# Patient Record
Sex: MALE | Race: ASIAN | NOT HISPANIC OR LATINO
[De-identification: names, ages, dates, MRNs, and addresses within clinical notes are randomized per-mention and may not be internally consistent; named-entity substitution may affect disease eponyms.]

---

## 2017-01-23 ENCOUNTER — FORM ENCOUNTER (OUTPATIENT)
Age: 62
End: 2017-01-23

## 2017-01-24 ENCOUNTER — APPOINTMENT (OUTPATIENT)
Age: 62
End: 2017-01-24

## 2017-02-01 PROBLEM — Z87.891 FORMER SMOKER: Status: ACTIVE | Noted: 2017-02-01

## 2017-03-31 ENCOUNTER — APPOINTMENT (OUTPATIENT)
Dept: VASCULAR SURGERY | Facility: CLINIC | Age: 62
End: 2017-03-31

## 2017-06-19 ENCOUNTER — INPATIENT (INPATIENT)
Facility: HOSPITAL | Age: 62
LOS: 1 days | Discharge: HOME | End: 2017-06-21
Attending: INTERNAL MEDICINE | Admitting: INTERNAL MEDICINE
Payer: MEDICAID

## 2017-06-19 VITALS
OXYGEN SATURATION: 100 % | RESPIRATION RATE: 20 BRPM | SYSTOLIC BLOOD PRESSURE: 181 MMHG | DIASTOLIC BLOOD PRESSURE: 109 MMHG | HEART RATE: 77 BPM | TEMPERATURE: 98 F

## 2017-06-19 DIAGNOSIS — D69.6 THROMBOCYTOPENIA, UNSPECIFIED: ICD-10-CM

## 2017-06-19 DIAGNOSIS — R30.0 DYSURIA: ICD-10-CM

## 2017-06-19 DIAGNOSIS — E87.70 FLUID OVERLOAD, UNSPECIFIED: ICD-10-CM

## 2017-06-19 DIAGNOSIS — N18.9 CHRONIC KIDNEY DISEASE, UNSPECIFIED: ICD-10-CM

## 2017-06-19 DIAGNOSIS — N18.6 END STAGE RENAL DISEASE: ICD-10-CM

## 2017-06-19 DIAGNOSIS — I77.0 ARTERIOVENOUS FISTULA, ACQUIRED: Chronic | ICD-10-CM

## 2017-06-19 DIAGNOSIS — Z29.9 ENCOUNTER FOR PROPHYLACTIC MEASURES, UNSPECIFIED: ICD-10-CM

## 2017-06-19 DIAGNOSIS — I10 ESSENTIAL (PRIMARY) HYPERTENSION: ICD-10-CM

## 2017-06-19 LAB
ALBUMIN SERPL ELPH-MCNC: 4 G/DL — SIGNIFICANT CHANGE UP (ref 3.3–5)
ALP SERPL-CCNC: 94 U/L — SIGNIFICANT CHANGE UP (ref 40–120)
ALT FLD-CCNC: 8 U/L — SIGNIFICANT CHANGE UP (ref 4–41)
AST SERPL-CCNC: 8 U/L — SIGNIFICANT CHANGE UP (ref 4–40)
BASE EXCESS BLDV CALC-SCNC: -4.6 MMOL/L — SIGNIFICANT CHANGE UP
BASOPHILS # BLD AUTO: 0.02 K/UL — SIGNIFICANT CHANGE UP (ref 0–0.2)
BASOPHILS NFR BLD AUTO: 0.3 % — SIGNIFICANT CHANGE UP (ref 0–2)
BILIRUB SERPL-MCNC: 0.3 MG/DL — SIGNIFICANT CHANGE UP (ref 0.2–1.2)
BLOOD GAS VENOUS - CREATININE: > 17.4 MG/DL — HIGH (ref 0.5–1.3)
BUN SERPL-MCNC: 110 MG/DL — HIGH (ref 7–23)
CALCIUM SERPL-MCNC: 8.9 MG/DL — SIGNIFICANT CHANGE UP (ref 8.4–10.5)
CHLORIDE BLDV-SCNC: 103 MMOL/L — SIGNIFICANT CHANGE UP (ref 96–108)
CHLORIDE SERPL-SCNC: 95 MMOL/L — LOW (ref 98–107)
CK MB BLD-MCNC: 3.39 NG/ML — SIGNIFICANT CHANGE UP (ref 1–6.6)
CK SERPL-CCNC: 154 U/L — SIGNIFICANT CHANGE UP (ref 30–200)
CO2 SERPL-SCNC: 19 MMOL/L — LOW (ref 22–31)
CREAT SERPL-MCNC: 17.06 MG/DL — HIGH (ref 0.5–1.3)
EOSINOPHIL # BLD AUTO: 0.11 K/UL — SIGNIFICANT CHANGE UP (ref 0–0.5)
EOSINOPHIL NFR BLD AUTO: 1.8 % — SIGNIFICANT CHANGE UP (ref 0–6)
GAS PNL BLDV: 135 MMOL/L — LOW (ref 136–146)
GLUCOSE BLDV-MCNC: 91 — SIGNIFICANT CHANGE UP (ref 70–99)
GLUCOSE SERPL-MCNC: 95 MG/DL — SIGNIFICANT CHANGE UP (ref 70–99)
HBV SURFACE AG SER-ACNC: NEGATIVE — SIGNIFICANT CHANGE UP
HCO3 BLDV-SCNC: 20 MMOL/L — SIGNIFICANT CHANGE UP (ref 20–27)
HCT VFR BLD CALC: 23 % — LOW (ref 39–50)
HCT VFR BLDV CALC: 23.6 % — LOW (ref 39–51)
HGB BLD-MCNC: 7.4 G/DL — LOW (ref 13–17)
HGB BLDV-MCNC: 7.6 G/DL — LOW (ref 13–17)
IMM GRANULOCYTES NFR BLD AUTO: 0.2 % — SIGNIFICANT CHANGE UP (ref 0–1.5)
LACTATE BLDV-MCNC: 0.6 MMOL/L — SIGNIFICANT CHANGE UP (ref 0.5–2)
LIDOCAIN IGE QN: 77.3 U/L — HIGH (ref 7–60)
LYMPHOCYTES # BLD AUTO: 1.59 K/UL — SIGNIFICANT CHANGE UP (ref 1–3.3)
LYMPHOCYTES # BLD AUTO: 26.5 % — SIGNIFICANT CHANGE UP (ref 13–44)
MCHC RBC-ENTMCNC: 30 PG — SIGNIFICANT CHANGE UP (ref 27–34)
MCHC RBC-ENTMCNC: 32.2 % — SIGNIFICANT CHANGE UP (ref 32–36)
MCV RBC AUTO: 93.1 FL — SIGNIFICANT CHANGE UP (ref 80–100)
MONOCYTES # BLD AUTO: 0.46 K/UL — SIGNIFICANT CHANGE UP (ref 0–0.9)
MONOCYTES NFR BLD AUTO: 7.7 % — SIGNIFICANT CHANGE UP (ref 2–14)
NEUTROPHILS # BLD AUTO: 3.82 K/UL — SIGNIFICANT CHANGE UP (ref 1.8–7.4)
NEUTROPHILS NFR BLD AUTO: 63.5 % — SIGNIFICANT CHANGE UP (ref 43–77)
NT-PROBNP SERPL-SCNC: 9621 PG/ML — SIGNIFICANT CHANGE UP
PCO2 BLDV: 45 MMHG — SIGNIFICANT CHANGE UP (ref 41–51)
PH BLDV: 7.29 PH — LOW (ref 7.32–7.43)
PLATELET # BLD AUTO: 109 K/UL — LOW (ref 150–400)
PMV BLD: 9.3 FL — SIGNIFICANT CHANGE UP (ref 7–13)
PO2 BLDV: 35 MMHG — SIGNIFICANT CHANGE UP (ref 35–40)
POTASSIUM BLDV-SCNC: 4.8 MMOL/L — HIGH (ref 3.4–4.5)
POTASSIUM SERPL-MCNC: 5.1 MMOL/L — SIGNIFICANT CHANGE UP (ref 3.5–5.3)
POTASSIUM SERPL-SCNC: 5.1 MMOL/L — SIGNIFICANT CHANGE UP (ref 3.5–5.3)
PROT SERPL-MCNC: 7 G/DL — SIGNIFICANT CHANGE UP (ref 6–8.3)
RBC # BLD: 2.47 M/UL — LOW (ref 4.2–5.8)
RBC # FLD: 16.2 % — HIGH (ref 10.3–14.5)
SAO2 % BLDV: 53.1 % — LOW (ref 60–85)
SODIUM SERPL-SCNC: 137 MMOL/L — SIGNIFICANT CHANGE UP (ref 135–145)
TROPONIN T SERPL-MCNC: < 0.06 NG/ML — SIGNIFICANT CHANGE UP (ref 0–0.06)
WBC # BLD: 6.01 K/UL — SIGNIFICANT CHANGE UP (ref 3.8–10.5)
WBC # FLD AUTO: 6.01 K/UL — SIGNIFICANT CHANGE UP (ref 3.8–10.5)

## 2017-06-19 PROCEDURE — 71020: CPT | Mod: 26

## 2017-06-19 PROCEDURE — 99223 1ST HOSP IP/OBS HIGH 75: CPT

## 2017-06-19 RX ORDER — AMLODIPINE BESYLATE 2.5 MG/1
10 TABLET ORAL DAILY
Qty: 0 | Refills: 0 | Status: DISCONTINUED | OUTPATIENT
Start: 2017-06-20 | End: 2017-06-21

## 2017-06-19 RX ORDER — CINACALCET 30 MG/1
30 TABLET, FILM COATED ORAL DAILY
Qty: 0 | Refills: 0 | Status: DISCONTINUED | OUTPATIENT
Start: 2017-06-19 | End: 2017-06-21

## 2017-06-19 RX ORDER — ERYTHROPOIETIN 10000 [IU]/ML
20000 INJECTION, SOLUTION INTRAVENOUS; SUBCUTANEOUS
Qty: 0 | Refills: 0 | Status: DISCONTINUED | OUTPATIENT
Start: 2017-06-19 | End: 2017-06-21

## 2017-06-19 RX ORDER — CALCIUM ACETATE 667 MG
2001 TABLET ORAL
Qty: 0 | Refills: 0 | Status: DISCONTINUED | OUTPATIENT
Start: 2017-06-19 | End: 2017-06-21

## 2017-06-19 RX ADMIN — ERYTHROPOIETIN 20000 UNIT(S): 10000 INJECTION, SOLUTION INTRAVENOUS; SUBCUTANEOUS at 23:04

## 2017-06-19 NOTE — ED ADULT NURSE NOTE - CHIEF COMPLAINT QUOTE
patient missed HD (normally MWF) last friday and today because he was out of the country. Arrived back from [place] on thursday and was unable to make it to HD on Friday because he was feeling tired. pt unable to go to HD today. pt states he feels SOB and is having difficulty speaking between breaths, fine crackles in the bases.    EKG complete

## 2017-06-19 NOTE — H&P ADULT - PROBLEM SELECTOR PLAN 1
ESRD on HD, missed HD x 2, HD tonight and in AM  F/U BMP, CBC, Renal F/U  Fall/aspiration precaution

## 2017-06-19 NOTE — ED PROVIDER NOTE - ATTENDING CONTRIBUTION TO CARE
Rae CASTRO- 63 Y/O M with h/o htn, esrd s/p kidney stone obstruction p/w sob after he missed dialysis last two times, last wed as pt was out of country and hence missed it, pt has chills now but no fever, cough. Pt makes small urine.    Pt is alert, well appearing male, s1s2 normal reg, b/l rales with jvd , abd soft, nt, nd, neuro exam aox3, cn 2-12 intact , skin warm, dry, good turgor, left arm av fistula thrill+    Dx:      fluid overload 2/2 missing HD, admit

## 2017-06-19 NOTE — CONSULT NOTE ADULT - PROBLEM SELECTOR RECOMMENDATION 9
urgent HD tonight, 2 hrs, 2K bath, net uf 2kg as tolearted,   next HD tomorrow w/2K bath,  net uf 2.5-3kg as tolearted,   send hepatitis profile w/hd  renal diet, fluid restriction  DENIZ holt in AM to send readmission paperwork for outpt HD at [facility] urgent HD tonight, 2 hrs, 2K bath, net uf 2kg as tolerated,   next HD tomorrow w/2K bath,  net uf 2.5-3kg as tolerated,   send hepatitis profile w/hd  renal diet, fluid restriction  DENIZ holt in AM to send readmission paperwork for [place] [facility]

## 2017-06-19 NOTE — H&P ADULT - MUSCULOSKELETAL
details… detailed exam no calf tenderness/no joint erythema/no joint warmth/normal strength/ROM intact/no joint swelling

## 2017-06-19 NOTE — ED PROVIDER NOTE - PSH
Acquired arteriovenous fistula  left wrist  1/2015 - [facility], Left upper arm 4/2015 (approximate date)  S/P Nephrectomy  (L) 2007

## 2017-06-19 NOTE — ED ADULT TRIAGE NOTE - CHIEF COMPLAINT QUOTE
patient missed HD (normally MWF) last friday and today because he was out of the country. Arrived back from [place] on thursday and was unable to make it to HD on Friday because he was feeling tired. pt unable to go to HD today. pt states he feels SOB and is having difficulty speaking between breaths, fine crackles in the bases.    EKG complete

## 2017-06-19 NOTE — H&P ADULT - PMH
Chronic kidney disease    Hemodialysis access, AV graft  Left upper extremity  HTN (Hypertension)    Hyperparathyroidism    Kidney stones Anemia    Chronic kidney disease    Hemodialysis access, AV graft  Left upper extremity  HTN (Hypertension)    Hyperparathyroidism    Kidney stones    Thrombocytopenia

## 2017-06-19 NOTE — H&P ADULT - ASSESSMENT
63 y/o male with PMHx of ESRD on HD ( M-W-F )  HTN, Chronic Anemia, Thrombocytopenia, S/P Left Nephrectomy due to Renal stone,  presenting to the ED c/o SOB that began yesterday, worse when he lays down flat, better when sitting forward. Pt reports he missed two days of dialysis because he was out of the country in [place] for the past 3 months.  Admits to epigastric pain that he said felt like gas and has resolved, NO CP, + SOB, mild dizziness, no HA, no N/V, no cough, + Dysuria, no fever, still making urine, . Denies other abdominal pain, diarrhea, fever, chills, recent illness. Pt is A+O x 3, seen By Nephrology tonight for Volume overload, HD tonight and tomorrow,

## 2017-06-19 NOTE — CONSULT NOTE ADULT - ASSESSMENT
62y Male w/ESRD on HD, HTN, anemia last HD was 6/14/17 w/fluid overload, uncontrolled BP, uremia, M.acidosis. needs urgent HD tonight 62y Male w/ESRD on HD, HTN, anemia, out of Country for >3 months, s/p missed HD, [place] spot p/w SOB. last HD was 6/14/17 w/fluid overload, uncontrolled BP, uremia, M.acidosis. needs urgent HD tonight

## 2017-06-19 NOTE — ED ADULT NURSE NOTE - CHPI ED SYMPTOMS NEG
no chills/no vomiting/no fever/no dizziness/no decreased eating/drinking/no numbness/no pain/no nausea/no tingling

## 2017-06-19 NOTE — ED PROVIDER NOTE - PMH
Chronic kidney disease    Hemodialysis access, AV graft  Left upper extremity  HTN (Hypertension)    Hyperparathyroidism    Kidney stones

## 2017-06-19 NOTE — ED PROVIDER NOTE - OBJECTIVE STATEMENT
63 yo male with PMHx of CKD on HD, htn presenting to the ED c/o SOB that began yesterday, worse when he lays down flat, better when sitting forward. Pt reports he missed two days of dialysis because he was out of the country in [place]. Admits to epigastric pain that he said felt like gas and has resolved, mild chest discomfort. Denies other abdominal pain, diarrhea, fever, chills, recent illness, urinary sx. 61 yo male with PMHx of CKD on HD, htn presenting to the ED c/o SOB that began yesterday, worse when he lays down flat, better when sitting forward. Pt reports he missed two days of dialysis because he was out of the country in [place]. Admits to epigastric pain that he said felt like gas and has resolved, mild chest discomfort. Denies other abdominal pain, diarrhea, fever, chills, recent illness, urinary sx.    [facility] [phone]. Dr. Jovan Garrison

## 2017-06-19 NOTE — CONSULT NOTE ADULT - SUBJECTIVE AND OBJECTIVE BOX
[facility] - Alicia / Ej S /Federico/ S Bojorquez/ S Faust/ Jovanbecky Juniorez / GAYATRI Njeru  ---------------------------------------------------------------------------------------------------------------    Patient is a 62y Male w/ESRD on HD, HTN, anemia out of country for >3mths, [place] new hepatitis profile, was advised by outpt HD unit to go to ER for HD and to arrange for outpt HD. c/o SOB since last night.     Patient seen and examined bedside in ER    PAST MEDICAL & SURGICAL HISTORY:  Hyperparathyroidism  Hemodialysis access, AV graft: Left upper extremity  Kidney stones  Chronic kidney disease  HTN (Hypertension)  Acquired arteriovenous fistula: left wrist  1/2015 - [facility], Left upper arm 4/2015 (approximate date)  S/P Nephrectomy: (L) 2007      Allergies: No Known Allergies    Home Medications Reviewed  Hospital Medications:   MEDICATIONS  (STANDING):  epoetin tammi Injectable 35000Wylg(s) IV Push <User Schedule>    SOCIAL HISTORY:  Denies ETOh,Smoking, illicit drug use  FAMILY HISTORY:  No pertinent family history  No pertinent family history in first degree relatives      REVIEW OF SYSTEMS:  CONSTITUTIONAL: No weakness, fevers or chills  EYES/ENT: No visual changes;  No vertigo or throat pain   NECK: No pain or stiffness  RESPIRATORY: No cough, wheezing, hemoptysis; No shortness of breath  CARDIOVASCULAR: No chest pain or palpitations.  GASTROINTESTINAL: No abdominal or epigastric pain. No nausea, vomiting, or hematemesis; No diarrhea or constipation. No melena or hematochezia.  GENITOURINARY: No dysuria, frequency, foamy urine, urinary urgency, incontinence or hematuria  NEUROLOGICAL: No numbness or weakness  SKIN: No itching, burning, rashes, or lesions   VASCULAR: No bilateral lower extremity edema.   All other review of systems is negative unless indicated above.    VITALS:  T(F): 98.4, Max: 98.4 (06-19 @ 22:02)  HR: 60  BP: 174/107  RR: 16  SpO2: 100%  Wt(kg): --        PHYSICAL EXAM:  Constitutional: NAD  HEENT: anicteric sclera, oropharynx clear, MMM  Neck: No JVD  Respiratory: CTAB, no wheezes, rales or rhonchi  Cardiovascular: S1, S2, RRR  Gastrointestinal: BS+, soft, NT/ND  Extremities: No cyanosis or clubbing. No peripheral edema  Neurological: A/O x 3, no focal deficits  Psychiatric: Normal mood, normal affect  : No CVA tenderness. No siegel.   Skin: No rashes  Vascular Access: TATIANA AVF+thrill    LABS:  06-19    137  |  95<L>  |  110<H>  ----------------------------<  95  5.1   |  19<L>  |  17.06<H>    Ca    8.9      19 Jun 2017 19:01    TPro  7.0  /  Alb  4.0  /  TBili  0.3  /  DBili      /  AST  8   /  ALT  8   /  AlkPhos  94  06-19                          7.4    6.01  )-----------( 109      ( 19 Jun 2017 19:01 )             23.0     Urine Studies:        RADIOLOGY & ADDITIONAL STUDIES: [facility] - Alicia / Ej S /Federico/ S Maryellen/ S Govind/ Jovan Garrison / GAYATRI Njeru  ---------------------------------------------------------------------------------------------------------------    Patient is a 62y Male w/ESRD on HD, HTN, anemia, pt been out of country to [place] for >3mths, [place] 6[place] w/o new hepatitis profile, called was outpt HD unit today AM who advised by to go to ER for HD and to get hepatitis profile checked for readmission to outpt HD unit. Pt well known to me from [facility]. Pt had last HD 6/14/17 in [place]. States slept for 2 days after [place] to [place], hence came late to ER. c/o SOB since last night. Denies cp.     Patient seen and examined bedside in ER earlier    PAST MEDICAL & SURGICAL HISTORY:  Hyperparathyroidism  Hemodialysis access, AV graft: Left upper extremity  Kidney stones  Chronic kidney disease  HTN (Hypertension)  Acquired arteriovenous fistula: left wrist  1/2015 - [facility], Left upper arm 4/2015 (approximate date)  S/P Nephrectomy: (L) 2007      Allergies: No Known Allergies    Home Medications Reviewed  Hospital Medications:   MEDICATIONS  (STANDING):  epoetin tammi Injectable 77830Qwys(s) IV Push <User Schedule>    SOCIAL HISTORY:  Denies ETOh,Smoking, illicit drug use  FAMILY HISTORY:  No pertinent family history  No pertinent family history in first degree relatives      REVIEW OF SYSTEMS:  CONSTITUTIONAL: No weakness, fevers or chills  EYES/ENT: No visual changes;  No vertigo or throat pain   NECK: No pain or stiffness  RESPIRATORY: No cough, wheezing, hemoptysis; + shortness of breath, now better  CARDIOVASCULAR: No chest pain or palpitations.  GASTROINTESTINAL: No abdominal or epigastric pain. No nausea, vomiting, or hematemesis; No diarrhea or constipation. No melena or hematochezia.  GENITOURINARY: No dysuria, frequency, foamy urine, urinary urgency, incontinence or hematuria  NEUROLOGICAL: No numbness or weakness  SKIN: No itching, burning, rashes, or lesions   VASCULAR: No bilateral lower extremity edema.   All other review of systems is negative unless indicated above.    VITALS:  T(F): 98.4, Max: 98.4 (06-19 @ 22:02)  HR: 60  BP: 174/107  RR: 16  SpO2: 100%  Wt(kg): --        PHYSICAL EXAM:  Constitutional: NAD, comfortable lying in bed  HEENT: anicteric sclera, oropharynx clear, MMM  Neck: No JVD  Respiratory: CTAB, no wheezes, rales or rhonchi  Cardiovascular: S1, S2, RRR  Gastrointestinal: BS+, soft, NT/ND  Extremities: No cyanosis or clubbing. No peripheral edema  Neurological: A/O x 3, no focal deficits  Psychiatric: Normal mood, normal affect  : No CVA tenderness. No siegel.   Skin: No rashes  Vascular Access: TATIANA AVF+thrill    LABS:  06-19    137  |  95<L>  |  110<H>  ----------------------------<  95  5.1   |  19<L>  |  17.06<H>    Ca    8.9      19 Jun 2017 19:01    TPro  7.0  /  Alb  4.0  /  TBili  0.3  /  DBili      /  AST  8   /  ALT  8   /  AlkPhos  94  06-19                          7.4    6.01  )-----------( 109      ( 19 Jun 2017 19:01 )             23.0     Urine Studies:        RADIOLOGY & ADDITIONAL STUDIES:

## 2017-06-19 NOTE — ED PROVIDER NOTE - PROGRESS NOTE DETAILS
Rae CASTRO- paged renal and plan to admit for urgent dialysis, pt is stable now, no resp distress, mild fluid overload

## 2017-06-19 NOTE — ED PROVIDER NOTE - MEDICAL DECISION MAKING DETAILS
61 yo male with CKD on HD and HTN  co sob x 1 day  Plan: labs, cxr, ekg 63 yo male with CKD on HD and HTN  co sob x 1 day  Plan: labs, cxr, ekg. likely admit for HD

## 2017-06-19 NOTE — ED PROVIDER NOTE - SKIN, MLM
Skin normal color for race, warm, dry and intact. No evidence of rash. mass on thoracic spine, non fluctuant

## 2017-06-19 NOTE — ED ADULT NURSE NOTE - OBJECTIVE STATEMENT
Pt 62y male p/w missed dialysis recently because he was out of country in [place], returned on Thursday and was unable to make it to dialysis on Friday b/c pt felt fatigued and tired. Presents to ED today feeling SOB. Pt appears comfortable breathing unlabored and equal, pt 100% on RA, appears in NAD, no respiratory distress at this time. Pt usually goes to dialysis M/W/F.  PMH of CKD. Pt denies chest pain, abd pain, fever, chills, urinary symptoms. Will continue to monitor. Pt 62y male p/w missed dialysis recently because he was out of country in [place], returned on Thursday and was unable to make it to dialysis on Friday b/c pt felt fatigued and tired. Presents to ED today feeling SOB. Pt appears comfortable breathing unlabored and equal, pt 100% on RA, appears in NAD, no respiratory distress at this time. Pt usually goes to dialysis M/W/F with Left AVF +thrill.  PMH of CKD. Pt denies chest pain, abd pain, fever, chills, urinary symptoms. Will continue to monitor.

## 2017-06-19 NOTE — H&P ADULT - ATTENDING COMMENTS
Pt was seen & Examined by me, Dr. BEBE Brown on 6/19/17. Dr. Zepeda was notified by me as well.     Dr. Zepeda will resume the care of pt in AM.

## 2017-06-20 DIAGNOSIS — N18.9 CHRONIC KIDNEY DISEASE, UNSPECIFIED: ICD-10-CM

## 2017-06-20 LAB
ALBUMIN SERPL ELPH-MCNC: 3.7 G/DL — SIGNIFICANT CHANGE UP (ref 3.3–5)
ALP SERPL-CCNC: 83 U/L — SIGNIFICANT CHANGE UP (ref 40–120)
ALT FLD-CCNC: 9 U/L — SIGNIFICANT CHANGE UP (ref 4–41)
APPEARANCE UR: CLEAR — SIGNIFICANT CHANGE UP
APTT BLD: 29.1 SEC — SIGNIFICANT CHANGE UP (ref 27.5–37.4)
AST SERPL-CCNC: 8 U/L — SIGNIFICANT CHANGE UP (ref 4–40)
BASOPHILS # BLD AUTO: 0.01 K/UL — SIGNIFICANT CHANGE UP (ref 0–0.2)
BASOPHILS NFR BLD AUTO: 0.2 % — SIGNIFICANT CHANGE UP (ref 0–2)
BILIRUB SERPL-MCNC: 0.3 MG/DL — SIGNIFICANT CHANGE UP (ref 0.2–1.2)
BILIRUB UR-MCNC: NEGATIVE — SIGNIFICANT CHANGE UP
BLD GP AB SCN SERPL QL: NEGATIVE — SIGNIFICANT CHANGE UP
BLOOD UR QL VISUAL: HIGH
BUN SERPL-MCNC: 57 MG/DL — HIGH (ref 7–23)
CALCIUM SERPL-MCNC: 8.6 MG/DL — SIGNIFICANT CHANGE UP (ref 8.4–10.5)
CHLORIDE SERPL-SCNC: 101 MMOL/L — SIGNIFICANT CHANGE UP (ref 98–107)
CO2 SERPL-SCNC: 22 MMOL/L — SIGNIFICANT CHANGE UP (ref 22–31)
COLOR SPEC: SIGNIFICANT CHANGE UP
CREAT SERPL-MCNC: 10.76 MG/DL — HIGH (ref 0.5–1.3)
EOSINOPHIL # BLD AUTO: 0.09 K/UL — SIGNIFICANT CHANGE UP (ref 0–0.5)
EOSINOPHIL NFR BLD AUTO: 1.6 % — SIGNIFICANT CHANGE UP (ref 0–6)
FERRITIN SERPL-MCNC: 864.7 NG/ML — HIGH (ref 30–400)
FOLATE SERPL-MCNC: 11.4 NG/ML — SIGNIFICANT CHANGE UP (ref 4.7–20)
GLUCOSE SERPL-MCNC: 83 MG/DL — SIGNIFICANT CHANGE UP (ref 70–99)
GLUCOSE UR-MCNC: 50 — SIGNIFICANT CHANGE UP
HAV IGM SER-ACNC: NONREACTIVE — SIGNIFICANT CHANGE UP
HAV IGM SER-ACNC: NONREACTIVE — SIGNIFICANT CHANGE UP
HBA1C BLD-MCNC: 5.3 % — SIGNIFICANT CHANGE UP (ref 4–5.6)
HBV CORE AB SER-ACNC: NONREACTIVE — SIGNIFICANT CHANGE UP
HBV CORE IGM SER-ACNC: NONREACTIVE — SIGNIFICANT CHANGE UP
HBV CORE IGM SER-ACNC: NONREACTIVE — SIGNIFICANT CHANGE UP
HBV SURFACE AB SER-ACNC: 919.8 MLU/ML — SIGNIFICANT CHANGE UP
HBV SURFACE AG SER-ACNC: NONREACTIVE — SIGNIFICANT CHANGE UP
HBV SURFACE AG SER-ACNC: NONREACTIVE — SIGNIFICANT CHANGE UP
HCT VFR BLD CALC: 21.8 % — LOW (ref 39–50)
HCV AB S/CO SERPL IA: 0.83 S/CO — SIGNIFICANT CHANGE UP
HCV AB S/CO SERPL IA: 0.88 S/CO — SIGNIFICANT CHANGE UP
HCV AB SERPL-IMP: SIGNIFICANT CHANGE UP
HCV AB SERPL-IMP: SIGNIFICANT CHANGE UP
HGB BLD-MCNC: 7.1 G/DL — LOW (ref 13–17)
HIV1 AG SER QL: SIGNIFICANT CHANGE UP
HIV1+2 AB SPEC QL: SIGNIFICANT CHANGE UP
IMM GRANULOCYTES NFR BLD AUTO: 0.2 % — SIGNIFICANT CHANGE UP (ref 0–1.5)
INR BLD: 0.97 — SIGNIFICANT CHANGE UP (ref 0.88–1.17)
IRON SATN MFR SERPL: 175 UG/DL — SIGNIFICANT CHANGE UP (ref 155–535)
IRON SATN MFR SERPL: 59 UG/DL — SIGNIFICANT CHANGE UP (ref 45–165)
KETONES UR-MCNC: NEGATIVE — SIGNIFICANT CHANGE UP
LEUKOCYTE ESTERASE UR-ACNC: NEGATIVE — SIGNIFICANT CHANGE UP
LIDOCAIN IGE QN: 63.6 U/L — HIGH (ref 7–60)
LYMPHOCYTES # BLD AUTO: 1.3 K/UL — SIGNIFICANT CHANGE UP (ref 1–3.3)
LYMPHOCYTES # BLD AUTO: 23.6 % — SIGNIFICANT CHANGE UP (ref 13–44)
MAGNESIUM SERPL-MCNC: 1.8 MG/DL — SIGNIFICANT CHANGE UP (ref 1.6–2.6)
MCHC RBC-ENTMCNC: 30.1 PG — SIGNIFICANT CHANGE UP (ref 27–34)
MCHC RBC-ENTMCNC: 32.6 % — SIGNIFICANT CHANGE UP (ref 32–36)
MCV RBC AUTO: 92.4 FL — SIGNIFICANT CHANGE UP (ref 80–100)
MONOCYTES # BLD AUTO: 0.58 K/UL — SIGNIFICANT CHANGE UP (ref 0–0.9)
MONOCYTES NFR BLD AUTO: 10.5 % — SIGNIFICANT CHANGE UP (ref 2–14)
NEUTROPHILS # BLD AUTO: 3.53 K/UL — SIGNIFICANT CHANGE UP (ref 1.8–7.4)
NEUTROPHILS NFR BLD AUTO: 63.9 % — SIGNIFICANT CHANGE UP (ref 43–77)
NITRITE UR-MCNC: NEGATIVE — SIGNIFICANT CHANGE UP
PH UR: 8 — SIGNIFICANT CHANGE UP (ref 4.6–8)
PHOSPHATE SERPL-MCNC: 5.1 MG/DL — HIGH (ref 2.5–4.5)
PLATELET # BLD AUTO: 116 K/UL — LOW (ref 150–400)
PMV BLD: 10.3 FL — SIGNIFICANT CHANGE UP (ref 7–13)
POTASSIUM SERPL-MCNC: 3.7 MMOL/L — SIGNIFICANT CHANGE UP (ref 3.5–5.3)
POTASSIUM SERPL-SCNC: 3.7 MMOL/L — SIGNIFICANT CHANGE UP (ref 3.5–5.3)
PROT SERPL-MCNC: 6.4 G/DL — SIGNIFICANT CHANGE UP (ref 6–8.3)
PROT UR-MCNC: 100 — SIGNIFICANT CHANGE UP
PROTHROM AB SERPL-ACNC: 10.9 SEC — SIGNIFICANT CHANGE UP (ref 9.8–13.1)
RBC # BLD: 2.36 M/UL — LOW (ref 4.2–5.8)
RBC # FLD: 16 % — HIGH (ref 10.3–14.5)
RBC CASTS # UR COMP ASSIST: SIGNIFICANT CHANGE UP (ref 0–?)
RH IG SCN BLD-IMP: POSITIVE — SIGNIFICANT CHANGE UP
SODIUM SERPL-SCNC: 143 MMOL/L — SIGNIFICANT CHANGE UP (ref 135–145)
SP GR SPEC: 1.01 — SIGNIFICANT CHANGE UP (ref 1–1.03)
SQUAMOUS # UR AUTO: SIGNIFICANT CHANGE UP
T4 FREE SERPL-MCNC: 1.44 NG/DL — SIGNIFICANT CHANGE UP (ref 0.9–1.8)
TSH SERPL-MCNC: 0.7 UIU/ML — SIGNIFICANT CHANGE UP (ref 0.27–4.2)
UIBC SERPL-MCNC: 116 UG/DL — SIGNIFICANT CHANGE UP (ref 110–370)
UROBILINOGEN FLD QL: NORMAL E.U. — SIGNIFICANT CHANGE UP (ref 0.1–0.2)
VIT B12 SERPL-MCNC: 355 PG/ML — SIGNIFICANT CHANGE UP (ref 200–900)
WBC # BLD: 5.52 K/UL — SIGNIFICANT CHANGE UP (ref 3.8–10.5)
WBC # FLD AUTO: 5.52 K/UL — SIGNIFICANT CHANGE UP (ref 3.8–10.5)
WBC UR QL: SIGNIFICANT CHANGE UP (ref 0–?)

## 2017-06-20 RX ADMIN — Medication 2001 MILLIGRAM(S): at 13:09

## 2017-06-20 RX ADMIN — Medication 2001 MILLIGRAM(S): at 20:43

## 2017-06-20 RX ADMIN — Medication 2001 MILLIGRAM(S): at 09:13

## 2017-06-20 RX ADMIN — AMLODIPINE BESYLATE 10 MILLIGRAM(S): 2.5 TABLET ORAL at 06:20

## 2017-06-20 RX ADMIN — CINACALCET 30 MILLIGRAM(S): 30 TABLET, FILM COATED ORAL at 13:09

## 2017-06-20 NOTE — PROGRESS NOTE ADULT - SUBJECTIVE AND OBJECTIVE BOX
Patient is a 62y old  Male who presents with a chief complaint of SOB, NEEDS HD tonight, Volume overload (2017 22:17)      INTERVAL HPI/OVERNIGHT EVENTS:  T(C): 36.7, Max: 37 (06-20 @ 15:08)  HR: 101 (68 - 101)  BP: 118/81 (111/60 - 156/86)  RR: 18 (18 - 18)  SpO2: 98% (98% - 100%)  Wt(kg): --  I&O's Summary  I & Os for 24h ending 2017 07:00  =============================================  IN: 400 ml / OUT: 2400 ml / NET: -2000 ml    I & Os for current day (as of 2017 23:36)  =============================================  IN: 500 ml / OUT: 3300 ml / NET: -2800 ml      PAST MEDICAL & SURGICAL HISTORY:  Thrombocytopenia  Anemia  Hyperparathyroidism  Hemodialysis access, AV graft: Left upper extremity  Kidney stones  Chronic kidney disease  HTN (Hypertension)  Acquired arteriovenous fistula: left wrist  2015 - [facility], Left upper arm 2015 (approximate date)  S/P Nephrectomy: (L)       SOCIAL HISTORY  Alcohol:  Tobacco:  Illicit substance use:    FAMILY HISTORY:    REVIEW OF SYSTEMS:  CONSTITUTIONAL: No fever, weight loss, or fatigue  EYES: No eye pain, visual disturbances, or discharge  ENMT:  No difficulty hearing, tinnitus, vertigo; No sinus or throat pain  NECK: No pain or stiffness  RESPIRATORY: No cough, wheezing, chills or hemoptysis; No shortness of breath  CARDIOVASCULAR: No chest pain, palpitations, dizziness, or leg swelling  GASTROINTESTINAL: No abdominal or epigastric pain. No nausea, vomiting, or hematemesis; No diarrhea or constipation. No melena or hematochezia.  GENITOURINARY: No dysuria, frequency, hematuria, or incontinence  NEUROLOGICAL: No headaches, memory loss, loss of strength, numbness, or tremors  SKIN: No itching, burning, rashes, or lesions   LYMPH NODES: No enlarged glands  ENDOCRINE: No heat or cold intolerance; No hair loss  MUSCULOSKELETAL: No joint pain or swelling; No muscle, back, or extremity pain  PSYCHIATRIC: No depression, anxiety, mood swings, or difficulty sleeping  HEME/LYMPH: No easy bruising, or bleeding gums  ALLERY AND IMMUNOLOGIC: No hives or eczema    RADIOLOGY & ADDITIONAL TESTS:    Imaging Personally Reviewed:  [ ] YES  [ ] NO    Consultant(s) Notes Reviewed:  [ ] YES  [ ] NO    PHYSICAL EXAM:  GENERAL: NAD, well-groomed, well-developed  HEAD:  Atraumatic, Normocephalic  EYES: EOMI, PERRLA, conjunctiva and sclera clear  ENMT: No tonsillar erythema, exudates, or enlargement; Moist mucous membranes, Good dentition, No lesions  NECK: Supple, No JVD, Normal thyroid  NERVOUS SYSTEM:  Alert & Oriented X3, Good concentration; Motor Strength 5/5 B/L upper and lower extremities; DTRs 2+ intact and symmetric  CHEST/LUNG: Clear to percussion bilaterally; No rales, rhonchi, wheezing, or rubs  HEART: Regular rate and rhythm; No murmurs, rubs, or gallops  ABDOMEN: Soft, Nontender, Nondistended; Bowel sounds present  EXTREMITIES:  2+ Peripheral Pulses, No clubbing, cyanosis, or edema  LYMPH: No lymphadenopathy noted  SKIN: No rashes or lesions    LABS:                        7.1    5.52  )-----------( 116      ( 2017 06:00 )             21.8     06-20    143  |  101  |  57<H>  ----------------------------<  83  3.7   |  22  |  10.76<H>    Ca    8.6      2017 06:00  Phos  5.1     06-20  Mg     1.8     06-20    TPro  6.4  /  Alb  3.7  /  TBili  0.3  /  DBili  x   /  AST  8   /  ALT  9   /  AlkPhos  83  06-20    PT/INR - ( 2017 06:00 )   PT: 10.9 SEC;   INR: 0.97          PTT - ( 2017 06:00 )  PTT:29.1 SEC  Urinalysis Basic - ( 2017 07:00 )    Color: PLYEL / Appearance: CLEAR / S.006 / pH: 8.0  Gluc: 50 / Ketone: NEGATIVE  / Bili: NEGATIVE / Urobili: NORMAL E.U.   Blood: TRACE / Protein: 100 / Nitrite: NEGATIVE   Leuk Esterase: NEGATIVE / RBC: 2-5 / WBC 0-2   Sq Epi: OCC / Non Sq Epi: x / Bacteria: x      CAPILLARY BLOOD GLUCOSE        Urinalysis Basic - ( 2017 07:00 )    Color: PLYEL / Appearance: CLEAR / S.006 / pH: 8.0  Gluc: 50 / Ketone: NEGATIVE  / Bili: NEGATIVE / Urobili: NORMAL E.U.   Blood: TRACE / Protein: 100 / Nitrite: NEGATIVE   Leuk Esterase: NEGATIVE / RBC: 2-5 / WBC 0-2   Sq Epi: OCC / Non Sq Epi: x / Bacteria: x        MEDICATIONS  (STANDING):  epoetin tammi Injectable 29763Acjf(s) IV Push <User Schedule>  calcium acetate 2001milliGRAM(s) Oral three times a day with meals  cinacalcet 30milliGRAM(s) Oral daily  amLODIPine   Tablet 10milliGRAM(s) Oral daily    MEDICATIONS  (PRN):      Care Discussed with Consultants/Other Providers [ ] YES  [ ] NO

## 2017-06-20 NOTE — PROGRESS NOTE ADULT - ASSESSMENT
62y Male w/ESRD on HD, HTN, anemia, out of Country for >3 months, s/p missed HD, [place] spot p/w SOB. last HD was 6/14/17 w/fluid overload, uncontrolled BP, uremia, M.acidosis. requiring urgent HD last night. Renal following for HD. Rx sheet reviewed, net uf 2L, uneventful

## 2017-06-20 NOTE — PROGRESS NOTE ADULT - SUBJECTIVE AND OBJECTIVE BOX
[facility] - Alicia / Ej S /Federico/ SERA Bojorquez/ SERA Faust/ Jovan Garrison / GAYATRI Njeru  ---------------------------------------------------------------------------------------------------------------    Patient seen and examined bedside    Subjective and Objective: No overnight events, sob resolved. No complaints today. feeling better    Allergies: No Known Allergies      Hospital Medications:   MEDICATIONS  (STANDING):  epoetin tammi Injectable 39266Jqji(s) IV Push <User Schedule>  calcium acetate 2001milliGRAM(s) Oral three times a day with meals  cinacalcet 30milliGRAM(s) Oral daily  amLODIPine   Tablet 10milliGRAM(s) Oral daily    VITALS:  T(F): 98.1, Max: 98.6 ( @ 15:08)  HR: 79  BP: 147/97  RR: 18  SpO2: 100%  Wt(kg): --    I & Os for current day (as of  @ 18:39)  =============================================  IN: 400 ml / OUT: 2400 ml / NET: -2000 ml    Height (cm): 175.3 ( @ 22:37)  Weight (kg): 71.7 ( @ 22:45)  BMI (kg/m2): 23.3 ( @ 22:45)  BSA (m2): 1.87 ( @ 22:45)    PHYSICAL EXAM:  Constitutional: NAD  HEENT: anicteric sclera, oropharynx clear  Neck: No JVD  Respiratory: CTAB, no wheezes, rales or rhonchi  Cardiovascular: S1, S2, RRR  Gastrointestinal: BS+, soft, NT/ND  Extremities: No cyanosis or clubbing. No peripheral edema  Neurological: A/O x 3, no focal deficits  Psychiatric: Normal mood, normal affect  : No CVA tenderness. No siegel.   Skin: No rashes  Vascular Access: TATIANA AVF +thrill    LABS:      143  |  101  |  57<H>  ----------------------------<  83  3.7   |  22  |  10.76<H>    Ca    8.6      2017 06:00  Phos  5.1       Mg     1.8         TPro  6.4  /  Alb  3.7  /  TBili  0.3  /  DBili      /  AST  8   /  ALT  9   /  AlkPhos  83      Creatinine Trend: 10.76 <--, 17.06 <--                        7.1    5.52  )-----------( 116      ( 2017 06:00 )             21.8     Urine Studies:  Urinalysis Basic - ( 2017 07:00 )    Color: PLYEL / Appearance: CLEAR / S.006 / pH: 8.0  Gluc: 50 / Ketone: NEGATIVE  / Bili: NEGATIVE / Urobili: NORMAL E.U.   Blood: TRACE / Protein: 100 / Nitrite: NEGATIVE   Leuk Esterase: NEGATIVE / RBC: 2-5 / WBC 0-2   Sq Epi: OCC / Non Sq Epi:  / Bacteria:     RADIOLOGY & ADDITIONAL STUDIES:  RAD CHEST PA LAT  Clear lungs. [facility] - Alicia / Ej S /Federico/ SERA Bojorquez/ SERA Faust/ Jovan Garrison / GAYATRI Njeru  ---------------------------------------------------------------------------------------------------------------    Patient seen and examined bedside on HD    Subjective and Objective: No overnight events, sob resolved. No complaints today. uneventful HD last night    Allergies: No Known Allergies    Hospital Medications:   MEDICATIONS  (STANDING):  epoetin tammi Injectable 35349Edba(s) IV Push <User Schedule>  calcium acetate 2001milliGRAM(s) Oral three times a day with meals  cinacalcet 30milliGRAM(s) Oral daily  amLODIPine   Tablet 10milliGRAM(s) Oral daily    VITALS:  T(F): 98.1, Max: 98.6 ( @ 15:08)  HR: 79  BP: 147/97  RR: 18  SpO2: 100%  Wt(kg): --    I & Os for current day (as of  @ 18:39)  =============================================  IN: 400 ml / OUT: 2400 ml / NET: -2000 ml    Height (cm): 175.3 ( @ 22:37)  Weight (kg): 71.7 ( @ 22:45)  BMI (kg/m2): 23.3 ( @ 22:45)  BSA (m2): 1.87 ( @ 22:45)    PHYSICAL EXAM:  Constitutional: NAD  HEENT: anicteric sclera, oropharynx clear  Neck: No JVD  Respiratory: CTAB, no wheezes, rales or rhonchi  Cardiovascular: S1, S2, RRR  Gastrointestinal: BS+, soft, NT/ND  Extremities: No cyanosis or clubbing. No peripheral edema  Neurological: A/O x 3, no focal deficits  Psychiatric: Normal mood, normal affect  : No CVA tenderness. No siegel.   Skin: No rashes  Vascular Access: TATIANA AVF +thrill    LABS:      143  |  101  |  57<H>  ----------------------------<  83  3.7   |  22  |  10.76<H>    Ca    8.6      2017 06:00  Phos  5.1       Mg     1.8         TPro  6.4  /  Alb  3.7  /  TBili  0.3  /  DBili      /  AST  8   /  ALT  9   /  AlkPhos  83      Creatinine Trend: 10.76 <--, 17.06 <--                        7.1    5.52  )-----------( 116      ( 2017 06:00 )             21.8     Urine Studies:  Urinalysis Basic - ( 2017 07:00 )    Color: PLYEL / Appearance: CLEAR / S.006 / pH: 8.0  Gluc: 50 / Ketone: NEGATIVE  / Bili: NEGATIVE / Urobili: NORMAL E.U.   Blood: TRACE / Protein: 100 / Nitrite: NEGATIVE   Leuk Esterase: NEGATIVE / RBC: 2-5 / WBC 0-2   Sq Epi: OCC / Non Sq Epi:  / Bacteria:     RADIOLOGY & ADDITIONAL STUDIES:  RAD CHEST PA LAT  Clear lungs.

## 2017-06-20 NOTE — PROGRESS NOTE ADULT - PROBLEM SELECTOR PLAN 1
HD today w/2K bath,  net uf 2.5-3kg as tolerated,   f/u w/SW to send readmission paperwork for [facility]pt [facility] c/w HD now, change to 3K bath, net uf 3kg as tolerated, bp better on hd, uneventful so far  f/u w/SW to send readmission paperwork for outpt HD at [facility]  c/w reanl diet, phos binders

## 2017-06-21 VITALS
SYSTOLIC BLOOD PRESSURE: 110 MMHG | HEART RATE: 63 BPM | OXYGEN SATURATION: 98 % | DIASTOLIC BLOOD PRESSURE: 67 MMHG | RESPIRATION RATE: 18 BRPM | TEMPERATURE: 99 F

## 2017-06-21 LAB
ALBUMIN SERPL ELPH-MCNC: 3.9 G/DL — SIGNIFICANT CHANGE UP (ref 3.3–5)
ALP SERPL-CCNC: 87 U/L — SIGNIFICANT CHANGE UP (ref 40–120)
ALT FLD-CCNC: 9 U/L — SIGNIFICANT CHANGE UP (ref 4–41)
AST SERPL-CCNC: 10 U/L — SIGNIFICANT CHANGE UP (ref 4–40)
BACTERIA UR CULT: SIGNIFICANT CHANGE UP
BASOPHILS # BLD AUTO: 0.01 K/UL — SIGNIFICANT CHANGE UP (ref 0–0.2)
BASOPHILS NFR BLD AUTO: 0.1 % — SIGNIFICANT CHANGE UP (ref 0–2)
BILIRUB SERPL-MCNC: 0.3 MG/DL — SIGNIFICANT CHANGE UP (ref 0.2–1.2)
BUN SERPL-MCNC: 42 MG/DL — HIGH (ref 7–23)
CALCIUM SERPL-MCNC: 9.1 MG/DL — SIGNIFICANT CHANGE UP (ref 8.4–10.5)
CHLORIDE SERPL-SCNC: 87 MMOL/L — LOW (ref 98–107)
CO2 SERPL-SCNC: 29 MMOL/L — SIGNIFICANT CHANGE UP (ref 22–31)
CREAT SERPL-MCNC: 8.5 MG/DL — HIGH (ref 0.5–1.3)
EOSINOPHIL # BLD AUTO: 0.05 K/UL — SIGNIFICANT CHANGE UP (ref 0–0.5)
EOSINOPHIL NFR BLD AUTO: 0.5 % — SIGNIFICANT CHANGE UP (ref 0–6)
GLUCOSE SERPL-MCNC: 93 MG/DL — SIGNIFICANT CHANGE UP (ref 70–99)
HCT VFR BLD CALC: 26.4 % — LOW (ref 39–50)
HGB BLD-MCNC: 8.5 G/DL — LOW (ref 13–17)
IMM GRANULOCYTES NFR BLD AUTO: 0.1 % — SIGNIFICANT CHANGE UP (ref 0–1.5)
LYMPHOCYTES # BLD AUTO: 1.77 K/UL — SIGNIFICANT CHANGE UP (ref 1–3.3)
LYMPHOCYTES # BLD AUTO: 16.5 % — SIGNIFICANT CHANGE UP (ref 13–44)
MAGNESIUM SERPL-MCNC: 1.8 MG/DL — SIGNIFICANT CHANGE UP (ref 1.6–2.6)
MCHC RBC-ENTMCNC: 29.9 PG — SIGNIFICANT CHANGE UP (ref 27–34)
MCHC RBC-ENTMCNC: 32.2 % — SIGNIFICANT CHANGE UP (ref 32–36)
MCV RBC AUTO: 93 FL — SIGNIFICANT CHANGE UP (ref 80–100)
MONOCYTES # BLD AUTO: 1.07 K/UL — HIGH (ref 0–0.9)
MONOCYTES NFR BLD AUTO: 10 % — SIGNIFICANT CHANGE UP (ref 2–14)
NEUTROPHILS # BLD AUTO: 7.79 K/UL — HIGH (ref 1.8–7.4)
NEUTROPHILS NFR BLD AUTO: 72.8 % — SIGNIFICANT CHANGE UP (ref 43–77)
PHOSPHATE SERPL-MCNC: 4.8 MG/DL — HIGH (ref 2.5–4.5)
PLATELET # BLD AUTO: 134 K/UL — LOW (ref 150–400)
PMV BLD: 10.4 FL — SIGNIFICANT CHANGE UP (ref 7–13)
POTASSIUM SERPL-MCNC: 4.1 MMOL/L — SIGNIFICANT CHANGE UP (ref 3.5–5.3)
POTASSIUM SERPL-SCNC: 4.1 MMOL/L — SIGNIFICANT CHANGE UP (ref 3.5–5.3)
PROT SERPL-MCNC: 7.1 G/DL — SIGNIFICANT CHANGE UP (ref 6–8.3)
RBC # BLD: 2.84 M/UL — LOW (ref 4.2–5.8)
RBC # FLD: 15.7 % — HIGH (ref 10.3–14.5)
SODIUM SERPL-SCNC: 134 MMOL/L — LOW (ref 135–145)
SPECIMEN SOURCE: SIGNIFICANT CHANGE UP
WBC # BLD: 10.7 K/UL — HIGH (ref 3.8–10.5)
WBC # FLD AUTO: 10.7 K/UL — HIGH (ref 3.8–10.5)

## 2017-06-21 RX ORDER — AMLODIPINE BESYLATE 2.5 MG/1
1 TABLET ORAL
Qty: 0 | Refills: 0 | COMMUNITY

## 2017-06-21 RX ORDER — AMLODIPINE BESYLATE 2.5 MG/1
1 TABLET ORAL
Qty: 0 | Refills: 0 | DISCHARGE
Start: 2017-06-21

## 2017-06-21 RX ORDER — LOSARTAN POTASSIUM 100 MG/1
1 TABLET, FILM COATED ORAL
Qty: 0 | Refills: 0 | COMMUNITY

## 2017-06-21 RX ADMIN — CINACALCET 30 MILLIGRAM(S): 30 TABLET, FILM COATED ORAL at 12:47

## 2017-06-21 RX ADMIN — Medication 2001 MILLIGRAM(S): at 12:47

## 2017-06-21 RX ADMIN — AMLODIPINE BESYLATE 10 MILLIGRAM(S): 2.5 TABLET ORAL at 06:00

## 2017-06-21 RX ADMIN — Medication 2001 MILLIGRAM(S): at 08:52

## 2017-06-21 RX ADMIN — Medication 2001 MILLIGRAM(S): at 17:58

## 2017-06-21 NOTE — DISCHARGE NOTE ADULT - CARE PROVIDER_API CALL
Monalisa Vargas), Internal Medicine; Nephrology  [facility] [address] [place] [address]  Phone: [phone]  Fax: [phone]    PCP,   your PCP  Phone: (   )    -  Fax: (   )    -

## 2017-06-21 NOTE — PROGRESS NOTE ADULT - ASSESSMENT
62y Male w/ESRD on HD, HTN, anemia, out of Country [place], s/p missed 2 HD, [place] p/w SOB. was admitted for w/fluid overload, uncontrolled BP, uremia, M.acidosis. requiring urgent HD 6/19. Renal following for HD. s/p HD yesterday, Rx sheet reviewed, net uf 2.8L removed, uneventful. now euvolemic, K acceptable

## 2017-06-21 NOTE — DISCHARGE NOTE ADULT - MEDICATION SUMMARY - MEDICATIONS TO TAKE
I will START or STAY ON the medications listed below when I get home from the hospital:    amLODIPine 10 mg oral tablet  -- 1 tab(s) by mouth once a day  -- Indication: For HTN (hypertension)    Sensipar 30 mg oral tablet  -- 1 tab(s) by mouth once a day  -- Indication: For ESRD (end stage renal disease)    calcium acetate 667 mg oral tablet  -- 3 tab(s) by mouth 3 times a day  -- Indication: For ESRD (end stage renal disease)

## 2017-06-21 NOTE — DISCHARGE NOTE ADULT - HOSPITAL COURSE
62y Male w/ESRD on HD, HTN, anemia, out of [place], s/p missed HD, [place] spot p/w SOB. last HD was 6/14/17 w/fluid overload, uncontrolled BP, uremia, M.acidosis. requiring urgent HD last night 6/19 and 6/20. 62y Male w/ESRD on HD, HTN, anemia, out of Country for >3 months, s/p missed HD, [place] spot p/w SOB. last HD was 6/14/17 w/fluid overload, uncontrolled BP, uremia, M.acidosis. requiring urgent HD last night 6/19 and 6/20.     Tolerated HD well. Next session can be 6/23 at clinic.     Stable for discharge home

## 2017-06-21 NOTE — DISCHARGE NOTE ADULT - CARE PLAN
Principal Discharge DX:	ESRD (end stage renal disease)  Goal:	continue HD schedule  Instructions for follow-up, activity and diet:	resume HD schedule. make sure your attend session, if you are traveling make proper arrangements for your self  Secondary Diagnosis:	HTN (hypertension)  Instructions for follow-up, activity and diet:	Losartan was held. BP was normal with holding medications. Follow up with your PCP for further evaluation and management. Please call to make an appointment within 1-2 days for BP check Principal Discharge DX:	ESRD (end stage renal disease)  Goal:	NEXT HD SESSION FRIDAY 6/23***  Instructions for follow-up, activity and diet:	resume HD schedule. NEXT SESSION 6/23  make sure your attend session, if you are traveling make proper arrangements for your self  Secondary Diagnosis:	HTN (hypertension)  Instructions for follow-up, activity and diet:	Losartan was held. BP was normal with holding medications. Follow up with your PCP for further evaluation and management. Please call to make an appointment within 1-2 days for BP check

## 2017-06-21 NOTE — CHART NOTE - NSCHARTNOTEFT_GEN_A_CORE
Spoke with Dr. Zepeda at 11am, patient can be discharge home once outpatient HD is confirmed per renal. Team made aware for IDR this morning

## 2017-06-21 NOTE — PROGRESS NOTE ADULT - SUBJECTIVE AND OBJECTIVE BOX
[facility] - Alicia / Ej S /Federico/ SERA Bojorquez/ SERA Faust/ Jovan Garrison / GAYATRI Njeru  ---------------------------------------------------------------------------------------------------------------    Patient seen and examined bedside     Subjective and Objective: No overnight events, sob resolved. No complaints today. uneventful HD yesterday    Allergies: No Known Allergies    Hospital Medications:   MEDICATIONS  (STANDING):  epoetin tammi Injectable 34133Vieo(s) IV Push <User Schedule>  calcium acetate 2001milliGRAM(s) Oral three times a day with meals  cinacalcet 30milliGRAM(s) Oral daily  amLODIPine   Tablet 10milliGRAM(s) Oral daily    VITALS:  Vital Signs Last 24 Hrs  T(C): 37.2, Max: 37.2 (06-21 @ 13:48)  T(F): 98.9, Max: 98.9 (06-21 @ 13:48)  HR: 63 (63 - 101)  BP: 110/67 (110/67 - 147/97)  BP(mean): --  RR: 18 (18 - 18)  SpO2: 98% (98% - 100%)    I&O's Summary    I & Os for current day (as of 21 Jun 2017 14:38)  =============================================  IN: 500 ml / OUT: 3300 ml / NET: -2800 ml    PHYSICAL EXAM:  Constitutional: NAD  HEENT: anicteric sclera, oropharynx clear  Neck: No JVD  Respiratory: CTAB, no wheezes, rales or rhonchi  Cardiovascular: S1, S2, RRR  Gastrointestinal: BS+, soft, NT/ND  Extremities: No cyanosis or clubbing. No peripheral edema  Neurological: A/O x 3, no focal deficits  Psychiatric: Normal mood, normal affect  : No CVA tenderness. No siegel.   Skin: No rashes  Vascular Access: TATIANA AVF +thrill    LABS:  06-21    134<L>  |  87<L>  |  42<H>  ----------------------------<  93  4.1   |  29  |  8.50<H>    Ca    9.1      21 Jun 2017 06:00  Phos  4.8     06-21  Mg     1.8     06-21    TPro  7.1  /  Alb  3.9  /  TBili  0.3  /  DBili  x   /  AST  10  /  ALT  9   /  AlkPhos  87  06-21                        8.5    10.70 )-----------( 134      ( 21 Jun 2017 06:00 )             26.4

## 2017-06-21 NOTE — DISCHARGE NOTE ADULT - PATIENT PORTAL LINK FT
“You can access the [facility] Patient Portal, offered by [facility], by registering with the following website: [email or web address]”

## 2017-06-21 NOTE — DISCHARGE NOTE ADULT - PLAN OF CARE
continue HD schedule resume HD schedule. make sure your attend session, if you are traveling make proper arrangements for your self Losartan was held. BP was normal with holding medications. Follow up with your PCP for further evaluation and management. Please call to make an appointment within 1-2 days for BP check NEXT HD SESSION FRIDAY 6/23*** resume HD schedule. NEXT SESSION 6/23  make sure your attend session, if you are traveling make proper arrangements for your self

## 2017-07-18 ENCOUNTER — APPOINTMENT (OUTPATIENT)
Dept: VASCULAR SURGERY | Facility: CLINIC | Age: 62
End: 2017-07-18

## 2017-08-17 ENCOUNTER — FORM ENCOUNTER (OUTPATIENT)
Age: 62
End: 2017-08-17

## 2017-08-18 ENCOUNTER — APPOINTMENT (OUTPATIENT)
Age: 62
End: 2017-08-18
Payer: COMMERCIAL

## 2017-08-18 PROCEDURE — 36902Z: CUSTOM

## 2017-12-07 ENCOUNTER — APPOINTMENT (OUTPATIENT)
Dept: VASCULAR SURGERY | Facility: CLINIC | Age: 62
End: 2017-12-07
Payer: MEDICAID

## 2017-12-07 PROCEDURE — 99213 OFFICE O/P EST LOW 20 MIN: CPT

## 2017-12-07 PROCEDURE — 93990 DOPPLER FLOW TESTING: CPT

## 2018-05-10 ENCOUNTER — APPOINTMENT (OUTPATIENT)
Dept: VASCULAR SURGERY | Facility: CLINIC | Age: 63
End: 2018-05-10
Payer: MEDICAID

## 2018-05-10 PROCEDURE — 93990 DOPPLER FLOW TESTING: CPT

## 2018-05-10 PROCEDURE — 99213 OFFICE O/P EST LOW 20 MIN: CPT

## 2018-06-23 ENCOUNTER — INPATIENT (INPATIENT)
Facility: HOSPITAL | Age: 63
LOS: 1 days | Discharge: HOME | End: 2018-06-25
Attending: INTERNAL MEDICINE | Admitting: INTERNAL MEDICINE
Payer: MEDICAID

## 2018-06-23 VITALS
HEART RATE: 81 BPM | TEMPERATURE: 98 F | SYSTOLIC BLOOD PRESSURE: 169 MMHG | OXYGEN SATURATION: 99 % | DIASTOLIC BLOOD PRESSURE: 99 MMHG | RESPIRATION RATE: 18 BRPM

## 2018-06-23 DIAGNOSIS — N18.9 CHRONIC KIDNEY DISEASE, UNSPECIFIED: ICD-10-CM

## 2018-06-23 DIAGNOSIS — N18.6 END STAGE RENAL DISEASE: ICD-10-CM

## 2018-06-23 DIAGNOSIS — R07.89 OTHER CHEST PAIN: ICD-10-CM

## 2018-06-23 DIAGNOSIS — I77.0 ARTERIOVENOUS FISTULA, ACQUIRED: Chronic | ICD-10-CM

## 2018-06-23 DIAGNOSIS — I10 ESSENTIAL (PRIMARY) HYPERTENSION: ICD-10-CM

## 2018-06-23 DIAGNOSIS — R06.00 DYSPNEA, UNSPECIFIED: ICD-10-CM

## 2018-06-23 LAB
BASE EXCESS BLDV CALC-SCNC: -0.4 MMOL/L — SIGNIFICANT CHANGE UP
BASOPHILS # BLD AUTO: 0.04 K/UL — SIGNIFICANT CHANGE UP (ref 0–0.2)
BASOPHILS NFR BLD AUTO: 0.5 % — SIGNIFICANT CHANGE UP (ref 0–2)
BLOOD GAS VENOUS - CREATININE: 15.6 MG/DL — HIGH (ref 0.5–1.3)
CHLORIDE BLDV-SCNC: 103 MMOL/L — SIGNIFICANT CHANGE UP (ref 96–108)
EOSINOPHIL # BLD AUTO: 0.28 K/UL — SIGNIFICANT CHANGE UP (ref 0–0.5)
EOSINOPHIL NFR BLD AUTO: 3.8 % — SIGNIFICANT CHANGE UP (ref 0–6)
GAS PNL BLDV: 143 MMOL/L — SIGNIFICANT CHANGE UP (ref 136–146)
GLUCOSE BLDV-MCNC: 123 — HIGH (ref 70–99)
HBV SURFACE AG SER-ACNC: NEGATIVE — SIGNIFICANT CHANGE UP
HCO3 BLDV-SCNC: 22 MMOL/L — SIGNIFICANT CHANGE UP (ref 20–27)
HCT VFR BLD CALC: 29.2 % — LOW (ref 39–50)
HCT VFR BLDV CALC: 31.1 % — LOW (ref 39–51)
HGB BLD-MCNC: 9.3 G/DL — LOW (ref 13–17)
HGB BLDV-MCNC: 10.1 G/DL — LOW (ref 13–17)
HIV COMBO RESULT: SIGNIFICANT CHANGE UP
HIV1+2 AB SPEC QL: SIGNIFICANT CHANGE UP
IMM GRANULOCYTES # BLD AUTO: 0.03 # — SIGNIFICANT CHANGE UP
IMM GRANULOCYTES NFR BLD AUTO: 0.4 % — SIGNIFICANT CHANGE UP (ref 0–1.5)
LACTATE BLDV-MCNC: 1.9 MMOL/L — SIGNIFICANT CHANGE UP (ref 0.5–2)
LYMPHOCYTES # BLD AUTO: 1.57 K/UL — SIGNIFICANT CHANGE UP (ref 1–3.3)
LYMPHOCYTES # BLD AUTO: 21.5 % — SIGNIFICANT CHANGE UP (ref 13–44)
MCHC RBC-ENTMCNC: 28 PG — SIGNIFICANT CHANGE UP (ref 27–34)
MCHC RBC-ENTMCNC: 31.8 % — LOW (ref 32–36)
MCV RBC AUTO: 88 FL — SIGNIFICANT CHANGE UP (ref 80–100)
MONOCYTES # BLD AUTO: 0.55 K/UL — SIGNIFICANT CHANGE UP (ref 0–0.9)
MONOCYTES NFR BLD AUTO: 7.5 % — SIGNIFICANT CHANGE UP (ref 2–14)
NEUTROPHILS # BLD AUTO: 4.84 K/UL — SIGNIFICANT CHANGE UP (ref 1.8–7.4)
NEUTROPHILS NFR BLD AUTO: 66.3 % — SIGNIFICANT CHANGE UP (ref 43–77)
NRBC # FLD: 0 — SIGNIFICANT CHANGE UP
PCO2 BLDV: 64 MMHG — HIGH (ref 41–51)
PH BLDV: 7.24 PH — LOW (ref 7.32–7.43)
PLATELET # BLD AUTO: 141 K/UL — LOW (ref 150–400)
PMV BLD: 10.2 FL — SIGNIFICANT CHANGE UP (ref 7–13)
PO2 BLDV: < 24 MMHG — LOW (ref 35–40)
POTASSIUM BLDV-SCNC: 4.8 MMOL/L — HIGH (ref 3.4–4.5)
RBC # BLD: 3.32 M/UL — LOW (ref 4.2–5.8)
RBC # FLD: 16.6 % — HIGH (ref 10.3–14.5)
SAO2 % BLDV: 16.2 % — LOW (ref 60–85)
TROPONIN T, HIGH SENSITIVITY: 56 NG/L — CRITICAL HIGH (ref ?–14)
TROPONIN T, HIGH SENSITIVITY: 57 NG/L — CRITICAL HIGH (ref ?–14)
WBC # BLD: 7.31 K/UL — SIGNIFICANT CHANGE UP (ref 3.8–10.5)
WBC # FLD AUTO: 7.31 K/UL — SIGNIFICANT CHANGE UP (ref 3.8–10.5)

## 2018-06-23 PROCEDURE — 71046 X-RAY EXAM CHEST 2 VIEWS: CPT | Mod: 26

## 2018-06-23 RX ORDER — LOSARTAN POTASSIUM 100 MG/1
50 TABLET, FILM COATED ORAL DAILY
Qty: 0 | Refills: 0 | Status: DISCONTINUED | OUTPATIENT
Start: 2018-06-23 | End: 2018-06-25

## 2018-06-23 RX ORDER — CINACALCET 30 MG/1
30 TABLET, FILM COATED ORAL DAILY
Qty: 0 | Refills: 0 | Status: DISCONTINUED | OUTPATIENT
Start: 2018-06-23 | End: 2018-06-25

## 2018-06-23 RX ORDER — CHOLECALCIFEROL (VITAMIN D3) 125 MCG
0 CAPSULE ORAL
Qty: 0 | Refills: 0 | COMMUNITY

## 2018-06-23 RX ORDER — HEPARIN SODIUM 5000 [USP'U]/ML
5000 INJECTION INTRAVENOUS; SUBCUTANEOUS EVERY 8 HOURS
Qty: 0 | Refills: 0 | Status: DISCONTINUED | OUTPATIENT
Start: 2018-06-23 | End: 2018-06-25

## 2018-06-23 RX ORDER — AMLODIPINE BESYLATE 2.5 MG/1
10 TABLET ORAL DAILY
Qty: 0 | Refills: 0 | Status: DISCONTINUED | OUTPATIENT
Start: 2018-06-23 | End: 2018-06-25

## 2018-06-23 NOTE — CONSULT NOTE ADULT - PROBLEM SELECTOR RECOMMENDATION 9
Plan for HD today   2k bath and uf 1.5 to 2kg as tolerated  consent obtained- in chart  will have output set initiated at [facility] on monday  trend bmp  check hepatitis panel

## 2018-06-23 NOTE — CONSULT NOTE ADULT - SUBJECTIVE AND OBJECTIVE BOX
[facility] Consult Note Nephrology - CONSULTATION NOTE    63 year old male hx of esrd on hd via left upper ext avf who was [place] country [place] one [place], [place]. Last HD was wednesday; Reports some SOB- denies any f/c/n/v/d/c    PAST MEDICAL & SURGICAL HISTORY:  Thrombocytopenia  Anemia  Hyperparathyroidism  Hemodialysis access, AV graft: Left upper extremity  Kidney stones  Chronic kidney disease  HTN (Hypertension)  Acquired arteriovenous fistula: left wrist  1/2015 - [facility], Left upper arm 4/2015 (approximate date)  S/P Nephrectomy: (L) 2007    No Known Allergies    Home Medications Reviewed  Hospital Medications:   MEDICATIONS  (STANDING):    SOCIAL HISTORY:  Denies ETOh,Smoking,   FAMILY HISTORY:  No pertinent family history    REVIEW OF SYSTEMS:  CONSTITUTIONAL: No weakness, fevers or chills  EYES/ENT: No visual changes;  No vertigo or throat pain   NECK: No pain or stiffness  RESPIRATORY: +shortness of breath  CARDIOVASCULAR: No chest pain or palpitations.  GASTROINTESTINAL: No abdominal or epigastric pain. No nausea, vomiting, or hematemesis; No diarrhea or constipation. No melena or hematochezia.  GENITOURINARY: No dysuria, frequency, foamy urine, urinary urgency, incontinence or hematuria  NEUROLOGICAL: No numbness or weakness  SKIN: No itching, burning, rashes, or lesions   VASCULAR: No bilateral lower extremity edema.   All other review of systems is negative unless indicated above.    VITALS:  T(F): 98 (06-23-18 @ 10:40), Max: 98 (06-23-18 @ 10:40)  HR: 81 (06-23-18 @ 10:40)  BP: 169/99 (06-23-18 @ 10:40)  RR: 18 (06-23-18 @ 10:40)  SpO2: 99% (06-23-18 @ 10:40)  Wt(kg): --      PHYSICAL EXAM:  Constitutional: NAD  HEENT: anicteric sclera, oropharynx clear, MMM  Neck: No JVD  Respiratory: CTAB, no wheezes, rales or rhonchi  Cardiovascular: S1, S2, RRR  Gastrointestinal: BS+, soft, NT/ND  Extremities: trace peripheral edema  Neurological: A/O x 3, no focal deficits  Psychiatric: Normal mood, normal affect  : No CVA tenderness. No siegel.   Skin: No rashes  Vascular Access: left upper ext avf + thrill    LABS:        Creatinine Trend:                         9.3    7.31  )-----------( 141      ( 23 Jun 2018 12:00 )             29.2     Urine Studies:      RADIOLOGY & ADDITIONAL STUDIES:

## 2018-06-23 NOTE — ED PROVIDER NOTE - OBJECTIVE STATEMENT
63 y.o M with PMH of endstage renal disease and currently on dialysis Mon, Wed, and Thurs, presents with complaint of SOB. Pt states that he was in [place] for the past month for a , and has last Dialysis was 3 days prior. He returned for regular dialysis but states that since he has been gone he can't get dialyzed without screening for HIV or Hepatitis. Since yesterday he developed SOB that is worse with lying down or exertion and has one episode of L side CP. Pt states the pain is sharp radiating and lasting seconds. Pt states that the pain has now resolved. Ther is no associate leg swelling or fever. 63 y.o M with PMH of endstage renal disease and currently on dialysis Mon, Wed, and Thurs, presents with complaint of SOB. Pt states that he was in [place] for the past month for a , and has last Dialysis was 3 days prior. He returned for regular dialysis but states that since he has been gone he can't get dialyzed without screening for HIV or Hepatitis. Since yesterday he developed SOB that is worse with lying down or exertion and has one episode of L side CP. Pt states the pain is sharp radiating and lasting seconds. Pt states that the pain has now resolved. There is no associate leg swelling or fever.

## 2018-06-23 NOTE — ED ADULT NURSE NOTE - CHIEF COMPLAINT QUOTE
dialysis pt needs hepitis A and B and Hiv test because his test is / told to come for blood by  [facility] dialysis [place]/  states normally dialyzed M-W-. pt was told to get labs done yesterday

## 2018-06-23 NOTE — ED PROVIDER NOTE - PMH
Anemia    Chronic kidney disease    Hemodialysis access, AV graft  Left upper extremity  HTN (Hypertension)    Hyperparathyroidism    Kidney stones    Thrombocytopenia

## 2018-06-23 NOTE — ED PROVIDER NOTE - NS_ ATTENDINGSCRIBEDETAILS _ED_A_ED_FT
The scribe's documentation has been prepared under my direction and personally reviewed by me in its entirety. I confirm that the note above accurately reflects all work, treatment, procedures, and medical decision making performed by me. AJ Anne MD

## 2018-06-23 NOTE — H&P ADULT - HISTORY OF PRESENT ILLNESS
This is a 62 yo M who states he had an epsisode of chest pain and SOB yesterday 6/22 . The chest pain woke him up from his sleep in the middle night and then he was also having SOB . He states the pain lasted seconds and went away on its own. He did not take any medications and denies any alleviating factors. The SOB has improved and he currently has no CP or SOB. His last dialysis was on 6/20 in [place]. He went to his HD on Friday 6/22 but they did not do dialysis because he needed to be checked for HIV and hepatitis. He will be dialyzed today .  Patient has no fever or chills. No palpitations. No N/V  no abdominal pain.

## 2018-06-23 NOTE — ED ADULT NURSE NOTE - OBJECTIVE STATEMENT
received pt A&Ox3 in no apparent distress at this time. c/o slight dizziness at this time. #20g IVL to R AC, bloods drawn and sent to the lab. no s/s of infiltration noted at this time. HD port noted to left armm intact. pt sent for xray. dispo pending.

## 2018-06-23 NOTE — ED PROVIDER NOTE - PROGRESS NOTE DETAILS
EKG was conducted.  with no signs of acute ischemia. Trop elevated in setting of ESRD, plan for serial trop. SEen by nephrology, plan for HD today, will admit to medicine. AJ Anne MD

## 2018-06-23 NOTE — ED ADULT TRIAGE NOTE - CHIEF COMPLAINT QUOTE
dialysis pt needs hepitis A and B and Hiv test because his test is / told to come for blood by  [facility] dialysis [place]/  states normally dialyzed M-W-. pt was told to get labs done yesterday

## 2018-06-23 NOTE — ED PROVIDER NOTE - MEDICAL DECISION MAKING DETAILS
62 y/o M on dialysis now with suspicion for fluid overload s/p not receiving dialysis. Plan for HIV, Hepatitis screen, basic labs, EKG, Troponin, CXR and admission.

## 2018-06-24 LAB
BUN SERPL-MCNC: 37 MG/DL — HIGH (ref 7–23)
CALCIUM SERPL-MCNC: 8.3 MG/DL — LOW (ref 8.4–10.5)
CHLORIDE SERPL-SCNC: 99 MMOL/L — SIGNIFICANT CHANGE UP (ref 98–107)
CO2 SERPL-SCNC: 26 MMOL/L — SIGNIFICANT CHANGE UP (ref 22–31)
CREAT SERPL-MCNC: 7.79 MG/DL — HIGH (ref 0.5–1.3)
GLUCOSE SERPL-MCNC: 84 MG/DL — SIGNIFICANT CHANGE UP (ref 70–99)
HAV IGM SER-ACNC: NONREACTIVE — SIGNIFICANT CHANGE UP
HBV CORE IGM SER-ACNC: NONREACTIVE — SIGNIFICANT CHANGE UP
HBV SURFACE AB SER-ACNC: 737.9 MLU/ML — SIGNIFICANT CHANGE UP
HBV SURFACE AG SER-ACNC: NONREACTIVE — SIGNIFICANT CHANGE UP
HCT VFR BLD CALC: 27.3 % — LOW (ref 39–50)
HCV AB S/CO SERPL IA: 0.85 S/CO — SIGNIFICANT CHANGE UP
HCV AB SERPL-IMP: SIGNIFICANT CHANGE UP
HGB BLD-MCNC: 8.8 G/DL — LOW (ref 13–17)
MAGNESIUM SERPL-MCNC: 2.1 MG/DL — SIGNIFICANT CHANGE UP (ref 1.6–2.6)
MCHC RBC-ENTMCNC: 27.6 PG — SIGNIFICANT CHANGE UP (ref 27–34)
MCHC RBC-ENTMCNC: 32.2 % — SIGNIFICANT CHANGE UP (ref 32–36)
MCV RBC AUTO: 85.6 FL — SIGNIFICANT CHANGE UP (ref 80–100)
NRBC # FLD: 0 — SIGNIFICANT CHANGE UP
PLATELET # BLD AUTO: 121 K/UL — LOW (ref 150–400)
PMV BLD: 10.2 FL — SIGNIFICANT CHANGE UP (ref 7–13)
POTASSIUM SERPL-MCNC: 3.7 MMOL/L — SIGNIFICANT CHANGE UP (ref 3.5–5.3)
POTASSIUM SERPL-SCNC: 3.7 MMOL/L — SIGNIFICANT CHANGE UP (ref 3.5–5.3)
RBC # BLD: 3.19 M/UL — LOW (ref 4.2–5.8)
RBC # FLD: 16.6 % — HIGH (ref 10.3–14.5)
SODIUM SERPL-SCNC: 139 MMOL/L — SIGNIFICANT CHANGE UP (ref 135–145)
WBC # BLD: 6.76 K/UL — SIGNIFICANT CHANGE UP (ref 3.8–10.5)
WBC # FLD AUTO: 6.76 K/UL — SIGNIFICANT CHANGE UP (ref 3.8–10.5)

## 2018-06-24 RX ORDER — ERYTHROPOIETIN 10000 [IU]/ML
10000 INJECTION, SOLUTION INTRAVENOUS; SUBCUTANEOUS
Qty: 0 | Refills: 0 | Status: DISCONTINUED | OUTPATIENT
Start: 2018-06-24 | End: 2018-06-25

## 2018-06-24 RX ADMIN — Medication 1 TABLET(S): at 13:44

## 2018-06-24 RX ADMIN — CINACALCET 30 MILLIGRAM(S): 30 TABLET, FILM COATED ORAL at 13:44

## 2018-06-24 RX ADMIN — LOSARTAN POTASSIUM 50 MILLIGRAM(S): 100 TABLET, FILM COATED ORAL at 05:52

## 2018-06-24 RX ADMIN — AMLODIPINE BESYLATE 10 MILLIGRAM(S): 2.5 TABLET ORAL at 13:44

## 2018-06-24 NOTE — PROGRESS NOTE ADULT - ASSESSMENT
· Assessment	  64 yo M with chest pain and SOB, missed HD on 6/22      Problem/Plan - 1:  ·  Problem: Other chest pain.  Plan: TELE  continue current meds   ECHO.      Problem/Plan - 2:  ·  Problem: ESRD (end stage renal disease).  Plan: continue HD  renal following.      Problem/Plan - 3:  ·  Problem: Hypertension, unspecified type.  Plan: continue losartan  & amlodipine.

## 2018-06-24 NOTE — PROGRESS NOTE ADULT - SUBJECTIVE AND OBJECTIVE BOX
Patient is a 63y old  Male who presents with a chief complaint of chest pain and SOB (23 Jun 2018 17:18)      SUBJECTIVE / OVERNIGHT EVENTS:   Feels better.  Denies CP/SOB/Palpitation/HA.    MEDICATIONS  (STANDING):  amLODIPine   Tablet 10 milliGRAM(s) Oral daily  cinacalcet 30 milliGRAM(s) Oral daily  epoetin tammi Injectable 90667 Unit(s) IV Push <User Schedule>  heparin  Injectable 5000 Unit(s) SubCutaneous every 8 hours  losartan 50 milliGRAM(s) Oral daily  Nephro-kiara 1 Tablet(s) Oral daily    MEDICATIONS  (PRN):        CAPILLARY BLOOD GLUCOSE        I&O's Summary    23 Jun 2018 07:01  -  24 Jun 2018 07:00  --------------------------------------------------------  IN: 520 mL / OUT: 3000 mL / NET: -2480 mL    24 Jun 2018 07:01  -  24 Jun 2018 23:09  --------------------------------------------------------  IN: 720 mL / OUT: 0 mL / NET: 720 mL        PHYSICAL EXAM:  GENERAL: NAD, well-developed  HEAD:  Atraumatic, Normocephalic  NECK: Supple, No JVD  CHEST/LUNG: Clear to auscultation bilaterally; No wheezing.  HEART: Regular rate and rhythm; No murmurs, rubs, or gallops  ABDOMEN: Soft, Nontender, Nondistended; Bowel sounds present  EXTREMITIES:   No clubbing, cyanosis, or edema  NEUROLOGY: AAO X 3  SKIN: No rashes    LABS:                        8.8    6.76  )-----------( 121      ( 24 Jun 2018 03:00 )             27.3     06-24    139  |  99  |  37<H>  ----------------------------<  84  3.7   |  26  |  7.79<H>    Ca    8.3<L>      24 Jun 2018 03:00  Mg     2.1     06-24              CAPILLARY BLOOD GLUCOSE                    RADIOLOGY & ADDITIONAL TESTS:    Imaging Personally Reviewed:    Consultant(s) Notes Reviewed:      Care Discussed with Consultants/Other Providers:

## 2018-06-24 NOTE — PROGRESS NOTE ADULT - SUBJECTIVE AND OBJECTIVE BOX
Patient seen and examined  no complaints    No Known Allergies    Hospital Medications:   MEDICATIONS  (STANDING):  amLODIPine   Tablet 10 milliGRAM(s) Oral daily  cinacalcet 30 milliGRAM(s) Oral daily  heparin  Injectable 5000 Unit(s) SubCutaneous every 8 hours  losartan 50 milliGRAM(s) Oral daily  Nephro-kiara 1 Tablet(s) Oral daily      VITALS:  T(F): 98.6 (06-24-18 @ 05:50), Max: 98.6 (06-24-18 @ 05:50)  HR: 74 (06-24-18 @ 05:50)  BP: 122/89 (06-24-18 @ 05:50)  RR: 18 (06-24-18 @ 05:50)  SpO2: 99% (06-24-18 @ 05:50)  Wt(kg): --    06-23 @ 07:01  -  06-24 @ 07:00  --------------------------------------------------------  IN: 520 mL / OUT: 3000 mL / NET: -2480 mL      Height (cm): 175.26 (06-23 @ 16:54)  Weight (kg): 70.9 (06-23 @ 16:54)  BMI (kg/m2): 23.1 (06-23 @ 16:54)  BSA (m2): 1.86 (06-23 @ 16:54)  PHYSICAL EXAM:  Constitutional: NAD  HEENT: anicteric sclera, oropharynx clear, MMM  Neck: No JVD  Respiratory: CTAB, no wheezes, rales or rhonchi  Cardiovascular: S1, S2, RRR  Gastrointestinal: BS+, soft, NT/ND  Extremities: trace peripheral edema  Neurological: A/O x 3, no focal deficits  Psychiatric: Normal mood, normal affect  : No CVA tenderness. No siegel.   Skin: No rashes  Vascular Access: left upper ext avf + thrill  LABS:  06-24    139  |  99  |  37<H>  ----------------------------<  84  3.7   |  26  |  7.79<H>    Ca    8.3<L>      24 Jun 2018 03:00  Mg     2.1     06-24      Creatinine Trend: 7.79 <--                        8.8    6.76  )-----------( 121      ( 24 Jun 2018 03:00 )             27.3     Urine Studies:      RADIOLOGY & ADDITIONAL STUDIES:

## 2018-06-24 NOTE — PROGRESS NOTE ADULT - PROBLEM SELECTOR PLAN 1
s/p HD yesterday- flowsheet reviewed  plan for next hd tuesday likely  trend bmp  needs output set up at [facility] dialysis in [place] [address]

## 2018-06-25 ENCOUNTER — TRANSCRIPTION ENCOUNTER (OUTPATIENT)
Age: 63
End: 2018-06-25

## 2018-06-25 VITALS — OXYGEN SATURATION: 100 % | HEART RATE: 74 BPM | RESPIRATION RATE: 18 BRPM | TEMPERATURE: 98 F

## 2018-06-25 LAB
BUN SERPL-MCNC: 55 MG/DL — HIGH (ref 7–23)
CALCIUM SERPL-MCNC: 7.9 MG/DL — LOW (ref 8.4–10.5)
CHLORIDE SERPL-SCNC: 98 MMOL/L — SIGNIFICANT CHANGE UP (ref 98–107)
CO2 SERPL-SCNC: 24 MMOL/L — SIGNIFICANT CHANGE UP (ref 22–31)
CREAT SERPL-MCNC: 10.63 MG/DL — HIGH (ref 0.5–1.3)
FERRITIN SERPL-MCNC: 991.5 NG/ML — HIGH (ref 30–400)
GLUCOSE SERPL-MCNC: 84 MG/DL — SIGNIFICANT CHANGE UP (ref 70–99)
HAPTOGLOB SERPL-MCNC: 122 MG/DL — SIGNIFICANT CHANGE UP (ref 34–200)
HCT VFR BLD CALC: 26.6 % — LOW (ref 39–50)
HGB BLD-MCNC: 8.5 G/DL — LOW (ref 13–17)
IRON SATN MFR SERPL: 125 UG/DL — SIGNIFICANT CHANGE UP (ref 45–165)
IRON SATN MFR SERPL: 158 UG/DL — SIGNIFICANT CHANGE UP (ref 155–535)
MAGNESIUM SERPL-MCNC: 2.2 MG/DL — SIGNIFICANT CHANGE UP (ref 1.6–2.6)
MCHC RBC-ENTMCNC: 27.6 PG — SIGNIFICANT CHANGE UP (ref 27–34)
MCHC RBC-ENTMCNC: 32 % — SIGNIFICANT CHANGE UP (ref 32–36)
MCV RBC AUTO: 86.4 FL — SIGNIFICANT CHANGE UP (ref 80–100)
NRBC # FLD: 0 — SIGNIFICANT CHANGE UP
PLATELET # BLD AUTO: 125 K/UL — LOW (ref 150–400)
PMV BLD: 10.4 FL — SIGNIFICANT CHANGE UP (ref 7–13)
POTASSIUM SERPL-MCNC: 4.1 MMOL/L — SIGNIFICANT CHANGE UP (ref 3.5–5.3)
POTASSIUM SERPL-SCNC: 4.1 MMOL/L — SIGNIFICANT CHANGE UP (ref 3.5–5.3)
RBC # BLD: 3.08 M/UL — LOW (ref 4.2–5.8)
RBC # FLD: 16.3 % — HIGH (ref 10.3–14.5)
SODIUM SERPL-SCNC: 139 MMOL/L — SIGNIFICANT CHANGE UP (ref 135–145)
UIBC SERPL-MCNC: 33.2 UG/DL — LOW (ref 110–370)
WBC # BLD: 6.1 K/UL — SIGNIFICANT CHANGE UP (ref 3.8–10.5)
WBC # FLD AUTO: 6.1 K/UL — SIGNIFICANT CHANGE UP (ref 3.8–10.5)

## 2018-06-25 RX ORDER — CALCIUM ACETATE 667 MG
3 TABLET ORAL
Qty: 0 | Refills: 0 | COMMUNITY

## 2018-06-25 RX ORDER — CINACALCET 30 MG/1
1 TABLET, FILM COATED ORAL
Qty: 0 | Refills: 0 | COMMUNITY

## 2018-06-25 RX ORDER — ERYTHROPOIETIN 10000 [IU]/ML
10000 INJECTION, SOLUTION INTRAVENOUS; SUBCUTANEOUS
Qty: 0 | Refills: 0 | Status: DISCONTINUED | OUTPATIENT
Start: 2018-06-25 | End: 2018-06-25

## 2018-06-25 RX ORDER — CINACALCET 30 MG/1
1 TABLET, FILM COATED ORAL
Qty: 30 | Refills: 0
Start: 2018-06-25 | End: 2018-07-24

## 2018-06-25 RX ADMIN — Medication 1 TABLET(S): at 09:28

## 2018-06-25 RX ADMIN — LOSARTAN POTASSIUM 50 MILLIGRAM(S): 100 TABLET, FILM COATED ORAL at 05:22

## 2018-06-25 RX ADMIN — HEPARIN SODIUM 5000 UNIT(S): 5000 INJECTION INTRAVENOUS; SUBCUTANEOUS at 13:19

## 2018-06-25 RX ADMIN — CINACALCET 30 MILLIGRAM(S): 30 TABLET, FILM COATED ORAL at 09:28

## 2018-06-25 RX ADMIN — AMLODIPINE BESYLATE 10 MILLIGRAM(S): 2.5 TABLET ORAL at 09:28

## 2018-06-25 NOTE — DISCHARGE NOTE ADULT - PATIENT PORTAL LINK FT
You can access the [facility] Patient Portal, offered by [facility], by registering with the following website: [email or web address]

## 2018-06-25 NOTE — PROGRESS NOTE ADULT - PROBLEM SELECTOR PLAN 1
s/p HD [place].   has HD spot [facility]WF4 at hospitals dialysis, can start tonight. confirmed w/[facility]  stable for d/c renal stand point to home w/o HD today

## 2018-06-25 NOTE — DISCHARGE NOTE ADULT - MEDICATION SUMMARY - MEDICATIONS TO STOP TAKING
I will STOP taking the medications listed below when I get home from the hospital:    calcium acetate 667 mg oral tablet  -- 3 tab(s) by mouth 3 times a day

## 2018-06-25 NOTE — PROGRESS NOTE ADULT - SUBJECTIVE AND OBJECTIVE BOX
[facility] - Alicia / Ej ZAMORA /Federico/ SERA Bojorquez/ SERA Faust/ Jovan Garrison / GAYATRI Matsonu  ---------------------------------------------------------------------------------------------------------------    Patient seen and examined bedside    Subjective and Objective: No overnight events. no sob. No complaints today    Allergies: No Known Allergies      Hospital Medications:   MEDICATIONS  (STANDING):  amLODIPine   Tablet 10 milliGRAM(s) Oral daily  cinacalcet 30 milliGRAM(s) Oral daily  epoetin tammi Injectable 37452 Unit(s) IV Push <User Schedule>  heparin  Injectable 5000 Unit(s) SubCutaneous every 8 hours  losartan 50 milliGRAM(s) Oral daily  Nephro-kiara 1 Tablet(s) Oral daily      VITALS:  T(F): 98.2 (06-25-18 @ 13:33), Max: 98.6 (06-25-18 @ 05:21)  HR: 74 (06-25-18 @ 13:33)  BP: 119/73 (06-25-18 @ 08:40)  RR: 18 (06-25-18 @ 13:33)  SpO2: 100% (06-25-18 @ 13:33)  Wt(kg): --    06-24 @ 07:01  -  06-25 @ 07:00  --------------------------------------------------------  IN: 1470 mL / OUT: 1 mL / NET: 1469 mL    06-25 @ 07:01  -  06-25 @ 13:51  --------------------------------------------------------  IN: 640 mL / OUT: 0 mL / NET: 640 mL      PHYSICAL EXAM:  Constitutional: NAD  HEENT: anicteric sclera, oropharynx clear  Neck: No JVD  Respiratory: CTAB, no wheezes, rales or rhonchi  Cardiovascular: S1, S2, RRR  Gastrointestinal: BS+, soft, NT/ND  Extremities: No cyanosis or clubbing. No peripheral edema  Neurological: A/O x 3, no focal deficits  Psychiatric: Normal mood, normal affect  : No CVA tenderness. No siegel.   Skin: No rashes  Vascular Access: AVF+thrill     LABS:  06-25    139  |  98  |  55<H>  ----------------------------<  84  4.1   |  24  |  10.63<H>    Ca    7.9<L>      25 Jun 2018 04:43  Mg     2.2     06-25      Creatinine Trend: 10.63 <--, 7.79 <--                        8.5    6.10  )-----------( 125      ( 25 Jun 2018 04:43 )             26.6     Urine Studies:        RADIOLOGY & ADDITIONAL STUDIES:

## 2018-06-25 NOTE — DISCHARGE NOTE ADULT - HOSPITAL COURSE
This is a 64 yo M who states he had an epsisode of chest pain and SOB yesterday 6/22 . The chest pain woke him up from his sleep in the middle night and then he was also having SOB . He states the pain lasted seconds and went away on its own. He did not take any medications and denies any alleviating factors. The SOB has improved and he currently has no CP or SOB. His last dialysis was on 6/20 in [place]. He went to his HD on Friday 6/22 but they did not do dialysis because he needed to be checked for HIV and hepatitis. He will be dialyzed today .  Patient has no fever or chills. No palpitations. No N/V  no abdominal pain. (23 Jun 2018 17:18)    On admission serial hsTc 57 to 56. CXR was clear. Patient had dialysis and awaited set up for outpatient dialysis. FRANNY given for anemia of chronic disease. Patient cleared for discharge.

## 2018-06-25 NOTE — DISCHARGE NOTE ADULT - PLAN OF CARE
To improve quality of life and reduce mortality by preventing fluid overload and electrolytes abnormalities, and blood pressure control. Please follow up with your nephrologist for management, and continue you schedule hemodialysis. Continue epogen per your nephrologist during dialysis. Low sodium and fat diet, continue anti-hypertensive medications, and follow up with primary care physician.

## 2018-06-25 NOTE — DISCHARGE NOTE ADULT - CARE PLAN
Principal Discharge DX:	ESRD (end stage renal disease)  Goal:	To improve quality of life and reduce mortality by preventing fluid overload and electrolytes abnormalities, and blood pressure control.  Assessment and plan of treatment:	Please follow up with your nephrologist for management, and continue you schedule hemodialysis.  Secondary Diagnosis:	Anemia due to chronic kidney disease  Assessment and plan of treatment:	Continue epogen per your nephrologist during dialysis.  Secondary Diagnosis:	Essential hypertension  Assessment and plan of treatment:	Low sodium and fat diet, continue anti-hypertensive medications, and follow up with primary care physician.

## 2018-06-25 NOTE — DISCHARGE NOTE ADULT - MEDICATION SUMMARY - MEDICATIONS TO TAKE
I will START or STAY ON the medications listed below when I get home from the hospital:    losartan 50 mg oral tablet  -- 1 tab(s) by mouth once a day  -- Indication: For Hypertension, unspecified type    Sensipar 30 mg oral tablet  -- 1 tab(s) by mouth once a day  -- Indication: For ESRD (end stage renal disease)    amLODIPine 10 mg oral tablet  -- 1 tab(s) by mouth once a day  -- Indication: For Hypertension, unspecified type    Nephro-Crystal oral tablet  -- 1 tab(s) by mouth once a day  -- Indication: For ESRD (end stage renal disease)

## 2018-06-25 NOTE — PROGRESS NOTE ADULT - SUBJECTIVE AND OBJECTIVE BOX
Patient is a 63y old  Male who presents with a chief complaint of chest pain and SOB (25 Jun 2018 08:16)      SUBJECTIVE / OVERNIGHT EVENTS:   Feels better.  Denies CP/SOB/Palpitation/HA.    MEDICATIONS  (STANDING):  amLODIPine   Tablet 10 milliGRAM(s) Oral daily  cinacalcet 30 milliGRAM(s) Oral daily  epoetin tammi Injectable 20915 Unit(s) IV Push <User Schedule>  heparin  Injectable 5000 Unit(s) SubCutaneous every 8 hours  losartan 50 milliGRAM(s) Oral daily  Nephro-kiara 1 Tablet(s) Oral daily    MEDICATIONS  (PRN):        CAPILLARY BLOOD GLUCOSE        I&O's Summary    24 Jun 2018 07:01  -  25 Jun 2018 07:00  --------------------------------------------------------  IN: 1470 mL / OUT: 1 mL / NET: 1469 mL    25 Jun 2018 07:01  -  25 Jun 2018 23:25  --------------------------------------------------------  IN: 640 mL / OUT: 0 mL / NET: 640 mL        PHYSICAL EXAM:  GENERAL: NAD, well-developed  HEAD:  Atraumatic, Normocephalic  NECK: Supple, No JVD  CHEST/LUNG: Clear to auscultation bilaterally; No wheezing.  HEART: Regular rate and rhythm; No murmurs, rubs, or gallops  ABDOMEN: Soft, Nontender, Nondistended; Bowel sounds present  EXTREMITIES:   No clubbing, cyanosis, or edema  NEUROLOGY: AAO X 3  SKIN: No rashes    LABS:                        8.5    6.10  )-----------( 125      ( 25 Jun 2018 04:43 )             26.6     06-25    139  |  98  |  55<H>  ----------------------------<  84  4.1   |  24  |  10.63<H>    Ca    7.9<L>      25 Jun 2018 04:43  Mg     2.2     06-25              CAPILLARY BLOOD GLUCOSE                    RADIOLOGY & ADDITIONAL TESTS:    Imaging Personally Reviewed:    Consultant(s) Notes Reviewed:      Care Discussed with Consultants/Other Providers:

## 2018-06-25 NOTE — DISCHARGE NOTE ADULT - CARE PROVIDER_API CALL
Dai Bojorquez), Internal Medicine  [address] [place] [address]  Phone: [phone]  Fax: [phone]

## 2018-09-13 ENCOUNTER — APPOINTMENT (OUTPATIENT)
Dept: VASCULAR SURGERY | Facility: CLINIC | Age: 63
End: 2018-09-13
Payer: MEDICAID

## 2018-09-13 DIAGNOSIS — N18.6 END STAGE RENAL DISEASE: ICD-10-CM

## 2018-09-13 PROCEDURE — 93990 DOPPLER FLOW TESTING: CPT

## 2018-09-13 PROCEDURE — 99213 OFFICE O/P EST LOW 20 MIN: CPT

## 2018-09-28 PROBLEM — D64.9 ANEMIA, UNSPECIFIED: Chronic | Status: ACTIVE | Noted: 2017-06-19

## 2018-09-28 PROBLEM — D69.6 THROMBOCYTOPENIA, UNSPECIFIED: Chronic | Status: ACTIVE | Noted: 2017-06-19

## 2018-10-29 ENCOUNTER — FORM ENCOUNTER (OUTPATIENT)
Age: 63
End: 2018-10-29

## 2018-10-30 ENCOUNTER — APPOINTMENT (OUTPATIENT)
Dept: ENDOVASCULAR SURGERY | Facility: CLINIC | Age: 63
End: 2018-10-30
Payer: MEDICAID

## 2018-10-30 PROCEDURE — 36903Z: CUSTOM

## 2019-01-04 ENCOUNTER — APPOINTMENT (OUTPATIENT)
Dept: VASCULAR SURGERY | Facility: CLINIC | Age: 64
End: 2019-01-04

## 2019-04-23 NOTE — H&P ADULT - OPHTHALMOLOGIC
details… Partial Purse String (Simple) Text: Given the location of the defect and the characteristics of the surrounding skin a simple purse string closure was deemed most appropriate.  Undermining was performed circumfirentially around the surgical defect.  A purse string suture was then placed and tightened. Wound tension only allowed a partial closure of the circular defect.

## 2019-06-03 ENCOUNTER — APPOINTMENT (OUTPATIENT)
Dept: VASCULAR SURGERY | Facility: CLINIC | Age: 64
End: 2019-06-03
Payer: MEDICAID

## 2019-06-03 VITALS
DIASTOLIC BLOOD PRESSURE: 87 MMHG | HEART RATE: 70 BPM | SYSTOLIC BLOOD PRESSURE: 138 MMHG | BODY MASS INDEX: 22.51 KG/M2 | WEIGHT: 152 LBS | TEMPERATURE: 98.1 F | HEIGHT: 69 IN

## 2019-06-03 PROCEDURE — 93990 DOPPLER FLOW TESTING: CPT

## 2019-06-03 PROCEDURE — 99214 OFFICE O/P EST MOD 30 MIN: CPT

## 2019-06-07 NOTE — DISCUSSION/SUMMARY
[FreeTextEntry1] : 65 yo male with history of esrd on hd presents for evaluation of left upper extremity avf  with now prolonged bleeding after hd\par duplex shows left brachial cephalic avf with multiple areas of stenosis one of which is >75% within the stent at the shoulder with flow rate of 1092\par given the severe stenosis and prolonged bleeding will arrange for left upper extremity fistulagram

## 2019-06-07 NOTE — HISTORY OF PRESENT ILLNESS
[] : left brachiocephalic fistula [FreeTextEntry1] : 63 yo male with history of esrd on hd presents for evaluation of left upper extremity avf.  pt without any complaints of the left upper extremity avf no difficulty with access\par pt reports that the rn at hd noted increased bleeding after hd

## 2019-06-17 ENCOUNTER — FORM ENCOUNTER (OUTPATIENT)
Age: 64
End: 2019-06-17

## 2019-06-18 ENCOUNTER — APPOINTMENT (OUTPATIENT)
Dept: ENDOVASCULAR SURGERY | Facility: CLINIC | Age: 64
End: 2019-06-18
Payer: MEDICAID

## 2019-06-18 VITALS
OXYGEN SATURATION: 97 % | TEMPERATURE: 98 F | SYSTOLIC BLOOD PRESSURE: 124 MMHG | RESPIRATION RATE: 16 BRPM | HEART RATE: 75 BPM | BODY MASS INDEX: 22.51 KG/M2 | WEIGHT: 152 LBS | DIASTOLIC BLOOD PRESSURE: 84 MMHG | HEIGHT: 69 IN

## 2019-06-18 DIAGNOSIS — T82.898A OTHER SPECIFIED COMPLICATION OF VASCULAR PROSTHETIC DEVICES, IMPLANTS AND GRAFTS, INITIAL ENCOUNTER: ICD-10-CM

## 2019-06-18 PROCEDURE — 36906Z: CUSTOM

## 2019-06-18 RX ORDER — CLOPIDOGREL BISULFATE 75 MG/1
75 TABLET, FILM COATED ORAL DAILY
Qty: 90 | Refills: 3 | Status: DISCONTINUED | COMMUNITY
Start: 2018-10-30 | End: 2019-06-18

## 2019-06-25 NOTE — PAST MEDICAL HISTORY
[Increasing age ( >40 years old)] : Increasing age ( >40 years old) [No therapy indicated for cases scheduled for less than one hour] : No therapy indicated for cases scheduled for less than one hour. [FreeTextEntry1] : Malignant Hyperthermia Screening Tool and Risk of Bleeding Assessment \par \par Mr. CHAVEZ MARQUEZ denies family of unexpected death following Anesthesia or Exercise.\par Denies Family history of Malignant Hyperthermia, Muscle or Neuromuscular disorder and High Temperature  following exercise.\par \par Mr. CHAVEZ MARQUEZ denies history of Muscle Spasm, Dark or Chocolate- Colored and Unanticipated fever immediately following anaesthesia or serious exercise.\par Mr. CHAVEZ MARQUEZ also denies bleeding tendencies/Risks of Bleeding.\par

## 2019-06-25 NOTE — PROCEDURE
[Site check for bleeding/hematoma/thrill/bruit] : Site check for bleeding/hematoma/thrill/bruit [Vital signs on admission the q 15 mins x2] : Vital signs on admission the q 15 mins x2 [Resume diet] : resume diet [Other: _____] : [unfilled] [FreeTextEntry1] : thrombectomy left arm fistula/TPA Iin OR/fistulogram/angioplasty/stent

## 2019-06-25 NOTE — HISTORY OF PRESENT ILLNESS
[] : in left upper arm [FreeTextEntry1] : alert and oriented x 3 \par accompanied by \par no reported falls [FreeTextEntry4] : yesterday [FreeTextEntry5] : yesterday at 11pm [FreeTextEntry6] : Dr. Padilla

## 2019-08-20 ENCOUNTER — APPOINTMENT (OUTPATIENT)
Dept: VASCULAR SURGERY | Facility: CLINIC | Age: 64
End: 2019-08-20
Payer: MEDICAID

## 2019-08-20 PROCEDURE — 93990 DOPPLER FLOW TESTING: CPT

## 2019-08-20 PROCEDURE — 99213 OFFICE O/P EST LOW 20 MIN: CPT

## 2019-08-20 NOTE — DISCUSSION/SUMMARY
[FreeTextEntry1] : 65 yo male with history of esrd on hd presents for follow up of left upper extremity avf \par duplex shows 50-75% stenosis of the distal upper arm and in stent stenosis at the shoulder with flow rate of 2064\par given no difficulty with access will continue to monitor\par pt to follow up in 3-4 months with repeat duplex

## 2019-08-20 NOTE — PHYSICAL EXAM
c/c of light pink spotting when wipping  denies intercourse with in 24-48 hrs or the spotting  light cramping on left side pt is not concerned with the cramping  advised patient to watch for persisting spotting or worsening  [Thrill] : thrill [Aneurysm] : aneurysm [Hand well perfused] : hand well perfused [2+] : left 2+ [Normal] : coordination grossly intact, no focal deficits [Pulsatile Thrill] : no pulsatile thrill [Bleeding] : no bleeding [Ulcer] : no ulcer [de-identified] : intact

## 2019-08-20 NOTE — HISTORY OF PRESENT ILLNESS
[] : left brachiocephalic fistula [FreeTextEntry1] : 65 yo male with history of esrd on hd presents for follow up of left upper extremity avf \par pt without any complaints denies any history of difficulty with left upper extremity avf for hd\par pt denies any bleeding after hd

## 2019-11-19 ENCOUNTER — APPOINTMENT (OUTPATIENT)
Dept: VASCULAR SURGERY | Facility: CLINIC | Age: 64
End: 2019-11-19
Payer: MEDICAID

## 2019-11-19 PROCEDURE — 93990 DOPPLER FLOW TESTING: CPT

## 2019-11-19 PROCEDURE — 99213 OFFICE O/P EST LOW 20 MIN: CPT

## 2019-11-19 NOTE — HISTORY OF PRESENT ILLNESS
[FreeTextEntry1] : 63 yo male with history of esrd on hd presents for follow up of left upper extremity avf \par pt without any complaints denies any history of difficulty with left upper extremity avf for hd\par pt denies any bleeding after hd  [] : left brachiocephalic fistula

## 2019-11-19 NOTE — PHYSICAL EXAM
[Thrill] : thrill [Pulsatile Thrill] : no pulsatile thrill [Bleeding] : no bleeding [Aneurysm] : aneurysm [Hand well perfused] : hand well perfused [Ulcer] : no ulcer [2+] : left 2+ [Normal] : coordination grossly intact, no focal deficits [de-identified] : intact

## 2020-03-24 ENCOUNTER — APPOINTMENT (OUTPATIENT)
Dept: VASCULAR SURGERY | Facility: CLINIC | Age: 65
End: 2020-03-24

## 2020-06-25 NOTE — PATIENT PROFILE ADULT. - FUNCTIONAL SCREEN CURRENT LEVEL: TOILETING, MLM
Left detailed message for patient notifying him of below. Asked him to call the office if he had questions. (0) independent

## 2020-10-06 NOTE — ED PROVIDER NOTE - NS ED ATTENDING STATEMENT MOD
What Is The Reason For Today's Visit?: History of Non-Melanoma Skin Cancer How Many Skin Cancers Have You Had?: one When Was Your Last Cancer Diagnosed?: 2015 I have personally performed a face to face diagnostic evaluation on this patient. I have reviewed the ACP note and agree with the history, exam and plan of care, except as noted.

## 2021-04-30 ENCOUNTER — APPOINTMENT (OUTPATIENT)
Dept: VASCULAR SURGERY | Facility: CLINIC | Age: 66
End: 2021-04-30

## 2021-05-25 ENCOUNTER — INPATIENT (INPATIENT)
Facility: HOSPITAL | Age: 66
LOS: 24 days | Discharge: HOME | End: 2021-06-19
Attending: INTERNAL MEDICINE | Admitting: INTERNAL MEDICINE
Payer: MEDICAID

## 2021-05-25 VITALS
RESPIRATION RATE: 22 BRPM | HEIGHT: 69 IN | HEART RATE: 95 BPM | OXYGEN SATURATION: 100 % | TEMPERATURE: 98 F | SYSTOLIC BLOOD PRESSURE: 184 MMHG | DIASTOLIC BLOOD PRESSURE: 120 MMHG

## 2021-05-25 DIAGNOSIS — E03.9 HYPOTHYROIDISM, UNSPECIFIED: ICD-10-CM

## 2021-05-25 DIAGNOSIS — D69.6 THROMBOCYTOPENIA, UNSPECIFIED: ICD-10-CM

## 2021-05-25 DIAGNOSIS — N18.6 END STAGE RENAL DISEASE: ICD-10-CM

## 2021-05-25 DIAGNOSIS — I10 ESSENTIAL (PRIMARY) HYPERTENSION: ICD-10-CM

## 2021-05-25 DIAGNOSIS — R77.8 OTHER SPECIFIED ABNORMALITIES OF PLASMA PROTEINS: ICD-10-CM

## 2021-05-25 DIAGNOSIS — I77.0 ARTERIOVENOUS FISTULA, ACQUIRED: Chronic | ICD-10-CM

## 2021-05-25 DIAGNOSIS — Z29.9 ENCOUNTER FOR PROPHYLACTIC MEASURES, UNSPECIFIED: ICD-10-CM

## 2021-05-25 DIAGNOSIS — R94.31 ABNORMAL ELECTROCARDIOGRAM [ECG] [EKG]: ICD-10-CM

## 2021-05-25 DIAGNOSIS — D64.9 ANEMIA, UNSPECIFIED: ICD-10-CM

## 2021-05-25 DIAGNOSIS — E87.5 HYPERKALEMIA: ICD-10-CM

## 2021-05-25 LAB
ALBUMIN SERPL ELPH-MCNC: 4 G/DL — SIGNIFICANT CHANGE UP (ref 3.3–5)
ALP SERPL-CCNC: 107 U/L — SIGNIFICANT CHANGE UP (ref 40–120)
ALT FLD-CCNC: 11 U/L — SIGNIFICANT CHANGE UP (ref 4–41)
ANION GAP SERPL CALC-SCNC: 21 MMOL/L — HIGH (ref 7–14)
ANION GAP SERPL CALC-SCNC: 22 MMOL/L — HIGH (ref 7–14)
AST SERPL-CCNC: 11 U/L — SIGNIFICANT CHANGE UP (ref 4–40)
BASE EXCESS BLDV CALC-SCNC: -6.5 MMOL/L — LOW (ref -3–2)
BASOPHILS # BLD AUTO: 0.04 K/UL — SIGNIFICANT CHANGE UP (ref 0–0.2)
BASOPHILS NFR BLD AUTO: 0.6 % — SIGNIFICANT CHANGE UP (ref 0–2)
BILIRUB SERPL-MCNC: 0.5 MG/DL — SIGNIFICANT CHANGE UP (ref 0.2–1.2)
BLOOD GAS VENOUS - CREATININE: 14.8 MG/DL — HIGH (ref 0.5–1.3)
BLOOD GAS VENOUS COMPREHENSIVE RESULT: SIGNIFICANT CHANGE UP
BUN SERPL-MCNC: 87 MG/DL — HIGH (ref 7–23)
BUN SERPL-MCNC: 89 MG/DL — HIGH (ref 7–23)
CALCIUM SERPL-MCNC: 9 MG/DL — SIGNIFICANT CHANGE UP (ref 8.4–10.5)
CALCIUM SERPL-MCNC: 9.2 MG/DL — SIGNIFICANT CHANGE UP (ref 8.4–10.5)
CHLORIDE BLDV-SCNC: 107 MMOL/L — SIGNIFICANT CHANGE UP (ref 96–108)
CHLORIDE SERPL-SCNC: 102 MMOL/L — SIGNIFICANT CHANGE UP (ref 98–107)
CHLORIDE SERPL-SCNC: 102 MMOL/L — SIGNIFICANT CHANGE UP (ref 98–107)
CO2 SERPL-SCNC: 15 MMOL/L — LOW (ref 22–31)
CO2 SERPL-SCNC: 16 MMOL/L — LOW (ref 22–31)
CREAT SERPL-MCNC: 13.26 MG/DL — HIGH (ref 0.5–1.3)
CREAT SERPL-MCNC: 13.27 MG/DL — HIGH (ref 0.5–1.3)
DIALYSIS INSTRUMENT RESULT - HEPATITIS B SURFACE ANTIGEN: NEGATIVE — SIGNIFICANT CHANGE UP
EOSINOPHIL # BLD AUTO: 0.1 K/UL — SIGNIFICANT CHANGE UP (ref 0–0.5)
EOSINOPHIL NFR BLD AUTO: 1.5 % — SIGNIFICANT CHANGE UP (ref 0–6)
GAS PNL BLDV: 139 MMOL/L — SIGNIFICANT CHANGE UP (ref 136–146)
GLUCOSE BLDC GLUCOMTR-MCNC: 119 MG/DL — HIGH (ref 70–99)
GLUCOSE BLDC GLUCOMTR-MCNC: 142 MG/DL — HIGH (ref 70–99)
GLUCOSE BLDV-MCNC: 112 MG/DL — HIGH (ref 70–99)
GLUCOSE SERPL-MCNC: 115 MG/DL — HIGH (ref 70–99)
GLUCOSE SERPL-MCNC: 86 MG/DL — SIGNIFICANT CHANGE UP (ref 70–99)
HAV IGM SER-ACNC: SIGNIFICANT CHANGE UP
HBV CORE IGM SER-ACNC: SIGNIFICANT CHANGE UP
HBV SURFACE AG SER-ACNC: SIGNIFICANT CHANGE UP
HCO3 BLDV-SCNC: 18 MMOL/L — LOW (ref 20–27)
HCT VFR BLD CALC: 26.3 % — LOW (ref 39–50)
HCT VFR BLDA CALC: 27.4 % — LOW (ref 39–51)
HGB BLD CALC-MCNC: 8.8 G/DL — LOW (ref 13–17)
HGB BLD-MCNC: 8.4 G/DL — LOW (ref 13–17)
HIV 1+2 AB+HIV1 P24 AG SERPL QL IA: SIGNIFICANT CHANGE UP
IANC: 4.85 K/UL — SIGNIFICANT CHANGE UP (ref 1.5–8.5)
IMM GRANULOCYTES NFR BLD AUTO: 0.4 % — SIGNIFICANT CHANGE UP (ref 0–1.5)
LACTATE BLDV-MCNC: 0.8 MMOL/L — SIGNIFICANT CHANGE UP (ref 0.5–2)
LYMPHOCYTES # BLD AUTO: 1.22 K/UL — SIGNIFICANT CHANGE UP (ref 1–3.3)
LYMPHOCYTES # BLD AUTO: 18 % — SIGNIFICANT CHANGE UP (ref 13–44)
MAGNESIUM SERPL-MCNC: 2.5 MG/DL — SIGNIFICANT CHANGE UP (ref 1.6–2.6)
MCHC RBC-ENTMCNC: 27.5 PG — SIGNIFICANT CHANGE UP (ref 27–34)
MCHC RBC-ENTMCNC: 31.9 GM/DL — LOW (ref 32–36)
MCV RBC AUTO: 86.2 FL — SIGNIFICANT CHANGE UP (ref 80–100)
MONOCYTES # BLD AUTO: 0.53 K/UL — SIGNIFICANT CHANGE UP (ref 0–0.9)
MONOCYTES NFR BLD AUTO: 7.8 % — SIGNIFICANT CHANGE UP (ref 2–14)
NEUTROPHILS # BLD AUTO: 4.85 K/UL — SIGNIFICANT CHANGE UP (ref 1.8–7.4)
NEUTROPHILS NFR BLD AUTO: 71.7 % — SIGNIFICANT CHANGE UP (ref 43–77)
NRBC # BLD: 0 /100 WBCS — SIGNIFICANT CHANGE UP
NRBC # FLD: 0 K/UL — SIGNIFICANT CHANGE UP
PCO2 BLDV: 38 MMHG — LOW (ref 41–51)
PH BLDV: 7.31 — LOW (ref 7.32–7.43)
PHOSPHATE SERPL-MCNC: 7.6 MG/DL — HIGH (ref 2.5–4.5)
PLATELET # BLD AUTO: 129 K/UL — LOW (ref 150–400)
PO2 BLDV: <24 MMHG — LOW (ref 35–40)
POTASSIUM BLDV-SCNC: 5.5 MMOL/L — HIGH (ref 3.4–4.5)
POTASSIUM SERPL-MCNC: 4.9 MMOL/L — SIGNIFICANT CHANGE UP (ref 3.5–5.3)
POTASSIUM SERPL-MCNC: 5.8 MMOL/L — HIGH (ref 3.5–5.3)
POTASSIUM SERPL-SCNC: 4.9 MMOL/L — SIGNIFICANT CHANGE UP (ref 3.5–5.3)
POTASSIUM SERPL-SCNC: 5.8 MMOL/L — HIGH (ref 3.5–5.3)
PROT SERPL-MCNC: 7.9 G/DL — SIGNIFICANT CHANGE UP (ref 6–8.3)
RBC # BLD: 3.05 M/UL — LOW (ref 4.2–5.8)
RBC # FLD: 17.4 % — HIGH (ref 10.3–14.5)
SAO2 % BLDV: 11.6 % — LOW (ref 60–85)
SARS-COV-2 RNA SPEC QL NAA+PROBE: SIGNIFICANT CHANGE UP
SODIUM SERPL-SCNC: 139 MMOL/L — SIGNIFICANT CHANGE UP (ref 135–145)
SODIUM SERPL-SCNC: 139 MMOL/L — SIGNIFICANT CHANGE UP (ref 135–145)
TROPONIN T, HIGH SENSITIVITY RESULT: 100 NG/L — CRITICAL HIGH
TROPONIN T, HIGH SENSITIVITY RESULT: 105 NG/L — CRITICAL HIGH
WBC # BLD: 6.77 K/UL — SIGNIFICANT CHANGE UP (ref 3.8–10.5)
WBC # FLD AUTO: 6.77 K/UL — SIGNIFICANT CHANGE UP (ref 3.8–10.5)

## 2021-05-25 PROCEDURE — 93010 ELECTROCARDIOGRAM REPORT: CPT

## 2021-05-25 PROCEDURE — 71045 X-RAY EXAM CHEST 1 VIEW: CPT | Mod: 26

## 2021-05-25 PROCEDURE — 99223 1ST HOSP IP/OBS HIGH 75: CPT

## 2021-05-25 PROCEDURE — 99285 EMERGENCY DEPT VISIT HI MDM: CPT | Mod: 25

## 2021-05-25 RX ORDER — FERROUS SULFATE 325(65) MG
325 TABLET ORAL DAILY
Refills: 0 | Status: DISCONTINUED | OUTPATIENT
Start: 2021-05-25 | End: 2021-06-19

## 2021-05-25 RX ORDER — FOLIC ACID 0.8 MG
1 TABLET ORAL DAILY
Refills: 0 | Status: DISCONTINUED | OUTPATIENT
Start: 2021-05-25 | End: 2021-06-19

## 2021-05-25 RX ORDER — CHLORHEXIDINE GLUCONATE 213 G/1000ML
1 SOLUTION TOPICAL
Refills: 0 | Status: DISCONTINUED | OUTPATIENT
Start: 2021-05-25 | End: 2021-06-19

## 2021-05-25 RX ORDER — CARVEDILOL PHOSPHATE 80 MG/1
12.5 CAPSULE, EXTENDED RELEASE ORAL EVERY 12 HOURS
Refills: 0 | Status: DISCONTINUED | OUTPATIENT
Start: 2021-05-25 | End: 2021-06-19

## 2021-05-25 RX ORDER — DEXTROSE 50 % IN WATER 50 %
50 SYRINGE (ML) INTRAVENOUS ONCE
Refills: 0 | Status: COMPLETED | OUTPATIENT
Start: 2021-05-25 | End: 2021-05-25

## 2021-05-25 RX ORDER — SEVELAMER CARBONATE 2400 MG/1
800 POWDER, FOR SUSPENSION ORAL
Refills: 0 | Status: DISCONTINUED | OUTPATIENT
Start: 2021-05-25 | End: 2021-05-29

## 2021-05-25 RX ORDER — INSULIN HUMAN 100 [IU]/ML
5 INJECTION, SOLUTION SUBCUTANEOUS ONCE
Refills: 0 | Status: COMPLETED | OUTPATIENT
Start: 2021-05-25 | End: 2021-05-25

## 2021-05-25 RX ORDER — LABETALOL HCL 100 MG
300 TABLET ORAL ONCE
Refills: 0 | Status: COMPLETED | OUTPATIENT
Start: 2021-05-25 | End: 2021-05-25

## 2021-05-25 RX ORDER — LABETALOL HCL 100 MG
300 TABLET ORAL ONCE
Refills: 0 | Status: DISCONTINUED | OUTPATIENT
Start: 2021-05-25 | End: 2021-05-25

## 2021-05-25 RX ORDER — HEPARIN SODIUM 5000 [USP'U]/ML
5000 INJECTION INTRAVENOUS; SUBCUTANEOUS EVERY 8 HOURS
Refills: 0 | Status: DISCONTINUED | OUTPATIENT
Start: 2021-05-25 | End: 2021-06-02

## 2021-05-25 RX ORDER — LEVOTHYROXINE SODIUM 125 MCG
100 TABLET ORAL DAILY
Refills: 0 | Status: DISCONTINUED | OUTPATIENT
Start: 2021-05-25 | End: 2021-06-19

## 2021-05-25 RX ORDER — ERYTHROPOIETIN 10000 [IU]/ML
10000 INJECTION, SOLUTION INTRAVENOUS; SUBCUTANEOUS
Refills: 0 | Status: DISCONTINUED | OUTPATIENT
Start: 2021-05-25 | End: 2021-05-31

## 2021-05-25 RX ORDER — FUROSEMIDE 40 MG
80 TABLET ORAL ONCE
Refills: 0 | Status: COMPLETED | OUTPATIENT
Start: 2021-05-25 | End: 2021-05-25

## 2021-05-25 RX ORDER — PANTOPRAZOLE SODIUM 20 MG/1
40 TABLET, DELAYED RELEASE ORAL
Refills: 0 | Status: DISCONTINUED | OUTPATIENT
Start: 2021-05-25 | End: 2021-06-19

## 2021-05-25 RX ORDER — LOSARTAN POTASSIUM 100 MG/1
1 TABLET, FILM COATED ORAL
Qty: 0 | Refills: 0 | DISCHARGE

## 2021-05-25 RX ORDER — LABETALOL HCL 100 MG
10 TABLET ORAL ONCE
Refills: 0 | Status: DISCONTINUED | OUTPATIENT
Start: 2021-05-25 | End: 2021-05-25

## 2021-05-25 RX ADMIN — Medication 300 MILLIGRAM(S): at 13:52

## 2021-05-25 RX ADMIN — ERYTHROPOIETIN 10000 UNIT(S): 10000 INJECTION, SOLUTION INTRAVENOUS; SUBCUTANEOUS at 22:46

## 2021-05-25 RX ADMIN — Medication 50 MILLILITER(S): at 14:34

## 2021-05-25 RX ADMIN — INSULIN HUMAN 5 UNIT(S): 100 INJECTION, SOLUTION SUBCUTANEOUS at 14:33

## 2021-05-25 RX ADMIN — Medication 80 MILLIGRAM(S): at 14:33

## 2021-05-25 NOTE — ED ADULT TRIAGE NOTE - CHIEF COMPLAINT QUOTE
c/o worsening SOB for the past 2 days worse today pt is on Dialysis, M,W,Fr reports just came back from [place] and was unable to schedule dialysis due to required screening blood work, pt left upper arm AVF and right neck catheter. denies any other symptoms,  pt with shallow rapid respirations in triage, communicates with 3-4 word sentences.

## 2021-05-25 NOTE — ED ADULT NURSE REASSESSMENT NOTE - NS ED NURSE REASSESS COMMENT FT1
Patient alert and awake oriented x4, breathing "better", scheduled for dialysis, pending lab results, verbal report given to dialysis nurse RN August.

## 2021-05-25 NOTE — H&P ADULT - NSICDXPASTMEDICALHX_GEN_ALL_CORE_FT
PAST MEDICAL HISTORY:  Anemia     Chronic kidney disease     Hemodialysis access, AV graft Left upper extremity    HTN (Hypertension)     Hyperparathyroidism     Kidney stones     Thrombocytopenia

## 2021-05-25 NOTE — H&P ADULT - HISTORY OF PRESENT ILLNESS
66M hx of ESRD on HD (M/W/F, previously via LUE AVF, now via chest wall catheter), anemia, hyperparathyroidism, thrombocytopenia, hypothyroidism, HTN who presents with shortness of breath and LE swelling. Patient went to [place] last year and unable to return sooner due to the pandemic. There he was getting HD via LUE fistula, which blew, then had catheter placed via which he has been getting HD since. He last had HD on Friday, [place] to the [place] on Saturday. He went to his previous HD center yesterday but was told he needs HIV/hepatitis screening because he has been out of the country for one year. He denies fevers, chills, cp, abdominal pain, n/v/d.     ED: Tm 98.7, HR 93, /105, RR 25, 95% on RA  s/p insulin 5U, d50 x1, Lasix 80mg IVx1, labetalol 300mg PO x1

## 2021-05-25 NOTE — H&P ADULT - PROBLEM SELECTOR PLAN 1
Presented with SOB/LE swelling likely from volume overload from missed HD session  - on HD (M/W/F, previously via LUE AVF, now via chest wall catheter)  - HD per nephrology - Dr. Vargas to see patient   - d/w lab to expedite hep b screen, ACP to expedite with dialysis unit once lab results   - at home on lasix 40mg daily, hold for now given patient already on ESRD/dose likely to have minimal affect given creatinine, can resume per nephro if indicated  - c/w Renvela TID with meals  - monitor BMP, electrolytes

## 2021-05-25 NOTE — H&P ADULT - NSHPREVIEWOFSYSTEMS_GEN_ALL_CORE
Review of Systems:   CONSTITUTIONAL: No fever, no fatigue  EYES: No eye pain or discharge  ENMT:  No sinus or throat pain  NECK: No pain or stiffness  RESPIRATORY: No cough, wheezing, chills or hemoptysis; +SOB  CARDIOVASCULAR: No chest pain, palpitations, dizziness, or leg swelling  GASTROINTESTINAL: No abdominal or epigastric pain. No nausea, vomiting, or hematemesis; No diarrhea or constipation. No melena or hematochezia.  GENITOURINARY: No dysuria or incontinence  MUSCULOSKELETAL: No joint pain or swelling; No muscle, back, or extremity pain  NEUROLOGICAL: No headaches, memory loss, loss of strength, numbness, or tremors  PSYCHIATRIC: No depression, anxiety, mood swings, or difficulty sleeping  SKIN: No rashes, no skin changes

## 2021-05-25 NOTE — ED PROVIDER NOTE - OBJECTIVE STATEMENT
66M w/ PMHx of HTN, ESRD on HD (M, W, F), anemia, thrombocytopenia, hyperparathyroidism p/w SOB.  Pt. went to visit [place] last year but was stuck there for a year due to the pandemic.  Had a left AVF through which he got dialysis, but the fistula blew in [place].  Pt. had a catheter placed on his neck and has been getting HD through there since.  Last HD on Friday, [place] on Saturday.  Went to his former HD center yesterday but was told he needed hepatitis and HIV testing since he's been out of the country for over a year.  Endorses b/l LE edema.  Denies any CP, f/c, sick contacts, abd pain, n/v/d/c, urinary sxs, rectal bleeding.  Pt. makes urine.

## 2021-05-25 NOTE — CONSULT NOTE ADULT - SUBJECTIVE AND OBJECTIVE BOX
[facility] - S Alicia / Ej S /D Federico/ SERA Bojorquez/ SERA Faust/ Jovan Garrison / M Danou/ O Gregorio  service -[phone], office [phone]  ---------------------------------------------------------------------------------------------------------------    Patient is a 66y Male whom presented to the hospital with   Denied recent NSAID use/Abx use/iv contrast studies.    Patient seen and examined    PAST MEDICAL & SURGICAL HISTORY:  HTN (Hypertension)    Chronic kidney disease    Kidney stones    Hemodialysis access, AV graft  Left upper extremity    Hyperparathyroidism    Anemia    Thrombocytopenia    S/P Nephrectomy  (L) 2007    Acquired arteriovenous fistula  left wrist  1/2015 - [facility], Left upper arm 4/2015 (approximate date)        Allergies: No Known Allergies    Home Medications Reviewed  Hospital Medications:   MEDICATIONS  (STANDING):  epoetin tammi-epbx (RETACRIT) Injectable 22363 Unit(s) IV Push <User Schedule>  ferrous    sulfate 325 milliGRAM(s) Oral daily  heparin   Injectable 5000 Unit(s) SubCutaneous every 8 hours  sevelamer carbonate 800 milliGRAM(s) Oral three times a day with meals    SOCIAL HISTORY:  Denies ETOh,Smoking, illicit drug use  FAMILY HISTORY:  No pertinent family history        REVIEW OF SYSTEMS:  CONSTITUTIONAL: No weakness, fevers or chills  EYES/ENT: No visual changes;  No vertigo or throat pain   NECK: No pain or stiffness  RESPIRATORY: No cough, wheezing, hemoptysis; No shortness of breath  CARDIOVASCULAR: No chest pain or palpitations.  GASTROINTESTINAL: No abdominal or epigastric pain. No nausea, vomiting, or hematemesis; No diarrhea or constipation. No melena or hematochezia.  GENITOURINARY: No dysuria, frequency, foamy urine, urinary urgency, incontinence or hematuria  NEUROLOGICAL: No numbness or weakness  SKIN: No itching, burning, rashes, or lesions   VASCULAR: No bilateral lower extremity edema.   All other review of systems is negative unless indicated above.    VITALS:  T(F): 98.7 (05-25-21 @ 15:23), Max: 98.7 (05-25-21 @ 15:23)  HR: 93 (05-25-21 @ 15:23)  BP: 162/105 (05-25-21 @ 15:23)  RR: 25 (05-25-21 @ 15:23)  SpO2: 95% (05-25-21 @ 15:23)  Wt(kg): --    Height (cm): 175.3 (05-25 @ 12:36)    PHYSICAL EXAM:  Constitutional: NAD  HEENT: anicteric sclera, oropharynx clear, MMM  Neck: No JVD  Respiratory: CTAB, no wheezes, rales or rhonchi  Cardiovascular: S1, S2, RRR  Gastrointestinal: BS+, soft, NT/ND  Extremities: No cyanosis or clubbing. No peripheral edema  Neurological: A/O x 3, no focal deficits  Psychiatric: Normal mood, normal affect  : No CVA tenderness. No siegel.   Skin: No rashes  Vascular Access:    LABS:  05-25    139  |  102  |  87<H>  ----------------------------<  86  4.9   |  16<L>  |  13.27<H>    Ca    9.0      25 May 2021 16:20  Phos  7.6     05-25  Mg     2.5     05-25    TPro  7.9  /  Alb  4.0  /  TBili  0.5  /  DBili      /  AST  11  /  ALT  11  /  AlkPhos  107  05-25    Creatinine Trend: 13.27 <--, 13.26 <--                        8.4    6.77  )-----------( 129      ( 25 May 2021 13:31 )             26.3     Urine Studies:        RADIOLOGY & ADDITIONAL STUDIES:                 [facility] - S Alicia / Ej S /D Federico/ S Maryellen/ S Govind/ Jovan Garrison / M Danou/ O Gregorio  service -[phone], office [phone]  ---------------------------------------------------------------------------------------------------------------  Patient seen and examined bedside in ER    66M hx of ESRD on HD (M/W/F, previously via LUE AVF, now via chest wall catheter), well known to me/our gp pt from [facility]; anemia, hyperparathyroidism, thrombocytopenia, hypothyroidism, HTN who presents with shortness of breath and LE swelling. Patient went to [place] last year and unable to return sooner due to the pandemic. There he was getting HD via LUE fistula, which thrombosed, then had catheter placed via which he has been getting HD since. Renal consulted for ESRD Mx.  Pt last HD was on 5/21 in [place]. [place] the [place] on Saturday 5[place]22. He called [facility] HD center yesterday, was advised to go ER as he needs new hepatitis panel because he has been out of the country for one year. Pt came today to ER as feeling sob. He denies fevers, chills, cp, abdominal pain, n/v/d.     PAST MEDICAL & SURGICAL HISTORY:  HTN (Hypertension)    Chronic kidney disease    Kidney stones    Hemodialysis access, AV graft  Left upper extremity    Hyperparathyroidism    Anemia    Thrombocytopenia    S/P Nephrectomy  (L) 2007    Acquired arteriovenous fistula  left wrist  1/2015 - [facility], Left upper arm 4/2015 (approximate date)    Allergies: No Known Allergies    Home Medications Reviewed  Hospital Medications:   MEDICATIONS  (STANDING):  epoetin tammi-epbx (RETACRIT) Injectable 36477 Unit(s) IV Push <User Schedule>  ferrous    sulfate 325 milliGRAM(s) Oral daily  heparin   Injectable 5000 Unit(s) SubCutaneous every 8 hours  sevelamer carbonate 800 milliGRAM(s) Oral three times a day with meals    SOCIAL HISTORY:  Denies ETOh,Smoking, illicit drug use  FAMILY HISTORY:  No pertinent family history    REVIEW OF SYSTEMS:  CONSTITUTIONAL: No weakness, fevers or chills  EYES/ENT: No visual changes;  No vertigo or throat pain   NECK: No pain or stiffness  RESPIRATORY: No cough, wheezing, hemoptysis; + shortness of breath  CARDIOVASCULAR: No chest pain or palpitations.  GASTROINTESTINAL: No abdominal or epigastric pain. No nausea, vomiting, or hematemesis; No diarrhea or constipation. No melena or hematochezia.  NEUROLOGICAL: No numbness or weakness  SKIN: No itching, burning, rashes, or lesions   VASCULAR: + bilateral lower extremity edema.   All other review of systems is negative unless indicated above.    VITALS:  T(F): 98.7 (05-25-21 @ 15:23), Max: 98.7 (05-25-21 @ 15:23)  HR: 93 (05-25-21 @ 15:23)  BP: 162/105 (05-25-21 @ 15:23)  RR: 25 (05-25-21 @ 15:23)  SpO2: 95% (05-25-21 @ 15:23)  Wt(kg): --    Height (cm): 175.3 (05-25 @ 12:36)    PHYSICAL EXAM:  Constitutional: NAD, on RA, speaking in full sentenses  HEENT: anicteric sclera  Neck: No JVD  Respiratory: CTAB, no wheezes, rales or rhonchi  Cardiovascular: S1, S2, RRR  Gastrointestinal: BS+, soft, NT/ND  Extremities: 1+ peripheral edema b/l   Neurological: A/O x 3, no focal deficits  Psychiatric: Normal mood, normal affect  : No siegel.   Vascular Access: LUE AVF no thrill; rt chest permacath+     LABS:  05-25    139  |  102  |  87<H>  ----------------------------<  86  4.9   |  16<L>  |  13.27<H>    Ca    9.0      25 May 2021 16:20  Phos  7.6     05-25  Mg     2.5     05-25    TPro  7.9  /  Alb  4.0  /  TBili  0.5  /  DBili      /  AST  11  /  ALT  11  /  AlkPhos  107  05-25    Creatinine Trend: 13.27 <--, 13.26 <--                        8.4    6.77  )-----------( 129      ( 25 May 2021 13:31 )             26.3     Urine Studies:        RADIOLOGY & ADDITIONAL STUDIES:  < from: Xray Chest 1 View- PORTABLE-Urgent (Xray Chest 1 View- PORTABLE-Urgent .) (05.25.21 @ 13:36) >    IMPRESSION:  Enlarged cardiac silhouette.    Findings consistent with pulmonary edema.    < end of copied text >

## 2021-05-25 NOTE — H&P ADULT - ASSESSMENT
66M hx of ESRD on HD (M/W/F, previously via LUE AVF, now via chest wall catheter), anemia, hyperparathyroidism, thrombocytopenia, hypothyroidism, HTN who presents with shortness of breath and LE swelling likely due to volume overload from missed HD session, admitted for urgent HD.

## 2021-05-25 NOTE — H&P ADULT - NSHPLABSRESULTS_GEN_ALL_CORE
8.4    6.77  )-----------( 129      ( 25 May 2021 13:31 )             26.3       05-25    139  |  102  |  87<H>  ----------------------------<  86  4.9   |  16<L>  |  13.27<H>    Ca    9.0      25 May 2021 16:20  Phos  7.6     05-25  Mg     2.5     05-25    TPro  7.9  /  Alb  4.0  /  TBili  0.5  /  DBili  x   /  AST  11  /  ALT  11  /  AlkPhos  107  05-25    EKG - SR, QTc 508ms

## 2021-05-25 NOTE — H&P ADULT - PROBLEM SELECTOR PLAN 4
Hb 8.4   - likely due to ACD in setting of ESRD  - c/w home ferrous sulfate and folic acid daily  - trend CBC Hb 8.4   - likely due to ACD in setting of ESRD  - c/w home ferrous sulfate and folic acid daily  - EPO per nephrology   - trend CBC

## 2021-05-25 NOTE — H&P ADULT - NSHPPHYSICALEXAM_GEN_ALL_CORE
Vital Signs Last 24 Hrs  T(C): 37.1 (25 May 2021 15:23), Max: 37.1 (25 May 2021 15:23)  T(F): 98.7 (25 May 2021 15:23), Max: 98.7 (25 May 2021 15:23)  HR: 93 (25 May 2021 15:23) (88 - 96)  BP: 162/105 (25 May 2021 15:23) (160/110 - 198/127)  RR: 25 (25 May 2021 15:23) (22 - 26)  SpO2: 95% (25 May 2021 15:23) (95% - 100%)    CONSTITUTIONAL: well-developed, well-groomed, no apparent distress  EYES: no conjunctival or scleral injection, non-icteric, PERRLA  ENMT: no external nasal lesions, oral mucosa with moist membranes  NECK: trachea midline, no palpable neck mass   RESPIRATORY: breathing comfortably, lungs CTA without wheeze/rales/rhonchi  CARDIOVASCULAR: regular rate and rhythm; +S1S2, no murmurs, rubs, or gallops, no lower extremity edema, 2+ peripheral pulses  GASTROINTESTINAL: soft, nontender, nondistended; +BS throughout, no rebound/guarding  MUSCULOSKELETAL: no joint effusions, normal strength and tone of extremities   NEUROLOGIC: non-focal, sensation intact to light touch in b/l upper and lower extremities   PSYCHIATRIC: AAOx3, appropriate mood and affect  SKIN: no rashes or lesions, warm

## 2021-05-25 NOTE — ED ADULT NURSE NOTE - OBJECTIVE STATEMENT
Patient alert and awake, oriented x4, breathing with SOB and tachypnea, SpO2@97%. Patient returned from [place], received dialysis in [place] on Friday, began to feel SOB Sunday night and went to his usual dialysis center for M, W, F but was refused services. Patient denies any pain, chest discomfort, n/v/d, fevers, chills or known sick contacts. Patient with left upper arm AV fistula and right neck shiley.

## 2021-05-25 NOTE — ED PROVIDER NOTE - PROGRESS NOTE DETAILS
d/w Dr. Christian-admitted for urgent HD. Patient remains mildly tachypneic but saturating appropriately. Will hold on NIPPV for now. d/w Dr. Christian-admitted for urgent HD. Patient remains mildly tachypneic but saturating appropriately. Will hold on NIPPV for now. Potassium mildly elevated-will temporize with insulin/d50 and lasix

## 2021-05-25 NOTE — H&P ADULT - PROBLEM SELECTOR PLAN 2
Resolved, potassium 5.8 --> 4.9  - in setting of ESRD/missed HD session  - s/p insulin, d50, lasix  - trend electrolytes

## 2021-05-25 NOTE — ED PROVIDER NOTE - CLINICAL SUMMARY MEDICAL DECISION MAKING FREE TEXT BOX
66M w/ hx of ESRD on HD (M, W, F) p/w SOB.  Last HD on Friday, unable to get it yesterday.  Lungs CTA, but pt. tachypneic.  EKG w/ TWI in V2-V3, but no hyperacute T-waves.  Will obtain labs, CXR, and admit for HD.

## 2021-05-25 NOTE — ED PROVIDER NOTE - ATTENDING CONTRIBUTION TO CARE
67yo M with HTN, ESRD last HD was friday, returned from [place] this weekend and was unable to get HD yestserday as he has been away for a year. 2 days ago started having increasing SOB, lower extremity edema and intermittent chest pain  no fevers, chills cough  pt in mild resp distress  lungs clear  will need HD, will check labs, check K, admit for HD

## 2021-05-25 NOTE — H&P ADULT - NSICDXPASTSURGICALHX_GEN_ALL_CORE_FT
PAST SURGICAL HISTORY:  Acquired arteriovenous fistula left wrist  1/2015 - [facility], Left upper arm 4/2015 (approximate date)    S/P Nephrectomy (L) 2007

## 2021-05-25 NOTE — H&P ADULT - PROBLEM SELECTOR PLAN 6
BP elevated 160s  - s/p labetalol 300mg PO x1  - c/w home [id number] BID  - monitor vital signs

## 2021-05-25 NOTE — ED ADULT NURSE REASSESSMENT NOTE - NS ED NURSE REASSESS COMMENT FT1
Patient admitted to Medicine for ESRD, report given to dialysis RN August. Face to face report given to CATRINA Granados.

## 2021-05-25 NOTE — ED PROVIDER NOTE - NS ED ROS FT
General: denies fever, chills, weight loss/weight gain.  HENT: denies nasal congestion, sore throat, rhinorrhea, ear pain.  Eyes: denies visual changes, blurred vision, eye discharge, eye redness.  Neck: denies neck pain, neck swelling.  CV: denies chest pain, palpitations.  Resp: +difficulty breathing; denies cough.  Abdominal: denies nausea, vomiting, diarrhea, abdominal pain, blood in stool, dark stool.  MSK: b/l LE swelling.  Neuro: denies headaches, numbness, tingling, dizziness, lightheadedness.  Skin: denies rashes, cuts, bruises.  Hematologic: denies unexplained bruises.

## 2021-05-25 NOTE — ED PROVIDER NOTE - PHYSICAL EXAMINATION
GENERAL: Patient awake alert NAD.  HEENT: NC/AT.  LUNGS: Tachypneic. CTAB, no wheezes or crackles.  CARDIAC: RRR, no m/r/g.  ABDOMEN: Soft, NT, ND, No rebound, guarding.  EXT: 1+ pitting edema of b/l LEs. No calf tenderness. CV 2+DP/PT bilaterally.   MSK: No pain with movement, no deformities.  NEURO: A&Ox3. Moving all extremities.  SKIN: Warm and dry. No rash.  PSYCH: Normal affect.

## 2021-05-25 NOTE — ED ADULT NURSE NOTE - CHIEF COMPLAINT QUOTE
c/o worsening SOB for the past 2 days worse today pt is on Dialysis, M,W,Fr reports just came back from [place] and was unable to schedule dialysis due to required screening blood work, pt left upper arm AVF and right neck catheter. denies any other symptoms,  pt with shallow rapid respirations in triage, communicates with 3-4 word sentences.

## 2021-05-25 NOTE — ED PROVIDER NOTE - CARE PLAN
Principal Discharge DX:	SOB (shortness of breath)   Principal Discharge DX:	End stage renal disease

## 2021-05-26 LAB
ANION GAP SERPL CALC-SCNC: 19 MMOL/L — HIGH (ref 7–14)
BUN SERPL-MCNC: 35 MG/DL — HIGH (ref 7–23)
CALCIUM SERPL-MCNC: 9.4 MG/DL — SIGNIFICANT CHANGE UP (ref 8.4–10.5)
CHLORIDE SERPL-SCNC: 95 MMOL/L — LOW (ref 98–107)
CO2 SERPL-SCNC: 23 MMOL/L — SIGNIFICANT CHANGE UP (ref 22–31)
COVID-19 SPIKE DOMAIN AB INTERP: NEGATIVE — SIGNIFICANT CHANGE UP
COVID-19 SPIKE DOMAIN ANTIBODY RESULT: 0.4 U/ML — SIGNIFICANT CHANGE UP
CREAT SERPL-MCNC: 6.8 MG/DL — HIGH (ref 0.5–1.3)
GLUCOSE SERPL-MCNC: 168 MG/DL — HIGH (ref 70–99)
HAV IGM SER-ACNC: SIGNIFICANT CHANGE UP
HBV CORE AB SER-ACNC: SIGNIFICANT CHANGE UP
HBV CORE IGM SER-ACNC: SIGNIFICANT CHANGE UP
HBV SURFACE AB SER-ACNC: >1000 MIU/ML — SIGNIFICANT CHANGE UP
HBV SURFACE AB SER-ACNC: >1000 MIU/ML — SIGNIFICANT CHANGE UP
HBV SURFACE AG SER-ACNC: SIGNIFICANT CHANGE UP
HCT VFR BLD CALC: 26.6 % — LOW (ref 39–50)
HCV AB S/CO SERPL IA: 0.71 S/CO — SIGNIFICANT CHANGE UP (ref 0–0.99)
HCV AB S/CO SERPL IA: 0.87 S/CO — SIGNIFICANT CHANGE UP (ref 0–0.99)
HCV AB SERPL-IMP: SIGNIFICANT CHANGE UP
HCV AB SERPL-IMP: SIGNIFICANT CHANGE UP
HGB BLD-MCNC: 8.2 G/DL — LOW (ref 13–17)
MAGNESIUM SERPL-MCNC: 2.1 MG/DL — SIGNIFICANT CHANGE UP (ref 1.6–2.6)
MCHC RBC-ENTMCNC: 26.6 PG — LOW (ref 27–34)
MCHC RBC-ENTMCNC: 30.8 GM/DL — LOW (ref 32–36)
MCV RBC AUTO: 86.4 FL — SIGNIFICANT CHANGE UP (ref 80–100)
MRSA PCR RESULT.: SIGNIFICANT CHANGE UP
NRBC # BLD: 0 /100 WBCS — SIGNIFICANT CHANGE UP
NRBC # FLD: 0 K/UL — SIGNIFICANT CHANGE UP
PHOSPHATE SERPL-MCNC: 4.9 MG/DL — HIGH (ref 2.5–4.5)
PLATELET # BLD AUTO: 132 K/UL — LOW (ref 150–400)
POTASSIUM SERPL-MCNC: 3.9 MMOL/L — SIGNIFICANT CHANGE UP (ref 3.5–5.3)
POTASSIUM SERPL-SCNC: 3.9 MMOL/L — SIGNIFICANT CHANGE UP (ref 3.5–5.3)
RBC # BLD: 3.08 M/UL — LOW (ref 4.2–5.8)
RBC # FLD: 17.5 % — HIGH (ref 10.3–14.5)
S AUREUS DNA NOSE QL NAA+PROBE: SIGNIFICANT CHANGE UP
SARS-COV-2 IGG+IGM SERPL QL IA: 0.4 U/ML — SIGNIFICANT CHANGE UP
SARS-COV-2 IGG+IGM SERPL QL IA: NEGATIVE — SIGNIFICANT CHANGE UP
SODIUM SERPL-SCNC: 137 MMOL/L — SIGNIFICANT CHANGE UP (ref 135–145)
TSH SERPL-MCNC: 0.21 UIU/ML — LOW (ref 0.27–4.2)
WBC # BLD: 4.17 K/UL — SIGNIFICANT CHANGE UP (ref 3.8–10.5)
WBC # FLD AUTO: 4.17 K/UL — SIGNIFICANT CHANGE UP (ref 3.8–10.5)

## 2021-05-26 RX ADMIN — CARVEDILOL PHOSPHATE 12.5 MILLIGRAM(S): 80 CAPSULE, EXTENDED RELEASE ORAL at 17:25

## 2021-05-26 RX ADMIN — CARVEDILOL PHOSPHATE 12.5 MILLIGRAM(S): 80 CAPSULE, EXTENDED RELEASE ORAL at 09:31

## 2021-05-26 RX ADMIN — PANTOPRAZOLE SODIUM 40 MILLIGRAM(S): 20 TABLET, DELAYED RELEASE ORAL at 06:50

## 2021-05-26 RX ADMIN — CHLORHEXIDINE GLUCONATE 1 APPLICATION(S): 213 SOLUTION TOPICAL at 09:27

## 2021-05-26 RX ADMIN — SEVELAMER CARBONATE 800 MILLIGRAM(S): 2400 POWDER, FOR SUSPENSION ORAL at 17:26

## 2021-05-26 RX ADMIN — Medication 1 MILLIGRAM(S): at 09:30

## 2021-05-26 RX ADMIN — HEPARIN SODIUM 5000 UNIT(S): 5000 INJECTION INTRAVENOUS; SUBCUTANEOUS at 14:30

## 2021-05-26 RX ADMIN — HEPARIN SODIUM 5000 UNIT(S): 5000 INJECTION INTRAVENOUS; SUBCUTANEOUS at 06:50

## 2021-05-26 RX ADMIN — Medication 100 MICROGRAM(S): at 06:50

## 2021-05-26 RX ADMIN — HEPARIN SODIUM 5000 UNIT(S): 5000 INJECTION INTRAVENOUS; SUBCUTANEOUS at 21:21

## 2021-05-26 RX ADMIN — Medication 325 MILLIGRAM(S): at 09:31

## 2021-05-26 RX ADMIN — SEVELAMER CARBONATE 800 MILLIGRAM(S): 2400 POWDER, FOR SUSPENSION ORAL at 09:31

## 2021-05-26 RX ADMIN — SEVELAMER CARBONATE 800 MILLIGRAM(S): 2400 POWDER, FOR SUSPENSION ORAL at 14:30

## 2021-05-26 NOTE — PROGRESS NOTE ADULT - ASSESSMENT
66M hx of ESRD on HD (M/W/F, previously via LUE AVF, now via chest wall catheter), anemia, hyperparathyroidism, thrombocytopenia, hypothyroidism, HTN who presents with shortness of breath and LE swelling likely due to volume overload from missed HD session, admitted for HD. Renal following for ESRD Mx.     ESRD on HD   [place] of country [place] over a year  hypervolemia, M acidosis- 2/2 missed HD-improved  Hyperkalemia. Resolved  s/p urgent HD yesterday, Rx sheet reviewed, net UF 3kg, tolerated well. uneventful.   plan for next HD tomorrow w/2k bath, uf 2.5-3L as tolearted  renal diet, fluid restriction 1L/day  dose all meds for ESRD  f/w w/SW for HD placement at [facility]  HTN, bp better today- c/w egwjbiwncip40.5mg BID. plan to inc uf w/hd  Anemia in CKD-Hb < goal. added epo 10k tiw w/hd. check Fe stores  Hyperphosphatemia- increased Renvela1>2 TID with meals. phos at goal now    poc d/w pt, daughter bedside  labs, chart reviewed  For any question, call:  [facility]  Office [phone]  Ans Serv [phone] cell - [phone]

## 2021-05-26 NOTE — PROGRESS NOTE ADULT - SUBJECTIVE AND OBJECTIVE BOX
Patient is a 66y old  Male who presents with a chief complaint of urgent HD (26 May 2021 18:00)      SUBJECTIVE / OVERNIGHT EVENTS:    Events noted.  CONSTITUTIONAL: No fever,  or fatigue  RESPIRATORY: No cough, wheezing,  No shortness of breath  CARDIOVASCULAR: No chest pain, palpitations, dizziness, or leg swelling  GASTROINTESTINAL: No abdominal or epigastric pain.   NEUROLOGICAL: No headaches,     MEDICATIONS  (STANDING):  carvedilol 12.5 milliGRAM(s) Oral every 12 hours  chlorhexidine 4% Liquid 1 Application(s) Topical <User Schedule>  epoetin tammi-epbx (RETACRIT) Injectable 87824 Unit(s) IV Push <User Schedule>  ferrous    sulfate 325 milliGRAM(s) Oral daily  folic acid 1 milliGRAM(s) Oral daily  heparin   Injectable 5000 Unit(s) SubCutaneous every 8 hours  levothyroxine 100 MICROGram(s) Oral daily  pantoprazole    Tablet 40 milliGRAM(s) Oral before breakfast  sevelamer carbonate 800 milliGRAM(s) Oral three times a day with meals    MEDICATIONS  (PRN):        CAPILLARY BLOOD GLUCOSE        I&O's Summary    25 May 2021 07:01  -  26 May 2021 07:00  --------------------------------------------------------  IN: 400 mL / OUT: 3400 mL / NET: -3000 mL        T(C): 37 (05-26-21 @ 21:11), Max: 37.2 (05-25-21 @ 23:50)  HR: 77 (05-26-21 @ 21:11) (55 - 98)  BP: 132/82 (05-26-21 @ 21:11) (121/89 - 146/88)  RR: 17 (05-26-21 @ 21:11) (16 - 20)  SpO2: 100% (05-26-21 @ 21:11) (97% - 100%)    PHYSICAL EXAM:  GENERAL: NAD  NECK: Supple, No JVD  CHEST/LUNG: Clear to auscultation bilaterally; No wheezing.  HEART: Regular rate and rhythm; No murmurs, rubs, or gallops  ABDOMEN: Soft, Nontender, Nondistended; Bowel sounds present  EXTREMITIES:   No edema  NEUROLOGY: AAO X 3      LABS:                        8.2    4.17  )-----------( 132      ( 26 May 2021 07:15 )             26.6     05-26    137  |  95<L>  |  35<H>  ----------------------------<  168<H>  3.9   |  23  |  6.80<H>    Ca    9.4      26 May 2021 07:15  Phos  4.9     05-26  Mg     2.1     05-26    TPro  7.9  /  Alb  4.0  /  TBili  0.5  /  DBili  x   /  AST  11  /  ALT  11  /  AlkPhos  107  05-25            CAPILLARY BLOOD GLUCOSE            RADIOLOGY & ADDITIONAL TESTS:    Imaging Personally Reviewed:    Consultant(s) Notes Reviewed:      Care Discussed with Consultants/Other Providers:    Wilfred Christian MD, CMD, FACP    [address] [place] [address]  Office Tel: [phone]  Cell: [phone]

## 2021-05-26 NOTE — PROGRESS NOTE ADULT - PROBLEM SELECTOR PLAN 4
Hb 8.2  - likely due to ACD in setting of ESRD  - c/w home ferrous sulfate and folic acid daily  - EPO per nephrology

## 2021-05-26 NOTE — PROGRESS NOTE ADULT - SUBJECTIVE AND OBJECTIVE BOX
[facility] - S Alicia / Ej S /D Federico/ S Maryellen/ S Govind/ Jovan Garrison / M Jose/ O Gregorio  service -[phone], office [phone]  ---------------------------------------------------------------------------------------------------------------    Patient seen and examined bedside    Subjective and Objective: No overnight events, sob resolved. No complaints today. feeling better    Allergies: No Known Allergies      Hospital Medications:   MEDICATIONS  (STANDING):  carvedilol 12.5 milliGRAM(s) Oral every 12 hours  chlorhexidine 4% Liquid 1 Application(s) Topical <User Schedule>  epoetin tammi-epbx (RETACRIT) Injectable 52865 Unit(s) IV Push <User Schedule>  ferrous    sulfate 325 milliGRAM(s) Oral daily  folic acid 1 milliGRAM(s) Oral daily  heparin   Injectable 5000 Unit(s) SubCutaneous every 8 hours  levothyroxine 100 MICROGram(s) Oral daily  pantoprazole    Tablet 40 milliGRAM(s) Oral before breakfast  sevelamer carbonate 800 milliGRAM(s) Oral three times a day with meals    VITALS:  T(F): 98.5 (05-26-21 @ 17:22), Max: 98.9 (05-25-21 @ 23:50)  HR: 75 (05-26-21 @ 17:22)  BP: 131/84 (05-26-21 @ 17:22)  RR: 16 (05-26-21 @ 17:22)  SpO2: 99% (05-26-21 @ 17:22)  Wt(kg): --    05-25 @ 07:01  -  05-26 @ 07:00  --------------------------------------------------------  IN: 400 mL / OUT: 3400 mL / NET: -3000 mL      PHYSICAL EXAM:  Constitutional: NAD, on RA, speaking in full sentenses  HEENT: anicteric sclera  Neck: No JVD  Respiratory: CTAB, no wheezes, rales or rhonchi  Cardiovascular: S1, S2, RRR  Gastrointestinal: BS+, soft, NT/ND  Extremities: 1+ peripheral edema b/l   Neurological: A/O x 3, no focal deficits  Psychiatric: Normal mood, normal affect  : No siegel.   Vascular Access: LUE AVF no thrill; rt chest permacath+     LABS:  05-26    137  |  95<L>  |  35<H>  ----------------------------<  168<H>  3.9   |  23  |  6.80<H>    Ca    9.4      26 May 2021 07:15  Phos  4.9     05-26  Mg     2.1     05-26    TPro  7.9  /  Alb  4.0  /  TBili  0.5  /  DBili      /  AST  11  /  ALT  11  /  AlkPhos  107  05-25    Creatinine Trend: 6.80 <--, 13.27 <--, 13.26 <--                        8.2    4.17  )-----------( 132      ( 26 May 2021 07:15 )             26.6     Urine Studies:        RADIOLOGY & ADDITIONAL STUDIES:   For information on Fall & Injury Prevention, visit [email or web address]

## 2021-05-27 LAB
ANION GAP SERPL CALC-SCNC: 18 MMOL/L — HIGH (ref 7–14)
BASOPHILS # BLD AUTO: 0.04 K/UL — SIGNIFICANT CHANGE UP (ref 0–0.2)
BASOPHILS NFR BLD AUTO: 1 % — SIGNIFICANT CHANGE UP (ref 0–2)
BUN SERPL-MCNC: 66 MG/DL — HIGH (ref 7–23)
CALCIUM SERPL-MCNC: 8.2 MG/DL — LOW (ref 8.4–10.5)
CHLORIDE SERPL-SCNC: 95 MMOL/L — LOW (ref 98–107)
CO2 SERPL-SCNC: 21 MMOL/L — LOW (ref 22–31)
CREAT SERPL-MCNC: 9.3 MG/DL — HIGH (ref 0.5–1.3)
EOSINOPHIL # BLD AUTO: 0.17 K/UL — SIGNIFICANT CHANGE UP (ref 0–0.5)
EOSINOPHIL NFR BLD AUTO: 4.1 % — SIGNIFICANT CHANGE UP (ref 0–6)
GLUCOSE SERPL-MCNC: 90 MG/DL — SIGNIFICANT CHANGE UP (ref 70–99)
HCT VFR BLD CALC: 23.2 % — LOW (ref 39–50)
HGB BLD-MCNC: 7.3 G/DL — LOW (ref 13–17)
IANC: 1.55 K/UL — SIGNIFICANT CHANGE UP (ref 1.5–8.5)
IMM GRANULOCYTES NFR BLD AUTO: 0.5 % — SIGNIFICANT CHANGE UP (ref 0–1.5)
LYMPHOCYTES # BLD AUTO: 1.74 K/UL — SIGNIFICANT CHANGE UP (ref 1–3.3)
LYMPHOCYTES # BLD AUTO: 42 % — SIGNIFICANT CHANGE UP (ref 13–44)
MAGNESIUM SERPL-MCNC: 2.1 MG/DL — SIGNIFICANT CHANGE UP (ref 1.6–2.6)
MCHC RBC-ENTMCNC: 27.5 PG — SIGNIFICANT CHANGE UP (ref 27–34)
MCHC RBC-ENTMCNC: 31.5 GM/DL — LOW (ref 32–36)
MCV RBC AUTO: 87.5 FL — SIGNIFICANT CHANGE UP (ref 80–100)
MONOCYTES # BLD AUTO: 0.62 K/UL — SIGNIFICANT CHANGE UP (ref 0–0.9)
MONOCYTES NFR BLD AUTO: 15 % — HIGH (ref 2–14)
NEUTROPHILS # BLD AUTO: 1.55 K/UL — LOW (ref 1.8–7.4)
NEUTROPHILS NFR BLD AUTO: 37.4 % — LOW (ref 43–77)
NRBC # BLD: 0 /100 WBCS — SIGNIFICANT CHANGE UP
NRBC # FLD: 0 K/UL — SIGNIFICANT CHANGE UP
PHOSPHATE SERPL-MCNC: 8.2 MG/DL — HIGH (ref 2.5–4.5)
PLATELET # BLD AUTO: 118 K/UL — LOW (ref 150–400)
POTASSIUM SERPL-MCNC: 4.4 MMOL/L — SIGNIFICANT CHANGE UP (ref 3.5–5.3)
POTASSIUM SERPL-SCNC: 4.4 MMOL/L — SIGNIFICANT CHANGE UP (ref 3.5–5.3)
RBC # BLD: 2.65 M/UL — LOW (ref 4.2–5.8)
RBC # FLD: 17.5 % — HIGH (ref 10.3–14.5)
SODIUM SERPL-SCNC: 134 MMOL/L — LOW (ref 135–145)
WBC # BLD: 4.14 K/UL — SIGNIFICANT CHANGE UP (ref 3.8–10.5)
WBC # FLD AUTO: 4.14 K/UL — SIGNIFICANT CHANGE UP (ref 3.8–10.5)

## 2021-05-27 PROCEDURE — 99222 1ST HOSP IP/OBS MODERATE 55: CPT

## 2021-05-27 RX ADMIN — HEPARIN SODIUM 5000 UNIT(S): 5000 INJECTION INTRAVENOUS; SUBCUTANEOUS at 21:50

## 2021-05-27 RX ADMIN — SEVELAMER CARBONATE 800 MILLIGRAM(S): 2400 POWDER, FOR SUSPENSION ORAL at 11:37

## 2021-05-27 RX ADMIN — ERYTHROPOIETIN 10000 UNIT(S): 10000 INJECTION, SOLUTION INTRAVENOUS; SUBCUTANEOUS at 17:34

## 2021-05-27 RX ADMIN — CARVEDILOL PHOSPHATE 12.5 MILLIGRAM(S): 80 CAPSULE, EXTENDED RELEASE ORAL at 19:39

## 2021-05-27 RX ADMIN — PANTOPRAZOLE SODIUM 40 MILLIGRAM(S): 20 TABLET, DELAYED RELEASE ORAL at 05:49

## 2021-05-27 RX ADMIN — HEPARIN SODIUM 5000 UNIT(S): 5000 INJECTION INTRAVENOUS; SUBCUTANEOUS at 13:27

## 2021-05-27 RX ADMIN — CHLORHEXIDINE GLUCONATE 1 APPLICATION(S): 213 SOLUTION TOPICAL at 11:39

## 2021-05-27 RX ADMIN — HEPARIN SODIUM 5000 UNIT(S): 5000 INJECTION INTRAVENOUS; SUBCUTANEOUS at 05:46

## 2021-05-27 RX ADMIN — CARVEDILOL PHOSPHATE 12.5 MILLIGRAM(S): 80 CAPSULE, EXTENDED RELEASE ORAL at 05:45

## 2021-05-27 RX ADMIN — Medication 100 MICROGRAM(S): at 05:48

## 2021-05-27 RX ADMIN — Medication 325 MILLIGRAM(S): at 11:38

## 2021-05-27 RX ADMIN — Medication 1 MILLIGRAM(S): at 11:38

## 2021-05-27 NOTE — CONSULT NOTE ADULT - SUBJECTIVE AND OBJECTIVE BOX
Vascular Surgery Consult  Consulting surgical team: Vascular Surgery (C Team)  Consulting attending: Sharon Robertson    HPI:  66M hx of ESRD on HD (M/W/F, previously via LUE AVF, now via chest wall catheter), anemia, hyperparathyroidism, thrombocytopenia, hypothyroidism, HTN who presents with shortness of breath and LE swelling. Patient went to [place] last year and unable to return sooner due to the pandemic. There he was getting HD via LUE fistula, which blew, then had catheter placed via which he has been getting HD since. He last had HD on Friday, returned to the [place] on Saturday. He went to his previous HD center yesterday but was told he needs HIV/hepatitis screening because he has been out of the country for one year. He denies fevers, chills, cp, abdominal pain, n/v/d.     Creation of left brachiocephalic fistula by Dr. Glaser in 2015. Patient reports he went to [place] but due to the pandemic and border closure, he was unable to return until last Saturday, where he came to the hospital for further evaluation. Reports his fistula clotted off on 3/6/21, was hospitalized for 1 month in [place] since 3/9/21 where they were unable to restore access in the fistula. A temporary catheter was placed in the right neck and he has been receiving dialysis through the catheter since then.     ED: Tm 98.7, HR 93, /105, RR 25, 95% on RA  s/p insulin 5U, d50 x1, Lasix 80mg IVx1, labetalol 300mg PO x1   (25 May 2021 17:36)      PAST MEDICAL HISTORY:  HTN (Hypertension)    Chronic kidney disease    Kidney stones    Hemodialysis access, AV graft    Hyperparathyroidism    Anemia    Thrombocytopenia        PAST SURGICAL HISTORY:  S/P Nephrectomy    Acquired arteriovenous fistula        MEDICATIONS:  carvedilol 12.5 milliGRAM(s) Oral every 12 hours  chlorhexidine 4% Liquid 1 Application(s) Topical <User Schedule>  epoetin tammi-epbx (RETACRIT) Injectable 10241 Unit(s) IV Push <User Schedule>  ferrous    sulfate 325 milliGRAM(s) Oral daily  folic acid 1 milliGRAM(s) Oral daily  heparin   Injectable 5000 Unit(s) SubCutaneous every 8 hours  levothyroxine 100 MICROGram(s) Oral daily  pantoprazole    Tablet 40 milliGRAM(s) Oral before breakfast  sevelamer carbonate 800 milliGRAM(s) Oral three times a day with meals      ALLERGIES:  No Known Allergies      VITALS & I/Os:  Vital Signs Last 24 Hrs  T(C): 36.4 (27 May 2021 14:00), Max: 37 (26 May 2021 21:11)  T(F): 97.5 (27 May 2021 14:00), Max: 98.6 (26 May 2021 21:11)  HR: 74 (27 May 2021 14:00) (69 - 77)  BP: 144/96 (27 May 2021 14:00) (127/83 - 144/96)  BP(mean): --  RR: 18 (27 May 2021 14:00) (16 - 18)  SpO2: 100% (27 May 2021 14:00) (99% - 100%)    I&O's Summary      PHYSICAL EXAM:  General: No acute distress  Respiratory: Nonlabored  Cardiovascular: RRR  Abdominal: Soft, nondistended, nontender  Extremities: Warm, left arm fistula without thrill, 2+ radial pulses    LABS:                        8.2    4.17  )-----------( 132      ( 26 May 2021 07:15 )             26.6     05-26    137  |  95<L>  |  35<H>  ----------------------------<  168<H>  3.9   |  23  |  6.80<H>    Ca    9.4      26 May 2021 07:15  Phos  4.9     05-26  Mg     2.1     05-26      Lactate:

## 2021-05-27 NOTE — PROGRESS NOTE ADULT - SUBJECTIVE AND OBJECTIVE BOX
Nephrology Followup Note - [phone] - Dr Pagan / Dr Vargas / Dr Faust / Dr Caballero / Dr Bojorquez / Dr Perkins / Dr Garrison / Dr Garcia  Pt seen and examined at bedside  No acute events overnight. No complaints.     Allergies:  No Known Allergies    Hospital Medications:   MEDICATIONS  (STANDING):  carvedilol 12.5 milliGRAM(s) Oral every 12 hours  chlorhexidine 4% Liquid 1 Application(s) Topical <User Schedule>  epoetin tammi-epbx (RETACRIT) Injectable 40665 Unit(s) IV Push <User Schedule>  ferrous    sulfate 325 milliGRAM(s) Oral daily  folic acid 1 milliGRAM(s) Oral daily  heparin   Injectable 5000 Unit(s) SubCutaneous every 8 hours  levothyroxine 100 MICROGram(s) Oral daily  pantoprazole    Tablet 40 milliGRAM(s) Oral before breakfast  sevelamer carbonate 800 milliGRAM(s) Oral three times a day with meals    VITALS:  T(F): 97.5 (05-27-21 @ 14:00), Max: 98.6 (05-26-21 @ 21:11)  HR: 74 (05-27-21 @ 14:00)  BP: 144/96 (05-27-21 @ 14:00)  RR: 18 (05-27-21 @ 14:00)  SpO2: 100% (05-27-21 @ 14:00)  Wt(kg): --      PHYSICAL EXAM:  Constitutional: NAD  HEENT: anicteric sclera, oropharynx clear, MMM  Neck: No JVD  Respiratory: CTAB, no wheezes, rales or rhonchi  Cardiovascular: S1, S2, RRR  Gastrointestinal: BS+, soft, NT/ND  Extremities: No cyanosis or clubbing. No peripheral edema  Neurological: A/O x 3, no focal deficits  Psychiatric: Normal mood, normal affect  : No CVA tenderness. No siegel.   Skin: No rashes  Vascular Access: LUE AV access absent thrill. [facility]     LABS:  05-26    137  |  95<L>  |  35<H>  ----------------------------<  168<H>  3.9   |  23  |  6.80<H>    Ca    9.4      26 May 2021 07:15  Phos  4.9     05-26  Mg     2.1     05-26      Creatinine Trend: 6.80 <--, 13.27 <--, 13.26 <--                        8.2    4.17  )-----------( 132      ( 26 May 2021 07:15 )             26.6     Urine Studies:      RADIOLOGY & ADDITIONAL STUDIES:

## 2021-05-27 NOTE — PROGRESS NOTE ADULT - ASSESSMENT
66M hx of ESRD on HD (M/W/F, previously via LUE AVF, now via chest wall catheter), anemia, hyperparathyroidism, thrombocytopenia, hypothyroidism, HTN who presents with shortness of breath and LE swelling likely due to volume overload from missed HD session, admitted for HD. Renal following for ESRD Mx.     ESRD on HD   Electrolytes normalized, and now within normal limits.   plan for next HD today w/2k bath, uf 2.5-3L as tolerated  vascular consult for new avf placement appreciated  Pt will also need IR consult for changing shiley cath to tunneled cath  renal diet, fluid restriction 1L/day  dose all meds for ESRD  f/w w/SW for HD placement at [facility]  HTN well controlled on current regimen   Anemia in CKD-Hb below goal. continue epo 10k tiw w/hd. check Fe stores  Hyperphosphatemia- increased Renvela1>2 TID with meals. phos at goal now  Check ipth, and start activated vit d, if needed.     Lennox Pagan MD  [facility]  Office [phone]  Ans Serv [phone]  Cell - [phone]

## 2021-05-27 NOTE — PROGRESS NOTE ADULT - SUBJECTIVE AND OBJECTIVE BOX
Patient is a 66y old  Male who presents with a chief complaint of urgent HD (27 May 2021 16:34)      SUBJECTIVE / OVERNIGHT EVENTS:    Events noted.  CONSTITUTIONAL: No fever,  or fatigue  RESPIRATORY: No cough, wheezing,  No shortness of breath  CARDIOVASCULAR: No chest pain, palpitations, dizziness, or leg swelling  GASTROINTESTINAL: No abdominal or epigastric pain.   NEUROLOGICAL: No headaches,     MEDICATIONS  (STANDING):  carvedilol 12.5 milliGRAM(s) Oral every 12 hours  chlorhexidine 4% Liquid 1 Application(s) Topical <User Schedule>  epoetin tammi-epbx (RETACRIT) Injectable 53030 Unit(s) IV Push <User Schedule>  ferrous    sulfate 325 milliGRAM(s) Oral daily  folic acid 1 milliGRAM(s) Oral daily  heparin   Injectable 5000 Unit(s) SubCutaneous every 8 hours  levothyroxine 100 MICROGram(s) Oral daily  pantoprazole    Tablet 40 milliGRAM(s) Oral before breakfast  sevelamer carbonate 800 milliGRAM(s) Oral three times a day with meals    MEDICATIONS  (PRN):        CAPILLARY BLOOD GLUCOSE        I&O's Summary    27 May 2021 07:01  -  28 May 2021 00:39  --------------------------------------------------------  IN: 400 mL / OUT: 3400 mL / NET: -3000 mL        T(C): 36.6 (05-27-21 @ 21:55), Max: 36.8 (05-27-21 @ 01:06)  HR: 82 (05-27-21 @ 21:55) (68 - 82)  BP: 125/68 (05-27-21 @ 21:55) (125/68 - 167/93)  RR: 17 (05-27-21 @ 21:55) (17 - 18)  SpO2: 98% (05-27-21 @ 21:55) (98% - 100%)    PHYSICAL EXAM:  GENERAL: NAD  NECK: Supple, No JVD  CHEST/LUNG: Clear to auscultation bilaterally; No wheezing.  HEART: Regular rate and rhythm; No murmurs, rubs, or gallops  ABDOMEN: Soft, Nontender, Nondistended; Bowel sounds present  EXTREMITIES:   No edema  NEUROLOGY: AAO X 3      LABS:                        7.3    4.14  )-----------( 118      ( 27 May 2021 17:19 )             23.2     05-27    134<L>  |  95<L>  |  66<H>  ----------------------------<  90  4.4   |  21<L>  |  9.30<H>    Ca    8.2<L>      27 May 2021 17:19  Phos  8.2     05-27  Mg     2.1     05-27              CAPILLARY BLOOD GLUCOSE            RADIOLOGY & ADDITIONAL TESTS:    Imaging Personally Reviewed:    Consultant(s) Notes Reviewed:      Care Discussed with Consultants/Other Providers:    Wilfred Christian MD, CMD, FACP    [address] [place] [address]  Office Tel: [phone]  Cell: [phone]

## 2021-05-28 LAB
ANION GAP SERPL CALC-SCNC: 21 MMOL/L — HIGH (ref 7–14)
BASOPHILS # BLD AUTO: 0.05 K/UL — SIGNIFICANT CHANGE UP (ref 0–0.2)
BASOPHILS NFR BLD AUTO: 1.2 % — SIGNIFICANT CHANGE UP (ref 0–2)
BUN SERPL-MCNC: 40 MG/DL — HIGH (ref 7–23)
CALCIUM SERPL-MCNC: 9.3 MG/DL — SIGNIFICANT CHANGE UP (ref 8.4–10.5)
CHLORIDE SERPL-SCNC: 94 MMOL/L — LOW (ref 98–107)
CO2 SERPL-SCNC: 19 MMOL/L — LOW (ref 22–31)
CREAT SERPL-MCNC: 6.32 MG/DL — HIGH (ref 0.5–1.3)
EOSINOPHIL # BLD AUTO: 0.13 K/UL — SIGNIFICANT CHANGE UP (ref 0–0.5)
EOSINOPHIL NFR BLD AUTO: 3.1 % — SIGNIFICANT CHANGE UP (ref 0–6)
FERRITIN SERPL-MCNC: 1578 NG/ML — HIGH (ref 30–400)
GLUCOSE SERPL-MCNC: 94 MG/DL — SIGNIFICANT CHANGE UP (ref 70–99)
HCT VFR BLD CALC: 31.1 % — LOW (ref 39–50)
HGB BLD-MCNC: 9.7 G/DL — LOW (ref 13–17)
IANC: 2.15 K/UL — SIGNIFICANT CHANGE UP (ref 1.5–8.5)
IMM GRANULOCYTES NFR BLD AUTO: 0.2 % — SIGNIFICANT CHANGE UP (ref 0–1.5)
IRON SATN MFR SERPL: 102 UG/DL — SIGNIFICANT CHANGE UP (ref 45–165)
IRON SATN MFR SERPL: 52 % — HIGH (ref 14–50)
LYMPHOCYTES # BLD AUTO: 1.41 K/UL — SIGNIFICANT CHANGE UP (ref 1–3.3)
LYMPHOCYTES # BLD AUTO: 33.6 % — SIGNIFICANT CHANGE UP (ref 13–44)
MAGNESIUM SERPL-MCNC: 2.1 MG/DL — SIGNIFICANT CHANGE UP (ref 1.6–2.6)
MCHC RBC-ENTMCNC: 27.9 PG — SIGNIFICANT CHANGE UP (ref 27–34)
MCHC RBC-ENTMCNC: 31.2 GM/DL — LOW (ref 32–36)
MCV RBC AUTO: 89.4 FL — SIGNIFICANT CHANGE UP (ref 80–100)
MONOCYTES # BLD AUTO: 0.45 K/UL — SIGNIFICANT CHANGE UP (ref 0–0.9)
MONOCYTES NFR BLD AUTO: 10.7 % — SIGNIFICANT CHANGE UP (ref 2–14)
NEUTROPHILS # BLD AUTO: 2.15 K/UL — SIGNIFICANT CHANGE UP (ref 1.8–7.4)
NEUTROPHILS NFR BLD AUTO: 51.2 % — SIGNIFICANT CHANGE UP (ref 43–77)
NRBC # BLD: 0 /100 WBCS — SIGNIFICANT CHANGE UP
NRBC # FLD: 0 K/UL — SIGNIFICANT CHANGE UP
PHOSPHATE SERPL-MCNC: 6 MG/DL — HIGH (ref 2.5–4.5)
PLATELET # BLD AUTO: 97 K/UL — LOW (ref 150–400)
POTASSIUM SERPL-MCNC: 4.3 MMOL/L — SIGNIFICANT CHANGE UP (ref 3.5–5.3)
POTASSIUM SERPL-SCNC: 4.3 MMOL/L — SIGNIFICANT CHANGE UP (ref 3.5–5.3)
PTH-INTACT FLD-MCNC: 1289 PG/ML — HIGH (ref 15–65)
RBC # BLD: 3.48 M/UL — LOW (ref 4.2–5.8)
RBC # FLD: 17.6 % — HIGH (ref 10.3–14.5)
SODIUM SERPL-SCNC: 134 MMOL/L — LOW (ref 135–145)
TIBC SERPL-MCNC: 196 UG/DL — LOW (ref 220–430)
UIBC SERPL-MCNC: 94 UG/DL — LOW (ref 110–370)
WBC # BLD: 4.2 K/UL — SIGNIFICANT CHANGE UP (ref 3.8–10.5)
WBC # FLD AUTO: 4.2 K/UL — SIGNIFICANT CHANGE UP (ref 3.8–10.5)

## 2021-05-28 PROCEDURE — ZZZZZ: CPT

## 2021-05-28 RX ORDER — DOXERCALCIFEROL 2.5 UG/1
4 CAPSULE ORAL
Refills: 0 | Status: DISCONTINUED | OUTPATIENT
Start: 2021-05-28 | End: 2021-05-31

## 2021-05-28 RX ADMIN — HEPARIN SODIUM 5000 UNIT(S): 5000 INJECTION INTRAVENOUS; SUBCUTANEOUS at 05:39

## 2021-05-28 RX ADMIN — CARVEDILOL PHOSPHATE 12.5 MILLIGRAM(S): 80 CAPSULE, EXTENDED RELEASE ORAL at 17:29

## 2021-05-28 RX ADMIN — PANTOPRAZOLE SODIUM 40 MILLIGRAM(S): 20 TABLET, DELAYED RELEASE ORAL at 05:40

## 2021-05-28 RX ADMIN — SEVELAMER CARBONATE 800 MILLIGRAM(S): 2400 POWDER, FOR SUSPENSION ORAL at 08:38

## 2021-05-28 RX ADMIN — HEPARIN SODIUM 5000 UNIT(S): 5000 INJECTION INTRAVENOUS; SUBCUTANEOUS at 13:16

## 2021-05-28 RX ADMIN — SEVELAMER CARBONATE 800 MILLIGRAM(S): 2400 POWDER, FOR SUSPENSION ORAL at 17:30

## 2021-05-28 RX ADMIN — Medication 325 MILLIGRAM(S): at 11:23

## 2021-05-28 RX ADMIN — SEVELAMER CARBONATE 800 MILLIGRAM(S): 2400 POWDER, FOR SUSPENSION ORAL at 13:15

## 2021-05-28 RX ADMIN — CHLORHEXIDINE GLUCONATE 1 APPLICATION(S): 213 SOLUTION TOPICAL at 11:24

## 2021-05-28 RX ADMIN — Medication 1 MILLIGRAM(S): at 11:23

## 2021-05-28 RX ADMIN — Medication 100 MICROGRAM(S): at 05:39

## 2021-05-28 RX ADMIN — HEPARIN SODIUM 5000 UNIT(S): 5000 INJECTION INTRAVENOUS; SUBCUTANEOUS at 21:27

## 2021-05-28 RX ADMIN — CARVEDILOL PHOSPHATE 12.5 MILLIGRAM(S): 80 CAPSULE, EXTENDED RELEASE ORAL at 05:39

## 2021-05-28 NOTE — PROGRESS NOTE ADULT - SUBJECTIVE AND OBJECTIVE BOX
Nephrology Followup Note - [phone] - Dr Pagan / Dr Vargas / Dr Faust / Dr Caballero / Dr Bojorquez / Dr Perkins / Dr Garrison / Dr Garcia  Pt seen and examined at bedside  No acute events overnight. No complaints.     Allergies:  No Known Allergies    Hospital Medications:   MEDICATIONS  (STANDING):  carvedilol 12.5 milliGRAM(s) Oral every 12 hours  chlorhexidine 4% Liquid 1 Application(s) Topical <User Schedule>  epoetin tammi-epbx (RETACRIT) Injectable 21619 Unit(s) IV Push <User Schedule>  ferrous    sulfate 325 milliGRAM(s) Oral daily  folic acid 1 milliGRAM(s) Oral daily  heparin   Injectable 5000 Unit(s) SubCutaneous every 8 hours  levothyroxine 100 MICROGram(s) Oral daily  pantoprazole    Tablet 40 milliGRAM(s) Oral before breakfast  sevelamer carbonate 800 milliGRAM(s) Oral three times a day with meals    VITALS:  T(F): 98 (05-28-21 @ 05:29), Max: 98 (05-28-21 @ 05:29)  HR: 76 (05-28-21 @ 05:29)  BP: 165/95 (05-28-21 @ 05:29)  RR: 17 (05-28-21 @ 05:29)  SpO2: 99% (05-28-21 @ 05:29)  Wt(kg): --    05-27 @ 07:01  -  05-28 @ 07:00  --------------------------------------------------------  IN: 400 mL / OUT: 3400 mL / NET: -3000 mL        PHYSICAL EXAM:  Constitutional: NAD  HEENT: anicteric sclera, oropharynx clear, MMM  Neck: No JVD  Respiratory: CTAB, no wheezes, rales or rhonchi  Cardiovascular: S1, S2, RRR  Gastrointestinal: BS+, soft, NT/ND  Extremities: No cyanosis or clubbing. No peripheral edema  Neurological: A/O x 3, no focal deficits  Psychiatric: Normal mood, normal affect  : No CVA tenderness. No siegel.   Skin: No rashes  Vascular Access: RIJ nontunneled cath.     LABS:  05-28    134<L>  |  94<L>  |  40<H>  ----------------------------<  94  4.3   |  19<L>  |  6.32<H>    Ca    9.3      28 May 2021 07:42  Phos  6.0     05-28  Mg     2.1     05-28      Creatinine Trend: 6.32 <--, 9.30 <--, 6.80 <--, 13.27 <--, 13.26 <--                        9.7    4.20  )-----------( 97       ( 28 May 2021 07:42 )             31.1     Urine Studies:      RADIOLOGY & ADDITIONAL STUDIES:

## 2021-05-28 NOTE — CHART NOTE - NSCHARTNOTEFT_GEN_A_CORE
Spoke to IR @ length regarding the shiley catheter.  Patient needs Permacath to be done before discharge which IR only will do it within 24  hours of discharge.  Patient needs to stay until Tuesday 6/1 to have AVF placed by vascular.  Please place IR consult order in so they may be able to do permacath right after AVF.  Spoke to IR attending Dr Meraz.  Yasmine CAMARGO

## 2021-05-28 NOTE — PROGRESS NOTE ADULT - ASSESSMENT
66M hx of ESRD on HD (M/W/F, previously via LUE AVF, now via chest wall catheter), anemia, hyperparathyroidism, thrombocytopenia, hypothyroidism, HTN who presents with shortness of breath and LE swelling likely due to volume overload from missed HD session, admitted for HD. Renal following for ESRD Mx.     ESRD on HD   Electrolytes normalized, and now within normal limits.   HD yesterday uneventful. Repeat HD tomorrow.  vascular consult for new avf placement appreciated, tentatively planned for next tues   Pt will also need IR consult for changing shiley cath to tunneled cath  renal diet, fluid restriction 1L/day  dose all meds for ESRD  f/w w/SW for HD placement at [facility]  HTN well controlled on current regimen   Anemia in CKD-Hb below goal. continue epo 10k tiw w/hd. Fe stores adequate.   Hyperphosphatemia- continue Renvela 2 tabs TID with meals. high pth noted, will also add hectorol 4 mcg Three times a week with HD.     Lennox Pagan MD  [facility]  Office [phone]  Ans Serv [phone]  Cell - [phone]

## 2021-05-28 NOTE — PROGRESS NOTE ADULT - SUBJECTIVE AND OBJECTIVE BOX
Patient is a 66y old  Male who presents with a chief complaint of urgent HD (28 May 2021 13:03)      SUBJECTIVE / OVERNIGHT EVENTS:    Events noted.  CONSTITUTIONAL: No fever,  or fatigue  RESPIRATORY: No cough, wheezing,  No shortness of breath  CARDIOVASCULAR: No chest pain, palpitations  GASTROINTESTINAL: No abdominal or epigastric pain.   NEUROLOGICAL: No headaches,     MEDICATIONS  (STANDING):  carvedilol 12.5 milliGRAM(s) Oral every 12 hours  chlorhexidine 4% Liquid 1 Application(s) Topical <User Schedule>  doxercalciferol Injectable 4 MICROGram(s) IV Push <User Schedule>  epoetin tammi-epbx (RETACRIT) Injectable 36865 Unit(s) IV Push <User Schedule>  ferrous    sulfate 325 milliGRAM(s) Oral daily  folic acid 1 milliGRAM(s) Oral daily  heparin   Injectable 5000 Unit(s) SubCutaneous every 8 hours  levothyroxine 100 MICROGram(s) Oral daily  pantoprazole    Tablet 40 milliGRAM(s) Oral before breakfast  sevelamer carbonate 800 milliGRAM(s) Oral three times a day with meals    MEDICATIONS  (PRN):        CAPILLARY BLOOD GLUCOSE        I&O's Summary    27 May 2021 07:01  -  28 May 2021 07:00  --------------------------------------------------------  IN: 400 mL / OUT: 3400 mL / NET: -3000 mL        T(C): 36.4 (05-28-21 @ 14:00), Max: 36.7 (05-28-21 @ 05:29)  HR: 75 (05-28-21 @ 14:05) (68 - 82)  BP: 165/82 (05-28-21 @ 14:05) (125/68 - 185/88)  RR: 20 (05-28-21 @ 14:00) (17 - 20)  SpO2: 100% (05-28-21 @ 14:00) (98% - 100%)    PHYSICAL EXAM:  GENERAL: NAD  NECK: Supple, No JVD  CHEST/LUNG: Clear to auscultation bilaterally; No wheezing.  HEART: Regular rate and rhythm; No murmurs, rubs, or gallops  ABDOMEN: Soft, Nontender, Nondistended; Bowel sounds present  EXTREMITIES:   No edema  NEUROLOGY: AAO X 3      LABS:                        9.7    4.20  )-----------( 97       ( 28 May 2021 07:42 )             31.1     05-28    134<L>  |  94<L>  |  40<H>  ----------------------------<  94  4.3   |  19<L>  |  6.32<H>    Ca    9.3      28 May 2021 07:42  Phos  6.0     05-28  Mg     2.1     05-28              CAPILLARY BLOOD GLUCOSE            RADIOLOGY & ADDITIONAL TESTS:    Imaging Personally Reviewed:    Consultant(s) Notes Reviewed:      Care Discussed with Consultants/Other Providers:    Wilfred Christian MD, CMD, FACP    [address] [place] [address]  Office Tel: [phone]  Cell: [phone]

## 2021-05-29 LAB
ANION GAP SERPL CALC-SCNC: 20 MMOL/L — HIGH (ref 7–14)
BUN SERPL-MCNC: 59 MG/DL — HIGH (ref 7–23)
CA-I BLD-SCNC: 1.11 MMOL/L — LOW (ref 1.15–1.29)
CALCIUM SERPL-MCNC: 8.9 MG/DL — SIGNIFICANT CHANGE UP (ref 8.4–10.5)
CHLORIDE SERPL-SCNC: 93 MMOL/L — LOW (ref 98–107)
CO2 SERPL-SCNC: 20 MMOL/L — LOW (ref 22–31)
CREAT SERPL-MCNC: 8.33 MG/DL — HIGH (ref 0.5–1.3)
GLUCOSE SERPL-MCNC: 81 MG/DL — SIGNIFICANT CHANGE UP (ref 70–99)
HCT VFR BLD CALC: 25.4 % — LOW (ref 39–50)
HGB BLD-MCNC: 7.9 G/DL — LOW (ref 13–17)
MAGNESIUM SERPL-MCNC: 2.1 MG/DL — SIGNIFICANT CHANGE UP (ref 1.6–2.6)
MCHC RBC-ENTMCNC: 27.6 PG — SIGNIFICANT CHANGE UP (ref 27–34)
MCHC RBC-ENTMCNC: 31.1 GM/DL — LOW (ref 32–36)
MCV RBC AUTO: 88.8 FL — SIGNIFICANT CHANGE UP (ref 80–100)
NRBC # BLD: 0 /100 WBCS — SIGNIFICANT CHANGE UP
NRBC # FLD: 0 K/UL — SIGNIFICANT CHANGE UP
PHOSPHATE SERPL-MCNC: 7.2 MG/DL — HIGH (ref 2.5–4.5)
PLATELET # BLD AUTO: 145 K/UL — LOW (ref 150–400)
POTASSIUM SERPL-MCNC: 4.7 MMOL/L — SIGNIFICANT CHANGE UP (ref 3.5–5.3)
POTASSIUM SERPL-SCNC: 4.7 MMOL/L — SIGNIFICANT CHANGE UP (ref 3.5–5.3)
RBC # BLD: 2.86 M/UL — LOW (ref 4.2–5.8)
RBC # FLD: 17.2 % — HIGH (ref 10.3–14.5)
SODIUM SERPL-SCNC: 133 MMOL/L — LOW (ref 135–145)
WBC # BLD: 5.54 K/UL — SIGNIFICANT CHANGE UP (ref 3.8–10.5)
WBC # FLD AUTO: 5.54 K/UL — SIGNIFICANT CHANGE UP (ref 3.8–10.5)

## 2021-05-29 PROCEDURE — 93010 ELECTROCARDIOGRAM REPORT: CPT

## 2021-05-29 RX ORDER — SEVELAMER CARBONATE 2400 MG/1
1600 POWDER, FOR SUSPENSION ORAL
Refills: 0 | Status: DISCONTINUED | OUTPATIENT
Start: 2021-05-29 | End: 2021-06-19

## 2021-05-29 RX ORDER — NIFEDIPINE 30 MG
30 TABLET, EXTENDED RELEASE 24 HR ORAL AT BEDTIME
Refills: 0 | Status: DISCONTINUED | OUTPATIENT
Start: 2021-05-29 | End: 2021-06-07

## 2021-05-29 RX ADMIN — Medication 30 MILLIGRAM(S): at 21:28

## 2021-05-29 RX ADMIN — CARVEDILOL PHOSPHATE 12.5 MILLIGRAM(S): 80 CAPSULE, EXTENDED RELEASE ORAL at 06:33

## 2021-05-29 RX ADMIN — SEVELAMER CARBONATE 800 MILLIGRAM(S): 2400 POWDER, FOR SUSPENSION ORAL at 13:09

## 2021-05-29 RX ADMIN — HEPARIN SODIUM 5000 UNIT(S): 5000 INJECTION INTRAVENOUS; SUBCUTANEOUS at 21:28

## 2021-05-29 RX ADMIN — Medication 1 MILLIGRAM(S): at 13:09

## 2021-05-29 RX ADMIN — CHLORHEXIDINE GLUCONATE 1 APPLICATION(S): 213 SOLUTION TOPICAL at 11:26

## 2021-05-29 RX ADMIN — CARVEDILOL PHOSPHATE 12.5 MILLIGRAM(S): 80 CAPSULE, EXTENDED RELEASE ORAL at 19:20

## 2021-05-29 RX ADMIN — HEPARIN SODIUM 5000 UNIT(S): 5000 INJECTION INTRAVENOUS; SUBCUTANEOUS at 13:10

## 2021-05-29 RX ADMIN — PANTOPRAZOLE SODIUM 40 MILLIGRAM(S): 20 TABLET, DELAYED RELEASE ORAL at 06:33

## 2021-05-29 RX ADMIN — Medication 100 MICROGRAM(S): at 06:33

## 2021-05-29 RX ADMIN — DOXERCALCIFEROL 4 MICROGRAM(S): 2.5 CAPSULE ORAL at 19:47

## 2021-05-29 RX ADMIN — HEPARIN SODIUM 5000 UNIT(S): 5000 INJECTION INTRAVENOUS; SUBCUTANEOUS at 06:34

## 2021-05-29 RX ADMIN — Medication 325 MILLIGRAM(S): at 13:10

## 2021-05-29 RX ADMIN — ERYTHROPOIETIN 10000 UNIT(S): 10000 INJECTION, SOLUTION INTRAVENOUS; SUBCUTANEOUS at 19:47

## 2021-05-29 NOTE — PROGRESS NOTE ADULT - ASSESSMENT
66M hx of ESRD on HD (M/W/F, previously via LUE AVF, now via chest wall catheter), anemia, hyperparathyroidism, thrombocytopenia, hypothyroidism, HTN who presents with shortness of breath and LE swelling likely due to volume overload from missed HD session, admitted for HD. Renal following for ESRD Mx.     ESRD on HD   Electrolytes normalized, and now within normal limits.   scheduled for HD today, net UF 2-2.5kg as tolerated  vascular consult noted- for new RUE avf placement, tentatively planned for next tues   will dialyze Monday for renal optimization pre OR  f/u w/IR consult for IJ tunneled cath insertion as per vas sx Attending note  renal diet, fluid restriction 1L/day  dose all meds for ESRD  f/w w/SW for HD placement at [facility]  HTN, uncontrolled on BB. added Nifedipine 30mg BT. inc uf w/hd  Anemia in CKD-Hb below goal. continue epo 10k tiw w/hd. Fe stores adequate.   Hyperphosphatemia- high- increased Renvela 1>2 tabs TID with meals. high pth noted, added hectorol 4 mcg Three times a week with HD.     poc d/w pt, PA covering   labs, chart reviewed  [facility]  Cell - [phone]  Office [phone]  Ans Serv [phone]

## 2021-05-29 NOTE — CHART NOTE - NSCHARTNOTEFT_GEN_A_CORE
awaiting from [facility] Attending Dr Robertson to confirm placement of AVF and Permacath on Tuesday.  Yasmine CAMARGO

## 2021-05-29 NOTE — PROGRESS NOTE ADULT - SUBJECTIVE AND OBJECTIVE BOX
Patient is a 66y old  Male who presents with a chief complaint of urgent HD (29 May 2021 17:20)      SUBJECTIVE / OVERNIGHT EVENTS:    Events noted.  CONSTITUTIONAL: No fever,  or fatigue  RESPIRATORY: No cough, wheezing,  No shortness of breath  CARDIOVASCULAR: No chest pain, palpitations, dizziness, or leg swelling  GASTROINTESTINAL: No abdominal or epigastric pain. No nausea, vomiting.  NEUROLOGICAL: No headaches,     MEDICATIONS  (STANDING):  carvedilol 12.5 milliGRAM(s) Oral every 12 hours  chlorhexidine 4% Liquid 1 Application(s) Topical <User Schedule>  doxercalciferol Injectable 4 MICROGram(s) IV Push <User Schedule>  epoetin tammi-epbx (RETACRIT) Injectable 50655 Unit(s) IV Push <User Schedule>  ferrous    sulfate 325 milliGRAM(s) Oral daily  folic acid 1 milliGRAM(s) Oral daily  heparin   Injectable 5000 Unit(s) SubCutaneous every 8 hours  levothyroxine 100 MICROGram(s) Oral daily  NIFEdipine XL 30 milliGRAM(s) Oral at bedtime  pantoprazole    Tablet 40 milliGRAM(s) Oral before breakfast  sevelamer carbonate 1600 milliGRAM(s) Oral three times a day with meals    MEDICATIONS  (PRN):        CAPILLARY BLOOD GLUCOSE        I&O's Summary      T(C): 36.8 (05-29-21 @ 21:26), Max: 36.8 (05-29-21 @ 21:26)  HR: 87 (05-29-21 @ 21:26) (75 - 91)  BP: 157/81 (05-29-21 @ 21:26) (139/77 - 184/114)  RR: 16 (05-29-21 @ 21:26) (16 - 18)  SpO2: 98% (05-29-21 @ 21:26) (98% - 100%)    PHYSICAL EXAM:  GENERAL: NAD  NECK: Supple, No JVD  CHEST/LUNG: Clear to auscultation bilaterally; No wheezing.  HEART: Regular rate and rhythm; No murmurs, rubs, or gallops  ABDOMEN: Soft, Nontender, Nondistended; Bowel sounds present  EXTREMITIES:   No edema  NEUROLOGY: AAO X 3      LABS:                        7.9    5.54  )-----------( 145      ( 29 May 2021 07:03 )             25.4     05-29    133<L>  |  93<L>  |  59<H>  ----------------------------<  81  4.7   |  20<L>  |  8.33<H>    Ca    8.9      29 May 2021 07:03  Phos  7.2     05-29  Mg     2.1     05-29              CAPILLARY BLOOD GLUCOSE            RADIOLOGY & ADDITIONAL TESTS:    Imaging Personally Reviewed:    Consultant(s) Notes Reviewed:      Care Discussed with Consultants/Other Providers:    Wilfred Christian MD, CMD, FACP    [address] [place] [address]  Office Tel: [phone]  Cell: [phone]

## 2021-05-29 NOTE — PROGRESS NOTE ADULT - SUBJECTIVE AND OBJECTIVE BOX
[facility] - S Alicia / Ej S /D Federico/ S Maryellen/ S Govind/ Jovan Garrison / M Jose/ O Gregorio  service -[phone], office [phone]  ---------------------------------------------------------------------------------------------------------------    Patient seen and examined bedside    Subjective and Objective: No overnight events, denied sob. No complaints today. feeling better    Allergies: No Known Allergies      Hospital Medications:   MEDICATIONS  (STANDING):  carvedilol 12.5 milliGRAM(s) Oral every 12 hours  chlorhexidine 4% Liquid 1 Application(s) Topical <User Schedule>  doxercalciferol Injectable 4 MICROGram(s) IV Push <User Schedule>  epoetin tammi-epbx (RETACRIT) Injectable 00961 Unit(s) IV Push <User Schedule>  ferrous    sulfate 325 milliGRAM(s) Oral daily  folic acid 1 milliGRAM(s) Oral daily  heparin   Injectable 5000 Unit(s) SubCutaneous every 8 hours  levothyroxine 100 MICROGram(s) Oral daily  pantoprazole    Tablet 40 milliGRAM(s) Oral before breakfast  sevelamer carbonate 800 milliGRAM(s) Oral three times a day with meals      VITALS:  T(F): 97.7 (05-29-21 @ 17:00), Max: 98.1 (05-29-21 @ 02:19)  HR: 86 (05-29-21 @ 17:00)  BP: 184/114 (05-29-21 @ 17:00)  RR: 18 (05-29-21 @ 17:00)  SpO2: 99% (05-29-21 @ 10:30)  Wt(kg): --      PHYSICAL EXAM:  Constitutional: NAD  HEENT: anicteric sclera  Neck: No JVD  Respiratory: CTAB, no wheezes, rales or rhonchi  Cardiovascular: S1, S2, RRR  Gastrointestinal: BS+, soft, NT/ND  Extremities: + peripheral edema  Neurological: A/O x 3  Psychiatric: Normal mood, normal affect  : No siegel.   Vascular Access: rt chest catheter+ ; lue AVF NO THRILL     LABS:  05-29    133<L>  |  93<L>  |  59<H>  ----------------------------<  81  4.7   |  20<L>  |  8.33<H>    Ca    8.9      29 May 2021 07:03  Phos  7.2     05-29  Mg     2.1     05-29      Creatinine Trend: 8.33 <--, 6.32 <--, 9.30 <--, 6.80 <--, 13.27 <--, 13.26 <--                        7.9    5.54  )-----------( 145      ( 29 May 2021 07:03 )             25.4     Urine Studies:        RADIOLOGY & ADDITIONAL STUDIES:

## 2021-05-30 LAB
ALBUMIN SERPL ELPH-MCNC: 3.5 G/DL — SIGNIFICANT CHANGE UP (ref 3.3–5)
ALP SERPL-CCNC: 99 U/L — SIGNIFICANT CHANGE UP (ref 40–120)
ALT FLD-CCNC: 9 U/L — SIGNIFICANT CHANGE UP (ref 4–41)
ANION GAP SERPL CALC-SCNC: 19 MMOL/L — HIGH (ref 7–14)
AST SERPL-CCNC: 10 U/L — SIGNIFICANT CHANGE UP (ref 4–40)
BILIRUB SERPL-MCNC: 0.3 MG/DL — SIGNIFICANT CHANGE UP (ref 0.2–1.2)
BLD GP AB SCN SERPL QL: NEGATIVE — SIGNIFICANT CHANGE UP
BUN SERPL-MCNC: 47 MG/DL — HIGH (ref 7–23)
CA-I BLD-SCNC: 1.05 MMOL/L — LOW (ref 1.15–1.29)
CALCIUM SERPL-MCNC: 9.2 MG/DL — SIGNIFICANT CHANGE UP (ref 8.4–10.5)
CHLORIDE SERPL-SCNC: 98 MMOL/L — SIGNIFICANT CHANGE UP (ref 98–107)
CO2 SERPL-SCNC: 20 MMOL/L — LOW (ref 22–31)
CREAT SERPL-MCNC: 7.14 MG/DL — HIGH (ref 0.5–1.3)
GLUCOSE SERPL-MCNC: 146 MG/DL — HIGH (ref 70–99)
HCT VFR BLD CALC: 26.9 % — LOW (ref 39–50)
HGB BLD-MCNC: 8.1 G/DL — LOW (ref 13–17)
MAGNESIUM SERPL-MCNC: 1.9 MG/DL — SIGNIFICANT CHANGE UP (ref 1.6–2.6)
MCHC RBC-ENTMCNC: 27.8 PG — SIGNIFICANT CHANGE UP (ref 27–34)
MCHC RBC-ENTMCNC: 30.1 GM/DL — LOW (ref 32–36)
MCV RBC AUTO: 92.4 FL — SIGNIFICANT CHANGE UP (ref 80–100)
NRBC # BLD: 0 /100 WBCS — SIGNIFICANT CHANGE UP
NRBC # FLD: 0 K/UL — SIGNIFICANT CHANGE UP
PHOSPHATE SERPL-MCNC: 5.7 MG/DL — HIGH (ref 2.5–4.5)
PLATELET # BLD AUTO: 153 K/UL — SIGNIFICANT CHANGE UP (ref 150–400)
POTASSIUM SERPL-MCNC: 3.9 MMOL/L — SIGNIFICANT CHANGE UP (ref 3.5–5.3)
POTASSIUM SERPL-SCNC: 3.9 MMOL/L — SIGNIFICANT CHANGE UP (ref 3.5–5.3)
PROT SERPL-MCNC: 7.3 G/DL — SIGNIFICANT CHANGE UP (ref 6–8.3)
RBC # BLD: 2.91 M/UL — LOW (ref 4.2–5.8)
RBC # FLD: 18.4 % — HIGH (ref 10.3–14.5)
RH IG SCN BLD-IMP: POSITIVE — SIGNIFICANT CHANGE UP
SODIUM SERPL-SCNC: 137 MMOL/L — SIGNIFICANT CHANGE UP (ref 135–145)
T4 FREE SERPL-MCNC: 1.8 NG/DL — SIGNIFICANT CHANGE UP (ref 0.9–1.8)
TSH SERPL-MCNC: 0.25 UIU/ML — LOW (ref 0.27–4.2)
WBC # BLD: 6.17 K/UL — SIGNIFICANT CHANGE UP (ref 3.8–10.5)
WBC # FLD AUTO: 6.17 K/UL — SIGNIFICANT CHANGE UP (ref 3.8–10.5)

## 2021-05-30 PROCEDURE — 93018 CV STRESS TEST I&R ONLY: CPT | Mod: GC

## 2021-05-30 PROCEDURE — 78452 HT MUSCLE IMAGE SPECT MULT: CPT | Mod: 26

## 2021-05-30 PROCEDURE — 93016 CV STRESS TEST SUPVJ ONLY: CPT | Mod: GC

## 2021-05-30 RX ADMIN — HEPARIN SODIUM 5000 UNIT(S): 5000 INJECTION INTRAVENOUS; SUBCUTANEOUS at 06:12

## 2021-05-30 RX ADMIN — HEPARIN SODIUM 5000 UNIT(S): 5000 INJECTION INTRAVENOUS; SUBCUTANEOUS at 22:21

## 2021-05-30 RX ADMIN — SEVELAMER CARBONATE 1600 MILLIGRAM(S): 2400 POWDER, FOR SUSPENSION ORAL at 17:29

## 2021-05-30 RX ADMIN — CARVEDILOL PHOSPHATE 12.5 MILLIGRAM(S): 80 CAPSULE, EXTENDED RELEASE ORAL at 17:31

## 2021-05-30 RX ADMIN — HEPARIN SODIUM 5000 UNIT(S): 5000 INJECTION INTRAVENOUS; SUBCUTANEOUS at 13:30

## 2021-05-30 RX ADMIN — SEVELAMER CARBONATE 1600 MILLIGRAM(S): 2400 POWDER, FOR SUSPENSION ORAL at 13:29

## 2021-05-30 RX ADMIN — PANTOPRAZOLE SODIUM 40 MILLIGRAM(S): 20 TABLET, DELAYED RELEASE ORAL at 06:12

## 2021-05-30 RX ADMIN — CARVEDILOL PHOSPHATE 12.5 MILLIGRAM(S): 80 CAPSULE, EXTENDED RELEASE ORAL at 06:12

## 2021-05-30 RX ADMIN — Medication 325 MILLIGRAM(S): at 17:31

## 2021-05-30 RX ADMIN — CHLORHEXIDINE GLUCONATE 1 APPLICATION(S): 213 SOLUTION TOPICAL at 15:05

## 2021-05-30 RX ADMIN — Medication 30 MILLIGRAM(S): at 22:22

## 2021-05-30 RX ADMIN — Medication 100 MICROGRAM(S): at 06:12

## 2021-05-30 RX ADMIN — Medication 1 MILLIGRAM(S): at 13:30

## 2021-05-30 NOTE — PROGRESS NOTE ADULT - SUBJECTIVE AND OBJECTIVE BOX
CHAVEZ MARQUEZ  66y  Male      Patient is a 66y old  Male who presents with a chief complaint of urgent HD (29 May 2021 18:15)  Patient seen and examined.chart reviewed.covering .  patient denies cp,sob,cough or fever.s/p stress test,result pending    REVIEW OF SYSTEMS:  CONSTITUTIONAL: No fever  RESPIRATORY: No cough, hemoptysis or shortness of breath  CARDIOVASCULAR: No chest pain, palpitations, dizziness, or leg swelling  GASTROINTESTINAL: No abdominal pain. nausea, vomiting, hematemesis  NEUROLOGICAL: No headaches, no dizziness  MUSCULOSKELETAL: No joint pain or swelling;     INTERVAL HPI/OVERNIGHT EVENTS:  T(C): 36.8 (05-30-21 @ 10:02), Max: 36.8 (05-29-21 @ 21:26)  HR: 89 (05-30-21 @ 06:11) (87 - 89)  BP: 152/85 (05-30-21 @ 06:11) (152/85 - 177/103)  RR: 18 (05-30-21 @ 06:11) (16 - 18)  SpO2: 100% (05-30-21 @ 06:11) (98% - 100%)  Wt(kg): --  I&O's Summary    29 May 2021 07:01  -  30 May 2021 07:00  --------------------------------------------------------  IN: 400 mL / OUT: 2400 mL / NET: -2000 mL      T(C): 36.8 (05-30-21 @ 10:02), Max: 36.8 (05-29-21 @ 21:26)  HR: 89 (05-30-21 @ 06:11) (87 - 89)  BP: 152/85 (05-30-21 @ 06:11) (152/85 - 177/103)  RR: 18 (05-30-21 @ 06:11) (16 - 18)  SpO2: 100% (05-30-21 @ 06:11) (98% - 100%)  Wt(kg): --Vital Signs Last 24 Hrs  T(C): 36.8 (30 May 2021 10:02), Max: 36.8 (29 May 2021 21:26)  T(F): 98.3 (30 May 2021 10:02), Max: 98.3 (29 May 2021 21:26)  HR: 89 (30 May 2021 06:11) (87 - 89)  BP: 152/85 (30 May 2021 06:11) (152/85 - 177/103)  BP(mean): --  RR: 18 (30 May 2021 06:11) (16 - 18)  SpO2: 100% (30 May 2021 06:11) (98% - 100%)    LABS:                        8.1    6.17  )-----------( 153      ( 30 May 2021 14:50 )             26.9     05-30    137  |  98  |  47<H>  ----------------------------<  146<H>  3.9   |  20<L>  |  7.14<H>    Ca    9.2      30 May 2021 14:50  Phos  5.7     05-30  Mg     1.9     05-30    TPro  7.3  /  Alb  3.5  /  TBili  0.3  /  DBili  x   /  AST  10  /  ALT  9   /  AlkPhos  99  05-30        CAPILLARY BLOOD GLUCOSE                PAST MEDICAL & SURGICAL HISTORY:  HTN (Hypertension)    Chronic kidney disease    Kidney stones    Hemodialysis access, AV graft  Left upper extremity    Hyperparathyroidism    Anemia    Thrombocytopenia    S/P Nephrectomy  (L) 2007    Acquired arteriovenous fistula  left wrist  1/2015 - [facility], Left upper arm 4/2015 (approximate date)        MEDICATIONS  (STANDING):  carvedilol 12.5 milliGRAM(s) Oral every 12 hours  chlorhexidine 4% Liquid 1 Application(s) Topical <User Schedule>  doxercalciferol Injectable 4 MICROGram(s) IV Push <User Schedule>  epoetin tammi-epbx (RETACRIT) Injectable 11676 Unit(s) IV Push <User Schedule>  ferrous    sulfate 325 milliGRAM(s) Oral daily  folic acid 1 milliGRAM(s) Oral daily  heparin   Injectable 5000 Unit(s) SubCutaneous every 8 hours  levothyroxine 100 MICROGram(s) Oral daily  NIFEdipine XL 30 milliGRAM(s) Oral at bedtime  pantoprazole    Tablet 40 milliGRAM(s) Oral before breakfast  sevelamer carbonate 1600 milliGRAM(s) Oral three times a day with meals    MEDICATIONS  (PRN):        RADIOLOGY & ADDITIONAL TESTS:    Imaging Personally Reviewed:  [ ] YES  [ ] NO    Consultant(s) Notes Reviewed:  [x ] YES  [ ] NO    PHYSICAL EXAM:  GENERAL: Alert and awake lying in bed in no distress  HEAD:  Atraumatic, Normocephalic  EYES: EOMI, LILIAM, conjunctiva and sclera clear  NECK: Supple, No JVD, Normal thyroid  NERVOUS SYSTEM:  Alert & Oriented X3, Motor and sensory systems are intact,   CHEST/LUNG: Bilateral clear breath sounds, no rhochi, no wheezing, no crepitations,  HEART: Regular rate and rhythm; No murmurs, rubs, or gallops  ABDOMEN: Soft, Nontender, Nondistended; Bowel sounds present  EXTREMITIES:   Peripheral Pulses are palpable, no  edema        Care Discussed with Consultants/Other Providers [x ] YES  [ ] NO      Code Status: [] Full Code [] DNR [] DNI [] Goals of Care:   Disposition: [] ICU [] Stroke Unit [] RCU []PCU []Floor [] Discharge Home         YEE McwilliamsFACP

## 2021-05-30 NOTE — CHART NOTE - NSCHARTNOTEFT_GEN_A_CORE
tsh 0.21 decreased f/u rpt tsh and free thyroxine -------------------  reviewed with medical attending Dr. Christian : f/u free thyroxine------------------  no acute interventions at this time

## 2021-05-31 LAB
ANION GAP SERPL CALC-SCNC: 19 MMOL/L — HIGH (ref 7–14)
BUN SERPL-MCNC: 64 MG/DL — HIGH (ref 7–23)
CALCIUM SERPL-MCNC: 9.9 MG/DL — SIGNIFICANT CHANGE UP (ref 8.4–10.5)
CHLORIDE SERPL-SCNC: 99 MMOL/L — SIGNIFICANT CHANGE UP (ref 98–107)
CO2 SERPL-SCNC: 20 MMOL/L — LOW (ref 22–31)
CREAT SERPL-MCNC: 9.09 MG/DL — HIGH (ref 0.5–1.3)
GLUCOSE SERPL-MCNC: 93 MG/DL — SIGNIFICANT CHANGE UP (ref 70–99)
HCT VFR BLD CALC: 25.1 % — LOW (ref 39–50)
HGB BLD-MCNC: 7.9 G/DL — LOW (ref 13–17)
MAGNESIUM SERPL-MCNC: 2 MG/DL — SIGNIFICANT CHANGE UP (ref 1.6–2.6)
MCHC RBC-ENTMCNC: 28.1 PG — SIGNIFICANT CHANGE UP (ref 27–34)
MCHC RBC-ENTMCNC: 31.5 GM/DL — LOW (ref 32–36)
MCV RBC AUTO: 89.3 FL — SIGNIFICANT CHANGE UP (ref 80–100)
NRBC # BLD: 0 /100 WBCS — SIGNIFICANT CHANGE UP
NRBC # FLD: 0 K/UL — SIGNIFICANT CHANGE UP
PHOSPHATE SERPL-MCNC: 7.7 MG/DL — HIGH (ref 2.5–4.5)
PLATELET # BLD AUTO: 165 K/UL — SIGNIFICANT CHANGE UP (ref 150–400)
POTASSIUM SERPL-MCNC: 4.8 MMOL/L — SIGNIFICANT CHANGE UP (ref 3.5–5.3)
POTASSIUM SERPL-SCNC: 4.8 MMOL/L — SIGNIFICANT CHANGE UP (ref 3.5–5.3)
RBC # BLD: 2.81 M/UL — LOW (ref 4.2–5.8)
RBC # FLD: 18.3 % — HIGH (ref 10.3–14.5)
SARS-COV-2 RNA SPEC QL NAA+PROBE: SIGNIFICANT CHANGE UP
SODIUM SERPL-SCNC: 138 MMOL/L — SIGNIFICANT CHANGE UP (ref 135–145)
WBC # BLD: 6.24 K/UL — SIGNIFICANT CHANGE UP (ref 3.8–10.5)
WBC # FLD AUTO: 6.24 K/UL — SIGNIFICANT CHANGE UP (ref 3.8–10.5)

## 2021-05-31 RX ORDER — ERYTHROPOIETIN 10000 [IU]/ML
10000 INJECTION, SOLUTION INTRAVENOUS; SUBCUTANEOUS
Refills: 0 | Status: DISCONTINUED | OUTPATIENT
Start: 2021-05-31 | End: 2021-06-07

## 2021-05-31 RX ORDER — DOXERCALCIFEROL 2.5 UG/1
4 CAPSULE ORAL
Refills: 0 | Status: DISCONTINUED | OUTPATIENT
Start: 2021-05-31 | End: 2021-06-08

## 2021-05-31 RX ADMIN — Medication 30 MILLIGRAM(S): at 21:15

## 2021-05-31 RX ADMIN — HEPARIN SODIUM 5000 UNIT(S): 5000 INJECTION INTRAVENOUS; SUBCUTANEOUS at 21:15

## 2021-05-31 RX ADMIN — CARVEDILOL PHOSPHATE 12.5 MILLIGRAM(S): 80 CAPSULE, EXTENDED RELEASE ORAL at 05:11

## 2021-05-31 RX ADMIN — Medication 325 MILLIGRAM(S): at 17:23

## 2021-05-31 RX ADMIN — CARVEDILOL PHOSPHATE 12.5 MILLIGRAM(S): 80 CAPSULE, EXTENDED RELEASE ORAL at 17:23

## 2021-05-31 RX ADMIN — Medication 1 MILLIGRAM(S): at 17:23

## 2021-05-31 RX ADMIN — ERYTHROPOIETIN 10000 UNIT(S): 10000 INJECTION, SOLUTION INTRAVENOUS; SUBCUTANEOUS at 12:29

## 2021-05-31 RX ADMIN — HEPARIN SODIUM 5000 UNIT(S): 5000 INJECTION INTRAVENOUS; SUBCUTANEOUS at 05:11

## 2021-05-31 RX ADMIN — SEVELAMER CARBONATE 1600 MILLIGRAM(S): 2400 POWDER, FOR SUSPENSION ORAL at 08:53

## 2021-05-31 RX ADMIN — SEVELAMER CARBONATE 1600 MILLIGRAM(S): 2400 POWDER, FOR SUSPENSION ORAL at 17:23

## 2021-05-31 RX ADMIN — HEPARIN SODIUM 5000 UNIT(S): 5000 INJECTION INTRAVENOUS; SUBCUTANEOUS at 17:24

## 2021-05-31 RX ADMIN — CHLORHEXIDINE GLUCONATE 1 APPLICATION(S): 213 SOLUTION TOPICAL at 10:30

## 2021-05-31 RX ADMIN — DOXERCALCIFEROL 4 MICROGRAM(S): 2.5 CAPSULE ORAL at 12:29

## 2021-05-31 RX ADMIN — Medication 100 MICROGRAM(S): at 05:11

## 2021-05-31 RX ADMIN — PANTOPRAZOLE SODIUM 40 MILLIGRAM(S): 20 TABLET, DELAYED RELEASE ORAL at 05:11

## 2021-05-31 NOTE — CHART NOTE - NSCHARTNOTEFT_GEN_A_CORE
Dr. Peguero vascular resident OR on tues  for AVF and permacath placement cancelled pt is scheduled for Thursday .  Dr. Christian and Pt aware.

## 2021-05-31 NOTE — PROGRESS NOTE ADULT - PROVIDER SPECIALTY LIST ADULT
Follow up with your doctor in 2 days for re-check. Go to the ER if at any time the redness is spreading or you develop a fever. Discharge Instructions for Cellulitis  You have been diagnosed with cellulitis.  This is an infection in the deepest layer · Redness that gets worse in or around the infected area, particularly if the area of redness expands to a wider area  · Shaking chills  · Swelling of the infected area  · Vomiting  Date Last Reviewed: 8/1/2016  © 9501-6354 [employer].  [address] [facility]

## 2021-05-31 NOTE — PROGRESS NOTE ADULT - SUBJECTIVE AND OBJECTIVE BOX
VASCULAR SURGERY PROGRESS NOTE    INTERVAL EVENTS: No acute events. Denies chest pain, shortness of breath      OBJECTIVE:    Vital Signs Last 24 Hrs  T(C): 36.9 (31 May 2021 05:07), Max: 37.1 (30 May 2021 17:23)  T(F): 98.4 (31 May 2021 05:07), Max: 98.7 (30 May 2021 17:23)  HR: 85 (31 May 2021 05:07) (70 - 86)  BP: 132/80 (31 May 2021 05:07) (132/80 - 159/90)  BP(mean): --  RR: 18 (31 May 2021 05:07) (18 - 18)  SpO2: 100% (31 May 2021 05:07) (100% - 100%)    General Appearance: Resting comfortably, no acute distress  Chest: non-labored breathing, no respiratory distress  CV: Pulse regular presently  Abdomen: Soft, non-tender, non-distended  Extremities: warm and well perfused, palpable radial pulse    I&O's Summary    I&O's Detail        LABS:                        7.9    6.24  )-----------( 165      ( 31 May 2021 07:16 )             25.1     05-31    138  |  99  |  64<H>  ----------------------------<  93  4.8   |  20<L>  |  9.09<H>    Ca    9.9      31 May 2021 07:16  Phos  7.7     05-31  Mg     2.0     05-31    TPro  7.3  /  Alb  3.5  /  TBili  0.3  /  DBili  x   /  AST  10  /  ALT  9   /  AlkPhos  99  05-30          RADIOLOGY & ADDITIONAL STUDIES:    va< from: VA Duplex Ext Veins Upper Comp, Right. (05.29.21 @ 17:17) >  RIGHT:  Deep venous thrombosis: (Not Examined)  Superficial venous thrombosis: Yes  ------------------------------------------------------------------------  Right Findings: Right Cephalic Vein      Diameter (cm)  Proximal upper arm         0.40  Mid upper arm                 0.44  Distal upper arm              0.46  Antecubital                      SVT  Proximal forearm             0.30  Mid forearm       0.23  Distal forearm                  0.21  Right Basilic Vein  [place]                     0.45  Mid upper arm                0.46  Distal upper arm             0.47  Right brachial artery: Normal flow velocities with  triphasic waveforms.  Right radial artery: Normal flow velocities with triphasic  waveforms.  Right ulnar artery: Normal flow velocities with triphasic  waveforms.  ------------------------------------------------------------------------  Summary/Impressions:  Superficial vein thrombosis noted in the antecubital  segment of the right cephalic vein.  No evidence of thrombosis or significant venous wall  thickening noted in the right basilic vein. Vessel  diameters as noted above.  ------------------------------------------------------------------------  Confirmed on  5/29/2021 - 7:44 PM by Kun Bowman MD, [facility],  [facility], [facility]  By signing this report, the attending physician certifies  that he or she has personally supervised and interpreted  the vascular study and has reviewed and or edited and  agrees with the written comments contained within the  report.    < end of copied text >

## 2021-05-31 NOTE — PROGRESS NOTE ADULT - SUBJECTIVE AND OBJECTIVE BOX
[facility] - S Alicia / Ej S /D Federico/ SERA Bojorquez/ SERA Faust/ Jovan Garrison / M Danou/ O Gregorio  service [phone], office [phone]  ---------------------------------------------------------------------------------------------------------------    Patient seen and examined bedside    Subjective and Objective: No overnight events, sob resolved. No complaints today. feeling better    Allergies: No Known Allergies      Hospital Medications:   MEDICATIONS  (STANDING):  carvedilol 12.5 milliGRAM(s) Oral every 12 hours  chlorhexidine 4% Liquid 1 Application(s) Topical <User Schedule>  doxercalciferol Injectable 4 MICROGram(s) IV Push <User Schedule>  epoetin tammi-epbx (RETACRIT) Injectable 30110 Unit(s) IV Push <User Schedule>  ferrous    sulfate 325 milliGRAM(s) Oral daily  folic acid 1 milliGRAM(s) Oral daily  heparin   Injectable 5000 Unit(s) SubCutaneous every 8 hours  levothyroxine 100 MICROGram(s) Oral daily  NIFEdipine XL 30 milliGRAM(s) Oral at bedtime  pantoprazole    Tablet 40 milliGRAM(s) Oral before breakfast  sevelamer carbonate 1600 milliGRAM(s) Oral three times a day with meals      REVIEW OF SYSTEMS:  CONSTITUTIONAL: No weakness, fevers or chills  EYES/ENT: No visual changes;  No vertigo or throat pain   NECK: No pain or stiffness  RESPIRATORY: No cough, wheezing, hemoptysis; No shortness of breath  CARDIOVASCULAR: No chest pain or palpitations.  GASTROINTESTINAL: No abdominal or epigastric pain. No nausea, vomiting, or hematemesis; No diarrhea or constipation. No melena or hematochezia.  GENITOURINARY: No dysuria, frequency, foamy urine, urinary urgency, incontinence or hematuria  NEUROLOGICAL: No numbness or weakness  SKIN: No itching, burning, rashes, or lesions   VASCULAR: No bilateral lower extremity edema.   All other review of systems is negative unless indicated above.    VITALS:  T(F): 98.3 (05-31-21 @ 15:22), Max: 98.7 (05-30-21 @ 17:23)  HR: 83 (05-31-21 @ 15:22)  BP: 136/86 (05-31-21 @ 15:22)  RR: 17 (05-31-21 @ 15:22)  SpO2: 100% (05-31-21 @ 15:22)  Wt(kg): --    05-31 @ 07:01  -  05-31 @ 16:17  --------------------------------------------------------  IN: 400 mL / OUT: 2900 mL / NET: -2500 mL          PHYSICAL EXAM:  Constitutional: NAD  HEENT: anicteric sclera, oropharynx clear  Neck: No JVD  Respiratory: CTAB, no wheezes, rales or rhonchi  Cardiovascular: S1, S2, RRR  Gastrointestinal: BS+, soft, NT/ND  Extremities: No cyanosis or clubbing. No peripheral edema  Neurological: A/O x 3, no focal deficits  Psychiatric: Normal mood, normal affect  : No CVA tenderness. No siegel.   Skin: No rashes  Vascular Access:    LABS:  05-31    138  |  99  |  64<H>  ----------------------------<  93  4.8   |  20<L>  |  9.09<H>    Ca    9.9      31 May 2021 07:16  Phos  7.7     05-31  Mg     2.0     05-31    TPro  7.3  /  Alb  3.5  /  TBili  0.3  /  DBili      /  AST  10  /  ALT  9   /  AlkPhos  99  05-30    Creatinine Trend: 9.09 <--, 7.14 <--, 8.33 <--, 6.32 <--, 9.30 <--, 6.80 <--, 13.27 <--, 13.26 <--                        7.9    6.24  )-----------( 165      ( 31 May 2021 07:16 )             25.1     Urine Studies:        RADIOLOGY & ADDITIONAL STUDIES:   [facility] - S Alicia / Ej S /D Federico/ S Maryellen/ S Govind/ Jovan Garrison / M Jose/ O Gregorio  service [phone], office [phone]  ---------------------------------------------------------------------------------------------------------------    Patient seen and examined bedside    Subjective and Objective: No overnight events, sob resolved. No complaints today. feeling better    Allergies: No Known Allergies      Hospital Medications:   MEDICATIONS  (STANDING):  carvedilol 12.5 milliGRAM(s) Oral every 12 hours  chlorhexidine 4% Liquid 1 Application(s) Topical <User Schedule>  doxercalciferol Injectable 4 MICROGram(s) IV Push <User Schedule>  epoetin tammi-epbx (RETACRIT) Injectable 47503 Unit(s) IV Push <User Schedule>  ferrous    sulfate 325 milliGRAM(s) Oral daily  folic acid 1 milliGRAM(s) Oral daily  heparin   Injectable 5000 Unit(s) SubCutaneous every 8 hours  levothyroxine 100 MICROGram(s) Oral daily  NIFEdipine XL 30 milliGRAM(s) Oral at bedtime  pantoprazole    Tablet 40 milliGRAM(s) Oral before breakfast  sevelamer carbonate 1600 milliGRAM(s) Oral three times a day with meals      VITALS:  T(F): 98.3 (05-31-21 @ 15:22), Max: 98.7 (05-30-21 @ 17:23)  HR: 83 (05-31-21 @ 15:22)  BP: 136/86 (05-31-21 @ 15:22)  RR: 17 (05-31-21 @ 15:22)  SpO2: 100% (05-31-21 @ 15:22)  Wt(kg): --    05-31 @ 07:01  -  05-31 @ 16:17  --------------------------------------------------------  IN: 400 mL / OUT: 2900 mL / NET: -2500 mL      PHYSICAL EXAM:  HEENT: anicteric sclera  Neck: No JVD  Respiratory: CTAB, no wheezes, rales or rhonchi  Cardiovascular: S1, S2, RRR  Gastrointestinal: BS+, soft, NT/ND  Extremities: trace peripheral edema  Neurological: A/O x 3  Psychiatric: Normal mood, normal affect  : No siegel.   Vascular Access: rt chest catheter+?subclavian ; lue AVF NO THRILL     LABS:  05-31    138  |  99  |  64<H>  ----------------------------<  93  4.8   |  20<L>  |  9.09<H>    Ca    9.9      31 May 2021 07:16  Phos  7.7     05-31  Mg     2.0     05-31    TPro  7.3  /  Alb  3.5  /  TBili  0.3  /  DBili      /  AST  10  /  ALT  9   /  AlkPhos  99  05-30    Creatinine Trend: 9.09 <--, 7.14 <--, 8.33 <--, 6.32 <--, 9.30 <--, 6.80 <--, 13.27 <--, 13.26 <--                        7.9    6.24  )-----------( 165      ( 31 May 2021 07:16 )             25.1     Urine Studies:        RADIOLOGY & ADDITIONAL STUDIES:

## 2021-05-31 NOTE — PROGRESS NOTE ADULT - ASSESSMENT
66M hx of ESRD on HD (M/W/F, previously via LUE AVF, now via chest wall catheter), anemia, hyperparathyroidism, thrombocytopenia, hypothyroidism, HTN who presents with shortness of breath and LE swelling likely due to volume overload from missed HD session, admitted for HD. Renal following for ESRD Mx.     ESRD on HD   Electrolytes, volume acceptable  s/p HD today, Rx sheet reviewed, net UF 2.5kg, tolerated well, poor bfr 300ml/min via current HD catheter, otherwise uneventful.  vascular consult noted- for new RUE avf placement and IJ permacath, tentatively planned for tomorrow  optimized renal stand point for OR  s/p stress test, f/u   renal diet, fluid restriction 1L/day  dose all meds for ESRD  f/w w/SW for HD placement at [facility]  HTN, better controlled now. c/w BB. added Nifedipine 30mg BT. inc uf w/hd  Anemia in CKD-Hb below goal. on epo 10k tiw w/hd. will inc dose w/next hd. Fe stores adequate. watch H/H  Hyperphosphatemia- high- increased Renvela 1>2 tabs TID with meals. high pth noted, c/w hectorol 4 mcg Three times a week with HD.     poc d/w pt, PA covering   labs, chart reviewed  [facility]  Cell - [phone]  Office [phone]  Ans Serv [phone]

## 2021-05-31 NOTE — CHART NOTE - NSCHARTNOTEFT_GEN_A_CORE
NST:Mildly Abnormal Study  * Myocardial Perfusion SPECT results are mildly abnormal.  * Review of raw data shows: The study is of good technical  quality.  * There is a small, mild defect in inferior wall, that is  mostly reversible, suggestive of mild ischemia and is of  questionable clinical significance.  * The images with attenuation correction show no  significant changes.  * Findings suggested cardiomyopathy. The left ventricle  was hypertrophied. Post-stress gated wall motion analysis  was performed (LVEF >= 45 %;LVEDV = 171 ml.), revealing  mild hypokinesis. Precise calculation of EF could not be  done due to difficulty contouring LV endocardium in  systole  reviewed with medical attending Dr. Christian no acute interventions at this time  cards consult in am   dr. Christian in agreement with plan

## 2021-05-31 NOTE — PROGRESS NOTE ADULT - PROBLEM SELECTOR PLAN 1
Nephro f/up noted.  On HD  For AVF - NST done. result-EF 45 %,CM Nephro f/up noted.  On HD  For AVF - NST done. result-EF 45 %,CM,mild hypokiness,small defect inf.wall,reversible.will get cardiology clearance.

## 2021-05-31 NOTE — PROGRESS NOTE ADULT - SUBJECTIVE AND OBJECTIVE BOX
CHAVEZ MARQUEZ  66y  Male      Patient is a 66y old  Male who presents with a chief complaint of urgent HD (31 May 2021 16:17)  comfortable,no cp,no sob,no fever,no cough    REVIEW OF SYSTEMS:  CONSTITUTIONAL: No fever  RESPIRATORY: No cough, hemoptysis or shortness of breath  CARDIOVASCULAR: No chest pain, palpitations, dizziness, or leg swelling  GASTROINTESTINAL: No abdominal pain. nausea, vomiting, hematemesis  GENITOURINARY: No dysuria, frequency, hematuria   NEUROLOGICAL: No headaches, no dizziness  MUSCULOSKELETAL: No joint pain or swelling;     INTERVAL HPI/OVERNIGHT EVENTS:  T(C): 37.2 (05-31-21 @ 20:13), Max: 37.2 (05-31-21 @ 20:13)  HR: 86 (05-31-21 @ 20:13) (70 - 86)  BP: 129/60 (05-31-21 @ 20:13) (129/60 - 159/89)  RR: 18 (05-31-21 @ 20:13) (16 - 18)  SpO2: 100% (05-31-21 @ 20:13) (100% - 100%)  Wt(kg): --  I&O's Summary    31 May 2021 07:01  -  31 May 2021 21:02  --------------------------------------------------------  IN: 400 mL / OUT: 2900 mL / NET: -2500 mL      T(C): 37.2 (05-31-21 @ 20:13), Max: 37.2 (05-31-21 @ 20:13)  HR: 86 (05-31-21 @ 20:13) (70 - 86)  BP: 129/60 (05-31-21 @ 20:13) (129/60 - 159/89)  RR: 18 (05-31-21 @ 20:13) (16 - 18)  SpO2: 100% (05-31-21 @ 20:13) (100% - 100%)  Wt(kg): --Vital Signs Last 24 Hrs  T(C): 37.2 (31 May 2021 20:13), Max: 37.2 (31 May 2021 20:13)  T(F): 98.9 (31 May 2021 20:13), Max: 98.9 (31 May 2021 20:13)  HR: 86 (31 May 2021 20:13) (70 - 86)  BP: 129/60 (31 May 2021 20:13) (129/60 - 159/89)  BP(mean): --  RR: 18 (31 May 2021 20:13) (16 - 18)  SpO2: 100% (31 May 2021 20:13) (100% - 100%)    LABS:                        7.9    6.24  )-----------( 165      ( 31 May 2021 07:16 )             25.1     05-31    138  |  99  |  64<H>  ----------------------------<  93  4.8   |  20<L>  |  9.09<H>    Ca    9.9      31 May 2021 07:16  Phos  7.7     05-31  Mg     2.0     05-31    TPro  7.3  /  Alb  3.5  /  TBili  0.3  /  DBili  x   /  AST  10  /  ALT  9   /  AlkPhos  99  05-30        CAPILLARY BLOOD GLUCOSE                PAST MEDICAL & SURGICAL HISTORY:  HTN (Hypertension)    Chronic kidney disease    Kidney stones    Hemodialysis access, AV graft  Left upper extremity    Hyperparathyroidism    Anemia    Thrombocytopenia    S/P Nephrectomy  (L) 2007    Acquired arteriovenous fistula  left wrist  1/2015 - [facility], Left upper arm 4/2015 (approximate date)        MEDICATIONS  (STANDING):  carvedilol 12.5 milliGRAM(s) Oral every 12 hours  chlorhexidine 4% Liquid 1 Application(s) Topical <User Schedule>  doxercalciferol Injectable 4 MICROGram(s) IV Push <User Schedule>  epoetin tammi-epbx (RETACRIT) Injectable 26981 Unit(s) IV Push <User Schedule>  ferrous    sulfate 325 milliGRAM(s) Oral daily  folic acid 1 milliGRAM(s) Oral daily  heparin   Injectable 5000 Unit(s) SubCutaneous every 8 hours  levothyroxine 100 MICROGram(s) Oral daily  NIFEdipine XL 30 milliGRAM(s) Oral at bedtime  pantoprazole    Tablet 40 milliGRAM(s) Oral before breakfast  sevelamer carbonate 1600 milliGRAM(s) Oral three times a day with meals    MEDICATIONS  (PRN):        RADIOLOGY & ADDITIONAL TESTS:    Imaging Personally Reviewed:  [ ] YES  [ ] NO    Consultant(s) Notes Reviewed:  [ x] YES  [ ] NO    PHYSICAL EXAM:  GENERAL: Alert and awake lying in bed in no distress  HEAD:  Atraumatic, Normocephalic  EYES: EOMI, LILIAM, conjunctiva and sclera clear  NECK: Supple, No JVD, Normal thyroid  NERVOUS SYSTEM:  Alert & Oriented X3, Motor and sensory systems are intact,   CHEST/LUNG: Bilateral clear breath sounds, no rhochi, no wheezing, no crepitations,  HEART: Regular rate and rhythm; No murmurs, rubs, or gallops  ABDOMEN: Soft, Nontender, Nondistended; Bowel sounds present  EXTREMITIES:   Peripheral Pulses are palpable, no  edema        Care Discussed with Consultants/Other Providers [ x] YES  [ ] NO      Code Status: [] Full Code [] DNR [] DNI [] Goals of Care:   Disposition: [] ICU [] Stroke Unit [] RCU []PCU []Floor [] Discharge Home         ADALGISA McwilliamsP

## 2021-05-31 NOTE — PROGRESS NOTE ADULT - ASSESSMENT
66M hx of ESRD on HD (M/W/F, previously via LUE AVF, now via chest wall catheter), anemia, hyperparathyroidism, thrombocytopenia, hypothyroidism, HTN presenting to the ED for urgent HD, with non-functioning AVF since 3/2021. Now for AVF planning on RUE    - Plan for AVF tomorrow in RUE   - Please document medical optimization   - Pending dialysis today  - Vein mapping completed   - Please protect RUE, remove all IVs and avoid for BP measurements or blood draws  - Left UE may be used for IVs and blood draws      Vascular Surgery (C Team)  [id number]   66M hx of ESRD on HD (M/W/F, previously via LUE AVF, now via chest wall catheter), anemia, hyperparathyroidism, thrombocytopenia, hypothyroidism, HTN presenting to the ED for urgent HD, with non-functioning AVF since 3/2021. Now for AVF planning on RUE    - Plan for AVF tomorrow in RUE   - Please document medical optimization   - Patient had nuclear stress test completed yesterday, pending results    - Pending dialysis today  - Vein mapping completed   - Please protect RUE, remove all IVs and avoid for BP measurements or blood draws  - Left UE may be used for IVs and blood draws      Vascular Surgery (C Team)  [id number]

## 2021-05-31 NOTE — PROGRESS NOTE ADULT - PROBLEM SELECTOR PLAN 4
Platelets 153  - hx of thrombocytopenia  - no s/s of bleeding  - trend CBC Platelets 165  - hx of thrombocytopenia  - no s/s of bleeding  - trend CBC

## 2021-06-01 LAB
ANION GAP SERPL CALC-SCNC: 20 MMOL/L — HIGH (ref 7–14)
APTT BLD: 31.3 SEC — SIGNIFICANT CHANGE UP (ref 27–36.3)
BUN SERPL-MCNC: 49 MG/DL — HIGH (ref 7–23)
CALCIUM SERPL-MCNC: 9.7 MG/DL — SIGNIFICANT CHANGE UP (ref 8.4–10.5)
CHLORIDE SERPL-SCNC: 95 MMOL/L — LOW (ref 98–107)
CO2 SERPL-SCNC: 22 MMOL/L — SIGNIFICANT CHANGE UP (ref 22–31)
CREAT SERPL-MCNC: 6.81 MG/DL — HIGH (ref 0.5–1.3)
GLUCOSE SERPL-MCNC: 94 MG/DL — SIGNIFICANT CHANGE UP (ref 70–99)
HCT VFR BLD CALC: 26.7 % — LOW (ref 39–50)
HGB BLD-MCNC: 8.3 G/DL — LOW (ref 13–17)
INR BLD: 1.11 RATIO — SIGNIFICANT CHANGE UP (ref 0.88–1.16)
MAGNESIUM SERPL-MCNC: 1.9 MG/DL — SIGNIFICANT CHANGE UP (ref 1.6–2.6)
MCHC RBC-ENTMCNC: 27.6 PG — SIGNIFICANT CHANGE UP (ref 27–34)
MCHC RBC-ENTMCNC: 31.1 GM/DL — LOW (ref 32–36)
MCV RBC AUTO: 88.7 FL — SIGNIFICANT CHANGE UP (ref 80–100)
NRBC # BLD: 0 /100 WBCS — SIGNIFICANT CHANGE UP
NRBC # FLD: 0 K/UL — SIGNIFICANT CHANGE UP
PHOSPHATE SERPL-MCNC: 6.9 MG/DL — HIGH (ref 2.5–4.5)
PLATELET # BLD AUTO: 182 K/UL — SIGNIFICANT CHANGE UP (ref 150–400)
POTASSIUM SERPL-MCNC: 4.1 MMOL/L — SIGNIFICANT CHANGE UP (ref 3.5–5.3)
POTASSIUM SERPL-SCNC: 4.1 MMOL/L — SIGNIFICANT CHANGE UP (ref 3.5–5.3)
PROTHROM AB SERPL-ACNC: 12.7 SEC — SIGNIFICANT CHANGE UP (ref 10.6–13.6)
RBC # BLD: 3.01 M/UL — LOW (ref 4.2–5.8)
RBC # FLD: 18.5 % — HIGH (ref 10.3–14.5)
SODIUM SERPL-SCNC: 137 MMOL/L — SIGNIFICANT CHANGE UP (ref 135–145)
WBC # BLD: 6.52 K/UL — SIGNIFICANT CHANGE UP (ref 3.8–10.5)
WBC # FLD AUTO: 6.52 K/UL — SIGNIFICANT CHANGE UP (ref 3.8–10.5)

## 2021-06-01 PROCEDURE — 93306 TTE W/DOPPLER COMPLETE: CPT | Mod: 26

## 2021-06-01 PROCEDURE — 99223 1ST HOSP IP/OBS HIGH 75: CPT

## 2021-06-01 RX ORDER — ASPIRIN/CALCIUM CARB/MAGNESIUM 324 MG
81 TABLET ORAL DAILY
Refills: 0 | Status: DISCONTINUED | OUTPATIENT
Start: 2021-06-01 | End: 2021-06-19

## 2021-06-01 RX ADMIN — HEPARIN SODIUM 5000 UNIT(S): 5000 INJECTION INTRAVENOUS; SUBCUTANEOUS at 21:43

## 2021-06-01 RX ADMIN — Medication 81 MILLIGRAM(S): at 21:44

## 2021-06-01 RX ADMIN — Medication 30 MILLIGRAM(S): at 21:41

## 2021-06-01 RX ADMIN — HEPARIN SODIUM 5000 UNIT(S): 5000 INJECTION INTRAVENOUS; SUBCUTANEOUS at 05:39

## 2021-06-01 RX ADMIN — Medication 1 MILLIGRAM(S): at 12:53

## 2021-06-01 RX ADMIN — Medication 100 MICROGRAM(S): at 05:39

## 2021-06-01 RX ADMIN — Medication 325 MILLIGRAM(S): at 12:53

## 2021-06-01 RX ADMIN — SEVELAMER CARBONATE 1600 MILLIGRAM(S): 2400 POWDER, FOR SUSPENSION ORAL at 12:54

## 2021-06-01 RX ADMIN — CHLORHEXIDINE GLUCONATE 1 APPLICATION(S): 213 SOLUTION TOPICAL at 12:52

## 2021-06-01 RX ADMIN — CARVEDILOL PHOSPHATE 12.5 MILLIGRAM(S): 80 CAPSULE, EXTENDED RELEASE ORAL at 16:54

## 2021-06-01 RX ADMIN — SEVELAMER CARBONATE 1600 MILLIGRAM(S): 2400 POWDER, FOR SUSPENSION ORAL at 07:21

## 2021-06-01 RX ADMIN — PANTOPRAZOLE SODIUM 40 MILLIGRAM(S): 20 TABLET, DELAYED RELEASE ORAL at 06:26

## 2021-06-01 RX ADMIN — SEVELAMER CARBONATE 1600 MILLIGRAM(S): 2400 POWDER, FOR SUSPENSION ORAL at 16:55

## 2021-06-01 RX ADMIN — CARVEDILOL PHOSPHATE 12.5 MILLIGRAM(S): 80 CAPSULE, EXTENDED RELEASE ORAL at 05:39

## 2021-06-01 RX ADMIN — HEPARIN SODIUM 5000 UNIT(S): 5000 INJECTION INTRAVENOUS; SUBCUTANEOUS at 12:54

## 2021-06-01 NOTE — PROGRESS NOTE ADULT - SUBJECTIVE AND OBJECTIVE BOX
CHAVEZ MARQUEZ  66y  Male      Patient is a 66y old  Male who presents with a chief complaint of urgent HD (01 Jun 2021 14:20)  Patient is comfortable,nad.no cp,no sob.no fever.Cardiology consult noted.    REVIEW OF SYSTEMS:  CONSTITUTIONAL: No fever  RESPIRATORY: No cough, hemoptysis or shortness of breath  CARDIOVASCULAR: No chest pain, palpitations, dizziness, or leg swelling  GASTROINTESTINAL: No abdominal pain. nausea, vomiting, hematemesis  GENITOURINARY: No dysuria, frequency, hematuria   NEUROLOGICAL: No headaches, no dizziness  MUSCULOSKELETAL: No joint pain or swelling;     INTERVAL HPI/OVERNIGHT EVENTS:  T(C): 36.4 (06-01-21 @ 16:52), Max: 36.7 (06-01-21 @ 05:35)  HR: 81 (06-01-21 @ 16:52) (81 - 84)  BP: 140/86 (06-01-21 @ 16:52) (137/78 - 140/86)  RR: 16 (06-01-21 @ 16:52) (16 - 17)  SpO2: 98% (06-01-21 @ 16:52) (98% - 100%)  Wt(kg): --  I&O's Summary    31 May 2021 07:01  -  01 Jun 2021 07:00  --------------------------------------------------------  IN: 400 mL / OUT: 2900 mL / NET: -2500 mL      T(C): 36.4 (06-01-21 @ 16:52), Max: 36.7 (06-01-21 @ 05:35)  HR: 81 (06-01-21 @ 16:52) (81 - 84)  BP: 140/86 (06-01-21 @ 16:52) (137/78 - 140/86)  RR: 16 (06-01-21 @ 16:52) (16 - 17)  SpO2: 98% (06-01-21 @ 16:52) (98% - 100%)  Wt(kg): --Vital Signs Last 24 Hrs  T(C): 36.4 (01 Jun 2021 16:52), Max: 36.7 (01 Jun 2021 05:35)  T(F): 97.5 (01 Jun 2021 16:52), Max: 98 (01 Jun 2021 05:35)  HR: 81 (01 Jun 2021 16:52) (81 - 84)  BP: 140/86 (01 Jun 2021 16:52) (137/78 - 140/86)  BP(mean): --  RR: 16 (01 Jun 2021 16:52) (16 - 17)  SpO2: 98% (01 Jun 2021 16:52) (98% - 100%)    LABS:                        8.3    6.52  )-----------( 182      ( 01 Jun 2021 06:37 )             26.7     06-01    137  |  95<L>  |  49<H>  ----------------------------<  94  4.1   |  22  |  6.81<H>    Ca    9.7      01 Jun 2021 06:37  Phos  6.9     06-01  Mg     1.9     06-01      PT/INR - ( 01 Jun 2021 09:02 )   PT: 12.7 sec;   INR: 1.11 ratio         PTT - ( 01 Jun 2021 09:02 )  PTT:31.3 sec    CAPILLARY BLOOD GLUCOSE                PAST MEDICAL & SURGICAL HISTORY:  HTN (Hypertension)    Chronic kidney disease    Kidney stones    Hemodialysis access, AV graft  Left upper extremity    Hyperparathyroidism    Anemia    Thrombocytopenia    S/P Nephrectomy  (L) 2007    Acquired arteriovenous fistula  left wrist  1/2015 - [facility], Left upper arm 4/2015 (approximate date)        MEDICATIONS  (STANDING):  aspirin enteric coated 81 milliGRAM(s) Oral daily  carvedilol 12.5 milliGRAM(s) Oral every 12 hours  chlorhexidine 4% Liquid 1 Application(s) Topical <User Schedule>  doxercalciferol Injectable 4 MICROGram(s) IV Push <User Schedule>  epoetin tammi-epbx (RETACRIT) Injectable 94575 Unit(s) IV Push <User Schedule>  ferrous    sulfate 325 milliGRAM(s) Oral daily  folic acid 1 milliGRAM(s) Oral daily  heparin   Injectable 5000 Unit(s) SubCutaneous every 8 hours  levothyroxine 100 MICROGram(s) Oral daily  NIFEdipine XL 30 milliGRAM(s) Oral at bedtime  pantoprazole    Tablet 40 milliGRAM(s) Oral before breakfast  sevelamer carbonate 1600 milliGRAM(s) Oral three times a day with meals    MEDICATIONS  (PRN):        RADIOLOGY & ADDITIONAL TESTS:    Imaging Personally Reviewed:  [ ] YES  [ ] NO    Consultant(s) Notes Reviewed:  [x ] YES  [ ] NO    PHYSICAL EXAM:  GENERAL: Alert and awake lying in bed in no distress  HEAD:  Atraumatic, Normocephalic  EYES: EOMI, LILIAM, conjunctiva and sclera clear  NECK: Supple, No JVD, Normal thyroid  NERVOUS SYSTEM:  Alert & Oriented X3, Motor and sensory systems are intact,   CHEST/LUNG: Bilateral clear breath sounds, no rhochi, no wheezing, no crepitations,  HEART: Regular rate and rhythm; No murmurs, rubs, or gallops  ABDOMEN: Soft, Nontender, Nondistended; Bowel sounds present  EXTREMITIES:   Peripheral Pulses are palpable, no  edema        Care Discussed with Consultants/Other Providers [x ] YES  [ ] NO      Code Status: [] Full Code [] DNR [] DNI [] Goals of Care:   Disposition: [] ICU [] Stroke Unit [] RCU []PCU []Floor [] Discharge Home         [facility]

## 2021-06-01 NOTE — CONSULT NOTE ADULT - SUBJECTIVE AND OBJECTIVE BOX
Cardiology/Vascular Medicine Inpatient Consultation Note      HISTORY OF PRESENT ILLNESS:  Patient is a 66 M with ESRD//HD (M/W/F, previously via LUE AVF, now via chest wall catheter), anemia, hyperparathyroidism, thrombocytopenia, hypothyroidism, and HTN who presents with shortness of breath and LE swelling. Patient went to [place] last year and unable to return sooner due to the pandemic. There he was getting HD via LUE fistula, which blew, then had catheter placed via which he has been getting HD since.     Patient evaluated by Vascular Surgery and the plan is for upper extremity AVF creation on the contralateral arm this Thursday.    From the cardiac perspective, the patient reports having no current exertional chest pain.  His dyspnea has improved with HD.  He denies having any other associated cardiac symptoms.  Denies a known previous history of CAD/CVA/VTE.  He reports that his mother  of an MI.      Stress test performed here at [facility] on 21 noted:    There is a small, mild defect in inferior wall, that is  mostly reversible, suggestive of mild schemia and is of  questionable clinical significance.          Allergies  No Known Allergies    MEDICATIONS:  carvedilol 12.5 milliGRAM(s) Oral every 12 hours  heparin   Injectable 5000 Unit(s) SubCutaneous every 8 hours  NIFEdipine XL 30 milliGRAM(s) Oral at bedtime  pantoprazole    Tablet 40 milliGRAM(s) Oral before breakfast  levothyroxine 100 MICROGram(s) Oral daily  chlorhexidine 4% Liquid 1 Application(s) Topical <User Schedule>  doxercalciferol Injectable 4 MICROGram(s) IV Push <User Schedule>  epoetin tammi-epbx (RETACRIT) Injectable 35673 Unit(s) IV Push <User Schedule>  ferrous    sulfate 325 milliGRAM(s) Oral daily  folic acid 1 milliGRAM(s) Oral daily    PAST MEDICAL & SURGICAL HISTORY:  HTN (Hypertension)  Chronic kidney disease  Kidney stones  Hemodialysis access, AV graft  Left upper extremity  Hyperparathyroidism  Anemia  Thrombocytopenia  S/P Nephrectomy  (L)   Acquired arteriovenous fistula  left wrist  2015 - J, Left upper arm 2015 (approximate date)    FAMILY HISTORY:  No pertinent family history    SOCIAL HISTORY:    As above, as per chart notes    REVIEW OF SYSTEMS:  As above, as per chart notes    PHYSICAL EXAM:  T(C): 36.6 (21 @ 09:00), Max: 37.2 (21 @ 20:13)  HR: 83 (21 @ 09:00) (83 - 86)  BP: 140/83 (21 @ 09:00) (129/60 - 148/82)  RR: 16 (21 @ 09:00) (16 - 18)  SpO2: 98% (21 @ 09:00) (98% - 100%)  Wt(kg): --  I&O's Summary    31 May 2021 07:  -  2021 07:00  --------------------------------------------------------  IN: 400 mL / OUT: 2900 mL / NET: -2500 mL    Appearance: NAD  HEENT:   No JVD  Cardiovascular: Normal S1 S2, No JVD, No murmurs, No edema  Respiratory: Decreased breath sounds bilaterally  Psychiatry: Awake, alert  Gastrointestinal:  Soft, Non-tender, + BS	  Neurologic: Non-focal  Extremities: No edema      LABS:	 	                          8.3    6.52  )-----------( 182      ( 2021 06:37 )             26.7     06-    137  |  95<L>  |  49<H>  ----------------------------<  94  4.1   |  22  |  6.81<H>      138  |  99  |  64<H>  ----------------------------<  93  4.8   |  20<L>  |  9.09<H>    Ca    9.7      2021 06:37  Ca    9.9      31 May 2021 07:16  Phos  6.9     06-  Phos  7.7       Mg     1.9     06-  Mg     2.0         TPro  7.3  /  Alb  3.5  /  TBili  0.3  /  DBili  x   /  AST  10  /  ALT  9   /  AlkPhos  99  05-30    < from: Transthoracic Echocardiogram (01.09.15 @ 07:37) >    Patient name: CURTIS MARQUEZOPNARINE  YOB: 1955   Age: 59 (M)   MR#: [id number]  Study Date: 2015  Location: [facility] onographer: Chris Alexis  Study quality: Technically good  Referring Physician: GILLES HARP MD  ------------------------------------------------------------------------  Blood Pressure: 152/95 mmHg  Height: 6ft 0in  Weight: 170 lb  BSA: 2 m2  ------------------------------------------------------------------------  PROCEDURE: Transthoracic echocardiogram with 2-D, M-Mode  and complete spectral and color flow Doppler.  INDICATION: Dyspnea (786.09)  ------------------------------------------------------------------------  DIMENSIONS:  Dimensions:     Normal Values:  LA:     4.9 cm    2.0 - 4.0 cm  Ao:     3.4cm    2.0 - 3.8 cm  SEPTUM: 1.1 cm    0.6 - 1.2 cm  PWT:    1.0 cm    0.6 - 1.1 cm  LVIDd:  5.4 cm    3.0 - 5.6 cm  LVIDs:    ---     1.8 - 4.0 cm  Derived Variables:  LVMI: 111 g/m2  RWT: 0.37  Ejection Fraction: 65 %  ------------------------------------------------------------------------  OBSERVATIONS:  Mitral Valve: Mitral annular dilatation.  Mild mitral regurgitation.  Aortic Root: Normal aortic root.  Aortic Valve: Normal trileaflet aortic valve.  Mild aortic regurgitation.  Left Atrium: Severely dilated left atrium.  LA volume index  = 66 cc/m2.  Left Ventricle: Left ventricular hypertrophy.  Normal left ventricular systolic function. EF 65%. No  segmental wall motion abnormalities.  Right Heart: Normal right atrium.  Normal right ventricular size and function.  Mild tricuspid regurgitation.  Estimated pulmonary artery systolic pressure equals 40 mm  Hg, assuming right atrial pressure equals 10  mm Hg,  consistent with mild pulmonary hypertension.  Pericardium/PleuraNormal pericardium with trace pericardial  effusion.  ------------------------------------------------------------------------  CONCLUSIONS:  1. Mitral annular dilatation. Mild mitral regurgitation.  2. Normal trileaflet aortic valve. Mild aortic  regurgitation.  3. Severely dilated left atrium.  LA volume index = 66  cc/m2.  4. Left ventricular hypertrophy.  5. Normal left ventricular systolic function. EF 65%. No  segmental wall motion abnormalities.  6. Normal right atrium.  7. Normal right ventricular sizeand function.  8. Estimated right ventricular systolic pressure equals 40  mm Hg, assuming right atrial pressure equals 10 mm Hg,  consistent with mild pulmonary hypertension.  9. Mild tricuspid regurgitation.  ------------------------------------------------------------------------  Confirmed on  2015 - 13:22:33 by Boris Pham MD  ------------------------------------------------------------------------    < end of copied text >  < from: Nuclear Stress Test-Pharmacologic (21 @ 08:51) >    PATIENT: Chavez Marquez  : 1955   AGE: 66 (M)   MR#: [id number]  STUDY DATE: 2021  LOCATION: [place] [address]  REF. PHYSICIAN(S): Wilfred Christian MD  FELLOW:  ------------------------------------------------------------------------  TYPE OF TEST: Stress/Rest  Pharmacologic  INDICATION: Shortness of breath (R06.02), Encounter for  preprocedural cardiovascular examination (Z01.810)  ------------------------------------------------------------------------  HISTORY:  CARDIAC HISTORY: 66 year old man with past medical history  of hypertension and ESRD on HD arrived to hospital with  shortness of breath and LE swelling needs new AVF and sent  for preop clearance  OTHER HISTORY: Anemia, Hyperparathyroid, Thrombocytopenia  RISK FACTORS: Past smoker, Hypertension, Family History  MEDICATIONS: Heparin SQ, Procardia XL, Renvela, Hectorol,  Coreg, Ferrous sulfate, Synthroid,Protonix  ------------------------------------------------------------------------  BASELINE ELECTROCARDIOGRAM:  Rhythm: Normal Sinus Rhythm - 81 BPM  ST: ST wave abnormalities in I  , II , III, aVF, V3, V4,  V5, V6.  Other: LVH with repolarization abnormality  ------------------------------------------------------------------------  HEMODYNAMIC PARAMETERS:                                   HR      BP  Baseline  Pre-Injection             77  135/88  00:00     Inject Regadenoson        77  00:30     Post Injection            77  01:00     Post Injection            86  117/64  02:00     Post Injection      92  125/67  03:00     Post Injection            91  123/66  04:00     Post Injection            88  120/62  05:00     Recovery                  88  06:00     Recovery                  87  07:00     Recovery                  86  116/64  08:24 Recovery                  87  ------------------------------------------------------------------------  Agent: Regadenoson 0.4 mg/5 ml NS. injected over 10 sec.  HR: Baseline HR: 77 bpm   Peak HR: 92 bpm (60% of MPHR)  MPHR: 154 bpm   85% of MPHR: 131 bpm  BP: Baseline BP: 135/88 mmHg   Peak BP: 135/88 mmHg   Peak  RPP: 98130 (Rate Pressure Product)  Last Caffeine intake: 12 hrs  Terminated: Completion of protocol  ------------------------------------------------------------------------  SYMPTOMS/FINDINGS:  Symptoms: Shortness of breath  Chest pain: No chest pain with administration of  Regadenoson  Treatment: None  ------------------------------------------------------------------------  ECG ABNORMALITIES DURING/AFTER STRESS:   Abnormalities: ECG changes could not be interpreted due  to left ventricular hypertrophy.  ------------------------------------------------------------------------  NEW ARRHYTHMIAS DEVELOPED DURING/AFTER STRESS:  Rare APDs occurred during rest, stress and recovery. Rare  VPDs occurred during stress and recovery.  ------------------------------------------------------------------------  STRESS TEST IMPRESSIONS:  Chest Pain: No chest pain with administration of  Regadenoson.  Symptom: Shortness of breath.  HR Response: Appropriate.  BP Response: Appropriate.  Heart Rhythm: Normal Sinus Rhythm - 81 BPM.  Baseline ECG: ST wave abnormalities in I  , II , III, aVF,  V3, V4, V5, V6.  ECG Abnormalities: ECG changes could not be interpreted  due to left ventricular hypertrophy.  Arrhythmia: Rare APDs occurred during rest, stress and  recovery. Rare VPDs occurred during stress and recovery.  ------------------------------------------------------------------------  PROCEDURE:  7.6 mCi of Tc99m Sestamibi were injected during stress  protocol done 2021. Approximately 45 minutes later,  tomographic images were obtained in a 180 degree arc from  right anterior oblique to left anterior oblique with 64  stops. At a separate time on 2021, 7.86 mCi of Tc99m  Sestamibi were injected at rest. Approximately 45  minute(s) later, tomographic images were obtained in a 180  degree arc from right anterior oblique to left anterior  oblique with 64 stops. The tomographic slices were  reconstructed in 3 orthogonal planes (short axis,  horizontal long axis and vertical long axis).  Interpretation was performed both by visual and  quantitative analysis.  Rest and stress images were acquired using CZT-based  system with pinhole collimation ([employer], [employer]), and reconstructed using MLEM algorithm.  Images were re-acquired with the patient in a prone  position.  ------------------------------------------------------------------------  NUCLEAR FINDINGS:  Review of raw data shows: The study is of good technical  quality.  The left ventricle was hypertrophied. There is a small,  mild defect in inferior wall, that is mostly reversible,  suggestive of mild schemia and is of questionable clinical  significance.  The images with attenuation correction show no significant  changes.  ------------------------------------------------------------------------  GATED ANALYSIS:  Post-stress gated wall motion analysis was performed (LVEF  >= 45 %;LVEDV = 171 ml.), revealing mild hypokinesis.  Precise calculation of EF could not be done due to  difficulty contouring LV endocardium in systole  ------------------------------------------------------------------------  IMPRESSIONS:Mildly Abnormal Study  * Myocardial Perfusion SPECT results are mildly abnormal.  * Review of raw data shows: The study is of good technical  quality.  * There is a small, mild defect in inferior wall, that is  mostly reversible, suggestive of mild schemia and is of  questionable clinical significance.  * The images with attenuation correction show no  significant changes.  * Findings suggested cardiomyopathy. The left ventricle  was hypertrophied. Post-stress gated wall motion analysis  was performed (LVEF >= 45 %;LVEDV = 171 ml.), revealing  mild hypokinesis. Precise calculation of EF could not be  done due to difficulty contouring LV endocardium in  systole  ------------------------------------------------------------------------  Confirmed on  2021 - 14:29:34 by Alf Rodas M.D.  ------------------------------------------------------------------------    < end of copied text >   Cardiology/Vascular Medicine Inpatient Consultation Note      HISTORY OF PRESENT ILLNESS:  Patient is a 66 M with ESRD//HD (M/W/F, previously via LUE AVF, now via chest wall catheter), anemia, hyperparathyroidism, thrombocytopenia, hypothyroidism, and HTN who presents with shortness of breath and LE swelling. Patient went to [place] last year and unable to return sooner due to the pandemic. There he was getting HD via LUE fistula, which blew, then had catheter placed via which he has been getting HD since.     Patient evaluated by Vascular Surgery and the plan is for upper extremity AVF creation on the contralateral arm this Thursday.    From the cardiac perspective, the patient reports having no current exertional chest pain.  His dyspnea has improved with HD.  He denies having any other associated cardiac symptoms.  Denies a known previous history of CAD/CVA/VTE.  He reports that his mother  of an MI.      Stress test performed here at [facility] on 21 noted:    There is a small, mild defect in inferior wall, that is  mostly reversible, suggestive of mild ischemia and is of  questionable clinical significance.    Reviewed imaged--while this finding on the NST is likely not clinically relevant, the possibility remains that the patient has underlying CAD for which he is not receiving optimal medical therapy (ie ASA and statin, he is already on BBs).  Will arrange for patient to get an echocardiogram for further stratification.  Will also arrange for a carotid artery US to help assess systemic atherosclerotic plaque burden.    Start low dose daily aspirin 81 mg, and recommend continuing with this during the yudy-procedural period (if able).  If there is significant atherosclerotic plaque noted on the carotid artery US, would recommend initiating statin therapy as well.  Will follow.    Allergies  No Known Allergies    MEDICATIONS:  carvedilol 12.5 milliGRAM(s) Oral every 12 hours  heparin   Injectable 5000 Unit(s) SubCutaneous every 8 hours  NIFEdipine XL 30 milliGRAM(s) Oral at bedtime  pantoprazole    Tablet 40 milliGRAM(s) Oral before breakfast  levothyroxine 100 MICROGram(s) Oral daily  chlorhexidine 4% Liquid 1 Application(s) Topical <User Schedule>  doxercalciferol Injectable 4 MICROGram(s) IV Push <User Schedule>  epoetin tammi-epbx (RETACRIT) Injectable 67935 Unit(s) IV Push <User Schedule>  ferrous    sulfate 325 milliGRAM(s) Oral daily  folic acid 1 milliGRAM(s) Oral daily    PAST MEDICAL & SURGICAL HISTORY:  HTN (Hypertension)  Chronic kidney disease  Kidney stones  Hemodialysis access, AV graft  Left upper extremity  Hyperparathyroidism  Anemia  Thrombocytopenia  S/P Nephrectomy  (L)   Acquired arteriovenous fistula  left wrist  2015 - [facility], Left upper arm 2015 (approximate date)    FAMILY HISTORY:  No pertinent family history    SOCIAL HISTORY:    As above, as per chart notes    REVIEW OF SYSTEMS:  As above, as per chart notes    PHYSICAL EXAM:  T(C): 36.6 (21 @ 09:00), Max: 37.2 (21 @ 20:13)  HR: 83 (21 @ 09:00) (83 - 86)  BP: 140/83 (21 @ 09:00) (129/60 - 148/82)  RR: 16 (21 @ 09:00) (16 - 18)  SpO2: 98% (21 @ 09:00) (98% - 100%)  Wt(kg): --  I&O's Summary    31 May 2021 07:01  -  2021 07:00  --------------------------------------------------------  IN: 400 mL / OUT: 2900 mL / NET: -2500 mL    Appearance: NAD  HEENT:   No JVD  Cardiovascular: Normal S1 S2, No JVD, No murmurs, No edema  Respiratory: Decreased breath sounds bilaterally  Psychiatry: Awake, alert  Gastrointestinal:  Soft, Non-tender, + BS	  Neurologic: Non-focal  Extremities: No edema      LABS:	 	                          8.3    6.52  )-----------( 182      ( 2021 06:37 )             26.7     06    137  |  95<L>  |  49<H>  ----------------------------<  94  4.1   |  22  |  6.81<H>      138  |  99  |  64<H>  ----------------------------<  93  4.8   |  20<L>  |  9.09<H>    Ca    9.7      2021 06:37  Ca    9.9      31 May 2021 07:16  Phos  6.9     -  Phos  7.7       Mg     1.9       Mg     2.0         TPro  7.3  /  Alb  3.5  /  TBili  0.3  /  DBili  x   /  AST  10  /  ALT  9   /  AlkPhos  99  05-30    < from: Transthoracic Echocardiogram (01.09.15 @ 07:37) >    Patient name: CHAVEZ MARQUEZ  YOB: 1955   Age: 59 (M)   MR#: [id number]  Study Date: 2015  [facility] onographer: Chris Alexis  Study quality: Technically good  Referring Physician: GILLES HARP MD  ------------------------------------------------------------------------  Blood Pressure: 152/95 mmHg  Height: 6ft 0in  Weight: 170 lb  BSA: 2 m2  ------------------------------------------------------------------------  PROCEDURE: Transthoracic echocardiogram with 2-D, M-Mode  and complete spectral and color flow Doppler.  INDICATION: Dyspnea (786.09)  ------------------------------------------------------------------------  DIMENSIONS:  Dimensions:     Normal Values:  LA:     4.9 cm    2.0 - 4.0 cm  Ao:     3.4cm    2.0 - 3.8 cm  SEPTUM: 1.1 cm    0.6 - 1.2 cm  PWT:    1.0 cm    0.6 - 1.1 cm  LVIDd:  5.4 cm    3.0 - 5.6 cm  LVIDs:    ---     1.8 - 4.0 cm  Derived Variables:  LVMI: 111 g/m2  RWT: 0.37  Ejection Fraction: 65 %  ------------------------------------------------------------------------  OBSERVATIONS:  Mitral Valve: Mitral annular dilatation.  Mild mitral regurgitation.  Aortic Root: Normal aortic root.  Aortic Valve: Normal trileaflet aortic valve.  Mild aortic regurgitation.  Left Atrium: Severely dilated left atrium.  LA volume index  = 66 cc/m2.  Left Ventricle: Left ventricular hypertrophy.  Normal left ventricular systolic function. EF 65%. No  segmental wall motion abnormalities.  Right Heart: Normal right atrium.  Normal right ventricular size and function.  Mild tricuspid regurgitation.  Estimated pulmonary artery systolic pressure equals 40 mm  Hg, assuming right atrial pressure equals 10  mm Hg,  consistent with mild pulmonary hypertension.  Pericardium/PleuraNormal pericardium with trace pericardial  effusion.  ------------------------------------------------------------------------  CONCLUSIONS:  1. Mitral annular dilatation. Mild mitral regurgitation.  2. Normal trileaflet aortic valve. Mild aortic  regurgitation.  3. Severely dilated left atrium.  LA volume index = 66  cc/m2.  4. Left ventricular hypertrophy.  5. Normal left ventricular systolic function. EF 65%. No  segmental wall motion abnormalities.  6. Normal right atrium.  7. Normal right ventricular sizeand function.  8. Estimated right ventricular systolic pressure equals 40  mm Hg, assuming right atrial pressure equals 10 mm Hg,  consistent with mild pulmonary hypertension.  9. Mild tricuspid regurgitation.  ------------------------------------------------------------------------  Confirmed on  2015 - 13:22:33 by Boris Pham MD  ------------------------------------------------------------------------    < end of copied text >  < from: Nuclear Stress Test-Pharmacologic (21 @ 08:51) >    PATIENT: Chavez Marquez  : 1955   AGE: 66 (M)   MR#: [id number]  STUDY DATE: 2021  LOCATION: [facility]  REF. PHYSICIAN(S): Wilfred Christian MD  FELLOW:  ------------------------------------------------------------------------  TYPE OF TEST: Stress/Rest  Pharmacologic  INDICATION: Shortness of breath (R06.02), Encounter for  preprocedural cardiovascular examination (Z01.810)  ------------------------------------------------------------------------  HISTORY:  CARDIAC HISTORY: 66 year old man with past medical history  of hypertension and ESRD on HD arrived to hospital with  shortness of breath and LE swelling needs new AVF and sent  for preop clearance  OTHER HISTORY: Anemia, Hyperparathyroid, Thrombocytopenia  RISK FACTORS: Past smoker, Hypertension, Family History  MEDICATIONS: Heparin SQ, Procardia XL, Renvela, Hectorol,  Coreg, Ferrous sulfate, Synthroid,Protonix  ------------------------------------------------------------------------  BASELINE ELECTROCARDIOGRAM:  Rhythm: Normal Sinus Rhythm - 81 BPM  ST: ST wave abnormalities in I  , II , III, aVF, V3, V4,  V5, V6.  Other: LVH with repolarization abnormality  ------------------------------------------------------------------------  HEMODYNAMIC PARAMETERS:                                   HR      BP  Baseline  Pre-Injection             77  135/88  00:00     Inject Regadenoson        77  00:30     Post Injection            77  01:00     Post Injection            86  117/64  02:00     Post Injection      92  125/67  03:00     Post Injection            91  123/66  04:00     Post Injection            88  120/62  05:00     Recovery                  88  06:00     Recovery                  87  07:00     Recovery                  86  116/64  08:24 Recovery                  87  ------------------------------------------------------------------------  Agent: Regadenoson 0.4 mg/5 ml NS. injected over 10 sec.  HR: Baseline HR: 77 bpm   Peak HR: 92 bpm (60% of MPHR)  MPHR: 154 bpm   85% of MPHR: 131 bpm  BP: Baseline BP: 135/88 mmHg   Peak BP: 135/88 mmHg   Peak  RPP: 71748 (Rate Pressure Product)  Last Caffeine intake: 12 hrs  Terminated: Completion of protocol  ------------------------------------------------------------------------  SYMPTOMS/FINDINGS:  Symptoms: Shortness of breath  Chest pain: No chest pain with administration of  Regadenoson  Treatment: None  ------------------------------------------------------------------------  ECG ABNORMALITIES DURING/AFTER STRESS:   Abnormalities: ECG changes could not be interpreted due  to left ventricular hypertrophy.  ------------------------------------------------------------------------  NEW ARRHYTHMIAS DEVELOPED DURING/AFTER STRESS:  Rare APDs occurred during rest, stress and recovery. Rare  VPDs occurred during stress and recovery.  ------------------------------------------------------------------------  STRESS TEST IMPRESSIONS:  Chest Pain: No chest pain with administration of  Regadenoson.  Symptom: Shortness of breath.  HR Response: Appropriate.  BP Response: Appropriate.  Heart Rhythm: Normal Sinus Rhythm - 81 BPM.  Baseline ECG: ST wave abnormalities in I  , II , III, aVF,  V3, V4, V5, V6.  ECG Abnormalities: ECG changes could not be interpreted  due to left ventricular hypertrophy.  Arrhythmia: Rare APDs occurred during rest, stress and  recovery. Rare VPDs occurred during stress and recovery.  ------------------------------------------------------------------------  PROCEDURE:  7.6 mCi of Tc99m Sestamibi were injected during stress  protocol done 2021. Approximately 45 minutes later,  tomographic images were obtained in a 180 degree arc from  right anterior oblique to left anterior oblique with 64  stops. At a separate time on 2021, 7.86 mCi of Tc99m  Sestamibi were injected at rest. Approximately 45  minute(s) later, tomographic images were obtained in a 180  degree arc from right anterior oblique to left anterior  oblique with 64 stops. The tomographic slices were  reconstructed in 3 orthogonal planes (short axis,  horizontal long axis and vertical long axis).  Interpretation was performed both by visual and  quantitative analysis.  Rest and stress images were acquired using CZT-based  system with pinhole collimation ([employer], [employer]), and reconstructed using MLEM algorithm.  Images were re-acquired with the patient in a prone  position.  ------------------------------------------------------------------------  NUCLEAR FINDINGS:  Review of raw data shows: The study is of good technical  quality.  The left ventricle was hypertrophied. There is a small,  mild defect in inferior wall, that is mostly reversible,  suggestive of mild schemia and is of questionable clinical  significance.  The images with attenuation correction show no significant  changes.  ------------------------------------------------------------------------  GATED ANALYSIS:  Post-stress gated wall motion analysis was performed (LVEF  >= 45 %;LVEDV = 171 ml.), revealing mild hypokinesis.  Precise calculation of EF could not be done due to  difficulty contouring LV endocardium in systole  ------------------------------------------------------------------------  IMPRESSIONS:Mildly Abnormal Study  * Myocardial Perfusion SPECT results are mildly abnormal.  * Review of raw data shows: The study is of good technical  quality.  * There is a small, mild defect in inferior wall, that is  mostly reversible, suggestive of mild schemia and is of  questionable clinical significance.  * The images with attenuation correction show no  significant changes.  * Findings suggested cardiomyopathy. The left ventricle  was hypertrophied. Post-stress gated wall motion analysis  was performed (LVEF >= 45 %;LVEDV = 171 ml.), revealing  mild hypokinesis. Precise calculation of EF could not be  done due to difficulty contouring LV endocardium in  systole  ------------------------------------------------------------------------  Confirmed on  2021 - 14:29:34 by Alf Rodas M.D.  ------------------------------------------------------------------------    < end of copied text >

## 2021-06-01 NOTE — PROGRESS NOTE ADULT - PROBLEM SELECTOR PLAN 1
Nephro f/up noted.  On HD  For AVF - NST done. result-EF 45 %,CM,mild hypokiness,small defect inf.wall,reversible.will get cardiology clearance.  Cardiology consult appreciated.ECHO,Carotid doppler. to be done.Started on asa 81 mg po daily.

## 2021-06-01 NOTE — PROGRESS NOTE ADULT - PROBLEM SELECTOR PLAN 5
-Improving.cont.coreg,Nifedepine
-Improving.cont.coreg,Nifedepine
BP elevated 160s  - s/p labetalol 300mg PO x1  - c/w home qlruqrcfibp61.5mg BID  - monitor vital signs
BP elevated 160s  - s/p labetalol 300mg PO x1  - c/w home igkmwbibwqu37.5mg BID  - monitor vital signs
-Improving.cont.coreg,Nifedepine
BP elevated 160s  - s/p labetalol 300mg PO x1  - c/w home acsrpngusvw12.5mg BID  - monitor vital signs
Platelets 129  - hx of thrombocytopenia  - no s/s of bleeding  - trend CBC

## 2021-06-01 NOTE — PROGRESS NOTE ADULT - ASSESSMENT
66M hx of ESRD on HD (M/W/F, previously via LUE AVF, now via chest wall catheter), anemia, hyperparathyroidism, thrombocytopenia, hypothyroidism, HTN presenting to the ED for urgent HD, with non-functioning AVF since 3/2021. Now for AVF planning on RUE    - Patient s/p nuclear stress test, now pending cardiac clearance and optimization  - Plan for AVF on Thursday pending no further cardiac workup  - Vein mapping completed   - Please protect RUE, remove all IVs and avoid for BP measurements or blood draws  - Left UE may be used for IVs and blood draws      Vascular Surgery (C Team)   [id number]

## 2021-06-01 NOTE — PROGRESS NOTE ADULT - PROBLEM SELECTOR PLAN 6
BP elevated 160s  - s/p labetalol 300mg PO x1  - c/w home zoknemxoxwj21.5mg BID  - monitor vital signs
- c/w levothyroxine 100mcg daily  - check TSH

## 2021-06-01 NOTE — PROGRESS NOTE ADULT - SUBJECTIVE AND OBJECTIVE BOX
Nephrology Followup Note - [phone] - Dr Pagan / Dr Vargas / Dr Faust / Dr Caballero / Dr Bojorquez / Dr Perkins / Dr Garrison / Dr Garcia  Pt seen and examined at bedside  No acute events overnight. No complaints.     Allergies:  No Known Allergies    Hospital Medications:   MEDICATIONS  (STANDING):  carvedilol 12.5 milliGRAM(s) Oral every 12 hours  chlorhexidine 4% Liquid 1 Application(s) Topical <User Schedule>  doxercalciferol Injectable 4 MICROGram(s) IV Push <User Schedule>  epoetin tammi-epbx (RETACRIT) Injectable 53232 Unit(s) IV Push <User Schedule>  ferrous    sulfate 325 milliGRAM(s) Oral daily  folic acid 1 milliGRAM(s) Oral daily  heparin   Injectable 5000 Unit(s) SubCutaneous every 8 hours  levothyroxine 100 MICROGram(s) Oral daily  NIFEdipine XL 30 milliGRAM(s) Oral at bedtime  pantoprazole    Tablet 40 milliGRAM(s) Oral before breakfast  sevelamer carbonate 1600 milliGRAM(s) Oral three times a day with meals    VITALS:  T(F): 97.8 (06-01-21 @ 09:00), Max: 98.9 (05-31-21 @ 20:13)  HR: 83 (06-01-21 @ 09:00)  BP: 140/83 (06-01-21 @ 09:00)  RR: 16 (06-01-21 @ 09:00)  SpO2: 98% (06-01-21 @ 09:00)  Wt(kg): --    05-31 @ 07:01  -  06-01 @ 07:00  --------------------------------------------------------  IN: 400 mL / OUT: 2900 mL / NET: -2500 mL        PHYSICAL EXAM:  Constitutional: NAD  HEENT: anicteric sclera, oropharynx clear, MMM  Neck: No JVD  Respiratory: CTAB, no wheezes, rales or rhonchi  Cardiovascular: S1, S2, RRR  Gastrointestinal: BS+, soft, NT/ND  Extremities: No cyanosis or clubbing. No peripheral edema  Neurological: A/O x 3, no focal deficits  Psychiatric: Normal mood, normal affect  : No CVA tenderness. No siegel.   Skin: No rashes  Vascular Access: [facility] matthew cath.     LABS:  06-01    137  |  95<L>  |  49<H>  ----------------------------<  94  4.1   |  22  |  6.81<H>    Ca    9.7      01 Jun 2021 06:37  Phos  6.9     06-01  Mg     1.9     06-01    TPro  7.3  /  Alb  3.5  /  TBili  0.3  /  DBili      /  AST  10  /  ALT  9   /  AlkPhos  99  05-30    Creatinine Trend: 6.81 <--, 9.09 <--, 7.14 <--, 8.33 <--, 6.32 <--, 9.30 <--, 6.80 <--, 13.27 <--                        8.3    6.52  )-----------( 182      ( 01 Jun 2021 06:37 )             26.7     Urine Studies:      RADIOLOGY & ADDITIONAL STUDIES:

## 2021-06-01 NOTE — PROGRESS NOTE ADULT - ASSESSMENT
66M hx of ESRD on HD (M/W/F, previously via LUE AVF, now via chest wall catheter), anemia, hyperparathyroidism, thrombocytopenia, hypothyroidism, HTN who presents with shortness of breath and LE swelling likely due to volume overload from missed HD session, admitted for HD. Renal following for ESRD Mx.     ESRD on HD   Electrolytes, volume acceptable  Pt dialyzed yesterday without acute event.   Repeat HD tomorrow.   vascular consult noted- for new RUE avf placement and IJ permacath,  s/p stress test, cards f/u   renal diet, fluid restriction 1L/day  dose all meds for ESRD  [place] [facility] [place]N, better controlled now. c/w BB. added Nifedipine 30mg BT. inc uf w/hd  Anemia in CKD-Hb below goal. on epo 10k tiw w/hd.   Hyperphosphatemia- high- Renvela 2 tabs TID with meals. high pth noted, c/w hectorol 4 mcg Three times a week with HD.

## 2021-06-01 NOTE — PROGRESS NOTE ADULT - PROBLEM SELECTOR PROBLEM 6
Hypothyroidism
Hypertension
Hypothyroidism

## 2021-06-01 NOTE — PROGRESS NOTE ADULT - SUBJECTIVE AND OBJECTIVE BOX
VASCULAR SURGERY PROGRESS NOTE    INTERVAL EVENTS: Patient had nuclear stress test showing cardiomyopathy. Denies chest pain, SOB.      OBJECTIVE:    Vital Signs Last 24 Hrs  T(C): 36.7 (01 Jun 2021 05:35), Max: 37.2 (31 May 2021 20:13)  T(F): 98 (01 Jun 2021 05:35), Max: 98.9 (31 May 2021 20:13)  HR: 84 (01 Jun 2021 05:35) (82 - 86)  BP: 137/78 (01 Jun 2021 05:35) (129/60 - 148/82)  BP(mean): --  RR: 17 (01 Jun 2021 05:35) (16 - 18)  SpO2: 100% (01 Jun 2021 05:35) (100% - 100%)    General Appearance: Resting comfortably, no acute distress  Chest: non-labored breathing, no respiratory distress, shiley in place on right neck  CV: Pulse regular presently  Abdomen: Soft, non-tender, non-distended  Extremities: warm and well perfused, radial pulse     I&O's Summary    31 May 2021 07:01  -  01 Jun 2021 07:00  --------------------------------------------------------  IN: 400 mL / OUT: 2900 mL / NET: -2500 mL      I&O's Detail    31 May 2021 07:01  -  01 Jun 2021 07:00  --------------------------------------------------------  IN:    Other (mL): 400 mL  Total IN: 400 mL    OUT:    Other (mL): 2900 mL  Total OUT: 2900 mL    Total NET: -2500 mL            LABS:                        8.3    6.52  )-----------( 182      ( 01 Jun 2021 06:37 )             26.7     05-31    138  |  99  |  64<H>  ----------------------------<  93  4.8   |  20<L>  |  9.09<H>    Ca    9.9      31 May 2021 07:16  Phos  7.7     05-31  Mg     2.0     05-31    TPro  7.3  /  Alb  3.5  /  TBili  0.3  /  DBili  x   /  AST  10  /  ALT  9   /  AlkPhos  99  05-30          RADIOLOGY & ADDITIONAL STUDIES:

## 2021-06-02 ENCOUNTER — TRANSCRIPTION ENCOUNTER (OUTPATIENT)
Age: 66
End: 2021-06-02

## 2021-06-02 LAB
ALBUMIN SERPL ELPH-MCNC: 3.9 G/DL — SIGNIFICANT CHANGE UP (ref 3.3–5)
ALP SERPL-CCNC: 120 U/L — SIGNIFICANT CHANGE UP (ref 40–120)
ALT FLD-CCNC: 10 U/L — SIGNIFICANT CHANGE UP (ref 4–41)
ANION GAP SERPL CALC-SCNC: 21 MMOL/L — HIGH (ref 7–14)
AST SERPL-CCNC: 11 U/L — SIGNIFICANT CHANGE UP (ref 4–40)
BILIRUB SERPL-MCNC: 0.4 MG/DL — SIGNIFICANT CHANGE UP (ref 0.2–1.2)
BUN SERPL-MCNC: 70 MG/DL — HIGH (ref 7–23)
CALCIUM SERPL-MCNC: 10.3 MG/DL — SIGNIFICANT CHANGE UP (ref 8.4–10.5)
CHLORIDE SERPL-SCNC: 94 MMOL/L — LOW (ref 98–107)
CO2 SERPL-SCNC: 19 MMOL/L — LOW (ref 22–31)
CREAT SERPL-MCNC: 9.19 MG/DL — HIGH (ref 0.5–1.3)
GLUCOSE SERPL-MCNC: 97 MG/DL — SIGNIFICANT CHANGE UP (ref 70–99)
HCT VFR BLD CALC: 28.2 % — LOW (ref 39–50)
HGB BLD-MCNC: 8.7 G/DL — LOW (ref 13–17)
MAGNESIUM SERPL-MCNC: 2.1 MG/DL — SIGNIFICANT CHANGE UP (ref 1.6–2.6)
MCHC RBC-ENTMCNC: 26.8 PG — LOW (ref 27–34)
MCHC RBC-ENTMCNC: 30.9 GM/DL — LOW (ref 32–36)
MCV RBC AUTO: 86.8 FL — SIGNIFICANT CHANGE UP (ref 80–100)
NRBC # BLD: 0 /100 WBCS — SIGNIFICANT CHANGE UP
NRBC # FLD: 0 K/UL — SIGNIFICANT CHANGE UP
PHOSPHATE SERPL-MCNC: 8.1 MG/DL — HIGH (ref 2.5–4.5)
PLATELET # BLD AUTO: 189 K/UL — SIGNIFICANT CHANGE UP (ref 150–400)
POTASSIUM SERPL-MCNC: 4.9 MMOL/L — SIGNIFICANT CHANGE UP (ref 3.5–5.3)
POTASSIUM SERPL-SCNC: 4.9 MMOL/L — SIGNIFICANT CHANGE UP (ref 3.5–5.3)
PROT SERPL-MCNC: 7.9 G/DL — SIGNIFICANT CHANGE UP (ref 6–8.3)
RBC # BLD: 3.25 M/UL — LOW (ref 4.2–5.8)
RBC # FLD: 18.3 % — HIGH (ref 10.3–14.5)
SODIUM SERPL-SCNC: 134 MMOL/L — LOW (ref 135–145)
WBC # BLD: 7.25 K/UL — SIGNIFICANT CHANGE UP (ref 3.8–10.5)
WBC # FLD AUTO: 7.25 K/UL — SIGNIFICANT CHANGE UP (ref 3.8–10.5)

## 2021-06-02 PROCEDURE — 93880 EXTRACRANIAL BILAT STUDY: CPT | Mod: 26

## 2021-06-02 PROCEDURE — 93454 CORONARY ARTERY ANGIO S&I: CPT | Mod: 26

## 2021-06-02 PROCEDURE — 99233 SBSQ HOSP IP/OBS HIGH 50: CPT

## 2021-06-02 RX ORDER — ALTEPLASE 100 MG
2 KIT INTRAVENOUS ONCE
Refills: 0 | Status: COMPLETED | OUTPATIENT
Start: 2021-06-02 | End: 2021-06-02

## 2021-06-02 RX ORDER — ATORVASTATIN CALCIUM 80 MG/1
40 TABLET, FILM COATED ORAL AT BEDTIME
Refills: 0 | Status: DISCONTINUED | OUTPATIENT
Start: 2021-06-02 | End: 2021-06-19

## 2021-06-02 RX ADMIN — Medication 325 MILLIGRAM(S): at 11:50

## 2021-06-02 RX ADMIN — CARVEDILOL PHOSPHATE 12.5 MILLIGRAM(S): 80 CAPSULE, EXTENDED RELEASE ORAL at 18:23

## 2021-06-02 RX ADMIN — CHLORHEXIDINE GLUCONATE 1 APPLICATION(S): 213 SOLUTION TOPICAL at 11:50

## 2021-06-02 RX ADMIN — ERYTHROPOIETIN 10000 UNIT(S): 10000 INJECTION, SOLUTION INTRAVENOUS; SUBCUTANEOUS at 10:36

## 2021-06-02 RX ADMIN — PANTOPRAZOLE SODIUM 40 MILLIGRAM(S): 20 TABLET, DELAYED RELEASE ORAL at 05:03

## 2021-06-02 RX ADMIN — ALTEPLASE 2 MILLIGRAM(S): KIT at 07:18

## 2021-06-02 RX ADMIN — Medication 81 MILLIGRAM(S): at 11:49

## 2021-06-02 RX ADMIN — HEPARIN SODIUM 5000 UNIT(S): 5000 INJECTION INTRAVENOUS; SUBCUTANEOUS at 05:04

## 2021-06-02 RX ADMIN — Medication 30 MILLIGRAM(S): at 21:22

## 2021-06-02 RX ADMIN — ATORVASTATIN CALCIUM 40 MILLIGRAM(S): 80 TABLET, FILM COATED ORAL at 21:22

## 2021-06-02 RX ADMIN — DOXERCALCIFEROL 4 MICROGRAM(S): 2.5 CAPSULE ORAL at 10:36

## 2021-06-02 RX ADMIN — Medication 100 MICROGRAM(S): at 05:03

## 2021-06-02 RX ADMIN — Medication 1 MILLIGRAM(S): at 11:50

## 2021-06-02 RX ADMIN — SEVELAMER CARBONATE 1600 MILLIGRAM(S): 2400 POWDER, FOR SUSPENSION ORAL at 11:51

## 2021-06-02 RX ADMIN — ALTEPLASE 2 MILLIGRAM(S): KIT at 07:19

## 2021-06-02 NOTE — PROGRESS NOTE ADULT - SUBJECTIVE AND OBJECTIVE BOX
[facility] - S Alicia / Ej S /D Federico/ S Maryellen/ S Govind/ Jovan Garrison / GAYATRI Matsonu/ O Gregorio  service -[phone], office [phone]  ---------------------------------------------------------------------------------------------------------------    Patient seen and examined bedside    Subjective and Objective: No overnight events, sob resolved. No complaints today. feeling better    Allergies: No Known Allergies      Hospital Medications:   MEDICATIONS  (STANDING):  aspirin enteric coated 81 milliGRAM(s) Oral daily  atorvastatin 40 milliGRAM(s) Oral at bedtime  carvedilol 12.5 milliGRAM(s) Oral every 12 hours  chlorhexidine 4% Liquid 1 Application(s) Topical <User Schedule>  doxercalciferol Injectable 4 MICROGram(s) IV Push <User Schedule>  epoetin tammi-epbx (RETACRIT) Injectable 76638 Unit(s) IV Push <User Schedule>  ferrous    sulfate 325 milliGRAM(s) Oral daily  folic acid 1 milliGRAM(s) Oral daily  heparin   Injectable 5000 Unit(s) SubCutaneous every 8 hours  levothyroxine 100 MICROGram(s) Oral daily  NIFEdipine XL 30 milliGRAM(s) Oral at bedtime  pantoprazole    Tablet 40 milliGRAM(s) Oral before breakfast  sevelamer carbonate 1600 milliGRAM(s) Oral three times a day with meals      REVIEW OF SYSTEMS:  CONSTITUTIONAL: No weakness, fevers or chills  EYES/ENT: No visual changes;  No vertigo or throat pain   NECK: No pain or stiffness  RESPIRATORY: No cough, wheezing, hemoptysis; No shortness of breath  CARDIOVASCULAR: No chest pain or palpitations.  GASTROINTESTINAL: No abdominal or epigastric pain. No nausea, vomiting, or hematemesis; No diarrhea or constipation. No melena or hematochezia.  GENITOURINARY: No dysuria, frequency, foamy urine, urinary urgency, incontinence or hematuria  NEUROLOGICAL: No numbness or weakness  SKIN: No itching, burning, rashes, or lesions   VASCULAR: No bilateral lower extremity edema.   All other review of systems is negative unless indicated above.    VITALS:  T(F): 97.8 (06-02-21 @ 11:56), Max: 98.8 (06-01-21 @ 20:00)  HR: 88 (06-02-21 @ 11:56)  BP: 129/94 (06-02-21 @ 11:56)  RR: 18 (06-02-21 @ 11:56)  SpO2: 100% (06-02-21 @ 11:56)  Wt(kg): --    06-02 @ 07:01  -  06-02 @ 13:31  --------------------------------------------------------  IN: 800 mL / OUT: 3300 mL / NET: -2500 mL          PHYSICAL EXAM:  Constitutional: NAD  HEENT: anicteric sclera, oropharynx clear  Neck: No JVD  Respiratory: CTAB, no wheezes, rales or rhonchi  Cardiovascular: S1, S2, RRR  Gastrointestinal: BS+, soft, NT/ND  Extremities: No cyanosis or clubbing. No peripheral edema  Neurological: A/O x 3, no focal deficits  Psychiatric: Normal mood, normal affect  : No CVA tenderness. No siegel.   Skin: No rashes  Vascular Access:    LABS:  06-02    134<L>  |  94<L>  |  70<H>  ----------------------------<  97  4.9   |  19<L>  |  9.19<H>    Ca    10.3      02 Jun 2021 06:33  Phos  8.1     06-02  Mg     2.1     06-02    TPro  7.9  /  Alb  3.9  /  TBili  0.4  /  DBili      /  AST  11  /  ALT  10  /  AlkPhos  120  06-02    Creatinine Trend: 9.19 <--, 6.81 <--, 9.09 <--, 7.14 <--, 8.33 <--, 6.32 <--, 9.30 <--                        8.7    7.25  )-----------( 189      ( 02 Jun 2021 06:33 )             28.2     Urine Studies:        RADIOLOGY & ADDITIONAL STUDIES:   [facility] - S Alicia / Ej S /D Federico/ S Maryellen/ S Govind/ Jovan Garrison / GAYATRI Garcia/ O Gregorio  service -[phone], office [phone]  ---------------------------------------------------------------------------------------------------------------    Patient seen and examined bedside    Subjective and Objective: No overnight events, sob resolved. No complaints today. feeling better    Allergies: No Known Allergies      Hospital Medications:   MEDICATIONS  (STANDING):  aspirin enteric coated 81 milliGRAM(s) Oral daily  atorvastatin 40 milliGRAM(s) Oral at bedtime  carvedilol 12.5 milliGRAM(s) Oral every 12 hours  chlorhexidine 4% Liquid 1 Application(s) Topical <User Schedule>  doxercalciferol Injectable 4 MICROGram(s) IV Push <User Schedule>  epoetin tammi-epbx (RETACRIT) Injectable 07362 Unit(s) IV Push <User Schedule>  ferrous    sulfate 325 milliGRAM(s) Oral daily  folic acid 1 milliGRAM(s) Oral daily  heparin   Injectable 5000 Unit(s) SubCutaneous every 8 hours  levothyroxine 100 MICROGram(s) Oral daily  NIFEdipine XL 30 milliGRAM(s) Oral at bedtime  pantoprazole    Tablet 40 milliGRAM(s) Oral before breakfast  sevelamer carbonate 1600 milliGRAM(s) Oral three times a day with meals      VITALS:  T(F): 97.8 (06-02-21 @ 11:56), Max: 98.8 (06-01-21 @ 20:00)  HR: 88 (06-02-21 @ 11:56)  BP: 129/94 (06-02-21 @ 11:56)  RR: 18 (06-02-21 @ 11:56)  SpO2: 100% (06-02-21 @ 11:56)  Wt(kg): --    06-02 @ 07:01  -  06-02 @ 13:31  --------------------------------------------------------  IN: 800 mL / OUT: 3300 mL / NET: -2500 mL      PHYSICAL EXAM:  Constitutional: NAD  HEENT: anicteric sclera  Neck: No JVD  Respiratory: CTAB, no wheezes, rales or rhonchi  Cardiovascular: S1, S2, RRR  Gastrointestinal: BS+, soft, NT/ND  Extremities: No peripheral edema  Neurological: A/O x 3  Psychiatric: Normal mood, normal affect  : No siegel.   Vascular Access: rt chest HD cath+    LABS:  06-02    134<L>  |  94<L>  |  70<H>  ----------------------------<  97  4.9   |  19<L>  |  9.19<H>    Ca    10.3      02 Jun 2021 06:33  Phos  8.1     06-02  Mg     2.1     06-02    TPro  7.9  /  Alb  3.9  /  TBili  0.4  /  DBili      /  AST  11  /  ALT  10  /  AlkPhos  120  06-02    Creatinine Trend: 9.19 <--, 6.81 <--, 9.09 <--, 7.14 <--, 8.33 <--, 6.32 <--, 9.30 <--                        8.7    7.25  )-----------( 189      ( 02 Jun 2021 06:33 )             28.2     Urine Studies:        RADIOLOGY & ADDITIONAL STUDIES:

## 2021-06-02 NOTE — CHART NOTE - NSCHARTNOTEFT_GEN_A_CORE
Vital Signs Last 24 Hrs  T(C): 36.6 (02 Jun 2021 11:56), Max: 37.1 (01 Jun 2021 20:00)  T(F): 97.8 (02 Jun 2021 11:56), Max: 98.8 (01 Jun 2021 20:00)  HR: 88 (02 Jun 2021 11:56) (81 - 88)  BP: 129/94 (02 Jun 2021 11:56) (129/94 - 161/93)  BP(mean): 113 (01 Jun 2021 20:00) (113 - 113)  RR: 18 (02 Jun 2021 11:56) (16 - 18)  SpO2: 100% (02 Jun 2021 11:56) (95% - 100%)                          8.7    7.25  )-----------( 189      ( 02 Jun 2021 06:33 )             28.2   06-02    134<L>  |  94<L>  |  70<H>  ----------------------------<  97  4.9   |  19<L>  |  9.19<H>    Ca    10.3      02 Jun 2021 06:33  Phos  8.1     06-02  Mg     2.1     06-02    TPro  7.9  /  Alb  3.9  /  TBili  0.4  /  DBili  x   /  AST  11  /  ALT  10  /  AlkPhos  120  06-02    Results and case was discussed with Dr. Christian-> patient scheduled for Cardiac Cath today with Dr. Gibbons as per Cards Dr. Bowman. Confirmed with cath lab.  Spoke with Vascular Sx-> patient is tentatively on schedule for OR tomorrow for AVF placement pending cardiac clearance.   Discussed plan of care with patient, RN and attending Vital Signs Last 24 Hrs  T(C): 36.6 (02 Jun 2021 11:56), Max: 37.1 (01 Jun 2021 20:00)  T(F): 97.8 (02 Jun 2021 11:56), Max: 98.8 (01 Jun 2021 20:00)  HR: 88 (02 Jun 2021 11:56) (81 - 88)  BP: 129/94 (02 Jun 2021 11:56) (129/94 - 161/93)  BP(mean): 113 (01 Jun 2021 20:00) (113 - 113)  RR: 18 (02 Jun 2021 11:56) (16 - 18)  SpO2: 100% (02 Jun 2021 11:56) (95% - 100%)                          8.7    7.25  )-----------( 189      ( 02 Jun 2021 06:33 )             28.2   06-02    134<L>  |  94<L>  |  70<H>  ----------------------------<  97  4.9   |  19<L>  |  9.19<H>    Ca    10.3      02 Jun 2021 06:33  Phos  8.1     06-02  Mg     2.1     06-02    TPro  7.9  /  Alb  3.9  /  TBili  0.4  /  DBili  x   /  AST  11  /  ALT  10  /  AlkPhos  120  06-02    Results and case was discussed with Dr. Christian-> patient scheduled for Cardiac Cath today with Dr. Gibbons as per Cards Dr. Bowman. Confirmed with cath lab.  Spoke with Vascular Sx-> patient is tentatively on schedule for OR tomorrow for AVF placement pending cardiac clearance.   Discussed plan of care with patient, RN and attending    Addendum @1318: Spoke with Cards Dr. Bowman-> cath unremarkable, patient cleared for OR tomorrow vascular sx informed, no need for Tele.

## 2021-06-02 NOTE — PROGRESS NOTE ADULT - ASSESSMENT
66M hx of ESRD on HD (M/W/F, previously via LUE AVF, now via chest wall catheter), anemia, hyperparathyroidism, thrombocytopenia, hypothyroidism, HTN who presents with shortness of breath and LE swelling likely due to volume overload from missed HD session, admitted for HD. Renal following for ESRD Mx.     ESRD on HD   Electrolytes, volume acceptable  s/p HD earlier today, Rx sheet reviewed, net UF 2.5kg, tolerated well. uneventful.  keep MWF schedule for now  vascular consult noted- for new RUE avf placement and IJ permacath,  s/p stress test, abnml- plan for [facility]- cards f/u. no renal objection.   renal diet, fluid restriction 1L/day  dose all meds for ESRD  f/w w/SW for HD placement at [facility] [place]  HTN, better controlled now. c/w BB. added Nifedipine 30mg BT. inc uf w/hd  Anemia in CKD-Hb below goal. on epo 10k tiw w/hd.   Hyperphosphatemia- high- Renvela 2 tabs TID with meals. high pth noted, c/w hectorol 4 mcg Three times a week with HD.     labs, chart reviewed  pl call or nay q's  cell [phone]

## 2021-06-02 NOTE — PROGRESS NOTE ADULT - SUBJECTIVE AND OBJECTIVE BOX
Patient is a 66y old  Male who presents with a chief complaint of urgent HD (02 Jun 2021 13:31)      SUBJECTIVE / OVERNIGHT EVENTS:    Events noted.  CONSTITUTIONAL: No fever,  or fatigue  RESPIRATORY: No cough, wheezing,  No shortness of breath  CARDIOVASCULAR: No chest pain, palpitations  GASTROINTESTINAL: No abdominal or epigastric pain.   NEUROLOGICAL: No headaches,     MEDICATIONS  (STANDING):  aspirin enteric coated 81 milliGRAM(s) Oral daily  atorvastatin 40 milliGRAM(s) Oral at bedtime  carvedilol 12.5 milliGRAM(s) Oral every 12 hours  chlorhexidine 4% Liquid 1 Application(s) Topical <User Schedule>  doxercalciferol Injectable 4 MICROGram(s) IV Push <User Schedule>  epoetin tammi-epbx (RETACRIT) Injectable 30106 Unit(s) IV Push <User Schedule>  ferrous    sulfate 325 milliGRAM(s) Oral daily  folic acid 1 milliGRAM(s) Oral daily  levothyroxine 100 MICROGram(s) Oral daily  NIFEdipine XL 30 milliGRAM(s) Oral at bedtime  pantoprazole    Tablet 40 milliGRAM(s) Oral before breakfast  sevelamer carbonate 1600 milliGRAM(s) Oral three times a day with meals    MEDICATIONS  (PRN):        CAPILLARY BLOOD GLUCOSE        I&O's Summary    02 Jun 2021 07:01  -  03 Jun 2021 01:24  --------------------------------------------------------  IN: 800 mL / OUT: 3300 mL / NET: -2500 mL        T(C): 37.1 (06-02-21 @ 20:01), Max: 37.1 (06-02-21 @ 06:25)  HR: 98 (06-02-21 @ 20:01) (80 - 98)  BP: 134/84 (06-02-21 @ 20:01) (129/94 - 161/93)  RR: 18 (06-02-21 @ 20:01) (16 - 18)  SpO2: 98% (06-02-21 @ 20:01) (97% - 100%)    PHYSICAL EXAM:  GENERAL: NAD  NECK: Supple, No JVD  CHEST/LUNG: Clear to auscultation bilaterally; No wheezing.  HEART: Regular rate and rhythm; No murmurs, rubs, or gallops  ABDOMEN: Soft, Nontender, Nondistended; Bowel sounds present  EXTREMITIES:   No edema  NEUROLOGY: AAO X 3      LABS:                        8.7    7.25  )-----------( 189      ( 02 Jun 2021 06:33 )             28.2     06-02    134<L>  |  94<L>  |  70<H>  ----------------------------<  97  4.9   |  19<L>  |  9.19<H>    Ca    10.3      02 Jun 2021 06:33  Phos  8.1     06-02  Mg     2.1     06-02    TPro  7.9  /  Alb  3.9  /  TBili  0.4  /  DBili  x   /  AST  11  /  ALT  10  /  AlkPhos  120  06-02    PT/INR - ( 01 Jun 2021 09:02 )   PT: 12.7 sec;   INR: 1.11 ratio         PTT - ( 01 Jun 2021 09:02 )  PTT:31.3 sec        CAPILLARY BLOOD GLUCOSE            RADIOLOGY & ADDITIONAL TESTS:    Imaging Personally Reviewed:    Consultant(s) Notes Reviewed:      Care Discussed with Consultants/Other Providers:    Wilfred Christian MD, CMD, FACP    [address] [place] [address]  Office Tel: [phone]  Cell: [phone]

## 2021-06-02 NOTE — PROGRESS NOTE ADULT - SUBJECTIVE AND OBJECTIVE BOX
Cardiology/Vascular Medicine Inpatient Progress Note    No new complaints.  Reviewed images--LV systolic dysfunction is new when compared to prior imaging within our system from 2015.  Pericardial effusion likely from setting of not receiving sufficient HD in setting of ESRD.  Continue low dose daily aspirin 81 mg, metoprolol.  Start atorvastatin 40 qhs.  Will work with Medicine and Renal teams to arrange for [facility] to assess coronary anatomy.    Vital Signs Last 24 Hrs  T(C): 37.1 (2021 06:25), Max: 37.1 (2021 20:00)  T(F): 98.7 (2021 06:25), Max: 98.8 (2021 20:00)  HR: 84 (2021 06:25) (81 - 87)  BP: 136/82 (2021 06:25) (135/85 - 159/94)  BP(mean): 113 (2021 20:00) (113 - 113)  RR: 17 (2021 06:25) (16 - 17)  SpO2: 97% (2021 06:25) (95% - 100%)    Appearance: NAD  HEENT:   No JVD  Cardiovascular: Normal S1 S2, No JVD, No murmurs, No edema  Respiratory: Decreased breath sounds bilaterally  Psychiatry: Awake, alert  Gastrointestinal:  Soft, Non-tender, + BS	  Neurologic: Non-focal  Extremities: No edema    MEDICATIONS  (STANDING):  aspirin enteric coated 81 milliGRAM(s) Oral daily  carvedilol 12.5 milliGRAM(s) Oral every 12 hours  chlorhexidine 4% Liquid 1 Application(s) Topical <User Schedule>  doxercalciferol Injectable 4 MICROGram(s) IV Push <User Schedule>  epoetin tammi-epbx (RETACRIT) Injectable 23992 Unit(s) IV Push <User Schedule>  ferrous    sulfate 325 milliGRAM(s) Oral daily  folic acid 1 milliGRAM(s) Oral daily  heparin   Injectable 5000 Unit(s) SubCutaneous every 8 hours  levothyroxine 100 MICROGram(s) Oral daily  NIFEdipine XL 30 milliGRAM(s) Oral at bedtime  pantoprazole    Tablet 40 milliGRAM(s) Oral before breakfast  sevelamer carbonate 1600 milliGRAM(s) Oral three times a day with meals        LABS:	 	                          8.7    7.25  )-----------( 189      ( 2021 06:33 )             28.2   06-01    137  |  95<L>  |  49<H>  ----------------------------<  94  4.1   |  22  |  6.81<H>    Ca    9.7      2021 06:37  Phos  6.9     06-01  Mg     1.9     06-01    PT/INR - ( 2021 09:02 )   PT: 12.7 sec;   INR: 1.11 ratio    PTT - ( 2021 09:02 )  PTT:31.3 sec      < from: Transthoracic Echocardiogram (01.09.15 @ 07:37) >    Patient name: CHAVEZ MARQUEZ  YOB: 1955   Age: 59 (M)   MR#: [id number]  Study Date: 2015  Location: [facility] onographer: Chris Alexis  Study quality: Technically good  Referring Physician: GILLES HARP MD  ------------------------------------------------------------------------  Blood Pressure: 152/95 mmHg  Height: 6ft 0in  Weight: 170 lb  BSA: 2 m2  ------------------------------------------------------------------------  PROCEDURE: Transthoracic echocardiogram with 2-D, M-Mode  and complete spectral and color flow Doppler.  INDICATION: Dyspnea (786.09)  ------------------------------------------------------------------------  DIMENSIONS:  Dimensions:     Normal Values:  LA:     4.9 cm    2.0 - 4.0 cm  Ao:     3.4cm    2.0 - 3.8 cm  SEPTUM: 1.1 cm    0.6 - 1.2 cm  PWT:    1.0 cm    0.6 - 1.1 cm  LVIDd:  5.4 cm    3.0 - 5.6 cm  LVIDs:    ---     1.8 - 4.0 cm  Derived Variables:  LVMI: 111 g/m2  RWT: 0.37  Ejection Fraction: 65 %  ------------------------------------------------------------------------  OBSERVATIONS:  Mitral Valve: Mitral annular dilatation.  Mild mitral regurgitation.  Aortic Root: Normal aortic root.  Aortic Valve: Normal trileaflet aortic valve.  Mild aortic regurgitation.  Left Atrium: Severely dilated left atrium.  LA volume index  = 66 cc/m2.  Left Ventricle: Left ventricular hypertrophy.  Normal left ventricular systolic function. EF 65%. No  segmental wall motion abnormalities.  Right Heart: Normal right atrium.  Normal right ventricular size and function.  Mild tricuspid regurgitation.  Estimated pulmonary artery systolic pressure equals 40 mm  Hg, assuming right atrial pressure equals 10  mm Hg,  consistent with mild pulmonary hypertension.  Pericardium/PleuraNormal pericardium with trace pericardial  effusion.  ------------------------------------------------------------------------  CONCLUSIONS:  1. Mitral annular dilatation. Mild mitral regurgitation.  2. Normal trileaflet aortic valve. Mild aortic  regurgitation.  3. Severely dilated left atrium.  LA volume index = 66  cc/m2.  4. Left ventricular hypertrophy.  5. Normal left ventricular systolic function. EF 65%. No  segmental wall motion abnormalities.  6. Normal right atrium.  7. Normal right ventricular sizeand function.  8. Estimated right ventricular systolic pressure equals 40  mm Hg, assuming right atrial pressure equals 10 mm Hg,  consistent with mild pulmonary hypertension.  9. Mild tricuspid regurgitation.  ------------------------------------------------------------------------  Confirmed on  2015 - 13:22:33 by Boris Pham MD  ------------------------------------------------------------------------    < end of copied text >  < from: Nuclear Stress Test-Pharmacologic (21 @ 08:51) >    PATIENT: Chavez Marquez  : 1955   AGE: 66 (M)   MR#: [id number]  STUDY DATE: 2021  LOCATION: [place] [address]  REF. PHYSICIAN(S): Wilfred Christian MD  FELLOW:  ------------------------------------------------------------------------  TYPE OF TEST: Stress/Rest  Pharmacologic  INDICATION: Shortness of breath (R06.02), Encounter for  preprocedural cardiovascular examination (Z01.810)  ------------------------------------------------------------------------  HISTORY:  CARDIAC HISTORY: 66 year old man with past medical history  of hypertension and ESRD on HD arrived to hospital with  shortness of breath and LE swelling needs new AVF and sent  for preop clearance  OTHER HISTORY: Anemia, Hyperparathyroid, Thrombocytopenia  RISK FACTORS: Past smoker, Hypertension, Family History  MEDICATIONS: Heparin SQ, Procardia XL, Renvela, Hectorol,  Coreg, Ferrous sulfate, Synthroid,Protonix  ------------------------------------------------------------------------  BASELINE ELECTROCARDIOGRAM:  Rhythm: Normal Sinus Rhythm - 81 BPM  ST: ST wave abnormalities in I  , II , III, aVF, V3, V4,  V5, V6.  Other: LVH with repolarization abnormality  ------------------------------------------------------------------------  HEMODYNAMIC PARAMETERS:                                   HR      BP  Baseline  Pre-Injection             77  135/88  00:00     Inject Regadenoson        77  00:30     Post Injection            77  01:00     Post Injection            86  117/64  02:00     Post Injection      92  125/67  03:00     Post Injection            91  123/66  04:00     Post Injection            88  120/62  05:00     Recovery                  88  06:00     Recovery                  87  07:00     Recovery                  86  116/64  08:24 Recovery                  87  ------------------------------------------------------------------------  Agent: Regadenoson 0.4 mg/5 ml NS. injected over 10 sec.  HR: Baseline HR: 77 bpm   Peak HR: 92 bpm (60% of MPHR)  MPHR: 154 bpm   85% of MPHR: 131 bpm  BP: Baseline BP: 135/88 mmHg   Peak BP: 135/88 mmHg   Peak  RPP: 85191 (Rate Pressure Product)  Last Caffeine intake: 12 hrs  Terminated: Completion of protocol  ------------------------------------------------------------------------  SYMPTOMS/FINDINGS:  Symptoms: Shortness of breath  Chest pain: No chest pain with administration of  Regadenoson  Treatment: None  ------------------------------------------------------------------------  ECG ABNORMALITIES DURING/AFTER STRESS:   Abnormalities: ECG changes could not be interpreted due  to left ventricular hypertrophy.  ------------------------------------------------------------------------  NEW ARRHYTHMIAS DEVELOPED DURING/AFTER STRESS:  Rare APDs occurred during rest, stress and recovery. Rare  VPDs occurred during stress and recovery.  ------------------------------------------------------------------------  STRESS TEST IMPRESSIONS:  Chest Pain: No chest pain with administration of  Regadenoson.  Symptom: Shortness of breath.  HR Response: Appropriate.  BP Response: Appropriate.  Heart Rhythm: Normal Sinus Rhythm - 81 BPM.  Baseline ECG: ST wave abnormalities in I  , II , III, aVF,  V3, V4, V5, V6.  ECG Abnormalities: ECG changes could not be interpreted  due to left ventricular hypertrophy.  Arrhythmia: Rare APDs occurred during rest, stress and  recovery. Rare VPDs occurred during stress and recovery.  ------------------------------------------------------------------------  PROCEDURE:  7.6 mCi of Tc99m Sestamibi were injected during stress  protocol done 2021. Approximately 45 minutes later,  tomographic images were obtained in a 180 degree arc from  right anterior oblique to left anterior oblique with 64  stops. At a separate time on 2021, 7.86 mCi of Tc99m  Sestamibi were injected at rest. Approximately 45  minute(s) later, tomographic images were obtained in a 180  degree arc from right anterior oblique to left anterior  oblique with 64 stops. The tomographic slices were  reconstructed in 3 orthogonal planes (short axis,  horizontal long axis and vertical long axis).  Interpretation was performed both by visual and  quantitative analysis.  Rest and stress images were acquired using CZT-based  system with pinhole collimation ([employer], [employer]), and reconstructed using MLEM algorithm.  Images were re-acquired with the patient in a prone  position.  ------------------------------------------------------------------------  NUCLEAR FINDINGS:  Review of raw data shows: The study is of good technical  quality.  The left ventricle was hypertrophied. There is a small,  mild defect in inferior wall, that is mostly reversible,  suggestive of mild schemia and is of questionable clinical  significance.  The images with attenuation correction show no significant  changes.  ------------------------------------------------------------------------  GATED ANALYSIS:  Post-stress gated wall motion analysis was performed (LVEF  >= 45 %;LVEDV = 171 ml.), revealing mild hypokinesis.  Precise calculation of EF could not be done due to  difficulty contouring LV endocardium in systole  ------------------------------------------------------------------------  IMPRESSIONS:Mildly Abnormal Study  * Myocardial Perfusion SPECT results are mildly abnormal.  * Review of raw data shows: The study is of good technical  quality.  * There is a small, mild defect in inferior wall, that is  mostly reversible, suggestive of mild schemia and is of  questionable clinical significance.  * The images with attenuation correction show no  significant changes.  * Findings suggested cardiomyopathy. The left ventricle  was hypertrophied. Post-stress gated wall motion analysis  was performed (LVEF >= 45 %;LVEDV = 171 ml.), revealing  mild hypokinesis. Precise calculation of EF could not be  done due to difficulty contouring LV endocardium in  systole  ------------------------------------------------------------------------  Confirmed on  2021 - 14:29:34 by Alf Rodas M.D.  ------------------------------------------------------------------------    < end of copied text >    < from: Transthoracic Echocardiogram (21 @ 17:58) >    Patient name: Chavez Marquez  YOB: 1955   Age: 66 (M)   MR#: [id number]  Study Date: 2021  Location: [id number]  Study quality: Technically good  Referring Physician: Wilfred Christian MD  BloodPressure: 148/69 mmHg  Height: 175 cm  Weight: 68 kg  BSA: 1.8 m2  ------------------------------------------------------------------------  PROCEDURE: Transthoracic echocardiogram with 2-D, M-Mode  and complete spectral and color flow Doppler.  INDICATION: Abnormal electrocardiogram (ECG) (EKG) (R94.31)  ------------------------------------------------------------------------  DIMENSIONS:  Dimensions:     Normal Values:  LA:     5.0 cm    2.0 - 4.0 cm  Ao:     2.5 cm    2.0 - 3.8 cm  SEPTUM: 1.1cm    0.6 - 1.2 cm  PWT:    1.1 cm    0.6 - 1.1 cm  LVIDd:  5.5 cm    3.0 - 5.6 cm  LVIDs:    ---     1.8 - 4.0 cm  Derived Variables:  LVMI: 132 g/m2  RWT: 0.40  Ejection Fraction (Modified Krishna Rule): 45 %  ------------------------------------------------------------------------  OBSERVATIONS:  Mitral Valve: Mitral annular calcification. Mild mitral  regurgitation.  Aortic Root: Normal aortic root.  Aortic Valve: Thickened aortic valve. Mild aortic  regurgitation.  Left Atrium: Severely dilated left atrium.  LA volume index  = 80 cc/m2.  Left Ventricle: Mild to moderate global left ventricular  systolic dysfunction. Severe left ventricular enlargement.  (DT:173 ms).  Right Heart: Mild right atrial enlargement. Right  ventricular enlargement with normal right ventricular  systolic function. Normal tricuspid valve. Minimal  tricuspid regurgitation. Normal pulmonic valve.  Pericardium/PleuraModerate pericardial effusion lateral and  posterior to the left ventricle, small effusion superior to  the right atrium. No cardiac tamponade..  Hemodynamic: Estimated right ventricular systolic pressure  equals 42 mm Hg, assuming right atrial pressure equals 10  mm Hg, consistent with mild pulmonary hypertension.  ------------------------------------------------------------------------  CONCLUSIONS:  1. Thickened aortic valve. Mild aortic regurgitation.  2. Severely dilated left atrium.  LA volume index = 80  cc/m2.  3. Severe left ventricular enlargement.  4. Mild to moderate global left ventricular systolic  dysfunction.  5. Right ventricular enlargement with normal right  ventricular systolic function.  6. Moderate pericardial effusion lateral and posterior to  the left ventricle, small effusion superior to the right  atrium. No cardiac tamponade..  ------------------------------------------------------------------------  Confirmed on  2021 - 19:13:36 by YEE Henderson      ------------------------------------------------------------------------    < end of copied text >     Cardiology/Vascular Medicine Inpatient Progress Note    No new complaints.  Seen at HD.  Reviewed images--LV systolic dysfunction is new when compared to prior imaging within our system from 2015.  Pericardial effusion likely from setting of not receiving sufficient HD in setting of ESRD.  Continue low dose daily aspirin 81 mg, metoprolol.  Start atorvastatin 40 qhs.  Will work with Medicine and Renal teams to arrange for [facility] to assess coronary [facility].    Vital Signs Last 24 Hrs  T(C): 37.1 (2021 06:25), Max: 37.1 (2021 20:00)  T(F): 98.7 (2021 06:25), Max: 98.8 (2021 20:00)  HR: 84 (2021 06:25) (81 - 87)  BP: 136/82 (2021 06:25) (135/85 - 159/94)  BP(mean): 113 (2021 20:00) (113 - 113)  RR: 17 (2021 06:25) (16 - 17)  SpO2: 97% (2021 06:25) (95% - 100%)    Appearance: NAD  HEENT:   No JVD  Cardiovascular: Normal S1 S2, No JVD, No murmurs, No edema  Respiratory: Decreased breath sounds bilaterally  Psychiatry: Awake, alert  Gastrointestinal:  Soft, Non-tender, + BS	  Neurologic: Non-focal  Extremities: No edema    MEDICATIONS  (STANDING):  aspirin enteric coated 81 milliGRAM(s) Oral daily  carvedilol 12.5 milliGRAM(s) Oral every 12 hours  chlorhexidine 4% Liquid 1 Application(s) Topical <User Schedule>  doxercalciferol Injectable 4 MICROGram(s) IV Push <User Schedule>  epoetin tammi-epbx (RETACRIT) Injectable 91510 Unit(s) IV Push <User Schedule>  ferrous    sulfate 325 milliGRAM(s) Oral daily  folic acid 1 milliGRAM(s) Oral daily  heparin   Injectable 5000 Unit(s) SubCutaneous every 8 hours  levothyroxine 100 MICROGram(s) Oral daily  NIFEdipine XL 30 milliGRAM(s) Oral at bedtime  pantoprazole    Tablet 40 milliGRAM(s) Oral before breakfast  sevelamer carbonate 1600 milliGRAM(s) Oral three times a day with meals        LABS:	 	                          8.7    7.25  )-----------( 189      ( 2021 06:33 )             28.2   06-01    137  |  95<L>  |  49<H>  ----------------------------<  94  4.1   |  22  |  6.81<H>    Ca    9.7      2021 06:37  Phos  6.9     06-01  Mg     1.9     06-01    PT/INR - ( 2021 09:02 )   PT: 12.7 sec;   INR: 1.11 ratio    PTT - ( 2021 09:02 )  PTT:31.3 sec      < from: Transthoracic Echocardiogram (01.09.15 @ 07:37) >    Patient name: CHAVEZ MARQUEZ  YOB: 1955   Age: 59 (M)   MR#: [id number]  Study Date: 2015  Location: [facility] onographer: Chris Alexis  Study quality: Technically good  Referring Physician: GILLES HARP MD  ------------------------------------------------------------------------  Blood Pressure: 152/95 mmHg  Height: 6ft 0in  Weight: 170 lb  BSA: 2 m2  ------------------------------------------------------------------------  PROCEDURE: Transthoracic echocardiogram with 2-D, M-Mode  and complete spectral and color flow Doppler.  INDICATION: Dyspnea (786.09)  ------------------------------------------------------------------------  DIMENSIONS:  Dimensions:     Normal Values:  LA:     4.9 cm    2.0 - 4.0 cm  Ao:     3.4cm    2.0 - 3.8 cm  SEPTUM: 1.1 cm    0.6 - 1.2 cm  PWT:    1.0 cm    0.6 - 1.1 cm  LVIDd:  5.4 cm    3.0 - 5.6 cm  LVIDs:    ---     1.8 - 4.0 cm  Derived Variables:  LVMI: 111 g/m2  RWT: 0.37  Ejection Fraction: 65 %  ------------------------------------------------------------------------  OBSERVATIONS:  Mitral Valve: Mitral annular dilatation.  Mild mitral regurgitation.  Aortic Root: Normal aortic root.  Aortic Valve: Normal trileaflet aortic valve.  Mild aortic regurgitation.  Left Atrium: Severely dilated left atrium.  LA volume index  = 66 cc/m2.  Left Ventricle: Left ventricular hypertrophy.  Normal left ventricular systolic function. EF 65%. No  segmental wall motion abnormalities.  Right Heart: Normal right atrium.  Normal right ventricular size and function.  Mild tricuspid regurgitation.  Estimated pulmonary artery systolic pressure equals 40 mm  Hg, assuming right atrial pressure equals 10  mm Hg,  consistent with mild pulmonary hypertension.  Pericardium/PleuraNormal pericardium with trace pericardial  effusion.  ------------------------------------------------------------------------  CONCLUSIONS:  1. Mitral annular dilatation. Mild mitral regurgitation.  2. Normal trileaflet aortic valve. Mild aortic  regurgitation.  3. Severely dilated left atrium.  LA volume index = 66  cc/m2.  4. Left ventricular hypertrophy.  5. Normal left ventricular systolic function. EF 65%. No  segmental wall motion abnormalities.  6. Normal right atrium.  7. Normal right ventricular sizeand function.  8. Estimated right ventricular systolic pressure equals 40  mm Hg, assuming right atrial pressure equals 10 mm Hg,  consistent with mild pulmonary hypertension.  9. Mild tricuspid regurgitation.  ------------------------------------------------------------------------  Confirmed on  2015 - 13:22:33 by Boris Pham MD  ------------------------------------------------------------------------    < end of copied text >  < from: Nuclear Stress Test-Pharmacologic (21 @ 08:51) >    PATIENT: Chavez Marquez  : 1955   AGE: 66 (M)   MR#: [id number]  STUDY DATE: 2021  LOCATION: [place] [address]  REF. PHYSICIAN(S): Wilfred Christian MD  FELLOW:  ------------------------------------------------------------------------  TYPE OF TEST: Stress/Rest  Pharmacologic  INDICATION: Shortness of breath (R06.02), Encounter for  preprocedural cardiovascular examination (Z01.810)  ------------------------------------------------------------------------  HISTORY:  CARDIAC HISTORY: 66 year old man with past medical history  of hypertension and ESRD on HD arrived to hospital with  shortness of breath and LE swelling needs new AVF and sent  for preop clearance  OTHER HISTORY: Anemia, Hyperparathyroid, Thrombocytopenia  RISK FACTORS: Past smoker, Hypertension, Family History  MEDICATIONS: Heparin SQ, Procardia XL, Renvela, Hectorol,  Coreg, Ferrous sulfate, Synthroid,Protonix  ------------------------------------------------------------------------  BASELINE ELECTROCARDIOGRAM:  Rhythm: Normal Sinus Rhythm - 81 BPM  ST: ST wave abnormalities in I  , II , III, aVF, V3, V4,  V5, V6.  Other: LVH with repolarization abnormality  ------------------------------------------------------------------------  HEMODYNAMIC PARAMETERS:                                   HR      BP  Baseline  Pre-Injection             77  135/88  00:00     Inject Regadenoson        77  00:30     Post Injection            77  01:00     Post Injection            86  117/64  02:00     Post Injection      92  125/67  03:00     Post Injection            91  123/66  04:00     Post Injection            88  120/62  05:00     Recovery                  88  06:00     Recovery                  87  07:00     Recovery                  86  116/64  08:24 Recovery                  87  ------------------------------------------------------------------------  Agent: Regadenoson 0.4 mg/5 ml NS. injected over 10 sec.  HR: Baseline HR: 77 bpm   Peak HR: 92 bpm (60% of MPHR)  MPHR: 154 bpm   85% of MPHR: 131 bpm  BP: Baseline BP: 135/88 mmHg   Peak BP: 135/88 mmHg   Peak  RPP: 83190 (Rate Pressure Product)  Last Caffeine intake: 12 hrs  Terminated: Completion of protocol  ------------------------------------------------------------------------  SYMPTOMS/FINDINGS:  Symptoms: Shortness of breath  Chest pain: No chest pain with administration of  Regadenoson  Treatment: None  ------------------------------------------------------------------------  ECG ABNORMALITIES DURING/AFTER STRESS:   Abnormalities: ECG changes could not be interpreted due  to left ventricular hypertrophy.  ------------------------------------------------------------------------  NEW ARRHYTHMIAS DEVELOPED DURING/AFTER STRESS:  Rare APDs occurred during rest, stress and recovery. Rare  VPDs occurred during stress and recovery.  ------------------------------------------------------------------------  STRESS TEST IMPRESSIONS:  Chest Pain: No chest pain with administration of  Regadenoson.  Symptom: Shortness of breath.  HR Response: Appropriate.  BP Response: Appropriate.  Heart Rhythm: Normal Sinus Rhythm - 81 BPM.  Baseline ECG: ST wave abnormalities in I  , II , III, aVF,  V3, V4, V5, V6.  ECG Abnormalities: ECG changes could not be interpreted  due to left ventricular hypertrophy.  Arrhythmia: Rare APDs occurred during rest, stress and  recovery. Rare VPDs occurred during stress and recovery.  ------------------------------------------------------------------------  PROCEDURE:  7.6 mCi of Tc99m Sestamibi were injected during stress  protocol done 2021. Approximately 45 minutes later,  tomographic images were obtained in a 180 degree arc from  right anterior oblique to left anterior oblique with 64  stops. At a separate time on 2021, 7.86 mCi of Tc99m  Sestamibi were injected at rest. Approximately 45  minute(s) later, tomographic images were obtained in a 180  degree arc from right anterior oblique to left anterior  oblique with 64 stops. The tomographic slices were  reconstructed in 3 orthogonal planes (short axis,  horizontal long axis and vertical long axis).  Interpretation was performed both by visual and  quantitative analysis.  Rest and stress images were acquired using CZT-based  system with pinhole collimation ([employer], [employer]), and reconstructed using MLEM algorithm.  Images were re-acquired with the patient in a prone  position.  ------------------------------------------------------------------------  NUCLEAR FINDINGS:  Review of raw data shows: The study is of good technical  quality.  The left ventricle was hypertrophied. There is a small,  mild defect in inferior wall, that is mostly reversible,  suggestive of mild schemia and is of questionable clinical  significance.  The images with attenuation correction show no significant  changes.  ------------------------------------------------------------------------  GATED ANALYSIS:  Post-stress gated wall motion analysis was performed (LVEF  >= 45 %;LVEDV = 171 ml.), revealing mild hypokinesis.  Precise calculation of EF could not be done due to  difficulty contouring LV endocardium in systole  ------------------------------------------------------------------------  IMPRESSIONS:Mildly Abnormal Study  * Myocardial Perfusion SPECT results are mildly abnormal.  * Review of raw data shows: The study is of good technical  quality.  * There is a small, mild defect in inferior wall, that is  mostly reversible, suggestive of mild schemia and is of  questionable clinical significance.  * The images with attenuation correction show no  significant changes.  * Findings suggested cardiomyopathy. The left ventricle  was hypertrophied. Post-stress gated wall motion analysis  was performed (LVEF >= 45 %;LVEDV = 171 ml.), revealing  mild hypokinesis. Precise calculation of EF could not be  done due to difficulty contouring LV endocardium in  systole  ------------------------------------------------------------------------  Confirmed on  2021 - 14:29:34 by Alf Rodas M.D.  ------------------------------------------------------------------------    < end of copied text >    < from: Transthoracic Echocardiogram (21 @ 17:58) >    Patient name: Chavez Marquez  YOB: 1955   Age: 66 (M)   MR#: [id number]  Study Date: 2021  Location: [id number]: [facility]  Study quality: Technically good  Referring Physician: Wilfred Christian MD  BloodPressure: 148/69 mmHg  Height: 175 cm  Weight: 68 kg  BSA: 1.8 m2  ------------------------------------------------------------------------  PROCEDURE: Transthoracic echocardiogram with 2-D, M-Mode  and complete spectral and color flow Doppler.  INDICATION: Abnormal electrocardiogram (ECG) (EKG) (R94.31)  ------------------------------------------------------------------------  DIMENSIONS:  Dimensions:     Normal Values:  LA:     5.0 cm    2.0 - 4.0 cm  Ao:     2.5 cm    2.0 - 3.8 cm  SEPTUM: 1.1cm    0.6 - 1.2 cm  PWT:    1.1 cm    0.6 - 1.1 cm  LVIDd:  5.5 cm    3.0 - 5.6 cm  LVIDs:    ---     1.8 - 4.0 cm  Derived Variables:  LVMI: 132 g/m2  RWT: 0.40  Ejection Fraction (Modified Krishna Rule): 45 %  ------------------------------------------------------------------------  OBSERVATIONS:  Mitral Valve: Mitral annular calcification. Mild mitral  regurgitation.  Aortic Root: Normal aortic root.  Aortic Valve: Thickened aortic valve. Mild aortic  regurgitation.  Left Atrium: Severely dilated left atrium.  LA volume index  = 80 cc/m2.  Left Ventricle: Mild to moderate global left ventricular  systolic dysfunction. Severe left ventricular enlargement.  (DT:173 ms).  Right Heart: Mild right atrial enlargement. Right  ventricular enlargement with normal right ventricular  systolic function. Normal tricuspid valve. Minimal  tricuspid regurgitation. Normal pulmonic valve.  Pericardium/PleuraModerate pericardial effusion lateral and  posterior to the left ventricle, small effusion superior to  the right atrium. No cardiac tamponade..  Hemodynamic: Estimated right ventricular systolic pressure  equals 42 mm Hg, assuming right atrial pressure equals 10  mm Hg, consistent with mild pulmonary hypertension.  ------------------------------------------------------------------------  CONCLUSIONS:  1. Thickened aortic valve. Mild aortic regurgitation.  2. Severely dilated left atrium.  LA volume index = 80  cc/m2.  3. Severe left ventricular enlargement.  4. Mild to moderate global left ventricular systolic  dysfunction.  5. Right ventricular enlargement with normal right  ventricular systolic function.  6. Moderate pericardial effusion lateral and posterior to  the left ventricle, small effusion superior to the right  atrium. No cardiac tamponade..  ------------------------------------------------------------------------  Confirmed on  2021 - 19:13:36 by YEE Henderson      ------------------------------------------------------------------------    < end of copied text >

## 2021-06-02 NOTE — PROGRESS NOTE ADULT - ASSESSMENT
Patient is a 66 M with ESRD//HD (M/W/F, previously via LUE AVF, now via chest wall catheter), anemia, hyperparathyroidism, thrombocytopenia, hypothyroidism, and HTN who presents with shortness of breath and LE swelling. Patient went to [place] last year and unable to return sooner due to the pandemic. There he was getting HD via LUE fistula, which blew, then had catheter placed via which he has been getting HD since.     Patient evaluated by Vascular Surgery and the plan is for upper extremity AVF creation on the contralateral arm this Thursday.    From the cardiac perspective, the patient reports having no current exertional chest pain.  His dyspnea has improved with HD.  He denies having any other associated cardiac symptoms.  Denies a known previous history of CAD/CVA/VTE.  He reports that his mother  of an MI.      Stress test performed here at [facility] on 21 noted:    There is a small, mild defect in inferior wall, that is  mostly reversible, suggestive of mild ischemia and is of  questionable clinical significance.    Echocardiogram:  1. Thickened aortic valve. Mild aortic regurgitation.  2. Severely dilated left atrium.  LA volume index = 80  cc/m2.  3. Severe left ventricular enlargement.  4. Mild to moderate global left ventricular systolic  dysfunction.  5. Right ventricular enlargement with normal right  ventricular systolic function.  6. Moderate pericardial effusion lateral and posterior to  the left ventricle, small effusion superior to the right  atrium. No cardiac tamponade.    Reviewed images--LV systolic dysfunction is new when compared to prior imaging within our system from 2015.  Pericardial effusion likely from setting of not receiving sufficient HD in setting of ESRD.    Continue low dose daily aspirin 81 mg, metoprolol.  Start atorvastatin 40 qhs.  Will work with Medicine and Renal teams to arrange for [facility] to assess coronary anatomy.    Will follow.

## 2021-06-03 LAB
ALBUMIN SERPL ELPH-MCNC: 3.6 G/DL — SIGNIFICANT CHANGE UP (ref 3.3–5)
ALP SERPL-CCNC: 112 U/L — SIGNIFICANT CHANGE UP (ref 40–120)
ALT FLD-CCNC: 8 U/L — SIGNIFICANT CHANGE UP (ref 4–41)
ANION GAP SERPL CALC-SCNC: 19 MMOL/L — HIGH (ref 7–14)
APTT BLD: 29.1 SEC — SIGNIFICANT CHANGE UP (ref 27–36.3)
AST SERPL-CCNC: 7 U/L — SIGNIFICANT CHANGE UP (ref 4–40)
BILIRUB SERPL-MCNC: 0.3 MG/DL — SIGNIFICANT CHANGE UP (ref 0.2–1.2)
BLD GP AB SCN SERPL QL: NEGATIVE — SIGNIFICANT CHANGE UP
BUN SERPL-MCNC: 51 MG/DL — HIGH (ref 7–23)
CALCIUM SERPL-MCNC: 9.3 MG/DL — SIGNIFICANT CHANGE UP (ref 8.4–10.5)
CHLORIDE SERPL-SCNC: 95 MMOL/L — LOW (ref 98–107)
CO2 SERPL-SCNC: 21 MMOL/L — LOW (ref 22–31)
CREAT SERPL-MCNC: 6.84 MG/DL — HIGH (ref 0.5–1.3)
GLUCOSE SERPL-MCNC: 96 MG/DL — SIGNIFICANT CHANGE UP (ref 70–99)
HCT VFR BLD CALC: 28.5 % — LOW (ref 39–50)
HGB BLD-MCNC: 8.8 G/DL — LOW (ref 13–17)
INR BLD: 1.06 RATIO — SIGNIFICANT CHANGE UP (ref 0.88–1.16)
MAGNESIUM SERPL-MCNC: 2 MG/DL — SIGNIFICANT CHANGE UP (ref 1.6–2.6)
MCHC RBC-ENTMCNC: 27.4 PG — SIGNIFICANT CHANGE UP (ref 27–34)
MCHC RBC-ENTMCNC: 30.9 GM/DL — LOW (ref 32–36)
MCV RBC AUTO: 88.8 FL — SIGNIFICANT CHANGE UP (ref 80–100)
NRBC # BLD: 0 /100 WBCS — SIGNIFICANT CHANGE UP
NRBC # FLD: 0 K/UL — SIGNIFICANT CHANGE UP
PHOSPHATE SERPL-MCNC: 6.5 MG/DL — HIGH (ref 2.5–4.5)
PLATELET # BLD AUTO: 193 K/UL — SIGNIFICANT CHANGE UP (ref 150–400)
POTASSIUM SERPL-MCNC: 4.5 MMOL/L — SIGNIFICANT CHANGE UP (ref 3.5–5.3)
POTASSIUM SERPL-SCNC: 4.5 MMOL/L — SIGNIFICANT CHANGE UP (ref 3.5–5.3)
PROT SERPL-MCNC: 7.3 G/DL — SIGNIFICANT CHANGE UP (ref 6–8.3)
PROTHROM AB SERPL-ACNC: 12 SEC — SIGNIFICANT CHANGE UP (ref 10.6–13.6)
RBC # BLD: 3.21 M/UL — LOW (ref 4.2–5.8)
RBC # FLD: 18.3 % — HIGH (ref 10.3–14.5)
RH IG SCN BLD-IMP: POSITIVE — SIGNIFICANT CHANGE UP
SARS-COV-2 RNA SPEC QL NAA+PROBE: SIGNIFICANT CHANGE UP
SODIUM SERPL-SCNC: 135 MMOL/L — SIGNIFICANT CHANGE UP (ref 135–145)
WBC # BLD: 6.11 K/UL — SIGNIFICANT CHANGE UP (ref 3.8–10.5)
WBC # FLD AUTO: 6.11 K/UL — SIGNIFICANT CHANGE UP (ref 3.8–10.5)

## 2021-06-03 PROCEDURE — 36558 INSERT TUNNELED CV CATH: CPT | Mod: RT,59

## 2021-06-03 PROCEDURE — 99233 SBSQ HOSP IP/OBS HIGH 50: CPT

## 2021-06-03 PROCEDURE — 36821 AV FUSION DIRECT ANY SITE: CPT | Mod: RT

## 2021-06-03 PROCEDURE — 77001 FLUOROGUIDE FOR VEIN DEVICE: CPT | Mod: 26

## 2021-06-03 RX ORDER — SODIUM CHLORIDE 9 MG/ML
500 INJECTION INTRAMUSCULAR; INTRAVENOUS; SUBCUTANEOUS ONCE
Refills: 0 | Status: DISCONTINUED | OUTPATIENT
Start: 2021-06-03 | End: 2021-06-03

## 2021-06-03 RX ORDER — HYDRALAZINE HCL 50 MG
5 TABLET ORAL ONCE
Refills: 0 | Status: COMPLETED | OUTPATIENT
Start: 2021-06-03 | End: 2021-06-03

## 2021-06-03 RX ORDER — FENTANYL CITRATE 50 UG/ML
50 INJECTION INTRAVENOUS
Refills: 0 | Status: DISCONTINUED | OUTPATIENT
Start: 2021-06-03 | End: 2021-06-04

## 2021-06-03 RX ORDER — HYDROMORPHONE HYDROCHLORIDE 2 MG/ML
0.5 INJECTION INTRAMUSCULAR; INTRAVENOUS; SUBCUTANEOUS
Refills: 0 | Status: DISCONTINUED | OUTPATIENT
Start: 2021-06-03 | End: 2021-06-04

## 2021-06-03 RX ORDER — OXYCODONE HYDROCHLORIDE 5 MG/1
5 TABLET ORAL ONCE
Refills: 0 | Status: DISCONTINUED | OUTPATIENT
Start: 2021-06-03 | End: 2021-06-04

## 2021-06-03 RX ADMIN — SEVELAMER CARBONATE 1600 MILLIGRAM(S): 2400 POWDER, FOR SUSPENSION ORAL at 08:13

## 2021-06-03 RX ADMIN — SEVELAMER CARBONATE 1600 MILLIGRAM(S): 2400 POWDER, FOR SUSPENSION ORAL at 22:32

## 2021-06-03 RX ADMIN — CHLORHEXIDINE GLUCONATE 1 APPLICATION(S): 213 SOLUTION TOPICAL at 09:36

## 2021-06-03 RX ADMIN — Medication 81 MILLIGRAM(S): at 11:05

## 2021-06-03 RX ADMIN — SEVELAMER CARBONATE 1600 MILLIGRAM(S): 2400 POWDER, FOR SUSPENSION ORAL at 11:07

## 2021-06-03 RX ADMIN — CARVEDILOL PHOSPHATE 12.5 MILLIGRAM(S): 80 CAPSULE, EXTENDED RELEASE ORAL at 19:39

## 2021-06-03 RX ADMIN — Medication 5 MILLIGRAM(S): at 19:26

## 2021-06-03 RX ADMIN — Medication 1 MILLIGRAM(S): at 11:05

## 2021-06-03 RX ADMIN — Medication 325 MILLIGRAM(S): at 11:05

## 2021-06-03 RX ADMIN — CARVEDILOL PHOSPHATE 12.5 MILLIGRAM(S): 80 CAPSULE, EXTENDED RELEASE ORAL at 05:43

## 2021-06-03 RX ADMIN — PANTOPRAZOLE SODIUM 40 MILLIGRAM(S): 20 TABLET, DELAYED RELEASE ORAL at 08:13

## 2021-06-03 RX ADMIN — Medication 100 MICROGRAM(S): at 05:43

## 2021-06-03 RX ADMIN — ATORVASTATIN CALCIUM 40 MILLIGRAM(S): 80 TABLET, FILM COATED ORAL at 22:32

## 2021-06-03 RX ADMIN — Medication 30 MILLIGRAM(S): at 22:32

## 2021-06-03 NOTE — PROGRESS NOTE ADULT - SUBJECTIVE AND OBJECTIVE BOX
Cardiology/Vascular Medicine Inpatient Progress Note    No new complaints.    LHC performed yesterday demonstrated no significant obstructive CAD.    From the cardiac perspective, the patient may proceed with vascular surgery (AVF creation) without the need for further cardiac testing or risk stratification.    Vital Signs Last 24 Hrs  T(C): 36.7 (03 Jun 2021 05:38), Max: 37.1 (02 Jun 2021 20:01)  T(F): 98 (03 Jun 2021 05:38), Max: 98.8 (02 Jun 2021 20:01)  HR: 92 (03 Jun 2021 05:38) (80 - 98)  BP: 141/92 (03 Jun 2021 05:38) (129/73 - 148/88)  BP(mean): --  RR: 20 (03 Jun 2021 05:38) (17 - 20)  SpO2: 98% (03 Jun 2021 05:38) (97% - 100%)    Appearance: NAD  HEENT:   No JVD  Cardiovascular: Normal S1 S2, No JVD, No murmurs, No edema  Respiratory: Decreased breath sounds bilaterally  Psychiatry: Awake, alert  Gastrointestinal:  Soft, Non-tender, + BS	  Neurologic: Non-focal  Extremities: No edema    MEDICATIONS  (STANDING):  aspirin enteric coated 81 milliGRAM(s) Oral daily  atorvastatin 40 milliGRAM(s) Oral at bedtime  carvedilol 12.5 milliGRAM(s) Oral every 12 hours  chlorhexidine 4% Liquid 1 Application(s) Topical <User Schedule>  doxercalciferol Injectable 4 MICROGram(s) IV Push <User Schedule>  epoetin tammi-epbx (RETACRIT) Injectable 54602 Unit(s) IV Push <User Schedule>  ferrous    sulfate 325 milliGRAM(s) Oral daily  folic acid 1 milliGRAM(s) Oral daily  levothyroxine 100 MICROGram(s) Oral daily  NIFEdipine XL 30 milliGRAM(s) Oral at bedtime  pantoprazole    Tablet 40 milliGRAM(s) Oral before breakfast  sevelamer carbonate 1600 milliGRAM(s) Oral three times a day with meals      LABS:	 	                          8.7    7.25  )-----------( 189      ( 02 Jun 2021 06:33 )             28.2     06-02    134<L>  |  94<L>  |  70<H>  ----------------------------<  97  4.9   |  19<L>  |  9.19<H>    Ca    10.3      02 Jun 2021 06:33  Phos  8.1     06-02  Mg     2.1     06-02    TPro  7.9  /  Alb  3.9  /  TBili  0.4  /  DBili  x   /  AST  11  /  ALT  10  /  AlkPhos  120  06-02    PT/INR - ( 01 Jun 2021 09:02 )   PT: 12.7 sec;   INR: 1.11 ratio    PTT - ( 01 Jun 2021 09:02 )  PTT:31.3 sec    ho< from: Transthoracic Echocardiogram (06.01.21 @ 17:58) >    Patient name: Janie Valverde  YOB: 1955   Age: 66 (M)   MR#: [id number]  Study Date: 6/1/2021  Location: [id number]: [facility]  Study quality: Technically good  Referring Physician: Wilfred Christian MD  BloodPressure: 148/69 mmHg  Height: 175 cm  Weight: 68 kg  BSA: 1.8 m2  ------------------------------------------------------------------------  PROCEDURE: Transthoracic echocardiogram with 2-D, M-Mode  and complete spectral and color flow Doppler.  INDICATION: Abnormal electrocardiogram (ECG) (EKG) (R94.31)  ------------------------------------------------------------------------  DIMENSIONS:  Dimensions:     Normal Values:  LA:     5.0 cm    2.0 - 4.0 cm  Ao:     2.5 cm    2.0 - 3.8 cm  SEPTUM: 1.1cm    0.6 - 1.2 cm  PWT:    1.1 cm    0.6 - 1.1 cm  LVIDd:  5.5 cm    3.0 - 5.6 cm  LVIDs:    ---     1.8 - 4.0 cm  Derived Variables:  LVMI: 132 g/m2  RWT: 0.40  Ejection Fraction (Modified Krishna Rule): 45 %  ------------------------------------------------------------------------  OBSERVATIONS:  Mitral Valve: Mitral annular calcification. Mild mitral  regurgitation.  Aortic Root: Normal aortic root.  Aortic Valve: Thickened aortic valve. Mild aortic  regurgitation.  Left Atrium: Severely dilated left atrium.  LA volume index  = 80 cc/m2.  Left Ventricle: Mild to moderate global left ventricular  systolic dysfunction. Severe left ventricular enlargement.  (DT:173 ms).  Right Heart: Mild right atrial enlargement. Right  ventricular enlargement with normal right ventricular  systolic function. Normal tricuspid valve. Minimal  tricuspid regurgitation. Normal pulmonic valve.  Pericardium/PleuraModerate pericardial effusion lateral and  posterior to the left ventricle, small effusion superior to  the right atrium. No cardiac tamponade..  Hemodynamic: Estimated right ventricular systolic pressure  equals 42 mm Hg, assuming right atrial pressure equals 10  mm Hg, consistent with mild pulmonary hypertension.  ------------------------------------------------------------------------  CONCLUSIONS:  1. Thickened aortic valve. Mild aortic regurgitation.  2. Severely dilated left atrium.  LA volume index = 80  cc/m2.  3. Severe left ventricular enlargement.  4. Mild to moderate global left ventricular systolic  dysfunction.  5. Right ventricular enlargement with normal right  ventricular systolic function.  6. Moderate pericardial effusion lateral and posterior to  the left ventricle, small effusion superior to the right  atrium. No cardiac tamponade..  ------------------------------------------------------------------------  Confirmed on  6/1/2021 - 19:13:36 by Tone Segundo M.D. [facility]I  ------------------------------------------------------------------------    < end of copied text >

## 2021-06-03 NOTE — PROGRESS NOTE ADULT - ASSESSMENT
66M hx of ESRD on HD (M/W/F, previously via LUE AVF, now via chest wall catheter), anemia, hyperparathyroidism, thrombocytopenia, hypothyroidism, HTN who presents with shortness of breath and LE swelling likely due to volume overload from missed HD session, admitted for HD. Renal following for ESRD Mx.     ESRD on HD   Electrolytes, volume acceptable  Pt dialyzed yesterday without acute event.   Continue MWF schedule for now, accepted to outpt unit on MWF schedule.   vascular consult noted- for new RUE avf placement and IJ permacath, tentatively planned today  renal diet, fluid restriction 1L/day  dose all meds for ESRD  HTN, better controlled now. c/w BB. added Nifedipine 30mg BT. inc uf w/hd  Anemia in CKD-Hb below goal. on epo 10k tiw w/hd.   Hyperphosphatemia- high phos level, continue Renvela 2 tabs TID with meals. high pth noted, c/w hectorol 4 mcg Three times a week with HD.     Lennox Pagan MD  [facility]  Office [phone]  Ans Serv [phone] cell - [phone]

## 2021-06-03 NOTE — PRE-OP CHECKLIST - ALLERGIES REVIEWED
Due to COVID-19 ACTION PLAN, the patient's office visit was converted to a phone visit.    This patient verbally consents to a telephone visit.    Patient states they are Damir Carnes and that they are speaking to me from their home.     It has been 12 months since the patient's last in-office visit.    Patient notes the following changes in medical history since last visit: He states that he has been well since his last visit with oncology.  He followed up with radiation oncology in June but has not followed up with them after that.  He has had no specific complaints.  He has had no recent labs or scans.    Review of Systems   All other systems reviewed and are negative.       Patient offers the following concerns: None    Patient is doing home BP checks: Not applicable    The following testing was reviewed during this phone visit: Previous labs and scans    Medication and allergy reconciliation completed over the phone.     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  I reviewed his previous history, previous labs and scans and the most recent radiation oncology note from June 2019 which suggested follow-up but which has not taken place.  This was stressed to the patient in no uncertain terms and he promised to set up appointment with oncology and radiation oncology as soon as possible.  I also reminded him to follow-up with his PCP regularly.    The following was ordered during today's call:   The patient will call for a follow-up appointment    Time spent talking with patient during today's call: 25    Testing should be completed prior to next visit. New prescriptions / refills sent to the pharmacy.    Patient was advised to call if they experience any new or worsening symptoms.    No follow-ups on  file.     done

## 2021-06-03 NOTE — PACU DISCHARGE NOTE - MENTAL STATUS: PATIENT PARTICIPATION
Prep Survey      Responses   [facility] facility patient discharged from?  [facility]   Is LACE score < 7 ?  No   Emergency Room discharge w/ pulse ox?  No   Eligibility  Not Eligible   What are the reasons patient is not eligible?  Other   Does the patient have one of the following disease processes/diagnoses(primary or secondary)?  Sepsis   Prep survey completed?  Yes          Larissa Srinivasan RN         Awake

## 2021-06-03 NOTE — PROGRESS NOTE ADULT - ASSESSMENT
Patient is a 66 M with ESRD//HD (M/W/F, previously via LUE AVF, now via chest wall catheter), anemia, hyperparathyroidism, thrombocytopenia, hypothyroidism, and HTN who presents with shortness of breath and LE swelling. Patient went to [place] last year and unable to return sooner due to the pandemic. There he was getting HD via LUE fistula, which blew, then had catheter placed via which he has been getting HD since.     Patient evaluated by Vascular Surgery and the plan is for upper extremity AVF creation on the contralateral arm this Thursday.    From the cardiac perspective, the patient reports having no current exertional chest pain.  His dyspnea has improved with HD.  He denies having any other associated cardiac symptoms.  Denies a known previous history of CAD/CVA/VTE.  He reports that his mother  of an MI.      Stress test performed here at [facility] on 21 noted:    There is a small, mild defect in inferior wall, that is  mostly reversible, suggestive of mild ischemia and is of  questionable clinical significance.    Echocardiogram:  1. Thickened aortic valve. Mild aortic regurgitation.  2. Severely dilated left atrium.  LA volume index = 80  cc/m2.  3. Severe left ventricular enlargement.  4. Mild to moderate global left ventricular systolic  dysfunction.  5. Right ventricular enlargement with normal right  ventricular systolic function.  6. Moderate pericardial effusion lateral and posterior to  the left ventricle, small effusion superior to the right  atrium. No cardiac tamponade.    Reviewed images--LV systolic dysfunction is new when compared to prior imaging within our system from 2015.  Pericardial effusion likely from setting of not receiving sufficient HD in setting of ESRD.  LHC performed yesterday demonstrated mild non-obstructive CAD.  Continue low dose daily aspirin 81 mg, metoprolol, atorvastatin.  From the cardiac perspective, the patient may proceed with vascular surgery (AVF creation) without the need for further cardiac testing or risk stratification.

## 2021-06-03 NOTE — CHART NOTE - NSCHARTNOTEFT_GEN_A_CORE
SURGERY POST-OP NOTE:    S: Patient underwent and tolerated procedure without issue and sent to PACU. Patient denies chest pain, shortness of breath, nausea, vomiting, lightheadedness, or dizziness. Pain was well controlled.      O:  T(C): 36.5 (06-03-21 @ 21:38), Max: 36.5 (06-03-21 @ 21:38)  HR: 82 (06-03-21 @ 21:38) (82 - 82)  BP: 121/98 (06-03-21 @ 21:38) (121/98 - 121/98)  RR: 12 (06-03-21 @ 21:38) (12 - 12)  SpO2: 98% (06-03-21 @ 21:38) (98% - 98%)  Wt(kg): --                        8.8    6.11  )-----------( 193      ( 03 Jun 2021 07:50 )             28.5        06-03    135  |  95<L>  |  51<H>  ----------------------------<  96  4.5   |  21<L>  |  6.84<H>    Ca    9.3      03 Jun 2021 07:50  Phos  6.5     06-03  Mg     2.0     06-03      Gen: NAD, resting in bed, alert and responding appropriately  Resp: Non-labored respirations on RA  Abd: Soft, nontender, nondistended  Ext: RUE RC AV fistula with dressing c/d/i, palpable thrill over fistula. Palpable radial pulse.  R Chest permacath in place with sterile dressing with some s/s strikethrough    Assessment/Plan:  66y Male now POD#0 s/p Creation of R RC fistula and placement of R permacath    - Pain control  - Regular diet  - DVT ppx: SQH  - Out of bed and encourage early ambulation  - Incentive spirometry  - Care per Medicine primary    Dispo: Floor

## 2021-06-03 NOTE — BRIEF OPERATIVE NOTE - NSICDXBRIEFPROCEDURE_GEN_ALL_CORE_FT
PROCEDURES:  Creation of right radiocephalic fistula 03-Jun-2021 19:22:42  David Aguilar  Replacement of Permacath catheter with imaging guidance 03-Jun-2021 19:23:14  David Aguilar

## 2021-06-03 NOTE — PROGRESS NOTE ADULT - PROBLEM SELECTOR PLAN 1
Nephro f/up noted.  On HD  S/p Right radiocephalic fistula creation and right IJ permacath under fluoro

## 2021-06-03 NOTE — PROGRESS NOTE ADULT - SUBJECTIVE AND OBJECTIVE BOX
VASCULAR SURGERY PROGRESS NOTE    INTERVAL EVENTS: Patient had cardiac cath completed which showed no occlusive disease.       OBJECTIVE:    Vital Signs Last 24 Hrs  T(C): 36.7 (03 Jun 2021 05:38), Max: 37.1 (02 Jun 2021 20:01)  T(F): 98 (03 Jun 2021 05:38), Max: 98.8 (02 Jun 2021 20:01)  HR: 92 (03 Jun 2021 05:38) (80 - 98)  BP: 141/92 (03 Jun 2021 05:38) (129/73 - 161/93)  BP(mean): --  RR: 20 (03 Jun 2021 05:38) (17 - 20)  SpO2: 98% (03 Jun 2021 05:38) (97% - 100%)    General Appearance: Resting comfortably, no acute distress  Chest: non-labored breathing, no respiratory distress  CV: Pulse regular presently  Abdomen: Soft, non-tender, non-distended  Extremities: warm and well perfused, palpable radial pulse on R, clotted fistula on L    I&O's Summary    02 Jun 2021 07:01  -  03 Jun 2021 06:47  --------------------------------------------------------  IN: 800 mL / OUT: 3300 mL / NET: -2500 mL      I&O's Detail    02 Jun 2021 07:01  -  03 Jun 2021 06:47  --------------------------------------------------------  IN:    Other (mL): 800 mL  Total IN: 800 mL    OUT:    Other (mL): 3300 mL  Total OUT: 3300 mL    Total NET: -2500 mL            LABS:                        8.7    7.25  )-----------( 189      ( 02 Jun 2021 06:33 )             28.2     06-02    134<L>  |  94<L>  |  70<H>  ----------------------------<  97  4.9   |  19<L>  |  9.19<H>    Ca    10.3      02 Jun 2021 06:33  Phos  8.1     06-02  Mg     2.1     06-02    TPro  7.9  /  Alb  3.9  /  TBili  0.4  /  DBili  x   /  AST  11  /  ALT  10  /  AlkPhos  120  06-02    PT/INR - ( 01 Jun 2021 09:02 )   PT: 12.7 sec;   INR: 1.11 ratio         PTT - ( 01 Jun 2021 09:02 )  PTT:31.3 sec      RADIOLOGY & ADDITIONAL STUDIES:

## 2021-06-03 NOTE — BRIEF OPERATIVE NOTE - OPERATION/FINDINGS
Right radiocephalic fistula creation and right IJ permacath under fluoro.  Right IJ shiley removed at beginning of case.

## 2021-06-03 NOTE — PROGRESS NOTE ADULT - SUBJECTIVE AND OBJECTIVE BOX
Nephrology Followup Note - [phone] - Dr Pagan / Dr Vargas / Dr Faust / Dr Caballero / Dr Bojorquez / Dr Perkins / Dr Garrison / Dr Garcia  Pt seen and examined at bedside  No acute events overnight. No complaints.     Allergies:  No Known Allergies    Hospital Medications:   MEDICATIONS  (STANDING):  aspirin enteric coated 81 milliGRAM(s) Oral daily  atorvastatin 40 milliGRAM(s) Oral at bedtime  carvedilol 12.5 milliGRAM(s) Oral every 12 hours  chlorhexidine 4% Liquid 1 Application(s) Topical <User Schedule>  doxercalciferol Injectable 4 MICROGram(s) IV Push <User Schedule>  epoetin tammi-epbx (RETACRIT) Injectable 00197 Unit(s) IV Push <User Schedule>  ferrous    sulfate 325 milliGRAM(s) Oral daily  folic acid 1 milliGRAM(s) Oral daily  levothyroxine 100 MICROGram(s) Oral daily  NIFEdipine XL 30 milliGRAM(s) Oral at bedtime  pantoprazole    Tablet 40 milliGRAM(s) Oral before breakfast  sevelamer carbonate 1600 milliGRAM(s) Oral three times a day with meals    VITALS:  T(F): 98.5 (06-03-21 @ 09:36), Max: 98.8 (06-02-21 @ 20:01)  HR: 89 (06-03-21 @ 09:36)  BP: 121/76 (06-03-21 @ 09:36)  RR: 18 (06-03-21 @ 09:36)  SpO2: 99% (06-03-21 @ 09:36)  Wt(kg): --    06-02 @ 07:01  -  06-03 @ 07:00  --------------------------------------------------------  IN: 800 mL / OUT: 3300 mL / NET: -2500 mL        PHYSICAL EXAM:  Constitutional: NAD  HEENT: anicteric sclera, oropharynx clear, MMM  Neck: No JVD  Respiratory: CTAB, no wheezes, rales or rhonchi  Cardiovascular: S1, S2, RRR  Gastrointestinal: BS+, soft, NT/ND  Extremities: No cyanosis or clubbing. No peripheral edema  Neurological: A/O x 3, no focal deficits  Psychiatric: Normal mood, normal affect  : No CVA tenderness. No siegel.   Skin: No rashes  Vascular Access: [facility].     LABS:  06-03    135  |  95<L>  |  51<H>  ----------------------------<  96  4.5   |  21<L>  |  6.84<H>    Ca    9.3      03 Jun 2021 07:50  Phos  6.5     06-03  Mg     2.0     06-03    TPro  7.3  /  Alb  3.6  /  TBili  0.3  /  DBili      /  AST  7   /  ALT  8   /  AlkPhos  112  06-03    Creatinine Trend: 6.84 <--, 9.19 <--, 6.81 <--, 9.09 <--, 7.14 <--, 8.33 <--, 6.32 <--, 9.30 <--                        8.8    6.11  )-----------( 193      ( 03 Jun 2021 07:50 )             28.5     Urine Studies:      RADIOLOGY & ADDITIONAL STUDIES:

## 2021-06-03 NOTE — PROGRESS NOTE ADULT - SUBJECTIVE AND OBJECTIVE BOX
Patient is a 66y old  Male who presents with a chief complaint of urgent HD (03 Jun 2021 12:37)      SUBJECTIVE / OVERNIGHT EVENTS:    Events noted.  CONSTITUTIONAL:  S/p Right radiocephalic fistula creation and right IJ permacath under fluoro  RESPIRATORY: No cough, wheezing,  No shortness of breath  CARDIOVASCULAR: No chest pain, palpitations, dizziness, or leg swelling  GASTROINTESTINAL: No abdominal or epigastric pain. No nausea, vomiting.  NEUROLOGICAL: No headaches,     MEDICATIONS  (STANDING):  aspirin enteric coated 81 milliGRAM(s) Oral daily  atorvastatin 40 milliGRAM(s) Oral at bedtime  carvedilol 12.5 milliGRAM(s) Oral every 12 hours  chlorhexidine 4% Liquid 1 Application(s) Topical <User Schedule>  doxercalciferol Injectable 4 MICROGram(s) IV Push <User Schedule>  epoetin tammi-epbx (RETACRIT) Injectable 47977 Unit(s) IV Push <User Schedule>  ferrous    sulfate 325 milliGRAM(s) Oral daily  folic acid 1 milliGRAM(s) Oral daily  levothyroxine 100 MICROGram(s) Oral daily  NIFEdipine XL 30 milliGRAM(s) Oral at bedtime  pantoprazole    Tablet 40 milliGRAM(s) Oral before breakfast  sevelamer carbonate 1600 milliGRAM(s) Oral three times a day with meals    MEDICATIONS  (PRN):  fentaNYL    Injectable 50 MICROGram(s) IV Push every 15 minutes PRN Severe Pain (7 - 10)  HYDROmorphone  Injectable 0.5 milliGRAM(s) IV Push every 10 minutes PRN Moderate Pain (4 - 6)  oxyCODONE    IR 5 milliGRAM(s) Oral once PRN Moderate Pain (4 - 6)        CAPILLARY BLOOD GLUCOSE        I&O's Summary    02 Jun 2021 07:01  -  03 Jun 2021 07:00  --------------------------------------------------------  IN: 800 mL / OUT: 3300 mL / NET: -2500 mL        T(C): 36.5 (06-03-21 @ 21:38), Max: 37.1 (06-03-21 @ 01:47)  HR: 82 (06-03-21 @ 21:38) (72 - 97)  BP: 121/98 (06-03-21 @ 21:38) (101/63 - 191/81)  RR: 12 (06-03-21 @ 21:38) (11 - 20)  SpO2: 98% (06-03-21 @ 21:38) (95% - 100%)    PHYSICAL EXAM:  GENERAL: NAD  NECK: Supple, No JVD  CHEST/LUNG: Clear to auscultation bilaterally; No wheezing.  HEART: Regular rate and rhythm; No murmurs, rubs, or gallops  ABDOMEN: Soft, Nontender, Nondistended; Bowel sounds present  EXTREMITIES:   No edema  NEUROLOGY: AAO X 3      LABS:                        8.8    6.11  )-----------( 193      ( 03 Jun 2021 07:50 )             28.5     06-03    135  |  95<L>  |  51<H>  ----------------------------<  96  4.5   |  21<L>  |  6.84<H>    Ca    9.3      03 Jun 2021 07:50  Phos  6.5     06-03  Mg     2.0     06-03    TPro  7.3  /  Alb  3.6  /  TBili  0.3  /  DBili  x   /  AST  7   /  ALT  8   /  AlkPhos  112  06-03    PT/INR - ( 03 Jun 2021 07:50 )   PT: 12.0 sec;   INR: 1.06 ratio         PTT - ( 03 Jun 2021 07:50 )  PTT:29.1 sec        CAPILLARY BLOOD GLUCOSE            RADIOLOGY & ADDITIONAL TESTS:    Imaging Personally Reviewed:    Consultant(s) Notes Reviewed:      Care Discussed with Consultants/Other Providers:    Wilfred Christian MD, CMD, FACP    [address] [place] [address]  Office Tel: [phone]  Cell: [phone]

## 2021-06-03 NOTE — PROGRESS NOTE ADULT - ASSESSMENT
66M hx of ESRD on HD (M/W/F, previously via LUE AVF, now via chest wall catheter), anemia, hyperparathyroidism, thrombocytopenia, hypothyroidism, HTN presenting to the ED for urgent HD, with non-functioning AVF since 3/2021. Now for AVF planning on RUE    - Patient s/p nuclear stress test, cardiac cath- optimized per medicine and cardiology for OR  - Plan for AVF on Thursday  - Vein mapping completed   - Please protect RUE, remove all IVs and avoid for BP measurements or blood draws  - Left UE may be used for IVs and blood draws      Vascular Surgery (C Team)   [id number]

## 2021-06-04 ENCOUNTER — TRANSCRIPTION ENCOUNTER (OUTPATIENT)
Age: 66
End: 2021-06-04

## 2021-06-04 LAB
ALBUMIN SERPL ELPH-MCNC: 3.4 G/DL — SIGNIFICANT CHANGE UP (ref 3.3–5)
ALP SERPL-CCNC: 105 U/L — SIGNIFICANT CHANGE UP (ref 40–120)
ALT FLD-CCNC: 5 U/L — SIGNIFICANT CHANGE UP (ref 4–41)
ANION GAP SERPL CALC-SCNC: 21 MMOL/L — HIGH (ref 7–14)
AST SERPL-CCNC: 9 U/L — SIGNIFICANT CHANGE UP (ref 4–40)
BILIRUB SERPL-MCNC: 0.3 MG/DL — SIGNIFICANT CHANGE UP (ref 0.2–1.2)
BUN SERPL-MCNC: 68 MG/DL — HIGH (ref 7–23)
CALCIUM SERPL-MCNC: 9.4 MG/DL — SIGNIFICANT CHANGE UP (ref 8.4–10.5)
CHLORIDE SERPL-SCNC: 92 MMOL/L — LOW (ref 98–107)
CO2 SERPL-SCNC: 19 MMOL/L — LOW (ref 22–31)
CREAT SERPL-MCNC: 8.93 MG/DL — HIGH (ref 0.5–1.3)
GLUCOSE SERPL-MCNC: 108 MG/DL — HIGH (ref 70–99)
HCT VFR BLD CALC: 27 % — LOW (ref 39–50)
HGB BLD-MCNC: 8.3 G/DL — LOW (ref 13–17)
MAGNESIUM SERPL-MCNC: 2.1 MG/DL — SIGNIFICANT CHANGE UP (ref 1.6–2.6)
MCHC RBC-ENTMCNC: 26.9 PG — LOW (ref 27–34)
MCHC RBC-ENTMCNC: 30.7 GM/DL — LOW (ref 32–36)
MCV RBC AUTO: 87.7 FL — SIGNIFICANT CHANGE UP (ref 80–100)
NRBC # BLD: 0 /100 WBCS — SIGNIFICANT CHANGE UP
NRBC # FLD: 0 K/UL — SIGNIFICANT CHANGE UP
PHOSPHATE SERPL-MCNC: 9.2 MG/DL — HIGH (ref 2.5–4.5)
PLATELET # BLD AUTO: 184 K/UL — SIGNIFICANT CHANGE UP (ref 150–400)
POTASSIUM SERPL-MCNC: 5.5 MMOL/L — HIGH (ref 3.5–5.3)
POTASSIUM SERPL-SCNC: 5.5 MMOL/L — HIGH (ref 3.5–5.3)
PROT SERPL-MCNC: 7.3 G/DL — SIGNIFICANT CHANGE UP (ref 6–8.3)
RBC # BLD: 3.08 M/UL — LOW (ref 4.2–5.8)
RBC # FLD: 18 % — HIGH (ref 10.3–14.5)
SODIUM SERPL-SCNC: 132 MMOL/L — LOW (ref 135–145)
WBC # BLD: 6.64 K/UL — SIGNIFICANT CHANGE UP (ref 3.8–10.5)
WBC # FLD AUTO: 6.64 K/UL — SIGNIFICANT CHANGE UP (ref 3.8–10.5)

## 2021-06-04 RX ORDER — HEPARIN SODIUM 5000 [USP'U]/ML
5000 INJECTION INTRAVENOUS; SUBCUTANEOUS EVERY 12 HOURS
Refills: 0 | Status: DISCONTINUED | OUTPATIENT
Start: 2021-06-04 | End: 2021-06-07

## 2021-06-04 RX ORDER — ACETAMINOPHEN 500 MG
975 TABLET ORAL ONCE
Refills: 0 | Status: COMPLETED | OUTPATIENT
Start: 2021-06-04 | End: 2021-06-04

## 2021-06-04 RX ADMIN — CHLORHEXIDINE GLUCONATE 1 APPLICATION(S): 213 SOLUTION TOPICAL at 10:53

## 2021-06-04 RX ADMIN — Medication 975 MILLIGRAM(S): at 14:15

## 2021-06-04 RX ADMIN — SEVELAMER CARBONATE 1600 MILLIGRAM(S): 2400 POWDER, FOR SUSPENSION ORAL at 10:54

## 2021-06-04 RX ADMIN — Medication 325 MILLIGRAM(S): at 12:24

## 2021-06-04 RX ADMIN — DOXERCALCIFEROL 4 MICROGRAM(S): 2.5 CAPSULE ORAL at 09:01

## 2021-06-04 RX ADMIN — Medication 30 MILLIGRAM(S): at 22:11

## 2021-06-04 RX ADMIN — Medication 1 MILLIGRAM(S): at 12:24

## 2021-06-04 RX ADMIN — Medication 81 MILLIGRAM(S): at 12:24

## 2021-06-04 RX ADMIN — SEVELAMER CARBONATE 1600 MILLIGRAM(S): 2400 POWDER, FOR SUSPENSION ORAL at 17:35

## 2021-06-04 RX ADMIN — SEVELAMER CARBONATE 1600 MILLIGRAM(S): 2400 POWDER, FOR SUSPENSION ORAL at 12:24

## 2021-06-04 RX ADMIN — Medication 975 MILLIGRAM(S): at 15:22

## 2021-06-04 RX ADMIN — CARVEDILOL PHOSPHATE 12.5 MILLIGRAM(S): 80 CAPSULE, EXTENDED RELEASE ORAL at 17:35

## 2021-06-04 RX ADMIN — ATORVASTATIN CALCIUM 40 MILLIGRAM(S): 80 TABLET, FILM COATED ORAL at 22:11

## 2021-06-04 RX ADMIN — ERYTHROPOIETIN 10000 UNIT(S): 10000 INJECTION, SOLUTION INTRAVENOUS; SUBCUTANEOUS at 09:01

## 2021-06-04 RX ADMIN — HEPARIN SODIUM 5000 UNIT(S): 5000 INJECTION INTRAVENOUS; SUBCUTANEOUS at 17:35

## 2021-06-04 NOTE — DISCHARGE NOTE PROVIDER - NSFOLLOWUPCLINICS_GEN_ALL_ED_FT
[facility] [place]  Internal Medicine  [address] [place] [address]  Phone: [phone]  Fax: [phone]

## 2021-06-04 NOTE — DISCHARGE NOTE PROVIDER - CARE PROVIDER_API CALL
Lennox Pagan)  Internal Medicine; Nephrology  [address] [place] [address]  Phone: [phone]  Fax: [phone]  Follow Up Time:     Sharon Robertson)  Surgery; Vascular Surgery  [address] [facility], [address]  [place] [address]  Phone: [phone]  Fax: [phone]  Follow Up Time:    Lennox Pagan)  Internal Medicine; Nephrology  [address] [place] [address]  Phone: [phone]  Fax: [phone]  Follow Up Time:     Sharon Robertson)  Surgery; Vascular Surgery  [address] [facility], [address]  [place] [address]  Phone: [phone]  Fax: [phone]  Follow Up Time:     Kun Bowman; PhD)  Cardiology; Internal Medicine; Vascular Medicine  [address] [place] [address], [address]  [place] [address]  Phone: [phone]  Fax: [phone]  Follow Up Time:

## 2021-06-04 NOTE — DISCHARGE NOTE PROVIDER - NSDCMRMEDTOKEN_GEN_ALL_CORE_FT
Coreg 12.5 mg oral tablet: 1 tab(s) orally 2 times a day  ferrous sulfate 325 mg (65 mg elemental iron) oral tablet: 1 tab(s) orally once a day  folic acid 1 mg oral tablet: 1 tab(s) orally once a day  Lasix 40 mg oral tablet: 1 tab(s) orally once a day  levothyroxine 100 mcg (0.1 mg) oral tablet: 1 tab(s) orally once a day  NexIUM 40 mg oral delayed release capsule: 1 cap(s) orally once a day  Renvela 800 mg oral tablet: 1 tab(s) orally 3 times a day (with meals)   Coreg 12.5 mg oral tablet: 1 tab(s) orally 2 times a day  Eliquis 5 mg oral tablet: 1 tab(s) orally 2 times a day   ferrous sulfate 325 mg (65 mg elemental iron) oral tablet: 1 tab(s) orally once a day  folic acid 1 mg oral tablet: 1 tab(s) orally once a day  Lasix 40 mg oral tablet: 1 tab(s) orally once a day  levothyroxine 100 mcg (0.1 mg) oral tablet: 1 tab(s) orally once a day  NexIUM 40 mg oral delayed release capsule: 1 cap(s) orally once a day  Renvela 800 mg oral tablet: 1 tab(s) orally 3 times a day (with meals)   aspirin 81 mg oral delayed release tablet: 1 tab(s) orally once a day  atorvastatin 40 mg oral tablet: 1 tab(s) orally once a day (at bedtime)  Coreg 12.5 mg oral tablet: 1 tab(s) orally 2 times a day  Eliquis 5 mg oral tablet: 1 tab(s) orally 2 times a day   ferrous sulfate 325 mg (65 mg elemental iron) oral tablet: 1 tab(s) orally once a day  folic acid 1 mg oral tablet: 1 tab(s) orally once a day  levothyroxine 100 mcg (0.1 mg) oral tablet: 1 tab(s) orally once a day  NexIUM 40 mg oral delayed release capsule: 1 cap(s) orally once a day  NIFEdipine 30 mg oral tablet, extended release: 1 tab(s) orally once a day (at bedtime)  Renvela 800 mg oral tablet: 1 tab(s) orally 3 times a day (with meals)  vancomycin 500 mg intravenous injection: 500 milligram(s) intravenous Tuesday, Thursday, Saturday to be given during dialysis and completed saturday 6/20   aspirin 81 mg oral delayed release tablet: 1 tab(s) orally once a day  atorvastatin 40 mg oral tablet: 1 tab(s) orally once a day (at bedtime)  Coreg 12.5 mg oral tablet: 1 tab(s) orally 2 times a day  Eliquis 5 mg oral tablet: 1 tab(s) orally 2 times a day   ferrous sulfate 325 mg (65 mg elemental iron) oral tablet: 1 tab(s) orally once a day  folic acid 1 mg oral tablet: 1 tab(s) orally once a day  levothyroxine 100 mcg (0.1 mg) oral tablet: 1 tab(s) orally once a day  NexIUM 40 mg oral delayed release capsule: 1 cap(s) orally once a day  NIFEdipine 30 mg oral tablet, extended release: 1 tab(s) orally once a day (at bedtime)  Physical Therapy : 3 times a week   Renvela 800 mg oral tablet: 1 tab(s) orally 3 times a day (with meals)  vancomycin 500 mg intravenous injection: 500 milligram(s) intravenous Tuesday, Thursday, Saturday to be given during dialysis and completed saturday 6/20   aspirin 81 mg oral delayed release tablet: 1 tab(s) orally once a day  atorvastatin 40 mg oral tablet: 1 tab(s) orally once a day (at bedtime)  Coreg 12.5 mg oral tablet: 1 tab(s) orally 2 times a day  Eliquis 5 mg oral tablet: 1 tab(s) orally 2 times a day   ferrous sulfate 325 mg (65 mg elemental iron) oral tablet: 1 tab(s) orally once a day  folic acid 1 mg oral tablet: 1 tab(s) orally once a day  levothyroxine 100 mcg (0.1 mg) oral tablet: 1 tab(s) orally once a day  NexIUM 40 mg oral delayed release capsule: 1 cap(s) orally once a day  NIFEdipine 30 mg oral tablet, extended release: 1 tab(s) orally once a day (at bedtime)  Physical Therapy : 3 times a week   Renvela 800 mg oral tablet: 1 tab(s) orally 3 times a day (with meals)  vancomycin 500 mg intravenous injection: 500 milligram(s) intravenous Tuesday, Thursday, Saturday to be given during dialysis and completed saturday 6/19

## 2021-06-04 NOTE — DISCHARGE NOTE PROVIDER - PROVIDER TOKENS
PROVIDER:[TOKEN:[[id number]]],PROVIDER:[TOKEN:[[id number]]] PROVIDER:[TOKEN:[[id number]]],PROVIDER:[TOKEN:[[id number]]],PROVIDER:[TOKEN:[[id number]]]

## 2021-06-04 NOTE — PROGRESS NOTE ADULT - SUBJECTIVE AND OBJECTIVE BOX
VASCULAR SURGERY PROGRESS NOTE    INTERVAL EVENTS: Had R AVF creation and R permacath placement in OR yesterday. No complications. In dialysis now. Denies CP, SOB. No numbness or tingling in arm, minimal pain, normal ROM and strength      OBJECTIVE:    Vital Signs Last 24 Hrs  T(C): 36.9 (04 Jun 2021 06:07), Max: 36.9 (03 Jun 2021 09:36)  T(F): 98.5 (04 Jun 2021 06:07), Max: 98.5 (03 Jun 2021 09:36)  HR: 81 (04 Jun 2021 06:07) (72 - 89)  BP: 152/92 (04 Jun 2021 06:07) (101/63 - 191/81)  BP(mean): 72 (03 Jun 2021 21:00) (72 - 105)  RR: 16 (04 Jun 2021 06:07) (11 - 20)  SpO2: 100% (04 Jun 2021 06:07) (95% - 100%)    General Appearance: Resting comfortably, no acute distress  Chest: non-labored breathing, no respiratory distress  CV: Pulse regular presently  Abdomen: Soft, non-tender, non-distended  Extremities: warm, right fistula with palpable thrill, strong  strength, normal sensation    I&O's Summary    I&O's Detail        LABS:                        8.8    6.11  )-----------( 193      ( 03 Jun 2021 07:50 )             28.5     06-03    135  |  95<L>  |  51<H>  ----------------------------<  96  4.5   |  21<L>  |  6.84<H>    Ca    9.3      03 Jun 2021 07:50  Phos  6.5     06-03  Mg     2.0     06-03    TPro  7.3  /  Alb  3.6  /  TBili  0.3  /  DBili  x   /  AST  7   /  ALT  8   /  AlkPhos  112  06-03    PT/INR - ( 03 Jun 2021 07:50 )   PT: 12.0 sec;   INR: 1.06 ratio         PTT - ( 03 Jun 2021 07:50 )  PTT:29.1 sec      RADIOLOGY & ADDITIONAL STUDIES:

## 2021-06-04 NOTE — DISCHARGE NOTE PROVIDER - HOSPITAL COURSE
66M w/ PMHx of HTN, ESRD on HD (M, W, F), anemia, thrombocytopenia, hyperparathyroidism p/w SOB.  Presents with shortness of breath and LE swelling likely due to volume overload from missed HD session, admitted for urgent HD.  Pt. went to visit [place] last year but was stuck there for a year due to the pandemic.  Had a left AVF through which he got dialysis, but the fistula blew in [place].  Pt. had a catheter placed on his neck and has been getting HD through there since.    End stage renal disease:  -Presented with SOB/LE swelling likely from volume overload from missed HD session  - on HD (M/W/F, previously via LUE AVF, now via chest wall catheter from [place])-Electrolytes normalized, and now within normal limits.   - at home on lasix 40mg daily, hold for now given patient already on ESRD/dose likely to have minimal affect given creatinine, can resume per nephro if indicated  - c/w Renvela TID with meals  - renal diet, fluid restriction 1L/day, dose all meds for ESRD  - Clotted AV fistula unlikely to be salvaged,  6/2 cathflo x2 ordered    - Vein mapping:Superficial vein thrombosis noted in the antecubital  segment of the right cephalic vein. No evidence of thrombosis or significant venous wall  thickening noted in the right basilic vein. Vessel  diameters as noted above.  - s/p new AVF on Thursday 6/3  with new permacath placement 6/3  - protect RUE, remove all IVs and avoid for BP measurements or blood draws  - outpatient follow up with vascular surgery and nephrology upon discharge     ABNORMAL NST   -NST 5/31- small, mild defect in inferior wall, that is mostly reversible, suggestive of mild ischemia  -Echo 6/1- EF 45%, mild AR, , moderate LV Sys Dysfx, Moderate pericardial effusion  -cards consulted  -Reviewed imaged--while this finding on the NST is likely not clinically relevant, the possibility remains that the patient has underlying CAD for which he is not receiving optimal medical therapy (ie ASA and statin, he is already on BBs).  -carotid doppler 6/2- mild heterogenous plaque in proximal L internal carotid artery, consistent with 16-49% stenosis.  -Prolonged QT interval:EKG with QTc 508ms:avoid QT prolonging medications.   -c/w coreg, started low dose daily aspirin 81 mg, recommend continuing with this during the yudy-procedural period (if able).  -Started atorvastatin 40 qhs.  -s/p LHC on 6/2 with Dr. Gibbons: mild diffuse disease/non-obstructive CAD. RFA accessed. c/w low dose daily aspirin 81 mg, BB, atorvastatin.  - outpatient follow up with cardiology Dr. Bowman     Hyperkalemia.    - in setting of ESRD/missed HD session  - s/p insulin, d50, lasix earlier in admission  - trend electrolytes daily   - repeat BMP within 1 week     Elevated troponin.    - Troponin 105 to 100  - likely i/s/o renal dysfunction  - no need to trend further.   - Prolonged QT interval:EKG with QTc 508ms:avoid QT prolonging medications    Anemia in CKD  -Renal Following   -Hb below goal. continue epo 10k tiw w/hd.   -Fe stores adequate.     Thrombocytopenia.    - Platelets 129  - hx of thrombocytopenia  - no s/s of bleeding  - trend CBC.     Hypertension.   - BP elevated 160s- improved   - s/p labetalol 300mg PO x1  - c/w home vcjyfnwejns26.5mg BID  - monitor vital signs.  - added Nifedipine 30mg qd  - cards following      Hypothyroidism.    - c/w levothyroxine 100mcg daily  - TSH - 0.21    Hyperphosphatemia  - high- increased Renvela 1>2 tabs TID with meals. high pth noted, added hectorol 4 mcg Three times a week with HD.     Prolonged QT interval.    - EKG with QTc 508ms  - avoid QT prolonging medications.     Subclinical Hyperthyroidism  -tsh 0.21 decreased f/u rpt tsh and free thyroxine :1.8 normal     On ___ this case was reviewed with  ____, the patient is medically stable and optimized for discharge. All medications were reviewed and prescriptions were sent to mutually agreed upon pharmacy. 66M w/ PMHx of HTN, ESRD on HD (M, W, F), anemia, thrombocytopenia, hyperparathyroidism p/w SOB.  Presents with shortness of breath and LE swelling likely due to volume overload from missed HD session, admitted for urgent HD.  Pt. went to visit [place] last year but was stuck there for a year due to the pandemic.  Had a left AVF through which he got dialysis, but the fistula blew in [place].  Pt. had a catheter placed on his neck and has been getting HD through there since.    End stage renal disease:  -Presented with SOB/LE swelling likely from volume overload from missed HD session  - on HD (M/W/F, previously via LUE AVF, now via chest wall catheter from [place])-Electrolytes normalized, and now within normal limits.   - at home on lasix 40mg daily, hold for now given patient already on ESRD/dose likely to have minimal affect given creatinine, can resume per nephro if indicated  - c/w Renvela TID with meals  - renal diet, fluid restriction 1L/day, dose all meds for ESRD  - Clotted AV fistula unlikely to be salvaged,  6/2 cathflo x2 ordered    - Vein mapping:Superficial vein thrombosis noted in the antecubital  segment of the right cephalic vein. No evidence of thrombosis or significant venous wall  thickening noted in the right basilic vein. Vessel  diameters as noted above.  - s/p new AVF on Thursday 6/3  with new permacath placement 6/3  - protect RUE, remove all IVs and avoid for BP measurements or blood draws  - outpatient follow up with vascular surgery and nephrology upon discharge   - Patient will be continued on eliquis due to clot in fistula and afib     ABNORMAL NST   -NST 5/31- small, mild defect in inferior wall, that is mostly reversible, suggestive of mild ischemia  -Echo 6/1- EF 45%, mild AR, , moderate LV Sys Dysfx, Moderate pericardial effusion  -cards consulted  -Reviewed imaged--while this finding on the NST is likely not clinically relevant, the possibility remains that the patient has underlying CAD for which he is not receiving optimal medical therapy (ie ASA and statin, he is already on BBs).  -carotid doppler 6/2- mild heterogenous plaque in proximal L internal carotid artery, consistent with 16-49% stenosis.  -Prolonged QT interval:EKG with QTc 508ms:avoid QT prolonging medications.   -c/w coreg, started low dose daily aspirin 81 mg, recommend continuing with this during the yudy-procedural period (if able).  -Started atorvastatin 40 qhs.  -s/p LHC on 6/2 with Dr. Gibbons: mild diffuse disease/non-obstructive CAD. RFA accessed. c/w low dose daily aspirin 81 mg, BB, atorvastatin.  - outpatient follow up with cardiology Dr. Bowman     Hyperkalemia.    - in setting of ESRD/missed HD session  - s/p insulin, d50, lasix earlier in admission  - trend electrolytes daily   - repeat BMP within 1 week     Elevated troponin.    - Troponin 105 to 100  - likely i/s/o renal dysfunction  - no need to trend further.   - Prolonged QT interval:EKG with QTc 508ms:avoid QT prolonging medications    Anemia in CKD  -Renal Following   -Hb below goal. continue epo 10k tiw w/hd.   -Fe stores adequate.     Thrombocytopenia.    - Platelets 129  - hx of thrombocytopenia  - no s/s of bleeding  - trend CBC.     Hypertension.   - BP elevated 160s- improved   - s/p labetalol 300mg PO x1  - c/w home sagifulaahe36.5mg BID  - monitor vital signs.  - added Nifedipine 30mg qd  - cards following      Hypothyroidism.    - c/w levothyroxine 100mcg daily  - TSH - 0.21    Hyperphosphatemia  - high- increased Renvela 1>2 tabs TID with meals. high pth noted, added hectorol 4 mcg Three times a week with HD.     Prolonged QT interval.    - EKG with QTc 508ms  - avoid QT prolonging medications.     Subclinical Hyperthyroidism  -tsh 0.21 decreased f/u rpt tsh and free thyroxine :1.8 normal     On 6/16/2021 this case was reviewed with Dr. Christian, the patient is medically stable and optimized for discharge. All medications were reviewed and prescriptions were sent to mutually agreed upon pharmacy. 66M w/ PMHx of HTN, ESRD on HD (M, W, F), anemia, thrombocytopenia, hyperparathyroidism p/w SOB.  Presents with shortness of breath and LE swelling likely due to volume overload from missed HD session, admitted for urgent HD.  Pt. went to visit [place] last year but was stuck there for a year due to the pandemic.  Had a left AVF through which he got dialysis, but the fistula blew in [place].  Pt. had a catheter placed on his neck and has been getting HD through there since.    End stage renal disease:  -Presented with SOB/LE swelling likely from volume overload from missed HD session  - on HD (M/W/F, previously via LUE AVF, now via chest wall catheter from [place])-Electrolytes normalized, and now within normal limits.   - at home on lasix 40mg daily, hold for now given patient already on ESRD/dose likely to have minimal affect given creatinine, can resume per nephro if indicated  - c/w Renvela TID with meals  - renal diet, fluid restriction 1L/day, dose all meds for ESRD  - Clotted AV fistula unlikely to be salvaged,  6/2 cathflo x2 ordered    - Vein mapping:Superficial vein thrombosis noted in the antecubital  segment of the right cephalic vein. No evidence of thrombosis or significant venous wall  thickening noted in the right basilic vein. Vessel  diameters as noted above.  - s/p new AVF on Thursday 6/3  with new permacath placement 6/3  - protect RUE, remove all IVs and avoid for BP measurements or blood draws  - outpatient follow up with vascular surgery and nephrology upon discharge   - Patient will be continued on eliquis due to clot in fistula and afib     ABNORMAL NST   -NST 5/31- small, mild defect in inferior wall, that is mostly reversible, suggestive of mild ischemia  -Echo 6/1- EF 45%, mild AR, , moderate LV Sys Dysfx, Moderate pericardial effusion  -cards consulted  -Reviewed imaged--while this finding on the NST is likely not clinically relevant, the possibility remains that the patient has underlying CAD for which he is not receiving optimal medical therapy (ie ASA and statin, he is already on BBs).  -carotid doppler 6/2- mild heterogenous plaque in proximal L internal carotid artery, consistent with 16-49% stenosis.  -Prolonged QT interval:EKG with QTc 508ms:avoid QT prolonging medications.   -c/w coreg, started low dose daily aspirin 81 mg, recommend continuing with this during the yudy-procedural period (if able).  -Started atorvastatin 40 qhs.  -s/p LHC on 6/2 with Dr. Gibbons: mild diffuse disease/non-obstructive CAD. RFA accessed. c/w low dose daily aspirin 81 mg, BB, atorvastatin.  - outpatient follow up with cardiology Dr. Bowman     Hyperkalemia.    - in setting of ESRD/missed HD session  - s/p insulin, d50, lasix earlier in admission  - trend electrolytes daily   - repeat BMP within 1 week     Elevated troponin.    - Troponin 105 to 100  - likely i/s/o renal dysfunction  - no need to trend further.   - Prolonged QT interval:EKG with QTc 508ms:avoid QT prolonging medications    Anemia in CKD  -Renal Following   -Hb below goal. continue epo 10k tiw w/hd.   -Fe stores adequate.     Thrombocytopenia.    - Platelets 129  - hx of thrombocytopenia  - no s/s of bleeding  - trend CBC.     Hypertension.   - BP elevated 160s- improved   - s/p labetalol 300mg PO x1  - c/w home rzevbdhbdew69.5mg BID  - monitor vital signs.  - added Nifedipine 30mg qd  - cards following      Hypothyroidism.    - c/w levothyroxine 100mcg daily  - TSH - 0.21    Hyperphosphatemia  - high- increased Renvela 1>2 tabs TID with meals. high pth noted, added hectorol 4 mcg Three times a week with HD.     Prolonged QT interval.    - EKG with QTc 508ms  - avoid QT prolonging medications.     Subclinical Hyperthyroidism  -tsh 0.21 decreased f/u rpt tsh and free thyroxine :1.8 normal     Cellulitis near AVF site  - ID consulted  - Vancomycin started and will be completed saturday 6/20 in dialysis     On 6/16/2021 this case was reviewed with Dr. Christian, the patient is medically stable and optimized for discharge. All medications were reviewed and prescriptions were sent to mutually agreed upon pharmacy. 66M w/ PMHx of HTN, ESRD on HD (M, W, F), anemia, thrombocytopenia, hyperparathyroidism p/w SOB.  Presents with shortness of breath and LE swelling likely due to volume overload from missed HD session, admitted for urgent HD.  Pt. went to visit [place] last year but was stuck there for a year due to the pandemic.  Had a left AVF through which he got dialysis, but the fistula blew in [place].  Pt. had a catheter placed on his neck and has been getting HD through there since.    End stage renal disease:  -Presented with SOB/LE swelling likely from volume overload from missed HD session  - on HD (M/W/F, previously via LUE AVF, now via chest wall catheter from [place])-Electrolytes normalized, and now within normal limits.   - at home on lasix 40mg daily, held given patient already on ESRD/dose likely to have minimal affect given creatinine, can resume per nephro if indicated  - Renvela continued TID with meals  - renal diet, fluid restriction 1L/day, dose all meds for ESRD  - Clotted AV fistula unlikely to be salvaged,  6/2 cathflo x2 ordered    - Vein mapping:Superficial vein thrombosis noted in the antecubital  segment of the right cephalic vein. No evidence of thrombosis or significant venous wall  thickening noted in the right basilic vein. Vessel  diameters as noted above.  - s/p new AVF on Thursday 6/3  with new permacath placement 6/3  - protect RUE, remove all IVs and avoid for BP measurements or blood draws  - outpatient follow up with vascular surgery and nephrology upon discharge   - Patient will be continued on eliquis due to clot in fistula and afib     ABNORMAL NST   -NST 5/31- small, mild defect in inferior wall, that is mostly reversible, suggestive of mild ischemia  -Echo 6/1- EF 45%, mild AR, , moderate LV Sys Dysfx, Moderate pericardial effusion  -cards consulted  -Reviewed images--while this finding on the NST is likely not clinically relevant, the possibility remains that the patient has underlying CAD for which he is not receiving optimal medical therapy (ie ASA and statin, he is already on BBs).  -carotid doppler 6/2- mild heterogenous plaque in proximal L internal carotid artery, consistent with 16-49% stenosis.  -Prolonged QT interval:EKG with QTc 508ms:avoid QT prolonging medications.   -c/w coreg, started low dose daily aspirin 81 mg, recommend continuing with this during the yudy-procedural period (if able).  -Started atorvastatin 40 qhs.  -s/p LHC on 6/2 with Dr. Gibbons: mild diffuse disease/non-obstructive CAD. RFA accessed. c/w low dose daily aspirin 81 mg, BB, atorvastatin.  - outpatient follow up with cardiology Dr. Bowman     Hyperkalemia.    - in setting of ESRD/missed HD session  - s/p insulin, d50, lasix earlier in admission  - electrolytes monitored daily   - repeat BMP within 1 week     Elevated troponin.    - Troponin 105 to 100  - likely i/s/o renal dysfunction  - no need to trend further.   - Prolonged QT interval:EKG with QTc 508ms: QT prolonging medications avoided    Anemia in CKD  -Renal Following   -Hb below goal. continue epo 10k tiw w/hd.   -Fe stores adequate.     Thrombocytopenia.    - Platelets 129  - hx of thrombocytopenia  - no s/s of bleeding  - trended CBC.     Hypertension.   - BP elevated 160s- improved   - s/p labetalol 300mg PO x1  - c/w home supxwoycbik52.5mg BID  - monitor vital signs.  - added Nifedipine 30mg qd  - Seen and evaluated by cardiology      Hypothyroidism.    - c/w levothyroxine 100mcg daily  - TSH - 0.21  - Pt to follow up with PCP for further monitoring of thyroid function tests     Hyperphosphatemia  - high- increased Renvela 1>2 tabs TID with meals. high pth noted, added hectorol 4 mcg Three times a week with HD.   - Pt to follow up with renal for further monitoring of phosphorous levels     Prolonged QT interval.    - EKG with QTc 508ms  - QT prolonging medications avoided     Subclinical Hyperthyroidism  -tsh 0.21 decreased f/u rpt tsh and free thyroxine :1.8 normal     Cellulitis near AVF site  - ID consulted  - Vancomycin started and will be completed saturday 6/20 in dialysis     On 6/19/2021 this case was reviewed with Dr. Christian, the patient is medically stable and optimized for discharge. All medications were reviewed and prescriptions were sent to mutually agreed upon pharmacy.

## 2021-06-04 NOTE — DISCHARGE NOTE PROVIDER - CARE PROVIDERS DIRECT ADDRESSES
,DirectAddress_Unknown,[email or web address] ,DirectAddress_Unknown,[email or web address]

## 2021-06-04 NOTE — DIETITIAN INITIAL EVALUATION ADULT. - PERTINENT MEDS FT
atorvastatin  carvedilol  doxercalciferol Injectable  ferrous    sulfate  folic acid  levothyroxine  NIFEdipine XL  pantoprazole    Tablet

## 2021-06-04 NOTE — PROGRESS NOTE ADULT - SUBJECTIVE AND OBJECTIVE BOX
Nephrology Followup Note - [phone] - Dr Pagan / Dr Vargas / Dr Faust / Dr Caballero / Dr Bojorquez / Dr Perkins / Dr Garrison / Dr Garcia  Pt seen and examined at bedside  No acute events overnight. Mild to moderate pain from new av access site.     Allergies:  No Known Allergies    Hospital Medications:   MEDICATIONS  (STANDING):  aspirin enteric coated 81 milliGRAM(s) Oral daily  atorvastatin 40 milliGRAM(s) Oral at bedtime  carvedilol 12.5 milliGRAM(s) Oral every 12 hours  chlorhexidine 4% Liquid 1 Application(s) Topical <User Schedule>  doxercalciferol Injectable 4 MICROGram(s) IV Push <User Schedule>  epoetin tammi-epbx (RETACRIT) Injectable 74937 Unit(s) IV Push <User Schedule>  ferrous    sulfate 325 milliGRAM(s) Oral daily  folic acid 1 milliGRAM(s) Oral daily  levothyroxine 100 MICROGram(s) Oral daily  NIFEdipine XL 30 milliGRAM(s) Oral at bedtime  pantoprazole    Tablet 40 milliGRAM(s) Oral before breakfast  sevelamer carbonate 1600 milliGRAM(s) Oral three times a day with meals    VITALS:  T(F): 97.3 (06-04-21 @ 10:35), Max: 98.5 (06-04-21 @ 06:07)  HR: 86 (06-04-21 @ 10:35)  BP: 123/48 (06-04-21 @ 10:35)  RR: 18 (06-04-21 @ 10:35)  SpO2: 100% (06-04-21 @ 06:07)  Wt(kg): --    06-04 @ 07:01  -  06-04 @ 11:26  --------------------------------------------------------  IN: 400 mL / OUT: 2500 mL / NET: -2100 mL      Height (cm): 175.3 (06-03 @ 14:05)  Weight (kg): 68 (06-03 @ 14:05)  BMI (kg/m2): 22.1 (06-03 @ 14:05)  BSA (m2): 1.83 (06-03 @ 14:05)  PHYSICAL EXAM:  Constitutional: NAD  HEENT: anicteric sclera, oropharynx clear, MMM  Neck: No JVD  Respiratory: CTAB, no wheezes, rales or rhonchi  Cardiovascular: S1, S2, RRR  Gastrointestinal: BS+, soft, NT/ND  Extremities: No cyanosis or clubbing. No peripheral edema  Neurological: A/O x 3, no focal deficits  Psychiatric: Normal mood, normal affect  : No CVA tenderness. No siegel.   Skin: No rashes  Vascular Access: RIJ Tunneled cath. right arm avf     LABS:  06-04    132<L>  |  92<L>  |  68<H>  ----------------------------<  108<H>  5.5<H>   |  19<L>  |  8.93<H>    Ca    9.4      04 Jun 2021 08:01  Phos  9.2     06-04  Mg     2.1     06-04    TPro  7.3  /  Alb  3.4  /  TBili  0.3  /  DBili      /  AST  9   /  ALT  5   /  AlkPhos  105  06-04    Creatinine Trend: 8.93 <--, 6.84 <--, 9.19 <--, 6.81 <--, 9.09 <--, 7.14 <--, 8.33 <--                        8.3    6.64  )-----------( 184      ( 04 Jun 2021 08:01 )             27.0     Urine Studies:      RADIOLOGY & ADDITIONAL STUDIES:

## 2021-06-04 NOTE — DIETITIAN INITIAL EVALUATION ADULT. - PROBLEM SELECTOR PLAN 4
Hb 8.4   - likely due to ACD in setting of ESRD  - c/w home ferrous sulfate and folic acid daily  - EPO per nephrology   - trend CBC

## 2021-06-04 NOTE — DIETITIAN INITIAL EVALUATION ADULT. - PROBLEM SELECTOR PLAN 6
BP elevated 160s  - s/p labetalol 300mg PO x1  - c/w home phskqxbdqvq05.5mg BID  - monitor vital signs

## 2021-06-04 NOTE — PROGRESS NOTE ADULT - ASSESSMENT
66M hx of ESRD on HD (M/W/F, previously via LUE AVF, now via chest wall catheter), anemia, hyperparathyroidism, thrombocytopenia, hypothyroidism, HTN who presents with shortness of breath and LE swelling likely due to volume overload from missed HD session, admitted for HD. Renal following for ESRD Mx.     ESRD on HD   Electrolytes, volume acceptable  Pt dialyzed earlier today without acute event. Flowsheet reviewed.   Continue MWF schedule for now, accepted to outpt unit on MWF schedule.   s/p new RUE avf and RIJ permacath   renal diet, fluid restriction 1L/day  dose all meds for ESRD  HTN, better controlled now. c/w BB. added Nifedipine 30mg BT. inc uf w/hd  Anemia in CKD-Hb below goal. on epo 10k tiw w/hd.   Hyperphosphatemia- high phos level, continue Renvela 2 tabs TID with meals. high pth noted, c/w hectorol 4 mcg Three times a week with HD.     Lennox Pagan MD  [facility]  Office [phone]  Ans Serv [phone]  [facility] - [phone]

## 2021-06-04 NOTE — DIETITIAN INITIAL EVALUATION ADULT. - PERTINENT LABORATORY DATA
06-04 Na132 mmol/L<L> Glu 108 mg/dL<H> K+ 5.5 mmol/L<H> Cr  8.93 mg/dL<H> BUN 68 mg/dL<H> 06-04 Phos 9.2 mg/dL<H> 06-04 Alb 3.4 g/dL    Hemoglobin A1C, Whole Blood: 5.3: High Risk (prediabetic)    5.7 - 6.4 %  Diabetic, diagnostic           > 6.5 %  ADA diabetic treatment goal    < 7.0 %    HbA1C values may not accurately reflect mean blood glucose  in patients with Hb variants.  Suggest clinical correlation. % (06.20.17 @ 06:00)

## 2021-06-04 NOTE — DIETITIAN INITIAL EVALUATION ADULT. - OTHER INFO
Met with patient at bedside. Patient reports good appetite and PO intake. Patient denies any nausea/vomiting/diarrhea/constipation or difficulty chewing and swallowing. NKFA. Usual weight in the range of 60kg reported. POST HD weight on 6/2-61.3kg and 5/31-62.2kg. Weight relatively stable. Pt was provided with extensive review of Renal diet principles including; phosphorus, potassium, and sodium content of food and recommended intake as well as recommended protein intake and High Biological Value Protein sources (i.e. chicken, turkey, beef, fish, eggs, etc.). Pt was also given written education materials.

## 2021-06-04 NOTE — DIETITIAN INITIAL EVALUATION ADULT. - ORAL INTAKE PTA/DIET HISTORY
Patient reports good appetite and PO intake PTA. He has knowledge of Renal Diet Restriction and able to verbalize to RD. Has seen RDN outpatient at dialysis. NKFA.

## 2021-06-04 NOTE — PROGRESS NOTE ADULT - ASSESSMENT
66M hx of ESRD on HD (M/W/F, previously via LUE AVF, now via chest wall catheter), anemia, hyperparathyroidism, thrombocytopenia, hypothyroidism, HTN presenting to the ED for urgent HD, with non-functioning AVF since 3/2021. Now s/p R radiocephalic AVF creation on 6/3 and permacath placement.     - Permacath can be used for dialysis  - Follow up with Dr Robertson in the office in one month ([phone])    Vascular Surgery (C Team)   [id number]

## 2021-06-04 NOTE — DISCHARGE NOTE PROVIDER - INSTRUCTIONS
Sodium and cholesterol restricted.  1000mL fluid restriction   No concentrated potassium, low sodium

## 2021-06-04 NOTE — PROGRESS NOTE ADULT - SUBJECTIVE AND OBJECTIVE BOX
Patient is a 66y old  Male who presents with a chief complaint of urgent HD (04 Jun 2021 14:52)      SUBJECTIVE / OVERNIGHT EVENTS:    Events noted.  CONSTITUTIONAL: No fever,  or fatigue  RESPIRATORY: No cough, wheezing,  No shortness of breath  CARDIOVASCULAR: No chest pain, palpitations, dizziness, or leg swelling  GASTROINTESTINAL: No abdominal or epigastric pain.   NEUROLOGICAL: No headaches,     MEDICATIONS  (STANDING):  aspirin enteric coated 81 milliGRAM(s) Oral daily  atorvastatin 40 milliGRAM(s) Oral at bedtime  carvedilol 12.5 milliGRAM(s) Oral every 12 hours  chlorhexidine 4% Liquid 1 Application(s) Topical <User Schedule>  doxercalciferol Injectable 4 MICROGram(s) IV Push <User Schedule>  epoetin tammi-epbx (RETACRIT) Injectable 95301 Unit(s) IV Push <User Schedule>  ferrous    sulfate 325 milliGRAM(s) Oral daily  folic acid 1 milliGRAM(s) Oral daily  heparin   Injectable 5000 Unit(s) SubCutaneous every 12 hours  levothyroxine 100 MICROGram(s) Oral daily  NIFEdipine XL 30 milliGRAM(s) Oral at bedtime  pantoprazole    Tablet 40 milliGRAM(s) Oral before breakfast  sevelamer carbonate 1600 milliGRAM(s) Oral three times a day with meals    MEDICATIONS  (PRN):        CAPILLARY BLOOD GLUCOSE        I&O's Summary    04 Jun 2021 07:01  -  04 Jun 2021 17:17  --------------------------------------------------------  IN: 400 mL / OUT: 2500 mL / NET: -2100 mL        T(C): 36.4 (06-04-21 @ 13:46), Max: 36.9 (06-04-21 @ 06:07)  HR: 85 (06-04-21 @ 13:46) (72 - 86)  BP: 139/79 (06-04-21 @ 13:46) (101/63 - 191/81)  RR: 18 (06-04-21 @ 13:46) (11 - 20)  SpO2: 100% (06-04-21 @ 13:46) (95% - 100%)    PHYSICAL EXAM:    NECK: Supple, No JVD  CHEST/LUNG: Clear to auscultation bilaterally; No wheezing.  HEART: Regular rate and rhythm; No murmurs, rubs, or gallops  ABDOMEN: Soft, Nontender, Nondistended; Bowel sounds present  EXTREMITIES:   No edema  NEUROLOGY: AAO X 3      LABS:                        8.3    6.64  )-----------( 184      ( 04 Jun 2021 08:01 )             27.0     06-04    132<L>  |  92<L>  |  68<H>  ----------------------------<  108<H>  5.5<H>   |  19<L>  |  8.93<H>    Ca    9.4      04 Jun 2021 08:01  Phos  9.2     06-04  Mg     2.1     06-04    TPro  7.3  /  Alb  3.4  /  TBili  0.3  /  DBili  x   /  AST  9   /  ALT  5   /  AlkPhos  105  06-04    PT/INR - ( 03 Jun 2021 07:50 )   PT: 12.0 sec;   INR: 1.06 ratio         PTT - ( 03 Jun 2021 07:50 )  PTT:29.1 sec        CAPILLARY BLOOD GLUCOSE            RADIOLOGY & ADDITIONAL TESTS:    Imaging Personally Reviewed:    Consultant(s) Notes Reviewed:      Care Discussed with Consultants/Other Providers:    Wilfred Christian MD, CMD, FACP    [address] [place] [address]  Office Tel: [phone]  Cell: [phone]

## 2021-06-04 NOTE — DISCHARGE NOTE PROVIDER - NSDCCPCAREPLAN_GEN_ALL_CORE_FT
PRINCIPAL DISCHARGE DIAGNOSIS  Diagnosis: End stage renal disease  Assessment and Plan of Treatment: In order to prevent further disease progression, continue to follow recommendations made by your primary provider/nephrologist. Continue a diet that is low in sodium and avoid foods that are concentrated in potassium and phosphorus. Continue your medications/supplementations as directed and avoid over-the-counter drugs that are harmful to kidneys, such as, Non-Steroidal Anti-Inflammatory Drugs (NSAIDs). Follow-up as outpatient to monitor your kidney function, as well as, vitamin D, Calcium, potassium, and phosphorus levels.        SECONDARY DISCHARGE DIAGNOSES  Diagnosis: Hypertension  Assessment and Plan of Treatment: Continue blood pressure medication regimen as directed. Follow a low salt/cholesterol diet. Monitor for any visual changes, headaches or dizziness.  Monitor blood pressure regularly.  Follow up with your primary care provider for further management for high blood pressure.      Diagnosis: Hypothyroidism  Assessment and Plan of Treatment: Continue your thyroid medications as recommended and follow-up with your outpatient provider for continual thyroid function testing and further medication management.       PRINCIPAL DISCHARGE DIAGNOSIS  Diagnosis: End stage renal disease  Assessment and Plan of Treatment: In order to prevent further disease progression, continue to follow recommendations made by your primary provider/nephrologist. Continue a diet that is low in sodium and avoid foods that are concentrated in potassium and phosphorus. Continue your medications/supplementations as directed and avoid over-the-counter drugs that are harmful to kidneys, such as, Non-Steroidal Anti-Inflammatory Drugs (NSAIDs). Follow-up as outpatient to monitor your kidney function, as well as, vitamin D, Calcium, potassium, and phosphorus levels. Please continue on eliquis twice a day        SECONDARY DISCHARGE DIAGNOSES  Diagnosis: Hypertension  Assessment and Plan of Treatment: Continue blood pressure medication regimen as directed. Follow a low salt/cholesterol diet. Monitor for any visual changes, headaches or dizziness.  Monitor blood pressure regularly.  Follow up with your primary care provider for further management for high blood pressure.      Diagnosis: Cellulitis of right upper extremity  Assessment and Plan of Treatment: You were given antibiotics and cellulitis resolved. Please follow up with your primary care physician after discharge for continued monitoring and management.    Diagnosis: Hypothyroidism  Assessment and Plan of Treatment: Continue your thyroid medications as recommended and follow-up with your outpatient provider for continual thyroid function testing and further medication management.

## 2021-06-05 LAB
ALBUMIN SERPL ELPH-MCNC: 3.9 G/DL — SIGNIFICANT CHANGE UP (ref 3.3–5)
ALP SERPL-CCNC: 122 U/L — HIGH (ref 40–120)
ALT FLD-CCNC: <5 U/L — LOW (ref 4–41)
ANION GAP SERPL CALC-SCNC: 18 MMOL/L — HIGH (ref 7–14)
AST SERPL-CCNC: 8 U/L — SIGNIFICANT CHANGE UP (ref 4–40)
BILIRUB SERPL-MCNC: 0.4 MG/DL — SIGNIFICANT CHANGE UP (ref 0.2–1.2)
BUN SERPL-MCNC: 48 MG/DL — HIGH (ref 7–23)
CALCIUM SERPL-MCNC: 10.5 MG/DL — SIGNIFICANT CHANGE UP (ref 8.4–10.5)
CHLORIDE SERPL-SCNC: 93 MMOL/L — LOW (ref 98–107)
CO2 SERPL-SCNC: 22 MMOL/L — SIGNIFICANT CHANGE UP (ref 22–31)
CREAT SERPL-MCNC: 6.86 MG/DL — HIGH (ref 0.5–1.3)
GLUCOSE SERPL-MCNC: 102 MG/DL — HIGH (ref 70–99)
HCT VFR BLD CALC: 30.6 % — LOW (ref 39–50)
HGB BLD-MCNC: 9.4 G/DL — LOW (ref 13–17)
MAGNESIUM SERPL-MCNC: 2 MG/DL — SIGNIFICANT CHANGE UP (ref 1.6–2.6)
MCHC RBC-ENTMCNC: 27.2 PG — SIGNIFICANT CHANGE UP (ref 27–34)
MCHC RBC-ENTMCNC: 30.7 GM/DL — LOW (ref 32–36)
MCV RBC AUTO: 88.4 FL — SIGNIFICANT CHANGE UP (ref 80–100)
NRBC # BLD: 0 /100 WBCS — SIGNIFICANT CHANGE UP
NRBC # FLD: 0 K/UL — SIGNIFICANT CHANGE UP
PHOSPHATE SERPL-MCNC: 6.6 MG/DL — HIGH (ref 2.5–4.5)
PLATELET # BLD AUTO: 205 K/UL — SIGNIFICANT CHANGE UP (ref 150–400)
POTASSIUM SERPL-MCNC: 4.4 MMOL/L — SIGNIFICANT CHANGE UP (ref 3.5–5.3)
POTASSIUM SERPL-SCNC: 4.4 MMOL/L — SIGNIFICANT CHANGE UP (ref 3.5–5.3)
PROT SERPL-MCNC: 8.3 G/DL — SIGNIFICANT CHANGE UP (ref 6–8.3)
RBC # BLD: 3.46 M/UL — LOW (ref 4.2–5.8)
RBC # FLD: 17.9 % — HIGH (ref 10.3–14.5)
SODIUM SERPL-SCNC: 133 MMOL/L — LOW (ref 135–145)
WBC # BLD: 6.83 K/UL — SIGNIFICANT CHANGE UP (ref 3.8–10.5)
WBC # FLD AUTO: 6.83 K/UL — SIGNIFICANT CHANGE UP (ref 3.8–10.5)

## 2021-06-05 RX ADMIN — CHLORHEXIDINE GLUCONATE 1 APPLICATION(S): 213 SOLUTION TOPICAL at 09:02

## 2021-06-05 RX ADMIN — CARVEDILOL PHOSPHATE 12.5 MILLIGRAM(S): 80 CAPSULE, EXTENDED RELEASE ORAL at 17:58

## 2021-06-05 RX ADMIN — CARVEDILOL PHOSPHATE 12.5 MILLIGRAM(S): 80 CAPSULE, EXTENDED RELEASE ORAL at 05:51

## 2021-06-05 RX ADMIN — Medication 100 MICROGRAM(S): at 05:51

## 2021-06-05 RX ADMIN — Medication 30 MILLIGRAM(S): at 21:12

## 2021-06-05 RX ADMIN — Medication 325 MILLIGRAM(S): at 12:53

## 2021-06-05 RX ADMIN — ATORVASTATIN CALCIUM 40 MILLIGRAM(S): 80 TABLET, FILM COATED ORAL at 21:12

## 2021-06-05 RX ADMIN — SEVELAMER CARBONATE 1600 MILLIGRAM(S): 2400 POWDER, FOR SUSPENSION ORAL at 09:02

## 2021-06-05 RX ADMIN — HEPARIN SODIUM 5000 UNIT(S): 5000 INJECTION INTRAVENOUS; SUBCUTANEOUS at 05:50

## 2021-06-05 RX ADMIN — PANTOPRAZOLE SODIUM 40 MILLIGRAM(S): 20 TABLET, DELAYED RELEASE ORAL at 06:21

## 2021-06-05 RX ADMIN — Medication 81 MILLIGRAM(S): at 12:53

## 2021-06-05 RX ADMIN — SEVELAMER CARBONATE 1600 MILLIGRAM(S): 2400 POWDER, FOR SUSPENSION ORAL at 17:58

## 2021-06-05 RX ADMIN — SEVELAMER CARBONATE 1600 MILLIGRAM(S): 2400 POWDER, FOR SUSPENSION ORAL at 12:53

## 2021-06-05 RX ADMIN — HEPARIN SODIUM 5000 UNIT(S): 5000 INJECTION INTRAVENOUS; SUBCUTANEOUS at 17:58

## 2021-06-05 RX ADMIN — Medication 1 MILLIGRAM(S): at 12:53

## 2021-06-05 NOTE — PROGRESS NOTE ADULT - SUBJECTIVE AND OBJECTIVE BOX
Patient is a 66y old  Male who presents with a chief complaint of urgent HD (04 Jun 2021 15:16)      SUBJECTIVE / OVERNIGHT EVENTS:    Events noted.  CONSTITUTIONAL: No fever,  or fatigue  RESPIRATORY: No cough, wheezing,  No shortness of breath  CARDIOVASCULAR: No chest pain, palpitations, dizziness, or leg swelling  GASTROINTESTINAL: No abdominal or epigastric pain. No nausea, vomiting.  NEUROLOGICAL: No headaches,     MEDICATIONS  (STANDING):  aspirin enteric coated 81 milliGRAM(s) Oral daily  atorvastatin 40 milliGRAM(s) Oral at bedtime  carvedilol 12.5 milliGRAM(s) Oral every 12 hours  chlorhexidine 4% Liquid 1 Application(s) Topical <User Schedule>  doxercalciferol Injectable 4 MICROGram(s) IV Push <User Schedule>  epoetin tammi-epbx (RETACRIT) Injectable 54954 Unit(s) IV Push <User Schedule>  ferrous    sulfate 325 milliGRAM(s) Oral daily  folic acid 1 milliGRAM(s) Oral daily  heparin   Injectable 5000 Unit(s) SubCutaneous every 12 hours  levothyroxine 100 MICROGram(s) Oral daily  NIFEdipine XL 30 milliGRAM(s) Oral at bedtime  pantoprazole    Tablet 40 milliGRAM(s) Oral before breakfast  sevelamer carbonate 1600 milliGRAM(s) Oral three times a day with meals    MEDICATIONS  (PRN):        CAPILLARY BLOOD GLUCOSE        I&O's Summary    04 Jun 2021 07:01  -  05 Jun 2021 07:00  --------------------------------------------------------  IN: 400 mL / OUT: 2550 mL / NET: -2150 mL    05 Jun 2021 07:01  -  05 Jun 2021 16:36  --------------------------------------------------------  IN: 525 mL / OUT: 0 mL / NET: 525 mL        T(C): 36.8 (06-05-21 @ 13:35), Max: 37 (06-05-21 @ 05:47)  HR: 84 (06-05-21 @ 13:35) (84 - 94)  BP: 140/82 (06-05-21 @ 13:35) (101/72 - 140/82)  RR: 18 (06-05-21 @ 13:35) (17 - 18)  SpO2: 100% (06-05-21 @ 13:35) (97% - 100%)    PHYSICAL EXAM:  GENERAL: NAD  NECK: Supple, No JVD  CHEST/LUNG: Clear to auscultation bilaterally; No wheezing.  HEART: Regular rate and rhythm; No murmurs, rubs, or gallops  ABDOMEN: Soft, Nontender, Nondistended; Bowel sounds present  EXTREMITIES:   No edema  NEUROLOGY: AAO X 3      LABS:                        9.4    6.83  )-----------( 205      ( 05 Jun 2021 07:10 )             30.6     06-05    133<L>  |  93<L>  |  48<H>  ----------------------------<  102<H>  4.4   |  22  |  6.86<H>    Ca    10.5      05 Jun 2021 07:10  Phos  6.6     06-05  Mg     2.0     06-05    TPro  8.3  /  Alb  3.9  /  TBili  0.4  /  DBili  x   /  AST  8   /  ALT  <5<L>  /  AlkPhos  122<H>  06-05            CAPILLARY BLOOD GLUCOSE            RADIOLOGY & ADDITIONAL TESTS:    Imaging Personally Reviewed:    Consultant(s) Notes Reviewed:      Care Discussed with Consultants/Other Providers:    Wilfred Christian MD, CMD, FACP    [address] [place] [address]  Office Tel: [phone]  Cell: [phone]

## 2021-06-05 NOTE — PROGRESS NOTE ADULT - PROBLEM SELECTOR PROBLEM 4
Hypertension
Hypertension
Thrombocytopenia
Thrombocytopenia
Hypertension
Thrombocytopenia
Thrombocytopenia
Hypertension
Thrombocytopenia
Thrombocytopenia
Anemia

## 2021-06-06 PROCEDURE — 74019 RADEX ABDOMEN 2 VIEWS: CPT | Mod: 26

## 2021-06-06 RX ORDER — TRAMADOL HYDROCHLORIDE 50 MG/1
25 TABLET ORAL ONCE
Refills: 0 | Status: DISCONTINUED | OUTPATIENT
Start: 2021-06-06 | End: 2021-06-06

## 2021-06-06 RX ORDER — ACETAMINOPHEN 500 MG
650 TABLET ORAL EVERY 6 HOURS
Refills: 0 | Status: DISCONTINUED | OUTPATIENT
Start: 2021-06-06 | End: 2021-06-19

## 2021-06-06 RX ADMIN — CARVEDILOL PHOSPHATE 12.5 MILLIGRAM(S): 80 CAPSULE, EXTENDED RELEASE ORAL at 05:45

## 2021-06-06 RX ADMIN — Medication 650 MILLIGRAM(S): at 00:34

## 2021-06-06 RX ADMIN — PANTOPRAZOLE SODIUM 40 MILLIGRAM(S): 20 TABLET, DELAYED RELEASE ORAL at 06:54

## 2021-06-06 RX ADMIN — Medication 81 MILLIGRAM(S): at 12:22

## 2021-06-06 RX ADMIN — Medication 100 MICROGRAM(S): at 05:45

## 2021-06-06 RX ADMIN — Medication 1 MILLIGRAM(S): at 12:22

## 2021-06-06 RX ADMIN — SEVELAMER CARBONATE 1600 MILLIGRAM(S): 2400 POWDER, FOR SUSPENSION ORAL at 17:39

## 2021-06-06 RX ADMIN — SEVELAMER CARBONATE 1600 MILLIGRAM(S): 2400 POWDER, FOR SUSPENSION ORAL at 12:22

## 2021-06-06 RX ADMIN — Medication 650 MILLIGRAM(S): at 01:34

## 2021-06-06 RX ADMIN — SEVELAMER CARBONATE 1600 MILLIGRAM(S): 2400 POWDER, FOR SUSPENSION ORAL at 09:00

## 2021-06-06 RX ADMIN — HEPARIN SODIUM 5000 UNIT(S): 5000 INJECTION INTRAVENOUS; SUBCUTANEOUS at 17:39

## 2021-06-06 RX ADMIN — HEPARIN SODIUM 5000 UNIT(S): 5000 INJECTION INTRAVENOUS; SUBCUTANEOUS at 05:45

## 2021-06-06 RX ADMIN — CARVEDILOL PHOSPHATE 12.5 MILLIGRAM(S): 80 CAPSULE, EXTENDED RELEASE ORAL at 17:39

## 2021-06-06 RX ADMIN — ATORVASTATIN CALCIUM 40 MILLIGRAM(S): 80 TABLET, FILM COATED ORAL at 22:14

## 2021-06-06 RX ADMIN — Medication 650 MILLIGRAM(S): at 22:17

## 2021-06-06 RX ADMIN — Medication 325 MILLIGRAM(S): at 12:22

## 2021-06-06 RX ADMIN — Medication 650 MILLIGRAM(S): at 22:47

## 2021-06-06 RX ADMIN — Medication 30 MILLIGRAM(S): at 22:14

## 2021-06-06 RX ADMIN — TRAMADOL HYDROCHLORIDE 25 MILLIGRAM(S): 50 TABLET ORAL at 23:45

## 2021-06-06 RX ADMIN — CHLORHEXIDINE GLUCONATE 1 APPLICATION(S): 213 SOLUTION TOPICAL at 09:01

## 2021-06-06 NOTE — PROGRESS NOTE ADULT - ASSESSMENT
66M hx of ESRD on HD (M/W/F, previously via LUE AVF, now via chest wall catheter), anemia, hyperparathyroidism, thrombocytopenia, hypothyroidism, HTN who presents with shortness of breath and LE swelling likely due to volume overload from missed HD session, admitted for urgent HD.     Abd pain:    AXR  Will monitor    Bleeding at PC site:    Vascular f/up    Dw pt's daughter.

## 2021-06-06 NOTE — CHART NOTE - NSCHARTNOTEFT_GEN_A_CORE
Pt with bleeding at permacath site again. Pressure dressing changed by nurse educator.   Vascular called for further evaluation     Tsering Tarango PA-C  Department of Medicine  Pager [phone]

## 2021-06-06 NOTE — PROGRESS NOTE ADULT - SUBJECTIVE AND OBJECTIVE BOX
Patient is a 66y old  Male who presents with a chief complaint of urgent HD (05 Jun 2021 15:36)      SUBJECTIVE / OVERNIGHT EVENTS:    Events noted.  CONSTITUTIONAL: No fever,  or fatigue  RESPIRATORY: No cough, wheezing,  No shortness of breath  CARDIOVASCULAR: No chest pain, palpitations  GASTROINTESTINAL: Abd pain- LLQ  NEUROLOGICAL: No headaches,     MEDICATIONS  (STANDING):  aspirin enteric coated 81 milliGRAM(s) Oral daily  atorvastatin 40 milliGRAM(s) Oral at bedtime  carvedilol 12.5 milliGRAM(s) Oral every 12 hours  chlorhexidine 4% Liquid 1 Application(s) Topical <User Schedule>  doxercalciferol Injectable 4 MICROGram(s) IV Push <User Schedule>  epoetin tammi-epbx (RETACRIT) Injectable 85762 Unit(s) IV Push <User Schedule>  ferrous    sulfate 325 milliGRAM(s) Oral daily  folic acid 1 milliGRAM(s) Oral daily  heparin   Injectable 5000 Unit(s) SubCutaneous every 12 hours  levothyroxine 100 MICROGram(s) Oral daily  NIFEdipine XL 30 milliGRAM(s) Oral at bedtime  pantoprazole    Tablet 40 milliGRAM(s) Oral before breakfast  sevelamer carbonate 1600 milliGRAM(s) Oral three times a day with meals    MEDICATIONS  (PRN):  acetaminophen   Tablet .. 650 milliGRAM(s) Oral every 6 hours PRN Moderate Pain (4 - 6)        CAPILLARY BLOOD GLUCOSE        I&O's Summary    05 Jun 2021 07:01  -  06 Jun 2021 07:00  --------------------------------------------------------  IN: 765 mL / OUT: 0 mL / NET: 765 mL    06 Jun 2021 07:01  -  06 Jun 2021 23:52  --------------------------------------------------------  IN: 640 mL / OUT: 150 mL / NET: 490 mL        T(C): 37.7 (06-06-21 @ 21:38), Max: 37.7 (06-06-21 @ 21:38)  HR: 90 (06-06-21 @ 21:38) (82 - 94)  BP: 135/71 (06-06-21 @ 21:38) (135/71 - 149/73)  RR: 18 (06-06-21 @ 21:38) (17 - 18)  SpO2: 98% (06-06-21 @ 21:38) (95% - 100%)    PHYSICAL EXAM:  GENERAL: NAD  NECK: Supple, No JVD  CHEST/LUNG: Clear to auscultation bilaterally; No wheezing.  HEART: Regular rate and rhythm; No murmurs, rubs, or gallops  ABDOMEN: Soft, Nontender, Nondistended; Bowel sounds present  EXTREMITIES:   No edema  NEUROLOGY: AAO X 3      LABS:                        9.4    6.83  )-----------( 205      ( 05 Jun 2021 07:10 )             30.6     06-05    133<L>  |  93<L>  |  48<H>  ----------------------------<  102<H>  4.4   |  22  |  6.86<H>    Ca    10.5      05 Jun 2021 07:10  Phos  6.6     06-05  Mg     2.0     06-05    TPro  8.3  /  Alb  3.9  /  TBili  0.4  /  DBili  x   /  AST  8   /  ALT  <5<L>  /  AlkPhos  122<H>  06-05            CAPILLARY BLOOD GLUCOSE            RADIOLOGY & ADDITIONAL TESTS:    Imaging Personally Reviewed:    Consultant(s) Notes Reviewed:      Care Discussed with Consultants/Other Providers:    Wilfred Christian MD, CMD, FACP    [address] [place] [address]  Office Tel: [phone]  Cell: [phone]

## 2021-06-06 NOTE — CHART NOTE - NSCHARTNOTEFT_GEN_A_CORE
Late Entry    had bleeding at permacath site. Dressing removed and pressure dressing placed. Bleeding ceased. will cont to monitor.

## 2021-06-07 LAB
ALBUMIN SERPL ELPH-MCNC: 3 G/DL — LOW (ref 3.3–5)
ALBUMIN SERPL ELPH-MCNC: 3.2 G/DL — LOW (ref 3.3–5)
ALP SERPL-CCNC: 102 U/L — SIGNIFICANT CHANGE UP (ref 40–120)
ALP SERPL-CCNC: 105 U/L — SIGNIFICANT CHANGE UP (ref 40–120)
ALT FLD-CCNC: <5 U/L — LOW (ref 4–41)
ALT FLD-CCNC: <5 U/L — LOW (ref 4–41)
ANION GAP SERPL CALC-SCNC: 23 MMOL/L — HIGH (ref 7–14)
ANION GAP SERPL CALC-SCNC: 23 MMOL/L — HIGH (ref 7–14)
APTT BLD: 31.1 SEC — SIGNIFICANT CHANGE UP (ref 27–36.3)
AST SERPL-CCNC: 6 U/L — SIGNIFICANT CHANGE UP (ref 4–40)
AST SERPL-CCNC: 7 U/L — SIGNIFICANT CHANGE UP (ref 4–40)
BILIRUB SERPL-MCNC: 0.3 MG/DL — SIGNIFICANT CHANGE UP (ref 0.2–1.2)
BILIRUB SERPL-MCNC: 0.3 MG/DL — SIGNIFICANT CHANGE UP (ref 0.2–1.2)
BLD GP AB SCN SERPL QL: NEGATIVE — SIGNIFICANT CHANGE UP
BUN SERPL-MCNC: 88 MG/DL — HIGH (ref 7–23)
BUN SERPL-MCNC: 95 MG/DL — HIGH (ref 7–23)
CALCIUM SERPL-MCNC: 9.2 MG/DL — SIGNIFICANT CHANGE UP (ref 8.4–10.5)
CALCIUM SERPL-MCNC: 9.4 MG/DL — SIGNIFICANT CHANGE UP (ref 8.4–10.5)
CHLORIDE SERPL-SCNC: 90 MMOL/L — LOW (ref 98–107)
CHLORIDE SERPL-SCNC: 91 MMOL/L — LOW (ref 98–107)
CO2 SERPL-SCNC: 18 MMOL/L — LOW (ref 22–31)
CO2 SERPL-SCNC: 18 MMOL/L — LOW (ref 22–31)
CREAT SERPL-MCNC: 11.51 MG/DL — HIGH (ref 0.5–1.3)
CREAT SERPL-MCNC: 12.55 MG/DL — HIGH (ref 0.5–1.3)
GLUCOSE BLDC GLUCOMTR-MCNC: 170 MG/DL — HIGH (ref 70–99)
GLUCOSE SERPL-MCNC: 133 MG/DL — HIGH (ref 70–99)
GLUCOSE SERPL-MCNC: 99 MG/DL — SIGNIFICANT CHANGE UP (ref 70–99)
HCT VFR BLD CALC: 22.2 % — LOW (ref 39–50)
HCT VFR BLD CALC: 22.8 % — LOW (ref 39–50)
HGB BLD-MCNC: 7 G/DL — CRITICAL LOW (ref 13–17)
HGB BLD-MCNC: 7.2 G/DL — LOW (ref 13–17)
INR BLD: 1.11 RATIO — SIGNIFICANT CHANGE UP (ref 0.88–1.16)
MAGNESIUM SERPL-MCNC: 2 MG/DL — SIGNIFICANT CHANGE UP (ref 1.6–2.6)
MAGNESIUM SERPL-MCNC: 2 MG/DL — SIGNIFICANT CHANGE UP (ref 1.6–2.6)
MCHC RBC-ENTMCNC: 27.3 PG — SIGNIFICANT CHANGE UP (ref 27–34)
MCHC RBC-ENTMCNC: 27.6 PG — SIGNIFICANT CHANGE UP (ref 27–34)
MCHC RBC-ENTMCNC: 31.5 GM/DL — LOW (ref 32–36)
MCHC RBC-ENTMCNC: 31.6 GM/DL — LOW (ref 32–36)
MCV RBC AUTO: 86.7 FL — SIGNIFICANT CHANGE UP (ref 80–100)
MCV RBC AUTO: 87.4 FL — SIGNIFICANT CHANGE UP (ref 80–100)
NRBC # BLD: 0 /100 WBCS — SIGNIFICANT CHANGE UP
NRBC # BLD: 0 /100 WBCS — SIGNIFICANT CHANGE UP
NRBC # FLD: 0 K/UL — SIGNIFICANT CHANGE UP
NRBC # FLD: 0 K/UL — SIGNIFICANT CHANGE UP
PHOSPHATE SERPL-MCNC: 8.4 MG/DL — HIGH (ref 2.5–4.5)
PHOSPHATE SERPL-MCNC: 8.6 MG/DL — HIGH (ref 2.5–4.5)
PLATELET # BLD AUTO: 179 K/UL — SIGNIFICANT CHANGE UP (ref 150–400)
PLATELET # BLD AUTO: 191 K/UL — SIGNIFICANT CHANGE UP (ref 150–400)
POTASSIUM SERPL-MCNC: 4.8 MMOL/L — SIGNIFICANT CHANGE UP (ref 3.5–5.3)
POTASSIUM SERPL-MCNC: 5.1 MMOL/L — SIGNIFICANT CHANGE UP (ref 3.5–5.3)
POTASSIUM SERPL-SCNC: 4.8 MMOL/L — SIGNIFICANT CHANGE UP (ref 3.5–5.3)
POTASSIUM SERPL-SCNC: 5.1 MMOL/L — SIGNIFICANT CHANGE UP (ref 3.5–5.3)
PROT SERPL-MCNC: 6.6 G/DL — SIGNIFICANT CHANGE UP (ref 6–8.3)
PROT SERPL-MCNC: 6.8 G/DL — SIGNIFICANT CHANGE UP (ref 6–8.3)
PROTHROM AB SERPL-ACNC: 12.7 SEC — SIGNIFICANT CHANGE UP (ref 10.6–13.6)
RBC # BLD: 2.56 M/UL — LOW (ref 4.2–5.8)
RBC # BLD: 2.61 M/UL — LOW (ref 4.2–5.8)
RBC # FLD: 17.2 % — HIGH (ref 10.3–14.5)
RBC # FLD: 17.5 % — HIGH (ref 10.3–14.5)
RH IG SCN BLD-IMP: POSITIVE — SIGNIFICANT CHANGE UP
SODIUM SERPL-SCNC: 131 MMOL/L — LOW (ref 135–145)
SODIUM SERPL-SCNC: 132 MMOL/L — LOW (ref 135–145)
WBC # BLD: 7.8 K/UL — SIGNIFICANT CHANGE UP (ref 3.8–10.5)
WBC # BLD: 8.03 K/UL — SIGNIFICANT CHANGE UP (ref 3.8–10.5)
WBC # FLD AUTO: 7.8 K/UL — SIGNIFICANT CHANGE UP (ref 3.8–10.5)
WBC # FLD AUTO: 8.03 K/UL — SIGNIFICANT CHANGE UP (ref 3.8–10.5)

## 2021-06-07 RX ORDER — SIMETHICONE 80 MG/1
80 TABLET, CHEWABLE ORAL THREE TIMES A DAY
Refills: 0 | Status: COMPLETED | OUTPATIENT
Start: 2021-06-07 | End: 2021-06-09

## 2021-06-07 RX ORDER — ERYTHROPOIETIN 10000 [IU]/ML
16000 INJECTION, SOLUTION INTRAVENOUS; SUBCUTANEOUS
Refills: 0 | Status: DISCONTINUED | OUTPATIENT
Start: 2021-06-07 | End: 2021-06-19

## 2021-06-07 RX ORDER — HYDROMORPHONE HYDROCHLORIDE 2 MG/ML
1.5 INJECTION INTRAMUSCULAR; INTRAVENOUS; SUBCUTANEOUS ONCE
Refills: 0 | Status: DISCONTINUED | OUTPATIENT
Start: 2021-06-07 | End: 2021-06-07

## 2021-06-07 RX ORDER — NIFEDIPINE 30 MG
60 TABLET, EXTENDED RELEASE 24 HR ORAL AT BEDTIME
Refills: 0 | Status: DISCONTINUED | OUTPATIENT
Start: 2021-06-07 | End: 2021-06-08

## 2021-06-07 RX ADMIN — Medication 1 MILLIGRAM(S): at 12:57

## 2021-06-07 RX ADMIN — SEVELAMER CARBONATE 1600 MILLIGRAM(S): 2400 POWDER, FOR SUSPENSION ORAL at 19:01

## 2021-06-07 RX ADMIN — Medication 81 MILLIGRAM(S): at 12:57

## 2021-06-07 RX ADMIN — Medication 100 MICROGRAM(S): at 05:19

## 2021-06-07 RX ADMIN — ATORVASTATIN CALCIUM 40 MILLIGRAM(S): 80 TABLET, FILM COATED ORAL at 22:03

## 2021-06-07 RX ADMIN — Medication 650 MILLIGRAM(S): at 20:00

## 2021-06-07 RX ADMIN — CHLORHEXIDINE GLUCONATE 1 APPLICATION(S): 213 SOLUTION TOPICAL at 10:08

## 2021-06-07 RX ADMIN — PANTOPRAZOLE SODIUM 40 MILLIGRAM(S): 20 TABLET, DELAYED RELEASE ORAL at 06:06

## 2021-06-07 RX ADMIN — Medication 650 MILLIGRAM(S): at 19:00

## 2021-06-07 RX ADMIN — Medication 325 MILLIGRAM(S): at 12:57

## 2021-06-07 RX ADMIN — SEVELAMER CARBONATE 1600 MILLIGRAM(S): 2400 POWDER, FOR SUSPENSION ORAL at 10:08

## 2021-06-07 RX ADMIN — CARVEDILOL PHOSPHATE 12.5 MILLIGRAM(S): 80 CAPSULE, EXTENDED RELEASE ORAL at 19:01

## 2021-06-07 RX ADMIN — Medication 60 MILLIGRAM(S): at 22:04

## 2021-06-07 RX ADMIN — SIMETHICONE 80 MILLIGRAM(S): 80 TABLET, CHEWABLE ORAL at 22:04

## 2021-06-07 RX ADMIN — HYDROMORPHONE HYDROCHLORIDE 1.5 MILLIGRAM(S): 2 INJECTION INTRAMUSCULAR; INTRAVENOUS; SUBCUTANEOUS at 21:00

## 2021-06-07 RX ADMIN — SEVELAMER CARBONATE 1600 MILLIGRAM(S): 2400 POWDER, FOR SUSPENSION ORAL at 12:57

## 2021-06-07 RX ADMIN — HYDROMORPHONE HYDROCHLORIDE 1.5 MILLIGRAM(S): 2 INJECTION INTRAMUSCULAR; INTRAVENOUS; SUBCUTANEOUS at 19:56

## 2021-06-07 RX ADMIN — HEPARIN SODIUM 5000 UNIT(S): 5000 INJECTION INTRAVENOUS; SUBCUTANEOUS at 05:18

## 2021-06-07 NOTE — PROGRESS NOTE ADULT - SUBJECTIVE AND OBJECTIVE BOX
Patient is a 66y old  Male who presents with a chief complaint of urgent HD (07 Jun 2021 14:32)      SUBJECTIVE / OVERNIGHT EVENTS:    Events noted.  CONSTITUTIONAL: Oozing from Perma cath site  RESPIRATORY: No cough, wheezing,  No shortness of breath  CARDIOVASCULAR: No chest pain, palpitations, dizziness, or leg swelling  GASTROINTESTINAL: No abdominal or epigastric pain. No nausea, vomiting.  NEUROLOGICAL: No headaches,     MEDICATIONS  (STANDING):  aspirin enteric coated 81 milliGRAM(s) Oral daily  atorvastatin 40 milliGRAM(s) Oral at bedtime  carvedilol 12.5 milliGRAM(s) Oral every 12 hours  chlorhexidine 4% Liquid 1 Application(s) Topical <User Schedule>  doxercalciferol Injectable 4 MICROGram(s) IV Push <User Schedule>  epoetin tammi-epbx (RETACRIT) Injectable 53067 Unit(s) IV Push <User Schedule>  ferrous    sulfate 325 milliGRAM(s) Oral daily  folic acid 1 milliGRAM(s) Oral daily  levothyroxine 100 MICROGram(s) Oral daily  NIFEdipine XL 60 milliGRAM(s) Oral at bedtime  pantoprazole    Tablet 40 milliGRAM(s) Oral before breakfast  sevelamer carbonate 1600 milliGRAM(s) Oral three times a day with meals  simethicone 80 milliGRAM(s) Chew three times a day    MEDICATIONS  (PRN):  acetaminophen   Tablet .. 650 milliGRAM(s) Oral every 6 hours PRN Moderate Pain (4 - 6)        CAPILLARY BLOOD GLUCOSE      POCT Blood Glucose.: 170 mg/dL (07 Jun 2021 18:47)    I&O's Summary    06 Jun 2021 07:01  -  07 Jun 2021 07:00  --------------------------------------------------------  IN: 640 mL / OUT: 150 mL / NET: 490 mL    07 Jun 2021 07:01  -  08 Jun 2021 00:54  --------------------------------------------------------  IN: 0 mL / OUT: 200 mL / NET: -200 mL        T(C): 36.9 (06-07-21 @ 21:32), Max: 37.3 (06-07-21 @ 17:39)  HR: 80 (06-07-21 @ 21:32) (80 - 90)  BP: 134/76 (06-07-21 @ 21:32) (134/76 - 182/90)  RR: 17 (06-07-21 @ 21:32) (17 - 18)  SpO2: 98% (06-07-21 @ 21:32) (98% - 99%)    PHYSICAL EXAM:  GENERAL: NAD  NECK: Supple, No JVD  CHEST/LUNG: Clear to auscultation bilaterally; No wheezing.  HEART: Regular rate and rhythm; No murmurs, rubs, or gallops  ABDOMEN: Soft, Nontender, Nondistended; Bowel sounds present  EXTREMITIES:   No edema  NEUROLOGY: AAO X 3      LABS:                        7.0    8.03  )-----------( 191      ( 07 Jun 2021 18:49 )             22.2     06-07    131<L>  |  90<L>  |  95<H>  ----------------------------<  133<H>  5.1   |  18<L>  |  12.55<H>    Ca    9.2      07 Jun 2021 19:26  Phos  8.6     06-07  Mg     2.0     06-07    TPro  6.6  /  Alb  3.0<L>  /  TBili  0.3  /  DBili  x   /  AST  7   /  ALT  <5<L>  /  AlkPhos  102  06-07    PT/INR - ( 07 Jun 2021 19:26 )   PT: 12.7 sec;   INR: 1.11 ratio         PTT - ( 07 Jun 2021 19:26 )  PTT:31.1 sec        CAPILLARY BLOOD GLUCOSE      POCT Blood Glucose.: 170 mg/dL (07 Jun 2021 18:47)        RADIOLOGY & ADDITIONAL TESTS:    Imaging Personally Reviewed:    Consultant(s) Notes Reviewed:      Care Discussed with Consultants/Other Providers:    Wilfred Christian MD, CMD, FACP    [address] [place] [address]  Office Tel: [phone]  Cell: [phone]

## 2021-06-07 NOTE — PROGRESS NOTE ADULT - ASSESSMENT
66M hx of ESRD on HD (M/W/F, previously via LUE AVF, now via chest wall catheter), anemia, hyperparathyroidism, thrombocytopenia, hypothyroidism, HTN who presents with shortness of breath and LE swelling likely due to volume overload from missed HD session, admitted for urgent HD.     Abd pain:    AXR: Gaseous distention of stomach  Improving    Bleeding at PC site:    Vascular f/up noted  S/p sutures at PC site

## 2021-06-07 NOTE — CHART NOTE - NSCHARTNOTEFT_GEN_A_CORE
Vascular Surgery paged during sign out regarding bleeding from R Chest permacath despite earlier attempts at hemostasis with Surgicel. Patient seen and examined at bedside. Patient in stable condition but with continued slow ooze noted from skin insertion site of Permcath.  Two simple interrupted sutures (3-0 nylon) placed at site of permacath insertion to skin and silver nitrate applied to small skin bleeder. Permacath dressed with pressure dressing and ACE bandaged wrapped around chest .    Brittani Peguero MD  C Team Surgery  [phone].

## 2021-06-07 NOTE — PROGRESS NOTE ADULT - ASSESSMENT
66M hx of ESRD on HD (M/W/F, previously via LUE AVF, now via chest wall catheter), anemia, hyperparathyroidism, thrombocytopenia, hypothyroidism, HTN who presents with shortness of breath and LE swelling likely due to volume overload from missed HD session, admitted for HD. Renal following for ESRD Mx.     ESRD on HD   K, volume acceptable  s/p new RUE avf and RIJ permacath   Pt last dialyzed 6/4  plan for next hemodialysis tomorrow w/2k bath, uf 2-2.5kg as tolerated by bp  hold routine HD today 2/2 active bleeding from PC site and scheduling issue  accepted to outpt unit on MWF schedule.    active bleeding from PC site- d/w PA covering, paged vas sx, likely need suturing to stop bleeding. watch BP, H/H  renal diet, fluid restriction 1L/day  dose all meds for ESRD  HTN, uncontrolled. bp high. c/w BB. inc Nifedipine 30>60mg BT. inc uf w/hd  Anemia in CKD-Hb below goal. inc epo 10>16k tiw w/hd.   Hyperphosphatemia- high phos level, continue Renvela 2 tabs TID with meals. high pth noted, c/w hectorol 4 mcg Three times a week with HD.     poc d/w pt, PA covering and HD RN  labs, chart reviewed  Nephrology  Office [phone]  Ans Serv [phone]  Cell -[phone]

## 2021-06-07 NOTE — CHART NOTE - NSCHARTNOTEFT_GEN_A_CORE
Vascular Surgery paged during sign out regarding bleeding from R Chest permacath despite earlier attempts at hemostasis with Surgicel. Patient seen and examined at bedside. Patient in stable condition but with continued slow ooze noted from skin insertion site of Permcath.  Two simple interrupted sutures (3-0 nylon) placed at site of permacath insertion to skin and silver nitrate applied to small skin bleeder. Permacath dressed with pressure dressing and ACE bandaged wrapped around chest .    Brittani Peguero MD  C Team Surgery  [phone]

## 2021-06-07 NOTE — CHART NOTE - NSCHARTNOTEFT_GEN_A_CORE
RRT called for bleeding from permacath dressing which started on 6/6 AM. Pt denies chest pain, headache, dizziness, SOB, abdominal pain, N/V/D. Dressing site was changed. Vascular surgery was consulted and will be placing sutures at permacath site. Repeat CBC, CMP, coags, Type and screen were sent.

## 2021-06-07 NOTE — PROGRESS NOTE ADULT - SUBJECTIVE AND OBJECTIVE BOX
[facility] - S Alicia / Ej S /D Federico/ SERA Bojorquez/ SERA Faust/ Jovan Garrison / GAYATRI Matsonu/ O Gregorio  service -[phone], office [phone]  ---------------------------------------------------------------------------------------------------------------    Patient seen and examined bedside    Subjective and Objective: No overnight events, sob resolved. No complaints today. feeling better    Allergies: No Known Allergies      Hospital Medications:   MEDICATIONS  (STANDING):  aspirin enteric coated 81 milliGRAM(s) Oral daily  atorvastatin 40 milliGRAM(s) Oral at bedtime  carvedilol 12.5 milliGRAM(s) Oral every 12 hours  chlorhexidine 4% Liquid 1 Application(s) Topical <User Schedule>  doxercalciferol Injectable 4 MICROGram(s) IV Push <User Schedule>  epoetin tammi-epbx (RETACRIT) Injectable 19416 Unit(s) IV Push <User Schedule>  ferrous    sulfate 325 milliGRAM(s) Oral daily  folic acid 1 milliGRAM(s) Oral daily  heparin   Injectable 5000 Unit(s) SubCutaneous every 12 hours  levothyroxine 100 MICROGram(s) Oral daily  NIFEdipine XL 30 milliGRAM(s) Oral at bedtime  pantoprazole    Tablet 40 milliGRAM(s) Oral before breakfast  sevelamer carbonate 1600 milliGRAM(s) Oral three times a day with meals  simethicone 80 milliGRAM(s) Chew three times a day      REVIEW OF SYSTEMS:  CONSTITUTIONAL: No weakness, fevers or chills  EYES/ENT: No visual changes;  No vertigo or throat pain   NECK: No pain or stiffness  RESPIRATORY: No cough, wheezing, hemoptysis; No shortness of breath  CARDIOVASCULAR: No chest pain or palpitations.  GASTROINTESTINAL: No abdominal or epigastric pain. No nausea, vomiting, or hematemesis; No diarrhea or constipation. No melena or hematochezia.  GENITOURINARY: No dysuria, frequency, foamy urine, urinary urgency, incontinence or hematuria  NEUROLOGICAL: No numbness or weakness  SKIN: No itching, burning, rashes, or lesions   VASCULAR: No bilateral lower extremity edema.   All other review of systems is negative unless indicated above.    VITALS:  T(F): 98.3 (06-07-21 @ 10:59), Max: 99.8 (06-06-21 @ 21:38)  HR: 90 (06-07-21 @ 10:59)  BP: 182/90 (06-07-21 @ 10:59)  RR: 18 (06-07-21 @ 10:59)  SpO2: 98% (06-07-21 @ 10:59)  Wt(kg): --    06-06 @ 07:01  -  06-07 @ 07:00  --------------------------------------------------------  IN: 640 mL / OUT: 150 mL / NET: 490 mL          PHYSICAL EXAM:  Constitutional: NAD  HEENT: anicteric sclera, oropharynx clear  Neck: No JVD  Respiratory: CTAB, no wheezes, rales or rhonchi  Cardiovascular: S1, S2, RRR  Gastrointestinal: BS+, soft, NT/ND  Extremities: No cyanosis or clubbing. No peripheral edema  Neurological: A/O x 3, no focal deficits  Psychiatric: Normal mood, normal affect  : No CVA tenderness. No siegel.   Skin: No rashes  Vascular Access:    LABS:  06-07    132<L>  |  91<L>  |  88<H>  ----------------------------<  99  4.8   |  18<L>  |  11.51<H>    Ca    9.4      07 Jun 2021 06:37  Phos  8.4     06-07  Mg     2.0     06-07    TPro  6.8  /  Alb  3.2<L>  /  TBili  0.3  /  DBili      /  AST  6   /  ALT  <5<L>  /  AlkPhos  105  06-07    Creatinine Trend: 11.51 <--, 6.86 <--, 8.93 <--, 6.84 <--, 9.19 <--, 6.81 <--                        7.2    7.80  )-----------( 179      ( 07 Jun 2021 06:37 )             22.8     Urine Studies:        RADIOLOGY & ADDITIONAL STUDIES:   [facility] - S Alicia / Ej S /D Federico/ S Maryellen/ SERA Faust/ Jovan Garrison / GAYATRI Garcia/ O Gregorio  service -[phone], office [phone]  ---------------------------------------------------------------------------------------------------------------    Patient seen and examined bedside    Subjective and Objective: overnight events noted, bleeding from permacath insertion site, dressing changed x2 so far today  denied sob     Allergies: No Known Allergies      Hospital Medications:   MEDICATIONS  (STANDING):  aspirin enteric coated 81 milliGRAM(s) Oral daily  atorvastatin 40 milliGRAM(s) Oral at bedtime  carvedilol 12.5 milliGRAM(s) Oral every 12 hours  chlorhexidine 4% Liquid 1 Application(s) Topical <User Schedule>  doxercalciferol Injectable 4 MICROGram(s) IV Push <User Schedule>  epoetin tammi-epbx (RETACRIT) Injectable 08571 Unit(s) IV Push <User Schedule>  ferrous    sulfate 325 milliGRAM(s) Oral daily  folic acid 1 milliGRAM(s) Oral daily  heparin   Injectable 5000 Unit(s) SubCutaneous every 12 hours  levothyroxine 100 MICROGram(s) Oral daily  NIFEdipine XL 30 milliGRAM(s) Oral at bedtime  pantoprazole    Tablet 40 milliGRAM(s) Oral before breakfast  sevelamer carbonate 1600 milliGRAM(s) Oral three times a day with meals  simethicone 80 milliGRAM(s) Chew three times a day    VITALS:  T(F): 98.3 (06-07-21 @ 10:59), Max: 99.8 (06-06-21 @ 21:38)  HR: 90 (06-07-21 @ 10:59)  BP: 182/90 (06-07-21 @ 10:59)  RR: 18 (06-07-21 @ 10:59)  SpO2: 98% (06-07-21 @ 10:59)  Wt(kg): --    06-06 @ 07:01  -  06-07 @ 07:00  --------------------------------------------------------  IN: 640 mL / OUT: 150 mL / NET: 490 mL      PHYSICAL EXAM:  Constitutional: NAD  HEENT: anicteric sclera  Neck: No JVD  Respiratory: CTAB, no wheezes, rales or rhonchi  Cardiovascular: S1, S2, RRR  Gastrointestinal: BS+, soft, NT  Extremities: No peripheral edema  Neurological: A/O x 3  Psychiatric: Normal mood, normal affect  : No siegel.   Vascular Access: rt IJ PC; rt wrist AVF    LABS:  06-07    132<L>  |  91<L>  |  88<H>  ----------------------------<  99  4.8   |  18<L>  |  11.51<H>    Ca    9.4      07 Jun 2021 06:37  Phos  8.4     06-07  Mg     2.0     06-07    TPro  6.8  /  Alb  3.2<L>  /  TBili  0.3  /  DBili      /  AST  6   /  ALT  <5<L>  /  AlkPhos  105  06-07    Creatinine Trend: 11.51 <--, 6.86 <--, 8.93 <--, 6.84 <--, 9.19 <--, 6.81 <--                        7.2    7.80  )-----------( 179      ( 07 Jun 2021 06:37 )             22.8     Urine Studies:        RADIOLOGY & ADDITIONAL STUDIES:

## 2021-06-07 NOTE — RAPID RESPONSE TEAM SUMMARY - NSSITUATIONBACKGROUNDRRT_GEN_ALL_CORE
66M hx of ESRD on HD (M/W/F, previously via LUE AVF, now via chest wall catheter), anemia, hyperparathyroidism, thrombocytopenia, hypothyroidism, HTN who presents with shortness of breath and LE swelling likely due to volume overload from missed HD session, admitted for HD. RRT called for staff concern regarding oozing at perm catheter site.

## 2021-06-08 LAB
ANION GAP SERPL CALC-SCNC: 25 MMOL/L — HIGH (ref 7–14)
BLD GP AB SCN SERPL QL: NEGATIVE — SIGNIFICANT CHANGE UP
BUN SERPL-MCNC: 111 MG/DL — HIGH (ref 7–23)
CALCIUM SERPL-MCNC: 9.7 MG/DL — SIGNIFICANT CHANGE UP (ref 8.4–10.5)
CHLORIDE SERPL-SCNC: 89 MMOL/L — LOW (ref 98–107)
CO2 SERPL-SCNC: 17 MMOL/L — LOW (ref 22–31)
CREAT SERPL-MCNC: 13.19 MG/DL — HIGH (ref 0.5–1.3)
GLUCOSE SERPL-MCNC: 99 MG/DL — SIGNIFICANT CHANGE UP (ref 70–99)
HCT VFR BLD CALC: 23.3 % — LOW (ref 39–50)
HGB BLD-MCNC: 7.3 G/DL — LOW (ref 13–17)
MAGNESIUM SERPL-MCNC: 2.1 MG/DL — SIGNIFICANT CHANGE UP (ref 1.6–2.6)
MCHC RBC-ENTMCNC: 27.4 PG — SIGNIFICANT CHANGE UP (ref 27–34)
MCHC RBC-ENTMCNC: 31.3 GM/DL — LOW (ref 32–36)
MCV RBC AUTO: 87.6 FL — SIGNIFICANT CHANGE UP (ref 80–100)
NRBC # BLD: 0 /100 WBCS — SIGNIFICANT CHANGE UP
NRBC # FLD: 0 K/UL — SIGNIFICANT CHANGE UP
PHOSPHATE SERPL-MCNC: 9.8 MG/DL — HIGH (ref 2.5–4.5)
PLATELET # BLD AUTO: 218 K/UL — SIGNIFICANT CHANGE UP (ref 150–400)
POTASSIUM SERPL-MCNC: 5.4 MMOL/L — HIGH (ref 3.5–5.3)
POTASSIUM SERPL-SCNC: 5.4 MMOL/L — HIGH (ref 3.5–5.3)
RBC # BLD: 2.66 M/UL — LOW (ref 4.2–5.8)
RBC # FLD: 17.2 % — HIGH (ref 10.3–14.5)
RH IG SCN BLD-IMP: POSITIVE — SIGNIFICANT CHANGE UP
SODIUM SERPL-SCNC: 131 MMOL/L — LOW (ref 135–145)
WBC # BLD: 8.04 K/UL — SIGNIFICANT CHANGE UP (ref 3.8–10.5)
WBC # FLD AUTO: 8.04 K/UL — SIGNIFICANT CHANGE UP (ref 3.8–10.5)

## 2021-06-08 RX ORDER — DOXERCALCIFEROL 2.5 UG/1
4 CAPSULE ORAL
Refills: 0 | Status: DISCONTINUED | OUTPATIENT
Start: 2021-06-08 | End: 2021-06-19

## 2021-06-08 RX ORDER — NIFEDIPINE 30 MG
30 TABLET, EXTENDED RELEASE 24 HR ORAL AT BEDTIME
Refills: 0 | Status: DISCONTINUED | OUTPATIENT
Start: 2021-06-08 | End: 2021-06-16

## 2021-06-08 RX ORDER — DESMOPRESSIN ACETATE 0.1 MG/1
20 TABLET ORAL ONCE
Refills: 0 | Status: COMPLETED | OUTPATIENT
Start: 2021-06-08 | End: 2021-06-08

## 2021-06-08 RX ADMIN — CHLORHEXIDINE GLUCONATE 1 APPLICATION(S): 213 SOLUTION TOPICAL at 15:00

## 2021-06-08 RX ADMIN — SIMETHICONE 80 MILLIGRAM(S): 80 TABLET, CHEWABLE ORAL at 15:01

## 2021-06-08 RX ADMIN — SIMETHICONE 80 MILLIGRAM(S): 80 TABLET, CHEWABLE ORAL at 06:16

## 2021-06-08 RX ADMIN — SEVELAMER CARBONATE 1600 MILLIGRAM(S): 2400 POWDER, FOR SUSPENSION ORAL at 10:12

## 2021-06-08 RX ADMIN — Medication 650 MILLIGRAM(S): at 18:03

## 2021-06-08 RX ADMIN — DESMOPRESSIN ACETATE 220 MICROGRAM(S): 0.1 TABLET ORAL at 10:12

## 2021-06-08 RX ADMIN — Medication 81 MILLIGRAM(S): at 15:01

## 2021-06-08 RX ADMIN — SEVELAMER CARBONATE 1600 MILLIGRAM(S): 2400 POWDER, FOR SUSPENSION ORAL at 17:59

## 2021-06-08 RX ADMIN — Medication 325 MILLIGRAM(S): at 15:01

## 2021-06-08 RX ADMIN — PANTOPRAZOLE SODIUM 40 MILLIGRAM(S): 20 TABLET, DELAYED RELEASE ORAL at 06:16

## 2021-06-08 RX ADMIN — ATORVASTATIN CALCIUM 40 MILLIGRAM(S): 80 TABLET, FILM COATED ORAL at 21:27

## 2021-06-08 RX ADMIN — Medication 650 MILLIGRAM(S): at 10:32

## 2021-06-08 RX ADMIN — Medication 1 MILLIGRAM(S): at 15:01

## 2021-06-08 RX ADMIN — Medication 650 MILLIGRAM(S): at 18:33

## 2021-06-08 RX ADMIN — CARVEDILOL PHOSPHATE 12.5 MILLIGRAM(S): 80 CAPSULE, EXTENDED RELEASE ORAL at 06:16

## 2021-06-08 RX ADMIN — Medication 650 MILLIGRAM(S): at 11:09

## 2021-06-08 RX ADMIN — Medication 100 MICROGRAM(S): at 06:16

## 2021-06-08 RX ADMIN — ERYTHROPOIETIN 16000 UNIT(S): 10000 INJECTION, SOLUTION INTRAVENOUS; SUBCUTANEOUS at 11:48

## 2021-06-08 RX ADMIN — Medication 30 MILLIGRAM(S): at 22:00

## 2021-06-08 RX ADMIN — SIMETHICONE 80 MILLIGRAM(S): 80 TABLET, CHEWABLE ORAL at 21:27

## 2021-06-08 RX ADMIN — DOXERCALCIFEROL 4 MICROGRAM(S): 2.5 CAPSULE ORAL at 12:18

## 2021-06-08 RX ADMIN — CARVEDILOL PHOSPHATE 12.5 MILLIGRAM(S): 80 CAPSULE, EXTENDED RELEASE ORAL at 17:59

## 2021-06-08 NOTE — PROGRESS NOTE ADULT - ASSESSMENT
66M hx of ESRD on HD (M/W/F, previously via LUE AVF, now via chest wall catheter), anemia, hyperparathyroidism, thrombocytopenia, hypothyroidism, HTN who presents with shortness of breath and LE swelling likely due to volume overload from missed HD session, admitted for HD. Renal following for ESRD Mx.     ESRD on HD   K, volume acceptable  s/p new RUE avf and RIJ permacath   Pt tolerating HD today.   accepted to outpt unit on MWF schedule.   vasc surgery f/u for bleeding from cath site,   renal diet, fluid restriction 1L/day  dose all meds for ESRD  HTN, relatively low today, c/w BB. Lower Nifedipine back to 30mg daily   Anemia in CKD-Hb below goal. inc epo 10>16k tiw w/hd. Transfused prbc today with HD  Hyperphosphatemia- high phos level, continue Renvela 2 tabs TID with meals. high pth noted, c/w hectorol 4 mcg Three times a week with HD.     Lennox Pagan MD  [facility]  Office [phone]  Ans Serv [phone] cell - [phone]

## 2021-06-08 NOTE — PROGRESS NOTE ADULT - ASSESSMENT
66M hx of ESRD on HD (M/W/F, previously via LUE AVF, now via chest wall catheter), anemia, hyperparathyroidism, thrombocytopenia, hypothyroidism, HTN presenting to the ED for urgent HD, with non-functioning AVF since 3/2021. Now s/p R radiocephalic AVF creation on 6/3 and permacath placement. Now with permacath oozing     - Likely oozing due to elevated BUN  - Needs dialysis to correct coagulopathy  - Recommend DDAVP as BUN>80, can be given prior to dialysis     Vascular Surgery (C Team)  [id number]

## 2021-06-08 NOTE — PROGRESS NOTE ADULT - SUBJECTIVE AND OBJECTIVE BOX
Nephrology Followup Note - [phone] - Dr Pagan / Dr Vargas / Dr Faust / Dr Caballero / Dr Bojorquez / Dr Perkins / Dr Garrison / Dr Garcia  Pt seen and examined during HD   Pt with bleeding from tunneled cath site.   Compression dressing noted over cath site.   Otherwise pt without complaints.     Allergies:  No Known Allergies    Hospital Medications:   MEDICATIONS  (STANDING):  aspirin enteric coated 81 milliGRAM(s) Oral daily  atorvastatin 40 milliGRAM(s) Oral at bedtime  carvedilol 12.5 milliGRAM(s) Oral every 12 hours  chlorhexidine 4% Liquid 1 Application(s) Topical <User Schedule>  doxercalciferol Injectable 4 MICROGram(s) IV Push <User Schedule>  epoetin tammi-epbx (RETACRIT) Injectable 21550 Unit(s) IV Push <User Schedule>  ferrous    sulfate 325 milliGRAM(s) Oral daily  folic acid 1 milliGRAM(s) Oral daily  levothyroxine 100 MICROGram(s) Oral daily  NIFEdipine XL 60 milliGRAM(s) Oral at bedtime  pantoprazole    Tablet 40 milliGRAM(s) Oral before breakfast  sevelamer carbonate 1600 milliGRAM(s) Oral three times a day with meals  simethicone 80 milliGRAM(s) Chew three times a day    VITALS:  T(F): 97.6 (06-08-21 @ 09:18), Max: 99.2 (06-07-21 @ 17:39)  HR: 78 (06-08-21 @ 09:18)  BP: 106/60 (06-08-21 @ 09:18)  RR: 18 (06-08-21 @ 09:18)  SpO2: 100% (06-08-21 @ 09:18)  Wt(kg): --    06-07 @ 07:01  -  06-08 @ 07:00  --------------------------------------------------------  IN: 0 mL / OUT: 400 mL / NET: -400 mL        PHYSICAL EXAM:  Constitutional: NAD  HEENT: anicteric sclera, oropharynx clear, MMM  Neck: No JVD  Respiratory: CTAB, no wheezes, rales or rhonchi  Cardiovascular: S1, S2, RRR  Gastrointestinal: BS+, soft, NT/ND  Extremities: No cyanosis or clubbing. No peripheral edema  Neurological: A/O x 3, no focal deficits  Psychiatric: Normal mood, normal affect  : No CVA tenderness. No siegel.   Skin: No rashes  Vascular Access: RIJ Tunneled cath. R wrist AV access +thrill and bruit.     LABS:  06-08    131<L>  |  89<L>  |  111<H>  ----------------------------<  99  5.4<H>   |  17<L>  |  13.19<H>    Ca    9.7      08 Jun 2021 06:30  Phos  9.8     06-08  Mg     2.1     06-08    TPro  6.6  /  Alb  3.0<L>  /  TBili  0.3  /  DBili      /  AST  7   /  ALT  <5<L>  /  AlkPhos  102  06-07    Creatinine Trend: 13.19 <--, 12.55 <--, 11.51 <--, 6.86 <--, 8.93 <--, 6.84 <--, 9.19 <--                        7.3    8.04  )-----------( 218      ( 08 Jun 2021 06:30 )             23.3     Urine Studies:      RADIOLOGY & ADDITIONAL STUDIES:

## 2021-06-08 NOTE — PROGRESS NOTE ADULT - SUBJECTIVE AND OBJECTIVE BOX
Patient is a 66y old  Male who presents with a chief complaint of urgent HD (07 Jun 2021 14:32)      SUBJECTIVE / OVERNIGHT EVENTS:    Events noted.  CONSTITUTIONAL: Oozing from Perma cath site  RESPIRATORY: No cough, wheezing,  No shortness of breath  CARDIOVASCULAR: No chest pain, palpitations, dizziness, or leg swelling  GASTROINTESTINAL: No abdominal or epigastric pain. No nausea, vomiting.  NEUROLOGICAL: No headaches,     MEDICATIONS  (STANDING):  aspirin enteric coated 81 milliGRAM(s) Oral daily  atorvastatin 40 milliGRAM(s) Oral at bedtime  carvedilol 12.5 milliGRAM(s) Oral every 12 hours  chlorhexidine 4% Liquid 1 Application(s) Topical <User Schedule>  doxercalciferol Injectable 4 MICROGram(s) IV Push <User Schedule>  epoetin tammi-epbx (RETACRIT) Injectable 59668 Unit(s) IV Push <User Schedule>  ferrous    sulfate 325 milliGRAM(s) Oral daily  folic acid 1 milliGRAM(s) Oral daily  levothyroxine 100 MICROGram(s) Oral daily  NIFEdipine XL 60 milliGRAM(s) Oral at bedtime  pantoprazole    Tablet 40 milliGRAM(s) Oral before breakfast  sevelamer carbonate 1600 milliGRAM(s) Oral three times a day with meals  simethicone 80 milliGRAM(s) Chew three times a day    MEDICATIONS  (PRN):  acetaminophen   Tablet .. 650 milliGRAM(s) Oral every 6 hours PRN Moderate Pain (4 - 6)        CAPILLARY BLOOD GLUCOSE      POCT Blood Glucose.: 170 mg/dL (07 Jun 2021 18:47)    I&O's Summary    06 Jun 2021 07:01  -  07 Jun 2021 07:00  --------------------------------------------------------  IN: 640 mL / OUT: 150 mL / NET: 490 mL    07 Jun 2021 07:01  -  08 Jun 2021 00:54  --------------------------------------------------------  IN: 0 mL / OUT: 200 mL / NET: -200 mL        T(C): 36.9 (06-07-21 @ 21:32), Max: 37.3 (06-07-21 @ 17:39)  HR: 80 (06-07-21 @ 21:32) (80 - 90)  BP: 134/76 (06-07-21 @ 21:32) (134/76 - 182/90)  RR: 17 (06-07-21 @ 21:32) (17 - 18)  SpO2: 98% (06-07-21 @ 21:32) (98% - 99%)    PHYSICAL EXAM:  GENERAL: NAD  NECK: Supple, No JVD  CHEST/LUNG: Clear to auscultation bilaterally; No wheezing.  HEART: Regular rate and rhythm; No murmurs, rubs, or gallops  ABDOMEN: Soft, Nontender, Nondistended; Bowel sounds present  EXTREMITIES:   No edema  NEUROLOGY: AAO X 3      LABS:                        7.0    8.03  )-----------( 191      ( 07 Jun 2021 18:49 )             22.2     06-07    131<L>  |  90<L>  |  95<H>  ----------------------------<  133<H>  5.1   |  18<L>  |  12.55<H>    Ca    9.2      07 Jun 2021 19:26  Phos  8.6     06-07  Mg     2.0     06-07    TPro  6.6  /  Alb  3.0<L>  /  TBili  0.3  /  DBili  x   /  AST  7   /  ALT  <5<L>  /  AlkPhos  102  06-07    PT/INR - ( 07 Jun 2021 19:26 )   PT: 12.7 sec;   INR: 1.11 ratio         PTT - ( 07 Jun 2021 19:26 )  PTT:31.1 sec        CAPILLARY BLOOD GLUCOSE      POCT Blood Glucose.: 170 mg/dL (07 Jun 2021 18:47)        RADIOLOGY & ADDITIONAL TESTS:    Imaging Personally Reviewed:    Consultant(s) Notes Reviewed:      Care Discussed with Consultants/Other Providers:    Wilfred Christian MD, CMD, FACP    [address] [place] [address]  Office Tel: [phone]  Cell: [phone]

## 2021-06-08 NOTE — PROGRESS NOTE ADULT - SUBJECTIVE AND OBJECTIVE BOX
GENERAL SURGERY PROGRESS NOTE    INTERVAL EVENTS: Patient had multiple episodes of oozing from permacath. Pressure, surgicel and stitches placed. Dialysis cancelled due to permacath oozing. Denies chest pain, SOB, dizziness      OBJECTIVE:    Vital Signs Last 24 Hrs  T(C): 36.6 (08 Jun 2021 06:12), Max: 37.3 (07 Jun 2021 17:39)  T(F): 97.9 (08 Jun 2021 06:12), Max: 99.2 (07 Jun 2021 17:39)  HR: 80 (08 Jun 2021 06:12) (77 - 90)  BP: 117/71 (08 Jun 2021 06:12) (117/71 - 182/90)  BP(mean): --  RR: 18 (08 Jun 2021 06:12) (17 - 18)  SpO2: 100% (08 Jun 2021 06:12) (98% - 100%)    General Appearance: Resting comfortably, no acute distress  Chest: non-labored breathing, no respiratory distress  CV: Pulse regular presently  Abdomen: Soft, non-tender, non-distended  Extremities: permacath in place with minimal oozing, AVF with palpable thrill    I&O's Summary    07 Jun 2021 07:01  -  08 Jun 2021 07:00  --------------------------------------------------------  IN: 0 mL / OUT: 200 mL / NET: -200 mL      I&O's Detail    07 Jun 2021 07:01  -  08 Jun 2021 07:00  --------------------------------------------------------  IN:  Total IN: 0 mL    OUT:    Voided (mL): 200 mL  Total OUT: 200 mL    Total NET: -200 mL            LABS:                        7.3    8.04  )-----------( 218      ( 08 Jun 2021 06:30 )             23.3     06-08    131<L>  |  89<L>  |  111<H>  ----------------------------<  99  5.4<H>   |  17<L>  |  13.19<H>    Ca    9.7      08 Jun 2021 06:30  Phos  9.8     06-08  Mg     2.1     06-08    TPro  6.6  /  Alb  3.0<L>  /  TBili  0.3  /  DBili  x   /  AST  7   /  ALT  <5<L>  /  AlkPhos  102  06-07    PT/INR - ( 07 Jun 2021 19:26 )   PT: 12.7 sec;   INR: 1.11 ratio         PTT - ( 07 Jun 2021 19:26 )  PTT:31.1 sec      RADIOLOGY & ADDITIONAL STUDIES:

## 2021-06-09 LAB
ANION GAP SERPL CALC-SCNC: 19 MMOL/L — HIGH (ref 7–14)
BUN SERPL-MCNC: 71 MG/DL — HIGH (ref 7–23)
CALCIUM SERPL-MCNC: 9.8 MG/DL — SIGNIFICANT CHANGE UP (ref 8.4–10.5)
CHLORIDE SERPL-SCNC: 92 MMOL/L — LOW (ref 98–107)
CO2 SERPL-SCNC: 21 MMOL/L — LOW (ref 22–31)
CREAT SERPL-MCNC: 8.59 MG/DL — HIGH (ref 0.5–1.3)
GLUCOSE SERPL-MCNC: 102 MG/DL — HIGH (ref 70–99)
HCT VFR BLD CALC: 24.1 % — LOW (ref 39–50)
HGB BLD-MCNC: 7.7 G/DL — LOW (ref 13–17)
MAGNESIUM SERPL-MCNC: 1.8 MG/DL — SIGNIFICANT CHANGE UP (ref 1.6–2.6)
MCHC RBC-ENTMCNC: 27.6 PG — SIGNIFICANT CHANGE UP (ref 27–34)
MCHC RBC-ENTMCNC: 32 GM/DL — SIGNIFICANT CHANGE UP (ref 32–36)
MCV RBC AUTO: 86.4 FL — SIGNIFICANT CHANGE UP (ref 80–100)
NRBC # BLD: 0 /100 WBCS — SIGNIFICANT CHANGE UP
NRBC # FLD: 0 K/UL — SIGNIFICANT CHANGE UP
PHOSPHATE SERPL-MCNC: 7.5 MG/DL — HIGH (ref 2.5–4.5)
PLATELET # BLD AUTO: 185 K/UL — SIGNIFICANT CHANGE UP (ref 150–400)
POTASSIUM SERPL-MCNC: 5.2 MMOL/L — SIGNIFICANT CHANGE UP (ref 3.5–5.3)
POTASSIUM SERPL-SCNC: 5.2 MMOL/L — SIGNIFICANT CHANGE UP (ref 3.5–5.3)
RBC # BLD: 2.79 M/UL — LOW (ref 4.2–5.8)
RBC # FLD: 17.2 % — HIGH (ref 10.3–14.5)
SODIUM SERPL-SCNC: 132 MMOL/L — LOW (ref 135–145)
WBC # BLD: 5.98 K/UL — SIGNIFICANT CHANGE UP (ref 3.8–10.5)
WBC # FLD AUTO: 5.98 K/UL — SIGNIFICANT CHANGE UP (ref 3.8–10.5)

## 2021-06-09 PROCEDURE — 71045 X-RAY EXAM CHEST 1 VIEW: CPT | Mod: 26

## 2021-06-09 RX ORDER — HEPARIN SODIUM 5000 [USP'U]/ML
5000 INJECTION INTRAVENOUS; SUBCUTANEOUS EVERY 12 HOURS
Refills: 0 | Status: DISCONTINUED | OUTPATIENT
Start: 2021-06-09 | End: 2021-06-11

## 2021-06-09 RX ORDER — HYDROMORPHONE HYDROCHLORIDE 2 MG/ML
1.5 INJECTION INTRAMUSCULAR; INTRAVENOUS; SUBCUTANEOUS EVERY 8 HOURS
Refills: 0 | Status: DISCONTINUED | OUTPATIENT
Start: 2021-06-09 | End: 2021-06-10

## 2021-06-09 RX ADMIN — Medication 650 MILLIGRAM(S): at 05:30

## 2021-06-09 RX ADMIN — HYDROMORPHONE HYDROCHLORIDE 1.5 MILLIGRAM(S): 2 INJECTION INTRAMUSCULAR; INTRAVENOUS; SUBCUTANEOUS at 21:42

## 2021-06-09 RX ADMIN — HYDROMORPHONE HYDROCHLORIDE 1.5 MILLIGRAM(S): 2 INJECTION INTRAMUSCULAR; INTRAVENOUS; SUBCUTANEOUS at 22:41

## 2021-06-09 RX ADMIN — PANTOPRAZOLE SODIUM 40 MILLIGRAM(S): 20 TABLET, DELAYED RELEASE ORAL at 05:25

## 2021-06-09 RX ADMIN — SEVELAMER CARBONATE 1600 MILLIGRAM(S): 2400 POWDER, FOR SUSPENSION ORAL at 11:45

## 2021-06-09 RX ADMIN — Medication 650 MILLIGRAM(S): at 22:00

## 2021-06-09 RX ADMIN — Medication 1 MILLIGRAM(S): at 11:45

## 2021-06-09 RX ADMIN — Medication 325 MILLIGRAM(S): at 11:45

## 2021-06-09 RX ADMIN — Medication 81 MILLIGRAM(S): at 11:45

## 2021-06-09 RX ADMIN — CARVEDILOL PHOSPHATE 12.5 MILLIGRAM(S): 80 CAPSULE, EXTENDED RELEASE ORAL at 17:36

## 2021-06-09 RX ADMIN — ATORVASTATIN CALCIUM 40 MILLIGRAM(S): 80 TABLET, FILM COATED ORAL at 21:35

## 2021-06-09 RX ADMIN — SIMETHICONE 80 MILLIGRAM(S): 80 TABLET, CHEWABLE ORAL at 05:25

## 2021-06-09 RX ADMIN — CHLORHEXIDINE GLUCONATE 1 APPLICATION(S): 213 SOLUTION TOPICAL at 09:12

## 2021-06-09 RX ADMIN — SEVELAMER CARBONATE 1600 MILLIGRAM(S): 2400 POWDER, FOR SUSPENSION ORAL at 09:11

## 2021-06-09 RX ADMIN — SIMETHICONE 80 MILLIGRAM(S): 80 TABLET, CHEWABLE ORAL at 14:11

## 2021-06-09 RX ADMIN — Medication 100 MICROGRAM(S): at 05:25

## 2021-06-09 RX ADMIN — Medication 650 MILLIGRAM(S): at 04:11

## 2021-06-09 RX ADMIN — CARVEDILOL PHOSPHATE 12.5 MILLIGRAM(S): 80 CAPSULE, EXTENDED RELEASE ORAL at 05:25

## 2021-06-09 RX ADMIN — Medication 650 MILLIGRAM(S): at 21:33

## 2021-06-09 RX ADMIN — SEVELAMER CARBONATE 1600 MILLIGRAM(S): 2400 POWDER, FOR SUSPENSION ORAL at 17:36

## 2021-06-09 RX ADMIN — Medication 30 MILLIGRAM(S): at 21:42

## 2021-06-09 NOTE — PHYSICAL THERAPY INITIAL EVALUATION ADULT - PATIENT PROFILE REVIEW, REHAB EVAL
PT orders received: ambulate as tolerated, ambulate with assistance. Consult with RN Trent MEHTA, patient may participate in PT evaluation./yes

## 2021-06-09 NOTE — PROGRESS NOTE ADULT - ASSESSMENT
66M hx of ESRD on HD (M/W/F, previously via LUE AVF, now via chest wall catheter), anemia, hyperparathyroidism, thrombocytopenia, hypothyroidism, HTN who presents with shortness of breath and LE swelling likely due to volume overload from missed HD session, admitted for HD. Renal following for ESRD Mx.     ESRD on HD   K, volume acceptable  s/p new RUE avf and RIJ permacath   Pt tolerated HD yesterday, repeat HD tomorrow   accepted to outpt unit on MWF schedule.   vasc surgery f/u for bleeding from cath site,   renal diet, fluid restriction 1L/day  dose all meds for ESRD  HTN, controlled. c/w BB and Nifedipine  Anemia in CKD-Hb below goal. Continue epo 16K tiw w/hd.   Hyperphosphatemia- high phos level, continue Renvela 2 tabs TID with meals. high pth noted, c/w hectorol 4 mcg Three times a week with HD.     Lennox Pagan MD  [facility]  Office [phone]  Ans Serv [phone]  Cell - [phone]

## 2021-06-09 NOTE — PHYSICAL THERAPY INITIAL EVALUATION ADULT - DISCHARGE DISPOSITION, PT EVAL
Anticipate discharge home; outpatient PT. Gait distance limited by dizziness. Please continue to follow progress notes.

## 2021-06-09 NOTE — PHYSICAL THERAPY INITIAL EVALUATION ADULT - IMPAIRED TRANSFERS: SIT/STAND, REHAB EVAL
Initial stand, pt with c/p dizziness. Pt rested on edge of bed, dizziness subsided./decreased strength

## 2021-06-09 NOTE — PROGRESS NOTE ADULT - SUBJECTIVE AND OBJECTIVE BOX
VASCULAR SURGERY PROGRESS NOTE    INTERVAL EVENTS: Bleeding subsided after DDAVP and dialysis. Called back overnight due to pain at permacath site. Evaluated and pressure dressing very tight, loosened. This morning pain has subsided, no bleeding, no hematoma       OBJECTIVE:    Vital Signs Last 24 Hrs  T(C): 36.6 (09 Jun 2021 05:23), Max: 37.1 (08 Jun 2021 21:51)  T(F): 97.9 (09 Jun 2021 05:23), Max: 98.7 (08 Jun 2021 21:51)  HR: 87 (09 Jun 2021 05:23) (78 - 91)  BP: 127/74 (09 Jun 2021 05:23) (98/55 - 135/75)  BP(mean): --  RR: 18 (09 Jun 2021 05:23) (17 - 18)  SpO2: 100% (09 Jun 2021 05:23) (97% - 100%)    General Appearance: Resting comfortably, no acute distress  Chest: non-labored breathing, no respiratory distress  CV: Pulse regular presently  Abdomen: Soft, non-tender, non-distended  Extremities: permacath in place without oozing or hematoma, AVF with palpable thrill    I&O's Summary    08 Jun 2021 07:01  -  09 Jun 2021 07:00  --------------------------------------------------------  IN: 1220 mL / OUT: 2200 mL / NET: -980 mL      I&O's Detail    08 Jun 2021 07:01  -  09 Jun 2021 07:00  --------------------------------------------------------  IN:    Oral Fluid: 520 mL    Other (mL): 700 mL  Total IN: 1220 mL    OUT:    Other (mL): 2200 mL  Total OUT: 2200 mL    Total NET: -980 mL            LABS:                        7.7    5.98  )-----------( 185      ( 09 Jun 2021 06:26 )             24.1     06-09    132<L>  |  92<L>  |  71<H>  ----------------------------<  102<H>  5.2   |  21<L>  |  8.59<H>    Ca    9.8      09 Jun 2021 06:26  Phos  7.5     06-09  Mg     1.8     06-09    TPro  6.6  /  Alb  3.0<L>  /  TBili  0.3  /  DBili  x   /  AST  7   /  ALT  <5<L>  /  AlkPhos  102  06-07    PT/INR - ( 07 Jun 2021 19:26 )   PT: 12.7 sec;   INR: 1.11 ratio         PTT - ( 07 Jun 2021 19:26 )  PTT:31.1 sec      RADIOLOGY & ADDITIONAL STUDIES:

## 2021-06-09 NOTE — PHYSICAL THERAPY INITIAL EVALUATION ADULT - PERTINENT HX OF CURRENT PROBLEM, REHAB EVAL
Pt is a 66 year old male presenting with SOB and LE swelling likely due to volume overload from missed HD session. Pt is a 66 year old male presenting with SOB and LE swelling likely due to volume overload from missed HD session. Admitted for HD. Pt s/p RRT on 6/7 for staff concern regarding oozing at perm catheter. PMH: ESRD on HD (M/W/F, previously via LUE AVF, now via chest wall catheter), anemia, hyperparathyroidism, thrombocytopenia, hypothyroidism, HTN.

## 2021-06-09 NOTE — PHYSICAL THERAPY INITIAL EVALUATION ADULT - ADDITIONAL COMMENTS
Pt lives in a house with + flight of stairs to negotiate. Prior to admission, pt ambulated independently, no assistive device.    Pt was left semi-supine with head of bed elevated to 30°, all lines/tubes intact and call bell within reach, RN aware.

## 2021-06-09 NOTE — PROGRESS NOTE ADULT - ASSESSMENT
66M hx of ESRD on HD (M/W/F, previously via LUE AVF, now via chest wall catheter), anemia, hyperparathyroidism, thrombocytopenia, hypothyroidism, HTN presenting to the ED for urgent HD, with non-functioning AVF since 3/2021. Now s/p R radiocephalic AVF creation on 6/3 and permacath placement.     - Bleeding from permacath site improved with dialysis and DDAVp  - Pain from site improved with lighter dressing  - No acute vascular surgery intervention  - Please call back with any further questions or concerns    Vascular Surgery (C Team)  [id number]

## 2021-06-09 NOTE — PHYSICAL THERAPY INITIAL EVALUATION ADULT - DID THE PATIENT HAVE SURGERY?
n/a Creation of right radiocephalic fistula, Replacement of Permacath catheter with imaging guidance/yes

## 2021-06-09 NOTE — PROGRESS NOTE ADULT - PROBLEM SELECTOR PLAN 1
Nephro f/up noted.  On HD  S/p Right radiocephalic fistula creation and right IJ permacath under fluoro  DC planning

## 2021-06-09 NOTE — CHART NOTE - NSCHARTNOTEFT_GEN_A_CORE
Notified by RN that the patient's permacath site is very painful and the permacath seems to be bulging out near his neck.     Patient was evaluated at bedside, at time of evaluation he was noted to be sitting up at edge of bed, in pain. He states that he cannot lie down flat due to the pain he is having in his neck. The pain is manageable when sitting up in bed, and severe 10/10 when the site is touched or he is lying flat. The permacath seems to be very superficial, bulging near the skin of the neck, no skin breakage noted. When site touched, pt noted to be wincing in pain. Concern for permacath misplacement/ dysfunction. Urgent CXR ordered and Vascular Surgery called. Vascular surgery stated someone from their team will come look at site. Will follow further recs. Tylenol given for pain per pt's request. Will continue to monitor pt closely.

## 2021-06-09 NOTE — PROGRESS NOTE ADULT - SUBJECTIVE AND OBJECTIVE BOX
Patient is a 66y old  Male who presents with a chief complaint of urgent HD (09 Jun 2021 11:43)      SUBJECTIVE / OVERNIGHT EVENTS:    Events noted.  CONSTITUTIONAL: No fever,  or fatigue  RESPIRATORY: No cough, wheezing,  No shortness of breath  CARDIOVASCULAR: No chest pain, palpitations, dizziness, or leg swelling  GASTROINTESTINAL: No abdominal or epigastric pain. No nausea, vomiting.  NEUROLOGICAL: No headaches,     MEDICATIONS  (STANDING):  aspirin enteric coated 81 milliGRAM(s) Oral daily  atorvastatin 40 milliGRAM(s) Oral at bedtime  carvedilol 12.5 milliGRAM(s) Oral every 12 hours  chlorhexidine 4% Liquid 1 Application(s) Topical <User Schedule>  doxercalciferol Injectable 4 MICROGram(s) IV Push <User Schedule>  epoetin tammi-epbx (RETACRIT) Injectable 28327 Unit(s) IV Push <User Schedule>  ferrous    sulfate 325 milliGRAM(s) Oral daily  folic acid 1 milliGRAM(s) Oral daily  heparin   Injectable 5000 Unit(s) SubCutaneous every 12 hours  levothyroxine 100 MICROGram(s) Oral daily  NIFEdipine XL 30 milliGRAM(s) Oral at bedtime  pantoprazole    Tablet 40 milliGRAM(s) Oral before breakfast  sevelamer carbonate 1600 milliGRAM(s) Oral three times a day with meals    MEDICATIONS  (PRN):  acetaminophen   Tablet .. 650 milliGRAM(s) Oral every 6 hours PRN Moderate Pain (4 - 6)        CAPILLARY BLOOD GLUCOSE        I&O's Summary    08 Jun 2021 07:01  -  09 Jun 2021 07:00  --------------------------------------------------------  IN: 1220 mL / OUT: 2200 mL / NET: -980 mL    09 Jun 2021 07:01  -  09 Jun 2021 15:44  --------------------------------------------------------  IN: 240 mL / OUT: 0 mL / NET: 240 mL        T(C): 36.7 (06-09-21 @ 10:30), Max: 37.1 (06-08-21 @ 21:51)  HR: 86 (06-09-21 @ 10:30) (84 - 91)  BP: 138/68 (06-09-21 @ 10:30) (98/55 - 138/68)  RR: 18 (06-09-21 @ 10:30) (17 - 18)  SpO2: 98% (06-09-21 @ 10:30) (97% - 100%)    PHYSICAL EXAM:  GENERAL: NAD  NECK: Supple, No JVD  CHEST/LUNG: Clear to auscultation bilaterally; No wheezing.  HEART: Regular rate and rhythm; No murmurs, rubs, or gallops  ABDOMEN: Soft, Nontender, Nondistended; Bowel sounds present  EXTREMITIES:   No edema  NEUROLOGY: AAO X 3      LABS:                        7.7    5.98  )-----------( 185      ( 09 Jun 2021 06:26 )             24.1     06-09    132<L>  |  92<L>  |  71<H>  ----------------------------<  102<H>  5.2   |  21<L>  |  8.59<H>    Ca    9.8      09 Jun 2021 06:26  Phos  7.5     06-09  Mg     1.8     06-09    TPro  6.6  /  Alb  3.0<L>  /  TBili  0.3  /  DBili  x   /  AST  7   /  ALT  <5<L>  /  AlkPhos  102  06-07    PT/INR - ( 07 Jun 2021 19:26 )   PT: 12.7 sec;   INR: 1.11 ratio         PTT - ( 07 Jun 2021 19:26 )  PTT:31.1 sec        CAPILLARY BLOOD GLUCOSE            RADIOLOGY & ADDITIONAL TESTS:    Imaging Personally Reviewed:    Consultant(s) Notes Reviewed:      Care Discussed with Consultants/Other Providers:    Wilfred Christian MD, CMD, FACP    [address] [place] [address]  Office Tel: [phone]  Cell: [phone]

## 2021-06-09 NOTE — PROGRESS NOTE ADULT - SUBJECTIVE AND OBJECTIVE BOX
Nephrology Followup Note - [phone] - Dr Pagan / Dr Vargas / Dr Faust / Dr Caballero / Dr Bojorquez / Dr Perkins / Dr Garrison / Dr Garcia  Pt seen and examined at bedside  Pt complained of pain from tunneled cath site overnight. Improved now.     Allergies:  No Known Allergies    Hospital Medications:   MEDICATIONS  (STANDING):  aspirin enteric coated 81 milliGRAM(s) Oral daily  atorvastatin 40 milliGRAM(s) Oral at bedtime  carvedilol 12.5 milliGRAM(s) Oral every 12 hours  chlorhexidine 4% Liquid 1 Application(s) Topical <User Schedule>  doxercalciferol Injectable 4 MICROGram(s) IV Push <User Schedule>  epoetin tammi-epbx (RETACRIT) Injectable 96593 Unit(s) IV Push <User Schedule>  ferrous    sulfate 325 milliGRAM(s) Oral daily  folic acid 1 milliGRAM(s) Oral daily  levothyroxine 100 MICROGram(s) Oral daily  NIFEdipine XL 30 milliGRAM(s) Oral at bedtime  pantoprazole    Tablet 40 milliGRAM(s) Oral before breakfast  sevelamer carbonate 1600 milliGRAM(s) Oral three times a day with meals  simethicone 80 milliGRAM(s) Chew three times a day      VITALS:  T(F): 98.1 (06-09-21 @ 10:30), Max: 98.7 (06-08-21 @ 21:51)  HR: 86 (06-09-21 @ 10:30)  BP: 138/68 (06-09-21 @ 10:30)  RR: 18 (06-09-21 @ 10:30)  SpO2: 98% (06-09-21 @ 10:30)  Wt(kg): --    06-08 @ 07:01  -  06-09 @ 07:00  --------------------------------------------------------  IN: 1220 mL / OUT: 2200 mL / NET: -980 mL    06-09 @ 07:01  -  06-09 @ 11:43  --------------------------------------------------------  IN: 240 mL / OUT: 0 mL / NET: 240 mL        PHYSICAL EXAM:  Constitutional: NAD  HEENT: anicteric sclera, oropharynx clear, MMM  Neck: No JVD  Respiratory: CTAB, no wheezes, rales or rhonchi  Cardiovascular: S1, S2, RRR  Gastrointestinal: BS+, soft, NT/ND  Extremities: No cyanosis or clubbing. No peripheral edema  Neurological: A/O x 3, no focal deficits  Psychiatric: Normal mood, normal affect  : No CVA tenderness. No siegel.   Skin: No rashes  Vascular Access: RIJ Tunneled cath. RUE AV access +thrill and bruit.     LABS:  06-09    132<L>  |  92<L>  |  71<H>  ----------------------------<  102<H>  5.2   |  21<L>  |  8.59<H>    Ca    9.8      09 Jun 2021 06:26  Phos  7.5     06-09  Mg     1.8     06-09    TPro  6.6  /  Alb  3.0<L>  /  TBili  0.3  /  DBili      /  AST  7   /  ALT  <5<L>  /  AlkPhos  102  06-07    Creatinine Trend: 8.59 <--, 13.19 <--, 12.55 <--, 11.51 <--, 6.86 <--, 8.93 <--, 6.84 <--                        7.7    5.98  )-----------( 185      ( 09 Jun 2021 06:26 )             24.1     Urine Studies:      RADIOLOGY & ADDITIONAL STUDIES:

## 2021-06-10 DIAGNOSIS — L03.113 CELLULITIS OF RIGHT UPPER LIMB: ICD-10-CM

## 2021-06-10 DIAGNOSIS — N18.6 END STAGE RENAL DISEASE: ICD-10-CM

## 2021-06-10 LAB
ANION GAP SERPL CALC-SCNC: 22 MMOL/L — HIGH (ref 7–14)
BUN SERPL-MCNC: 89 MG/DL — HIGH (ref 7–23)
CALCIUM SERPL-MCNC: 9.5 MG/DL — SIGNIFICANT CHANGE UP (ref 8.4–10.5)
CHLORIDE SERPL-SCNC: 89 MMOL/L — LOW (ref 98–107)
CO2 SERPL-SCNC: 18 MMOL/L — LOW (ref 22–31)
CREAT SERPL-MCNC: 10.84 MG/DL — HIGH (ref 0.5–1.3)
GLUCOSE SERPL-MCNC: 113 MG/DL — HIGH (ref 70–99)
HCT VFR BLD CALC: 24 % — LOW (ref 39–50)
HGB BLD-MCNC: 7.6 G/DL — LOW (ref 13–17)
MAGNESIUM SERPL-MCNC: 1.8 MG/DL — SIGNIFICANT CHANGE UP (ref 1.6–2.6)
MCHC RBC-ENTMCNC: 27.4 PG — SIGNIFICANT CHANGE UP (ref 27–34)
MCHC RBC-ENTMCNC: 31.7 GM/DL — LOW (ref 32–36)
MCV RBC AUTO: 86.6 FL — SIGNIFICANT CHANGE UP (ref 80–100)
NRBC # BLD: 0 /100 WBCS — SIGNIFICANT CHANGE UP
NRBC # FLD: 0 K/UL — SIGNIFICANT CHANGE UP
PHOSPHATE SERPL-MCNC: 7.8 MG/DL — HIGH (ref 2.5–4.5)
PLATELET # BLD AUTO: 196 K/UL — SIGNIFICANT CHANGE UP (ref 150–400)
POTASSIUM SERPL-MCNC: 5 MMOL/L — SIGNIFICANT CHANGE UP (ref 3.5–5.3)
POTASSIUM SERPL-SCNC: 5 MMOL/L — SIGNIFICANT CHANGE UP (ref 3.5–5.3)
RBC # BLD: 2.77 M/UL — LOW (ref 4.2–5.8)
RBC # FLD: 16.9 % — HIGH (ref 10.3–14.5)
SODIUM SERPL-SCNC: 129 MMOL/L — LOW (ref 135–145)
WBC # BLD: 6.61 K/UL — SIGNIFICANT CHANGE UP (ref 3.8–10.5)
WBC # FLD AUTO: 6.61 K/UL — SIGNIFICANT CHANGE UP (ref 3.8–10.5)

## 2021-06-10 PROCEDURE — 99232 SBSQ HOSP IP/OBS MODERATE 35: CPT

## 2021-06-10 PROCEDURE — 93010 ELECTROCARDIOGRAM REPORT: CPT

## 2021-06-10 RX ORDER — HYDROMORPHONE HYDROCHLORIDE 2 MG/ML
1.5 INJECTION INTRAMUSCULAR; INTRAVENOUS; SUBCUTANEOUS EVERY 8 HOURS
Refills: 0 | Status: DISCONTINUED | OUTPATIENT
Start: 2021-06-10 | End: 2021-06-17

## 2021-06-10 RX ORDER — VANCOMYCIN HCL 1 G
1000 VIAL (EA) INTRAVENOUS ONCE
Refills: 0 | Status: COMPLETED | OUTPATIENT
Start: 2021-06-10 | End: 2021-06-10

## 2021-06-10 RX ADMIN — Medication 650 MILLIGRAM(S): at 06:30

## 2021-06-10 RX ADMIN — PANTOPRAZOLE SODIUM 40 MILLIGRAM(S): 20 TABLET, DELAYED RELEASE ORAL at 05:24

## 2021-06-10 RX ADMIN — ATORVASTATIN CALCIUM 40 MILLIGRAM(S): 80 TABLET, FILM COATED ORAL at 21:00

## 2021-06-10 RX ADMIN — HEPARIN SODIUM 5000 UNIT(S): 5000 INJECTION INTRAVENOUS; SUBCUTANEOUS at 05:24

## 2021-06-10 RX ADMIN — CARVEDILOL PHOSPHATE 12.5 MILLIGRAM(S): 80 CAPSULE, EXTENDED RELEASE ORAL at 18:05

## 2021-06-10 RX ADMIN — Medication 250 MILLIGRAM(S): at 19:03

## 2021-06-10 RX ADMIN — CARVEDILOL PHOSPHATE 12.5 MILLIGRAM(S): 80 CAPSULE, EXTENDED RELEASE ORAL at 05:23

## 2021-06-10 RX ADMIN — Medication 100 MICROGRAM(S): at 05:23

## 2021-06-10 RX ADMIN — Medication 30 MILLIGRAM(S): at 21:00

## 2021-06-10 RX ADMIN — CHLORHEXIDINE GLUCONATE 1 APPLICATION(S): 213 SOLUTION TOPICAL at 09:06

## 2021-06-10 RX ADMIN — Medication 325 MILLIGRAM(S): at 15:12

## 2021-06-10 RX ADMIN — Medication 81 MILLIGRAM(S): at 15:12

## 2021-06-10 RX ADMIN — Medication 650 MILLIGRAM(S): at 15:15

## 2021-06-10 RX ADMIN — ERYTHROPOIETIN 16000 UNIT(S): 10000 INJECTION, SOLUTION INTRAVENOUS; SUBCUTANEOUS at 11:32

## 2021-06-10 RX ADMIN — SEVELAMER CARBONATE 1600 MILLIGRAM(S): 2400 POWDER, FOR SUSPENSION ORAL at 18:05

## 2021-06-10 RX ADMIN — Medication 1 MILLIGRAM(S): at 15:12

## 2021-06-10 RX ADMIN — SEVELAMER CARBONATE 1600 MILLIGRAM(S): 2400 POWDER, FOR SUSPENSION ORAL at 09:06

## 2021-06-10 RX ADMIN — HYDROMORPHONE HYDROCHLORIDE 1.5 MILLIGRAM(S): 2 INJECTION INTRAMUSCULAR; INTRAVENOUS; SUBCUTANEOUS at 12:00

## 2021-06-10 RX ADMIN — HYDROMORPHONE HYDROCHLORIDE 1.5 MILLIGRAM(S): 2 INJECTION INTRAMUSCULAR; INTRAVENOUS; SUBCUTANEOUS at 11:30

## 2021-06-10 RX ADMIN — HEPARIN SODIUM 5000 UNIT(S): 5000 INJECTION INTRAVENOUS; SUBCUTANEOUS at 18:05

## 2021-06-10 RX ADMIN — Medication 650 MILLIGRAM(S): at 05:23

## 2021-06-10 RX ADMIN — Medication 650 MILLIGRAM(S): at 15:22

## 2021-06-10 RX ADMIN — DOXERCALCIFEROL 4 MICROGRAM(S): 2.5 CAPSULE ORAL at 11:32

## 2021-06-10 NOTE — CHART NOTE - NSCHARTNOTEFT_GEN_A_CORE
Fellow, Dr Wynn, evaluated AVF this morning due to concern for erythema and edema    Patient appears to have a phlebitis    Recommend:  - Warm compresses  - Elevation of arm     Vascular Surgery [id number]

## 2021-06-10 NOTE — CHART NOTE - NSCHARTNOTEFT_GEN_A_CORE
Notified by Edward FRITZ pt's HR on routine vitals was at 115 bpm  Patient seen and evaluated.   Well developed, well nourished elderly [place] male found sleeping supine in bed. Patient was awakened he was alert and oriented x 3 appeared in no acute distress.   Patient states he was in [place] for the past several months he was stuck there due to the pandemic. He was told he had an irregular heart beat but he does not recall any medications they recommended. He is unsure of anticoagulation.     He denies any chest pain, palpitations, shortness of breath, nausea, vomiting, near-syncope/Syncope.     VITAL SIGNS:  Vital Signs Last 24 Hrs  T(C): 37.3 (10 Saurav 2021 20:56), Max: 37.5 (10 Saurav 2021 15:06)  T(F): 99.2 (10 Saurav 2021 20:56), Max: 99.5 (10 Saurav 2021 15:06)  HR: 110 (10 Saurav 2021 21:04) (63 - 115)  BP: 132/68 (10 Saurav 2021 20:56) (132/68 - 160/77)  RR: 18 (10 Saurav 2021 20:56) (17 - 18)  SpO2: 100% (10 Saurav 2021 20:56) (97% - 100%)    No Known Allergies    MEDICATIONS  (STANDING):  aspirin enteric coated 81 milliGRAM(s) Oral daily  atorvastatin 40 milliGRAM(s) Oral at bedtime  carvedilol 12.5 milliGRAM(s) Oral every 12 hours  chlorhexidine 4% Liquid 1 Application(s) Topical <User Schedule>  doxercalciferol Injectable 4 MICROGram(s) IV Push <User Schedule>  epoetin tammi-epbx (RETACRIT) Injectable 86786 Unit(s) IV Push <User Schedule>  ferrous    sulfate 325 milliGRAM(s) Oral daily  folic acid 1 milliGRAM(s) Oral daily  heparin   Injectable 5000 Unit(s) SubCutaneous every 12 hours  levothyroxine 100 MICROGram(s) Oral daily  NIFEdipine XL 30 milliGRAM(s) Oral at bedtime  pantoprazole    Tablet 40 milliGRAM(s) Oral before breakfast  sevelamer carbonate 1600 milliGRAM(s) Oral three times a day with meals    MEDICATIONS  (PRN):  acetaminophen   Tablet .. 650 milliGRAM(s) Oral every 6 hours PRN Moderate Pain (4 - 6)  HYDROmorphone  Injectable 1.5 milliGRAM(s) IV Push every 8 hours PRN Severe Pain (7 - 10)               7.6    6.61  )-----------( 196      ( 10 Saurav 2021 11:45 )             24.0     06-10    129<L>  |  89<L>  |  89<H>  ----------------------------<  113<H>  5.0   |  18<L>  |  10.84<H>    Ca    9.5      10 Saurav 2021 11:45  Phos  7.8     06-10  Mg     1.8     06-10        EKG - AFib  bpm, LVH w strain pattern    A/P - ? New onset Afib - CE ordered, Tranfer to telemetry floor for monitoring  s/w Dr Snyder - plan start pt on Heparin gtt for anticoagulation Notified by Edward FRITZ pt's HR on routine vitals was at 115 bpm  Patient seen and evaluated.   Well developed, well nourished elderly [place] male found sleeping supine in bed. Patient was awakened he was alert and oriented x 3 appeared in no acute distress.   Patient states he was in [place] for the past several months he was stuck there due to the pandemic. He was told he had an irregular heart beat but he does not recall any medications they recommended. He is unsure of anticoagulation.     He denies any chest pain, palpitations, shortness of breath, nausea, vomiting, near-syncope/Syncope.     VITAL SIGNS:  Vital Signs Last 24 Hrs  T(C): 37.3 (10 Saurav 2021 20:56), Max: 37.5 (10 Saurav 2021 15:06)  T(F): 99.2 (10 Saurav 2021 20:56), Max: 99.5 (10 Saurav 2021 15:06)  HR: 110 (10 Saruav 2021 21:04) (63 - 115)  BP: 132/68 (10 Saurav 2021 20:56) (132/68 - 160/77)  RR: 18 (10 Saurav 2021 20:56) (17 - 18)  SpO2: 100% (10 Saurav 2021 20:56) (97% - 100%)    No Known Allergies    MEDICATIONS  (STANDING):  aspirin enteric coated 81 milliGRAM(s) Oral daily  atorvastatin 40 milliGRAM(s) Oral at bedtime  carvedilol 12.5 milliGRAM(s) Oral every 12 hours  chlorhexidine 4% Liquid 1 Application(s) Topical <User Schedule>  doxercalciferol Injectable 4 MICROGram(s) IV Push <User Schedule>  epoetin tammi-epbx (RETACRIT) Injectable 94872 Unit(s) IV Push <User Schedule>  ferrous    sulfate 325 milliGRAM(s) Oral daily  folic acid 1 milliGRAM(s) Oral daily  heparin   Injectable 5000 Unit(s) SubCutaneous every 12 hours  levothyroxine 100 MICROGram(s) Oral daily  NIFEdipine XL 30 milliGRAM(s) Oral at bedtime  pantoprazole    Tablet 40 milliGRAM(s) Oral before breakfast  sevelamer carbonate 1600 milliGRAM(s) Oral three times a day with meals    MEDICATIONS  (PRN):  acetaminophen   Tablet .. 650 milliGRAM(s) Oral every 6 hours PRN Moderate Pain (4 - 6)  HYDROmorphone  Injectable 1.5 milliGRAM(s) IV Push every 8 hours PRN Severe Pain (7 - 10)               7.6    6.61  )-----------( 196      ( 10 Saurav 2021 11:45 )             24.0     06-10    129<L>  |  89<L>  |  89<H>  ----------------------------<  113<H>  5.0   |  18<L>  |  10.84<H>    Ca    9.5      10 Saurav 2021 11:45  Phos  7.8     06-10  Mg     1.8     06-10        EKG - AFib  bpm, LVH w strain pattern    A/P - ? New onset Afib - CE ordered, Tranfer to telemetry floor for monitoring  s/w Dr Snyder - plan start pt on Heparin gtt w/o bolus for anticoagulation  House cardiology called recs appreciated  Echo ordered to evaluate LV function, WMA   TSH, Lipid profile, HgA1c, pBNP ordered w am labs     Troponin T, High Sensitivity (06.11.21 @ 02:24)    Troponin T, High Sensitivity Result: 86:     Creatine Kinase, Serum: 29 U/L    pt stable will continue to monitor

## 2021-06-10 NOTE — PROGRESS NOTE ADULT - SUBJECTIVE AND OBJECTIVE BOX
Nephrology Followup Note - [phone] - Dr Pagan / Dr Vargas / Dr Faust / Dr Caballero / Dr Bojorquez / Dr Perkins / Dr Garrison / Dr Garcia  Pt seen and examined during HD  Pt c/o pain over right arm.   Right forearm red, warm, erythematous and painful.     Allergies:  No Known Allergies    Hospital Medications:   MEDICATIONS  (STANDING):  aspirin enteric coated 81 milliGRAM(s) Oral daily  atorvastatin 40 milliGRAM(s) Oral at bedtime  carvedilol 12.5 milliGRAM(s) Oral every 12 hours  chlorhexidine 4% Liquid 1 Application(s) Topical <User Schedule>  doxercalciferol Injectable 4 MICROGram(s) IV Push <User Schedule>  epoetin tammi-epbx (RETACRIT) Injectable 20862 Unit(s) IV Push <User Schedule>  ferrous    sulfate 325 milliGRAM(s) Oral daily  folic acid 1 milliGRAM(s) Oral daily  heparin   Injectable 5000 Unit(s) SubCutaneous every 12 hours  levothyroxine 100 MICROGram(s) Oral daily  NIFEdipine XL 30 milliGRAM(s) Oral at bedtime  pantoprazole    Tablet 40 milliGRAM(s) Oral before breakfast  sevelamer carbonate 1600 milliGRAM(s) Oral three times a day with meals    VITALS:  T(F): 98 (06-10-21 @ 10:31), Max: 98.4 (06-09-21 @ 17:33)  HR: 81 (06-10-21 @ 10:31)  BP: 137/78 (06-10-21 @ 10:31)  RR: 18 (06-10-21 @ 10:31)  SpO2: 100% (06-10-21 @ 10:31)  Wt(kg): --    06-09 @ 07:01  -  06-10 @ 07:00  --------------------------------------------------------  IN: 240 mL / OUT: 0 mL / NET: 240 mL        PHYSICAL EXAM:  Constitutional: NAD  HEENT: anicteric sclera, oropharynx clear, MMM  Neck: No JVD  Respiratory: CTAB, no wheezes, rales or rhonchi  Cardiovascular: S1, S2, RRR  Gastrointestinal: BS+, soft, NT/ND  Extremities: No cyanosis or clubbing. No peripheral edema  Neurological: A/O x 3, no focal deficits  Psychiatric: Normal mood, normal affect  : No CVA tenderness. No siegel.   Skin: No rashes  Vascular Access: RIJ Tunneled cath.     LABS:  06-09    132<L>  |  92<L>  |  71<H>  ----------------------------<  102<H>  5.2   |  21<L>  |  8.59<H>    Ca    9.8      09 Jun 2021 06:26  Phos  7.5     06-09  Mg     1.8     06-09      Creatinine Trend: 8.59 <--, 13.19 <--, 12.55 <--, 11.51 <--, 6.86 <--, 8.93 <--                        7.7    5.98  )-----------( 185      ( 09 Jun 2021 06:26 )             24.1     Urine Studies:      RADIOLOGY & ADDITIONAL STUDIES:

## 2021-06-10 NOTE — CONSULT NOTE ADULT - SUBJECTIVE AND OBJECTIVE BOX
HPI:  66M hx of  ·	ESRD on HD (M/W/F, previously via LUE AVF, now via chest wall catheter)  ·	anemia  ·	hyperparathyroidism  ·	thrombocytopenia  ·	hypothyroidism  ·	HTN    He presented with shortness of breath and LE swelling on . Patient went to [place] last year and unable to return sooner due to the pandemic. There he was getting HD via LUE fistula, which became dilate    S/p creation of right sided AV fistula on 6/3.     on , the patient noted increased bleeding with associated pain from the permacath site.   It improved after DDVAP and HD.     Today, he complains of pain to his right forearm (ventral aspect).  There is associated redness.   There is moderately intense pain worse with palpation.        PAST MEDICAL & SURGICAL HISTORY:  HTN (Hypertension)    Chronic kidney disease    Kidney stones    Hemodialysis access, AV graft  Left upper extremity    Hyperparathyroidism    Anemia    Thrombocytopenia    S/P Nephrectomy  (L)     Acquired arteriovenous fistula  left wrist  2015 - [facility], Left upper arm 2015 (approximate date)        Allergies    No Known Allergies    Intolerances        ANTIMICROBIALS:      OTHER MEDS:  acetaminophen   Tablet .. 650 milliGRAM(s) Oral every 6 hours PRN  aspirin enteric coated 81 milliGRAM(s) Oral daily  atorvastatin 40 milliGRAM(s) Oral at bedtime  carvedilol 12.5 milliGRAM(s) Oral every 12 hours  chlorhexidine 4% Liquid 1 Application(s) Topical <User Schedule>  doxercalciferol Injectable 4 MICROGram(s) IV Push <User Schedule>  epoetin tammi-epbx (RETACRIT) Injectable 60911 Unit(s) IV Push <User Schedule>  ferrous    sulfate 325 milliGRAM(s) Oral daily  folic acid 1 milliGRAM(s) Oral daily  heparin   Injectable 5000 Unit(s) SubCutaneous every 12 hours  HYDROmorphone  Injectable 1.5 milliGRAM(s) IV Push every 8 hours PRN  levothyroxine 100 MICROGram(s) Oral daily  NIFEdipine XL 30 milliGRAM(s) Oral at bedtime  pantoprazole    Tablet 40 milliGRAM(s) Oral before breakfast  sevelamer carbonate 1600 milliGRAM(s) Oral three times a day with meals      SOCIAL HISTORY:\  from [place]  former smoker  retired    FAMILY HISTORY:  No pertinent family history  mother  80s - "rhewumatism"  faotehr  80s htn          REVIEW OF SYSTEMS  [  ] ROS unobtainable because:    [ x ] All other systems negative except as noted below:	    Constitutional:  [ ] fever [ ] chills  [ ] weight loss  [ ] weakness  Skin:  [ ] rash [ ] phlebitis	  Eyes: [ ] icterus [ ] pain  [ ] discharge	  ENMT: [ ] sore throat  [ ] thrush [ ] ulcers [ ] exudates  Respiratory: [x ] dyspnea [ ] hemoptysis [ ] cough [ ] sputum	  Cardiovascular:  [ ] chest pain [ ] palpitations [ ] edema	  Gastrointestinal:  [ ] nausea [ ] vomiting [ ] diarrhea [ ] constipation [ ] pain	  Genitourinary:  [ ] dysuria [ ] frequency [ ] hematuria [ ] discharge [ ] flank pain  [ ] incontinence  Musculoskeletal:  [ ] myalgias [ ] arthralgias [ ] arthritis  [ ] back pain  Neurological:  [ ] headache [ ] seizures  [ ] confusion/altered mental status  Psychiatric:  [ ] anxiety [ ] depression	  Hematology/Lymphatics:  [ ] lymphadenopathy  Endocrine:  [ ] adrenal [ ] thyroid  Allergic/Immunologic:	 [ ] transplant [ ] seasonal    PHYSICAL EXAM:  General: [ x] non-toxic  HEAD/EYES: [ ] PERRL x[ ] white sclera [ ] icterus  ENT:  [ ] normal [x ] supple [ ] thrush [ ] pharyngeal exudate  Cardiovascular:   [ ] murmur [x ] normal [ ] PPM/AICD  Respiratory:  [x ] clear to ausculation bilaterally  GI:  [x ] soft, non-tender, normal bowel sounds  :  [ ] siegel [ ] no CVA tenderness   Musculoskeletal:  [ ] no synovitis  Neurologic:  [ ] non-focal exam   Skin:  x[ ]percath insertion site- tender to touch  right formarm- red , warm, indurated  Lymph: [ ] no lymphadenopathy  Psychiatric:  [ ] appropriate affect x] alert & oriented  Lines:  [x ] no phlebitis [ ] central line          Drug Dosing Weight  Height (cm): 175.3 (2021 14:05)  Weight (kg): 68 (2021 14:05)  BMI (kg/m2): 22.1 (2021 14:05)  BSA (m2): 1.83 (2021 14:05)    Vital Signs Last 24 Hrs  T(F): 98.5 (06-10-21 @ 17:15), Max: 99.8 (21 @ 21:38)    Vital Signs Last 24 Hrs  HR: 106 (06-10-21 @ 17:15) (63 - 106)  BP: 132/72 (06-10-21 @ 17:15) (132/72 - 160/77)  RR: 18 (06-10-21 @ 17:15)  SpO2: 100% (06-10-21 @ 17:15) (95% - 100%)  Wt(kg): --                          7.6    6.61  )-----------( 196      ( 10 Saurav 2021 11:45 )             24.0       06-10    129<L>  |  89<L>  |  89<H>  ----------------------------<  113<H>  5.0   |  18<L>  |  10.84<H>    Ca    9.5      10 Saurav 2021 11:45  Phos  7.8     06-10  Mg     1.8     10            MICROBIOLOGY:    RADIOLOGY:

## 2021-06-10 NOTE — PROGRESS NOTE ADULT - SUBJECTIVE AND OBJECTIVE BOX
Patient is a 66y old  Male who presents with a chief complaint of urgent HD (09 Jun 2021 11:43)      SUBJECTIVE / OVERNIGHT EVENTS: No event over night     Events noted.  CONSTITUTIONAL: No fever,  or fatigue  RESPIRATORY: No cough, wheezing,  No shortness of breath  CARDIOVASCULAR: No chest pain, palpitations, dizziness, or leg swelling  GASTROINTESTINAL: No abdominal or epigastric pain. No nausea, vomiting.  NEUROLOGICAL: No headaches,       T(C): 37.3 (06-10-21 @ 20:56), Max: 37.5 (06-10-21 @ 15:06)  HR: 115 (06-10-21 @ 20:56) (63 - 115)  BP: 132/68 (06-10-21 @ 20:56) (132/68 - 155/-)  RR: 18 (06-10-21 @ 20:56) (17 - 18)  SpO2: 100% (06-10-21 @ 20:56) (100% - 100%)      MEDICATIONS  (STANDING):  aspirin enteric coated 81 milliGRAM(s) Oral daily  atorvastatin 40 milliGRAM(s) Oral at bedtime  carvedilol 12.5 milliGRAM(s) Oral every 12 hours  chlorhexidine 4% Liquid 1 Application(s) Topical <User Schedule>  doxercalciferol Injectable 4 MICROGram(s) IV Push <User Schedule>  epoetin tammi-epbx (RETACRIT) Injectable 70569 Unit(s) IV Push <User Schedule>  ferrous    sulfate 325 milliGRAM(s) Oral daily  folic acid 1 milliGRAM(s) Oral daily  heparin   Injectable 5000 Unit(s) SubCutaneous every 12 hours  levothyroxine 100 MICROGram(s) Oral daily  NIFEdipine XL 30 milliGRAM(s) Oral at bedtime  pantoprazole    Tablet 40 milliGRAM(s) Oral before breakfast  sevelamer carbonate 1600 milliGRAM(s) Oral three times a day with meals    MEDICATIONS  (PRN):  acetaminophen   Tablet .. 650 milliGRAM(s) Oral every 6 hours PRN Moderate Pain (4 - 6)  HYDROmorphone  Injectable 1.5 milliGRAM(s) IV Push every 8 hours PRN Severe Pain (7 - 10)    PHYSICAL EXAM:  GENERAL: NAD  NECK: Supple, No JVD  CHEST/LUNG: Clear to auscultation bilaterally; No wheezing.  HEART: Regular rate and rhythm; No murmurs, rubs, or gallops  ABDOMEN: Soft, Nontender, Nondistended; Bowel sounds present  EXTREMITIES:   No edema  NEUROLOGY: AAO X 3                            7.6    6.61  )-----------( 196      ( 10 Saurav 2021 11:45 )             24.0               129|89|89<113  5.0|18|10.84  9.5,1.8,7.8  06-10 @ 11:45      CAPILLARY BLOOD GLUCOSE      RADIOLOGY & ADDITIONAL TESTS:    Imaging Personally Reviewed:    Consultant(s) Notes Reviewed:      Care Discussed with Consultants/Other Providers:    Wilfred Christian MD, CMD, FACP    [address] [place] [address]  Office Tel: [phone]  Cell: [phone]

## 2021-06-10 NOTE — PROGRESS NOTE ADULT - ASSESSMENT
66M hx of ESRD on HD (M/W/F, previously via LUE AVF, now via chest wall catheter), anemia, hyperparathyroidism, thrombocytopenia, hypothyroidism, HTN who presents with shortness of breath and LE swelling likely due to volume overload from missed HD session, admitted for HD. Renal following for ESRD Mx.     ESRD on HD   K, volume acceptable  s/p new RUE avf and RIJ permacath   Pt tolerating HD today.   accepted to outpt unit on MWF schedule.   vasc surgery f/u for concerning cellulitis over new AV access site.   Consider starting IV abx for possible cellulitis.   renal diet, fluid restriction 1L/day  dose all meds for ESRD  HTN, controlled. c/w BB and Nifedipine  Anemia in CKD-Hb below goal. Continue epo 16K tiw w/hd.   Hyperphosphatemia- high phos level, continue Renvela 2 tabs TID with meals. high pth noted, c/w hectorol 4 mcg Three times a week with HD.     Lennox Pagan MD  [facility]  Office [phone]  Ans Serv [phone]  Cell - [phone]

## 2021-06-11 DIAGNOSIS — I48.91 UNSPECIFIED ATRIAL FIBRILLATION: ICD-10-CM

## 2021-06-11 LAB
A1C WITH ESTIMATED AVERAGE GLUCOSE RESULT: 5.3 % — SIGNIFICANT CHANGE UP (ref 4–5.6)
ANION GAP SERPL CALC-SCNC: 19 MMOL/L — HIGH (ref 7–14)
APTT BLD: 41.8 SEC — HIGH (ref 27–36.3)
APTT BLD: 50.3 SEC — HIGH (ref 27–36.3)
BUN SERPL-MCNC: 59 MG/DL — HIGH (ref 7–23)
CALCIUM SERPL-MCNC: 9.3 MG/DL — SIGNIFICANT CHANGE UP (ref 8.4–10.5)
CHLORIDE SERPL-SCNC: 89 MMOL/L — LOW (ref 98–107)
CHOLEST SERPL-MCNC: 73 MG/DL — SIGNIFICANT CHANGE UP
CK MB BLD-MCNC: 4.5 % — HIGH (ref 0–2.5)
CK MB CFR SERPL CALC: 1.3 NG/ML — SIGNIFICANT CHANGE UP
CK SERPL-CCNC: 29 U/L — LOW (ref 30–200)
CO2 SERPL-SCNC: 22 MMOL/L — SIGNIFICANT CHANGE UP (ref 22–31)
CREAT SERPL-MCNC: 8.02 MG/DL — HIGH (ref 0.5–1.3)
ESTIMATED AVERAGE GLUCOSE: 105 MG/DL — SIGNIFICANT CHANGE UP (ref 68–114)
GLUCOSE SERPL-MCNC: 93 MG/DL — SIGNIFICANT CHANGE UP (ref 70–99)
HCT VFR BLD CALC: 25.9 % — LOW (ref 39–50)
HCT VFR BLD CALC: 26.3 % — LOW (ref 39–50)
HDLC SERPL-MCNC: 44 MG/DL — SIGNIFICANT CHANGE UP
HGB BLD-MCNC: 8.3 G/DL — LOW (ref 13–17)
HGB BLD-MCNC: 8.5 G/DL — LOW (ref 13–17)
LIPID PNL WITH DIRECT LDL SERPL: 21 MG/DL — SIGNIFICANT CHANGE UP
MAGNESIUM SERPL-MCNC: 1.8 MG/DL — SIGNIFICANT CHANGE UP (ref 1.6–2.6)
MCHC RBC-ENTMCNC: 27.1 PG — SIGNIFICANT CHANGE UP (ref 27–34)
MCHC RBC-ENTMCNC: 27.7 PG — SIGNIFICANT CHANGE UP (ref 27–34)
MCHC RBC-ENTMCNC: 32 GM/DL — SIGNIFICANT CHANGE UP (ref 32–36)
MCHC RBC-ENTMCNC: 32.3 GM/DL — SIGNIFICANT CHANGE UP (ref 32–36)
MCV RBC AUTO: 84.6 FL — SIGNIFICANT CHANGE UP (ref 80–100)
MCV RBC AUTO: 85.7 FL — SIGNIFICANT CHANGE UP (ref 80–100)
NON HDL CHOLESTEROL: 29 MG/DL — SIGNIFICANT CHANGE UP
NRBC # BLD: 0 /100 WBCS — SIGNIFICANT CHANGE UP
NRBC # BLD: 0 /100 WBCS — SIGNIFICANT CHANGE UP
NRBC # FLD: 0 K/UL — SIGNIFICANT CHANGE UP
NRBC # FLD: 0 K/UL — SIGNIFICANT CHANGE UP
NT-PROBNP SERPL-SCNC: HIGH PG/ML
PHOSPHATE SERPL-MCNC: 7 MG/DL — HIGH (ref 2.5–4.5)
PLATELET # BLD AUTO: 220 K/UL — SIGNIFICANT CHANGE UP (ref 150–400)
PLATELET # BLD AUTO: 240 K/UL — SIGNIFICANT CHANGE UP (ref 150–400)
POTASSIUM SERPL-MCNC: 4.6 MMOL/L — SIGNIFICANT CHANGE UP (ref 3.5–5.3)
POTASSIUM SERPL-SCNC: 4.6 MMOL/L — SIGNIFICANT CHANGE UP (ref 3.5–5.3)
RBC # BLD: 3.06 M/UL — LOW (ref 4.2–5.8)
RBC # BLD: 3.07 M/UL — LOW (ref 4.2–5.8)
RBC # FLD: 17 % — HIGH (ref 10.3–14.5)
RBC # FLD: 17.1 % — HIGH (ref 10.3–14.5)
SODIUM SERPL-SCNC: 130 MMOL/L — LOW (ref 135–145)
TRIGL SERPL-MCNC: 40 MG/DL — SIGNIFICANT CHANGE UP
TROPONIN T, HIGH SENSITIVITY RESULT: 86 NG/L — CRITICAL HIGH
TSH SERPL-MCNC: 0.21 UIU/ML — LOW (ref 0.27–4.2)
VANCOMYCIN FLD-MCNC: 14.5 UG/ML — SIGNIFICANT CHANGE UP
WBC # BLD: 6.44 K/UL — SIGNIFICANT CHANGE UP (ref 3.8–10.5)
WBC # BLD: 7.01 K/UL — SIGNIFICANT CHANGE UP (ref 3.8–10.5)
WBC # FLD AUTO: 6.44 K/UL — SIGNIFICANT CHANGE UP (ref 3.8–10.5)
WBC # FLD AUTO: 7.01 K/UL — SIGNIFICANT CHANGE UP (ref 3.8–10.5)

## 2021-06-11 PROCEDURE — 99223 1ST HOSP IP/OBS HIGH 75: CPT

## 2021-06-11 PROCEDURE — 99232 SBSQ HOSP IP/OBS MODERATE 35: CPT

## 2021-06-11 RX ORDER — VANCOMYCIN HCL 1 G
1000 VIAL (EA) INTRAVENOUS ONCE
Refills: 0 | Status: COMPLETED | OUTPATIENT
Start: 2021-06-11 | End: 2021-06-11

## 2021-06-11 RX ORDER — HEPARIN SODIUM 5000 [USP'U]/ML
INJECTION INTRAVENOUS; SUBCUTANEOUS
Qty: 25000 | Refills: 0 | Status: DISCONTINUED | OUTPATIENT
Start: 2021-06-11 | End: 2021-06-16

## 2021-06-11 RX ORDER — HEPARIN SODIUM 5000 [USP'U]/ML
5500 INJECTION INTRAVENOUS; SUBCUTANEOUS EVERY 6 HOURS
Refills: 0 | Status: DISCONTINUED | OUTPATIENT
Start: 2021-06-11 | End: 2021-06-16

## 2021-06-11 RX ORDER — HEPARIN SODIUM 5000 [USP'U]/ML
2500 INJECTION INTRAVENOUS; SUBCUTANEOUS EVERY 6 HOURS
Refills: 0 | Status: DISCONTINUED | OUTPATIENT
Start: 2021-06-11 | End: 2021-06-16

## 2021-06-11 RX ADMIN — HYDROMORPHONE HYDROCHLORIDE 1.5 MILLIGRAM(S): 2 INJECTION INTRAMUSCULAR; INTRAVENOUS; SUBCUTANEOUS at 01:24

## 2021-06-11 RX ADMIN — HEPARIN SODIUM 1200 UNIT(S)/HR: 5000 INJECTION INTRAVENOUS; SUBCUTANEOUS at 03:29

## 2021-06-11 RX ADMIN — Medication 81 MILLIGRAM(S): at 12:12

## 2021-06-11 RX ADMIN — CARVEDILOL PHOSPHATE 12.5 MILLIGRAM(S): 80 CAPSULE, EXTENDED RELEASE ORAL at 17:50

## 2021-06-11 RX ADMIN — Medication 325 MILLIGRAM(S): at 12:12

## 2021-06-11 RX ADMIN — SEVELAMER CARBONATE 1600 MILLIGRAM(S): 2400 POWDER, FOR SUSPENSION ORAL at 12:12

## 2021-06-11 RX ADMIN — Medication 100 MICROGRAM(S): at 05:20

## 2021-06-11 RX ADMIN — SEVELAMER CARBONATE 1600 MILLIGRAM(S): 2400 POWDER, FOR SUSPENSION ORAL at 09:39

## 2021-06-11 RX ADMIN — CARVEDILOL PHOSPHATE 12.5 MILLIGRAM(S): 80 CAPSULE, EXTENDED RELEASE ORAL at 05:20

## 2021-06-11 RX ADMIN — ATORVASTATIN CALCIUM 40 MILLIGRAM(S): 80 TABLET, FILM COATED ORAL at 21:40

## 2021-06-11 RX ADMIN — Medication 30 MILLIGRAM(S): at 21:40

## 2021-06-11 RX ADMIN — Medication 1 MILLIGRAM(S): at 12:12

## 2021-06-11 RX ADMIN — HEPARIN SODIUM 1300 UNIT(S)/HR: 5000 INJECTION INTRAVENOUS; SUBCUTANEOUS at 10:50

## 2021-06-11 RX ADMIN — HEPARIN SODIUM 2500 UNIT(S): 5000 INJECTION INTRAVENOUS; SUBCUTANEOUS at 20:00

## 2021-06-11 RX ADMIN — HEPARIN SODIUM 1400 UNIT(S)/HR: 5000 INJECTION INTRAVENOUS; SUBCUTANEOUS at 19:50

## 2021-06-11 RX ADMIN — PANTOPRAZOLE SODIUM 40 MILLIGRAM(S): 20 TABLET, DELAYED RELEASE ORAL at 05:20

## 2021-06-11 RX ADMIN — Medication 250 MILLIGRAM(S): at 17:44

## 2021-06-11 RX ADMIN — CHLORHEXIDINE GLUCONATE 1 APPLICATION(S): 213 SOLUTION TOPICAL at 12:20

## 2021-06-11 RX ADMIN — SEVELAMER CARBONATE 1600 MILLIGRAM(S): 2400 POWDER, FOR SUSPENSION ORAL at 17:43

## 2021-06-11 RX ADMIN — HEPARIN SODIUM 2500 UNIT(S): 5000 INJECTION INTRAVENOUS; SUBCUTANEOUS at 10:52

## 2021-06-11 NOTE — PROGRESS NOTE ADULT - PROBLEM SELECTOR PLAN 2
Troponin 105 to 100  - likely i/s/o renal dysfunction
Nephro f/up noted.  On HD  S/p Right radiocephalic fistula creation and right IJ permacath under fluoro  DC planning
Troponin 105 to 100  - likely i/s/o renal dysfunction
C. Cath: Medical management
Nephro f/up noted.  On HD  S/p Right radiocephalic fistula creation and right IJ permacath under fluoro  DC planning
C. Cath: Medical management
Troponin 105 to 100  - likely i/s/o renal dysfunction
C. Cath: Medical management
C. Cath: Medical management
BMP  Resolved

## 2021-06-11 NOTE — CONSULT NOTE ADULT - ASSESSMENT
66M hx of ESRD on HD (M/W/F, previously via LUE AVF, now via chest wall catheter), anemia, hyperparathyroidism, thrombocytopenia, hypothyroidism, HTN presenting to the ED for urgent HD, with non-functioning AVF since 3/2021    - Clotted AV fistula unlikely to be salvaged, will plan for new fistula creation on right arm, tentatively plan for next Tuesday 6/1  - Please document medical clearance  - Vein mapping ordered  - Please protect RUE, remove all IVs and avoid for BP measurements or blood draws  - Left UE may be used for IVs and blood draws    d/w Dr. Marcelo Doan, PGY-3  Vascular Surgery (C Team)  [id number] with questions 
66 year old with esrd with pain and tenderness at site of permcath and new redness /tenderness to his right arm    He is afebrile with a nornal WBC.    1) Right forearm cellulitis    Check blood cultures times two  Start vancoymcin dosed by level    2) Permacath pain  Had recent bleeding at site  ? if this is cause of pain  Check blood cultures    3) ESRD  dose vanco by level    
66M hx of ESRD on HD (M/W/F, previously via LUE AVF, now via chest wall catheter), anemia, hyperparathyroidism, thrombocytopenia, hypothyroidism, HTN who presents with shortness of breath and LE swelling and admitted for urgent dialysis. Cardiology called for new atrial fibrillation.
66M hx of ESRD on HD (M/W/F, previously via LUE AVF, now via chest wall catheter), anemia, hyperparathyroidism, thrombocytopenia, hypothyroidism, HTN who presents with shortness of breath and LE swelling likely due to volume overload from missed HD session, admitted for HD. Renal consulted for ESRD Mx.     ESRD on HD   out of country for over a year  s/p last HD 5/21 in [place]  hypervolemia, M acidosis- 2/2 missed HD  Hyperkalemia. Resolved, potassium 5.8 --> 4.9  - s/p insulin, d50, lasix. rpt K acceptable  Informed consent for HD obtained from pt. In chart.   Arranged for HD tonight w/2k bath, uf 2.5-3L as tolearted  renal diet, fluid restriction 1L/day  dose all meds for ESRD  [place] [facility] [place]N, bp high- resume home bp meds- quavarnioxy77.5mg BID. plan to inc uf w/hd  Anemia in CKD-Hb < goal. added epo 10k tiw w/hd. check Fe stores  Hyperphosphatemia- inc Renvela1>2 TID with meals    poc d/w pt, ER and admitting Attending, HD RN  labs, rad, chart reviewed  For any question, call:  [facility]  Office [phone]  Ans Serv [phone]  Cell - [phone]  
Patient is a 66 M with ESRD//HD (M/W/F, previously via LUE AVF, now via chest wall catheter), anemia, hyperparathyroidism, thrombocytopenia, hypothyroidism, and HTN who presents with shortness of breath and LE swelling. Patient went to [place] last year and unable to return sooner due to the pandemic. There he was getting HD via LUE fistula, which blew, then had catheter placed via which he has been getting HD since.     Patient evaluated by Vascular Surgery and the plan is for upper extremity AVF creation on the contralateral arm this Thursday.    From the cardiac perspective, the patient reports having no current exertional chest pain.  His dyspnea has improved with HD.  He denies having any other associated cardiac symptoms.  Denies a known previous history of CAD/CVA/VTE.  He reports that his mother  of an MI.      Stress test performed here at [facility] [place] on 21 noted:    There is a small, mild defect in inferior wall, that is  mostly reversible, suggestive of mild ischemia and is of  questionable clinical significance.    Reviewed imaged--while this finding on the NST is likely not clinically relevant, the possibility remains that the patient has underlying CAD for which he is not receiving optimal medical therapy (ie ASA and statin, he is already on BBs).  Will arrange for patient to get an echocardiogram for further stratification.  Will also arrange for a carotid artery US to help assess systemic atherosclerotic plaque burden.    Start low dose daily aspirin 81 mg, and recommend continuing with this during the yudy-procedural period (if able).  If there is significant atherosclerotic plaque noted on the carotid artery US, would recommend initiating statin therapy as well.  Will follow.

## 2021-06-11 NOTE — PROGRESS NOTE ADULT - PROBLEM SELECTOR PROBLEM 1
End stage renal disease
Cellulitis of right upper extremity
Cellulitis of right upper extremity
End stage renal disease

## 2021-06-11 NOTE — PROGRESS NOTE ADULT - PROBLEM SELECTOR PROBLEM 2
Elevated troponin
End stage renal disease
Elevated troponin
End stage renal disease
Elevated troponin
Hyperkalemia
Elevated troponin

## 2021-06-11 NOTE — CONSULT NOTE ADULT - SUBJECTIVE AND OBJECTIVE BOX
HISTORY OF PRESENT ILLNESS:  Patient is a 66y old  Male who presents with a chief complaint of urgent HD (10 Saurav 2021 17:28)    HPI:  66M hx of ESRD on HD (M/W/F, previously via LUE AVF, now via chest wall catheter), anemia, hyperparathyroidism, thrombocytopenia, hypothyroidism, HTN who presents with shortness of breath and LE swelling. Patient went to [place] last year and unable to return sooner due to the pandemic. There he was getting HD via LUE fistula, which blew, then had catheter placed via which he has been getting HD since. He last had HD on Friday, [place] the [place] on [place]. He went to his previous HD center yesterday but was told he needs HIV/hepatitis screening because he has been out of the country for one year. He denies fevers, chills, cp, abdominal pain, n/v/d.   Cardiology called for new   atrial fibrillation     ED: Tm 98.7, HR 93, /105, RR 25, 95% on RA      Allergies    No Known Allergies    Intolerances    	    MEDICATIONS:  aspirin enteric coated 81 milliGRAM(s) Oral daily  carvedilol 12.5 milliGRAM(s) Oral every 12 hours  heparin   Injectable 5000 Unit(s) SubCutaneous every 12 hours  NIFEdipine XL 30 milliGRAM(s) Oral at bedtime  acetaminophen   Tablet .. 650 milliGRAM(s) Oral every 6 hours PRN  HYDROmorphone  Injectable 1.5 milliGRAM(s) IV Push every 8 hours PRN  pantoprazole    Tablet 40 milliGRAM(s) Oral before breakfast  atorvastatin 40 milliGRAM(s) Oral at bedtime  levothyroxine 100 MICROGram(s) Oral daily  chlorhexidine 4% Liquid 1 Application(s) Topical <User Schedule>  doxercalciferol Injectable 4 MICROGram(s) IV Push <User Schedule>  epoetin tammi-epbx (RETACRIT) Injectable 77819 Unit(s) IV Push <User Schedule>  ferrous    sulfate 325 milliGRAM(s) Oral daily  folic acid 1 milliGRAM(s) Oral daily      PAST MEDICAL & SURGICAL HISTORY:  HTN (Hypertension)  Chronic kidney disease  Kidney stones  Hemodialysis access, AV graft  Left upper extremity  Hyperparathyroidism  Anemia  Thrombocytopenia  S/P Nephrectomy  (L) 2007  Acquired arteriovenous fistula  left wrist  1/2015 - [facility], Left upper arm 4/2015 (approximate date)        FAMILY HISTORY:  No pertinent family history        SOCIAL HISTORY:      Smoker: [ ] Active [ ] never  [ ] previous  Alcohol:  [ ] social [ ] daily [ ] never  Lives with:   Occupation:    REVIEW OF SYSTEMS  See HPI. Otherwise, 10 point ROS done and otherwise negative.  >>> <<<    PHYSICAL EXAM:  T(C): 37.1 (06-11-21 @ 01:23), Max: 37.5 (06-10-21 @ 15:06)  HR: 87 (06-11-21 @ 01:23) (63 - 115)  BP: 152/85 (06-11-21 @ 01:23) (132/68 - 155/-)  RR: 18 (06-11-21 @ 01:23) (17 - 18)  SpO2: 98% (06-11-21 @ 01:23) (97% - 100%)  Wt(kg): --    Appearance: Normal	  Cardiovascular: Normal S1 S2, No JVD, No murmurs, No edema  Respiratory: Lungs clear to auscultation	  Psychiatry: A & O x 3, Mood & affect appropriate  Gastrointestinal:  Soft, Non-tender, + BS	  Skin: No rashes, No ecchymoses, No cyanosis	  Neurologic: Non-focal, A&Ox3, nonfocal, RAMIREZ x 4  Extremities: Normal range of motion, No clubbing, cyanosis or edema  Vascular: Peripheral pulses palpable 2+ bilaterally    I&O's Summary    09 Jun 2021 07:01  -  10 Saurav 2021 07:00  --------------------------------------------------------  IN: 240 mL / OUT: 0 mL / NET: 240 mL    10 Saurav 2021 07:01  -  11 Jun 2021 02:47  --------------------------------------------------------  IN: 400 mL / OUT: 3250 mL / NET: -2850 mL      	 	  LABS:	 	                          7.6    6.61  )-----------( 196      ( 10 Saurav 2021 11:45 )             24.0     06-10    129<L>  |  89<L>  |  89<H>  ----------------------------<  113<H>  5.0   |  18<L>  |  10.84<H>  06-09    132<L>  |  92<L>  |  71<H>  ----------------------------<  102<H>  5.2   |  21<L>  |  8.59<H>    Ca    9.5      10 Saurav 2021 11:45  Ca    9.8      09 Jun 2021 06:26  Phos  7.8     06-10  Phos  7.5     06-09  Mg     1.8     06-10  Mg     1.8     06-09  proBNP:   Lipid Profile:   HgA1c:   TSH: Thyroid Stimulating Hormone, Serum: 0.25 uIU/mL (05-30 @ 14:50)  Thyroid Stimulating Hormone, Serum: 0.21 uIU/mL (05-26 @ 07:15)                         HISTORY OF PRESENT ILLNESS:  Patient is a 66y old  Male who presents with a chief complaint of urgent HD (10 Saurav 2021 17:28)    HPI:  66M hx of ESRD on HD (M/W/F, previously via LUE AVF, now via chest wall catheter), anemia, hyperparathyroidism, thrombocytopenia, hypothyroidism, HTN who presents with shortness of breath and LE swelling. Patient went to [place] last year and unable to return sooner due to the pandemic. While there getting HD via LUE fistula, his fistula blew and a catheter was placed which he has been getting HD since. He last had HD on Friday, [place] the [place] on [place]. He went to his previous HD center yesterday but was told he needs HIV/hepatitis screening because he has been out of the country for one year. He denies fevers, chills, cp, abdominal pain, n/v/d.   Cardiology called for new atrial fibrillation     ED: Tm 98.7, HR 93, /105, RR 25, 95% on RA      Allergies    No Known Allergies    Intolerances    	    MEDICATIONS:  aspirin enteric coated 81 milliGRAM(s) Oral daily  carvedilol 12.5 milliGRAM(s) Oral every 12 hours  heparin   Injectable 5000 Unit(s) SubCutaneous every 12 hours  NIFEdipine XL 30 milliGRAM(s) Oral at bedtime  acetaminophen   Tablet .. 650 milliGRAM(s) Oral every 6 hours PRN  HYDROmorphone  Injectable 1.5 milliGRAM(s) IV Push every 8 hours PRN  pantoprazole    Tablet 40 milliGRAM(s) Oral before breakfast  atorvastatin 40 milliGRAM(s) Oral at bedtime  levothyroxine 100 MICROGram(s) Oral daily  chlorhexidine 4% Liquid 1 Application(s) Topical <User Schedule>  doxercalciferol Injectable 4 MICROGram(s) IV Push <User Schedule>  epoetin tammi-epbx (RETACRIT) Injectable 39700 Unit(s) IV Push <User Schedule>  ferrous    sulfate 325 milliGRAM(s) Oral daily  folic acid 1 milliGRAM(s) Oral daily      PAST MEDICAL & SURGICAL HISTORY:  HTN (Hypertension)  Chronic kidney disease  Kidney stones  Hemodialysis access, AV graft  Left upper extremity  Hyperparathyroidism  Anemia  Thrombocytopenia  S/P Nephrectomy  (L) 2007  Acquired arteriovenous fistula  left wrist  1/2015 - [facility], Left upper arm 4/2015 (approximate date)        FAMILY HISTORY:  No pertinent family history        SOCIAL HISTORY:      Smoker: [ ] Active [ ] never  [ ] previous  Alcohol:  [ ] social [ ] daily [ ] never  Lives with:   Occupation:    REVIEW OF SYSTEMS  See HPI. Otherwise, 10 point ROS done and otherwise negative.  >>> <<<    PHYSICAL EXAM:  T(C): 37.1 (06-11-21 @ 01:23), Max: 37.5 (06-10-21 @ 15:06)  HR: 87 (06-11-21 @ 01:23) (63 - 115)  BP: 152/85 (06-11-21 @ 01:23) (132/68 - 155/-)  RR: 18 (06-11-21 @ 01:23) (17 - 18)  SpO2: 98% (06-11-21 @ 01:23) (97% - 100%)  Wt(kg): --    Appearance: Normal	  Cardiovascular: Normal S1 S2, No JVD, No murmurs, No edema  Respiratory: Lungs clear to auscultation	  Psychiatry: A & O x 3, Mood & affect appropriate  Gastrointestinal:  Soft, Non-tender, + BS	  Skin: No rashes, No ecchymoses, No cyanosis	  Neurologic: Non-focal, A&Ox3, nonfocal, RAMIREZ x 4  Extremities: Normal range of motion, No clubbing, cyanosis or edema  Vascular: Peripheral pulses palpable 2+ bilaterally    I&O's Summary    09 Jun 2021 07:01  -  10 Saurav 2021 07:00  --------------------------------------------------------  IN: 240 mL / OUT: 0 mL / NET: 240 mL    10 Saurav 2021 07:01  -  11 Jun 2021 02:47  --------------------------------------------------------  IN: 400 mL / OUT: 3250 mL / NET: -2850 mL      	 	  LABS:	 	                          7.6    6.61  )-----------( 196      ( 10 Saurav 2021 11:45 )             24.0     06-10    129<L>  |  89<L>  |  89<H>  ----------------------------<  113<H>  5.0   |  18<L>  |  10.84<H>  06-09    132<L>  |  92<L>  |  71<H>  ----------------------------<  102<H>  5.2   |  21<L>  |  8.59<H>    Ca    9.5      10 Saurav 2021 11:45  Ca    9.8      09 Jun 2021 06:26  Phos  7.8     06-10  Phos  7.5     06-09  Mg     1.8     06-10  Mg     1.8     06-09  proBNP:   Lipid Profile:   HgA1c:   TSH: Thyroid Stimulating Hormone, Serum: 0.25 uIU/mL (05-30 @ 14:50)  Thyroid Stimulating Hormone, Serum: 0.21 uIU/mL (05-26 @ 07:15)

## 2021-06-11 NOTE — PROGRESS NOTE ADULT - ASSESSMENT
66 year old with esrd with pain and tenderness at site of permcath and new redness /tenderness to his right arm    He is afebrile with a nornal WBC.    1) Right forearm cellulitis    Check blood cultures times two- testing  Continue vancoymcin dosed by level    Check daily level- redose with level less than 15      2) Permacath pain  Had recent bleeding at site  ? if this is cause of pain  Check blood cultures    3) ESRD  dose vanco by level

## 2021-06-11 NOTE — PROGRESS NOTE ADULT - SUBJECTIVE AND OBJECTIVE BOX
Patient is a 66y old  Male who presents with a chief complaint of urgent HD (09 Jun 2021 11:43)      SUBJECTIVE / OVERNIGHT EVENTS: No event over night     Events noted.  CONSTITUTIONAL: No fever,  or fatigue  RESPIRATORY: No cough, wheezing,  No shortness of breath  CARDIOVASCULAR: No chest pain, palpitations, dizziness, or leg swelling  GASTROINTESTINAL: No abdominal or epigastric pain. No nausea, vomiting.  NEUROLOGICAL: No headaches,     T(C): 36.9 (06-11-21 @ 21:33), Max: 36.9 (06-11-21 @ 21:33)  HR: 80 (06-11-21 @ 21:33) (80 - 82)  BP: 134/78 (06-11-21 @ 21:33) (126/69 - 134/78)  RR: 16 (06-11-21 @ 21:33) (16 - 18)  SpO2: 97% (06-11-21 @ 21:33) (97% - 100%)        MEDICATIONS  (STANDING):  aspirin enteric coated 81 milliGRAM(s) Oral daily  atorvastatin 40 milliGRAM(s) Oral at bedtime  carvedilol 12.5 milliGRAM(s) Oral every 12 hours  chlorhexidine 4% Liquid 1 Application(s) Topical <User Schedule>  doxercalciferol Injectable 4 MICROGram(s) IV Push <User Schedule>  epoetin tammi-epbx (RETACRIT) Injectable 93835 Unit(s) IV Push <User Schedule>  ferrous    sulfate 325 milliGRAM(s) Oral daily  folic acid 1 milliGRAM(s) Oral daily  heparin  Infusion.  Unit(s)/Hr (12 mL/Hr) IV Continuous <Continuous>  levothyroxine 100 MICROGram(s) Oral daily  NIFEdipine XL 30 milliGRAM(s) Oral at bedtime  pantoprazole    Tablet 40 milliGRAM(s) Oral before breakfast  sevelamer carbonate 1600 milliGRAM(s) Oral three times a day with meals    MEDICATIONS  (PRN):  acetaminophen   Tablet .. 650 milliGRAM(s) Oral every 6 hours PRN Moderate Pain (4 - 6)  heparin   Injectable 5500 Unit(s) IV Push every 6 hours PRN For aPTT less than 40  heparin   Injectable 2500 Unit(s) IV Push every 6 hours PRN For aPTT between 40 - 57  HYDROmorphone  Injectable 1.5 milliGRAM(s) IV Push every 8 hours PRN Severe Pain (7 - 10)              PHYSICAL EXAM:  GENERAL: NAD  NECK: Supple, No JVD  CHEST/LUNG: Clear to auscultation bilaterally; No wheezing.  HEART: Regular rate and rhythm; No murmurs, rubs, or gallops  ABDOMEN: Soft, Nontender, Nondistended; Bowel sounds present  EXTREMITIES:   No edema  NEUROLOGY: AAO X 3                            7.6    6.61  )-----------( 196      ( 10 Saurav 2021 11:45 )             24.0               129|89|89<113  5.0|18|10.84  9.5,1.8,7.8  06-10 @ 11:45      CAPILLARY BLOOD GLUCOSE      RADIOLOGY & ADDITIONAL TESTS:    Imaging Personally Reviewed:    Consultant(s) Notes Reviewed:      Care Discussed with Consultants/Other Providers:    Wilfred Christian MD, CMD, FACP    [address] [place] [address]  Office Tel: [phone]  Cell: [phone]

## 2021-06-11 NOTE — PROGRESS NOTE ADULT - ASSESSMENT
66M hx of ESRD on HD (M/W/F, previously via LUE AVF, now via chest wall catheter), anemia, hyperparathyroidism, thrombocytopenia, hypothyroidism, HTN who presents with shortness of breath and LE swelling likely due to volume overload from missed HD session, admitted for HD. Renal following for ESRD Mx.     ESRD on HD   K, volume acceptable  s/p new RUE avf and RIJ permacath   s/p HD yesterday, Rx sheet reviewed, net UF 2.5kg, tolerated well. uneventful.  plan for next hd tomorrow w/2k bath, uf 2kg as tolerated  accepted to outpt unit on MWF schedule.   vasc surgery f/u for concerning cellulitis over new AV access site.   started IV abx for possible cellulitis. on vanco. redose 3xweek post hd or based on level. as per ID  renal diet, fluid restriction 1L/day  dose all meds for ESRD  HTN, controlled well now. c/w BB and Nifedipine  Anemia in CKD-Hb below goal. Continue epo 16K tiw w/hd.   Hyperphosphatemia- high phos level, continue Renvela 2 tabs TID with meals. high pth noted, c/w hectorol 4 mcg Three times a week with HD.     labs, chart reviewed  [facility]  Cell - [phone]  Office [phone]  Ans Serv [phone]

## 2021-06-11 NOTE — CONSULT NOTE ADULT - PROBLEM SELECTOR RECOMMENDATION 9
Atrial fibrillation with heart rate 105 -120. Astmptomatic and hemodynamically stable    CHADSVASC score:  3  Has Bled score: 3    Monitor on telemetry  Continue rate control with carvedilol  anticoagulation with Heparin gtt full   labs include risk factor profile to include TSH, HGBA1c, Lipid profile, pBNP  EKG, PRN chest pain  Echo in AM to R/O thrombus and evaluate LV function and wall motion abnormality Atrial fibrillation with heart rate 105 -120. Astmptomatic and hemodynamically stable    CHADSVASC score:  3  Has Bled score: 3    Monitor on telemetry  Continue rate control with carvedilol  anticoagulation with Heparin gtt full if no contraindication  labs include risk factor profile to include TSH, HGBA1c, Lipid profile, pBNP  EKG, PRN chest pain  Echo in AM to R/O thrombus and evaluate LV function and wall motion abnormality Atrial fibrillation with heart rate 105 -120. Asymptomatic and hemodynamically stable    CHADSVASC score:  3  Has Bled score: 3    Monitor on telemetry  Continue rate control with carvedilol  anticoagulation with Heparin gtt full if no contraindication  labs include risk factor profile to include TSH, HGBA1c, Lipid profile, pBNP  EKG, PRN chest pain  Echo in AM to R/O thrombus and evaluate LV function and wall motion abnormality

## 2021-06-11 NOTE — CONSULT NOTE ADULT - ATTENDING COMMENTS
ESRD on HD now with clotted AVF for over 1m   will need new AVF on the R  protect R arm  vein mapping  [facility]
Personally saw and examined patient  Labs and vitals reviewed  Agree with above assessment and plan  66M hx of ESRD on HD, HTN who initially p/w ADHF requiring HD, now with ?new HD  EKG overnight showing AF  currently on tele, showing SR, pt asymptomatic, denies cp, sob  rate controlled, cont BB, ccb  [id number] at least 3, agree with a/c  cont care per primary team.

## 2021-06-11 NOTE — CONSULT NOTE ADULT - CONSULT REASON
ESRD, for HD
----------------------------------------------------------  Interventional Radiology Brief Consult Note  -----------------------------------------------------------    Reason for Referral:   Non-tunnelled catheter placed in [place] for conversion to tunnelled catheter.     Clinical Summary: 66y Male with thrombosed AVF and plan for new AVF placement by vascular in OR on Tuesday 6/1. IR is consulted for conversion of non-tunnelled line to tunnelled line.       Vitals:  T(C): 36.7 (05-28-21 @ 05:29), Max: 36.7 (05-28-21 @ 05:29)  HR: 76 (05-28-21 @ 05:29) (68 - 82)  BP: 165/95 (05-28-21 @ 05:29) (125/68 - 167/93)  RR: 17 (05-28-21 @ 05:29) (17 - 18)  SpO2: 99% (05-28-21 @ 05:29) (98% - 100%)    Labs:           9.7  4.20)-----(97     (05-28-21 @ 07:42)         31.1     134 | 94 | 40  --------------------< 94     (05-28-21 @ 07:42)  4.3 | 19 | 6.32         Assessment: 66y Male with non-tunnelled line. IR is consulted for conversion to tunnelled line.     Recommendations:  - IR deferring at this time. Please reach out to vascular for placement of tunnelled catheter in the OR as patient is getting new AVF.
Atrial fibrillation
AVF clotted
right arm infection
Pre-operative cardiovascular evaluation

## 2021-06-11 NOTE — PROGRESS NOTE ADULT - SUBJECTIVE AND OBJECTIVE BOX
Follow Up:      Inverval History/ROS:Patient is a 66y old  Male who presents with a chief complaint of urgent HD (11 Jun 2021 16:42)    Right forearm is less tender/ red.   No fever    Allergies    No Known Allergies    Intolerances        ANTIMICROBIALS:  vancomycin  IVPB 1000 once      OTHER MEDS:  acetaminophen   Tablet .. 650 milliGRAM(s) Oral every 6 hours PRN  aspirin enteric coated 81 milliGRAM(s) Oral daily  atorvastatin 40 milliGRAM(s) Oral at bedtime  carvedilol 12.5 milliGRAM(s) Oral every 12 hours  chlorhexidine 4% Liquid 1 Application(s) Topical <User Schedule>  doxercalciferol Injectable 4 MICROGram(s) IV Push <User Schedule>  epoetin tammi-epbx (RETACRIT) Injectable 16875 Unit(s) IV Push <User Schedule>  ferrous    sulfate 325 milliGRAM(s) Oral daily  folic acid 1 milliGRAM(s) Oral daily  heparin   Injectable 5500 Unit(s) IV Push every 6 hours PRN  heparin   Injectable 2500 Unit(s) IV Push every 6 hours PRN  heparin  Infusion.  Unit(s)/Hr IV Continuous <Continuous>  HYDROmorphone  Injectable 1.5 milliGRAM(s) IV Push every 8 hours PRN  levothyroxine 100 MICROGram(s) Oral daily  NIFEdipine XL 30 milliGRAM(s) Oral at bedtime  pantoprazole    Tablet 40 milliGRAM(s) Oral before breakfast  sevelamer carbonate 1600 milliGRAM(s) Oral three times a day with meals      Vital Signs Last 24 Hrs  T(C): 36.4 (11 Jun 2021 14:18), Max: 37.3 (10 Saurav 2021 20:56)  T(F): 97.6 (11 Jun 2021 14:18), Max: 99.2 (10 Saurav 2021 20:56)  HR: 80 (11 Jun 2021 14:18) (80 - 115)  BP: 126/69 (11 Jun 2021 14:18) (124/75 - 152/85)  BP(mean): --  RR: 18 (11 Jun 2021 14:18) (18 - 18)  SpO2: 100% (11 Jun 2021 14:18) (98% - 100%)    PHYSICAL EXAM:  General: [ x] non-toxic  HEAD/EYES: [ ] PERRL [x ] white sclera [ ] icterus  ENT:  [ ] normal [ x] supple [ ] thrush [ ] pharyngeal exudate  Cardiovascular:   [ ] murmur [ x] normal [ ] PPM/AICD  Respiratory:  [ x] clear to ausculation bilaterally  GI:  [x ] soft, non-tender, normal bowel sounds  :  [ ] siegel [ ] xno CVA tenderness   Musculoskeletal:  [ ] no synovitis  Neurologic:  [ ] non-focal exam   Skin:  [x ] right arm less tender less red  Lymph: [ ] no lymphadenopathy  Psychiatric:  [ ] appropriate affect [ ] alert & oriented  Lines:  [x ] no phlebitis [ ] central line                                8.5    6.44  )-----------( 240      ( 11 Jun 2021 10:11 )             26.3       06-11    130<L>  |  89<L>  |  59<H>  ----------------------------<  93  4.6   |  22  |  8.02<H>    Ca    9.3      11 Jun 2021 08:07  Phos  7.0     06-11  Mg     1.8     06-11            MICROBIOLOGY:    RADIOLOGY:

## 2021-06-11 NOTE — CONSULT NOTE ADULT - CONSULT REQUESTED DATE/TIME
28-May-2021 10:18
10-Saurav-2021 17:30
25-May-2021
01-Jun-2021 12:39
27-May-2021 16:09
11-Jun-2021 02:46

## 2021-06-11 NOTE — PROGRESS NOTE ADULT - PROBLEM SELECTOR PLAN 3
- likely due to ACD in setting of ESRD  - c/w home ferrous sulfate and folic acid daily  - EPO per nephrology
Hb 8.2  - likely due to ACD in setting of ESRD  - c/w home ferrous sulfate and folic acid daily  - EPO per nephrology
Monitor cbc, Epogen a sper renal
- likely due to ACD in setting of ESRD  - c/w home ferrous sulfate and folic acid daily  - EPO per nephrology
- likely due to ACD in setting of ESRD  - c/w home ferrous sulfate and folic acid daily  - EPO per nephrology
Hb 8.2  - likely due to ACD in setting of ESRD  - c/w home ferrous sulfate and folic acid daily  - EPO per nephrology
- likely due to ACD in setting of ESRD  - c/w home ferrous sulfate and folic acid daily  - EPO per nephrology
Monitor cbc, Epogen a sper renal
Hb 8.2  - likely due to ACD in setting of ESRD  - c/w home ferrous sulfate and folic acid daily  - EPO per nephrology
Troponin 105 to 100  - likely i/s/o renal dysfunction
Hb 8.2  - likely due to ACD in setting of ESRD  - c/w home ferrous sulfate and folic acid daily  - EPO per nephrology

## 2021-06-11 NOTE — PROGRESS NOTE ADULT - PROBLEM SELECTOR PROBLEM 3
Elevated troponin
Anemia
Anemia in ESRD (end-stage renal disease)
Anemia
Anemia in ESRD (end-stage renal disease)
Anemia

## 2021-06-11 NOTE — PROGRESS NOTE ADULT - SUBJECTIVE AND OBJECTIVE BOX
[facility] - S Alicia / Ej S /D Federico/ SERA Bojorquez/ SERA Faust/ Jovan Garrison / M Danou/ O Gregorio  service [phone], office [phone]  ---------------------------------------------------------------------------------------------------------------    Patient seen and examined bedside    Subjective and Objective: No overnight events, sob resolved. No complaints today. feeling better    Allergies: No Known Allergies      Hospital Medications:   MEDICATIONS  (STANDING):  aspirin enteric coated 81 milliGRAM(s) Oral daily  atorvastatin 40 milliGRAM(s) Oral at bedtime  carvedilol 12.5 milliGRAM(s) Oral every 12 hours  chlorhexidine 4% Liquid 1 Application(s) Topical <User Schedule>  doxercalciferol Injectable 4 MICROGram(s) IV Push <User Schedule>  epoetin tammi-epbx (RETACRIT) Injectable 19764 Unit(s) IV Push <User Schedule>  ferrous    sulfate 325 milliGRAM(s) Oral daily  folic acid 1 milliGRAM(s) Oral daily  heparin  Infusion.  Unit(s)/Hr (12 mL/Hr) IV Continuous <Continuous>  levothyroxine 100 MICROGram(s) Oral daily  NIFEdipine XL 30 milliGRAM(s) Oral at bedtime  pantoprazole    Tablet 40 milliGRAM(s) Oral before breakfast  sevelamer carbonate 1600 milliGRAM(s) Oral three times a day with meals  vancomycin  IVPB 1000 milliGRAM(s) IV Intermittent once      REVIEW OF SYSTEMS:  CONSTITUTIONAL: No weakness, fevers or chills  EYES/ENT: No visual changes;  No vertigo or throat pain   NECK: No pain or stiffness  RESPIRATORY: No cough, wheezing, hemoptysis; No shortness of breath  CARDIOVASCULAR: No chest pain or palpitations.  GASTROINTESTINAL: No abdominal or epigastric pain. No nausea, vomiting, or hematemesis; No diarrhea or constipation. No melena or hematochezia.  GENITOURINARY: No dysuria, frequency, foamy urine, urinary urgency, incontinence or hematuria  NEUROLOGICAL: No numbness or weakness  SKIN: No itching, burning, rashes, or lesions   VASCULAR: No bilateral lower extremity edema.   All other review of systems is negative unless indicated above.    VITALS:  T(F): 97.6 (06-11-21 @ 14:18), Max: 99.2 (06-10-21 @ 20:56)  HR: 80 (06-11-21 @ 14:18)  BP: 126/69 (06-11-21 @ 14:18)  RR: 18 (06-11-21 @ 14:18)  SpO2: 100% (06-11-21 @ 14:18)  Wt(kg): --    06-10 @ 07:01  -  06-11 @ 07:00  --------------------------------------------------------  IN: 518 mL / OUT: 3250 mL / NET: -2732 mL          PHYSICAL EXAM:  Constitutional: NAD  HEENT: anicteric sclera, oropharynx clear  Neck: No JVD  Respiratory: CTAB, no wheezes, rales or rhonchi  Cardiovascular: S1, S2, RRR  Gastrointestinal: BS+, soft, NT/ND  Extremities: No cyanosis or clubbing. No peripheral edema  Neurological: A/O x 3, no focal deficits  Psychiatric: Normal mood, normal affect  : No CVA tenderness. No siegel.   Skin: No rashes  Vascular Access:    LABS:  06-11    130<L>  |  89<L>  |  59<H>  ----------------------------<  93  4.6   |  22  |  8.02<H>    Ca    9.3      11 Jun 2021 08:07  Phos  7.0     06-11  Mg     1.8     06-11      Creatinine Trend: 8.02 <--, 10.84 <--, 8.59 <--, 13.19 <--, 12.55 <--, 11.51 <--, 6.86 <--                        8.5    6.44  )-----------( 075      ( 11 Jun 2021 10:11 )             26.3     Urine Studies:        RADIOLOGY & ADDITIONAL STUDIES:   [facility] - S Alicia / Ej S /D Federico/ S Maryellen/ S Govind/ Jovan Garrison / GAYATRI Garcia/ O Gregorio  service [phone], office [phone]  ---------------------------------------------------------------------------------------------------------------    Patient seen and examined bedside    Subjective and Objective: No overnight events, denied sob. No complaints today.     Allergies: No Known Allergies      Hospital Medications:   MEDICATIONS  (STANDING):  aspirin enteric coated 81 milliGRAM(s) Oral daily  atorvastatin 40 milliGRAM(s) Oral at bedtime  carvedilol 12.5 milliGRAM(s) Oral every 12 hours  chlorhexidine 4% Liquid 1 Application(s) Topical <User Schedule>  doxercalciferol Injectable 4 MICROGram(s) IV Push <User Schedule>  epoetin tammi-epbx (RETACRIT) Injectable 09120 Unit(s) IV Push <User Schedule>  ferrous    sulfate 325 milliGRAM(s) Oral daily  folic acid 1 milliGRAM(s) Oral daily  heparin  Infusion.  Unit(s)/Hr (12 mL/Hr) IV Continuous <Continuous>  levothyroxine 100 MICROGram(s) Oral daily  NIFEdipine XL 30 milliGRAM(s) Oral at bedtime  pantoprazole    Tablet 40 milliGRAM(s) Oral before breakfast  sevelamer carbonate 1600 milliGRAM(s) Oral three times a day with meals  vancomycin  IVPB 1000 milliGRAM(s) IV Intermittent once    VITALS:  T(F): 97.6 (06-11-21 @ 14:18), Max: 99.2 (06-10-21 @ 20:56)  HR: 80 (06-11-21 @ 14:18)  BP: 126/69 (06-11-21 @ 14:18)  RR: 18 (06-11-21 @ 14:18)  SpO2: 100% (06-11-21 @ 14:18)  Wt(kg): --    06-10 @ 07:01  -  06-11 @ 07:00  --------------------------------------------------------  IN: 518 mL / OUT: 3250 mL / NET: -2732 mL      PHYSICAL EXAM:  Constitutional: NAD  HEENT: anicteric sclera  Neck: No JVD  Respiratory: CTAB, no wheezes, rales or rhonchi  Cardiovascular: S1, S2, RRR  Gastrointestinal: BS+, soft, NT/ND  Extremities: No peripheral edema  Neurological: A/O x 3, no focal deficits  Psychiatric: Normal mood, normal affect  : No siegel.   Vascular Access: rt IJ PC+     LABS:  06-11    130<L>  |  89<L>  |  59<H>  ----------------------------<  93  4.6   |  22  |  8.02<H>    Ca    9.3      11 Jun 2021 08:07  Phos  7.0     06-11  Mg     1.8     06-11      Creatinine Trend: 8.02 <--, 10.84 <--, 8.59 <--, 13.19 <--, 12.55 <--, 11.51 <--, 6.86 <--                        8.5    6.44  )-----------( 240      ( 11 Jun 2021 10:11 )             26.3     Urine Studies:        RADIOLOGY & ADDITIONAL STUDIES:

## 2021-06-12 LAB
ANION GAP SERPL CALC-SCNC: 20 MMOL/L — HIGH (ref 7–14)
APTT BLD: 46.9 SEC — HIGH (ref 27–36.3)
APTT BLD: 60 SEC — HIGH (ref 27–36.3)
APTT BLD: 69.6 SEC — HIGH (ref 27–36.3)
BUN SERPL-MCNC: 78 MG/DL — HIGH (ref 7–23)
CALCIUM SERPL-MCNC: 10.2 MG/DL — SIGNIFICANT CHANGE UP (ref 8.4–10.5)
CHLORIDE SERPL-SCNC: 85 MMOL/L — LOW (ref 98–107)
CO2 SERPL-SCNC: 19 MMOL/L — LOW (ref 22–31)
CREAT SERPL-MCNC: 10.07 MG/DL — HIGH (ref 0.5–1.3)
GLUCOSE SERPL-MCNC: 102 MG/DL — HIGH (ref 70–99)
HCT VFR BLD CALC: 23.8 % — LOW (ref 39–50)
HGB BLD-MCNC: 7.6 G/DL — LOW (ref 13–17)
MAGNESIUM SERPL-MCNC: 1.8 MG/DL — SIGNIFICANT CHANGE UP (ref 1.6–2.6)
MCHC RBC-ENTMCNC: 27.4 PG — SIGNIFICANT CHANGE UP (ref 27–34)
MCHC RBC-ENTMCNC: 31.9 GM/DL — LOW (ref 32–36)
MCV RBC AUTO: 85.9 FL — SIGNIFICANT CHANGE UP (ref 80–100)
NRBC # BLD: 0 /100 WBCS — SIGNIFICANT CHANGE UP
NRBC # FLD: 0 K/UL — SIGNIFICANT CHANGE UP
PHOSPHATE SERPL-MCNC: 7.9 MG/DL — HIGH (ref 2.5–4.5)
PLATELET # BLD AUTO: 234 K/UL — SIGNIFICANT CHANGE UP (ref 150–400)
POTASSIUM SERPL-MCNC: 4.6 MMOL/L — SIGNIFICANT CHANGE UP (ref 3.5–5.3)
POTASSIUM SERPL-SCNC: 4.6 MMOL/L — SIGNIFICANT CHANGE UP (ref 3.5–5.3)
RBC # BLD: 2.77 M/UL — LOW (ref 4.2–5.8)
RBC # FLD: 16.8 % — HIGH (ref 10.3–14.5)
SODIUM SERPL-SCNC: 124 MMOL/L — LOW (ref 135–145)
VANCOMYCIN FLD-MCNC: 25.3 UG/ML
WBC # BLD: 7.4 K/UL — SIGNIFICANT CHANGE UP (ref 3.8–10.5)
WBC # FLD AUTO: 7.4 K/UL — SIGNIFICANT CHANGE UP (ref 3.8–10.5)

## 2021-06-12 PROCEDURE — 99232 SBSQ HOSP IP/OBS MODERATE 35: CPT

## 2021-06-12 RX ADMIN — HEPARIN SODIUM 2500 UNIT(S): 5000 INJECTION INTRAVENOUS; SUBCUTANEOUS at 11:47

## 2021-06-12 RX ADMIN — SEVELAMER CARBONATE 1600 MILLIGRAM(S): 2400 POWDER, FOR SUSPENSION ORAL at 10:11

## 2021-06-12 RX ADMIN — Medication 100 MICROGRAM(S): at 05:24

## 2021-06-12 RX ADMIN — Medication 1 MILLIGRAM(S): at 11:45

## 2021-06-12 RX ADMIN — Medication 650 MILLIGRAM(S): at 08:15

## 2021-06-12 RX ADMIN — HEPARIN SODIUM 1500 UNIT(S)/HR: 5000 INJECTION INTRAVENOUS; SUBCUTANEOUS at 11:42

## 2021-06-12 RX ADMIN — Medication 325 MILLIGRAM(S): at 11:44

## 2021-06-12 RX ADMIN — Medication 81 MILLIGRAM(S): at 11:45

## 2021-06-12 RX ADMIN — Medication 650 MILLIGRAM(S): at 07:15

## 2021-06-12 RX ADMIN — Medication 30 MILLIGRAM(S): at 22:39

## 2021-06-12 RX ADMIN — ATORVASTATIN CALCIUM 40 MILLIGRAM(S): 80 TABLET, FILM COATED ORAL at 22:39

## 2021-06-12 RX ADMIN — CARVEDILOL PHOSPHATE 12.5 MILLIGRAM(S): 80 CAPSULE, EXTENDED RELEASE ORAL at 05:24

## 2021-06-12 RX ADMIN — CHLORHEXIDINE GLUCONATE 1 APPLICATION(S): 213 SOLUTION TOPICAL at 11:45

## 2021-06-12 RX ADMIN — PANTOPRAZOLE SODIUM 40 MILLIGRAM(S): 20 TABLET, DELAYED RELEASE ORAL at 05:24

## 2021-06-12 RX ADMIN — SEVELAMER CARBONATE 1600 MILLIGRAM(S): 2400 POWDER, FOR SUSPENSION ORAL at 11:44

## 2021-06-12 RX ADMIN — HEPARIN SODIUM 1500 UNIT(S)/HR: 5000 INJECTION INTRAVENOUS; SUBCUTANEOUS at 20:05

## 2021-06-12 RX ADMIN — HEPARIN SODIUM 1400 UNIT(S)/HR: 5000 INJECTION INTRAVENOUS; SUBCUTANEOUS at 03:30

## 2021-06-12 NOTE — PROGRESS NOTE ADULT - ASSESSMENT
· Assessment	  66M hx of ESRD on HD (M/W/F, previously via LUE AVF, now via chest wall catheter), anemia, hyperparathyroidism, thrombocytopenia, hypothyroidism, HTN who presents with shortness of breath and LE swelling likely due to volume overload from missed HD session, admitted for urgent HD.                Problem/Plan - 1:  ·  Problem: Cellulitis of right upper extremity.  Plan: ID following.   Continue with Vancomycin.      Problem/Plan - 2:  ·  Problem: End stage renal disease.  Plan: Nephro f/up noted.  On HD  S/p Right radiocephalic fistula creation and right IJ permacath under fluoro  DC planning.      Problem/Plan - 3:  ·  Problem: Anemia in ESRD (end-stage renal disease).  Plan: Monitor cbc, Epogen a sper renal.

## 2021-06-12 NOTE — PROGRESS NOTE ADULT - ASSESSMENT
66M hx of ESRD on HD (M/W/F, previously via LUE AVF, now via chest wall catheter), anemia, hyperparathyroidism, thrombocytopenia, hypothyroidism, HTN who presents with shortness of breath and LE swelling likely due to volume overload from missed HD session, admitted for HD. Renal following for ESRD Mx.     ESRD on HD   K, volume acceptable  Hyponatremia likely 2/2 dilutional  s/p new RUE avf and RIJ permacath   scheduled for hd today w/2k bath, uf 2kg as tolerated  accepted to outpt unit on MWF schedule. will switch MWF next week  vasc surgery f/u for concerning cellulitis over new AV access site.   started IV abx for possible cellulitis. s/p vanco. abxs as per ID  renal diet, fluid restriction 1L/day  dose all meds for ESRD  HTN, controlled well now. c/w BB and Nifedipine  Anemia in CKD-Hb below goal. Continue epo 16K tiw w/hd.   Hyperphosphatemia- high phos level, continue Renvela 2 tabs TID with meals. high pth noted, c/w hectorol 4 mcg Three times a week with HD.     labs, chart reviewed  [facility]  Cell - [phone]  Office [phone]  Ans Serv [phone]

## 2021-06-12 NOTE — PROVIDER CONTACT NOTE (CRITICAL VALUE NOTIFICATION) - ACTION/TREATMENT ORDERED:
continue to monitor, obtain consent for transfusion
Continue to monitor pt, pt has dialysis ordered for today

## 2021-06-12 NOTE — PROGRESS NOTE ADULT - SUBJECTIVE AND OBJECTIVE BOX
Patient is a 66y old  Male who presents with a chief complaint of urgent HD (11 Jun 2021 16:46)      SUBJECTIVE / OVERNIGHT EVENTS:    Events noted.  CONSTITUTIONAL: No fever,  or fatigue  RESPIRATORY: No cough, wheezing,  No shortness of breath  CARDIOVASCULAR: No chest pain, palpitations, dizziness, or leg swelling  GASTROINTESTINAL: No abdominal or epigastric pain. No nausea, vomiting.  NEUROLOGICAL: No headaches,     MEDICATIONS  (STANDING):  aspirin enteric coated 81 milliGRAM(s) Oral daily  atorvastatin 40 milliGRAM(s) Oral at bedtime  carvedilol 12.5 milliGRAM(s) Oral every 12 hours  chlorhexidine 4% Liquid 1 Application(s) Topical <User Schedule>  doxercalciferol Injectable 4 MICROGram(s) IV Push <User Schedule>  epoetin tammi-epbx (RETACRIT) Injectable 87456 Unit(s) IV Push <User Schedule>  ferrous    sulfate 325 milliGRAM(s) Oral daily  folic acid 1 milliGRAM(s) Oral daily  heparin  Infusion.  Unit(s)/Hr (12 mL/Hr) IV Continuous <Continuous>  levothyroxine 100 MICROGram(s) Oral daily  NIFEdipine XL 30 milliGRAM(s) Oral at bedtime  pantoprazole    Tablet 40 milliGRAM(s) Oral before breakfast  sevelamer carbonate 1600 milliGRAM(s) Oral three times a day with meals    MEDICATIONS  (PRN):  acetaminophen   Tablet .. 650 milliGRAM(s) Oral every 6 hours PRN Moderate Pain (4 - 6)  heparin   Injectable 5500 Unit(s) IV Push every 6 hours PRN For aPTT less than 40  heparin   Injectable 2500 Unit(s) IV Push every 6 hours PRN For aPTT between 40 - 57  HYDROmorphone  Injectable 1.5 milliGRAM(s) IV Push every 8 hours PRN Severe Pain (7 - 10)        CAPILLARY BLOOD GLUCOSE        I&O's Summary    11 Jun 2021 07:01  -  12 Jun 2021 07:00  --------------------------------------------------------  IN: 100 mL / OUT: 100 mL / NET: 0 mL        T(C): 36.7 (06-12-21 @ 12:03), Max: 36.9 (06-11-21 @ 21:33)  HR: 74 (06-12-21 @ 12:03) (74 - 84)  BP: 115/68 (06-12-21 @ 12:03) (115/68 - 148/75)  RR: 18 (06-12-21 @ 12:03) (16 - 18)  SpO2: 100% (06-12-21 @ 12:03) (97% - 100%)    PHYSICAL EXAM:  GENERAL: NAD  NECK: Supple, No JVD  CHEST/LUNG: Clear to auscultation bilaterally; No wheezing.  HEART: Regular rate and rhythm; No murmurs, rubs, or gallops  ABDOMEN: Soft, Nontender, Nondistended; Bowel sounds present  EXTREMITIES:   No edema  NEUROLOGY: AAO X 3      LABS:                        7.6    7.40  )-----------( 234      ( 12 Jun 2021 07:27 )             23.8     06-12    124<L>  |  85<L>  |  78<H>  ----------------------------<  102<H>  4.6   |  19<L>  |  10.07<H>    Ca    10.2      12 Jun 2021 07:27  Phos  7.9     06-12  Mg     1.8     06-12      PTT - ( 12 Jun 2021 10:58 )  PTT:46.9 sec  CARDIAC MARKERS ( 11 Jun 2021 02:24 )  x     / x     / 29 U/L / x     / 1.3 ng/mL        CAPILLARY BLOOD GLUCOSE            RADIOLOGY & ADDITIONAL TESTS:    Imaging Personally Reviewed:    Consultant(s) Notes Reviewed:      Care Discussed with Consultants/Other Providers:    Wilfred Christian MD, CMD, FACP    [address] [place] [address]  Office Tel: [phone]  Cell: [phone]

## 2021-06-12 NOTE — PROGRESS NOTE ADULT - SUBJECTIVE AND OBJECTIVE BOX
[facility] - S Alicia / Ej S /D Federico/ SERA Bojorquez/ SERA Faust/ Jovan Garrison / M Danou/ O Gregorio  service [phone], office [phone]  ---------------------------------------------------------------------------------------------------------------    Patient seen and examined bedside    Subjective and Objective: No overnight events, sob resolved. No complaints today. feeling better    Allergies: No Known Allergies      Hospital Medications:   MEDICATIONS  (STANDING):  aspirin enteric coated 81 milliGRAM(s) Oral daily  atorvastatin 40 milliGRAM(s) Oral at bedtime  carvedilol 12.5 milliGRAM(s) Oral every 12 hours  chlorhexidine 4% Liquid 1 Application(s) Topical <User Schedule>  doxercalciferol Injectable 4 MICROGram(s) IV Push <User Schedule>  epoetin tammi-epbx (RETACRIT) Injectable 26608 Unit(s) IV Push <User Schedule>  ferrous    sulfate 325 milliGRAM(s) Oral daily  folic acid 1 milliGRAM(s) Oral daily  heparin  Infusion.  Unit(s)/Hr (12 mL/Hr) IV Continuous <Continuous>  levothyroxine 100 MICROGram(s) Oral daily  NIFEdipine XL 30 milliGRAM(s) Oral at bedtime  pantoprazole    Tablet 40 milliGRAM(s) Oral before breakfast  sevelamer carbonate 1600 milliGRAM(s) Oral three times a day with meals      REVIEW OF SYSTEMS:  CONSTITUTIONAL: No weakness, fevers or chills  EYES/ENT: No visual changes;  No vertigo or throat pain   NECK: No pain or stiffness  RESPIRATORY: No cough, wheezing, hemoptysis; No shortness of breath  CARDIOVASCULAR: No chest pain or palpitations.  GASTROINTESTINAL: No abdominal or epigastric pain. No nausea, vomiting, or hematemesis; No diarrhea or constipation. No melena or hematochezia.  GENITOURINARY: No dysuria, frequency, foamy urine, urinary urgency, incontinence or hematuria  NEUROLOGICAL: No numbness or weakness  SKIN: No itching, burning, rashes, or lesions   VASCULAR: No bilateral lower extremity edema.   All other review of systems is negative unless indicated above.    VITALS:  T(F): 98 (06-12-21 @ 12:03), Max: 98.5 (06-11-21 @ 21:33)  HR: 74 (06-12-21 @ 12:03)  BP: 115/68 (06-12-21 @ 12:03)  RR: 18 (06-12-21 @ 12:03)  SpO2: 100% (06-12-21 @ 12:03)  Wt(kg): --    06-11 @ 07:01  -  06-12 @ 07:00  --------------------------------------------------------  IN: 100 mL / OUT: 100 mL / NET: 0 mL          PHYSICAL EXAM:  Constitutional: NAD  HEENT: anicteric sclera, oropharynx clear  Neck: No JVD  Respiratory: CTAB, no wheezes, rales or rhonchi  Cardiovascular: S1, S2, RRR  Gastrointestinal: BS+, soft, NT/ND  Extremities: No cyanosis or clubbing. No peripheral edema  Neurological: A/O x 3, no focal deficits  Psychiatric: Normal mood, normal affect  : No CVA tenderness. No siegel.   Skin: No rashes  Vascular Access:    LABS:  06-12    124<L>  |  85<L>  |  78<H>  ----------------------------<  102<H>  4.6   |  19<L>  |  10.07<H>    Ca    10.2      12 Jun 2021 07:27  Phos  7.9     06-12  Mg     1.8     06-12      Creatinine Trend: 10.07 <--, 8.02 <--, 10.84 <--, 8.59 <--, 13.19 <--, 12.55 <--, 11.51 <--                        7.6    7.40  )-----------( 234      ( 12 Jun 2021 07:27 )             23.8     Urine Studies:        RADIOLOGY & ADDITIONAL STUDIES:   [facility] - S Alicia / Ej S /D Federico/ S Maryellen/ S Govind/ Jovan Garrison / GAYATRI Garcia/ O Gregorio  service [phone], office [phone]  ---------------------------------------------------------------------------------------------------------------    Patient seen and examined bedside    Subjective and Objective: No overnight events, denied sob. No complaints today.     Allergies: No Known Allergies      Hospital Medications:   MEDICATIONS  (STANDING):  aspirin enteric coated 81 milliGRAM(s) Oral daily  atorvastatin 40 milliGRAM(s) Oral at bedtime  carvedilol 12.5 milliGRAM(s) Oral every 12 hours  chlorhexidine 4% Liquid 1 Application(s) Topical <User Schedule>  doxercalciferol Injectable 4 MICROGram(s) IV Push <User Schedule>  epoetin tammi-epbx (RETACRIT) Injectable 12830 Unit(s) IV Push <User Schedule>  ferrous    sulfate 325 milliGRAM(s) Oral daily  folic acid 1 milliGRAM(s) Oral daily  heparin  Infusion.  Unit(s)/Hr (12 mL/Hr) IV Continuous <Continuous>  levothyroxine 100 MICROGram(s) Oral daily  NIFEdipine XL 30 milliGRAM(s) Oral at bedtime  pantoprazole    Tablet 40 milliGRAM(s) Oral before breakfast  sevelamer carbonate 1600 milliGRAM(s) Oral three times a day with meals    VITALS:  T(F): 98 (06-12-21 @ 12:03), Max: 98.5 (06-11-21 @ 21:33)  HR: 74 (06-12-21 @ 12:03)  BP: 115/68 (06-12-21 @ 12:03)  RR: 18 (06-12-21 @ 12:03)  SpO2: 100% (06-12-21 @ 12:03)  Wt(kg): --    06-11 @ 07:01  -  06-12 @ 07:00  --------------------------------------------------------  IN: 100 mL / OUT: 100 mL / NET: 0 mL      PHYSICAL EXAM:  Constitutional: NAD  HEENT: anicteric sclera  Neck: No JVD  Respiratory: CTAB, no wheezes, rales or rhonchi  Cardiovascular: S1, S2, RRR  Gastrointestinal: BS+, soft, NT/ND  Extremities: No peripheral edema  Neurological: A/O x 3, no focal deficits  Psychiatric: Normal mood, normal affect  : No siegel.   Vascular Access: rt IJ PC+     LABS:  06-12    124<L>  |  85<L>  |  78<H>  ----------------------------<  102<H>  4.6   |  19<L>  |  10.07<H>    Ca    10.2      12 Jun 2021 07:27  Phos  7.9     06-12  Mg     1.8     06-12      Creatinine Trend: 10.07 <--, 8.02 <--, 10.84 <--, 8.59 <--, 13.19 <--, 12.55 <--, 11.51 <--                        7.6    7.40  )-----------( 234      ( 12 Jun 2021 07:27 )             23.8     Urine Studies:        RADIOLOGY & ADDITIONAL STUDIES:

## 2021-06-12 NOTE — PROGRESS NOTE ADULT - SUBJECTIVE AND OBJECTIVE BOX
Follow Up:      Inverval History/ROS:Patient is a 66y old  Male who presents with a chief complaint of urgent HD (12 Jun 2021 13:49)    Right arm feels better  No fever      Allergies    No Known Allergies    Intolerances        ANTIMICROBIALS:      OTHER MEDS:  acetaminophen   Tablet .. 650 milliGRAM(s) Oral every 6 hours PRN  aspirin enteric coated 81 milliGRAM(s) Oral daily  atorvastatin 40 milliGRAM(s) Oral at bedtime  carvedilol 12.5 milliGRAM(s) Oral every 12 hours  chlorhexidine 4% Liquid 1 Application(s) Topical <User Schedule>  doxercalciferol Injectable 4 MICROGram(s) IV Push <User Schedule>  epoetin tammi-epbx (RETACRIT) Injectable 81165 Unit(s) IV Push <User Schedule>  ferrous    sulfate 325 milliGRAM(s) Oral daily  folic acid 1 milliGRAM(s) Oral daily  heparin   Injectable 5500 Unit(s) IV Push every 6 hours PRN  heparin   Injectable 2500 Unit(s) IV Push every 6 hours PRN  heparin  Infusion.  Unit(s)/Hr IV Continuous <Continuous>  HYDROmorphone  Injectable 1.5 milliGRAM(s) IV Push every 8 hours PRN  levothyroxine 100 MICROGram(s) Oral daily  NIFEdipine XL 30 milliGRAM(s) Oral at bedtime  pantoprazole    Tablet 40 milliGRAM(s) Oral before breakfast  sevelamer carbonate 1600 milliGRAM(s) Oral three times a day with meals      Vital Signs Last 24 Hrs  T(C): 36.7 (12 Jun 2021 12:03), Max: 36.9 (11 Jun 2021 21:33)  T(F): 98 (12 Jun 2021 12:03), Max: 98.5 (11 Jun 2021 21:33)  HR: 74 (12 Jun 2021 12:03) (74 - 84)  BP: 115/68 (12 Jun 2021 12:03) (115/68 - 148/75)  BP(mean): --  RR: 18 (12 Jun 2021 12:03) (16 - 18)  SpO2: 100% (12 Jun 2021 12:03) (97% - 100%)    PHYSICAL EXAM:  General: [x ] non-toxic  HEAD/EYES: [ ] PERRL [ x] white sclera [ ] icterus  ENT:  [ ] normal x[ ] supple [ ] thrush [ ] pharyngeal exudate  Cardiovascular:   [ ] murmur [x ] normal [ ] PPM/AICD  Respiratory:  [x ] clear to ausculation bilaterally  GI:  [x ] soft, non-tender, normal bowel sounds  :  [ ] siegel [ x] no CVA tenderness   Musculoskeletal:  [ ] no synovitis  Neurologic:  [ ] non-focal exam   Skin:  [x ] right arm is less red/ less inudrated  Lymph: [x ] no lymphadenopathy  Psychiatric:  [x ] appropriate affect [ ] alert & oriented  Lines:  [ x] no phlebitis [ ] central line                                7.6    7.40  )-----------( 234      ( 12 Jun 2021 07:27 )             23.8       06-12    124<L>  |  85<L>  |  78<H>  ----------------------------<  102<H>  4.6   |  19<L>  |  10.07<H>    Ca    10.2      12 Jun 2021 07:27  Phos  7.9     06-12  Mg     1.8     06-12            MICROBIOLOGY:Culture Results:   No growth to date. (06-10-21 @ 20:57)  Culture Results:   No growth to date. (06-10-21 @ 20:57)      RADIOLOGY:

## 2021-06-13 LAB
ANION GAP SERPL CALC-SCNC: 14 MMOL/L — SIGNIFICANT CHANGE UP (ref 7–14)
APTT BLD: 56.3 SEC — HIGH (ref 27–36.3)
APTT BLD: 59.6 SEC — HIGH (ref 27–36.3)
APTT BLD: 82.3 SEC — HIGH (ref 27–36.3)
BUN SERPL-MCNC: 37 MG/DL — HIGH (ref 7–23)
CALCIUM SERPL-MCNC: 9.3 MG/DL — SIGNIFICANT CHANGE UP (ref 8.4–10.5)
CHLORIDE SERPL-SCNC: 95 MMOL/L — LOW (ref 98–107)
CO2 SERPL-SCNC: 25 MMOL/L — SIGNIFICANT CHANGE UP (ref 22–31)
CREAT SERPL-MCNC: 5.92 MG/DL — HIGH (ref 0.5–1.3)
GLUCOSE SERPL-MCNC: 74 MG/DL — SIGNIFICANT CHANGE UP (ref 70–99)
HCT VFR BLD CALC: 23.9 % — LOW (ref 39–50)
HGB BLD-MCNC: 7.6 G/DL — LOW (ref 13–17)
MAGNESIUM SERPL-MCNC: 1.9 MG/DL — SIGNIFICANT CHANGE UP (ref 1.6–2.6)
MCHC RBC-ENTMCNC: 27.1 PG — SIGNIFICANT CHANGE UP (ref 27–34)
MCHC RBC-ENTMCNC: 31.8 GM/DL — LOW (ref 32–36)
MCV RBC AUTO: 85.4 FL — SIGNIFICANT CHANGE UP (ref 80–100)
NRBC # BLD: 0 /100 WBCS — SIGNIFICANT CHANGE UP
NRBC # FLD: 0 K/UL — SIGNIFICANT CHANGE UP
PHOSPHATE SERPL-MCNC: 5.1 MG/DL — HIGH (ref 2.5–4.5)
PLATELET # BLD AUTO: 272 K/UL — SIGNIFICANT CHANGE UP (ref 150–400)
POTASSIUM SERPL-MCNC: 4.2 MMOL/L — SIGNIFICANT CHANGE UP (ref 3.5–5.3)
POTASSIUM SERPL-SCNC: 4.2 MMOL/L — SIGNIFICANT CHANGE UP (ref 3.5–5.3)
RBC # BLD: 2.8 M/UL — LOW (ref 4.2–5.8)
RBC # FLD: 16.8 % — HIGH (ref 10.3–14.5)
SODIUM SERPL-SCNC: 134 MMOL/L — LOW (ref 135–145)
VANCOMYCIN FLD-MCNC: 17.3 UG/ML — SIGNIFICANT CHANGE UP
WBC # BLD: 6.72 K/UL — SIGNIFICANT CHANGE UP (ref 3.8–10.5)
WBC # FLD AUTO: 6.72 K/UL — SIGNIFICANT CHANGE UP (ref 3.8–10.5)

## 2021-06-13 PROCEDURE — 93306 TTE W/DOPPLER COMPLETE: CPT | Mod: 26

## 2021-06-13 RX ORDER — VANCOMYCIN HCL 1 G
1000 VIAL (EA) INTRAVENOUS ONCE
Refills: 0 | Status: COMPLETED | OUTPATIENT
Start: 2021-06-13 | End: 2021-06-13

## 2021-06-13 RX ADMIN — SEVELAMER CARBONATE 1600 MILLIGRAM(S): 2400 POWDER, FOR SUSPENSION ORAL at 18:16

## 2021-06-13 RX ADMIN — CHLORHEXIDINE GLUCONATE 1 APPLICATION(S): 213 SOLUTION TOPICAL at 11:59

## 2021-06-13 RX ADMIN — PANTOPRAZOLE SODIUM 40 MILLIGRAM(S): 20 TABLET, DELAYED RELEASE ORAL at 05:14

## 2021-06-13 RX ADMIN — Medication 325 MILLIGRAM(S): at 11:59

## 2021-06-13 RX ADMIN — SEVELAMER CARBONATE 1600 MILLIGRAM(S): 2400 POWDER, FOR SUSPENSION ORAL at 10:04

## 2021-06-13 RX ADMIN — HEPARIN SODIUM 1600 UNIT(S)/HR: 5000 INJECTION INTRAVENOUS; SUBCUTANEOUS at 10:39

## 2021-06-13 RX ADMIN — SEVELAMER CARBONATE 1600 MILLIGRAM(S): 2400 POWDER, FOR SUSPENSION ORAL at 11:59

## 2021-06-13 RX ADMIN — Medication 100 MICROGRAM(S): at 05:14

## 2021-06-13 RX ADMIN — Medication 30 MILLIGRAM(S): at 22:05

## 2021-06-13 RX ADMIN — Medication 650 MILLIGRAM(S): at 01:53

## 2021-06-13 RX ADMIN — HEPARIN SODIUM 2500 UNIT(S): 5000 INJECTION INTRAVENOUS; SUBCUTANEOUS at 02:49

## 2021-06-13 RX ADMIN — ATORVASTATIN CALCIUM 40 MILLIGRAM(S): 80 TABLET, FILM COATED ORAL at 22:05

## 2021-06-13 RX ADMIN — CARVEDILOL PHOSPHATE 12.5 MILLIGRAM(S): 80 CAPSULE, EXTENDED RELEASE ORAL at 18:15

## 2021-06-13 RX ADMIN — Medication 81 MILLIGRAM(S): at 11:59

## 2021-06-13 RX ADMIN — Medication 1 MILLIGRAM(S): at 12:00

## 2021-06-13 RX ADMIN — CARVEDILOL PHOSPHATE 12.5 MILLIGRAM(S): 80 CAPSULE, EXTENDED RELEASE ORAL at 05:14

## 2021-06-13 RX ADMIN — Medication 250 MILLIGRAM(S): at 18:14

## 2021-06-13 RX ADMIN — Medication 650 MILLIGRAM(S): at 02:53

## 2021-06-13 RX ADMIN — HEPARIN SODIUM 1600 UNIT(S)/HR: 5000 INJECTION INTRAVENOUS; SUBCUTANEOUS at 02:49

## 2021-06-13 RX ADMIN — HEPARIN SODIUM 1600 UNIT(S)/HR: 5000 INJECTION INTRAVENOUS; SUBCUTANEOUS at 18:14

## 2021-06-13 NOTE — PROGRESS NOTE ADULT - ASSESSMENT
· Assessment	  66M hx of ESRD on HD (M/W/F, previously via LUE AVF, now via chest wall catheter), anemia, hyperparathyroidism, thrombocytopenia, hypothyroidism, HTN who presents with shortness of breath and LE swelling likely due to volume overload from missed HD session, admitted for urgent HD.       Problem/Plan - 1:  ·  Problem: Cellulitis of right upper extremity.  Plan: ID following.   Continue with Vancomycin.      Problem/Plan - 2:  ·  Problem: End stage renal disease.  Plan: Nephro f/up noted.  On HD  S/p Right radiocephalic fistula creation and right IJ permacath under fluoro  DC planning.      Problem/Plan - 3:  ·  Problem: Anemia in ESRD (end-stage renal disease).  Plan: Monitor cbc, Epogen as per renal.

## 2021-06-13 NOTE — PROGRESS NOTE ADULT - SUBJECTIVE AND OBJECTIVE BOX
Patient is a 66y old  Male who presents with a chief complaint of urgent HD (12 Jun 2021 17:29)      SUBJECTIVE / OVERNIGHT EVENTS:    Events noted.  CONSTITUTIONAL: No fever,  or fatigue  RESPIRATORY: No cough, wheezing,  No shortness of breath  CARDIOVASCULAR: No chest pain, palpitations,   GASTROINTESTINAL: No abdominal or epigastric pain.   NEUROLOGICAL: No headaches,     MEDICATIONS  (STANDING):  aspirin enteric coated 81 milliGRAM(s) Oral daily  atorvastatin 40 milliGRAM(s) Oral at bedtime  carvedilol 12.5 milliGRAM(s) Oral every 12 hours  chlorhexidine 4% Liquid 1 Application(s) Topical <User Schedule>  doxercalciferol Injectable 4 MICROGram(s) IV Push <User Schedule>  epoetin tammi-epbx (RETACRIT) Injectable 31724 Unit(s) IV Push <User Schedule>  ferrous    sulfate 325 milliGRAM(s) Oral daily  folic acid 1 milliGRAM(s) Oral daily  heparin  Infusion.  Unit(s)/Hr (12 mL/Hr) IV Continuous <Continuous>  levothyroxine 100 MICROGram(s) Oral daily  NIFEdipine XL 30 milliGRAM(s) Oral at bedtime  pantoprazole    Tablet 40 milliGRAM(s) Oral before breakfast  sevelamer carbonate 1600 milliGRAM(s) Oral three times a day with meals    MEDICATIONS  (PRN):  acetaminophen   Tablet .. 650 milliGRAM(s) Oral every 6 hours PRN Moderate Pain (4 - 6)  heparin   Injectable 5500 Unit(s) IV Push every 6 hours PRN For aPTT less than 40  heparin   Injectable 2500 Unit(s) IV Push every 6 hours PRN For aPTT between 40 - 57  HYDROmorphone  Injectable 1.5 milliGRAM(s) IV Push every 8 hours PRN Severe Pain (7 - 10)        CAPILLARY BLOOD GLUCOSE        I&O's Summary    12 Jun 2021 07:01  -  13 Jun 2021 07:00  --------------------------------------------------------  IN: 750 mL / OUT: 2400 mL / NET: -1650 mL        T(C): 37.1 (06-13-21 @ 21:58), Max: 37.1 (06-13-21 @ 21:58)  HR: 80 (06-13-21 @ 21:58) (74 - 80)  BP: 128/70 (06-13-21 @ 21:58) (128/70 - 162/98)  RR: 16 (06-13-21 @ 21:58) (16 - 18)  SpO2: 99% (06-13-21 @ 21:58) (97% - 99%)    PHYSICAL EXAM:    NECK: Supple, No JVD  CHEST/LUNG: Clear to auscultation bilaterally; No wheezing.  HEART: Regular rate and rhythm; No murmurs, rubs, or gallops  ABDOMEN: Soft, Nontender, Nondistended; Bowel sounds present  EXTREMITIES:   No edema  NEUROLOGY: AAO X 3      LABS:                        7.6    6.72  )-----------( 272      ( 13 Jun 2021 08:28 )             23.9     06-13    134<L>  |  95<L>  |  37<H>  ----------------------------<  74  4.2   |  25  |  5.92<H>    Ca    9.3      13 Jun 2021 08:28  Phos  5.1     06-13  Mg     1.9     06-13      PTT - ( 13 Jun 2021 16:30 )  PTT:59.6 sec        CAPILLARY BLOOD GLUCOSE            RADIOLOGY & ADDITIONAL TESTS:    Imaging Personally Reviewed:    Consultant(s) Notes Reviewed:      Care Discussed with Consultants/Other Providers:    Wilfred Christian MD, CMD, FACP    [address] [address]  Office Tel: [phone]  Cell: [phone]

## 2021-06-14 LAB
ANION GAP SERPL CALC-SCNC: 16 MMOL/L — HIGH (ref 7–14)
APTT BLD: 62.7 SEC — HIGH (ref 27–36.3)
BUN SERPL-MCNC: 54 MG/DL — HIGH (ref 7–23)
CALCIUM SERPL-MCNC: 9.7 MG/DL — SIGNIFICANT CHANGE UP (ref 8.4–10.5)
CHLORIDE SERPL-SCNC: 92 MMOL/L — LOW (ref 98–107)
CO2 SERPL-SCNC: 23 MMOL/L — SIGNIFICANT CHANGE UP (ref 22–31)
CREAT SERPL-MCNC: 8.03 MG/DL — HIGH (ref 0.5–1.3)
GLUCOSE SERPL-MCNC: 95 MG/DL — SIGNIFICANT CHANGE UP (ref 70–99)
HCT VFR BLD CALC: 24.4 % — LOW (ref 39–50)
HGB BLD-MCNC: 7.5 G/DL — LOW (ref 13–17)
MAGNESIUM SERPL-MCNC: 1.8 MG/DL — SIGNIFICANT CHANGE UP (ref 1.6–2.6)
MCHC RBC-ENTMCNC: 26.7 PG — LOW (ref 27–34)
MCHC RBC-ENTMCNC: 30.7 GM/DL — LOW (ref 32–36)
MCV RBC AUTO: 86.8 FL — SIGNIFICANT CHANGE UP (ref 80–100)
NRBC # BLD: 0 /100 WBCS — SIGNIFICANT CHANGE UP
NRBC # FLD: 0 K/UL — SIGNIFICANT CHANGE UP
PHOSPHATE SERPL-MCNC: 7.4 MG/DL — HIGH (ref 2.5–4.5)
PLATELET # BLD AUTO: 295 K/UL — SIGNIFICANT CHANGE UP (ref 150–400)
POTASSIUM SERPL-MCNC: 4.7 MMOL/L — SIGNIFICANT CHANGE UP (ref 3.5–5.3)
POTASSIUM SERPL-SCNC: 4.7 MMOL/L — SIGNIFICANT CHANGE UP (ref 3.5–5.3)
RBC # BLD: 2.81 M/UL — LOW (ref 4.2–5.8)
RBC # FLD: 16.7 % — HIGH (ref 10.3–14.5)
SODIUM SERPL-SCNC: 131 MMOL/L — LOW (ref 135–145)
VANCOMYCIN FLD-MCNC: 20 UG/ML — SIGNIFICANT CHANGE UP
WBC # BLD: 7.88 K/UL — SIGNIFICANT CHANGE UP (ref 3.8–10.5)
WBC # FLD AUTO: 7.88 K/UL — SIGNIFICANT CHANGE UP (ref 3.8–10.5)

## 2021-06-14 PROCEDURE — 99232 SBSQ HOSP IP/OBS MODERATE 35: CPT

## 2021-06-14 RX ORDER — VANCOMYCIN HCL 1 G
750 VIAL (EA) INTRAVENOUS ONCE
Refills: 0 | Status: COMPLETED | OUTPATIENT
Start: 2021-06-14 | End: 2021-06-14

## 2021-06-14 RX ADMIN — Medication 81 MILLIGRAM(S): at 12:02

## 2021-06-14 RX ADMIN — SEVELAMER CARBONATE 1600 MILLIGRAM(S): 2400 POWDER, FOR SUSPENSION ORAL at 17:50

## 2021-06-14 RX ADMIN — CARVEDILOL PHOSPHATE 12.5 MILLIGRAM(S): 80 CAPSULE, EXTENDED RELEASE ORAL at 06:09

## 2021-06-14 RX ADMIN — Medication 30 MILLIGRAM(S): at 23:13

## 2021-06-14 RX ADMIN — SEVELAMER CARBONATE 1600 MILLIGRAM(S): 2400 POWDER, FOR SUSPENSION ORAL at 09:19

## 2021-06-14 RX ADMIN — CHLORHEXIDINE GLUCONATE 1 APPLICATION(S): 213 SOLUTION TOPICAL at 09:19

## 2021-06-14 RX ADMIN — ATORVASTATIN CALCIUM 40 MILLIGRAM(S): 80 TABLET, FILM COATED ORAL at 23:13

## 2021-06-14 RX ADMIN — Medication 325 MILLIGRAM(S): at 12:02

## 2021-06-14 RX ADMIN — Medication 100 MICROGRAM(S): at 06:09

## 2021-06-14 RX ADMIN — Medication 1 MILLIGRAM(S): at 12:02

## 2021-06-14 RX ADMIN — SEVELAMER CARBONATE 1600 MILLIGRAM(S): 2400 POWDER, FOR SUSPENSION ORAL at 12:02

## 2021-06-14 RX ADMIN — PANTOPRAZOLE SODIUM 40 MILLIGRAM(S): 20 TABLET, DELAYED RELEASE ORAL at 06:09

## 2021-06-14 RX ADMIN — HEPARIN SODIUM 1600 UNIT(S)/HR: 5000 INJECTION INTRAVENOUS; SUBCUTANEOUS at 06:58

## 2021-06-14 RX ADMIN — CARVEDILOL PHOSPHATE 12.5 MILLIGRAM(S): 80 CAPSULE, EXTENDED RELEASE ORAL at 17:43

## 2021-06-14 RX ADMIN — Medication 250 MILLIGRAM(S): at 17:43

## 2021-06-14 NOTE — PROGRESS NOTE ADULT - SUBJECTIVE AND OBJECTIVE BOX
[facility] - S Alicia / Ej S /D Federico/ S Maryellen/ S Govind/ Jovan Garrison / GAYATRI Garcia/ O Gregorio  service -[phone], office [phone]  ---------------------------------------------------------------------------------------------------------------    Patient seen and examined bedside    Subjective and Objective: No overnight events, denied sob. No complaints today.     Allergies: No Known Allergies      Hospital Medications:   MEDICATIONS  (STANDING):  aspirin enteric coated 81 milliGRAM(s) Oral daily  atorvastatin 40 milliGRAM(s) Oral at bedtime  carvedilol 12.5 milliGRAM(s) Oral every 12 hours  chlorhexidine 4% Liquid 1 Application(s) Topical <User Schedule>  doxercalciferol Injectable 4 MICROGram(s) IV Push <User Schedule>  epoetin tammi-epbx (RETACRIT) Injectable 56494 Unit(s) IV Push <User Schedule>  ferrous    sulfate 325 milliGRAM(s) Oral daily  folic acid 1 milliGRAM(s) Oral daily  heparin  Infusion.  Unit(s)/Hr (12 mL/Hr) IV Continuous <Continuous>  levothyroxine 100 MICROGram(s) Oral daily  NIFEdipine XL 30 milliGRAM(s) Oral at bedtime  pantoprazole    Tablet 40 milliGRAM(s) Oral before breakfast  sevelamer carbonate 1600 milliGRAM(s) Oral three times a day with meals  vancomycin  IVPB 750 milliGRAM(s) IV Intermittent once    VITALS:  T(F): 98.2 (06-14-21 @ 06:00), Max: 98.8 (06-13-21 @ 21:58)  HR: 75 (06-14-21 @ 06:00)  BP: 153/93 (06-14-21 @ 06:00)  RR: 17 (06-14-21 @ 06:00)  SpO2: 100% (06-14-21 @ 06:00)  Wt(kg): --    06-13 @ 07:01  -  06-14 @ 07:00  --------------------------------------------------------  IN: 350 mL / OUT: 0 mL / NET: 350 mL    06-14 @ 07:01  -  06-14 @ 14:36  --------------------------------------------------------  IN: 200 mL / OUT: 0 mL / NET: 200 mL      PHYSICAL EXAM:  Constitutional: NAD  HEENT: anicteric sclera  Neck: No JVD  Respiratory: CTAB, no wheezes, rales or rhonchi  Cardiovascular: S1, S2, RRR  Gastrointestinal: BS+, soft, NT/ND  Extremities: No peripheral edema  Neurological: A/O x 3, no focal deficits  Psychiatric: Normal mood, normal affect  : No siegel.   Vascular Access: rt IJ PC+    LABS:  06-14    131<L>  |  92<L>  |  54<H>  ----------------------------<  95  4.7   |  23  |  8.03<H>    Ca    9.7      14 Jun 2021 06:29  Phos  7.4     06-14  Mg     1.8     06-14      Creatinine Trend: 8.03 <--, 5.92 <--, 10.07 <--, 8.02 <--, 10.84 <--, 8.59 <--, 13.19 <--, 12.55 <--                        7.5    7.88  )-----------( 295      ( 14 Jun 2021 06:29 )             24.4     Urine Studies:        RADIOLOGY & ADDITIONAL STUDIES:   [facility] - S Alicia / Ej S /D Federico/ S Maryellen/ S Govind/ Jovan Garrison / GAYATRI Garcia/ O Gregorio  service -[phone], office [phone]  ---------------------------------------------------------------------------------------------------------------    Patient seen and examined bedside    Subjective and Objective: No overnight events, denied sob. No complaints today.     Allergies: No Known Allergies      Hospital Medications:   MEDICATIONS  (STANDING):  aspirin enteric coated 81 milliGRAM(s) Oral daily  atorvastatin 40 milliGRAM(s) Oral at bedtime  carvedilol 12.5 milliGRAM(s) Oral every 12 hours  chlorhexidine 4% Liquid 1 Application(s) Topical <User Schedule>  doxercalciferol Injectable 4 MICROGram(s) IV Push <User Schedule>  epoetin tammi-epbx (RETACRIT) Injectable 33934 Unit(s) IV Push <User Schedule>  ferrous    sulfate 325 milliGRAM(s) Oral daily  folic acid 1 milliGRAM(s) Oral daily  heparin  Infusion.  Unit(s)/Hr (12 mL/Hr) IV Continuous <Continuous>  levothyroxine 100 MICROGram(s) Oral daily  NIFEdipine XL 30 milliGRAM(s) Oral at bedtime  pantoprazole    Tablet 40 milliGRAM(s) Oral before breakfast  sevelamer carbonate 1600 milliGRAM(s) Oral three times a day with meals  vancomycin  IVPB 750 milliGRAM(s) IV Intermittent once    VITALS:  T(F): 98.2 (06-14-21 @ 06:00), Max: 98.8 (06-13-21 @ 21:58)  HR: 75 (06-14-21 @ 06:00)  BP: 153/93 (06-14-21 @ 06:00)  RR: 17 (06-14-21 @ 06:00)  SpO2: 100% (06-14-21 @ 06:00)  Wt(kg): --    06-13 @ 07:01  -  06-14 @ 07:00  --------------------------------------------------------  IN: 350 mL / OUT: 0 mL / NET: 350 mL    06-14 @ 07:01  -  06-14 @ 14:36  --------------------------------------------------------  IN: 200 mL / OUT: 0 mL / NET: 200 mL      PHYSICAL EXAM:  Constitutional: NAD  HEENT: anicteric sclera  Neck: No JVD  Respiratory: CTAB, no wheezes, rales or rhonchi  Cardiovascular: S1, S2, RRR  Gastrointestinal: BS+, soft, NT/ND  Extremities: No peripheral edema  Neurological: A/O x 3, no focal deficits  Psychiatric: Normal mood, normal affect  : No siegel.   Vascular Access: rt IJ PC+; RFA maturing avf    LABS:  06-14    131<L>  |  92<L>  |  54<H>  ----------------------------<  95  4.7   |  23  |  8.03<H>    Ca    9.7      14 Jun 2021 06:29  Phos  7.4     06-14  Mg     1.8     06-14      Creatinine Trend: 8.03 <--, 5.92 <--, 10.07 <--, 8.02 <--, 10.84 <--, 8.59 <--, 13.19 <--, 12.55 <--                        7.5    7.88  )-----------( 295      ( 14 Jun 2021 06:29 )             24.4     Urine Studies:        RADIOLOGY & ADDITIONAL STUDIES:

## 2021-06-14 NOTE — PROGRESS NOTE ADULT - ASSESSMENT
· Assessment	  66M hx of ESRD on HD (M/W/F, previously via LUE AVF, now via chest wall catheter), anemia, hyperparathyroidism, thrombocytopenia, hypothyroidism, HTN who presents with shortness of breath and LE swelling likely due to volume overload from missed HD session, admitted for urgent HD.        Problem/Plan -   ·  Problem: End stage renal disease.  Plan: Nephro f/up noted.  On HD  S/p Right radiocephalic fistula creation and right IJ permacath under fluoro  DC planning.      Problem/Plan -  ·  Problem: Anemia in ESRD (end-stage renal disease).  Plan: Epogen as per renal.

## 2021-06-14 NOTE — PROGRESS NOTE ADULT - ASSESSMENT
66M hx of ESRD on HD (M/W/F, previously via LUE AVF, now via chest wall catheter), anemia, hyperparathyroidism, thrombocytopenia, hypothyroidism, HTN who presents with shortness of breath and LE swelling likely due to volume overload from missed HD session, admitted for HD. Renal following for ESRD Mx.     ESRD on HD   K, volume acceptable  Hyponatremia likely 2/2 dilutional- improving  s/p new RUE avf and RIJ permacath   scheduled for hd today w/2k bath, inc net uf 2.5kg as tolerated  accepted to outpt unit on MWF schedule. MWF schedule this week  vas surgery f/u for concerning cellulitis over new AV access site. started IV abx for possible cellulitis. s/p vanco. abxs as per ID  renal diet, fluid restriction 1L/day  dose all meds for ESRD  HTN, controlled well now. c/w BB and Nifedipine  Anemia in CKD-Hb below goal. Continue epo 16K tiw w/hd.   Hyperphosphatemia- high phos level, continue Renvela 2 tabs TID with meals. high pth noted, c/w hectorol 4 mcg Three times a week with HD.     labs, chart reviewed  [facility]  Cell - [phone]  Office [phone]  Ans Serv [phone]

## 2021-06-14 NOTE — PROGRESS NOTE ADULT - SUBJECTIVE AND OBJECTIVE BOX
Patient is a 66y old  Male who presents with a chief complaint of urgent HD (14 Jun 2021 14:35)      SUBJECTIVE / OVERNIGHT EVENTS:    Events noted.  CONSTITUTIONAL: No fever,  or fatigue  RESPIRATORY: No cough, wheezing,  No shortness of breath  CARDIOVASCULAR: No chest pain, palpitations,   GASTROINTESTINAL: No abdominal or epigastric pain.   NEUROLOGICAL: No headaches,     MEDICATIONS  (STANDING):  aspirin enteric coated 81 milliGRAM(s) Oral daily  atorvastatin 40 milliGRAM(s) Oral at bedtime  carvedilol 12.5 milliGRAM(s) Oral every 12 hours  chlorhexidine 4% Liquid 1 Application(s) Topical <User Schedule>  doxercalciferol Injectable 4 MICROGram(s) IV Push <User Schedule>  epoetin tammi-epbx (RETACRIT) Injectable 26746 Unit(s) IV Push <User Schedule>  ferrous    sulfate 325 milliGRAM(s) Oral daily  folic acid 1 milliGRAM(s) Oral daily  heparin  Infusion.  Unit(s)/Hr (12 mL/Hr) IV Continuous <Continuous>  levothyroxine 100 MICROGram(s) Oral daily  NIFEdipine XL 30 milliGRAM(s) Oral at bedtime  pantoprazole    Tablet 40 milliGRAM(s) Oral before breakfast  sevelamer carbonate 1600 milliGRAM(s) Oral three times a day with meals    MEDICATIONS  (PRN):  acetaminophen   Tablet .. 650 milliGRAM(s) Oral every 6 hours PRN Moderate Pain (4 - 6)  heparin   Injectable 5500 Unit(s) IV Push every 6 hours PRN For aPTT less than 40  heparin   Injectable 2500 Unit(s) IV Push every 6 hours PRN For aPTT between 40 - 57  HYDROmorphone  Injectable 1.5 milliGRAM(s) IV Push every 8 hours PRN Severe Pain (7 - 10)        CAPILLARY BLOOD GLUCOSE        I&O's Summary    13 Jun 2021 07:01  -  14 Jun 2021 07:00  --------------------------------------------------------  IN: 350 mL / OUT: 0 mL / NET: 350 mL    14 Jun 2021 07:01  -  15 Jun 2021 00:43  --------------------------------------------------------  IN: 1050 mL / OUT: 3400 mL / NET: -2350 mL        T(C): 37 (06-14-21 @ 23:11), Max: 37 (06-14-21 @ 19:05)  HR: 92 (06-14-21 @ 23:11) (75 - 92)  BP: 145/68 (06-14-21 @ 23:11) (145/68 - 179/91)  RR: 16 (06-14-21 @ 23:11) (16 - 18)  SpO2: 98% (06-14-21 @ 23:11) (98% - 100%)    PHYSICAL EXAM:  GENERAL: NAD  NECK: Supple, No JVD  CHEST/LUNG: Clear to auscultation bilaterally; No wheezing.  HEART: Regular rate and rhythm; No murmurs, rubs, or gallops  ABDOMEN: Soft, Nontender, Nondistended; Bowel sounds present  EXTREMITIES:   No edema  NEUROLOGY: AAO X 3      LABS:                        7.5    7.88  )-----------( 295      ( 14 Jun 2021 06:29 )             24.4     06-14    131<L>  |  92<L>  |  54<H>  ----------------------------<  95  4.7   |  23  |  8.03<H>    Ca    9.7      14 Jun 2021 06:29  Phos  7.4     06-14  Mg     1.8     06-14      PTT - ( 14 Jun 2021 06:29 )  PTT:62.7 sec        CAPILLARY BLOOD GLUCOSE            RADIOLOGY & ADDITIONAL TESTS:    Imaging Personally Reviewed:    Consultant(s) Notes Reviewed:      Care Discussed with Consultants/Other Providers:    Wilfred Christian MD, CMD, FACP    [address] [address]  Office Tel: [phone]  Cell: [phone]

## 2021-06-14 NOTE — PROGRESS NOTE ADULT - SUBJECTIVE AND OBJECTIVE BOX
Follow Up:      Inverval History/ROS:Patient is a 66y old  Male who presents with a chief complaint of urgent HD (13 Jun 2021 16:38)    Less pain to right arm   No fever    Allergies    No Known Allergies    Intolerances        ANTIMICROBIALS:  vancomycin  IVPB 750 once      OTHER MEDS:  acetaminophen   Tablet .. 650 milliGRAM(s) Oral every 6 hours PRN  aspirin enteric coated 81 milliGRAM(s) Oral daily  atorvastatin 40 milliGRAM(s) Oral at bedtime  carvedilol 12.5 milliGRAM(s) Oral every 12 hours  chlorhexidine 4% Liquid 1 Application(s) Topical <User Schedule>  doxercalciferol Injectable 4 MICROGram(s) IV Push <User Schedule>  epoetin tammi-epbx (RETACRIT) Injectable 87104 Unit(s) IV Push <User Schedule>  ferrous    sulfate 325 milliGRAM(s) Oral daily  folic acid 1 milliGRAM(s) Oral daily  heparin   Injectable 5500 Unit(s) IV Push every 6 hours PRN  heparin   Injectable 2500 Unit(s) IV Push every 6 hours PRN  heparin  Infusion.  Unit(s)/Hr IV Continuous <Continuous>  HYDROmorphone  Injectable 1.5 milliGRAM(s) IV Push every 8 hours PRN  levothyroxine 100 MICROGram(s) Oral daily  NIFEdipine XL 30 milliGRAM(s) Oral at bedtime  pantoprazole    Tablet 40 milliGRAM(s) Oral before breakfast  sevelamer carbonate 1600 milliGRAM(s) Oral three times a day with meals      Vital Signs Last 24 Hrs  T(C): 36.8 (14 Jun 2021 06:00), Max: 37.1 (13 Jun 2021 21:58)  T(F): 98.2 (14 Jun 2021 06:00), Max: 98.8 (13 Jun 2021 21:58)  HR: 75 (14 Jun 2021 06:00) (74 - 80)  BP: 153/93 (14 Jun 2021 06:00) (128/70 - 158/88)  BP(mean): --  RR: 17 (14 Jun 2021 06:00) (16 - 17)  SpO2: 100% (14 Jun 2021 06:00) (99% - 100%)    PHYSICAL EXAM:  General: [ x] non-toxic  HEAD/EYES: [ ] PERRL [x ] white sclera [ ] icterus  ENT:  [ ] normal [x ] supple [ ] thrush [ ] pharyngeal exudate  Cardiovascular:   [ ] murmur [x ] normal [ ] PPM/AICD  Respiratory:  [x ] clear to ausculation bilaterally  GI:  [x ] soft, non-tender, normal bowel sounds  :  [ ] siegel [ ] no CVA tenderness   Musculoskeletal:  [ ] no synovitis  Neurologic:  [ ] non-focal exam   Skin:  [x ] right forearm with less redness, tednerness  Lymph: [x ] no lymphadenopathy  Psychiatric:  [ x] appropriate affect [ ] alert & oriented  Lines:  [x ] no phlebitis [ ] central line                                7.5    7.88  )-----------( 295      ( 14 Jun 2021 06:29 )             24.4       06-14    131<L>  |  92<L>  |  54<H>  ----------------------------<  95  4.7   |  23  |  8.03<H>    Ca    9.7      14 Jun 2021 06:29  Phos  7.4     06-14  Mg     1.8     06-14            MICROBIOLOGY:Culture Results:   No growth to date. (06-10-21 @ 20:57)  Culture Results:   No growth to date. (06-10-21 @ 20:57)      RADIOLOGY:

## 2021-06-14 NOTE — PROGRESS NOTE ADULT - ASSESSMENT
66 year old with esrd with pain and tenderness at site of permcath and new redness /tenderness to his right arm    He is afebrile with a nornal WBC.    1) Right forearm cellulitis    Check blood cultures times two- testing  Continue vancoymcin dosed by level    Check daily level- redose with level less than 15    Stop vancomycin on 6/17    2) Permacath pain  Had recent bleeding at site  ? if this is cause of pain      3) ESRD  dose vanco by level    call ID service with additional questions

## 2021-06-15 LAB
ANION GAP SERPL CALC-SCNC: 12 MMOL/L — SIGNIFICANT CHANGE UP (ref 7–14)
APTT BLD: 67.8 SEC — HIGH (ref 27–36.3)
BUN SERPL-MCNC: 29 MG/DL — HIGH (ref 7–23)
CALCIUM SERPL-MCNC: 10.1 MG/DL — SIGNIFICANT CHANGE UP (ref 8.4–10.5)
CHLORIDE SERPL-SCNC: 97 MMOL/L — LOW (ref 98–107)
CO2 SERPL-SCNC: 25 MMOL/L — SIGNIFICANT CHANGE UP (ref 22–31)
CREAT SERPL-MCNC: 5.18 MG/DL — HIGH (ref 0.5–1.3)
CULTURE RESULTS: SIGNIFICANT CHANGE UP
CULTURE RESULTS: SIGNIFICANT CHANGE UP
GLUCOSE SERPL-MCNC: 84 MG/DL — SIGNIFICANT CHANGE UP (ref 70–99)
HCT VFR BLD CALC: 24.2 % — LOW (ref 39–50)
HGB BLD-MCNC: 7.6 G/DL — LOW (ref 13–17)
INR BLD: 1.2 RATIO — HIGH (ref 0.88–1.16)
MAGNESIUM SERPL-MCNC: 1.8 MG/DL — SIGNIFICANT CHANGE UP (ref 1.6–2.6)
MCHC RBC-ENTMCNC: 27.3 PG — SIGNIFICANT CHANGE UP (ref 27–34)
MCHC RBC-ENTMCNC: 31.4 GM/DL — LOW (ref 32–36)
MCV RBC AUTO: 87.1 FL — SIGNIFICANT CHANGE UP (ref 80–100)
NRBC # BLD: 0 /100 WBCS — SIGNIFICANT CHANGE UP
NRBC # FLD: 0 K/UL — SIGNIFICANT CHANGE UP
PHOSPHATE SERPL-MCNC: 5.4 MG/DL — HIGH (ref 2.5–4.5)
PLATELET # BLD AUTO: 269 K/UL — SIGNIFICANT CHANGE UP (ref 150–400)
POTASSIUM SERPL-MCNC: 4.2 MMOL/L — SIGNIFICANT CHANGE UP (ref 3.5–5.3)
POTASSIUM SERPL-SCNC: 4.2 MMOL/L — SIGNIFICANT CHANGE UP (ref 3.5–5.3)
PROTHROM AB SERPL-ACNC: 13.7 SEC — HIGH (ref 10.6–13.6)
RBC # BLD: 2.78 M/UL — LOW (ref 4.2–5.8)
RBC # FLD: 16.6 % — HIGH (ref 10.3–14.5)
SODIUM SERPL-SCNC: 134 MMOL/L — LOW (ref 135–145)
SPECIMEN SOURCE: SIGNIFICANT CHANGE UP
SPECIMEN SOURCE: SIGNIFICANT CHANGE UP
VANCOMYCIN FLD-MCNC: 22.3 UG/ML — SIGNIFICANT CHANGE UP
WBC # BLD: 6.43 K/UL — SIGNIFICANT CHANGE UP (ref 3.8–10.5)
WBC # FLD AUTO: 6.43 K/UL — SIGNIFICANT CHANGE UP (ref 3.8–10.5)

## 2021-06-15 PROCEDURE — 99232 SBSQ HOSP IP/OBS MODERATE 35: CPT

## 2021-06-15 RX ORDER — VANCOMYCIN HCL 1 G
500 VIAL (EA) INTRAVENOUS
Refills: 0 | Status: DISCONTINUED | OUTPATIENT
Start: 2021-06-15 | End: 2021-06-17

## 2021-06-15 RX ADMIN — CARVEDILOL PHOSPHATE 12.5 MILLIGRAM(S): 80 CAPSULE, EXTENDED RELEASE ORAL at 05:31

## 2021-06-15 RX ADMIN — SEVELAMER CARBONATE 1600 MILLIGRAM(S): 2400 POWDER, FOR SUSPENSION ORAL at 17:48

## 2021-06-15 RX ADMIN — Medication 30 MILLIGRAM(S): at 21:34

## 2021-06-15 RX ADMIN — Medication 100 MICROGRAM(S): at 05:30

## 2021-06-15 RX ADMIN — Medication 81 MILLIGRAM(S): at 11:19

## 2021-06-15 RX ADMIN — Medication 1 MILLIGRAM(S): at 11:19

## 2021-06-15 RX ADMIN — ATORVASTATIN CALCIUM 40 MILLIGRAM(S): 80 TABLET, FILM COATED ORAL at 21:33

## 2021-06-15 RX ADMIN — Medication 325 MILLIGRAM(S): at 11:19

## 2021-06-15 RX ADMIN — SEVELAMER CARBONATE 1600 MILLIGRAM(S): 2400 POWDER, FOR SUSPENSION ORAL at 08:25

## 2021-06-15 RX ADMIN — CARVEDILOL PHOSPHATE 12.5 MILLIGRAM(S): 80 CAPSULE, EXTENDED RELEASE ORAL at 17:49

## 2021-06-15 RX ADMIN — PANTOPRAZOLE SODIUM 40 MILLIGRAM(S): 20 TABLET, DELAYED RELEASE ORAL at 05:31

## 2021-06-15 RX ADMIN — SEVELAMER CARBONATE 1600 MILLIGRAM(S): 2400 POWDER, FOR SUSPENSION ORAL at 11:47

## 2021-06-15 RX ADMIN — HEPARIN SODIUM 1600 UNIT(S)/HR: 5000 INJECTION INTRAVENOUS; SUBCUTANEOUS at 07:10

## 2021-06-15 RX ADMIN — CHLORHEXIDINE GLUCONATE 1 APPLICATION(S): 213 SOLUTION TOPICAL at 11:19

## 2021-06-15 NOTE — PROGRESS NOTE ADULT - SUBJECTIVE AND OBJECTIVE BOX
Follow Up:      Inverval History/ROS:Patient is a 66y old  Male who presents with a chief complaint of urgent HD (15 Saurav 2021 13:16)    No fever  Less RUE pain    Allergies    No Known Allergies    Intolerances        ANTIMICROBIALS:  vancomycin  IVPB 500 <User Schedule>      OTHER MEDS:  acetaminophen   Tablet .. 650 milliGRAM(s) Oral every 6 hours PRN  aspirin enteric coated 81 milliGRAM(s) Oral daily  atorvastatin 40 milliGRAM(s) Oral at bedtime  carvedilol 12.5 milliGRAM(s) Oral every 12 hours  chlorhexidine 4% Liquid 1 Application(s) Topical <User Schedule>  doxercalciferol Injectable 4 MICROGram(s) IV Push <User Schedule>  epoetin tammi-epbx (RETACRIT) Injectable 87766 Unit(s) IV Push <User Schedule>  ferrous    sulfate 325 milliGRAM(s) Oral daily  folic acid 1 milliGRAM(s) Oral daily  heparin   Injectable 5500 Unit(s) IV Push every 6 hours PRN  heparin   Injectable 2500 Unit(s) IV Push every 6 hours PRN  heparin  Infusion.  Unit(s)/Hr IV Continuous <Continuous>  HYDROmorphone  Injectable 1.5 milliGRAM(s) IV Push every 8 hours PRN  levothyroxine 100 MICROGram(s) Oral daily  NIFEdipine XL 30 milliGRAM(s) Oral at bedtime  pantoprazole    Tablet 40 milliGRAM(s) Oral before breakfast  sevelamer carbonate 1600 milliGRAM(s) Oral three times a day with meals      Vital Signs Last 24 Hrs  T(C): 36.8 (15 Saurav 2021 05:28), Max: 37 (14 Jun 2021 19:05)  T(F): 98.2 (15 Saurav 2021 05:28), Max: 98.6 (14 Jun 2021 19:05)  HR: 84 (15 Saurav 2021 05:28) (79 - 92)  BP: 156/89 (15 Saurav 2021 05:28) (145/68 - 179/91)  BP(mean): --  RR: 16 (15 Saurav 2021 05:28) (16 - 18)  SpO2: 98% (15 Saurav 2021 05:28) (98% - 100%)    PHYSICAL EXAM:  General: [x ] non-toxic  HEAD/EYES: [ ] PERRL x[ ] white sclera [ ] icterus  ENT:  [ ] normal [x ] supple [ ] thrush [ ] pharyngeal exudate  Cardiovascular:   [ ] murmur [x ] normal [ ] PPM/AICD  Respiratory:  [ x] clear to ausculation bilaterally  GI:  [ x] soft, non-tender, normal bowel sounds  :  [ ] siegel [ ] no CVA tenderness   Musculoskeletal:  [ ] no synovitis  Neurologic:  [ ] non-focal exam   Skin:  [x ] no rash  Lymph: [ ] no lymphadenopathy  Psychiatric:  [x ] appropriate affect [ ] alert & oriented  Lines:  [x ] no phlebitis [ ] central line                                7.6    6.43  )-----------( 269      ( 15 Saurav 2021 06:03 )             24.2       06-15    134<L>  |  97<L>  |  29<H>  ----------------------------<  84  4.2   |  25  |  5.18<H>    Ca    10.1      15 Saurav 2021 06:03  Phos  5.4     06-15  Mg     1.8     06-15            MICROBIOLOGY:Culture Results:   No growth to date. (06-10-21 @ 20:57)  Culture Results:   No growth to date. (06-10-21 @ 20:57)      RADIOLOGY:

## 2021-06-15 NOTE — PROGRESS NOTE ADULT - ASSESSMENT
66M hx of ESRD on HD (M/W/F, previously via LUE AVF, now via chest wall catheter), anemia, hyperparathyroidism, thrombocytopenia, hypothyroidism, HTN who presents with shortness of breath and LE swelling likely due to volume overload from missed HD session, admitted for HD. Renal following for ESRD Mx.     ESRD on HD   K, volume acceptable  Hyponatremia likely 2/2 dilutional- improving  s/p new RUE avf and RIJ permacath   HD yesterday uneventful, repeat HD tomorrow.   accepted to outpt unit on MWF schedule. MWF schedule this week  Repeat HD tomorrow.   Resolved cellulitis over left arm  renal diet, fluid restriction 1L/day  dose all meds for ESRD  HTN, controlled well now. c/w BB and Nifedipine  Anemia in CKD-Hb below goal. Continue epo 16K tiw w/hd.   Hyperphosphatemia- high phos level, continue Renvela 2 tabs TID with meals. high pth noted, c/w hectorol 4 mcg Three times a week with HD.     Lennox Pagan MD  [facility]  Office [phone]  Ans Serv [phone] cell - [phone]

## 2021-06-15 NOTE — PROGRESS NOTE ADULT - ASSESSMENT
66 year old with esrd with pain and tenderness at site of permcath and new redness /tenderness to his right arm    He is afebrile with a nornal WBC.    1) Right forearm cellulitis    Vancomycin 550 mg after hd on 6/16 and 6/18 (I ordered)  Stop vancomycin on 6/18    2) Permacath pain  Had recent bleeding at site  ? if this is cause of pain      3) ESRD  dose vanco by level    call ID service with additional questions

## 2021-06-15 NOTE — PROGRESS NOTE ADULT - SUBJECTIVE AND OBJECTIVE BOX
Patient is a 66y old  Male who presents with a chief complaint of urgent HD (14 Jun 2021 14:35)      SUBJECTIVE / OVERNIGHT EVENTS:    Events noted.  CONSTITUTIONAL: No fever,  or fatigue  RESPIRATORY: No cough, wheezing,  No shortness of breath  CARDIOVASCULAR: No chest pain, palpitations,   GASTROINTESTINAL: No abdominal or epigastric pain.   NEUROLOGICAL: No headaches,     MEDICATIONS  (STANDING):  aspirin enteric coated 81 milliGRAM(s) Oral daily  atorvastatin 40 milliGRAM(s) Oral at bedtime  carvedilol 12.5 milliGRAM(s) Oral every 12 hours  chlorhexidine 4% Liquid 1 Application(s) Topical <User Schedule>  doxercalciferol Injectable 4 MICROGram(s) IV Push <User Schedule>  epoetin tammi-epbx (RETACRIT) Injectable 26653 Unit(s) IV Push <User Schedule>  ferrous    sulfate 325 milliGRAM(s) Oral daily  folic acid 1 milliGRAM(s) Oral daily  heparin  Infusion.  Unit(s)/Hr (12 mL/Hr) IV Continuous <Continuous>  levothyroxine 100 MICROGram(s) Oral daily  NIFEdipine XL 30 milliGRAM(s) Oral at bedtime  pantoprazole    Tablet 40 milliGRAM(s) Oral before breakfast  sevelamer carbonate 1600 milliGRAM(s) Oral three times a day with meals    MEDICATIONS  (PRN):  acetaminophen   Tablet .. 650 milliGRAM(s) Oral every 6 hours PRN Moderate Pain (4 - 6)  heparin   Injectable 5500 Unit(s) IV Push every 6 hours PRN For aPTT less than 40  heparin   Injectable 2500 Unit(s) IV Push every 6 hours PRN For aPTT between 40 - 57  HYDROmorphone  Injectable 1.5 milliGRAM(s) IV Push every 8 hours PRN Severe Pain (7 - 10)        CAPILLARY BLOOD GLUCOSE        I&O's Summary    13 Jun 2021 07:01  -  14 Jun 2021 07:00  --------------------------------------------------------  IN: 350 mL / OUT: 0 mL / NET: 350 mL    14 Jun 2021 07:01  -  15 Jun 2021 00:43  --------------------------------------------------------  IN: 1050 mL / OUT: 3400 mL / NET: -2350 mL        T(C): 37 (06-14-21 @ 23:11), Max: 37 (06-14-21 @ 19:05)  HR: 92 (06-14-21 @ 23:11) (75 - 92)  BP: 145/68 (06-14-21 @ 23:11) (145/68 - 179/91)  RR: 16 (06-14-21 @ 23:11) (16 - 18)  SpO2: 98% (06-14-21 @ 23:11) (98% - 100%)    PHYSICAL EXAM:  GENERAL: NAD  NECK: Supple, No JVD  CHEST/LUNG: Clear to auscultation bilaterally; No wheezing.  HEART: Regular rate and rhythm; No murmurs, rubs, or gallops  ABDOMEN: Soft, Nontender, Nondistended; Bowel sounds present  EXTREMITIES:   No edema  NEUROLOGY: AAO X 3      LABS:                        7.5    7.88  )-----------( 295      ( 14 Jun 2021 06:29 )             24.4     06-14    131<L>  |  92<L>  |  54<H>  ----------------------------<  95  4.7   |  23  |  8.03<H>    Ca    9.7      14 Jun 2021 06:29  Phos  7.4     06-14  Mg     1.8     06-14      PTT - ( 14 Jun 2021 06:29 )  PTT:62.7 sec        CAPILLARY BLOOD GLUCOSE            RADIOLOGY & ADDITIONAL TESTS:    Imaging Personally Reviewed:    Consultant(s) Notes Reviewed:      Care Discussed with Consultants/Other Providers:    Wilfred Christian MD, CMD, FACP    [address] [address]  Office Tel: [phone]  Cell: [phone]

## 2021-06-15 NOTE — PROGRESS NOTE ADULT - SUBJECTIVE AND OBJECTIVE BOX
Nephrology Followup Note - [phone] - Dr Pagan / Dr Vargas / Dr Faust / Dr Caballero / Dr Bojorquez / Dr Perkins / Dr Garrison / Dr Garcia  Pt seen and examined at bedside  No acute events overnight. No complaints.     Allergies:  No Known Allergies    Hospital Medications:   MEDICATIONS  (STANDING):  aspirin enteric coated 81 milliGRAM(s) Oral daily  atorvastatin 40 milliGRAM(s) Oral at bedtime  carvedilol 12.5 milliGRAM(s) Oral every 12 hours  chlorhexidine 4% Liquid 1 Application(s) Topical <User Schedule>  doxercalciferol Injectable 4 MICROGram(s) IV Push <User Schedule>  epoetin tammi-epbx (RETACRIT) Injectable 13826 Unit(s) IV Push <User Schedule>  ferrous    sulfate 325 milliGRAM(s) Oral daily  folic acid 1 milliGRAM(s) Oral daily  heparin  Infusion.  Unit(s)/Hr (12 mL/Hr) IV Continuous <Continuous>  levothyroxine 100 MICROGram(s) Oral daily  NIFEdipine XL 30 milliGRAM(s) Oral at bedtime  pantoprazole    Tablet 40 milliGRAM(s) Oral before breakfast  sevelamer carbonate 1600 milliGRAM(s) Oral three times a day with meals    VITALS:  T(F): 98.2 (06-15-21 @ 05:28), Max: 98.6 (06-14-21 @ 19:05)  HR: 84 (06-15-21 @ 05:28)  BP: 156/89 (06-15-21 @ 05:28)  RR: 16 (06-15-21 @ 05:28)  SpO2: 98% (06-15-21 @ 05:28)  Wt(kg): --    06-14 @ 07:01  -  06-15 @ 07:00  --------------------------------------------------------  IN: 1400 mL / OUT: 3400 mL / NET: -2000 mL    06-15 @ 07:01  -  06-15 @ 13:16  --------------------------------------------------------  IN: 200 mL / OUT: 0 mL / NET: 200 mL        PHYSICAL EXAM:  Constitutional: NAD  HEENT: anicteric sclera, oropharynx clear, MMM  Neck: No JVD  Respiratory: CTAB, no wheezes, rales or rhonchi  Cardiovascular: S1, S2, RRR  Gastrointestinal: BS+, soft, NT/ND  Extremities: No cyanosis or clubbing. No peripheral edema  Neurological: A/O x 3, no focal deficits  Psychiatric: Normal mood, normal affect  : No CVA tenderness. No siegel.   Skin: No rashes  Vascular Access: RIJ Tunneled cath. R forearm AV access +thrill and bruit.     LABS:  06-15    134<L>  |  97<L>  |  29<H>  ----------------------------<  84  4.2   |  25  |  5.18<H>    Ca    10.1      15 Saurav 2021 06:03  Phos  5.4     06-15  Mg     1.8     06-15      Creatinine Trend: 5.18 <--, 8.03 <--, 5.92 <--, 10.07 <--, 8.02 <--, 10.84 <--, 8.59 <--                        7.6    6.43  )-----------( 269      ( 15 Saurav 2021 06:03 )             24.2     Urine Studies:      RADIOLOGY & ADDITIONAL STUDIES:

## 2021-06-15 NOTE — CHART NOTE - NSCHARTNOTEFT_GEN_A_CORE
Source: Patient [ ]    Family [ ]     other [ ]    Diet :   RD follow-up for length of stay.  Patient endorses a good appetite, eating >75% of meals without chewing/swallowing difficulties. Denies GI distress i.e. nausea, vomiting, diarrhea. Diet education previously provided by RD at time of initial assessment; patient reported that he does not have any questions or concerns. Nephrology following patient --- hyponatremia noted to be improving, noted to likely be dilutional.  Patient also noted with hyperphosphatemia, receives renvela with meals. Encouraged patient to consume high biological protein foods with HD tx to meet increased demands.        Current Weight:  6/14 post-HD 66.6kg      6/3 - 68kg        Pertinent Medications:   atorvastatin  carvedilol  doxercalciferol Injectable  ferrous    sulfate  folic acid  levothyroxine  NIFEdipine XL  pantoprazole    Tablet    Pertinent Labs:  06-15 Na134 mmol/L<L> Glu 84 mg/dL K+ 4.2 mmol/L Cr  5.18 mg/dL<H> BUN 29 mg/dL<H> 06-15 Phos 5.4 mg/dL<H> 06-11 Chol 73 mg/dL LDL --    HDL 44 mg/dL Trig 40 mg/dL      Skin: No pressure injuries, 1+ edema to rt arm    Estimated Needs:   [ X ] no change since previous assessment  [ ] recalculated:       Previous Nutrition Diagnosis: Altered nutrition related labs, renal indices     Nutrition Diagnosis is [ X ] ongoing  [ ] resolved [ ] not applicable     Additional Recommendations:   1) Monitor weights, PO intake/diet tolerance, skin integrity, pertinent labs.  2) Honor food preferences as requested.

## 2021-06-16 ENCOUNTER — TRANSCRIPTION ENCOUNTER (OUTPATIENT)
Age: 66
End: 2021-06-16

## 2021-06-16 LAB
ANION GAP SERPL CALC-SCNC: 18 MMOL/L — HIGH (ref 7–14)
APTT BLD: 68.8 SEC — HIGH (ref 27–36.3)
BUN SERPL-MCNC: 50 MG/DL — HIGH (ref 7–23)
CALCIUM SERPL-MCNC: 9.9 MG/DL — SIGNIFICANT CHANGE UP (ref 8.4–10.5)
CHLORIDE SERPL-SCNC: 95 MMOL/L — LOW (ref 98–107)
CO2 SERPL-SCNC: 22 MMOL/L — SIGNIFICANT CHANGE UP (ref 22–31)
CREAT SERPL-MCNC: 7.39 MG/DL — HIGH (ref 0.5–1.3)
GLUCOSE SERPL-MCNC: 88 MG/DL — SIGNIFICANT CHANGE UP (ref 70–99)
HCT VFR BLD CALC: 24.5 % — LOW (ref 39–50)
HGB BLD-MCNC: 7.3 G/DL — LOW (ref 13–17)
MAGNESIUM SERPL-MCNC: 1.8 MG/DL — SIGNIFICANT CHANGE UP (ref 1.6–2.6)
MCHC RBC-ENTMCNC: 26.6 PG — LOW (ref 27–34)
MCHC RBC-ENTMCNC: 29.8 GM/DL — LOW (ref 32–36)
MCV RBC AUTO: 89.4 FL — SIGNIFICANT CHANGE UP (ref 80–100)
NRBC # BLD: 0 /100 WBCS — SIGNIFICANT CHANGE UP
NRBC # FLD: 0 K/UL — SIGNIFICANT CHANGE UP
PHOSPHATE SERPL-MCNC: 7.3 MG/DL — HIGH (ref 2.5–4.5)
PLATELET # BLD AUTO: 253 K/UL — SIGNIFICANT CHANGE UP (ref 150–400)
POTASSIUM SERPL-MCNC: 4.9 MMOL/L — SIGNIFICANT CHANGE UP (ref 3.5–5.3)
POTASSIUM SERPL-SCNC: 4.9 MMOL/L — SIGNIFICANT CHANGE UP (ref 3.5–5.3)
RBC # BLD: 2.74 M/UL — LOW (ref 4.2–5.8)
RBC # FLD: 17 % — HIGH (ref 10.3–14.5)
SARS-COV-2 RNA SPEC QL NAA+PROBE: SIGNIFICANT CHANGE UP
SODIUM SERPL-SCNC: 135 MMOL/L — SIGNIFICANT CHANGE UP (ref 135–145)
WBC # BLD: 6.49 K/UL — SIGNIFICANT CHANGE UP (ref 3.8–10.5)
WBC # FLD AUTO: 6.49 K/UL — SIGNIFICANT CHANGE UP (ref 3.8–10.5)

## 2021-06-16 RX ORDER — HYDRALAZINE HCL 50 MG
5 TABLET ORAL ONCE
Refills: 0 | Status: COMPLETED | OUTPATIENT
Start: 2021-06-16 | End: 2021-06-16

## 2021-06-16 RX ORDER — NIFEDIPINE 30 MG
60 TABLET, EXTENDED RELEASE 24 HR ORAL AT BEDTIME
Refills: 0 | Status: DISCONTINUED | OUTPATIENT
Start: 2021-06-16 | End: 2021-06-16

## 2021-06-16 RX ORDER — ASPIRIN/CALCIUM CARB/MAGNESIUM 324 MG
1 TABLET ORAL
Qty: 30 | Refills: 0
Start: 2021-06-16 | End: 2021-07-15

## 2021-06-16 RX ORDER — NIFEDIPINE 30 MG
1 TABLET, EXTENDED RELEASE 24 HR ORAL
Qty: 30 | Refills: 0
Start: 2021-06-16 | End: 2021-07-15

## 2021-06-16 RX ORDER — ATORVASTATIN CALCIUM 80 MG/1
1 TABLET, FILM COATED ORAL
Qty: 30 | Refills: 0
Start: 2021-06-16 | End: 2021-07-15

## 2021-06-16 RX ORDER — APIXABAN 2.5 MG/1
1 TABLET, FILM COATED ORAL
Qty: 60 | Refills: 0
Start: 2021-06-16 | End: 2021-07-15

## 2021-06-16 RX ORDER — HYDRALAZINE HCL 50 MG
2.5 TABLET ORAL ONCE
Refills: 0 | Status: COMPLETED | OUTPATIENT
Start: 2021-06-16 | End: 2021-06-16

## 2021-06-16 RX ORDER — VANCOMYCIN HCL 1 G
500 VIAL (EA) INTRAVENOUS
Qty: 0 | Refills: 0 | DISCHARGE
Start: 2021-06-16

## 2021-06-16 RX ORDER — NIFEDIPINE 30 MG
60 TABLET, EXTENDED RELEASE 24 HR ORAL AT BEDTIME
Refills: 0 | Status: DISCONTINUED | OUTPATIENT
Start: 2021-06-16 | End: 2021-06-19

## 2021-06-16 RX ORDER — FUROSEMIDE 40 MG
1 TABLET ORAL
Qty: 0 | Refills: 0 | DISCHARGE

## 2021-06-16 RX ORDER — APIXABAN 2.5 MG/1
5 TABLET, FILM COATED ORAL EVERY 12 HOURS
Refills: 0 | Status: DISCONTINUED | OUTPATIENT
Start: 2021-06-16 | End: 2021-06-19

## 2021-06-16 RX ADMIN — Medication 100 MICROGRAM(S): at 05:34

## 2021-06-16 RX ADMIN — Medication 60 MILLIGRAM(S): at 18:00

## 2021-06-16 RX ADMIN — Medication 81 MILLIGRAM(S): at 13:42

## 2021-06-16 RX ADMIN — Medication 1 MILLIGRAM(S): at 13:43

## 2021-06-16 RX ADMIN — CARVEDILOL PHOSPHATE 12.5 MILLIGRAM(S): 80 CAPSULE, EXTENDED RELEASE ORAL at 05:34

## 2021-06-16 RX ADMIN — Medication 5 MILLIGRAM(S): at 13:59

## 2021-06-16 RX ADMIN — HEPARIN SODIUM 1600 UNIT(S)/HR: 5000 INJECTION INTRAVENOUS; SUBCUTANEOUS at 09:34

## 2021-06-16 RX ADMIN — CHLORHEXIDINE GLUCONATE 1 APPLICATION(S): 213 SOLUTION TOPICAL at 09:35

## 2021-06-16 RX ADMIN — Medication 2.5 MILLIGRAM(S): at 23:44

## 2021-06-16 RX ADMIN — Medication 325 MILLIGRAM(S): at 13:44

## 2021-06-16 RX ADMIN — PANTOPRAZOLE SODIUM 40 MILLIGRAM(S): 20 TABLET, DELAYED RELEASE ORAL at 05:33

## 2021-06-16 RX ADMIN — CARVEDILOL PHOSPHATE 12.5 MILLIGRAM(S): 80 CAPSULE, EXTENDED RELEASE ORAL at 17:34

## 2021-06-16 RX ADMIN — SEVELAMER CARBONATE 1600 MILLIGRAM(S): 2400 POWDER, FOR SUSPENSION ORAL at 13:43

## 2021-06-16 RX ADMIN — APIXABAN 5 MILLIGRAM(S): 2.5 TABLET, FILM COATED ORAL at 17:34

## 2021-06-16 RX ADMIN — DOXERCALCIFEROL 4 MICROGRAM(S): 2.5 CAPSULE ORAL at 21:59

## 2021-06-16 RX ADMIN — SEVELAMER CARBONATE 1600 MILLIGRAM(S): 2400 POWDER, FOR SUSPENSION ORAL at 09:35

## 2021-06-16 NOTE — PROVIDER CONTACT NOTE (OTHER) - ACTION/TREATMENT ORDERED:
Vascular will be informed as per provider
continue to monitor, plan for dialysis this shift, vacular team to reassess dressing.
will contact vascular to assess pt
Chest x-ray ordered
Administer pain medication and continue to monitor as per provider
No further interventions at this time, reassess BP in one hour
Will order 2.5mg Hydralazine IVP
dressing changed and pressure.

## 2021-06-16 NOTE — PROGRESS NOTE ADULT - ASSESSMENT
· Assessment	  66M hx of ESRD on HD (M/W/F, previously via LUE AVF, now via chest wall catheter), anemia, hyperparathyroidism, thrombocytopenia, hypothyroidism, HTN who presents with shortness of breath and LE swelling likely due to volume overload from missed HD session, admitted for urgent HD.        Problem/Plan -   ·  Problem: End stage renal disease.  Plan: Nephro f/up noted.  On HD  DC planning.      Problem/Plan -  ·  Problem: Anemia in ESRD (end-stage renal disease).  Plan: Epogen as per renal.

## 2021-06-16 NOTE — DISCHARGE NOTE NURSING/CASE MANAGEMENT/SOCIAL WORK - NSDCCRNAME_GEN_ALL_CORE_FT
[facility] [address] [address] Tele#[phone] Fax# [phone], Dialysis scheduled for Tuesday, Thursday and Saturday at 2pm. First appointment 6/17 at 2pm.    [facility] [address] [address] Tele#[phone] Fax# [phone], Dialysis scheduled for Tuesday, Thursday and Saturday at 2pm. First appointment 6/17 at 10am.

## 2021-06-16 NOTE — PROVIDER CONTACT NOTE (OTHER) - SITUATION
Patients right arm/forearm is swollen, red and hot to touch, also complaining of 8/10 pain
Permacath bleeding at site.
Pt complains of swelling and pain in right forearm.
Patient starting bleeding from right permacath site, vitals were stable and patient was asymptomatic except mild dizziness, no complaints of pain
hypertensive
Patient's BP is 180/89mmHg
SBP range 180-190's
Patient is having pain on right side of chest/neck, patient is concerned for right chest catheter tenderness and visibility

## 2021-06-16 NOTE — PROVIDER CONTACT NOTE (OTHER) - RECOMMENDATIONS
To be given a breakthrough anti HTN now
provider notified
Continue to monitor, confirm placement
Order dilaudid IV push PRN for patient for severe pain, vascular team will be notified to visit the patient at bedside
assess pt
continue to monitor permacath site
dressing changed

## 2021-06-16 NOTE — PROVIDER CONTACT NOTE (OTHER) - BACKGROUND
Patient has noted high SBP since 1PM then prior and during HD despite receiving anti HTN medicines
patient admitted for urgent hemodialysis
pt was admitted by SOB and EVETTE Swelling, End stage renal disease.
Patient is A&O x4, admitted for ESRD, s/p new AVF placement on RUE, hx of CKD, Anemia, thrombocytopenia, HTN
pt here for emergent dialysis and new fistula
Patient is A&O x4, admitted for ESRD, hx of anemia, thrombocytopenia, p/w shortness of breath and volume overload
dialysis pt
Patient was admitted for ESRD, s/p RUE AVF placement on 6/3, and SOB and volume overload due to missed HD session, hx of HTN, HLD, thrombocytopenia, anemia, hyperkalemia

## 2021-06-16 NOTE — PROVIDER CONTACT NOTE (OTHER) - REASON
Patient starting bleeding from right permacath site, vitals were stable and patient was asymptomatic
Low Heart rate
Patient is having pain on right side of chest/neck, patient is concerned for right chest catheter tenderness and visibility
Permacath bleeding at site.
[facility] site
Pt complains of pain in forearm
Patients right arm/forearm is swollen, red and hot to touch, c/o of 8/10 pain
Patient's BP is 180/89mmHg
's
BP & bleeding at permacath site

## 2021-06-16 NOTE — PROGRESS NOTE ADULT - SUBJECTIVE AND OBJECTIVE BOX
Patient is a 66y old  Male who presents with a chief complaint of urgent HD (16 Jun 2021 13:29)      SUBJECTIVE / OVERNIGHT EVENTS:    Events noted.  CONSTITUTIONAL: No fever,  or fatigue  RESPIRATORY: No cough, wheezing,  No shortness of breath  CARDIOVASCULAR: No chest pain, palpitations, dizziness, or leg swelling  GASTROINTESTINAL: No abdominal or epigastric pain. No nausea, vomiting.  NEUROLOGICAL: No headaches,     MEDICATIONS  (STANDING):  apixaban 5 milliGRAM(s) Oral every 12 hours  aspirin enteric coated 81 milliGRAM(s) Oral daily  atorvastatin 40 milliGRAM(s) Oral at bedtime  carvedilol 12.5 milliGRAM(s) Oral every 12 hours  chlorhexidine 4% Liquid 1 Application(s) Topical <User Schedule>  doxercalciferol Injectable 4 MICROGram(s) IV Push <User Schedule>  epoetin tammi-epbx (RETACRIT) Injectable 46808 Unit(s) IV Push <User Schedule>  ferrous    sulfate 325 milliGRAM(s) Oral daily  folic acid 1 milliGRAM(s) Oral daily  levothyroxine 100 MICROGram(s) Oral daily  NIFEdipine XL 60 milliGRAM(s) Oral at bedtime  pantoprazole    Tablet 40 milliGRAM(s) Oral before breakfast  sevelamer carbonate 1600 milliGRAM(s) Oral three times a day with meals  vancomycin  IVPB 500 milliGRAM(s) IV Intermittent <User Schedule>    MEDICATIONS  (PRN):  acetaminophen   Tablet .. 650 milliGRAM(s) Oral every 6 hours PRN Moderate Pain (4 - 6)  HYDROmorphone  Injectable 1.5 milliGRAM(s) IV Push every 8 hours PRN Severe Pain (7 - 10)        CAPILLARY BLOOD GLUCOSE        I&O's Summary    15 Saurav 2021 07:01  -  16 Jun 2021 07:00  --------------------------------------------------------  IN: 718 mL / OUT: 0 mL / NET: 718 mL    16 Jun 2021 07:01  -  17 Jun 2021 00:56  --------------------------------------------------------  IN: 400 mL / OUT: 2900 mL / NET: -2500 mL        T(C): 36.7 (06-17-21 @ 00:07), Max: 36.9 (06-16-21 @ 05:29)  HR: 81 (06-17-21 @ 00:07) (78 - 86)  BP: 172/95 (06-17-21 @ 00:07) (156/87 - 193/112)  RR: 17 (06-17-21 @ 00:07) (17 - 20)  SpO2: 99% (06-16-21 @ 13:49) (99% - 100%)    PHYSICAL EXAM:  GENERAL: NAD  NECK: Supple, No JVD  CHEST/LUNG: Clear to auscultation bilaterally; No wheezing.  HEART: Regular rate and rhythm; No murmurs, rubs, or gallops  ABDOMEN: Soft, Nontender, Nondistended; Bowel sounds present  EXTREMITIES:   No edema  NEUROLOGY: AAO X 3      LABS:                        7.3    6.49  )-----------( 253      ( 16 Jun 2021 06:44 )             24.5     06-16    135  |  95<L>  |  50<H>  ----------------------------<  88  4.9   |  22  |  7.39<H>    Ca    9.9      16 Jun 2021 06:44  Phos  7.3     06-16  Mg     1.8     06-16      PT/INR - ( 15 Saurav 2021 06:03 )   PT: 13.7 sec;   INR: 1.20 ratio         PTT - ( 16 Jun 2021 06:44 )  PTT:68.8 sec        CAPILLARY BLOOD GLUCOSE            RADIOLOGY & ADDITIONAL TESTS:    Imaging Personally Reviewed:    Consultant(s) Notes Reviewed:      Care Discussed with Consultants/Other Providers:    Wilfred Christian MD, CMD, FACP    [id number] [id number]  Office Tel: [phone]  Cell: [phone]

## 2021-06-16 NOTE — DISCHARGE NOTE NURSING/CASE MANAGEMENT/SOCIAL WORK - PATIENT PORTAL LINK FT
You can access the FollowMyHealth Patient Portal offered by [facility] by registering at the following website: [email or web address]. By joining [employer] [facility]’s FollowMyHealth portal, you will also be able to view your health information using other applications (apps) compatible with our system.

## 2021-06-16 NOTE — PROVIDER CONTACT NOTE (OTHER) - ASSESSMENT
Patient alert and oriented x 4 with no visible s/s of distress in bed, noted with HR 55 asymptomatic. Provider made aware she instructed to rescheduled Coreg to be given at 10:00am on 5/26/21.
pts permacath site continues to saturate through dressing despite being changed multiple times
vital signs stable
Patient denies headache, dizziness nor chest pains  Pre hemodialysis tx /107, HR86  Recent 23:30 181/101 HR 85
AM /90. Pt refused AM coreg. ALso noted pts permacath site to be bleeding. blood is saturating thorugh dressing and leaking thorugh.
Patient is A&O x4, vitals signs stable at this time, right forearm red, swollen and hot to touch, complaining of pain 8/10
Patient starting bleeding from right permacath site, vitals were stable and patient was asymptomatic except mild dizziness, no complaints of pain
Patient's BP is 180/89mmHg, patient denies headache, chest pain, SOB. Patient received scheduled dose of Coreg 12.5mg PO
Pt has bruit and thrill in right forearm. Arm is slightly red and warm to the touch and pt complains of pain and swelling. Looks to be swollen +1 compared to left arm.
Vitals are stable at this time, scant bleeding drainage on the gauze and tegaderm site, right chest tenderness noted, patient c/o pain rating 6/10

## 2021-06-16 NOTE — CHART NOTE - NSCHARTNOTEFT_GEN_A_CORE
Notified by RN patient hypertensive; 's s/p PO Nifedipine and Carvedilol given @ 18:00. Patient medically dc' ed; however in the setting of hypertensive urgency dc cancelled. Spoke with Dr. Buckner, nephrologist patient to be scheduled for fluid removal this pm for 2 hours.       During dialysis patient hypertensive to 190's; 2.5 mg IVP of hydralazine given. SBP improved to 170's. Patient asymptomatic. Will continue to monitor.     RAMAN Driscoll  Department of Medicine   In House # [id number]

## 2021-06-16 NOTE — PROGRESS NOTE ADULT - SUBJECTIVE AND OBJECTIVE BOX
[facility] - S Alicia / Ej S /D Federico/ SERA Bojorquez/ SERA Faust/ Jovan Garrison / GAYATRI Matsonu/ O Gregorio  service -[phone], office [phone]  ---------------------------------------------------------------------------------------------------------------    Patient seen and examined bedside    Subjective and Objective: No overnight events, sob resolved. No complaints today. feeling better    Allergies: No Known Allergies      Hospital Medications:   MEDICATIONS  (STANDING):  aspirin enteric coated 81 milliGRAM(s) Oral daily  atorvastatin 40 milliGRAM(s) Oral at bedtime  carvedilol 12.5 milliGRAM(s) Oral every 12 hours  chlorhexidine 4% Liquid 1 Application(s) Topical <User Schedule>  doxercalciferol Injectable 4 MICROGram(s) IV Push <User Schedule>  epoetin tammi-epbx (RETACRIT) Injectable 58046 Unit(s) IV Push <User Schedule>  ferrous    sulfate 325 milliGRAM(s) Oral daily  folic acid 1 milliGRAM(s) Oral daily  heparin  Infusion.  Unit(s)/Hr (12 mL/Hr) IV Continuous <Continuous>  levothyroxine 100 MICROGram(s) Oral daily  NIFEdipine XL 30 milliGRAM(s) Oral at bedtime  pantoprazole    Tablet 40 milliGRAM(s) Oral before breakfast  sevelamer carbonate 1600 milliGRAM(s) Oral three times a day with meals  vancomycin  IVPB 500 milliGRAM(s) IV Intermittent <User Schedule>      REVIEW OF SYSTEMS:  CONSTITUTIONAL: No weakness, fevers or chills  EYES/ENT: No visual changes;  No vertigo or throat pain   NECK: No pain or stiffness  RESPIRATORY: No cough, wheezing, hemoptysis; No shortness of breath  CARDIOVASCULAR: No chest pain or palpitations.  GASTROINTESTINAL: No abdominal or epigastric pain. No nausea, vomiting, or hematemesis; No diarrhea or constipation. No melena or hematochezia.  GENITOURINARY: No dysuria, frequency, foamy urine, urinary urgency, incontinence or hematuria  NEUROLOGICAL: No numbness or weakness  SKIN: No itching, burning, rashes, or lesions   VASCULAR: No bilateral lower extremity edema.   All other review of systems is negative unless indicated above.    VITALS:  T(F): 98.4 (06-16-21 @ 05:29), Max: 98.6 (06-15-21 @ 21:28)  HR: 84 (06-16-21 @ 05:29)  BP: 156/87 (06-16-21 @ 05:29)  RR: 19 (06-16-21 @ 05:29)  SpO2: 100% (06-16-21 @ 05:29)  Wt(kg): --    06-15 @ 07:01  -  06-16 @ 07:00  --------------------------------------------------------  IN: 718 mL / OUT: 0 mL / NET: 718 mL          PHYSICAL EXAM:  Constitutional: NAD  HEENT: anicteric sclera, oropharynx clear  Neck: No JVD  Respiratory: CTAB, no wheezes, rales or rhonchi  Cardiovascular: S1, S2, RRR  Gastrointestinal: BS+, soft, NT/ND  Extremities: No cyanosis or clubbing. No peripheral edema  Neurological: A/O x 3, no focal deficits  Psychiatric: Normal mood, normal affect  : No CVA tenderness. No siegel.   Skin: No rashes  Vascular Access:    LABS:  06-16    135  |  95<L>  |  50<H>  ----------------------------<  88  4.9   |  22  |  7.39<H>    Ca    9.9      16 Jun 2021 06:44  Phos  7.3     06-16  Mg     1.8     06-16      Creatinine Trend: 7.39 <--, 5.18 <--, 8.03 <--, 5.92 <--, 10.07 <--, 8.02 <--, 10.84 <--                        7.3    6.49  )-----------( 039      ( 16 Jun 2021 06:44 )             24.5     Urine Studies:        RADIOLOGY & ADDITIONAL STUDIES:   [facility] - S Alicia / Ej S /D Federico/ S Maryellen/ S Govind/ Jovan Garrison / GAYATRI Garcia/ O Gregorio  service -[phone], office [phone]  ---------------------------------------------------------------------------------------------------------------    Patient seen and examined bedside    Subjective and Objective: No overnight events, denied sob. No complaints today. feeling better    Allergies: No Known Allergies      Hospital Medications:   MEDICATIONS  (STANDING):  aspirin enteric coated 81 milliGRAM(s) Oral daily  atorvastatin 40 milliGRAM(s) Oral at bedtime  carvedilol 12.5 milliGRAM(s) Oral every 12 hours  chlorhexidine 4% Liquid 1 Application(s) Topical <User Schedule>  doxercalciferol Injectable 4 MICROGram(s) IV Push <User Schedule>  epoetin tammi-epbx (RETACRIT) Injectable 62198 Unit(s) IV Push <User Schedule>  ferrous    sulfate 325 milliGRAM(s) Oral daily  folic acid 1 milliGRAM(s) Oral daily  heparin  Infusion.  Unit(s)/Hr (12 mL/Hr) IV Continuous <Continuous>  levothyroxine 100 MICROGram(s) Oral daily  NIFEdipine XL 30 milliGRAM(s) Oral at bedtime  pantoprazole    Tablet 40 milliGRAM(s) Oral before breakfast  sevelamer carbonate 1600 milliGRAM(s) Oral three times a day with meals  vancomycin  IVPB 500 milliGRAM(s) IV Intermittent <User Schedule>      VITALS:  T(F): 98.4 (06-16-21 @ 05:29), Max: 98.6 (06-15-21 @ 21:28)  HR: 84 (06-16-21 @ 05:29)  BP: 156/87 (06-16-21 @ 05:29)  RR: 19 (06-16-21 @ 05:29)  SpO2: 100% (06-16-21 @ 05:29)  Wt(kg): --    06-15 @ 07:01  -  06-16 @ 07:00  --------------------------------------------------------  IN: 718 mL / OUT: 0 mL / NET: 718 mL      PHYSICAL EXAM:  Constitutional: NAD  HEENT: anicteric sclera  Neck: No JVD  Respiratory: CTAB, no wheezes, rales or rhonchi  Cardiovascular: S1, S2, RRR  Gastrointestinal: BS+, soft, NT/ND  Extremities: No peripheral edema  Neurological: A/O x 3, no focal deficits  Psychiatric: Normal mood, normal affect  : No siegel.   Vascular Access: rt IJ PC+; RFA maturing avf      LABS:  06-16    135  |  95<L>  |  50<H>  ----------------------------<  88  4.9   |  22  |  7.39<H>    Ca    9.9      16 Jun 2021 06:44  Phos  7.3     06-16  Mg     1.8     06-16      Creatinine Trend: 7.39 <--, 5.18 <--, 8.03 <--, 5.92 <--, 10.07 <--, 8.02 <--, 10.84 <--                        7.3    6.49  )-----------( 253      ( 16 Jun 2021 06:44 )             24.5     Urine Studies:        RADIOLOGY & ADDITIONAL STUDIES:

## 2021-06-16 NOTE — CHART NOTE - NSCHARTNOTEFT_GEN_A_CORE
Spoke with nephrologist about elevated blood pressure. As per nephrologist, most likely due to anxiety or not having dialysis. Patient scheduled for early dialysis tomorrow upon discharge. As per nephrology, ok to discharge patient, re-iterate to take BP meds, and will monitor at dialysis tomorrow. Will continue to monitor.

## 2021-06-16 NOTE — PROGRESS NOTE ADULT - ASSESSMENT
66M hx of ESRD on HD (M/W/F, previously via LUE AVF, now via chest wall catheter), anemia, hyperparathyroidism, thrombocytopenia, hypothyroidism, HTN who presents with shortness of breath and LE swelling likely due to volume overload from missed HD session, admitted for HD. Renal following for ESRD Mx.     ESRD on HD   K, volume acceptable  Hyponatremia likely 2/2 dilutional- improved  s/p new RUE avf and RIJ permacath   vasc surgery f/u for concerning cellulitis over new AV access site. started IV abx for possible cellulitis. s/p vanco. abxs as per ID. till tomorrow  renal diet, fluid restriction 1L/day  dose all meds for ESRD  HTN, controlled. c/w BB and Nifedipine  Anemia in CKD-Hb below goal. Continue epo 16K tiw w/hd.   Hyperphosphatemia- high phos level, continue Renvela 2 tabs TID with meals. high pth noted, c/w hectorol 4 mcg Three times a week with HD.     d/w SW-pt accepted to outpt [facility], on TTS schedule, to start tomorrow  pt stable for d/c renal stand point  no indication for hd today  d/w covering PA    labs, chart reviewed  [facility]  Cell - [phone]  Office [phone]  Ans Serv [phone]

## 2021-06-16 NOTE — PROVIDER CONTACT NOTE (OTHER) - NAME OF MD/NP/PA/DO NOTIFIED:
ACP
Morning Provider
Yasmine Espinal
[place] [id number]
[facility] # [id number]
mark Wallace ACP
ACP Sandra
RAMAN De Guzman
ACP

## 2021-06-16 NOTE — PROVIDER CONTACT NOTE (OTHER) - DATE AND TIME:
29-May-2021 06:57
08-Jun-2021 00:00
09-Jun-2021 21:30
08-Jun-2021 06:10
26-May-2021 07:15
07-Jun-2021 00:15
07-Jun-2021 11:00
09-Jun-2021 04:00
16-Jun-2021 23:40
09-Jun-2021 16:58

## 2021-06-17 LAB
ANION GAP SERPL CALC-SCNC: 20 MMOL/L — HIGH (ref 7–14)
APTT BLD: 38.6 SEC — HIGH (ref 27–36.3)
BUN SERPL-MCNC: 68 MG/DL — HIGH (ref 7–23)
CALCIUM SERPL-MCNC: 10.1 MG/DL — SIGNIFICANT CHANGE UP (ref 8.4–10.5)
CHLORIDE SERPL-SCNC: 93 MMOL/L — LOW (ref 98–107)
CO2 SERPL-SCNC: 20 MMOL/L — LOW (ref 22–31)
CREAT SERPL-MCNC: 8.88 MG/DL — HIGH (ref 0.5–1.3)
GLUCOSE SERPL-MCNC: 90 MG/DL — SIGNIFICANT CHANGE UP (ref 70–99)
HCT VFR BLD CALC: 25.2 % — LOW (ref 39–50)
HGB BLD-MCNC: 7.9 G/DL — LOW (ref 13–17)
MAGNESIUM SERPL-MCNC: 1.8 MG/DL — SIGNIFICANT CHANGE UP (ref 1.6–2.6)
MCHC RBC-ENTMCNC: 27.1 PG — SIGNIFICANT CHANGE UP (ref 27–34)
MCHC RBC-ENTMCNC: 31.3 GM/DL — LOW (ref 32–36)
MCV RBC AUTO: 86.3 FL — SIGNIFICANT CHANGE UP (ref 80–100)
NRBC # BLD: 0 /100 WBCS — SIGNIFICANT CHANGE UP
NRBC # FLD: 0 K/UL — SIGNIFICANT CHANGE UP
PHOSPHATE SERPL-MCNC: 7.7 MG/DL — HIGH (ref 2.5–4.5)
PLATELET # BLD AUTO: 250 K/UL — SIGNIFICANT CHANGE UP (ref 150–400)
POTASSIUM SERPL-MCNC: 5.3 MMOL/L — SIGNIFICANT CHANGE UP (ref 3.5–5.3)
POTASSIUM SERPL-SCNC: 5.3 MMOL/L — SIGNIFICANT CHANGE UP (ref 3.5–5.3)
RBC # BLD: 2.92 M/UL — LOW (ref 4.2–5.8)
RBC # FLD: 16.7 % — HIGH (ref 10.3–14.5)
SODIUM SERPL-SCNC: 133 MMOL/L — LOW (ref 135–145)
VANCOMYCIN FLD-MCNC: 19 UG/ML — SIGNIFICANT CHANGE UP
WBC # BLD: 7.62 K/UL — SIGNIFICANT CHANGE UP (ref 3.8–10.5)
WBC # FLD AUTO: 7.62 K/UL — SIGNIFICANT CHANGE UP (ref 3.8–10.5)

## 2021-06-17 RX ORDER — VANCOMYCIN HCL 1 G
500 VIAL (EA) INTRAVENOUS
Refills: 0 | Status: COMPLETED | OUTPATIENT
Start: 2021-06-17 | End: 2021-06-19

## 2021-06-17 RX ORDER — HYDROMORPHONE HYDROCHLORIDE 2 MG/ML
1.5 INJECTION INTRAMUSCULAR; INTRAVENOUS; SUBCUTANEOUS EVERY 8 HOURS
Refills: 0 | Status: DISCONTINUED | OUTPATIENT
Start: 2021-06-17 | End: 2021-06-19

## 2021-06-17 RX ADMIN — Medication 100 MICROGRAM(S): at 05:07

## 2021-06-17 RX ADMIN — Medication 60 MILLIGRAM(S): at 22:01

## 2021-06-17 RX ADMIN — CARVEDILOL PHOSPHATE 12.5 MILLIGRAM(S): 80 CAPSULE, EXTENDED RELEASE ORAL at 05:07

## 2021-06-17 RX ADMIN — SEVELAMER CARBONATE 1600 MILLIGRAM(S): 2400 POWDER, FOR SUSPENSION ORAL at 09:19

## 2021-06-17 RX ADMIN — PANTOPRAZOLE SODIUM 40 MILLIGRAM(S): 20 TABLET, DELAYED RELEASE ORAL at 05:07

## 2021-06-17 RX ADMIN — DOXERCALCIFEROL 4 MICROGRAM(S): 2.5 CAPSULE ORAL at 12:12

## 2021-06-17 RX ADMIN — ATORVASTATIN CALCIUM 40 MILLIGRAM(S): 80 TABLET, FILM COATED ORAL at 21:59

## 2021-06-17 RX ADMIN — ERYTHROPOIETIN 16000 UNIT(S): 10000 INJECTION, SOLUTION INTRAVENOUS; SUBCUTANEOUS at 12:13

## 2021-06-17 RX ADMIN — APIXABAN 5 MILLIGRAM(S): 2.5 TABLET, FILM COATED ORAL at 17:30

## 2021-06-17 RX ADMIN — APIXABAN 5 MILLIGRAM(S): 2.5 TABLET, FILM COATED ORAL at 05:07

## 2021-06-17 RX ADMIN — CHLORHEXIDINE GLUCONATE 1 APPLICATION(S): 213 SOLUTION TOPICAL at 09:20

## 2021-06-17 RX ADMIN — CARVEDILOL PHOSPHATE 12.5 MILLIGRAM(S): 80 CAPSULE, EXTENDED RELEASE ORAL at 17:29

## 2021-06-17 RX ADMIN — SEVELAMER CARBONATE 1600 MILLIGRAM(S): 2400 POWDER, FOR SUSPENSION ORAL at 17:29

## 2021-06-17 RX ADMIN — Medication 100 MILLIGRAM(S): at 18:11

## 2021-06-17 NOTE — PROGRESS NOTE ADULT - SUBJECTIVE AND OBJECTIVE BOX
Nephrology Followup Note - [phone] - Dr Pagan / Dr Vargas / Dr Faust / Dr Caballero / Dr Bojorquez / Dr Perkins / Dr Garrison / Dr Garcia  Pt seen and examined during HD earlier today  No acute events overnight. No complaints.     Allergies:  No Known Allergies    Hospital Medications:   MEDICATIONS  (STANDING):  apixaban 5 milliGRAM(s) Oral every 12 hours  aspirin enteric coated 81 milliGRAM(s) Oral daily  atorvastatin 40 milliGRAM(s) Oral at bedtime  carvedilol 12.5 milliGRAM(s) Oral every 12 hours  chlorhexidine 4% Liquid 1 Application(s) Topical <User Schedule>  doxercalciferol Injectable 4 MICROGram(s) IV Push <User Schedule>  epoetin tammi-epbx (RETACRIT) Injectable 54306 Unit(s) IV Push <User Schedule>  ferrous    sulfate 325 milliGRAM(s) Oral daily  folic acid 1 milliGRAM(s) Oral daily  levothyroxine 100 MICROGram(s) Oral daily  NIFEdipine XL 60 milliGRAM(s) Oral at bedtime  pantoprazole    Tablet 40 milliGRAM(s) Oral before breakfast  sevelamer carbonate 1600 milliGRAM(s) Oral three times a day with meals  vancomycin  IVPB 500 milliGRAM(s) IV Intermittent <User Schedule>    VITALS:  T(F): 98.2 (06-17-21 @ 10:55), Max: 98.3 (06-16-21 @ 21:30)  HR: 76 (06-17-21 @ 10:55)  BP: 136/79 (06-17-21 @ 10:55)  RR: 17 (06-17-21 @ 10:55)  SpO2: 98% (06-17-21 @ 10:55)  Wt(kg): --    06-16 @ 07:01  -  06-17 @ 07:00  --------------------------------------------------------  IN: 650 mL / OUT: 2900 mL / NET: -2250 mL    06-17 @ 07:01  -  06-17 @ 16:04  --------------------------------------------------------  IN: 400 mL / OUT: 2900 mL / NET: -2500 mL        PHYSICAL EXAM:  Constitutional: NAD  HEENT: anicteric sclera, oropharynx clear, MMM  Neck: No JVD  Respiratory: CTAB, no wheezes, rales or rhonchi  Cardiovascular: S1, S2, RRR  Gastrointestinal: BS+, soft, NT/ND  Extremities: No cyanosis or clubbing. No peripheral edema  Neurological: A/O x 3, no focal deficits  Psychiatric: Normal mood, normal affect  : No CVA tenderness. No siegel.   Skin: No rashes  Vascular Access: RIJ Tunneled cath. RUE AV access +thrill and bruit.     LABS:  06-17    133<L>  |  93<L>  |  68<H>  ----------------------------<  90  5.3   |  20<L>  |  8.88<H>    Ca    10.1      17 Jun 2021 07:15  Phos  7.7     06-17  Mg     1.8     06-17      Creatinine Trend: 8.88 <--, 7.39 <--, 5.18 <--, 8.03 <--, 5.92 <--, 10.07 <--, 8.02 <--                        7.9    7.62  )-----------( 250      ( 17 Jun 2021 07:15 )             25.2     Urine Studies:      RADIOLOGY & ADDITIONAL STUDIES:

## 2021-06-17 NOTE — PROGRESS NOTE ADULT - NSHPATTENDINGPLANDISCUSS_GEN_ALL_CORE
Pt has bilateral thigh to foot pain that she rates as severe. Pt was incontinent of urine today which is unusual for pt.  
pt, Resident, Dr Zepeda
pt, Dr Zepeda

## 2021-06-17 NOTE — PROGRESS NOTE ADULT - ASSESSMENT
66M hx of ESRD on HD (M/W/F, previously via LUE AVF, now via chest wall catheter), anemia, hyperparathyroidism, thrombocytopenia, hypothyroidism, HTN who presents with shortness of breath and LE swelling likely due to volume overload from missed HD session, admitted for HD. Renal following for ESRD Mx.     ESRD on HD   Pt had isolated UF yesterday, due to uncontrolled BP. 2.5 kg UF achieved. Tolerating HD today, with similar UF goals.   Repeat HD 6/19.   K, volume acceptable  Hyponatremia likely 2/2 dilutional- improved  s/p new RUE avf and RIJ permacath   renal diet, fluid restriction 1L/day  dose all meds for ESRD  HTN, controlled. c/w BB and Nifedipine  Anemia in CKD-Hb below goal. Continue epo 16K tiw w/hd.   Hyperphosphatemia- high phos level, continue Renvela 2 tabs TID with meals. high pth noted, c/w hectorol 4 mcg Three times a week with HD.     d/w SW-pt accepted to outpt [facility], on TTS schedule  pt stable for d/c renal stand point    Lennox Pagan MD  [facility]  Office [phone]  Ans Serv [phone]  Cell - [phone]

## 2021-06-17 NOTE — PROGRESS NOTE ADULT - SUBJECTIVE AND OBJECTIVE BOX
Patient is a 66y old  Male who presents with a chief complaint of urgent HD (16 Jun 2021 13:29)      SUBJECTIVE / OVERNIGHT EVENTS:    Events noted.  CONSTITUTIONAL: No fever,  or fatigue  RESPIRATORY: No cough, wheezing,  No shortness of breath  CARDIOVASCULAR: No chest pain, palpitations, dizziness, or leg swelling  GASTROINTESTINAL: No abdominal or epigastric pain. No nausea, vomiting.  NEUROLOGICAL: No headaches,     MEDICATIONS  (STANDING):  apixaban 5 milliGRAM(s) Oral every 12 hours  aspirin enteric coated 81 milliGRAM(s) Oral daily  atorvastatin 40 milliGRAM(s) Oral at bedtime  carvedilol 12.5 milliGRAM(s) Oral every 12 hours  chlorhexidine 4% Liquid 1 Application(s) Topical <User Schedule>  doxercalciferol Injectable 4 MICROGram(s) IV Push <User Schedule>  epoetin tammi-epbx (RETACRIT) Injectable 97770 Unit(s) IV Push <User Schedule>  ferrous    sulfate 325 milliGRAM(s) Oral daily  folic acid 1 milliGRAM(s) Oral daily  levothyroxine 100 MICROGram(s) Oral daily  NIFEdipine XL 60 milliGRAM(s) Oral at bedtime  pantoprazole    Tablet 40 milliGRAM(s) Oral before breakfast  sevelamer carbonate 1600 milliGRAM(s) Oral three times a day with meals  vancomycin  IVPB 500 milliGRAM(s) IV Intermittent <User Schedule>    MEDICATIONS  (PRN):  acetaminophen   Tablet .. 650 milliGRAM(s) Oral every 6 hours PRN Moderate Pain (4 - 6)  HYDROmorphone  Injectable 1.5 milliGRAM(s) IV Push every 8 hours PRN Severe Pain (7 - 10)        CAPILLARY BLOOD GLUCOSE        I&O's Summary    15 Saurav 2021 07:01  -  16 Jun 2021 07:00  --------------------------------------------------------  IN: 718 mL / OUT: 0 mL / NET: 718 mL    16 Jun 2021 07:01  -  17 Jun 2021 00:56  --------------------------------------------------------  IN: 400 mL / OUT: 2900 mL / NET: -2500 mL        T(C): 36.7 (06-17-21 @ 00:07), Max: 36.9 (06-16-21 @ 05:29)  HR: 81 (06-17-21 @ 00:07) (78 - 86)  BP: 172/95 (06-17-21 @ 00:07) (156/87 - 193/112)  RR: 17 (06-17-21 @ 00:07) (17 - 20)  SpO2: 99% (06-16-21 @ 13:49) (99% - 100%)    PHYSICAL EXAM:  GENERAL: NAD  NECK: Supple, No JVD  CHEST/LUNG: Clear to auscultation bilaterally; No wheezing.  HEART: Regular rate and rhythm; No murmurs, rubs, or gallops  ABDOMEN: Soft, Nontender, Nondistended; Bowel sounds present  EXTREMITIES:   No edema  NEUROLOGY: AAO X 3      LABS:                        7.3    6.49  )-----------( 253      ( 16 Jun 2021 06:44 )             24.5     06-16    135  |  95<L>  |  50<H>  ----------------------------<  88  4.9   |  22  |  7.39<H>    Ca    9.9      16 Jun 2021 06:44  Phos  7.3     06-16  Mg     1.8     06-16      PT/INR - ( 15 Saurav 2021 06:03 )   PT: 13.7 sec;   INR: 1.20 ratio         PTT - ( 16 Jun 2021 06:44 )  PTT:68.8 sec        CAPILLARY BLOOD GLUCOSE            RADIOLOGY & ADDITIONAL TESTS:    Imaging Personally Reviewed:    Consultant(s) Notes Reviewed:      Care Discussed with Consultants/Other Providers:    Wilfred Christian MD, CMD, FACP    [address] [address]  Office Tel: [phone]  Cell: [phone]

## 2021-06-18 LAB
ANION GAP SERPL CALC-SCNC: 17 MMOL/L — HIGH (ref 7–14)
APTT BLD: 37.4 SEC — HIGH (ref 27–36.3)
BUN SERPL-MCNC: 48 MG/DL — HIGH (ref 7–23)
CALCIUM SERPL-MCNC: 10 MG/DL — SIGNIFICANT CHANGE UP (ref 8.4–10.5)
CHLORIDE SERPL-SCNC: 93 MMOL/L — LOW (ref 98–107)
CO2 SERPL-SCNC: 22 MMOL/L — SIGNIFICANT CHANGE UP (ref 22–31)
CREAT SERPL-MCNC: 7.09 MG/DL — HIGH (ref 0.5–1.3)
GLUCOSE SERPL-MCNC: 104 MG/DL — HIGH (ref 70–99)
HCT VFR BLD CALC: 27.4 % — LOW (ref 39–50)
HGB BLD-MCNC: 8.4 G/DL — LOW (ref 13–17)
MAGNESIUM SERPL-MCNC: 1.8 MG/DL — SIGNIFICANT CHANGE UP (ref 1.6–2.6)
MCHC RBC-ENTMCNC: 26.8 PG — LOW (ref 27–34)
MCHC RBC-ENTMCNC: 30.7 GM/DL — LOW (ref 32–36)
MCV RBC AUTO: 87.5 FL — SIGNIFICANT CHANGE UP (ref 80–100)
NRBC # BLD: 0 /100 WBCS — SIGNIFICANT CHANGE UP
NRBC # FLD: 0 K/UL — SIGNIFICANT CHANGE UP
PHOSPHATE SERPL-MCNC: 7 MG/DL — HIGH (ref 2.5–4.5)
PLATELET # BLD AUTO: 246 K/UL — SIGNIFICANT CHANGE UP (ref 150–400)
POTASSIUM SERPL-MCNC: 4.4 MMOL/L — SIGNIFICANT CHANGE UP (ref 3.5–5.3)
POTASSIUM SERPL-SCNC: 4.4 MMOL/L — SIGNIFICANT CHANGE UP (ref 3.5–5.3)
RBC # BLD: 3.13 M/UL — LOW (ref 4.2–5.8)
RBC # FLD: 16.7 % — HIGH (ref 10.3–14.5)
SODIUM SERPL-SCNC: 132 MMOL/L — LOW (ref 135–145)
VANCOMYCIN FLD-MCNC: 18.4 UG/ML — SIGNIFICANT CHANGE UP
WBC # BLD: 5.91 K/UL — SIGNIFICANT CHANGE UP (ref 3.8–10.5)
WBC # FLD AUTO: 5.91 K/UL — SIGNIFICANT CHANGE UP (ref 3.8–10.5)

## 2021-06-18 RX ADMIN — SEVELAMER CARBONATE 1600 MILLIGRAM(S): 2400 POWDER, FOR SUSPENSION ORAL at 17:22

## 2021-06-18 RX ADMIN — Medication 1 MILLIGRAM(S): at 12:22

## 2021-06-18 RX ADMIN — Medication 100 MICROGRAM(S): at 05:57

## 2021-06-18 RX ADMIN — APIXABAN 5 MILLIGRAM(S): 2.5 TABLET, FILM COATED ORAL at 05:57

## 2021-06-18 RX ADMIN — Medication 81 MILLIGRAM(S): at 12:21

## 2021-06-18 RX ADMIN — Medication 60 MILLIGRAM(S): at 21:53

## 2021-06-18 RX ADMIN — SEVELAMER CARBONATE 1600 MILLIGRAM(S): 2400 POWDER, FOR SUSPENSION ORAL at 12:21

## 2021-06-18 RX ADMIN — Medication 325 MILLIGRAM(S): at 12:22

## 2021-06-18 RX ADMIN — CARVEDILOL PHOSPHATE 12.5 MILLIGRAM(S): 80 CAPSULE, EXTENDED RELEASE ORAL at 05:57

## 2021-06-18 RX ADMIN — CARVEDILOL PHOSPHATE 12.5 MILLIGRAM(S): 80 CAPSULE, EXTENDED RELEASE ORAL at 17:22

## 2021-06-18 RX ADMIN — CHLORHEXIDINE GLUCONATE 1 APPLICATION(S): 213 SOLUTION TOPICAL at 12:20

## 2021-06-18 RX ADMIN — PANTOPRAZOLE SODIUM 40 MILLIGRAM(S): 20 TABLET, DELAYED RELEASE ORAL at 06:41

## 2021-06-18 RX ADMIN — SEVELAMER CARBONATE 1600 MILLIGRAM(S): 2400 POWDER, FOR SUSPENSION ORAL at 09:00

## 2021-06-18 RX ADMIN — APIXABAN 5 MILLIGRAM(S): 2.5 TABLET, FILM COATED ORAL at 17:22

## 2021-06-18 RX ADMIN — ATORVASTATIN CALCIUM 40 MILLIGRAM(S): 80 TABLET, FILM COATED ORAL at 21:53

## 2021-06-18 NOTE — PROGRESS NOTE ADULT - ASSESSMENT
66M hx of ESRD on HD (M/W/F, previously via LUE AVF, now via chest wall catheter), anemia, hyperparathyroidism, thrombocytopenia, hypothyroidism, HTN who presents with shortness of breath and LE swelling likely due to volume overload from missed HD session, admitted for HD. Renal following for ESRD Mx.     ESRD on HD   Tolerated HD yesterday, with 2.5kg UF achieved.   Repeat HD 6/19.   K, volume acceptable  Hyponatremia likely 2/2 dilutional- improved  s/p new RUE avf and RIJ permacath   renal diet, fluid restriction 1L/day  dose all meds for ESRD  HTN, controlled. c/w BB and Nifedipine  Anemia in CKD-Hb below goal. Continue epo 16K tiw w/hd.   Hyperphosphatemia- high phos level, continue Renvela 2 tabs TID with meals. high pth noted, c/w hectorol 4 mcg Three times a week with HD.     d/w SW-pt accepted to outpt [facility], on TTS schedule  pt stable for d/c renal stand point    Lennox Pagan MD  [facility]  Office [phone]  Ans Serv [phone]  Cell - [phone]

## 2021-06-18 NOTE — PROGRESS NOTE ADULT - SUBJECTIVE AND OBJECTIVE BOX
Nephrology Followup Note - [phone] - Dr Pagan / Dr Vargas / Dr Faust / Dr Caballero / Dr Bojorquez / Dr Perkins / Dr Garrison / Dr Garcia  Pt seen and examined at bedside  No acute events overnight. No complaints.     Allergies:  No Known Allergies    Hospital Medications:   MEDICATIONS  (STANDING):  apixaban 5 milliGRAM(s) Oral every 12 hours  aspirin enteric coated 81 milliGRAM(s) Oral daily  atorvastatin 40 milliGRAM(s) Oral at bedtime  carvedilol 12.5 milliGRAM(s) Oral every 12 hours  chlorhexidine 4% Liquid 1 Application(s) Topical <User Schedule>  doxercalciferol Injectable 4 MICROGram(s) IV Push <User Schedule>  epoetin tammi-epbx (RETACRIT) Injectable 03227 Unit(s) IV Push <User Schedule>  ferrous    sulfate 325 milliGRAM(s) Oral daily  folic acid 1 milliGRAM(s) Oral daily  levothyroxine 100 MICROGram(s) Oral daily  NIFEdipine XL 60 milliGRAM(s) Oral at bedtime  pantoprazole    Tablet 40 milliGRAM(s) Oral before breakfast  sevelamer carbonate 1600 milliGRAM(s) Oral three times a day with meals  vancomycin  IVPB 500 milliGRAM(s) IV Intermittent <User Schedule>    VITALS:  T(F): 98.3 (06-18-21 @ 05:55), Max: 98.5 (06-17-21 @ 21:58)  HR: 80 (06-18-21 @ 05:55)  BP: 138/83 (06-18-21 @ 05:55)  RR: 18 (06-18-21 @ 05:55)  SpO2: 99% (06-18-21 @ 05:55)  Wt(kg): --    06-17 @ 07:01  -  06-18 @ 07:00  --------------------------------------------------------  IN: 2490 mL / OUT: 2900 mL / NET: -410 mL    PHYSICAL EXAM:  Constitutional: NAD  HEENT: anicteric sclera, oropharynx clear, MMM  Neck: No JVD  Respiratory: CTAB, no wheezes, rales or rhonchi  Cardiovascular: S1, S2, RRR  Gastrointestinal: BS+, soft, NT/ND  Extremities: No cyanosis or clubbing. No peripheral edema  Neurological: A/O x 3, no focal deficits  Psychiatric: Normal mood, normal affect  : No CVA tenderness. No siegel.   Skin: No rashes  Vascular Access: RIJ Tunneled cath. LUE AV access +thrill and bruit.     LABS:  06-17    133<L>  |  93<L>  |  68<H>  ----------------------------<  90  5.3   |  20<L>  |  8.88<H>    Ca    10.1      17 Jun 2021 07:15  Phos  7.7     06-17  Mg     1.8     06-17      Creatinine Trend: 8.88 <--, 7.39 <--, 5.18 <--, 8.03 <--, 5.92 <--, 10.07 <--                        7.9    7.62  )-----------( 250      ( 17 Jun 2021 07:15 )             25.2     Urine Studies:      RADIOLOGY & ADDITIONAL STUDIES:

## 2021-06-18 NOTE — PROGRESS NOTE ADULT - SUBJECTIVE AND OBJECTIVE BOX
Patient is a 66y old  Male who presents with a chief complaint of urgent HD (18 Jun 2021 09:23)      SUBJECTIVE / OVERNIGHT EVENTS:    Events noted.  CONSTITUTIONAL: No fever,  or fatigue  RESPIRATORY: No cough, wheezing,  No shortness of breath  CARDIOVASCULAR: No chest pain, palpitations, dizziness, or leg swelling  GASTROINTESTINAL: No abdominal or epigastric pain. No nausea, vomiting.  NEUROLOGICAL: No headaches,     MEDICATIONS  (STANDING):  apixaban 5 milliGRAM(s) Oral every 12 hours  aspirin enteric coated 81 milliGRAM(s) Oral daily  atorvastatin 40 milliGRAM(s) Oral at bedtime  carvedilol 12.5 milliGRAM(s) Oral every 12 hours  chlorhexidine 4% Liquid 1 Application(s) Topical <User Schedule>  doxercalciferol Injectable 4 MICROGram(s) IV Push <User Schedule>  epoetin tammi-epbx (RETACRIT) Injectable 61915 Unit(s) IV Push <User Schedule>  ferrous    sulfate 325 milliGRAM(s) Oral daily  folic acid 1 milliGRAM(s) Oral daily  levothyroxine 100 MICROGram(s) Oral daily  NIFEdipine XL 60 milliGRAM(s) Oral at bedtime  pantoprazole    Tablet 40 milliGRAM(s) Oral before breakfast  sevelamer carbonate 1600 milliGRAM(s) Oral three times a day with meals  vancomycin  IVPB 500 milliGRAM(s) IV Intermittent <User Schedule>    MEDICATIONS  (PRN):  acetaminophen   Tablet .. 650 milliGRAM(s) Oral every 6 hours PRN Moderate Pain (4 - 6)  HYDROmorphone  Injectable 1.5 milliGRAM(s) IV Push every 8 hours PRN Severe Pain (7 - 10)        CAPILLARY BLOOD GLUCOSE        I&O's Summary    17 Jun 2021 07:01  -  18 Jun 2021 07:00  --------------------------------------------------------  IN: 2490 mL / OUT: 2900 mL / NET: -410 mL        T(C): 36.7 (06-18-21 @ 12:50), Max: 36.9 (06-17-21 @ 21:58)  HR: 79 (06-18-21 @ 12:50) (78 - 80)  BP: 139/84 (06-18-21 @ 12:50) (138/83 - 144/91)  RR: 18 (06-18-21 @ 12:50) (18 - 18)  SpO2: 99% (06-18-21 @ 12:50) (98% - 99%)    PHYSICAL EXAM:  GENERAL: NAD  NECK: Supple, No JVD  CHEST/LUNG: Clear to auscultation bilaterally; No wheezing.  HEART: Regular rate and rhythm; No murmurs, rubs, or gallops  ABDOMEN: Soft, Nontender, Nondistended; Bowel sounds present  EXTREMITIES:   No edema  NEUROLOGY: AAO X 3      LABS:                        8.4    5.91  )-----------( 246      ( 18 Jun 2021 13:23 )             27.4     06-18    132<L>  |  93<L>  |  48<H>  ----------------------------<  104<H>  4.4   |  22  |  7.09<H>    Ca    10.0      18 Jun 2021 13:23  Phos  7.0     06-18  Mg     1.8     06-18      PTT - ( 18 Jun 2021 13:23 )  PTT:37.4 sec        CAPILLARY BLOOD GLUCOSE            RADIOLOGY & ADDITIONAL TESTS:    Imaging Personally Reviewed:    Consultant(s) Notes Reviewed:      Care Discussed with Consultants/Other Providers:    Wilfred Christian MD, CMD, FACP    [address] [address]  Office Tel: [phone]  Cell: [phone]

## 2021-06-19 VITALS
RESPIRATION RATE: 17 BRPM | TEMPERATURE: 98 F | HEART RATE: 71 BPM | OXYGEN SATURATION: 100 % | DIASTOLIC BLOOD PRESSURE: 71 MMHG | SYSTOLIC BLOOD PRESSURE: 126 MMHG

## 2021-06-19 LAB
ANION GAP SERPL CALC-SCNC: 17 MMOL/L — HIGH (ref 7–14)
BUN SERPL-MCNC: 64 MG/DL — HIGH (ref 7–23)
CALCIUM SERPL-MCNC: 10.5 MG/DL — SIGNIFICANT CHANGE UP (ref 8.4–10.5)
CHLORIDE SERPL-SCNC: 91 MMOL/L — LOW (ref 98–107)
CO2 SERPL-SCNC: 22 MMOL/L — SIGNIFICANT CHANGE UP (ref 22–31)
CREAT SERPL-MCNC: 8.19 MG/DL — HIGH (ref 0.5–1.3)
GLUCOSE SERPL-MCNC: 89 MG/DL — SIGNIFICANT CHANGE UP (ref 70–99)
HCT VFR BLD CALC: 25.9 % — LOW (ref 39–50)
HGB BLD-MCNC: 8 G/DL — LOW (ref 13–17)
MAGNESIUM SERPL-MCNC: 1.8 MG/DL — SIGNIFICANT CHANGE UP (ref 1.6–2.6)
MCHC RBC-ENTMCNC: 26.6 PG — LOW (ref 27–34)
MCHC RBC-ENTMCNC: 30.9 GM/DL — LOW (ref 32–36)
MCV RBC AUTO: 86 FL — SIGNIFICANT CHANGE UP (ref 80–100)
NRBC # BLD: 0 /100 WBCS — SIGNIFICANT CHANGE UP
NRBC # FLD: 0 K/UL — SIGNIFICANT CHANGE UP
PHOSPHATE SERPL-MCNC: 7.8 MG/DL — HIGH (ref 2.5–4.5)
PLATELET # BLD AUTO: 246 K/UL — SIGNIFICANT CHANGE UP (ref 150–400)
POTASSIUM SERPL-MCNC: 4.8 MMOL/L — SIGNIFICANT CHANGE UP (ref 3.5–5.3)
POTASSIUM SERPL-SCNC: 4.8 MMOL/L — SIGNIFICANT CHANGE UP (ref 3.5–5.3)
RBC # BLD: 3.01 M/UL — LOW (ref 4.2–5.8)
RBC # FLD: 16.8 % — HIGH (ref 10.3–14.5)
SODIUM SERPL-SCNC: 130 MMOL/L — LOW (ref 135–145)
VANCOMYCIN FLD-MCNC: 17.2 UG/ML — SIGNIFICANT CHANGE UP
WBC # BLD: 6.86 K/UL — SIGNIFICANT CHANGE UP (ref 3.8–10.5)
WBC # FLD AUTO: 6.86 K/UL — SIGNIFICANT CHANGE UP (ref 3.8–10.5)

## 2021-06-19 RX ADMIN — Medication 1 MILLIGRAM(S): at 11:24

## 2021-06-19 RX ADMIN — DOXERCALCIFEROL 4 MICROGRAM(S): 2.5 CAPSULE ORAL at 09:06

## 2021-06-19 RX ADMIN — ERYTHROPOIETIN 16000 UNIT(S): 10000 INJECTION, SOLUTION INTRAVENOUS; SUBCUTANEOUS at 09:16

## 2021-06-19 RX ADMIN — Medication 100 MILLIGRAM(S): at 11:31

## 2021-06-19 RX ADMIN — SEVELAMER CARBONATE 1600 MILLIGRAM(S): 2400 POWDER, FOR SUSPENSION ORAL at 11:31

## 2021-06-19 RX ADMIN — APIXABAN 5 MILLIGRAM(S): 2.5 TABLET, FILM COATED ORAL at 17:26

## 2021-06-19 RX ADMIN — Medication 325 MILLIGRAM(S): at 11:24

## 2021-06-19 RX ADMIN — CHLORHEXIDINE GLUCONATE 1 APPLICATION(S): 213 SOLUTION TOPICAL at 11:23

## 2021-06-19 RX ADMIN — Medication 81 MILLIGRAM(S): at 11:24

## 2021-06-19 RX ADMIN — Medication 100 MICROGRAM(S): at 05:41

## 2021-06-19 RX ADMIN — CARVEDILOL PHOSPHATE 12.5 MILLIGRAM(S): 80 CAPSULE, EXTENDED RELEASE ORAL at 11:31

## 2021-06-19 RX ADMIN — SEVELAMER CARBONATE 1600 MILLIGRAM(S): 2400 POWDER, FOR SUSPENSION ORAL at 17:25

## 2021-06-19 RX ADMIN — APIXABAN 5 MILLIGRAM(S): 2.5 TABLET, FILM COATED ORAL at 05:41

## 2021-06-19 NOTE — CHART NOTE - NSCHARTNOTESELECT_GEN_ALL_CORE
Bleeding Permacath
DC planning/Event Note
Event Note
Follow-Up/Nutrition Services
Medicine ACP/Event Note
ACP/Event Note
Bleeding Permacath
Event Note
PostOp Note
Pre-Op Note
Vascular Surgery

## 2021-06-19 NOTE — PROGRESS NOTE ADULT - NSICDXPILOT_GEN_ALL_CORE
[place]

## 2021-06-19 NOTE — PROGRESS NOTE ADULT - SUBJECTIVE AND OBJECTIVE BOX
[facility] : Ans Serv [phone], Office [phone]  Dr Caballero/Dr Vargas / Dr Ej ZAMORA /Dr Jordan etienne /Dr SERA Bojorquez/Dr Dagoberto Faust/Dr Jovan Garrison /Dr GAYATRI Matsonu  _______________________________________________________________________________________________    seen and examined today for End Stage Renal Disease on Dialysis   Interval : s/p hemodialysis today,   VITALS:  T(F): 98.1 (06-19-21 @ 11:30), Max: 98.5 (06-19-21 @ 05:43)  HR: 71 (06-19-21 @ 11:30)  BP: 126/71 (06-19-21 @ 11:30)  RR: 17 (06-19-21 @ 11:30)  SpO2: 100% (06-19-21 @ 11:30)    Physical Exam :-  Constitutional: NAD  Neck: Supple.  Respiratory: Bilateral equal breath sounds, no Crackles present.  Cardiovascular: S1, S2 normal, positive Murmur  Gastrointestinal: Bowel Sounds present, soft, non tender.  Extremities: no Edema Feet  Neurological: Alert and Oriented x 3  Psychiatric: Normal mood, normal affect  Data:-  Allergies :   No Known Allergies    Hospital Medications:   MEDICATIONS  (STANDING):  apixaban 5 milliGRAM(s) Oral every 12 hours  aspirin enteric coated 81 milliGRAM(s) Oral daily  atorvastatin 40 milliGRAM(s) Oral at bedtime  carvedilol 12.5 milliGRAM(s) Oral every 12 hours  chlorhexidine 4% Liquid 1 Application(s) Topical <User Schedule>  doxercalciferol Injectable 4 MICROGram(s) IV Push <User Schedule>  epoetin tammi-epbx (RETACRIT) Injectable 69799 Unit(s) IV Push <User Schedule>  ferrous    sulfate 325 milliGRAM(s) Oral daily  folic acid 1 milliGRAM(s) Oral daily  levothyroxine 100 MICROGram(s) Oral daily  NIFEdipine XL 60 milliGRAM(s) Oral at bedtime  pantoprazole    Tablet 40 milliGRAM(s) Oral before breakfast  sevelamer carbonate 1600 milliGRAM(s) Oral three times a day with meals    06-19    130<L>  |  91<L>  |  64<H>  ----------------------------<  89  4.8   |  22  |  8.19<H>    Ca    10.5      19 Jun 2021 07:05  Phos  7.8     06-19  Mg     1.8     06-19      Creatinine Trend: 8.19 <--, 7.09 <--, 8.88 <--, 7.39 <--, 5.18 <--, 8.03 <--, 5.92 <--                        8.0    6.86  )-----------( 246      ( 19 Jun 2021 07:05 )             25.9

## 2021-06-19 NOTE — PROGRESS NOTE ADULT - REASON FOR ADMISSION
urgent HD

## 2021-06-19 NOTE — PROGRESS NOTE ADULT - ASSESSMENT
66M hx of ESRD on HD (M/W/F, previously via LUE AVF, now via chest wall catheter), anemia, hyperparathyroidism, thrombocytopenia, hypothyroidism, HTN who presents with shortness of breath and LE swelling likely due to volume overload from missed HD session, admitted for HD. Renal following for ESRD Mx.     ESRD on HD   s/p hemodialysis today, tolerated well, no cramps    HTN, controlled. c/w BB and Nifedipine    Anemia in CKD-Hb below goal. Continue epo 16K tiw w/hd.     Hyperphosphatemia- high phos level, continue Renvela 2 tabs TID with meals., c/w hectorol 4 mcg Three times a week with HD.     contact with question   cell [phone]  Ans Serv- [phone]

## 2021-06-28 ENCOUNTER — INPATIENT (INPATIENT)
Facility: HOSPITAL | Age: 66
LOS: 2 days | Discharge: HOME | End: 2021-07-01
Attending: INTERNAL MEDICINE | Admitting: INTERNAL MEDICINE
Payer: MEDICAID

## 2021-06-28 VITALS
OXYGEN SATURATION: 97 % | TEMPERATURE: 101 F | HEART RATE: 90 BPM | DIASTOLIC BLOOD PRESSURE: 81 MMHG | RESPIRATION RATE: 18 BRPM | SYSTOLIC BLOOD PRESSURE: 163 MMHG | HEIGHT: 69 IN

## 2021-06-28 DIAGNOSIS — I77.0 ARTERIOVENOUS FISTULA, ACQUIRED: Chronic | ICD-10-CM

## 2021-06-28 DIAGNOSIS — J81.0 ACUTE PULMONARY EDEMA: ICD-10-CM

## 2021-06-28 LAB
ALBUMIN SERPL ELPH-MCNC: 3.8 G/DL — SIGNIFICANT CHANGE UP (ref 3.3–5)
ALP SERPL-CCNC: 153 U/L — HIGH (ref 40–120)
ALT FLD-CCNC: 10 U/L — SIGNIFICANT CHANGE UP (ref 4–41)
ANION GAP SERPL CALC-SCNC: 20 MMOL/L — HIGH (ref 7–14)
APTT BLD: 22.2 SEC — LOW (ref 27–36.3)
AST SERPL-CCNC: 19 U/L — SIGNIFICANT CHANGE UP (ref 4–40)
BASOPHILS # BLD AUTO: 0.05 K/UL — SIGNIFICANT CHANGE UP (ref 0–0.2)
BASOPHILS NFR BLD AUTO: 0.7 % — SIGNIFICANT CHANGE UP (ref 0–2)
BILIRUB SERPL-MCNC: 0.5 MG/DL — SIGNIFICANT CHANGE UP (ref 0.2–1.2)
BLOOD GAS VENOUS COMPREHENSIVE RESULT: SIGNIFICANT CHANGE UP
BUN SERPL-MCNC: 72 MG/DL — HIGH (ref 7–23)
CALCIUM SERPL-MCNC: 9.6 MG/DL — SIGNIFICANT CHANGE UP (ref 8.4–10.5)
CHLORIDE SERPL-SCNC: 93 MMOL/L — LOW (ref 98–107)
CO2 SERPL-SCNC: 22 MMOL/L — SIGNIFICANT CHANGE UP (ref 22–31)
CREAT SERPL-MCNC: 8.68 MG/DL — HIGH (ref 0.5–1.3)
EOSINOPHIL # BLD AUTO: 0.18 K/UL — SIGNIFICANT CHANGE UP (ref 0–0.5)
EOSINOPHIL NFR BLD AUTO: 2.5 % — SIGNIFICANT CHANGE UP (ref 0–6)
GLUCOSE SERPL-MCNC: 86 MG/DL — SIGNIFICANT CHANGE UP (ref 70–99)
HCT VFR BLD CALC: 27.1 % — LOW (ref 39–50)
HGB BLD-MCNC: 8.1 G/DL — LOW (ref 13–17)
IANC: 5.09 K/UL — SIGNIFICANT CHANGE UP (ref 1.5–8.5)
IMM GRANULOCYTES NFR BLD AUTO: 0.1 % — SIGNIFICANT CHANGE UP (ref 0–1.5)
INR BLD: 1.28 RATIO — HIGH (ref 0.88–1.16)
LYMPHOCYTES # BLD AUTO: 1.31 K/UL — SIGNIFICANT CHANGE UP (ref 1–3.3)
LYMPHOCYTES # BLD AUTO: 17.9 % — SIGNIFICANT CHANGE UP (ref 13–44)
MCHC RBC-ENTMCNC: 26.6 PG — LOW (ref 27–34)
MCHC RBC-ENTMCNC: 29.9 GM/DL — LOW (ref 32–36)
MCV RBC AUTO: 88.9 FL — SIGNIFICANT CHANGE UP (ref 80–100)
MONOCYTES # BLD AUTO: 0.67 K/UL — SIGNIFICANT CHANGE UP (ref 0–0.9)
MONOCYTES NFR BLD AUTO: 9.2 % — SIGNIFICANT CHANGE UP (ref 2–14)
NEUTROPHILS # BLD AUTO: 5.09 K/UL — SIGNIFICANT CHANGE UP (ref 1.8–7.4)
NEUTROPHILS NFR BLD AUTO: 69.6 % — SIGNIFICANT CHANGE UP (ref 43–77)
NRBC # BLD: 0 /100 WBCS — SIGNIFICANT CHANGE UP
NRBC # FLD: 0 K/UL — SIGNIFICANT CHANGE UP
NT-PROBNP SERPL-SCNC: HIGH PG/ML
PLATELET # BLD AUTO: 196 K/UL — SIGNIFICANT CHANGE UP (ref 150–400)
POTASSIUM SERPL-MCNC: 5.7 MMOL/L — HIGH (ref 3.5–5.3)
POTASSIUM SERPL-SCNC: 5.7 MMOL/L — HIGH (ref 3.5–5.3)
PROT SERPL-MCNC: 8.1 G/DL — SIGNIFICANT CHANGE UP (ref 6–8.3)
PROTHROM AB SERPL-ACNC: 14.4 SEC — HIGH (ref 10.6–13.6)
RAPID RVP RESULT: SIGNIFICANT CHANGE UP
RBC # BLD: 3.05 M/UL — LOW (ref 4.2–5.8)
RBC # FLD: 17.2 % — HIGH (ref 10.3–14.5)
SARS-COV-2 RNA SPEC QL NAA+PROBE: SIGNIFICANT CHANGE UP
SODIUM SERPL-SCNC: 135 MMOL/L — SIGNIFICANT CHANGE UP (ref 135–145)
TROPONIN T, HIGH SENSITIVITY RESULT: 90 NG/L — CRITICAL HIGH
WBC # BLD: 7.31 K/UL — SIGNIFICANT CHANGE UP (ref 3.8–10.5)
WBC # FLD AUTO: 7.31 K/UL — SIGNIFICANT CHANGE UP (ref 3.8–10.5)

## 2021-06-28 PROCEDURE — 93010 ELECTROCARDIOGRAM REPORT: CPT

## 2021-06-28 PROCEDURE — 99291 CRITICAL CARE FIRST HOUR: CPT | Mod: 25

## 2021-06-28 PROCEDURE — 99291 CRITICAL CARE FIRST HOUR: CPT

## 2021-06-28 PROCEDURE — 71045 X-RAY EXAM CHEST 1 VIEW: CPT | Mod: 26

## 2021-06-28 RX ORDER — NIFEDIPINE 30 MG
30 TABLET, EXTENDED RELEASE 24 HR ORAL AT BEDTIME
Refills: 0 | Status: DISCONTINUED | OUTPATIENT
Start: 2021-06-28 | End: 2021-06-29

## 2021-06-28 RX ORDER — APIXABAN 2.5 MG/1
5 TABLET, FILM COATED ORAL EVERY 12 HOURS
Refills: 0 | Status: DISCONTINUED | OUTPATIENT
Start: 2021-06-28 | End: 2021-06-29

## 2021-06-28 RX ORDER — ATORVASTATIN CALCIUM 80 MG/1
40 TABLET, FILM COATED ORAL AT BEDTIME
Refills: 0 | Status: DISCONTINUED | OUTPATIENT
Start: 2021-06-28 | End: 2021-07-01

## 2021-06-28 RX ORDER — NITROGLYCERIN 6.5 MG
80 CAPSULE, EXTENDED RELEASE ORAL
Qty: 50 | Refills: 0 | Status: DISCONTINUED | OUTPATIENT
Start: 2021-06-28 | End: 2021-06-29

## 2021-06-28 RX ORDER — CHLORHEXIDINE GLUCONATE 213 G/1000ML
1 SOLUTION TOPICAL
Refills: 0 | Status: DISCONTINUED | OUTPATIENT
Start: 2021-06-28 | End: 2021-07-01

## 2021-06-28 RX ORDER — CHLORHEXIDINE GLUCONATE 213 G/1000ML
1 SOLUTION TOPICAL DAILY
Refills: 0 | Status: DISCONTINUED | OUTPATIENT
Start: 2021-06-28 | End: 2021-07-01

## 2021-06-28 RX ORDER — PANTOPRAZOLE SODIUM 20 MG/1
40 TABLET, DELAYED RELEASE ORAL
Refills: 0 | Status: DISCONTINUED | OUTPATIENT
Start: 2021-06-28 | End: 2021-07-01

## 2021-06-28 RX ORDER — CARVEDILOL PHOSPHATE 80 MG/1
12.5 CAPSULE, EXTENDED RELEASE ORAL EVERY 12 HOURS
Refills: 0 | Status: DISCONTINUED | OUTPATIENT
Start: 2021-06-28 | End: 2021-07-01

## 2021-06-28 RX ORDER — CEFEPIME 1 G/1
2000 INJECTION, POWDER, FOR SOLUTION INTRAMUSCULAR; INTRAVENOUS ONCE
Refills: 0 | Status: COMPLETED | OUTPATIENT
Start: 2021-06-28 | End: 2021-06-28

## 2021-06-28 RX ORDER — LEVOTHYROXINE SODIUM 125 MCG
100 TABLET ORAL DAILY
Refills: 0 | Status: DISCONTINUED | OUTPATIENT
Start: 2021-06-28 | End: 2021-07-01

## 2021-06-28 RX ORDER — ASPIRIN/CALCIUM CARB/MAGNESIUM 324 MG
81 TABLET ORAL DAILY
Refills: 0 | Status: DISCONTINUED | OUTPATIENT
Start: 2021-06-28 | End: 2021-07-01

## 2021-06-28 RX ORDER — ACETAMINOPHEN 500 MG
650 TABLET ORAL ONCE
Refills: 0 | Status: DISCONTINUED | OUTPATIENT
Start: 2021-06-28 | End: 2021-06-28

## 2021-06-28 RX ORDER — SEVELAMER CARBONATE 2400 MG/1
800 POWDER, FOR SUSPENSION ORAL
Refills: 0 | Status: DISCONTINUED | OUTPATIENT
Start: 2021-06-28 | End: 2021-07-01

## 2021-06-28 RX ORDER — ACETAMINOPHEN 500 MG
1000 TABLET ORAL ONCE
Refills: 0 | Status: COMPLETED | OUTPATIENT
Start: 2021-06-28 | End: 2021-06-28

## 2021-06-28 RX ORDER — VANCOMYCIN HCL 1 G
1000 VIAL (EA) INTRAVENOUS ONCE
Refills: 0 | Status: COMPLETED | OUTPATIENT
Start: 2021-06-28 | End: 2021-06-28

## 2021-06-28 RX ADMIN — Medication 400 MILLIGRAM(S): at 21:40

## 2021-06-28 RX ADMIN — CEFEPIME 100 MILLIGRAM(S): 1 INJECTION, POWDER, FOR SOLUTION INTRAMUSCULAR; INTRAVENOUS at 20:48

## 2021-06-28 RX ADMIN — Medication 250 MILLIGRAM(S): at 22:05

## 2021-06-28 RX ADMIN — Medication 24 MICROGRAM(S)/MIN: at 21:23

## 2021-06-28 NOTE — ED PROVIDER NOTE - ATTENDING CONTRIBUTION TO CARE
I have personally performed a face to face bedside history and physical examination of this patient. I have discussed the history, examination, review of systems, assessment and plan of management with the resident. I have reviewed the electronic medical record and amended it to reflect my history, review of systems, physical exam, assessment and plan.    Brief HPI:  65 yo M pmh HTN, ESRD (on HD T/Th/S; last hd 6/26/21) via permacath (patient with dysfunctional LUE AVF, creation of right radial AVF 6/3 c/b cellulitis s/p vanco x7 days, anemia, thrombocytopenia, hyperparathyroidism p/w SOB and weakness.  On arrival patient tachypneic, hypertensive.  Limited history due to critical illness.      Vitals:   Reviewed    Exam:    GEN:  appears in distress, alert, speaking short sentences   HEENT:  NCAT, neck supple, EOMI, PERRLA, sclera anicteric, no conjunctival pallor or injection, no stridor, normal voice, no tonsillar exudate, uvula midline  CV:  regular rhythm and rate, s1/s2 audible, no murmurs, rubs or gallops, peripheral pulses 2+ and symmetric; lue avf no palpable thrill  PULM:  tripoding, tachypneic, crackles in b/l lung fields  ABD:  non distended, non-tender, no rebound, no guarding, negative Cano's sign, bowel sounds normal, no cvat  MSK:  no gross deformity, non-tender extremities and joints, range of motion grossly normal appearing, 1+ pitting b/l le edema, extremities warm and well perfused   NEURO:  AAOx3, CN II-XII intact, motor 5/5 in upper and lower extremities bilaterally, sensation grossly intact in extremities and trunk  SKIN:  erythema over right radial artery avf    A/P:   65 yo M pmh HTN, ESRD (on HD T/Th/S; last hd 6/26/21) via permacath (patient with dysfunctional LUE AVF, creation of right radial AVF 6/3 c/b cellulitis s/p vanco x7 days, anemia, thrombocytopenia, hyperparathyroidism p/w SOB and weakness.  Febrile, hypertensive, hypoxemic, tachypneic.  Appears volume overloaded on exam.  Possible pna vs. rue cellulitis as source of fever.  EKG non-ischemic and not c/w hyperkalemia.  Will send labs, nippv, nitro gtt, abx, cxr.  Will place renal and icu consults given patient likely will need urgent HD.  Anticipate admission.

## 2021-06-28 NOTE — ED PROVIDER NOTE - PROGRESS NOTE ADDITIONAL1
Additional Progress Note...
Monitor lytes and replete as needed.  Diet Renal/Dash diet. NPO Midnight   DVT prophylaxis: At this time pt to be on Heparin drip.

## 2021-06-28 NOTE — ED PROVIDER NOTE - OBJECTIVE STATEMENT
65y/o M w/ h/o HTN, ESRD (on HD T/Th/S) via permecath, anemia, thrombocytopenia, hyperparathyroidism p/w SOB and weakness. Pt states he has been feeling weak and SOB for the past 3 days. Endorses fevers and warmth to R UE fistula site. Pt receives dialysis T/Th/S last dialysis 2d ago still makes some urine. No chest pain, abdominal pain, vomiting.

## 2021-06-28 NOTE — H&P ADULT - NSHPREVIEWOFSYSTEMS_GEN_ALL_CORE
REVIEW OF SYSTEMS:    CONSTITUTIONAL: +fever, no chills or malaise  EYES: No visual changes; no sclera icterics, no pain or drainage  ENT:  No vertigo or throat pain   NECK: No pain or stiffness  RESPIRATORY: +SOB, +cough, +hemoptysis, wheezing  CARDIOVASCULAR: No chest pain or palpitations  GASTROINTESTINAL: No abdominal or epigastric pain. No nausea, vomiting, or hematemesis; No diarrhea or constipation. No melena or hematochezia.  GENITOURINARY: No dysuria, frequency or hematuria  NEUROLOGICAL: No numbness or weakness  SKIN: No itching, rashes  Psych: No anxiety or depression REVIEW OF SYSTEMS:    CONSTITUTIONAL: +fever, no chills or malaise  EYES: No visual changes; no sclera icterics, no pain or drainage  ENT:  No vertigo or throat pain   NECK: No pain or stiffness  RESPIRATORY: +SOB, +cough, +mild hemoptysis, wheezing  CARDIOVASCULAR: No chest pain or palpitations  GASTROINTESTINAL: No abdominal or epigastric pain. No N/V, or hematemesis; No diarrhea or constipation. No melena or hematochezia.  GENITOURINARY: No dysuria, frequency or hematuria  NEUROLOGICAL: No numbness or weakness  SKIN: +erythematous, tender RUE site  Psych: No anxiety or depression CONSTITUTIONAL: +fever, no chills or malaise  EYES: No visual changes; no sclera icterics, no pain or drainage  ENT:  No vertigo or throat pain   NECK: No pain or stiffness  RESPIRATORY: +SOB, +cough, +mild hemoptysis, wheezing  CARDIOVASCULAR: No chest pain or palpitations  GASTROINTESTINAL: No abdominal or epigastric pain. No N/V, or hematemesis; No diarrhea or constipation. No melena or hematochezia.  GENITOURINARY: No dysuria, frequency or hematuria  NEUROLOGICAL: No numbness or weakness  SKIN: +erythematous, tender RUE site  Psych: No anxiety or depression

## 2021-06-28 NOTE — ED PROVIDER NOTE - CLINICAL SUMMARY MEDICAL DECISION MAKING FREE TEXT BOX
67y/o M w/ h/o HTN, ESRD (on HD T/Th/S) via permecath, anemia, thrombocytopenia, hyperparathyroidism p/w SOB in mild respiratory distress with htn concerning for acute pulmonary edema. Plan CXR, BIPAP, nitro gtt and reassess. Pt also febrile concerning for infection source possible from skin. Will obtain cultures start abx reassess. Likely CCU vs MICU

## 2021-06-28 NOTE — ED PROVIDER NOTE - PHYSICAL EXAMINATION
Gen: NAD, non-toxic appearing  Head: normal appearing  HEENT: normal conjunctiva, oral mucosa dry  Lung: mild respiratory distress, speaking in short sentences, rhonchi b/l  CV: regular rate and rhythm, palpable thrill in R arm  Abd: soft, non distended, non tender   MSK: no visible deformities  Neuro: No focal deficits, AAOx3  Skin: clotted L UE fistula, erythematous and warm R UE  Psych: normal affect

## 2021-06-28 NOTE — ED ADULT NURSE NOTE - NSHOSCREENINGQ1_ED_ALL_ED
[facility]  [address] [facility] [address]  [facility] [address]  Phone: [phone]                  Stas Matias Jr.   3/29/2017 8:00 AM   Office Visit    Description:  Male : 1945   Provider:  OSITO Mckenzie MD   Department:  [facility]           Reason for Visit     Elevated PSA                To Do List           Goals (5 Years of Data)     None      [facility]     [facility] -  Assistance  Registered nurses in the [facility] Center provide clinical advisement, health education, appointment booking, and other advisory services.  Call for this free service at [phone].             Medications           Message regarding Medications     Verify the changes and/or additions to your medication regime listed below are the same as discussed with your clinician today.  If any of these changes or additions are incorrect, please notify your healthcare provider.        STOP taking these medications     acetaminophen (TYLENOL) 325 MG tablet Take 325 mg by mouth every 6 (six) hours as needed for Pain.    atorvastatin (LIPITOR) 40 MG tablet Take 1 tablet (40 mg total) by mouth once daily.    rosuvastatin (CRESTOR) 40 MG Tab Take 1 tablet (40 mg total) by mouth every evening.           Verify that the below list of medications is an accurate representation of the medications you are currently taking.  If none reported, the list may be blank. If incorrect, please contact your healthcare provider. Carry this list with you in case of emergency.           Current Medications     aspirin 81 mg Tab Take 1 tablet by mouth Daily.    multivitamin capsule Take 1 capsule by mouth once daily.    sildenafil (VIAGRA) 50 MG tablet Take 1 tablet by mouth Use as needed.       tramadol (ULTRAM) 50 mg tablet Take 50 mg by mouth every 6 (six) hours as needed for Pain.           Clinical Reference Information           Your Vitals Were     BP Pulse Height Weight BMI    129/59 (BP  "Location: Right arm) 53 5' 11.5" (1.816 m) 78.2 kg (172 lb 6.4 oz) 23.71 kg/m2      Blood Pressure          Most Recent Value    BP  (!)  129/59      Allergies as of 3/29/2017     No Known Drug Allergies      Immunizations Administered on Date of Encounter - 3/29/2017     None      Smoking Cessation     If you would like to quit smoking:   You may be eligible for free services if you are a [place] resident and started smoking cigarettes before September 1, 1988.  Call the Smoking Cessation [employer] (SCT) toll free at (053) 223-3097 or (621) 592-2522.   Call 3-611-QUIT-NOW if you do not meet the above criteria.            Language Assistance Services     ATTENTION: Language assistance services are available, free of charge. Please call [phone].      ATENCIÓN: Si habla radha, tiene a hartley disposición servicios gratuitos de asistencia lingüística. Llame al [phone].     CHÚ Ý: N?u b?n nói Ti?ng Vi?t, có các d?ch v? h? tr? ngôn ng? mi?n phí dành cho b?n. G?i s? [phone].         [facility] complies with applicable Federal civil rights laws and does not discriminate on the basis of race, color, national origin, age, disability, or sex.        " No

## 2021-06-28 NOTE — ED ADULT NURSE REASSESSMENT NOTE - NS ED NURSE REASSESS COMMENT FT1
Dinah RN: Pt Aox4, arrives from home c/o sob x3 weeks. Reports last dialysis saturday. Noted old Left AV fistual, New AV fistual in Rt Arm. Pt noted with IV 20G Lft Hand, IV 20G placed Rt forearm. Pt VS as charted. Pt currently reports some relief of SOB on bipap. Nitro gtt titrated as documented. Pt head of bed elevated, given blankets, will continue to monitor closely. Dinah RN: Pt Aox4, arrives from home c/o sob x3 weeks. Reports last dialysis saturday. Noted old Left AV fistula, New AV fistula in Rt Arm. Pt noted with IV 20G Lft Hand, IV 20G placed Lft forearm. Pt VS as charted, currently reports some relief of SOB on bipap. Nitro gtt titrated as documented. Pt head of bed elevated, given blankets, will continue to monitor closely.

## 2021-06-28 NOTE — ED ADULT NURSE REASSESSMENT NOTE - NS ED NURSE REASSESS COMMENT FT1
Received report from CATRINA Steven. Patient A&OX4, ambulatory at baseline. Patient c/o worsening SOB, placed on BIPAP, tolerating well at present time. Patient given abx, PO Tylenol to be changed to IV Tylenol. Patient in no acute distress. Side rails up for safety. Given blanket for comfort. Call bell within reach. Will continue to monitor.

## 2021-06-28 NOTE — H&P ADULT - ASSESSMENT
66M PMH HTN, anemia, thrombocytopenia, hyperparathyroidism, and ESRD on HD (T/Th/Sat) via permacath p/w SOB and weakness likely 2/2 fluid overload due to missed HD.     #Neuro    #CV  HTN  -continue home carvediolol 12.5mg BID    #Pulm    #GI      #Renal  SOB and weakness likely 2/2 fluid overload  -Dr. Vargas is outpt nephrologist  -awaiting nephro recs  -monitor BMP, electrolytes  -Hold home Lasix 40mg QD     #ID      #Endo  Hypothyroidism  -continue home levothyroxine 100mcg QD    #Heme  Anemia 2/2 to ACD in setting of ESRD  -c/w home ferrous sulfate and folic acid QD    Thrombocytopenia  -      #Ethics  Full code 66M PMH HTN, anemia, thrombocytopenia, hyperparathyroidism, and ESRD on HD (T/Th/Sat) via permacath p/w SOB and weakness likely 2/2 fluid overload due to missed HD.     #Neuro      #CV  HTN  -continue nitro gtt  -continue home carvediolol 12.5mg BID, ASA 81mg, Atorvastatin 40mg, nifedipine 30mg    #Pulm  Hypercapnia/hypoxemia  -Continue BiPAP    #GI  -Protonix ppx    #Renal  SOB and weakness likely 2/2 fluid overload  -Urgent HD tx per nephro  -monitor BMP, electrolytes  -continue home Renvela 800mg TID    #ID  ?RUE cellulitis  -afebrile w/ latest WBC 7.31     #Endo  Hypothyroidism  -continue home levothyroxine 100mcg QD    #Heme  Anemia 2/2 to ACD in setting of ESRD  -latest Hgb at 8.1 (baseline approx 7.6-8.0)  -c/w home ferrous sulfate and folic acid QD    L AVF Clot  -continue home eliquis     DVT ppx  -SQH    #Ethics  Full code 66M PMH HTN, anemia, thrombocytopenia, hyperparathyroidism, and ESRD on HD (T/Th/Sat) via permacath p/w SOB and weakness likely 2/2 fluid overload due to missed HD.     #Neuro  A&Ox4, no active issues    #CV  HTN  -titrate nitro gtt  -continue home carvediolol 12.5mg BID, ASA 81mg, Atorvastatin 40mg, nifedipine 30mg    #Pulm  Hypercapnia/hypoxemia  -Continue BiPAP    #GI  -Protonix ppx    #Renal  SOB and weakness likely 2/2 fluid overload  -Urgent HD tx per nephro  -monitor BMP, electrolytes  -continue home Renvela 800mg TID    #ID  ?RUE cellulitis  -afebrile w/ continuous normal WBC, latest at 7.31     #Endo  Hypothyroidism  -continue home levothyroxine 100mcg QD    #Heme  Anemia in the setting of ESRD  -latest Hgb at 8.1 (baseline 7.6-8.0)  -continue home ferrous sulfate and folic acid QD    L AVF Clot  -continue home eliquis     #Ethics  Full code 66M PMH HTN, anemia, thrombocytopenia, hyperparathyroidism, and ESRD on HD (T/Th/Sat) via permacath p/w SOB and weakness likely 2/2 fluid overload due to missed HD.     #Neuro  A&Ox4, no active issues    #CV  HTN  -titrate nitro gtt  -continue home carvediolol 12.5mg BID, ASA 81mg, Atorvastatin 40mg, nifedipine 30mg    #Pulm  Hypercapnia/hypoxemia  -Continue BiPAP    #GI  -Protonix ppx    #Renal  SOB and weakness likely 2/2 fluid overload  -Urgent HD tonight for 3 hours with 2.5-3kg UF per nephro  -monitor BMP, electrolytes  -continue home Renvela 800mg TID    #ID  ?RUE cellulitis  -afebrile w/ continuous normal WBC, latest at 7.31     #Endo  Hypothyroidism  -continue home levothyroxine 100mcg QD    #Heme  Anemia in the setting of ESRD  -latest Hgb at 8.1 (baseline 7.6-8.0)  -continue home ferrous sulfate and folic acid QD    L AVF Clot  -continue home eliquis     #Ethics  Full code 66M PMH HTN, anemia, thrombocytopenia, hyperparathyroidism, and ESRD on HD (T/Th/Sat) via permacath p/w SOB and weakness likely 2/2 fluid overload due to missed HD.     #Neuro  A&Ox4, no active issues    #CV  HTN  -titrate nitro gtt  -continue home carvediolol 12.5mg BID, ASA 81mg, Atorvastatin 40mg, nifedipine 30mg    #Pulm  Hypercapnia/hypoxemia  -Continue BiPAP    #GI  -Protonix ppx    #Renal  SOB and weakness likely 2/2 fluid overload  -Urgent HD tonight for 3 hours with 2.5-3kg UF per nephro  -monitor BMP, electrolytes  -continue home Renvela 800mg TID    #ID  Fever likely 2/2 multifocal PNA  -In ED, started on vanco and cefepime  -CXR - b/l patchy opacities concerning for multifocal pna.  -Pt currently on Zosyn and azithromycin  -Obtain urine legionella antigen, if negative, d/c azithromycin  -Strep pneumo urine antigen obtained  -Afebrile w/ last WBC at 7.31     #Endo  Hypothyroidism  -continue home levothyroxine 100mcg QD    #Heme  Anemia in the setting of ESRD  -latest Hgb at 8.1 (baseline 7.6-8.0)  -continue home ferrous sulfate and folic acid QD    L AVF Clot  -continue home eliquis     #Ethics  Full code 66M PMH HTN, anemia, thrombocytopenia, hyperparathyroidism, and ESRD on HD (T/Th/Sat) via permacath p/w SOB and weakness likely 2/2 fluid overload due to missed HD.     #Neuro  A&Ox4, no active issues    #CV  HTN  -plan to wean nitro gtt  -continue home carvediolol 12.5mg BID, ASA 81mg, Atorvastatin 40mg, nifedipine 30mg    #Pulm  Hypercapnia/hypoxemia  -Continue BiPAP    #GI  -Protonix ppx    #Renal  SOB and weakness likely 2/2 fluid overload  -Urgent HD tonight for 3 hours with 2.5-3kg UF per nephro  -monitor BMP, electrolytes  -continue home Renvela 800mg TID    #ID  Fever likely 2/2 multifocal PNA  -In ED, fever at 101, started on vanco and cefepime  -CXR - b/l patchy opacities concerning for multifocal pna.  -currently afebrile w/ last WBC at 7.31   -Pt placed on Zosyn and azithromycin in MICU  -Obtain urine legionella antigen, if negative, d/c azithromycin  -f/u strep pneumo urine antigen  -f/u blood and sputum cx.   -f/u on UA    #Endo  Hypothyroidism  -continue home levothyroxine 100mcg QD    #Heme  Anemia in the setting of ESRD  -latest Hgb at 8.1 (baseline 7.6-8.0)  -continue home ferrous sulfate and folic acid QD    L AVF Clot  -continue home eliquis     #Ethics  Full code 66M PMH HTN, anemia, thrombocytopenia, hyperparathyroidism, and ESRD on HD (T/Th/Sat) via permacath p/w SOB and weakness likely 2/2 fluid overload due to missed HD.     #Neuro  A&Ox4, no active issues    #CV  HTN  -plan to wean nitro gtt  -continue home carvediolol 12.5mg BID, ASA 81mg, Atorvastatin 40mg, nifedipine 30mg    #Pulm  Hypercapnia/hypoxemia likely due to fluid overload in the setting of ESRD  -Continue BiPAP  -monitor ABGs    #GI  -Protonix ppx    #Renal  SOB and weakness likely 2/2 fluid overload  -Urgent HD tonight for 3 hours with 2.5-3kg UF per nephro (goal: remove 3L)  -monitor BMP, electrolytes  -continue home Renvela 800mg TID    #ID  Fever likely 2/2 multifocal PNA  -In ED, fever at 101, started on vanco and cefepime  -CXR - b/l patchy opacities concerning for multifocal pna.  -currently afebrile w/ last WBC at 7.31   -Pt placed on Zosyn and azithromycin in MICU  -Obtain urine legionella antigen, if negative, d/c azithromycin  -f/u strep pneumo urine antigen  -f/u blood and sputum cx.   -f/u on UA    #Endo  Hypothyroidism  -continue home levothyroxine 100mcg QD    #Heme  Anemia in the setting of ESRD  -latest Hgb at 8.1 (baseline 7.6-8.0)  -continue home ferrous sulfate and folic acid QD    L AVF Clot  -continue home eliquis     #Ethics  Full code 66M PMH HTN, anemia, thrombocytopenia, hyperparathyroidism, and ESRD on HD (T/Th/Sat) via permacath p/w SOB and weakness likely 2/2 fluid overload vs PNA.      #Neuro  A&Ox4, no active issues    #CV  HTN  -plan to wean nitro gtt  -continue home carvediolol 12.5mg BID, nifedipine 30mg; can adjust hold parameters gradually as patient's pressures improve      #Pulm  Hypercapnia/hypoxemia likely due to fluid overload in the setting of ESRD  -Continue BiPAP  -monitor ABGs  PNA  -suspicion of PNA on CXR; COVID and RVP negative  -f/u urine legionella and strep  -sputum cx ordered  -will empirically treat with azithro and zosyn for now    #GI  -Renal diet  -c/w protonix especially in setting of acute anemia until etiology determined    #Renal  SOB and weakness likely 2/2 fluid overload  -Urgent HD tonight for 3 hours with 2.5-3kg UF per nephro (goal: remove 3L)  -monitor BMP, electrolytes  -continue home Renvela 800mg TID    #ID  Fever likely 2/2 multifocal PNA  -In ED, fever at 101, s/p vanco and cefepime in ED  -CXR - b/l patchy opacities concerning for multifocal pna.  -currently afebrile w/ last WBC at 7.31   -Pt placed on Zosyn and azithromycin in MICU  -Obtain urine legionella antigen, if negative, d/c azithromycin  -f/u strep pneumo urine antigen  -f/u blood and sputum cx.   -f/u on UA, if positive, can send cx    #Endo  Hypothyroidism  -continue home levothyroxine 100mcg QD    #Heme  Anemia in the setting of ESRD  -latest Hgb at 8.1 (baseline 7.6-8.0)  -continue home ferrous sulfate and folic acid QD  L AVF Clot  -continue home eliquis       #Ethics  Full code 66M PMH HTN, anemia, thrombocytopenia, hyperparathyroidism, and ESRD on HD (T/Th/Sat) via permacath p/w SOB and weakness likely 2/2 fluid overload vs PNA.      #Neuro  A&Ox4, no active issues    #CV  HTN  -plan to wean nitro gtt  -continue home carvediolol 12.5mg BID, nifedipine 30mg; can adjust hold parameters gradually as patient's pressures improve    #Pulm  Hypercapnia/hypoxemia likely due to fluid overload in the setting of ESRD  -Continue BiPAP  -monitor ABGs  PNA  -suspicion of PNA on CXR; COVID and RVP negative  -f/u urine legionella and strep  -sputum cx ordered  -will empirically treat with azithro and zosyn for now    #GI  -Renal diet  -c/w protonix especially in setting of acute anemia until etiology determined    #Renal  SOB and weakness likely 2/2 fluid overload  -Urgent HD tonight for 3 hours with 2.5-3kg UF per nephro (goal: remove 3L)  -monitor BMP, electrolytes  -continue home Renvela 800mg TID    #ID  Fever likely 2/2 multifocal PNA  -In ED, fever at 101, s/p vanco and cefepime in ED  -CXR - b/l patchy opacities concerning for multifocal pna.  -currently afebrile w/ last WBC at 7.31   -Pt placed on Zosyn and azithromycin in MICU  -Obtain urine legionella antigen, if negative, d/c azithromycin  -f/u strep pneumo urine antigen  -f/u blood and sputum cx.   -f/u on UA, if positive, can send cx    #Endo  Hypothyroidism  -continue home levothyroxine 100mcg QD    #Heme  Anemia in the setting of ESRD  -latest Hgb at 8.1 (baseline 7.6-8.0)  -continue home ferrous sulfate and folic acid QD  L AVF Clot  -continue home eliquis       #Ethics  Full code 66M PMH HTN, anemia, thrombocytopenia, hyperparathyroidism, and ESRD on HD (T/Th/Sat) via permacath p/w SOB and weakness likely 2/2 fluid overload vs PNA.      #Neuro  A&Ox4, no active issues    #CV  HTN  -plan to wean nitro gtt  -continue home carvediolol 12.5mg BID, nifedipine 30mg; can adjust hold parameters gradually as patient's pressures improve    #Pulm  Hypercapnia/hypoxemia likely due to fluid overload in the setting of ESRD  -Continue BiPAP  -monitor ABGs  PNA  -suspicion of PNA on CXR; COVID and RVP negative  -f/u urine legionella and strep  -sputum cx ordered  -will empirically treat with azithro and zosyn for now to cover for bacterial PNA  -also concerning for CoVID PNA given pt unvaccinated, RPV negative, however high suspicion so repeat covid swab pending  -elevated inflammatory markers including procal    #GI  -Renal diet  -c/w protonix especially in setting of acute anemia until etiology determined    #Renal  SOB and weakness likely 2/2 fluid overload  -Urgent HD tonight for 3 hours with 2.5-3kg UF per nephro (goal: remove 3L)  -monitor BMP, electrolytes  -continue home Renvela 800mg TID    #ID  Fever likely 2/2 multifocal PNA  -In ED, fever at 101, s/p vanco and cefepime in ED  -CXR - b/l patchy opacities concerning for multifocal pna.  -currently afebrile w/ last WBC at 7.31   -Pt placed on Zosyn and azithromycin in MICU  -Obtain urine legionella antigen, if negative, d/c azithromycin  -f/u strep pneumo urine antigen  -f/u blood and sputum cx.   -f/u on UA, if positive, can send cx    #Endo  Hypothyroidism  -continue home levothyroxine 100mcg QD    #Heme  Anemia in the setting of ESRD  -Hgb baseline 7.6-8.0  -continue home ferrous sulfate and folic acid QD  -Hgb downtrending, however, low suspicion for active bleeding  -if Hgb stabilizes after 1U PRBC, possibly hold eliquis w/ caution due to limited HD access   L AVF Clot  -continue home eliquis     #Ethics  Full code 66M PMH HTN, anemia, thrombocytopenia, hyperparathyroidism, and ESRD on HD (T/Th/Sat) via permacath p/w SOB and weakness likely 2/2 fluid overload vs PNA.      #Neuro  A&Ox4, no active issues    #CV  HTN  -plan to wean nitro gtt  -continue home carvediolol 12.5mg BID, nifedipine 30mg; can adjust hold parameters gradually as patient's pressures improve    #Pulm  Hypercapnia/hypoxemia likely due to fluid overload in the setting of ESRD  -Continue BiPAP  -monitor ABGs  PNA  -suspicion of PNA on CXR; COVID and RVP negative  -f/u urine legionella and strep  -sputum cx ordered  -will empirically treat with azithro and zosyn for now to cover for bacterial PNA  -also concerning for CoVID PNA given pt unvaccinated; elevated inflammatory markers including procal, however, RVP negative x2     #GI  -Renal diet  -c/w protonix especially in setting of acute anemia until etiology determined    #Renal  SOB and weakness likely 2/2 fluid overload  -Urgent HD tonight for 3 hours with 2.5-3kg UF per nephro (goal: remove 3L)  -monitor BMP, electrolytes  -continue home Renvela 800mg TID    #ID  Fever likely 2/2 multifocal PNA  -In ED, fever at 101, s/p vanco and cefepime in ED  -CXR - b/l patchy opacities concerning for multifocal pna.  -currently afebrile w/ last WBC at 7.31   -Pt placed on Zosyn and azithromycin in MICU  -Obtain urine legionella antigen, if negative, d/c azithromycin  -f/u strep pneumo urine antigen  -f/u blood and sputum cx.   -f/u on UA, if positive, can send cx    #Endo  Hypothyroidism  -continue home levothyroxine 100mcg QD    #Heme  Anemia in the setting of ESRD  -Hgb baseline 7.6-8.0  -continue home ferrous sulfate and folic acid QD  -Hgb downtrending, however, low suspicion for active bleeding  -if Hgb stabilizes after 1U PRBC, possibly hold eliquis w/ caution due to limited HD access   L AVF Clot  -continue home eliquis     #Ethics  Full code

## 2021-06-28 NOTE — H&P ADULT - NSHPLABSRESULTS_GEN_ALL_CORE
LABS:                        8.1    7.31  )-----------( 196      ( 28 Jun 2021 21:05 )             27.1     06-28    135  |  93<L>  |  72<H>  ----------------------------<  86  5.7<H>   |  22  |  8.68<H>    Ca    9.6      28 Jun 2021 21:05    TPro  8.1  /  Alb  3.8  /  TBili  0.5  /  DBili  x   /  AST  19  /  ALT  10  /  AlkPhos  153<H>  06-28    PT/INR - ( 28 Jun 2021 21:05 )   PT: 14.4 sec;   INR: 1.28 ratio         PTT - ( 28 Jun 2021 21:05 )  PTT:22.2 sec

## 2021-06-28 NOTE — ED ADULT NURSE NOTE - OBJECTIVE STATEMENT
Patient alert and awake, oriented x4, breathing with SOB on room air, SpO2@90%, RR 26. Patient placed on oxygen at 3LPM, SpO2@98% now. Patient with old AV fistula that is no longer working to left arm, new AV fistula to right arm that is not ready, right chest shiley used for dialysis T/R/Sa, last dialysis Saturday. Patient reports fevers, chills, SOB, and chest pain x 3 days, denies having covid19 vaccine due to unable to return from [place] x 14 months.

## 2021-06-28 NOTE — ED PROVIDER NOTE - PROGRESS NOTE DETAILS
TAYE:  Patient remains hypertensive.  Will start nitro gtt. Tobi, PGY2: pt still hypertensive on nitro gtt, will uptitrate gtt and call ICU. may need urgent dialysis. TAYE:  MICU and [facility] paged for urgent dialysis.  MICU to see patient per discussion. TAYE:  Spoke with nephrologist Dr. Pagan who will see patient. Tobi, PGY2: improved on nitro. plan to go to ICU for dialysis and nitro gtt

## 2021-06-28 NOTE — ED PROVIDER NOTE - CARE PLAN
Principal Discharge DX:	Acute pulmonary edema  Secondary Diagnosis:	Cellulitis  Secondary Diagnosis:	Multifocal pneumonia

## 2021-06-28 NOTE — ED ADULT NURSE NOTE - NSFALLRSKASSESSDT_ED_ALL_ED
no abdominal pain, no bloating, no constipation, no diarrhea, no nausea and no vomiting.
28-Jun-2021 19:53

## 2021-06-28 NOTE — ED PROVIDER NOTE - NS ED ROS FT
GENERAL: +fever, no chills  EYES: no change in vision  HEENT: no trouble swallowing, no trouble speaking  CARDIAC: no chest pain  PULMONARY: +SOB; no cough  GI: no abdominal pain, no nausea, no vomiting, no diarrhea, no constipation  : No dysuria, no frequency, no change in appearance, or odor of urine  SKIN: no rashes  NEURO: no headache, no weakness  MSK: No joint pain

## 2021-06-28 NOTE — H&P ADULT - ATTENDING COMMENTS
pt is a 65 yo male with hx htn, hyperparathyroidism, esrd on hd  tu/th/sat who presents with hx of fever and increasing sob, cough.  pt last dialyzed saturday.  In the er pt with dyspnea, rr 30, elevated  bp 210/110, cxr showing b/l  vasc congestion with right sided   consolidation upper and lower lung fields,    pt placed on bipap for acute hypoxemic respiratory failure. elevated  bnp., iv nitroglycerin    PE bp 200/100  rr 24 heent neck supple, lungs rales b/l heart s1s2 abdomen  nontender ext no edema , lue fistula thrombosed,  right radial fistula,   left shiley catheter,        k  5.7   bicarb 22 cr 8.6 hgb 8.1    A/P  65 yo male with acute hypoxemic respiratory failure  -bipap as tolerated, check pulse ox  -panculture, blood, urine, will start zosyn, azithro to cover  atypical and CAP  -urgent hemodialysis for fluid removal,   -covid pcr, covid labs  -pt on eliquis for av fistula thrombosis, will continue  -f/u bmp post hemodialysis  -critically ill with hypoxemic respiratory failure

## 2021-06-28 NOTE — H&P ADULT - NSHPPHYSICALEXAM_GEN_ALL_CORE
PHYSICAL EXAM:  GENERAL: NAD, lying in bed comfortably  HEENT: NC/AT, EOMI, PERRL  NECK: Supple, No JVD  CHEST/LUNG: CTAB, no increased WOB  HEART: RRR, no m/r/g  ABDOMEN: soft, NT, ND, BS+  EXTREMITIES:  2+ peripheral pulses, no clubbing, no edema  NERVOUS SYSTEM:  A&Ox3, no focal deficits  MSK: FROM all 4 extremities, full and equal strength  SKIN: No rashes or lesions PHYSICAL EXAM:  GENERAL: A&Ox4, male in no acute distress  HEENT: NC/AT, EOMI, PERRL  NECK: Supple, No JVD  CHEST/LUNG: CTAB, no increased WOB  HEART: RRR, no m/r/g, palpable thrill in RUE  ABDOMEN: soft, NT, ND, BS+  EXTREMITIES:  2+ peripheral pulses, no clubbing, no edema  NERVOUS SYSTEM: no focal deficits  MSK: FROM all 4 extremities, full and equal strength  SKIN: erythematous and tender RUE AVF site PHYSICAL EXAM:  GENERAL: A&Ox4, male in no acute distress  HEENT: NC/AT, EOMI, PERRL  NECK: Supple, No JVD  CHEST/LUNG: CTAB, no wheezes or crackles  HEART: RRR, no m/r/g, palpable thrill in RUE  ABDOMEN: soft, NT, ND, BS+  EXTREMITIES:  2+ peripheral pulses, no clubbing, no edema  NERVOUS SYSTEM: no focal deficits  MSK: FROM all 4 extremities, full and equal strength  SKIN: erythematous and tender RUE AVF site GENERAL: A&Ox4, nl-appearing male in no acute distress  HEENT: Normocephalic, atraumatic, EOMI, PERRL  NECK: Supple, No JVD  PULM: symmetric chest rise, mild rhonchi, no wheezes  HEART: RRR, no m/r/g.   ABDOMEN: soft, NT, ND, BS+  EXTREMITIES:  2+ peripheral pulses, no clubbing or edema  NERVOUS SYSTEM: no focal deficits  MSK: FROM all 4 extremities, full and equal strength  SKIN: erythematous and tender to touch RUE AVF site, 3 hematoma-like protuberance on LUE w/ mild pain on palpation.

## 2021-06-28 NOTE — H&P ADULT - HISTORY OF PRESENT ILLNESS
66M PMH HTN, anemia, thrombocytopenia, hyperparathyroidism, and ESRD on HD (T/Th/Sat) via permacath p/w SOB and weakness. Pt states he started experiencing sx yesterday in addition to cough w/ productive red sputum. Pt has been residing in [place] since last year and unable to return sooner due to the pandemic. Pt continued to receive HD via LUE fistula which dysfunctioned leading to chest wall catheter. [place] 5[place] and has been admitted for volume overload from missed HD sessions from 5/29 - 6/19. At that time, new R AVF and RIJ permacath placement via vascular for conversion of non-tunnelled to tunnelled catheter line. Pt tolerated procedure (RUE AVF and RIJ permacath placement) w/ minor complications on 6/3. On 6/10, pt developed R forearm cellulitis. Vanco IV for 7 days (6/10 - 6/17) which resolved. Pt d/c on 6/19.     In ED, pt found to be continuously hypertensive (164/106) leading to nitro gtt. Pt placed on BiPAP. EKG QTc <500. Last HD on Saturday 6/26. Pt being admitted to MICU for urgent HD.  66M PMH HTN, anemia, thrombocytopenia, hyperparathyroidism, and ESRD on HD (T/Th/Sat) via permacath p/w SOB and weakness. Pt states he started experiencing sx yesterday in addition to cough w/ productive red sputum. Pt has been residing in [place] since last year and unable to return until 5/29 due to the pandemic. Pt continued to receive HD in [place] via LUE fistula which dysfunctioned leading to using a chest wall catheter. [place] and has been admitted for volume overload from missed HD sessions from 5/29 - 6/19. At that time, new R AVF and RIJ permacath placement via vascular for conversion of non-tunnelled to tunnelled catheter line. Pt tolerated procedure w/ minor complications on 6/3. On 6/10, pt developed R forearm cellulitis. Vanco IV for 7 days (6/10 - 6/17) which resolved. Pt d/c on 6/19. Pt being admitted to MICU for urgent HD.     In ED today, pt found to be continuously hypertensive (max: 164/106) leading to nitro gtt. Pt placed on BiPAP. EKG QTc <500. Last HD on Saturday 6/26.  66M PMH HTN, anemia, thrombocytopenia, hyperparathyroidism, and ESRD on HD (T/Th/Sat) via permacath p/w SOB and weakness. Pt states he started experiencing sx yesterday in addition to cough w/ infrequent red sputum. Pt has been residing in [place] since last [place] and [place]. Pt continued to receive HD in [place] via LUE fistula which stopped functioning leading to using a chest wall catheter. [place], he was hospitalized for volume overload from missed HD session from 5/29 - 6/19. At that time, new R AVF and RIJ permacath placement via vascular for conversion of non-tunnelled to tunnelled catheter line. Pt tolerated procedure w/ minor complications on 6/3. On 6/10, pt developed R forearm cellulitis. Vanco IV for 7 days (6/10 - 6/17) which resolved. Pt d/c on 6/19. Pt being admitted today to MICU for urgent HD.     In ED, pt found to be continuously hypertensive (max: 164/106) leading to nitro gtt. Pt placed on BiPAP. Last HD on Saturday 6/26.  66M PMH HTN, anemia, thrombocytopenia, hyperparathyroidism, and ESRD on HD (T/Th/Sat) via permacath p/w SOB and weakness. Pt states he started experiencing sx yesterday in addition to cough w/ infrequent red sputum. Pt has been residing in [place] since last [place] and [place]. Pt continued to receive HD in [place] via LUE fistula which stopped functioning leading to using a chest wall catheter. [place], he was hospitalized for volume overload from missed HD session from 5/29 - 6/19. At that time, new R AVF and RIJ permacath placement via vascular for conversion of non-tunnelled to tunnelled catheter line. Pt tolerated procedure w/ minor complications on 6/3. On 6/10, pt developed R forearm cellulitis. Vanco IV for 7 days (6/10 - 6/17) which resolved. Pt d/c on 6/19. Pt being admitted today to MICU for urgent HD.     In ED, pt found to be continuously hypertensive (max: 164/106) leading to nitro gtt. Febrile at 101. Pt placed on BiPAP. Last HD on Saturday 6/26.

## 2021-06-28 NOTE — ED ADULT TRIAGE NOTE - CHIEF COMPLAINT QUOTE
Pt arrives c/o weakness & SOB x 3 days. Also c/o swelling, warmth to R fistula site. Pt receives dialysis T/R/Sat. Febrile here, warmth and redness noted to R arm. Reporting mild chest pain, nausea vomiting and diarrhea. PMHx CKD, HTN, Anemia, Thrombocytopenia

## 2021-06-29 LAB
ALBUMIN SERPL ELPH-MCNC: 3 G/DL — LOW (ref 3.3–5)
ALP SERPL-CCNC: 105 U/L — SIGNIFICANT CHANGE UP (ref 40–120)
ALT FLD-CCNC: 6 U/L — SIGNIFICANT CHANGE UP (ref 4–41)
ANION GAP SERPL CALC-SCNC: 19 MMOL/L — HIGH (ref 7–14)
AST SERPL-CCNC: 9 U/L — SIGNIFICANT CHANGE UP (ref 4–40)
B PERT DNA SPEC QL NAA+PROBE: SIGNIFICANT CHANGE UP
BASOPHILS # BLD AUTO: 0.04 K/UL — SIGNIFICANT CHANGE UP (ref 0–0.2)
BASOPHILS # BLD AUTO: 0.05 K/UL — SIGNIFICANT CHANGE UP (ref 0–0.2)
BASOPHILS # BLD AUTO: 0.05 K/UL — SIGNIFICANT CHANGE UP (ref 0–0.2)
BASOPHILS NFR BLD AUTO: 0.6 % — SIGNIFICANT CHANGE UP (ref 0–2)
BASOPHILS NFR BLD AUTO: 0.6 % — SIGNIFICANT CHANGE UP (ref 0–2)
BASOPHILS NFR BLD AUTO: 0.8 % — SIGNIFICANT CHANGE UP (ref 0–2)
BILIRUB SERPL-MCNC: 0.4 MG/DL — SIGNIFICANT CHANGE UP (ref 0.2–1.2)
BLD GP AB SCN SERPL QL: NEGATIVE — SIGNIFICANT CHANGE UP
BLOOD GAS VENOUS COMPREHENSIVE RESULT: SIGNIFICANT CHANGE UP
BUN SERPL-MCNC: 71 MG/DL — HIGH (ref 7–23)
C PNEUM DNA SPEC QL NAA+PROBE: SIGNIFICANT CHANGE UP
CALCIUM SERPL-MCNC: 8.7 MG/DL — SIGNIFICANT CHANGE UP (ref 8.4–10.5)
CHLORIDE SERPL-SCNC: 93 MMOL/L — LOW (ref 98–107)
CK SERPL-CCNC: 34 U/L — SIGNIFICANT CHANGE UP (ref 30–200)
CO2 SERPL-SCNC: 22 MMOL/L — SIGNIFICANT CHANGE UP (ref 22–31)
CREAT SERPL-MCNC: 8.64 MG/DL — HIGH (ref 0.5–1.3)
CRP SERPL-MCNC: 62.8 MG/L — HIGH
D DIMER BLD IA.RAPID-MCNC: 1779 NG/ML DDU — HIGH
EOSINOPHIL # BLD AUTO: 0.13 K/UL — SIGNIFICANT CHANGE UP (ref 0–0.5)
EOSINOPHIL # BLD AUTO: 0.16 K/UL — SIGNIFICANT CHANGE UP (ref 0–0.5)
EOSINOPHIL # BLD AUTO: 0.28 K/UL — SIGNIFICANT CHANGE UP (ref 0–0.5)
EOSINOPHIL NFR BLD AUTO: 2 % — SIGNIFICANT CHANGE UP (ref 0–6)
EOSINOPHIL NFR BLD AUTO: 2.5 % — SIGNIFICANT CHANGE UP (ref 0–6)
EOSINOPHIL NFR BLD AUTO: 3.6 % — SIGNIFICANT CHANGE UP (ref 0–6)
FERRITIN SERPL-MCNC: 1155 NG/ML — HIGH (ref 30–400)
FLUAV SUBTYP SPEC NAA+PROBE: SIGNIFICANT CHANGE UP
FLUBV RNA SPEC QL NAA+PROBE: SIGNIFICANT CHANGE UP
GLUCOSE SERPL-MCNC: 103 MG/DL — HIGH (ref 70–99)
HADV DNA SPEC QL NAA+PROBE: SIGNIFICANT CHANGE UP
HCOV 229E RNA SPEC QL NAA+PROBE: SIGNIFICANT CHANGE UP
HCOV HKU1 RNA SPEC QL NAA+PROBE: SIGNIFICANT CHANGE UP
HCOV NL63 RNA SPEC QL NAA+PROBE: SIGNIFICANT CHANGE UP
HCOV OC43 RNA SPEC QL NAA+PROBE: SIGNIFICANT CHANGE UP
HCT VFR BLD CALC: 18.8 % — CRITICAL LOW (ref 39–50)
HCT VFR BLD CALC: 20.7 % — CRITICAL LOW (ref 39–50)
HCT VFR BLD CALC: 22 % — LOW (ref 39–50)
HCT VFR BLD CALC: 24.7 % — LOW (ref 39–50)
HGB BLD-MCNC: 5.8 G/DL — CRITICAL LOW (ref 13–17)
HGB BLD-MCNC: 6.4 G/DL — CRITICAL LOW (ref 13–17)
HGB BLD-MCNC: 6.8 G/DL — CRITICAL LOW (ref 13–17)
HGB BLD-MCNC: 7.7 G/DL — LOW (ref 13–17)
HMPV RNA SPEC QL NAA+PROBE: SIGNIFICANT CHANGE UP
HPIV1 RNA SPEC QL NAA+PROBE: SIGNIFICANT CHANGE UP
HPIV2 RNA SPEC QL NAA+PROBE: SIGNIFICANT CHANGE UP
HPIV3 RNA SPEC QL NAA+PROBE: SIGNIFICANT CHANGE UP
HPIV4 RNA SPEC QL NAA+PROBE: SIGNIFICANT CHANGE UP
IANC: 4.5 K/UL — SIGNIFICANT CHANGE UP (ref 1.5–8.5)
IANC: 4.56 K/UL — SIGNIFICANT CHANGE UP (ref 1.5–8.5)
IANC: 5.93 K/UL — SIGNIFICANT CHANGE UP (ref 1.5–8.5)
IMM GRANULOCYTES NFR BLD AUTO: 0.2 % — SIGNIFICANT CHANGE UP (ref 0–1.5)
IMM GRANULOCYTES NFR BLD AUTO: 0.3 % — SIGNIFICANT CHANGE UP (ref 0–1.5)
IMM GRANULOCYTES NFR BLD AUTO: 0.4 % — SIGNIFICANT CHANGE UP (ref 0–1.5)
LDH SERPL L TO P-CCNC: 179 U/L — SIGNIFICANT CHANGE UP (ref 135–225)
LYMPHOCYTES # BLD AUTO: 0.71 K/UL — LOW (ref 1–3.3)
LYMPHOCYTES # BLD AUTO: 0.88 K/UL — LOW (ref 1–3.3)
LYMPHOCYTES # BLD AUTO: 1.19 K/UL — SIGNIFICANT CHANGE UP (ref 1–3.3)
LYMPHOCYTES # BLD AUTO: 14 % — SIGNIFICANT CHANGE UP (ref 13–44)
LYMPHOCYTES # BLD AUTO: 18.1 % — SIGNIFICANT CHANGE UP (ref 13–44)
LYMPHOCYTES # BLD AUTO: 9.1 % — LOW (ref 13–44)
MAGNESIUM SERPL-MCNC: 2 MG/DL — SIGNIFICANT CHANGE UP (ref 1.6–2.6)
MCHC RBC-ENTMCNC: 26.7 PG — LOW (ref 27–34)
MCHC RBC-ENTMCNC: 26.8 PG — LOW (ref 27–34)
MCHC RBC-ENTMCNC: 26.8 PG — LOW (ref 27–34)
MCHC RBC-ENTMCNC: 26.9 PG — LOW (ref 27–34)
MCHC RBC-ENTMCNC: 30.9 GM/DL — LOW (ref 32–36)
MCHC RBC-ENTMCNC: 31.2 GM/DL — LOW (ref 32–36)
MCV RBC AUTO: 86.1 FL — SIGNIFICANT CHANGE UP (ref 80–100)
MCV RBC AUTO: 86.3 FL — SIGNIFICANT CHANGE UP (ref 80–100)
MCV RBC AUTO: 86.6 FL — SIGNIFICANT CHANGE UP (ref 80–100)
MCV RBC AUTO: 87 FL — SIGNIFICANT CHANGE UP (ref 80–100)
MONOCYTES # BLD AUTO: 0.61 K/UL — SIGNIFICANT CHANGE UP (ref 0–0.9)
MONOCYTES # BLD AUTO: 0.69 K/UL — SIGNIFICANT CHANGE UP (ref 0–0.9)
MONOCYTES # BLD AUTO: 0.78 K/UL — SIGNIFICANT CHANGE UP (ref 0–0.9)
MONOCYTES NFR BLD AUTO: 10 % — SIGNIFICANT CHANGE UP (ref 2–14)
MONOCYTES NFR BLD AUTO: 11 % — SIGNIFICANT CHANGE UP (ref 2–14)
MONOCYTES NFR BLD AUTO: 9.3 % — SIGNIFICANT CHANGE UP (ref 2–14)
NEUTROPHILS # BLD AUTO: 4.5 K/UL — SIGNIFICANT CHANGE UP (ref 1.8–7.4)
NEUTROPHILS # BLD AUTO: 4.56 K/UL — SIGNIFICANT CHANGE UP (ref 1.8–7.4)
NEUTROPHILS # BLD AUTO: 5.93 K/UL — SIGNIFICANT CHANGE UP (ref 1.8–7.4)
NEUTROPHILS NFR BLD AUTO: 69.5 % — SIGNIFICANT CHANGE UP (ref 43–77)
NEUTROPHILS NFR BLD AUTO: 71.7 % — SIGNIFICANT CHANGE UP (ref 43–77)
NEUTROPHILS NFR BLD AUTO: 76.3 % — SIGNIFICANT CHANGE UP (ref 43–77)
NRBC # BLD: 0 /100 WBCS — SIGNIFICANT CHANGE UP
NRBC # FLD: 0 K/UL — SIGNIFICANT CHANGE UP
OB PNL STL: NEGATIVE — SIGNIFICANT CHANGE UP
PHOSPHATE SERPL-MCNC: 6 MG/DL — HIGH (ref 2.5–4.5)
PLATELET # BLD AUTO: 140 K/UL — LOW (ref 150–400)
PLATELET # BLD AUTO: 142 K/UL — LOW (ref 150–400)
PLATELET # BLD AUTO: 147 K/UL — LOW (ref 150–400)
PLATELET # BLD AUTO: 167 K/UL — SIGNIFICANT CHANGE UP (ref 150–400)
POTASSIUM SERPL-MCNC: 5 MMOL/L — SIGNIFICANT CHANGE UP (ref 3.5–5.3)
POTASSIUM SERPL-SCNC: 5 MMOL/L — SIGNIFICANT CHANGE UP (ref 3.5–5.3)
PROCALCITONIN SERPL-MCNC: 0.78 NG/ML — HIGH (ref 0.02–0.1)
PROT SERPL-MCNC: 6.4 G/DL — SIGNIFICANT CHANGE UP (ref 6–8.3)
RBC # BLD: 2.16 M/UL — LOW (ref 4.2–5.8)
RBC # BLD: 2.4 M/UL — LOW (ref 4.2–5.8)
RBC # BLD: 2.54 M/UL — LOW (ref 4.2–5.8)
RBC # BLD: 2.87 M/UL — LOW (ref 4.2–5.8)
RBC # FLD: 16.1 % — HIGH (ref 10.3–14.5)
RBC # FLD: 16.4 % — HIGH (ref 10.3–14.5)
RBC # FLD: 16.8 % — HIGH (ref 10.3–14.5)
RBC # FLD: 17.1 % — HIGH (ref 10.3–14.5)
RH IG SCN BLD-IMP: POSITIVE — SIGNIFICANT CHANGE UP
RSV RNA SPEC QL NAA+PROBE: SIGNIFICANT CHANGE UP
RV+EV RNA SPEC QL NAA+PROBE: SIGNIFICANT CHANGE UP
SARS-COV-2 RNA SPEC QL NAA+PROBE: SIGNIFICANT CHANGE UP
SODIUM SERPL-SCNC: 134 MMOL/L — LOW (ref 135–145)
WBC # BLD: 6.28 K/UL — SIGNIFICANT CHANGE UP (ref 3.8–10.5)
WBC # BLD: 6.4 K/UL — SIGNIFICANT CHANGE UP (ref 3.8–10.5)
WBC # BLD: 6.56 K/UL — SIGNIFICANT CHANGE UP (ref 3.8–10.5)
WBC # BLD: 7.78 K/UL — SIGNIFICANT CHANGE UP (ref 3.8–10.5)
WBC # FLD AUTO: 6.28 K/UL — SIGNIFICANT CHANGE UP (ref 3.8–10.5)
WBC # FLD AUTO: 6.4 K/UL — SIGNIFICANT CHANGE UP (ref 3.8–10.5)
WBC # FLD AUTO: 6.56 K/UL — SIGNIFICANT CHANGE UP (ref 3.8–10.5)
WBC # FLD AUTO: 7.78 K/UL — SIGNIFICANT CHANGE UP (ref 3.8–10.5)

## 2021-06-29 PROCEDURE — 99291 CRITICAL CARE FIRST HOUR: CPT | Mod: 25

## 2021-06-29 PROCEDURE — 76604 US EXAM CHEST: CPT | Mod: 26

## 2021-06-29 PROCEDURE — 93308 TTE F-UP OR LMTD: CPT | Mod: 26

## 2021-06-29 RX ORDER — METOPROLOL TARTRATE 50 MG
5 TABLET ORAL ONCE
Refills: 0 | Status: COMPLETED | OUTPATIENT
Start: 2021-06-29 | End: 2021-06-29

## 2021-06-29 RX ORDER — PIPERACILLIN AND TAZOBACTAM 4; .5 G/20ML; G/20ML
3.38 INJECTION, POWDER, LYOPHILIZED, FOR SOLUTION INTRAVENOUS EVERY 12 HOURS
Refills: 0 | Status: DISCONTINUED | OUTPATIENT
Start: 2021-06-29 | End: 2021-06-29

## 2021-06-29 RX ORDER — FERROUS SULFATE 325(65) MG
325 TABLET ORAL DAILY
Refills: 0 | Status: DISCONTINUED | OUTPATIENT
Start: 2021-06-29 | End: 2021-07-01

## 2021-06-29 RX ORDER — FOLIC ACID 0.8 MG
1 TABLET ORAL DAILY
Refills: 0 | Status: DISCONTINUED | OUTPATIENT
Start: 2021-06-29 | End: 2021-07-01

## 2021-06-29 RX ORDER — HYDRALAZINE HCL 50 MG
10 TABLET ORAL EVERY 6 HOURS
Refills: 0 | Status: DISCONTINUED | OUTPATIENT
Start: 2021-06-29 | End: 2021-07-01

## 2021-06-29 RX ORDER — AZITHROMYCIN 500 MG/1
500 TABLET, FILM COATED ORAL EVERY 24 HOURS
Refills: 0 | Status: DISCONTINUED | OUTPATIENT
Start: 2021-06-29 | End: 2021-06-29

## 2021-06-29 RX ORDER — ACETAMINOPHEN 500 MG
650 TABLET ORAL ONCE
Refills: 0 | Status: COMPLETED | OUTPATIENT
Start: 2021-06-29 | End: 2021-06-29

## 2021-06-29 RX ORDER — NIFEDIPINE 30 MG
60 TABLET, EXTENDED RELEASE 24 HR ORAL DAILY
Refills: 0 | Status: DISCONTINUED | OUTPATIENT
Start: 2021-06-29 | End: 2021-07-01

## 2021-06-29 RX ORDER — PIPERACILLIN AND TAZOBACTAM 4; .5 G/20ML; G/20ML
3.38 INJECTION, POWDER, LYOPHILIZED, FOR SOLUTION INTRAVENOUS ONCE
Refills: 0 | Status: COMPLETED | OUTPATIENT
Start: 2021-06-29 | End: 2021-06-29

## 2021-06-29 RX ADMIN — Medication 325 MILLIGRAM(S): at 11:28

## 2021-06-29 RX ADMIN — APIXABAN 5 MILLIGRAM(S): 2.5 TABLET, FILM COATED ORAL at 20:40

## 2021-06-29 RX ADMIN — PANTOPRAZOLE SODIUM 40 MILLIGRAM(S): 20 TABLET, DELAYED RELEASE ORAL at 05:34

## 2021-06-29 RX ADMIN — Medication 81 MILLIGRAM(S): at 11:28

## 2021-06-29 RX ADMIN — Medication 1 MILLIGRAM(S): at 11:28

## 2021-06-29 RX ADMIN — Medication 650 MILLIGRAM(S): at 17:23

## 2021-06-29 RX ADMIN — Medication 100 MICROGRAM(S): at 05:34

## 2021-06-29 RX ADMIN — CARVEDILOL PHOSPHATE 12.5 MILLIGRAM(S): 80 CAPSULE, EXTENDED RELEASE ORAL at 17:56

## 2021-06-29 RX ADMIN — Medication 5 MILLIGRAM(S): at 04:50

## 2021-06-29 RX ADMIN — AZITHROMYCIN 255 MILLIGRAM(S): 500 TABLET, FILM COATED ORAL at 05:16

## 2021-06-29 RX ADMIN — CHLORHEXIDINE GLUCONATE 1 APPLICATION(S): 213 SOLUTION TOPICAL at 11:32

## 2021-06-29 RX ADMIN — Medication 24 MICROGRAM(S)/MIN: at 10:57

## 2021-06-29 RX ADMIN — SEVELAMER CARBONATE 800 MILLIGRAM(S): 2400 POWDER, FOR SUSPENSION ORAL at 11:33

## 2021-06-29 RX ADMIN — PIPERACILLIN AND TAZOBACTAM 200 GRAM(S): 4; .5 INJECTION, POWDER, LYOPHILIZED, FOR SOLUTION INTRAVENOUS at 01:15

## 2021-06-29 RX ADMIN — Medication 10 MILLIGRAM(S): at 23:57

## 2021-06-29 RX ADMIN — Medication 10 MILLIGRAM(S): at 17:56

## 2021-06-29 RX ADMIN — SEVELAMER CARBONATE 800 MILLIGRAM(S): 2400 POWDER, FOR SUSPENSION ORAL at 17:57

## 2021-06-29 RX ADMIN — CARVEDILOL PHOSPHATE 12.5 MILLIGRAM(S): 80 CAPSULE, EXTENDED RELEASE ORAL at 05:16

## 2021-06-29 RX ADMIN — Medication 650 MILLIGRAM(S): at 16:37

## 2021-06-29 RX ADMIN — ATORVASTATIN CALCIUM 40 MILLIGRAM(S): 80 TABLET, FILM COATED ORAL at 21:09

## 2021-06-29 RX ADMIN — Medication 60 MILLIGRAM(S): at 10:57

## 2021-06-29 RX ADMIN — PIPERACILLIN AND TAZOBACTAM 25 GRAM(S): 4; .5 INJECTION, POWDER, LYOPHILIZED, FOR SOLUTION INTRAVENOUS at 12:44

## 2021-06-29 NOTE — CONSULT NOTE ADULT - SUBJECTIVE AND OBJECTIVE BOX
CC: Patient is a 66y old  Male who presents with a chief complaint of Fluid overload (29 Jun 2021 14:40)    HPI:  66M PMH HTN, anemia, thrombocytopenia, hyperparathyroidism, and ESRD on HD (T/Th/Sat) via permacath p/w SOB and weakness. Pt states he started experiencing sx yesterday in addition to cough w/ infrequent red sputum. Pt has been residing in [place] since last [place] and [place]. Pt continued to receive HD in [place] via LUE fistula which stopped functioning leading to using a chest wall catheter. [place], he was hospitalized for volume overload from missed HD session from 5/29 - 6/19. At that time, new R AVF and RIJ permacath placement via vascular for conversion of non-tunnelled to tunnelled catheter line. Pt tolerated procedure w/ minor complications on 6/3. On 6/10, pt developed R forearm cellulitis. Vanco IV for 7 days (6/10 - 6/17) which resolved. Pt d/c on 6/19. Pt being admitted today to MICU for urgent HD.     In ED, pt found to be continuously hypertensive (max: 164/106) leading to nitro gtt. Febrile at 101. Pt placed on BiPAP. Last HD on Saturday 6/26.  (28 Jun 2021 22:43)    Patient currently feels well, no reports of bloody or melanotic stool, blood in urine.   right wrist swelling subjectively improved    PMH  HTN (Hypertension)    Chronic kidney disease    Kidney stones    Hemodialysis access, AV graft    Hyperparathyroidism    Anemia    Thrombocytopenia      PSH  S/P Nephrectomy    Acquired arteriovenous fistula      MEDS    Allergies    No Known Allergies    Intolerances          Physical Exam  T(C): 37.9 (06-29-21 @ 16:00), Max: 38.3 (06-28-21 @ 18:57)  HR: 99 (06-29-21 @ 17:00) (77 - 144)  BP: 191/110 (06-29-21 @ 17:00) (132/77 - 228/112)  RR: 20 (06-29-21 @ 17:00) (18 - 36)  SpO2: 95% (06-29-21 @ 17:00) (90% - 100%)  Wt(kg): --  Tmax: T(C): , Max: 38.3 (06-28-21 @ 18:57)  Wt(kg): --    Gen: NAD  HEENT: normocephalic, atraumatic, no scleral icterus  CV: S1, S2, RRR  Pulm: CTA B/L  Abd: Soft, ND, NTP, no rebound, no guarding, no palpable organomegaly/masses  Ext: warm, no edema, right RC AVF with palpable thrill, healing scab  no swelling, palp radial and ulnar    06-28-21  -  06-29-21  --------------------------------------------------------  IN:    Nitroglycerin: 344 mL    Other (mL): 400 mL  Total IN: 744 mL    OUT:    Other (mL): 3000 mL  Total OUT: 3000 mL    Total NET: -2256 mL          Labs:                        6.8    6.28  )-----------( 140      ( 29 Jun 2021 10:41 )             22.0     06-29    134<L>  |  93<L>  |  71<H>  ----------------------------<  103<H>  5.0   |  22  |  8.64<H>    Ca    8.7      29 Jun 2021 01:45  Phos  6.0     06-29  Mg     2.00     06-29    TPro  6.4  /  Alb  3.0<L>  /  TBili  0.4  /  DBili  x   /  AST  9   /  ALT  6   /  AlkPhos  105  06-29    PT/INR - ( 28 Jun 2021 21:05 )   PT: 14.4 sec;   INR: 1.28 ratio         PTT - ( 28 Jun 2021 21:05 )  PTT:22.2 sec    
[facility] Consult Note Nephrology - CONSULTATION NOTE - [phone] - Dr Pagan / Dr Vargas / Dr Faust / Dr Caballero / Dr Bojorquez / Dr Perkins / Dr Garrison / Dr Garcia    Patient is a 66y Male with ESRD on HD TTS, HTN who presents with shortness of breath and LE swelling. Pt was recently hospitalized earlier this [place], [place] with [place] overload and HD unit placement. During that hospitalization, new right arm AVF placed, but c/b cellulitis. He reports compliance with his HD sessions in outpt unit, but thinks his UF goals has been too low. He developed dyspnea since yesterday, and progressively worsening. Denies fever, mild cough. HTN urgency in ED. Febrile. Placed on BiPAP in ED for increased work of breathing.   Renal consulted for ESRD Mx.     PAST MEDICAL & SURGICAL HISTORY:  HTN (Hypertension)    Chronic kidney disease    Kidney stones    Hemodialysis access, AV graft  Left upper extremity    Hyperparathyroidism    Anemia    Thrombocytopenia    S/P Nephrectomy  (L) 2007    Acquired arteriovenous fistula  left wrist  1/2015 - [facility], Left upper arm 4/2015 (approximate date)      Allergies:  No Known Allergies    Home Medications Reviewed  Hospital Medications:   MEDICATIONS  (STANDING):  chlorhexidine 2% Cloths 1 Application(s) Topical daily  nitroglycerin  Infusion 80 MICROgram(s)/Min (24 mL/Hr) IV Continuous <Continuous>    SOCIAL HISTORY:  Denies ETOh,Smoking,   FAMILY HISTORY:  No pertinent family history      REVIEW OF SYSTEMS:  CONSTITUTIONAL: No weakness, fevers or chills  EYES/ENT: No visual changes;  No vertigo or throat pain   NECK: No pain or stiffness  RESPIRATORY: + cough, +shortness of breath  CARDIOVASCULAR: No chest pain or palpitations.  GASTROINTESTINAL: No abdominal or epigastric pain. No nausea, vomiting, or hematemesis; No diarrhea or constipation. No melena or hematochezia.  GENITOURINARY: No dysuria, frequency, foamy urine, urinary urgency, incontinence or hematuria  NEUROLOGICAL: No numbness or weakness  SKIN: No itching, burning, rashes, or lesions   VASCULAR: No bilateral lower extremity edema.   All other review of systems is negative unless indicated above.    VITALS:  T(F): 98 (06-28-21 @ 22:12), Max: 101 (06-28-21 @ 18:57)  HR: 85 (06-28-21 @ 22:51)  BP: 175/- (06-28-21 @ 22:51)  RR: 27 (06-28-21 @ 22:51)  SpO2: 100% (06-28-21 @ 22:51)  Wt(kg): --    Height (cm): 175.3 (06-28 @ 17:56)  PHYSICAL EXAM:  Constitutional: NAD  HEENT: anicteric sclera, oropharynx clear, MMM  Neck: No JVD  Respiratory: CTAB, no wheezes, rales or rhonchi  Cardiovascular: S1, S2, RRR  Gastrointestinal: BS+, soft, NT/ND  Extremities: No cyanosis or clubbing. No peripheral edema  Neurological: A/O x 3, no focal deficits  Psychiatric: Normal mood, normal affect  : No CVA tenderness. No siegel.   Skin: No rashes  Vascular Access: RIJ Tunneled cath. R arm AV access +thrill and bruit.     LABS:  06-28    135  |  93<L>  |  72<H>  ----------------------------<  86  5.7<H>   |  22  |  8.68<H>    Ca    9.6      28 Jun 2021 21:05    TPro  8.1  /  Alb  3.8  /  TBili  0.5  /  DBili      /  AST  19  /  ALT  10  /  AlkPhos  153<H>  06-28    Creatinine Trend: 8.68 <--                        8.1    7.31  )-----------( 196      ( 28 Jun 2021 21:05 )             27.1     Urine Studies:      RADIOLOGY & ADDITIONAL STUDIES:  < from: Xray Chest 1 View- PORTABLE-Urgent (06.28.21 @ 20:31) >  INTERPRETATION:  b/l patchy opacities concerning for multifocal pna.    < end of copied text >

## 2021-06-29 NOTE — PROGRESS NOTE ADULT - ASSESSMENT
66M PMH HTN, anemia, thrombocytopenia, hyperparathyroidism, and ESRD on HD (T/Th/Sat) via permacath p/w SOB and weakness likely 2/2 fluid overload vs PNA.      #Neuro  A&Ox4, no active issues    #CV  HTN  -plan to wean nitro gtt  -continue home carvediolol 12.5mg BID, nifedipine 30mg; can adjust hold parameters gradually as patient's pressures improve    #Pulm  Hypercapnia/hypoxemia likely due to fluid overload in the setting of ESRD  -Continue BiPAP  -monitor ABGs  PNA  -suspicion of PNA on CXR; COVID and RVP negative  -f/u urine legionella and strep  -sputum cx ordered  -will empirically treat with azithro and zosyn for now to cover for bacterial PNA  -also concerning for CoVID PNA given pt unvaccinated; elevated inflammatory markers including procal, however, RVP negative x2     #GI  -Renal diet  -c/w protonix especially in setting of acute anemia until etiology determined    #Renal  SOB and weakness likely 2/2 fluid overload  -Urgent HD tonight for 3 hours with 2.5-3kg UF per nephro (goal: remove 3L)  -monitor BMP, electrolytes  -continue home Renvela 800mg TID    #ID  Fever likely 2/2 multifocal PNA  -In ED, fever at 101, s/p vanco and cefepime in ED  -CXR - b/l patchy opacities concerning for multifocal pna.  -currently afebrile w/ last WBC at 7.31   -Pt placed on Zosyn and azithromycin in MICU  -Obtain urine legionella antigen, if negative, d/c azithromycin  -f/u strep pneumo urine antigen  -f/u blood and sputum cx.   -f/u on UA, if positive, can send cx    #Endo  Hypothyroidism  -continue home levothyroxine 100mcg QD    #Heme  Anemia in the setting of ESRD  -Hgb baseline 7.6-8.0  -continue home ferrous sulfate and folic acid QD  -Hgb downtrending, however, low suspicion for active bleeding  -if Hgb stabilizes after 1U PRBC, possibly hold eliquis w/ caution due to limited HD access   L AVF Clot  -continue home eliquis     #Ethics  Full code 66M PMH HTN, anemia, thrombocytopenia, hyperparathyroidism, and ESRD on HD (T/Th/Sat) via permacath p/w SOB and weakness likely 2/2 fluid overload vs PNA.      #Neuro  A&Ox4, no active issues    #CV  HTN  -plan to wean nitro gtt  -continue home carvediolol 12.5mg BID, increase nifedipine 30mg to 60; start hydralazine 10 q6. can adjust hold parameters gradually as patient's pressures improve    #Pulm  Hypercapnia/hypoxemia likely due to fluid overload in the setting of ESRD  -Continue NC, wean as tolerated  PNA  -suspicion of PNA on CXR; COVID and RVP negative  -f/u urine legionella and strep  -sputum cx ordered  -will empirically treat with azithro and zosyn for now to cover for bacterial PNA  -also concerning for COVID PNA given pt unvaccinated; elevated inflammatory markers including procal, however, RVP negative x2. Low suspicion for covid    #GI  -Renal diet  -c/w protonix especially in setting of acute anemia until etiology determined    #Renal  SOB and weakness likely 2/2 fluid overload  -Urgent HD tonight for 3 hours with 2.5-3kg UF per nephro (goal: remove 3L)  -monitor BMP, electrolytes  -continue home Renvela 800mg TID    #ID  Fever possibly reactive in the setting of fluid overload and hypertension. Considered PNA  -In ED, fever at 101, s/p vanco and cefepime in ED  -CXR - b/l patchy opacities concerning for multifocal pna.  -currently afebrile w/ last WBC at 7.31   -Pt placed on Zosyn and azithromycin in MICU. Given breathing improves after HD, will monitor off antibiotics at this time  -f/u strep pneumo urine antigen  -f/u blood and sputum cx.   -f/u on UA, if positive, can send cx    #Endo  Hypothyroidism  -continue home levothyroxine 100mcg QD    #Heme  Anemia in the setting of ESRD  -Hgb baseline 7.6-8.0  -continue home ferrous sulfate and folic acid QD  -Hgb downtrending, however, low suspicion for active bleeding  -will discontinue eliquis. if cbc stable, can start heparin gtt  -vascular sx consulted for need to keep AVF patent    #Ethics  Full code

## 2021-06-29 NOTE — CHART NOTE - NSCHARTNOTEFT_GEN_A_CORE
This note was copied from the mother's chart. LACTATION NOTE - MOTHER      Evaluation Type: Inpatient    Problems identified  Problems identified: Knowledge deficit;Milk supply not WNL; Unable to acheive sustained latch  Milk supply not WNL: Reduced (poten MICU Course:  66M PMHx HTN, anemia, thrombocytopenia, hyperparathyroidism, and ESRD on HD (T/Th/Sat) via permacath p/w SOB and weakness x 1 day. Also, endorses cough w/ infrequent red sputum. Pt had multitudes of problems w/ HD access in the past including LUE fistula dysfunctioning and resorting to a chest [place] catheter [place] of [place]. [place] hospitalized for volume overload from missed HD session from 5/29 - 6/19. At that time, new R AVF and RIJ permacath placed via vascular. On 6/10, pt developed R forearm cellulitis. Vanco IV for 7 days (6/10 - 6/17) which resolved. Pt d/c on 6/19. This admission, in the ED, pt found to be continuously hypertensive (max: 164/106) leading to nitro gtt. Febrile at 101. Pt placed on BiPAP. Last HD noted on Saturday 6/26. Pt admitted to MICU for urgent HD. Also, concern for possible PNA due to CXR findings, however, SOB and weakness more likely 2/2 fluid overload due to dramatic improvement after HD. COVID and RVP negative x2. 1u PRBC provided to pt during admission due to Hb at 6.4 from 7.8. Otherwise, uneventful.     Home Medications:  Coreg 12.5 mg oral tablet: 1 tab(s) orally 2 times a day (28 Jun 2021 22:56)  ferrous sulfate 325 mg (65 mg elemental iron) oral tablet: 1 tab(s) orally once a day (28 Jun 2021 22:56)  folic acid 1 mg oral tablet: 1 tab(s) orally once a day (28 Jun 2021 22:56)  levothyroxine 100 mcg (0.1 mg) oral tablet: 1 tab(s) orally once a day (28 Jun 2021 22:56)  NexIUM 40 mg oral delayed release capsule: 1 cap(s) orally once a day (28 Jun 2021 22:56)  Renvela 800 mg oral tablet: 1 tab(s) orally 3 times a day (with meals) (28 Jun 2021 22:56)    Vital Signs Last 24 Hrs  T(C): 36.6 (29 Jun 2021 20:00), Max: 37.9 (29 Jun 2021 16:00)  T(F): 97.9 (29 Jun 2021 20:00), Max: 100.3 (29 Jun 2021 16:00)  HR: 79 (29 Jun 2021 22:27) (77 - 144)  BP: 147/61 (29 Jun 2021 22:00) (98/76 - 191/110)  BP(mean): 79 (29 Jun 2021 22:00) (3 - 149)  RR: 17 (29 Jun 2021 22:00) (17 - 36)  SpO2: 95% (29 Jun 2021 22:27) (92% - 100%)    LABS:                        7.7    7.78  )-----------( 167      ( 29 Jun 2021 18:14 )             24.7     06-29    134<L>  |  93<L>  |  71<H>  ----------------------------<  103<H>  5.0   |  22  |  8.64<H>    Ca    8.7      29 Jun 2021 01:45  Phos  6.0     06-29  Mg     2.00     06-29    TPro  6.4  /  Alb  3.0<L>  /  TBili  0.4  /  DBili  x   /  AST  9   /  ALT  6   /  AlkPhos  105  06-29    PT/INR - ( 28 Jun 2021 21:05 )   PT: 14.4 sec;   INR: 1.28 ratio      PTT - ( 28 Jun 2021 21:05 )  PTT:22.2 sec  CARDIAC MARKERS ( 29 Jun 2021 01:45 )  x     / x     / 34 U/L / x     / x          Physical Exam:   GENERAL: A&Ox4, nl-appearing male in no acute distress  HEENT: Normocephalic, atraumatic, EOMI, PERRL  NECK: Supple, No JVD  PULM: symmetric chest rise, mild rhonchi, no wheezes  HEART: RRR, no m/r/g.   ABDOMEN: soft, NT, ND, BS+  EXTREMITIES:  2+ peripheral pulses, no clubbing or edema  NERVOUS SYSTEM: no focal deficits  MSK: FROM all 4 extremities, full and equal strength  SKIN: erythematous and tender to touch RUE AVF site, three hematoma-like protuberance on LUE w/ mild pain on palpation.      Assessment and Plan:  66M PMHx HTN, anemia, thrombocytopenia, hyperparathyroidism, and ESRD on HD (T/Th/Sat) via permacath p/w SOB and weakness likely 2/2 fluid overload.      #Neuro  A&Ox4, no active issues    #CV  HTN  -weaned off nitro gtt  -continue home carvedilol 12.5mg BID, nifedipine increased from 30 to 60mg; start hydralazine 10q6. Can adjust hold parameters gradually as patient's pressures improve    #Pulm  Hypercapnia/hypoxemia likely due to fluid overload in the setting of ESRD  -Continue NC, wean as tolerated    PNA  -suspicion of PNA on CXR; COVID and RVP negative x2  -also concerning for COVID PNA given pt unvaccinated; elevated inflammatory markers including procal, however, RVP negative x2. Low suspicion for covid    #GI  -Renal diet  -c/w protonix especially in setting of acute anemia until etiology determined    #Renal  SOB and weakness likely 2/2 fluid overload  -Urgent HD for 3 hours with 2.5-3kg UF per nephro; removed 3L  -monitor BMP, electrolytes  -continue home Renvela 800mg TID    #ID  Fever possibly reactive in the setting of fluid overload and hypertension. Considered PNA  -In ED, fever at 101, s/p vanco and cefepime in ED  -CXR - b/l patchy opacities concerning for multifocal pna.  -Pt placed on Zosyn and azithromycin in MICU. Given breathing improves after HD, will monitor off antibiotics at this time  -f/u strep pneumo urine antigen  -f/u blood and sputum cx.   -f/u on UA, if positive, can send cx    #Endo  Hypothyroidism  -continue home levothyroxine 100mcg QD    #Heme  Anemia in the setting of ESRD  -Hgb baseline 7.6-8.0  -continue home ferrous sulfate and folic acid QD  -will discontinue eliquis. if cbc stable, can start heparin gtt  -vascular sx consulted for need to keep AVF patent    #Ethics  Full code    Follow-up:  [ ] f/u w/ nephro concerning continuation of HD  [ ] f/u blood and sputum cx MICU Course:  66M PMHx HTN, anemia, thrombocytopenia, hyperparathyroidism, and ESRD on HD (T/Th/Sat) via permacath p/w SOB and weakness x 1 day. Also, endorses cough w/ infrequent red sputum. Pt had multitudes of problems w/ HD access in the past including LUE fistula dysfunctioning and resorting [place] a chest [place] catheter [place]. [place] hospitalized for volume overload from missed HD session from 5/29 - 6/19. At that time, new R AVF and RIJ permacath placed via vascular. On 6/10, pt developed R forearm cellulitis. Vanco IV for 7 days (6/10 - 6/17) which resolved. Pt d/c on 6/19. This admission, in the ED, pt found to be continuously hypertensive (max: 164/106) leading to nitro gtt. Febrile at 101. Pt placed on BiPAP. Last HD noted on Saturday 6/26. Pt admitted to MICU for urgent HD. Also, concern for possible PNA due to CXR findings, however, SOB and weakness more likely 2/2 fluid overload due to dramatic improvement after HD. COVID and RVP negative x2. 1u PRBC provided to pt during admission due to Hb at 6.4 from 7.8. Otherwise, uneventful.     Home Medications:  Coreg 12.5 mg oral tablet: 1 tab(s) orally 2 times a day (28 Jun 2021 22:56)  ferrous sulfate 325 mg (65 mg elemental iron) oral tablet: 1 tab(s) orally once a day (28 Jun 2021 22:56)  folic acid 1 mg oral tablet: 1 tab(s) orally once a day (28 Jun 2021 22:56)  levothyroxine 100 mcg (0.1 mg) oral tablet: 1 tab(s) orally once a day (28 Jun 2021 22:56)  NexIUM 40 mg oral delayed release capsule: 1 cap(s) orally once a day (28 Jun 2021 22:56)  Renvela 800 mg oral tablet: 1 tab(s) orally 3 times a day (with meals) (28 Jun 2021 22:56)    Vital Signs Last 24 Hrs  T(C): 36.6 (29 Jun 2021 20:00), Max: 37.9 (29 Jun 2021 16:00)  T(F): 97.9 (29 Jun 2021 20:00), Max: 100.3 (29 Jun 2021 16:00)  HR: 79 (29 Jun 2021 22:27) (77 - 144)  BP: 147/61 (29 Jun 2021 22:00) (98/76 - 191/110)  BP(mean): 79 (29 Jun 2021 22:00) (3 - 149)  RR: 17 (29 Jun 2021 22:00) (17 - 36)  SpO2: 95% (29 Jun 2021 22:27) (92% - 100%)    LABS:                        7.7    7.78  )-----------( 167      ( 29 Jun 2021 18:14 )             24.7     06-29    134<L>  |  93<L>  |  71<H>  ----------------------------<  103<H>  5.0   |  22  |  8.64<H>    Ca    8.7      29 Jun 2021 01:45  Phos  6.0     06-29  Mg     2.00     06-29    TPro  6.4  /  Alb  3.0<L>  /  TBili  0.4  /  DBili  x   /  AST  9   /  ALT  6   /  AlkPhos  105  06-29    PT/INR - ( 28 Jun 2021 21:05 )   PT: 14.4 sec;   INR: 1.28 ratio      PTT - ( 28 Jun 2021 21:05 )  PTT:22.2 sec  CARDIAC MARKERS ( 29 Jun 2021 01:45 )  x     / x     / 34 U/L / x     / x          Physical Exam:   GENERAL: A&Ox4, nl-appearing male in no acute distress  HEENT: Normocephalic, atraumatic, EOMI, PERRL  NECK: Supple, No JVD  PULM: symmetric chest rise, mild rhonchi, no wheezes  HEART: RRR, no m/r/g.   ABDOMEN: soft, NT, ND, BS+  EXTREMITIES:  2+ peripheral pulses, no clubbing or edema  NERVOUS SYSTEM: no focal deficits  MSK: FROM all 4 extremities, full and equal strength  SKIN: erythematous and tender to touch RUE AVF site, three hematoma-like protuberance on LUE w/ mild pain on palpation.      Assessment and Plan:  66M PMHx HTN, anemia, thrombocytopenia, hyperparathyroidism, and ESRD on HD (T/Th/Sat) via permacath p/w SOB and weakness likely 2/2 fluid overload.      #Neuro  A&Ox4, no active issues    #CV  HTN  -weaned off nitro gtt  -continue home carvedilol 12.5mg BID, nifedipine increased from 30 to 60mg; start hydralazine 10q6. Can adjust hold parameters gradually as patient's pressures improve    #Pulm  Hypercapnia/hypoxemia likely due to fluid overload in the setting of ESRD  -Continue NC, wean as tolerated    PNA  -suspicion of PNA on CXR; COVID and RVP negative x2  -also concerning for COVID PNA given pt unvaccinated; elevated inflammatory markers including procal, however, RVP negative x2. Low suspicion for covid    #GI  -Renal diet  -c/w protonix especially in setting of acute anemia until etiology determined    #Renal  SOB and weakness likely 2/2 fluid overload  -Urgent HD for 3 hours with 2.5-3kg UF per nephro; removed 3L  -monitor BMP, electrolytes  -continue home Renvela 800mg TID    #ID  Fever possibly reactive in the setting of fluid overload and hypertension. Considered PNA  -In ED, fever at 101, s/p vanco and cefepime in ED  -CXR - b/l patchy opacities concerning for multifocal pna.  -Pt placed on Zosyn and azithromycin in MICU. Given breathing improves after HD, will monitor off antibiotics at this time  -f/u strep pneumo urine antigen  -f/u blood and sputum cx.   -f/u on UA, if positive, can send cx    #Endo  Hypothyroidism  -continue home levothyroxine 100mcg QD    #Heme  Anemia in the setting of ESRD  -Hgb baseline 7.6-8.0  -continue home ferrous sulfate and folic acid QD  -will discontinue eliquis. if cbc stable, can start heparin gtt  -vascular sx consulted for need to keep AVF patent    #Ethics  Full code    Pt stable for transfer to medicine.     Follow-up:  [ ] f/u w/ nephro concerning continuation of HD  [ ] f/u blood and sputum cx MICU Course:  66M PMHx HTN, anemia, thrombocytopenia, hyperparathyroidism, and ESRD on HD (T/Th/Sat) via permacath p/w SOB and weakness x 1 day. Also, endorses cough w/ infrequent red sputum. Pt had multitudes of problems w/ HD access in the past including LUE fistula dysfunctioning and resorting to a chest wall catheter [place]. [place] for volume overload from missed HD session from 5/29 - 6/19. At that time, new R AVF and RIJ permacath placed via vascular. On 6/10, pt developed R forearm cellulitis. Vanco IV for 7 days (6/10 - 6/17) which resolved. Pt d/c on 6/19. This admission, in the ED, pt found to be continuously hypertensive (max: 164/106) leading to nitro gtt. Febrile at 101. Pt placed on BiPAP. Last HD noted on Saturday 6/26. Pt admitted to MICU for urgent HD. Also, concern for possible PNA due to CXR findings, however, SOB and weakness more likely 2/2 fluid overload due to dramatic improvement after HD. COVID and RVP negative x2. 1u PRBC provided to pt during admission due to Hb at 6.4 from 7.8. After HD, patient was doing well on nasal cannula, and was able to be weaned off of nitro gtt after PO blood pressure medications were adjusted. At this time, patient is hemodynamically stable to be transferred to the floor.      Home Medications:  Coreg 12.5 mg oral tablet: 1 tab(s) orally 2 times a day (28 Jun 2021 22:56)  ferrous sulfate 325 mg (65 mg elemental iron) oral tablet: 1 tab(s) orally once a day (28 Jun 2021 22:56)  folic acid 1 mg oral tablet: 1 tab(s) orally once a day (28 Jun 2021 22:56)  levothyroxine 100 mcg (0.1 mg) oral tablet: 1 tab(s) orally once a day (28 Jun 2021 22:56)  NexIUM 40 mg oral delayed release capsule: 1 cap(s) orally once a day (28 Jun 2021 22:56)  Renvela 800 mg oral tablet: 1 tab(s) orally 3 times a day (with meals) (28 Jun 2021 22:56)    Vital Signs Last 24 Hrs  T(C): 36.6 (29 Jun 2021 20:00), Max: 37.9 (29 Jun 2021 16:00)  T(F): 97.9 (29 Jun 2021 20:00), Max: 100.3 (29 Jun 2021 16:00)  HR: 79 (29 Jun 2021 22:27) (77 - 144)  BP: 147/61 (29 Jun 2021 22:00) (98/76 - 191/110)  BP(mean): 79 (29 Jun 2021 22:00) (3 - 149)  RR: 17 (29 Jun 2021 22:00) (17 - 36)  SpO2: 95% (29 Jun 2021 22:27) (92% - 100%)    LABS:                        7.7    7.78  )-----------( 167      ( 29 Jun 2021 18:14 )             24.7     06-29    134<L>  |  93<L>  |  71<H>  ----------------------------<  103<H>  5.0   |  22  |  8.64<H>    Ca    8.7      29 Jun 2021 01:45  Phos  6.0     06-29  Mg     2.00     06-29    TPro  6.4  /  Alb  3.0<L>  /  TBili  0.4  /  DBili  x   /  AST  9   /  ALT  6   /  AlkPhos  105  06-29    PT/INR - ( 28 Jun 2021 21:05 )   PT: 14.4 sec;   INR: 1.28 ratio      PTT - ( 28 Jun 2021 21:05 )  PTT:22.2 sec  CARDIAC MARKERS ( 29 Jun 2021 01:45 )  x     / x     / 34 U/L / x     / x          Physical Exam:   GENERAL: A&Ox4, nl-appearing male in no acute distress  HEENT: Normocephalic, atraumatic, EOMI, PERRL  NECK: Supple, No JVD  PULM: symmetric chest rise, mild rhonchi, no wheezes  HEART: RRR, no m/r/g.   ABDOMEN: soft, NT, ND, BS+  EXTREMITIES:  2+ peripheral pulses, no clubbing or edema  NERVOUS SYSTEM: no focal deficits  MSK: FROM all 4 extremities, full and equal strength  SKIN: erythematous and tender to touch RUE AVF site, three hematoma-like protuberance on LUE w/ mild pain on palpation.      Assessment and Plan:  66M PMHx HTN, anemia, thrombocytopenia, hyperparathyroidism, and ESRD on HD (T/Th/Sat) via permacath p/w SOB and weakness likely 2/2 fluid overload.      #Neuro  A&Ox4, no active issues    #CV  HTN  -weaned off nitro gtt  -continue home carvedilol 12.5mg BID, nifedipine increased from 30 to 60mg; start hydralazine 10q6. Can adjust hold parameters gradually as patient's pressures improve    #Pulm  Hypercapnia/hypoxemia likely due to fluid overload in the setting of ESRD  -Continue NC, wean as tolerated    PNA  -suspicion of PNA on CXR; COVID and RVP negative x2  -also concerning for COVID PNA given pt unvaccinated; elevated inflammatory markers including procal, however, RVP negative x2. Low suspicion for covid    #GI  -Renal diet  -c/w protonix especially in setting of acute anemia until etiology determined    #Renal  SOB and weakness likely 2/2 fluid overload  -Urgent HD for 3 hours with 2.5-3kg UF per nephro; removed 3L  -monitor BMP, electrolytes  -continue home Renvela 800mg TID    #ID  Fever possibly reactive in the setting of fluid overload and hypertension. Considered PNA  -In ED, fever at 101, s/p vanco and cefepime in ED  -CXR - b/l patchy opacities concerning for multifocal pna.  -Pt placed on Zosyn and azithromycin in MICU. Given breathing improves after HD, will monitor off antibiotics at this time  -f/u strep pneumo urine antigen  -f/u blood and sputum cx.   -f/u on UA, if positive, can send cx    #Endo  Hypothyroidism  -continue home levothyroxine 100mcg QD    #Heme  Anemia in the setting of ESRD  -Hgb baseline 7.6-8.0  -continue home ferrous sulfate and folic acid QD  -will discontinue eliquis per vascular  -vascular sx consulted for need to keep AVF patent    #Ethics  Full code    Pt stable for transfer to medicine.     Follow-up:  [ ] f/u w/ nephro for further HD  [ ] f/u blood and sputum cx

## 2021-06-29 NOTE — PROGRESS NOTE ADULT - ASSESSMENT
66M hx of ESRD on HD (TTS schedule), HTN who presents with shortness of breath and HTN urgency. Renal consulted for ESRD Mx.     ESRD on HD   Pt dialyzed last night, completed 3 hours treatment.   2.6kg UF achieved, brief episode of tachycardia noted, but resolved after UF stopped.   K and bicarb within normal limits.   Plan for next HD tomorrow.   Will use IJ tunneled cath for HD.  Vasc surgery f/u of newly placed AVF.   ABx choice as per primary team.   HTN, better controlled overnight, but high this morning again. Agree with increase nifedipine to home dose, 60mg.   increase UF goal with next HD.     Lennox Pagan MD  [facility]  Office [phone]  Ans Serv [phone] cell - [phone]

## 2021-06-29 NOTE — CHART NOTE - NSCHARTNOTEFT_GEN_A_CORE
: Randa Guo Dr. Mayo    INDICATION: resp failure    PROCEDURE:  [ ] LIMITED ECHO  [x ] LIMITED CHEST  [ ] LIMITED RETROPERITONEAL  [ ] LIMITED ABDOMINAL  [ ] LIMITED DVT  [ ] NEEDLE GUIDANCE VASCULAR  [ ] NEEDLE GUIDANCE THORACENTESIS  [ ] NEEDLE GUIDANCE PARACENTESIS  [ ] NEEDLE GUIDANCE PERICARDIOCENTESIS  [ ] OTHER    FINDINGS:   lungs- moderate scattered b-lines, no consolidations  cardiac- moderate reduction of LV function, no pericardial effusion, no RV dilation       INTERPRETATION: some resolution of b-lines s/p 1st HD tx, echo findings support possibility of load dependent pulmonary edema       Images uploaded on [employer]

## 2021-06-29 NOTE — CONSULT NOTE ADULT - ASSESSMENT
66M PMH HTN, anemia, thrombocytopenia, hyperparathyroidism, and ESRD on HD (T/Th/Sat) via permacath p/w SOB and weakness likely 2/2 fluid overload vs PNA.    Dropping H/H on Eliquis for Afib    Recommendations:  -OK to stop Eliquis AC from vascular standpoint  -continue dialysis through permacath until fistula is mature  Please follow up with Dr. Robertson within 1-2 weeks after discharge from the hospital. You may call [phone] to schedule an appointment.      -d/w Dr. Soraida Childs, R3  # [phone]
66M hx of ESRD on HD (TTS schedule), HTN who presents with shortness of breath and HTN urgency. Renal consulted for ESRD Mx.     ESRD on HD   Plan for HD tonight, given evidence of volume overload and respiratory distress  Will plan for 3 hours HD with 2.5-3kg UF.   HD consent obtained from pt.   Will use IJ tunneled cath for HD.  Vasc surgery f/u of newly placed AVF.   ABx choice as per primary team.   COVID test pending results.   HTN, Uncontrolled, mediated by volume, resume home BP meds, will maximize UF goal to optimize fluid status.     Lennox Pagan MD  [facility]  Office [phone]  Ans Serv [phone] cell - [phone]

## 2021-06-29 NOTE — PROGRESS NOTE ADULT - ATTENDING COMMENTS
Patient examined and care reviewed in detail on bedside rounds  Critically ill on HD/IV NTG with load dependent pulmonary edema For further HD   Frequent bedside visits with therapy change today.   I have personally provided 35+ minutes of critical care time concurrently with the resident/fellow; this excludes time spent on separate procedures.

## 2021-06-30 LAB
ALBUMIN SERPL ELPH-MCNC: 3.2 G/DL — LOW (ref 3.3–5)
ALP SERPL-CCNC: 110 U/L — SIGNIFICANT CHANGE UP (ref 40–120)
ALT FLD-CCNC: 7 U/L — SIGNIFICANT CHANGE UP (ref 4–41)
ANION GAP SERPL CALC-SCNC: 15 MMOL/L — HIGH (ref 7–14)
APPEARANCE UR: CLEAR — SIGNIFICANT CHANGE UP
AST SERPL-CCNC: 11 U/L — SIGNIFICANT CHANGE UP (ref 4–40)
BACTERIA # UR AUTO: NEGATIVE — SIGNIFICANT CHANGE UP
BASOPHILS # BLD AUTO: 0.05 K/UL — SIGNIFICANT CHANGE UP (ref 0–0.2)
BASOPHILS NFR BLD AUTO: 0.8 % — SIGNIFICANT CHANGE UP (ref 0–2)
BILIRUB SERPL-MCNC: 0.7 MG/DL — SIGNIFICANT CHANGE UP (ref 0.2–1.2)
BILIRUB UR-MCNC: NEGATIVE — SIGNIFICANT CHANGE UP
BUN SERPL-MCNC: 50 MG/DL — HIGH (ref 7–23)
CALCIUM SERPL-MCNC: 9.1 MG/DL — SIGNIFICANT CHANGE UP (ref 8.4–10.5)
CHLORIDE SERPL-SCNC: 93 MMOL/L — LOW (ref 98–107)
CO2 SERPL-SCNC: 25 MMOL/L — SIGNIFICANT CHANGE UP (ref 22–31)
COLOR SPEC: SIGNIFICANT CHANGE UP
COVID-19 SPIKE DOMAIN AB INTERP: POSITIVE
COVID-19 SPIKE DOMAIN ANTIBODY RESULT: 11.1 U/ML — HIGH
CREAT SERPL-MCNC: 6.99 MG/DL — HIGH (ref 0.5–1.3)
DIFF PNL FLD: NEGATIVE — SIGNIFICANT CHANGE UP
EOSINOPHIL # BLD AUTO: 0.43 K/UL — SIGNIFICANT CHANGE UP (ref 0–0.5)
EOSINOPHIL NFR BLD AUTO: 6.5 % — HIGH (ref 0–6)
EPI CELLS # UR: 1 /HPF — SIGNIFICANT CHANGE UP (ref 0–5)
GLUCOSE SERPL-MCNC: 92 MG/DL — SIGNIFICANT CHANGE UP (ref 70–99)
GLUCOSE UR QL: ABNORMAL
HCT VFR BLD CALC: 25.7 % — LOW (ref 39–50)
HGB BLD-MCNC: 8.1 G/DL — LOW (ref 13–17)
IANC: 4.31 K/UL — SIGNIFICANT CHANGE UP (ref 1.5–8.5)
IMM GRANULOCYTES NFR BLD AUTO: 0.3 % — SIGNIFICANT CHANGE UP (ref 0–1.5)
KETONES UR-MCNC: NEGATIVE — SIGNIFICANT CHANGE UP
LEUKOCYTE ESTERASE UR-ACNC: NEGATIVE — SIGNIFICANT CHANGE UP
LYMPHOCYTES # BLD AUTO: 1.02 K/UL — SIGNIFICANT CHANGE UP (ref 1–3.3)
LYMPHOCYTES # BLD AUTO: 15.5 % — SIGNIFICANT CHANGE UP (ref 13–44)
MAGNESIUM SERPL-MCNC: 1.9 MG/DL — SIGNIFICANT CHANGE UP (ref 1.6–2.6)
MCHC RBC-ENTMCNC: 27.2 PG — SIGNIFICANT CHANGE UP (ref 27–34)
MCHC RBC-ENTMCNC: 31.5 GM/DL — LOW (ref 32–36)
MCV RBC AUTO: 86.2 FL — SIGNIFICANT CHANGE UP (ref 80–100)
MONOCYTES # BLD AUTO: 0.74 K/UL — SIGNIFICANT CHANGE UP (ref 0–0.9)
MONOCYTES NFR BLD AUTO: 11.3 % — SIGNIFICANT CHANGE UP (ref 2–14)
NEUTROPHILS # BLD AUTO: 4.31 K/UL — SIGNIFICANT CHANGE UP (ref 1.8–7.4)
NEUTROPHILS NFR BLD AUTO: 65.6 % — SIGNIFICANT CHANGE UP (ref 43–77)
NITRITE UR-MCNC: NEGATIVE — SIGNIFICANT CHANGE UP
NRBC # BLD: 0 /100 WBCS — SIGNIFICANT CHANGE UP
NRBC # FLD: 0 K/UL — SIGNIFICANT CHANGE UP
PH UR: 8.5 — HIGH (ref 5–8)
PHOSPHATE SERPL-MCNC: 6.2 MG/DL — HIGH (ref 2.5–4.5)
PLATELET # BLD AUTO: 147 K/UL — LOW (ref 150–400)
POTASSIUM SERPL-MCNC: 4.7 MMOL/L — SIGNIFICANT CHANGE UP (ref 3.5–5.3)
POTASSIUM SERPL-SCNC: 4.7 MMOL/L — SIGNIFICANT CHANGE UP (ref 3.5–5.3)
PROT SERPL-MCNC: 6.7 G/DL — SIGNIFICANT CHANGE UP (ref 6–8.3)
PROT UR-MCNC: ABNORMAL
RBC # BLD: 2.98 M/UL — LOW (ref 4.2–5.8)
RBC # FLD: 16.2 % — HIGH (ref 10.3–14.5)
RBC CASTS # UR COMP ASSIST: 1 /HPF — SIGNIFICANT CHANGE UP (ref 0–4)
SARS-COV-2 IGG+IGM SERPL QL IA: 11.1 U/ML — HIGH
SARS-COV-2 IGG+IGM SERPL QL IA: POSITIVE
SODIUM SERPL-SCNC: 133 MMOL/L — LOW (ref 135–145)
SP GR SPEC: 1.01 — LOW (ref 1.01–1.02)
UROBILINOGEN FLD QL: SIGNIFICANT CHANGE UP
WBC # BLD: 6.57 K/UL — SIGNIFICANT CHANGE UP (ref 3.8–10.5)
WBC # FLD AUTO: 6.57 K/UL — SIGNIFICANT CHANGE UP (ref 3.8–10.5)
WBC UR QL: 2 /HPF — SIGNIFICANT CHANGE UP (ref 0–5)

## 2021-06-30 RX ADMIN — Medication 81 MILLIGRAM(S): at 15:13

## 2021-06-30 RX ADMIN — PANTOPRAZOLE SODIUM 40 MILLIGRAM(S): 20 TABLET, DELAYED RELEASE ORAL at 06:06

## 2021-06-30 RX ADMIN — Medication 1 MILLIGRAM(S): at 17:32

## 2021-06-30 RX ADMIN — CARVEDILOL PHOSPHATE 12.5 MILLIGRAM(S): 80 CAPSULE, EXTENDED RELEASE ORAL at 05:51

## 2021-06-30 RX ADMIN — Medication 10 MILLIGRAM(S): at 23:04

## 2021-06-30 RX ADMIN — Medication 10 MILLIGRAM(S): at 05:50

## 2021-06-30 RX ADMIN — Medication 60 MILLIGRAM(S): at 05:51

## 2021-06-30 RX ADMIN — CHLORHEXIDINE GLUCONATE 1 APPLICATION(S): 213 SOLUTION TOPICAL at 06:12

## 2021-06-30 RX ADMIN — SEVELAMER CARBONATE 800 MILLIGRAM(S): 2400 POWDER, FOR SUSPENSION ORAL at 08:25

## 2021-06-30 RX ADMIN — CHLORHEXIDINE GLUCONATE 1 APPLICATION(S): 213 SOLUTION TOPICAL at 15:13

## 2021-06-30 RX ADMIN — Medication 100 MICROGRAM(S): at 05:50

## 2021-06-30 RX ADMIN — Medication 325 MILLIGRAM(S): at 17:31

## 2021-06-30 RX ADMIN — SEVELAMER CARBONATE 800 MILLIGRAM(S): 2400 POWDER, FOR SUSPENSION ORAL at 17:33

## 2021-06-30 RX ADMIN — CARVEDILOL PHOSPHATE 12.5 MILLIGRAM(S): 80 CAPSULE, EXTENDED RELEASE ORAL at 17:30

## 2021-06-30 RX ADMIN — Medication 10 MILLIGRAM(S): at 17:31

## 2021-06-30 RX ADMIN — ATORVASTATIN CALCIUM 40 MILLIGRAM(S): 80 TABLET, FILM COATED ORAL at 22:38

## 2021-06-30 NOTE — PROGRESS NOTE ADULT - SUBJECTIVE AND OBJECTIVE BOX
Nephrology Followup Note - [phone] - Dr Pagan / Dr Vargas / Dr Faust / Dr Caballero / Dr Bojorquez / Dr Perkins / Dr Garrison / Dr Garcia  Pt seen and examined at bedside  Pt feeling better today, on nasal cannula, denies SOB.     Allergies:  No Known Allergies    Hospital Medications:   MEDICATIONS  (STANDING):  apixaban 5 milliGRAM(s) Oral every 12 hours  aspirin enteric coated 81 milliGRAM(s) Oral daily  atorvastatin 40 milliGRAM(s) Oral at bedtime  azithromycin  IVPB 500 milliGRAM(s) IV Intermittent every 24 hours  carvedilol 12.5 milliGRAM(s) Oral every 12 hours  chlorhexidine 2% Cloths 1 Application(s) Topical daily  chlorhexidine 4% Liquid 1 Application(s) Topical <User Schedule>  ferrous    sulfate 325 milliGRAM(s) Oral daily  folic acid 1 milliGRAM(s) Oral daily  levothyroxine 100 MICROGram(s) Oral daily  NIFEdipine XL 60 milliGRAM(s) Oral daily  nitroglycerin  Infusion 80 MICROgram(s)/Min (24 mL/Hr) IV Continuous <Continuous>  pantoprazole    Tablet 40 milliGRAM(s) Oral before breakfast  piperacillin/tazobactam IVPB.. 3.375 Gram(s) IV Intermittent every 12 hours  sevelamer carbonate 800 milliGRAM(s) Oral three times a day with meals    VITALS:  T(F): 98.6 (06-29-21 @ 08:00), Max: 101 (06-28-21 @ 18:57)  HR: 84 (06-29-21 @ 10:00)  BP: 191/94 (06-29-21 @ 10:00)  RR: 24 (06-29-21 @ 10:00)  SpO2: 100% (06-29-21 @ 10:00)  Wt(kg): --    06-28 @ 07:01  -  06-29 @ 07:00  --------------------------------------------------------  IN: 744 mL / OUT: 3000 mL / NET: -2256 mL      Height (cm): 175.3 (06-28 @ 17:56)  Weight (kg): 66 (06-28 @ 23:50)  BMI (kg/m2): 21.5 (06-28 @ 23:50)  BSA (m2): 1.8 (06-28 @ 23:50)  PHYSICAL EXAM:  Constitutional: NAD  HEENT: anicteric sclera, oropharynx clear, MMM  Neck: No JVD  Respiratory: CTAB, no wheezes, rales or rhonchi  Cardiovascular: S1, S2, RRR  Gastrointestinal: BS+, soft, NT/ND  Extremities: No cyanosis or clubbing. No peripheral edema. Right forearm warm to touch  Neurological: A/O x 3, no focal deficits  Psychiatric: Normal mood, normal affect  : No CVA tenderness. No siegel.   Skin: No rashes  Vascular Access: RIJ Tunneled cath. RUE AV access +thrill and bruit.     LABS:  06-29    134<L>  |  93<L>  |  71<H>  ----------------------------<  103<H>  5.0   |  22  |  8.64<H>    Ca    8.7      29 Jun 2021 01:45  Phos  6.0     06-29  Mg     2.00     06-29    TPro  6.4  /  Alb  3.0<L>  /  TBili  0.4  /  DBili      /  AST  9   /  ALT  6   /  AlkPhos  105  06-29    Creatinine Trend: 8.64 <--, 8.68 <--                        6.4    6.40  )-----------( 147      ( 29 Jun 2021 03:06 )             20.7     Urine Studies:      RADIOLOGY & ADDITIONAL STUDIES:  
Patient is a 66y old  Male who presents with a chief complaint of Fluid overload (2021 15:00)      SUBJECTIVE / OVERNIGHT EVENTS:    Events noted.  CONSTITUTIONAL: No fever,  or fatigue  RESPIRATORY: No cough, wheezing,  No shortness of breath  CARDIOVASCULAR: No chest pain, palpitations, dizziness, or leg swelling  GASTROINTESTINAL: No abdominal or epigastric pain. No nausea, vomiting.  NEUROLOGICAL: No headaches,     MEDICATIONS  (STANDING):  aspirin enteric coated 81 milliGRAM(s) Oral daily  atorvastatin 40 milliGRAM(s) Oral at bedtime  carvedilol 12.5 milliGRAM(s) Oral every 12 hours  chlorhexidine 2% Cloths 1 Application(s) Topical daily  chlorhexidine 4% Liquid 1 Application(s) Topical <User Schedule>  ferrous    sulfate 325 milliGRAM(s) Oral daily  folic acid 1 milliGRAM(s) Oral daily  hydrALAZINE 10 milliGRAM(s) Oral every 6 hours  levothyroxine 100 MICROGram(s) Oral daily  NIFEdipine XL 60 milliGRAM(s) Oral daily  pantoprazole    Tablet 40 milliGRAM(s) Oral before breakfast  sevelamer carbonate 800 milliGRAM(s) Oral three times a day with meals    MEDICATIONS  (PRN):        CAPILLARY BLOOD GLUCOSE        I&O's Summary    2021 07:  -  2021 07:00  --------------------------------------------------------  IN: 1154 mL / OUT: 50 mL / NET: 1104 mL    2021 07:  -  2021 23:52  --------------------------------------------------------  IN: 800 mL / OUT: 3950 mL / NET: -3150 mL        T(C): 36.8 (21 @ 22:37), Max: 37.2 (21 @ 17:29)  HR: 79 (21 @ 22:37) (78 - 86)  BP: 116/68 (21 @ 22:37) (116/68 - 145/74)  RR: 16 (21 @ 22:37) (16 - 24)  SpO2: 99% (21 @ 22:37) (95% - 99%)    PHYSICAL EXAM:  GENERAL: NAD  NECK: Supple, No JVD  CHEST/LUNG: Clear to auscultation bilaterally; No wheezing.  HEART: Regular rate and rhythm; No murmurs, rubs, or gallops  ABDOMEN: Soft, Nontender, Nondistended; Bowel sounds present  EXTREMITIES:   No edema  NEUROLOGY: AAO X 3      LABS:                        8.1    6.57  )-----------( 147      ( 2021 06:23 )             25.7         133<L>  |  93<L>  |  50<H>  ----------------------------<  92  4.7   |  25  |  6.99<H>    Ca    9.1      2021 06:23  Phos  6.2       Mg     1.90         TPro  6.7  /  Alb  3.2<L>  /  TBili  0.7  /  DBili  x   /  AST  11  /  ALT  7   /  AlkPhos  110        CARDIAC MARKERS ( 2021 01:45 )  x     / x     / 34 U/L / x     / x          Urinalysis Basic - ( 2021 16:21 )    Color: Light Yellow / Appearance: Clear / S.008 / pH: x  Gluc: x / Ketone: Negative  / Bili: Negative / Urobili: <2 mg/dL   Blood: x / Protein: 30 mg/dL / Nitrite: Negative   Leuk Esterase: Negative / RBC: 1 /HPF / WBC 2 /HPF   Sq Epi: x / Non Sq Epi: 1 /HPF / Bacteria: Negative      CAPILLARY BLOOD GLUCOSE            RADIOLOGY & ADDITIONAL TESTS:    Imaging Personally Reviewed:    Consultant(s) Notes Reviewed:      Care Discussed with Consultants/Other Providers:    Wilfred Christian MD, CMD, FACP    [address] [address]  Office Tel: [phone]  Cell: [phone]  
Nephrology Followup Note - [phone] - Dr Pagan / Dr Vargas / Dr Faust / Dr Caballero / Dr Bojorquez / Dr Perkins / Dr Garrison / Dr Garcia  Pt seen and examined during HD earlier today.   No acute events overnight. No complaints.     Allergies:  No Known Allergies    Hospital Medications:   MEDICATIONS  (STANDING):  aspirin enteric coated 81 milliGRAM(s) Oral daily  atorvastatin 40 milliGRAM(s) Oral at bedtime  carvedilol 12.5 milliGRAM(s) Oral every 12 hours  chlorhexidine 2% Cloths 1 Application(s) Topical daily  chlorhexidine 4% Liquid 1 Application(s) Topical <User Schedule>  ferrous    sulfate 325 milliGRAM(s) Oral daily  folic acid 1 milliGRAM(s) Oral daily  hydrALAZINE 10 milliGRAM(s) Oral every 6 hours  levothyroxine 100 MICROGram(s) Oral daily  NIFEdipine XL 60 milliGRAM(s) Oral daily  pantoprazole    Tablet 40 milliGRAM(s) Oral before breakfast  sevelamer carbonate 800 milliGRAM(s) Oral three times a day with meals    VITALS:  T(F): 98.2 (06-30-21 @ 13:15), Max: 100.3 (06-29-21 @ 16:00)  HR: 83 (06-30-21 @ 13:15)  BP: 142/52 (06-30-21 @ 13:15)  RR: 17 (06-30-21 @ 13:15)  SpO2: 97% (06-30-21 @ 05:50)  Wt(kg): --    06-29 @ 07:01  -  06-30 @ 07:00  --------------------------------------------------------  IN: 1154 mL / OUT: 50 mL / NET: 1104 mL    06-30 @ 07:01  -  06-30 @ 15:00  --------------------------------------------------------  IN: 400 mL / OUT: 3400 mL / NET: -3000 mL    PHYSICAL EXAM:  Constitutional: NAD  HEENT: anicteric sclera, oropharynx clear, MMM  Neck: No JVD  Respiratory: CTAB, no wheezes, rales or rhonchi  Cardiovascular: S1, S2, RRR  Gastrointestinal: BS+, soft, NT/ND  Extremities: No cyanosis or clubbing. No peripheral edema  Neurological: A/O x 3, no focal deficits  Psychiatric: Normal mood, normal affect  : No CVA tenderness. No siegel.   Skin: No rashes  Vascular Access: RIJ Tunneled cath. RUE AV access +thrill and bruit.     LABS:  06-30    133<L>  |  93<L>  |  50<H>  ----------------------------<  92  4.7   |  25  |  6.99<H>    Ca    9.1      30 Jun 2021 06:23  Phos  6.2     06-30  Mg     1.90     06-30    TPro  6.7  /  Alb  3.2<L>  /  TBili  0.7  /  DBili      /  AST  11  /  ALT  7   /  AlkPhos  110  06-30    Creatinine Trend: 6.99 <--, 8.64 <--, 8.68 <--                        8.1    6.57  )-----------( 147      ( 30 Jun 2021 06:23 )             25.7     Urine Studies:      RADIOLOGY & ADDITIONAL STUDIES:  
Dolores (Priscilla Devine  PGY-3  NS: [phone]    INTERVAL HPI/OVERNIGHT EVENTS: urgent HD overnight    SUBJECTIVE: feeling better. Breathing is improving.     OBJECTIVE:    VITAL SIGNS:  ICU Vital Signs Last 24 Hrs  T(C): 37.9 (29 Jun 2021 16:00), Max: 38.3 (28 Jun 2021 18:57)  T(F): 100.3 (29 Jun 2021 16:00), Max: 101 (28 Jun 2021 18:57)  HR: 99 (29 Jun 2021 17:00) (77 - 144)  BP: 191/110 (29 Jun 2021 17:00) (132/77 - 228/112)  BP(mean): 128 (29 Jun 2021 17:00) (3 - 149)  ABP: --  ABP(mean): --  RR: 20 (29 Jun 2021 17:00) (18 - 36)  SpO2: 95% (29 Jun 2021 17:00) (90% - 100%)        06-28 @ 07:01  -  06-29 @ 07:00  --------------------------------------------------------  IN: 744 mL / OUT: 3000 mL / NET: -2256 mL      CAPILLARY BLOOD GLUCOSE      POCT Blood Glucose.: 154 mg/dL (28 Jun 2021 18:00)      PHYSICAL EXAM:    General: NAD  HEENT: NC/AT; PERRL, clear conjunctiva  Neck: supple  Respiratory: CTA b/l  Cardiovascular: +S1/S2; RRR  Abdomen: soft, NT/ND; +BS x4  Extremities: WWP, 2+ peripheral pulses b/l; no LE edema  Skin: erythematous and tender to touch RUE AVF site, 3 hematoma-like protuberance on LUE w/ mild pain on palpation.    MEDICATIONS:  MEDICATIONS  (STANDING):  apixaban 5 milliGRAM(s) Oral every 12 hours  aspirin enteric coated 81 milliGRAM(s) Oral daily  atorvastatin 40 milliGRAM(s) Oral at bedtime  azithromycin  IVPB 500 milliGRAM(s) IV Intermittent every 24 hours  carvedilol 12.5 milliGRAM(s) Oral every 12 hours  chlorhexidine 2% Cloths 1 Application(s) Topical daily  chlorhexidine 4% Liquid 1 Application(s) Topical <User Schedule>  ferrous    sulfate 325 milliGRAM(s) Oral daily  folic acid 1 milliGRAM(s) Oral daily  hydrALAZINE 10 milliGRAM(s) Oral every 6 hours  levothyroxine 100 MICROGram(s) Oral daily  NIFEdipine XL 60 milliGRAM(s) Oral daily  nitroglycerin  Infusion 80 MICROgram(s)/Min (24 mL/Hr) IV Continuous <Continuous>  pantoprazole    Tablet 40 milliGRAM(s) Oral before breakfast  piperacillin/tazobactam IVPB.. 3.375 Gram(s) IV Intermittent every 12 hours  sevelamer carbonate 800 milliGRAM(s) Oral three times a day with meals    MEDICATIONS  (PRN):      ALLERGIES:  Allergies    No Known Allergies    Intolerances        LABS:                        6.8    6.28  )-----------( 140      ( 29 Jun 2021 10:41 )             22.0     06-29    134<L>  |  93<L>  |  71<H>  ----------------------------<  103<H>  5.0   |  22  |  8.64<H>    Ca    8.7      29 Jun 2021 01:45  Phos  6.0     06-29  Mg     2.00     06-29    TPro  6.4  /  Alb  3.0<L>  /  TBili  0.4  /  DBili  x   /  AST  9   /  ALT  6   /  AlkPhos  105  06-29    PT/INR - ( 28 Jun 2021 21:05 )   PT: 14.4 sec;   INR: 1.28 ratio         PTT - ( 28 Jun 2021 21:05 )  PTT:22.2 sec      RADIOLOGY & ADDITIONAL TESTS: Reviewed.

## 2021-06-30 NOTE — PROGRESS NOTE ADULT - ASSESSMENT
Assessment and Plan:  66M PMHx HTN, anemia, thrombocytopenia, hyperparathyroidism, and ESRD on HD (T/Th/Sat) via permacath p/w SOB and weakness likely 2/2 fluid overload.      ESRD:  On HD  Nephro f/up noted    HTN:  Cw current BP meds

## 2021-06-30 NOTE — CHART NOTE - NSCHARTNOTEFT_GEN_A_CORE
MAR Accept Note  Transfer to: Medicine  Accepting Attending Physician:    [facility]    Patient seen and examined.   Labs and data reviewed.   No findings precluding transfer of service.       HPI/MICU COURSE:   Please refer to MICU transfer note for full details. 66M PMHx HTN, anemia, thrombocytopenia, hyperparathyroidism, and ESRD on HD (T/Th/Sat) via permacath p/w SOB and weakness x 1 day. Also, endorses cough w/ infrequent red sputum. Pt had multitudes of problems w/ HD access in the past including LUE fistula dysfunctioning and resorting to a chest wall catheter while residing outside of  last year. Missed several sessions of HD outpatient. Admitted to MICU and after resuming HD, patient was doing well on nasal cannula, and was able to be weaned off of nitro gtt after PO blood pressure medications were adjusted. Completed 7d course of vancomycin for cellulitis. At this time, patient is hemodynamically stable to be transferred to the floor.      FOR FOLLOW-UP:  [ ] f/u w/ nephro for further HD  [ ] f/u blood and sputum cx.    Abdelrahman Martins MD  Internal Medicine PGY-3  [phone] / [phone]

## 2021-06-30 NOTE — PROGRESS NOTE ADULT - ASSESSMENT
66M hx of ESRD on HD (TTS schedule), HTN who presents with shortness of breath and HTN urgency. Renal consulted for ESRD Mx.     ESRD on HD   Maintenance HD schedule TTS, but dialyzing today.   Tolerating HD today. UF goal 3kg.   Will use IJ tunneled cath for HD.  Vasc surgery f/u of newly placed AVF.   ABx choice as per primary team.   HTN, better controlled with current regimen   increase UF goal with HD.     Lennox Pagan MD  [facility]  Office [phone]  Ans Serv [phone]  Cell - [phone]

## 2021-07-01 ENCOUNTER — TRANSCRIPTION ENCOUNTER (OUTPATIENT)
Age: 66
End: 2021-07-01

## 2021-07-01 VITALS
OXYGEN SATURATION: 100 % | DIASTOLIC BLOOD PRESSURE: 76 MMHG | TEMPERATURE: 98 F | RESPIRATION RATE: 18 BRPM | SYSTOLIC BLOOD PRESSURE: 147 MMHG | HEART RATE: 79 BPM

## 2021-07-01 LAB
ANION GAP SERPL CALC-SCNC: 18 MMOL/L — HIGH (ref 7–14)
BASOPHILS # BLD AUTO: 0.06 K/UL — SIGNIFICANT CHANGE UP (ref 0–0.2)
BASOPHILS NFR BLD AUTO: 1 % — SIGNIFICANT CHANGE UP (ref 0–2)
BUN SERPL-MCNC: 44 MG/DL — HIGH (ref 7–23)
CALCIUM SERPL-MCNC: 9.1 MG/DL — SIGNIFICANT CHANGE UP (ref 8.4–10.5)
CHLORIDE SERPL-SCNC: 95 MMOL/L — LOW (ref 98–107)
CO2 SERPL-SCNC: 22 MMOL/L — SIGNIFICANT CHANGE UP (ref 22–31)
CREAT SERPL-MCNC: 6.11 MG/DL — HIGH (ref 0.5–1.3)
EOSINOPHIL # BLD AUTO: 0.53 K/UL — HIGH (ref 0–0.5)
EOSINOPHIL NFR BLD AUTO: 9.1 % — HIGH (ref 0–6)
GLUCOSE SERPL-MCNC: 77 MG/DL — SIGNIFICANT CHANGE UP (ref 70–99)
HCT VFR BLD CALC: 27.5 % — LOW (ref 39–50)
HGB BLD-MCNC: 8.6 G/DL — LOW (ref 13–17)
IANC: 3.53 K/UL — SIGNIFICANT CHANGE UP (ref 1.5–8.5)
IMM GRANULOCYTES NFR BLD AUTO: 0.3 % — SIGNIFICANT CHANGE UP (ref 0–1.5)
LYMPHOCYTES # BLD AUTO: 0.98 K/UL — LOW (ref 1–3.3)
LYMPHOCYTES # BLD AUTO: 16.9 % — SIGNIFICANT CHANGE UP (ref 13–44)
MAGNESIUM SERPL-MCNC: 2.1 MG/DL — SIGNIFICANT CHANGE UP (ref 1.6–2.6)
MCHC RBC-ENTMCNC: 27 PG — SIGNIFICANT CHANGE UP (ref 27–34)
MCHC RBC-ENTMCNC: 31.3 GM/DL — LOW (ref 32–36)
MCV RBC AUTO: 86.5 FL — SIGNIFICANT CHANGE UP (ref 80–100)
MONOCYTES # BLD AUTO: 0.69 K/UL — SIGNIFICANT CHANGE UP (ref 0–0.9)
MONOCYTES NFR BLD AUTO: 11.9 % — SIGNIFICANT CHANGE UP (ref 2–14)
NEUTROPHILS # BLD AUTO: 3.53 K/UL — SIGNIFICANT CHANGE UP (ref 1.8–7.4)
NEUTROPHILS NFR BLD AUTO: 60.8 % — SIGNIFICANT CHANGE UP (ref 43–77)
NRBC # BLD: 0 /100 WBCS — SIGNIFICANT CHANGE UP
NRBC # FLD: 0 K/UL — SIGNIFICANT CHANGE UP
PHOSPHATE SERPL-MCNC: 6.6 MG/DL — HIGH (ref 2.5–4.5)
PLATELET # BLD AUTO: 151 K/UL — SIGNIFICANT CHANGE UP (ref 150–400)
POTASSIUM SERPL-MCNC: 4.9 MMOL/L — SIGNIFICANT CHANGE UP (ref 3.5–5.3)
POTASSIUM SERPL-SCNC: 4.9 MMOL/L — SIGNIFICANT CHANGE UP (ref 3.5–5.3)
RBC # BLD: 3.18 M/UL — LOW (ref 4.2–5.8)
RBC # FLD: 16 % — HIGH (ref 10.3–14.5)
SODIUM SERPL-SCNC: 135 MMOL/L — SIGNIFICANT CHANGE UP (ref 135–145)
WBC # BLD: 5.81 K/UL — SIGNIFICANT CHANGE UP (ref 3.8–10.5)
WBC # FLD AUTO: 5.81 K/UL — SIGNIFICANT CHANGE UP (ref 3.8–10.5)

## 2021-07-01 RX ORDER — HYDRALAZINE HCL 50 MG
1 TABLET ORAL
Qty: 120 | Refills: 0
Start: 2021-07-01 | End: 2021-07-30

## 2021-07-01 RX ORDER — NIFEDIPINE 30 MG
1 TABLET, EXTENDED RELEASE 24 HR ORAL
Qty: 30 | Refills: 0
Start: 2021-07-01 | End: 2021-07-30

## 2021-07-01 RX ADMIN — Medication 10 MILLIGRAM(S): at 12:32

## 2021-07-01 RX ADMIN — Medication 100 MICROGRAM(S): at 06:08

## 2021-07-01 RX ADMIN — SEVELAMER CARBONATE 800 MILLIGRAM(S): 2400 POWDER, FOR SUSPENSION ORAL at 12:32

## 2021-07-01 RX ADMIN — Medication 10 MILLIGRAM(S): at 17:06

## 2021-07-01 RX ADMIN — Medication 10 MILLIGRAM(S): at 06:07

## 2021-07-01 RX ADMIN — SEVELAMER CARBONATE 800 MILLIGRAM(S): 2400 POWDER, FOR SUSPENSION ORAL at 17:05

## 2021-07-01 RX ADMIN — Medication 81 MILLIGRAM(S): at 17:07

## 2021-07-01 RX ADMIN — PANTOPRAZOLE SODIUM 40 MILLIGRAM(S): 20 TABLET, DELAYED RELEASE ORAL at 06:07

## 2021-07-01 RX ADMIN — CARVEDILOL PHOSPHATE 12.5 MILLIGRAM(S): 80 CAPSULE, EXTENDED RELEASE ORAL at 06:07

## 2021-07-01 RX ADMIN — SEVELAMER CARBONATE 800 MILLIGRAM(S): 2400 POWDER, FOR SUSPENSION ORAL at 10:19

## 2021-07-01 RX ADMIN — CARVEDILOL PHOSPHATE 12.5 MILLIGRAM(S): 80 CAPSULE, EXTENDED RELEASE ORAL at 17:06

## 2021-07-01 RX ADMIN — CHLORHEXIDINE GLUCONATE 1 APPLICATION(S): 213 SOLUTION TOPICAL at 06:11

## 2021-07-01 RX ADMIN — Medication 60 MILLIGRAM(S): at 06:07

## 2021-07-01 RX ADMIN — Medication 325 MILLIGRAM(S): at 17:07

## 2021-07-01 RX ADMIN — Medication 1 MILLIGRAM(S): at 17:07

## 2021-07-01 NOTE — DISCHARGE NOTE PROVIDER - HOSPITAL COURSE
66M with PMHx HTN, anemia, thrombocytopenia, hyperparathyroidism, and ESRD on HD (T/Th/Sat) via permacath p/w SOB and weakness likely secondary to fluid overload. Admitted to ICU for urgent HD. Course complicated by hypoxic/ hypercapneic respiratory failure requiring BIPAP and nasal cannula. Patient weaned off supplemental O2 and stable on room air. Patient developed fever without acute symptoms. CXR with multifocal opacities suspicious for PNA. RVP/ COVID negative. Placed on antibiotics in the ICU. Cultures negative and ABX discontinued. Per nephrology, patient ok to be discharged today and resume HD on 7/3 at outpatient site.            66M with PMHx HTN, anemia, thrombocytopenia, hyperparathyroidism, and ESRD on HD (T/Th/Sat) via permacath p/w SOB and weakness likely secondary to fluid overload. Admitted to ICU for urgent HD. Course complicated by hypoxic/ hypercapneic respiratory failure requiring BIPAP and nasal cannula. Patient weaned off supplemental O2 and stable on room air. Patient developed fever without acute symptoms. CXR with multifocal opacities suspicious for PNA. RVP/ COVID negative. Placed on antibiotics in the ICU. Cultures negative and ABX discontinued. Per nephrology, patient ok to be discharged today and resume HD on 7/3 at outpatient site. Patient noted to be anemic and is s/p PRBC this admission. Per nephrology, likely AOCD. Eliquis for atrial fibrillation held and Hgb remained stable. Per Dr. Christian, resume Eliquis on discharge.     Patient seen and evaluated. Reviewed discharge medications with patient and attending. All new medications requiring new prescriptions were sent to the pharmacy of patient's choice. Reviewed need for prescription for previous home medications and new prescriptions sent if requested. Medically cleared/stable for discharge as per Dr. Christian on 7/1/2021 with appropriate follow up. Patient understands and agrees with plan of care.

## 2021-07-01 NOTE — DISCHARGE NOTE PROVIDER - NPI NUMBER (FOR SYSADMIN USE ONLY) :
surgical team at bedside readjusting placement of ng tube after multiple episodes of vomiting.  ng tube collection container replaced, 600mls of output noted.  family at bedside, pt awaiting bed assignment, Will continue to monitor. [[id number]]

## 2021-07-01 NOTE — DISCHARGE NOTE PROVIDER - CARE PROVIDER_API CALL
Sharon Robertson)  Surgery; Vascular Surgery  [address], [address]  [place] [address]  Phone: [phone]  Fax: [phone]  Follow Up Time:

## 2021-07-01 NOTE — DISCHARGE NOTE PROVIDER - NSDCFUADDAPPT_GEN_ALL_CORE_FT
Follow up with vascular Dr. Robertson within two weeks of discharge for monitoring of your AV fistula.

## 2021-07-01 NOTE — DISCHARGE NOTE PROVIDER - NSDCCPCAREPLAN_GEN_ALL_CORE_FT
PRINCIPAL DISCHARGE DIAGNOSIS  Diagnosis: Acute pulmonary edema  Assessment and Plan of Treatment:       SECONDARY DISCHARGE DIAGNOSES  Diagnosis: Cellulitis  Assessment and Plan of Treatment:     Diagnosis: Multifocal pneumonia  Assessment and Plan of Treatment:      PRINCIPAL DISCHARGE DIAGNOSIS  Diagnosis: Acute pulmonary edema  Assessment and Plan of Treatment: You came to the hospital with shortness of breath likely from volume overload. You had urgent dialysis in the ICU and your symptoms has improved. You were placed on BIPAP to improve your respiratory status and now are stable off oxygen. Your dialysis is to resume on 7/3/2021. Follow up with your primary care physician or nephrologist within one week of discharge.      SECONDARY DISCHARGE DIAGNOSES  Diagnosis: Multifocal pneumonia  Assessment and Plan of Treatment:     Diagnosis: Atrial fibrillation  Assessment and Plan of Treatment: Continue with your Eliquis for anticoagulation for atrial fibrillation. Your blood work demonstrates anemia that     PRINCIPAL DISCHARGE DIAGNOSIS  Diagnosis: Acute pulmonary edema  Assessment and Plan of Treatment: You came to the hospital with shortness of breath likely from volume overload. You had urgent dialysis in the ICU and your symptoms has improved. You were placed on BIPAP to improve your respiratory status and now are stable off oxygen. Your dialysis is to resume on 7/3/2021. Follow up with your primary care physician or nephrologist within one week of discharge.      SECONDARY DISCHARGE DIAGNOSES  Diagnosis: Atrial fibrillation  Assessment and Plan of Treatment: Continue with your Eliquis for anticoagulation for atrial fibrillation. Your blood work demonstrates anemia and you received blood transfusions this admission. Your eliquis was stopped in the hospital because of this and is being started gain for discharge. Please follow up with your primary care physician within one week for blood work to make sure your blood counts are stable.    Diagnosis: HTN (hypertension)  Assessment and Plan of Treatment: Your blood pressure was elevated this admission and your blood pressure medications were adjusted. Please take as prescribed anf ollow up with your PCP within one week of discharge for blood pressure check and medication management.

## 2021-07-01 NOTE — DISCHARGE NOTE PROVIDER - NSDCMRMEDTOKEN_GEN_ALL_CORE_FT
aspirin 81 mg oral delayed release tablet: 1 tab(s) orally once a day  atorvastatin 40 mg oral tablet: 1 tab(s) orally once a day (at bedtime)  Coreg 12.5 mg oral tablet: 1 tab(s) orally 2 times a day  Eliquis 5 mg oral tablet: 1 tab(s) orally 2 times a day   ferrous sulfate 325 mg (65 mg elemental iron) oral tablet: 1 tab(s) orally once a day  folic acid 1 mg oral tablet: 1 tab(s) orally once a day  levothyroxine 100 mcg (0.1 mg) oral tablet: 1 tab(s) orally once a day  NexIUM 40 mg oral delayed release capsule: 1 cap(s) orally once a day  NIFEdipine 30 mg oral tablet, extended release: 1 tab(s) orally once a day (at bedtime)  Physical Therapy : 3 times a week   Renvela 800 mg oral tablet: 1 tab(s) orally 3 times a day (with meals)   aspirin 81 mg oral delayed release tablet: 1 tab(s) orally once a day  atorvastatin 40 mg oral tablet: 1 tab(s) orally once a day (at bedtime)  Coreg 12.5 mg oral tablet: 1 tab(s) orally 2 times a day  Eliquis 5 mg oral tablet: 1 tab(s) orally 2 times a day   ferrous sulfate 325 mg (65 mg elemental iron) oral tablet: 1 tab(s) orally once a day  folic acid 1 mg oral tablet: 1 tab(s) orally once a day  hydrALAZINE 10 mg oral tablet: 1 tab(s) orally every 6 hours  levothyroxine 100 mcg (0.1 mg) oral tablet: 1 tab(s) orally once a day  NexIUM 40 mg oral delayed release capsule: 1 cap(s) orally once a day  NIFEdipine 60 mg oral tablet, extended release: 1 tab(s) orally once a day   Renvela 800 mg oral tablet: 1 tab(s) orally 3 times a day (with meals)

## 2021-07-01 NOTE — DISCHARGE NOTE NURSING/CASE MANAGEMENT/SOCIAL WORK - PATIENT PORTAL LINK FT
You can access the FollowMyHealth Patient Portal offered by [facility] by registering at the following website: [email or web address]. By joining [facility]’s FollowMyHealth portal, you will also be able to view your health information using other applications (apps) compatible with our system.

## 2021-07-04 LAB
CULTURE RESULTS: SIGNIFICANT CHANGE UP
CULTURE RESULTS: SIGNIFICANT CHANGE UP
SPECIMEN SOURCE: SIGNIFICANT CHANGE UP
SPECIMEN SOURCE: SIGNIFICANT CHANGE UP

## 2021-07-21 ENCOUNTER — APPOINTMENT (OUTPATIENT)
Dept: VASCULAR SURGERY | Facility: CLINIC | Age: 66
End: 2021-07-21
Payer: MEDICAID

## 2021-07-21 VITALS
DIASTOLIC BLOOD PRESSURE: 89 MMHG | HEART RATE: 79 BPM | SYSTOLIC BLOOD PRESSURE: 140 MMHG | TEMPERATURE: 99 F | BODY MASS INDEX: 21.48 KG/M2 | HEIGHT: 69 IN | WEIGHT: 145 LBS

## 2021-07-21 PROCEDURE — 99024 POSTOP FOLLOW-UP VISIT: CPT

## 2021-07-21 PROCEDURE — 93990 DOPPLER FLOW TESTING: CPT

## 2021-07-25 ENCOUNTER — APPOINTMENT (OUTPATIENT)
Dept: DISASTER EMERGENCY | Facility: CLINIC | Age: 66
End: 2021-07-25

## 2021-07-25 DIAGNOSIS — Z01.818 ENCOUNTER FOR OTHER PREPROCEDURAL EXAMINATION: ICD-10-CM

## 2021-07-26 LAB — SARS-COV-2 N GENE NPH QL NAA+PROBE: NOT DETECTED

## 2021-07-28 ENCOUNTER — APPOINTMENT (OUTPATIENT)
Dept: VASCULAR SURGERY | Facility: HOSPITAL | Age: 66
End: 2021-07-28

## 2021-07-28 ENCOUNTER — OUTPATIENT (OUTPATIENT)
Dept: OUTPATIENT SERVICES | Facility: HOSPITAL | Age: 66
LOS: 1 days | Discharge: HOME | End: 2021-07-28
Payer: MEDICAID

## 2021-07-28 DIAGNOSIS — N18.6 END STAGE RENAL DISEASE: ICD-10-CM

## 2021-07-28 DIAGNOSIS — I77.0 ARTERIOVENOUS FISTULA, ACQUIRED: Chronic | ICD-10-CM

## 2021-07-28 PROCEDURE — 99152 MOD SED SAME PHYS/QHP 5/>YRS: CPT

## 2021-07-28 PROCEDURE — 76937 US GUIDE VASCULAR ACCESS: CPT | Mod: 26

## 2021-07-28 PROCEDURE — 36902 INTRO CATH DIALYSIS CIRCUIT: CPT | Mod: 58

## 2021-07-28 RX ORDER — SODIUM CHLORIDE 9 MG/ML
3 INJECTION INTRAMUSCULAR; INTRAVENOUS; SUBCUTANEOUS EVERY 8 HOURS
Refills: 0 | Status: DISCONTINUED | OUTPATIENT
Start: 2021-07-28 | End: 2021-08-11

## 2021-07-28 NOTE — H&P CARDIOLOGY - REVIEW OF SYSTEMS
Patient denies chest pain, SOB, palpitations, dizziness, presyncope, syncope,  headache, visual disturbances, CVA, PE, DVT, MOLLY, abdominal pain, N/V/D/C, hematochezia, melena, dysuria, hematuria, fever, chills.

## 2021-07-28 NOTE — H&P CARDIOLOGY - HISTORY OF PRESENT ILLNESS
66 y o Male with a PMH of HTN, anemia, thrombocytopenia, hyperparathyroidism,A Fib ( on Eliquis), ESRD on HD (T/Th/Sat) via permacath presented today for Balloon Assisted Maturation of right AVF.   Patient denies chest pain, SOB, palpitations, dizziness, presyncope, syncope,  headache, visual disturbances, CVA, PE, DVT, MOLLY, abdominal pain, N/V/D/C, hematochezia, melena, dysuria, hematuria, fever, chills.

## 2021-07-28 NOTE — H&P CARDIOLOGY - PSH
Acquired arteriovenous fistula  left wrist  1/2015 - [facility], Left upper arm 4/2015 (approximate date)  AVF (arteriovenous fistula)    S/P Nephrectomy  (L) 2007

## 2021-07-28 NOTE — H&P CARDIOLOGY - ATTENDING COMMENTS
I have discussed the case with the surgical house staff. I have personally seen, examined, and participated in the care of this patient. I have reviewed all pertinent clinical information.    right rcf, not yet ready to use  plan for balloon assisted maturation  Risks, benefits, and alternatives of the procedure were discussed with the patient and family. All questions were answered. They agree to proceed with the procedure.

## 2021-08-02 NOTE — CHART NOTE - NSCHARTNOTEFT_GEN_A_CORE
Subjective   Patient ID: Renate is a 23 year old female.    Chief Complaint   Patient presents with   • Follow-up     needs physical         HPI      23-year-old female no past medical history here for school physical.  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   Overall healthy no medical issues.  Semester this year.    Sattred Jan 2020  Class cnacled mid way due to pandemic   retryign this year [employer]    Has one semster to compelpte    Past Medical History:   Diagnosis Date   • No known problems        Past Surgical History:   Procedure Laterality Date   • No past surgeries         Social History     Tobacco Use   • Smoking status: Never Smoker   • Smokeless tobacco: Never Used   Vaping Use   • Vaping Use: never used   Substance Use Topics   • Alcohol use: Yes     Comment: once a week   • Drug use: Yes     Types: Marijuana     Comment: 1-2 times/week       Family History   Problem Relation Age of Onset   • Patient is unaware of any medical problems Mother    • Patient is unaware of any medical problems Father    • Cancer Neg Hx    • Diabetes Neg Hx    • Heart disease Neg Hx        OB History   No obstetric history on file.        ALLERGIES:  No Known Allergies    No current outpatient medications on file.     No current facility-administered medications for this visit.        Review of Systems:    Constitutional:       Denies fever, chills or fatigue, no night sweats, weight stable  Ophthalmologic:    Negative   HEENT/Neck:        Negative  Respiratory:          Denies cough or SOB   Cardiovascular:      Denies CP or  palpitations no PND or orthopnea  Gastroenterology:  No abdominal pain , No diarrhea or constipation.No nausea or vomiting.  Genitourinary:        Negative  Musculoskeletal:    Denies back pain or  joint aches   Dermatology:         No  Called to see pt several times for oozing from permacath dressing   Vascular surgery has been called and saw pt each time dressing was redone with surgicell / iv bag with pressure and continues to ooze  6pm dressing again with blood noted -vascular to see patient   BP stable HR stable   Stat cbc sent and pending   Heparin dc - vascular aware rash  Neurologic:            No headaches, no migraine, no headache, no dizziness  PSYCH:                No Anxiety, Depression or sleep issues     Vitals:    08/02/21 1607   BP: 114/72   BP Location: LUE - Left upper extremity   Patient Position: Sitting   Cuff Size: Regular   Pulse: 65   Resp: 16   Temp: 96 °F (35.6 °C)   TempSrc: Temporal   SpO2: 97%   Weight: 94 kg (207 lb 3.7 oz)   Height: 5' (1.524 m)   PainSc:  0   LMP: 07/23/2021        Physical Exam:  Constitutional: Well appearing, no acute distress, overweight   Head: Normocephalic and atraumatic.   HEENT: TM intact, no cerumen or erythema ear canal  Neck: Supple, no palpable cervical or supraclavicular lymphadenopathy, no palpable thyroid mass  CV:  S1 S2 RRR, no murmur or gallop   Lung: Good bilateral air entry, no rales or rhonchi.   Abdominal: Soft. Bowel sounds are normal, non tender, no rebound or guarding  Musk: Back  - No Deformity , no spinal process tenderness, no joint erythema or inflammation, ROM intact  EXT: No edema  Lower ext  Neuro: Alert and oriented to person, place, and time, normal motor tone, normal gait  Skin:  No rash   Psychiatric: Normal mood and affect. Judgment and thought content normal.         Assessment   Problem List Items Addressed This Visit        Health Encounters    School physical exam - Primary     See scanned  copy under media [facility] career physical examination report           Relevant Orders    TETANUS DIPHTHERIA ACELLULAR PERTUSSIS VACC, 10+ YRS (BOOSTRIX) (Completed)       Infectious Diseases    Need for Tdap vaccination    Relevant Orders    TETANUS DIPHTHERIA ACELLULAR PERTUSSIS VACC, 10+ YRS (BOOSTRIX) (Completed)        I ordered QuantiFERON gold for TB screening and I provided signed form pending test results however later medical assistant went for the blood test patient decided not to have the QuantiFERON gold.          Portions of this  note may have been created using the  MRO voice recognition system.  Errors in content may be related to improper recognition of the system.  Effort to review and correct the note has been made but irregularities may still be present.  Medication reconciliation was done but medications not prescribed by me may be inaccurate.    Schedule follow up: as needed

## 2021-08-16 ENCOUNTER — APPOINTMENT (OUTPATIENT)
Dept: INTERNAL MEDICINE | Facility: CLINIC | Age: 66
End: 2021-08-16

## 2021-08-20 DIAGNOSIS — E03.9 HYPOTHYROIDISM, UNSPECIFIED: ICD-10-CM

## 2021-08-20 DIAGNOSIS — Z86.2 PERSONAL HISTORY OF DISEASES OF THE BLOOD AND BLOOD-FORMING ORGANS AND CERTAIN DISORDERS INVOLVING THE IMMUNE MECHANISM: ICD-10-CM

## 2021-08-20 DIAGNOSIS — D64.9 ANEMIA, UNSPECIFIED: ICD-10-CM

## 2021-08-20 RX ORDER — ESOMEPRAZOLE MAGNESIUM 40 MG/1
40 CAPSULE, DELAYED RELEASE ORAL DAILY
Refills: 0 | Status: DISCONTINUED | COMMUNITY
End: 2021-08-20

## 2021-08-20 RX ORDER — CHLORHEXIDINE GLUCONATE 4 %
325 (65 FE) LIQUID (ML) TOPICAL DAILY
Refills: 0 | Status: ACTIVE | COMMUNITY

## 2021-08-20 RX ORDER — FOLIC ACID 1 MG/1
1 TABLET ORAL DAILY
Refills: 0 | Status: ACTIVE | COMMUNITY

## 2021-08-20 RX ORDER — LEVOTHYROXINE SODIUM 0.1 MG/1
100 TABLET ORAL DAILY
Refills: 0 | Status: ACTIVE | COMMUNITY

## 2021-08-20 RX ORDER — CINACALCET HYDROCHLORIDE 90 MG/1
TABLET, COATED ORAL
Refills: 0 | Status: DISCONTINUED | COMMUNITY
End: 2021-08-20

## 2021-08-20 RX ORDER — SEVELAMER CARBONATE 800 MG/1
800 TABLET, FILM COATED ORAL
Refills: 0 | Status: ACTIVE | COMMUNITY

## 2021-08-20 RX ORDER — SEVELAMER CARBONATE 800 MG/1
TABLET, FILM COATED ORAL
Refills: 0 | Status: DISCONTINUED | COMMUNITY
End: 2021-08-20

## 2021-08-20 RX ORDER — ATORVASTATIN CALCIUM 40 MG/1
40 TABLET, FILM COATED ORAL AT BEDTIME
Refills: 0 | Status: ACTIVE | COMMUNITY

## 2021-08-20 RX ORDER — CLOPIDOGREL BISULFATE 75 MG/1
75 TABLET, FILM COATED ORAL DAILY
Qty: 90 | Refills: 3 | Status: DISCONTINUED | COMMUNITY
Start: 2019-06-18 | End: 2021-08-20

## 2021-08-20 RX ORDER — CARVEDILOL 12.5 MG/1
12.5 TABLET, FILM COATED ORAL TWICE DAILY
Refills: 0 | Status: ACTIVE | COMMUNITY

## 2021-08-20 RX ORDER — LOSARTAN POTASSIUM 100 MG/1
TABLET, FILM COATED ORAL
Refills: 0 | Status: DISCONTINUED | COMMUNITY
End: 2021-08-20

## 2021-08-20 RX ORDER — AMLODIPINE BESYLATE 5 MG/1
TABLET ORAL
Refills: 0 | Status: DISCONTINUED | COMMUNITY
End: 2021-08-20

## 2021-08-20 RX ORDER — ASPIRIN ENTERIC COATED TABLETS 81 MG 81 MG/1
81 TABLET, DELAYED RELEASE ORAL
Refills: 0 | Status: ACTIVE | COMMUNITY

## 2021-08-20 RX ORDER — NIFEDIPINE 60 MG/1
60 TABLET, EXTENDED RELEASE ORAL DAILY
Refills: 0 | Status: ACTIVE | COMMUNITY

## 2021-08-20 RX ORDER — APIXABAN 5 MG/1
5 TABLET, FILM COATED ORAL TWICE DAILY
Refills: 0 | Status: ACTIVE | COMMUNITY

## 2021-08-20 RX ORDER — HYDRALAZINE HYDROCHLORIDE 10 MG/1
10 TABLET ORAL EVERY 6 HOURS
Refills: 0 | Status: ACTIVE | COMMUNITY

## 2021-08-24 ENCOUNTER — APPOINTMENT (OUTPATIENT)
Dept: VASCULAR SURGERY | Facility: CLINIC | Age: 66
End: 2021-08-24
Payer: MEDICAID

## 2021-08-24 VITALS — WEIGHT: 134.48 LBS | HEIGHT: 69 IN | BODY MASS INDEX: 19.92 KG/M2 | TEMPERATURE: 96.5 F

## 2021-08-24 DIAGNOSIS — I77.0 ARTERIOVENOUS FISTULA, ACQUIRED: ICD-10-CM

## 2021-08-24 DIAGNOSIS — N18.9 CHRONIC KIDNEY DISEASE, UNSPECIFIED: ICD-10-CM

## 2021-08-24 PROCEDURE — 99024 POSTOP FOLLOW-UP VISIT: CPT

## 2021-08-24 PROCEDURE — 93990 DOPPLER FLOW TESTING: CPT

## 2021-08-25 NOTE — ASSESSMENT
[FreeTextEntry1] : CHAVEZ MARQUEZ is a 66 year old male with ESRD on HD currently via right chest permcath presents for followup. \par \par > ESRD on HD via right chest PermCath, status post right RCF, status post balloon angioplasty.\par –Reviewed results of duplex with patient. Fistula still appears to be small in caliber although has improved significantly since pre BAM procedure. We will plan for repeat fistulogram and balloon assisted maturation.

## 2021-08-25 NOTE — DATA REVIEWED
[FreeTextEntry1] : 8/24/2021-RUE fistula duplex\par Patent radial-cephalic AVF with multiple branches noted throughout forearm. No evidence of significant stenosis. Measuring up to 4.9mm in diameter. Volume flow 583 mL/min.

## 2021-08-25 NOTE — PHYSICAL EXAM
[Calm] : calm [de-identified] : Well-appearing  [FreeTextEntry1] : Palpable right radial pulse. palpable thrill overlying fistula.  [de-identified] : motor and sensation intact in all four extremities  [de-identified] : A&Ox4

## 2021-08-25 NOTE — HISTORY OF PRESENT ILLNESS
[FreeTextEntry1] : Patient with history of ESRD on HD via right chest permcath. Prior history of thrombosed left arm fistula.\par 6/3/2021–right radiocephalic fistula creation. [de-identified] : 7/28/2021–right arm fistulogram with balloon angioplasty using 6mm balloon.\par \par 8/24/2021–Patient presents for follow-up. Undergoes HD via right chest permcath T,T,S. No issues with right arm. Denies right hand pain.

## 2021-08-30 ENCOUNTER — APPOINTMENT (OUTPATIENT)
Dept: DISASTER EMERGENCY | Facility: CLINIC | Age: 66
End: 2021-08-30

## 2021-08-31 LAB — SARS-COV-2 N GENE NPH QL NAA+PROBE: NOT DETECTED

## 2021-09-01 ENCOUNTER — APPOINTMENT (OUTPATIENT)
Dept: CARDIOLOGY | Facility: CLINIC | Age: 66
End: 2021-09-01
Payer: MEDICAID

## 2021-09-01 ENCOUNTER — NON-APPOINTMENT (OUTPATIENT)
Age: 66
End: 2021-09-01

## 2021-09-01 VITALS
DIASTOLIC BLOOD PRESSURE: 66 MMHG | BODY MASS INDEX: 19.85 KG/M2 | SYSTOLIC BLOOD PRESSURE: 105 MMHG | WEIGHT: 134 LBS | HEIGHT: 69 IN | RESPIRATION RATE: 16 BRPM | TEMPERATURE: 97.4 F | OXYGEN SATURATION: 97 % | HEART RATE: 75 BPM

## 2021-09-01 DIAGNOSIS — I34.0 NONRHEUMATIC MITRAL (VALVE) INSUFFICIENCY: ICD-10-CM

## 2021-09-01 DIAGNOSIS — I10 ESSENTIAL (PRIMARY) HYPERTENSION: ICD-10-CM

## 2021-09-01 DIAGNOSIS — Z87.09 PERSONAL HISTORY OF OTHER DISEASES OF THE RESPIRATORY SYSTEM: ICD-10-CM

## 2021-09-01 DIAGNOSIS — I51.89 OTHER ILL-DEFINED HEART DISEASES: ICD-10-CM

## 2021-09-01 PROCEDURE — 93000 ELECTROCARDIOGRAM COMPLETE: CPT

## 2021-09-01 PROCEDURE — 99215 OFFICE O/P EST HI 40 MIN: CPT

## 2021-09-02 ENCOUNTER — APPOINTMENT (OUTPATIENT)
Dept: VASCULAR SURGERY | Facility: HOSPITAL | Age: 66
End: 2021-09-02

## 2021-09-02 ENCOUNTER — OUTPATIENT (OUTPATIENT)
Dept: OUTPATIENT SERVICES | Facility: HOSPITAL | Age: 66
LOS: 1 days | Discharge: HOME | End: 2021-09-02
Payer: MEDICAID

## 2021-09-02 DIAGNOSIS — I77.0 ARTERIOVENOUS FISTULA, ACQUIRED: Chronic | ICD-10-CM

## 2021-09-02 DIAGNOSIS — N18.6 END STAGE RENAL DISEASE: ICD-10-CM

## 2021-09-02 PROCEDURE — 36902 INTRO CATH DIALYSIS CIRCUIT: CPT

## 2021-09-02 PROCEDURE — 36909 DIALYSIS CIRCUIT EMBOLJ: CPT

## 2021-09-02 PROCEDURE — 99152 MOD SED SAME PHYS/QHP 5/>YRS: CPT

## 2021-09-02 PROCEDURE — 76937 US GUIDE VASCULAR ACCESS: CPT | Mod: 26

## 2021-09-02 RX ORDER — SODIUM CHLORIDE 9 MG/ML
3 INJECTION INTRAMUSCULAR; INTRAVENOUS; SUBCUTANEOUS EVERY 8 HOURS
Refills: 0 | Status: DISCONTINUED | OUTPATIENT
Start: 2021-09-02 | End: 2021-09-16

## 2021-09-02 NOTE — H&P CARDIOLOGY - NSICDXPASTMEDICALHX_GEN_ALL_CORE_FT
PAST MEDICAL HISTORY:  Anemia     Atrial fibrillation on Eliquis    Chronic kidney disease     Hemodialysis access, AV graft Left upper extremity    HTN (Hypertension)     Hyperparathyroidism     Kidney stones     Thrombocytopenia

## 2021-09-02 NOTE — H&P CARDIOLOGY - ATTENDING COMMENTS
I have discussed the case with the surgical house staff. I have personally seen, examined, and participated in the care of this patient. I have reviewed all pertinent clinical information.    plan for fistulogram.  Risks, benefits, and alternatives of the procedure were discussed with the patient. All questions were answered. He agrees to proceed with the procedure.

## 2021-09-02 NOTE — H&P CARDIOLOGY - REVIEW OF SYSTEMS
The patient denies chest pain, SOB, palpitations, dizziness, presyncope, syncope,  headache, visual disturbances, CVA, PE, DVT, MOLLY, abdominal pain, N/V/D/C, hematochezia, melena, dysuria, hematuria, fever, chills.

## 2021-09-02 NOTE — H&P CARDIOLOGY - HISTORY OF PRESENT ILLNESS
66 y.o. male presents today for elective R AV fistulogram to r/o stenosis.   The patient denies chest pain, SOB, palpitations, dizziness, presyncope, syncope,  headache, visual disturbances, CVA, PE, DVT, MOLLY, abdominal pain, N/V/D/C, hematochezia, melena, dysuria, hematuria, fever, chills.  L AV fistula - dysfunctional. At present R IJ Olga for dialysis access.

## 2021-09-02 NOTE — H&P CARDIOLOGY - NSICDXPASTSURGICALHX_GEN_ALL_CORE_FT
PAST SURGICAL HISTORY:  Acquired arteriovenous fistula left wrist  1/2015 - [facility], Left upper arm 4/2015 (approximate date)    AVF (arteriovenous fistula)     S/P Nephrectomy (L) 2007

## 2021-09-08 PROBLEM — I48.91 UNSPECIFIED ATRIAL FIBRILLATION: Chronic | Status: ACTIVE | Noted: 2021-09-02

## 2021-10-05 ENCOUNTER — APPOINTMENT (OUTPATIENT)
Dept: VASCULAR SURGERY | Facility: CLINIC | Age: 66
End: 2021-10-05
Payer: MEDICAID

## 2021-10-05 VITALS
TEMPERATURE: 97.3 F | HEART RATE: 73 BPM | WEIGHT: 134 LBS | BODY MASS INDEX: 19.85 KG/M2 | SYSTOLIC BLOOD PRESSURE: 138 MMHG | DIASTOLIC BLOOD PRESSURE: 78 MMHG | HEIGHT: 69 IN

## 2021-10-05 DIAGNOSIS — N18.6 END STAGE RENAL DISEASE: ICD-10-CM

## 2021-10-05 DIAGNOSIS — Z99.2 END STAGE RENAL DISEASE: ICD-10-CM

## 2021-10-05 PROCEDURE — 93990 DOPPLER FLOW TESTING: CPT

## 2021-10-05 PROCEDURE — 99213 OFFICE O/P EST LOW 20 MIN: CPT

## 2021-10-05 NOTE — HISTORY OF PRESENT ILLNESS
[FreeTextEntry1] : Patient with history of ESRD on HD via right chest permcath. Prior history of thrombosed left arm fistula.\par 6/3/2021–right radiocephalic fistula creation.\par \par 7/28/2021–right arm fistulogram with balloon angioplasty using 6mm balloon.\par \par 8/24/2021–Patient presents for follow-up. Undergoes HD via right chest permcath T,T,S. No issues with right arm. Denies right hand pain.  [de-identified] : 9/2/2021–right arm fistulogram with balloon angioplasty\par \par 10/5/2021–Patient presents for follow-up.  Doing well post procedure.  Denies any right hand pain, weakness, numbness.

## 2021-10-05 NOTE — PHYSICAL EXAM
[Calm] : calm [de-identified] : Well-appearing  [FreeTextEntry1] : Palpable right radial pulse. palpable thrill overlying fistula.  [de-identified] : motor and sensation intact in all four extremities  [de-identified] : A&Ox4

## 2021-10-05 NOTE — ASSESSMENT
[FreeTextEntry1] : CHAVEZ MARQUEZ is a 66 year old male with ESRD on HD currently via right chest permcath presents for followup. \par \par > ESRD on HD via right chest PermCath, status post right RCF, status post balloon angioplasty.\par –Reviewed results of duplex with patient. \par –Okay to start using right arm fistula for hemodialysis access.

## 2021-10-05 NOTE — DATA REVIEWED
[FreeTextEntry1] : 10/5/2021–right upper extremity fistula duplex\par Patent radiocephalic fistula without evidence of stenosis or thrombosis.  There are multiple branches.  Fistula measures 6.1 mm to 6.3 mm in size.  Volume flow rate is 603 mL/min.\par --------------------------------------\par 8/24/2021-RUE fistula duplex\par Patent radial-cephalic AVF with multiple branches noted throughout forearm. No evidence of significant stenosis. Measuring up to 4.9mm in diameter. Volume flow 583 mL/min.

## 2021-10-15 ENCOUNTER — INPATIENT (INPATIENT)
Facility: HOSPITAL | Age: 66
LOS: 20 days | Discharge: HOME | End: 2021-11-05
Attending: INTERNAL MEDICINE | Admitting: INTERNAL MEDICINE
Payer: MEDICAID

## 2021-10-15 VITALS
DIASTOLIC BLOOD PRESSURE: 97 MMHG | OXYGEN SATURATION: 100 % | SYSTOLIC BLOOD PRESSURE: 160 MMHG | TEMPERATURE: 98 F | RESPIRATION RATE: 16 BRPM | HEART RATE: 65 BPM | HEIGHT: 69 IN

## 2021-10-15 DIAGNOSIS — T82.898A OTHER SPECIFIED COMPLICATION OF VASCULAR PROSTHETIC DEVICES, IMPLANTS AND GRAFTS, INITIAL ENCOUNTER: ICD-10-CM

## 2021-10-15 DIAGNOSIS — T82.7XXA INFECTION AND INFLAMMATORY REACTION DUE TO OTHER CARDIAC AND VASCULAR DEVICES, IMPLANTS AND GRAFTS, INITIAL ENCOUNTER: ICD-10-CM

## 2021-10-15 DIAGNOSIS — N18.6 END STAGE RENAL DISEASE: ICD-10-CM

## 2021-10-15 DIAGNOSIS — I77.0 ARTERIOVENOUS FISTULA, ACQUIRED: Chronic | ICD-10-CM

## 2021-10-15 LAB
ALBUMIN SERPL ELPH-MCNC: 4.2 G/DL — SIGNIFICANT CHANGE UP (ref 3.3–5)
ALP SERPL-CCNC: 165 U/L — HIGH (ref 40–120)
ALT FLD-CCNC: 20 U/L — SIGNIFICANT CHANGE UP (ref 4–41)
ANION GAP SERPL CALC-SCNC: 17 MMOL/L — HIGH (ref 7–14)
APTT BLD: 33.1 SEC — SIGNIFICANT CHANGE UP (ref 27–36.3)
AST SERPL-CCNC: 30 U/L — SIGNIFICANT CHANGE UP (ref 4–40)
B PERT DNA SPEC QL NAA+PROBE: SIGNIFICANT CHANGE UP
B PERT+PARAPERT DNA PNL SPEC NAA+PROBE: SIGNIFICANT CHANGE UP
BASE EXCESS BLDV CALC-SCNC: 4.1 MMOL/L — HIGH (ref -2–3)
BASOPHILS # BLD AUTO: 0.06 K/UL — SIGNIFICANT CHANGE UP (ref 0–0.2)
BASOPHILS NFR BLD AUTO: 0.8 % — SIGNIFICANT CHANGE UP (ref 0–2)
BILIRUB SERPL-MCNC: 0.3 MG/DL — SIGNIFICANT CHANGE UP (ref 0.2–1.2)
BLD GP AB SCN SERPL QL: NEGATIVE — SIGNIFICANT CHANGE UP
BLOOD GAS VENOUS COMPREHENSIVE RESULT: SIGNIFICANT CHANGE UP
BORDETELLA PARAPERTUSSIS (RAPRVP): SIGNIFICANT CHANGE UP
BUN SERPL-MCNC: 42 MG/DL — HIGH (ref 7–23)
C PNEUM DNA SPEC QL NAA+PROBE: SIGNIFICANT CHANGE UP
CALCIUM SERPL-MCNC: 6.3 MG/DL — CRITICAL LOW (ref 8.4–10.5)
CHLORIDE BLDV-SCNC: 99 MMOL/L — SIGNIFICANT CHANGE UP (ref 96–108)
CHLORIDE SERPL-SCNC: 96 MMOL/L — LOW (ref 98–107)
CO2 BLDV-SCNC: 32.9 MMOL/L — HIGH (ref 22–26)
CO2 SERPL-SCNC: 24 MMOL/L — SIGNIFICANT CHANGE UP (ref 22–31)
CREAT SERPL-MCNC: 7.24 MG/DL — HIGH (ref 0.5–1.3)
EOSINOPHIL # BLD AUTO: 0.36 K/UL — SIGNIFICANT CHANGE UP (ref 0–0.5)
EOSINOPHIL NFR BLD AUTO: 5 % — SIGNIFICANT CHANGE UP (ref 0–6)
FLUAV SUBTYP SPEC NAA+PROBE: SIGNIFICANT CHANGE UP
FLUBV RNA SPEC QL NAA+PROBE: SIGNIFICANT CHANGE UP
GAS PNL BLDV: 136 MMOL/L — SIGNIFICANT CHANGE UP (ref 136–145)
GLUCOSE BLDV-MCNC: 88 MG/DL — SIGNIFICANT CHANGE UP (ref 70–99)
GLUCOSE SERPL-MCNC: 82 MG/DL — SIGNIFICANT CHANGE UP (ref 70–99)
HADV DNA SPEC QL NAA+PROBE: SIGNIFICANT CHANGE UP
HCO3 BLDV-SCNC: 31 MMOL/L — HIGH (ref 22–29)
HCOV 229E RNA SPEC QL NAA+PROBE: SIGNIFICANT CHANGE UP
HCOV HKU1 RNA SPEC QL NAA+PROBE: SIGNIFICANT CHANGE UP
HCOV NL63 RNA SPEC QL NAA+PROBE: SIGNIFICANT CHANGE UP
HCOV OC43 RNA SPEC QL NAA+PROBE: SIGNIFICANT CHANGE UP
HCT VFR BLD CALC: 33.7 % — LOW (ref 39–50)
HCT VFR BLDA CALC: 31 % — LOW (ref 39–51)
HGB BLD CALC-MCNC: 10.2 G/DL — LOW (ref 13–17)
HGB BLD-MCNC: 9.9 G/DL — LOW (ref 13–17)
HMPV RNA SPEC QL NAA+PROBE: SIGNIFICANT CHANGE UP
HPIV1 RNA SPEC QL NAA+PROBE: SIGNIFICANT CHANGE UP
HPIV2 RNA SPEC QL NAA+PROBE: SIGNIFICANT CHANGE UP
HPIV3 RNA SPEC QL NAA+PROBE: SIGNIFICANT CHANGE UP
HPIV4 RNA SPEC QL NAA+PROBE: SIGNIFICANT CHANGE UP
IANC: 3.78 K/UL — SIGNIFICANT CHANGE UP (ref 1.5–8.5)
IMM GRANULOCYTES NFR BLD AUTO: 0.4 % — SIGNIFICANT CHANGE UP (ref 0–1.5)
INR BLD: 1.33 RATIO — HIGH (ref 0.88–1.16)
LACTATE BLDV-MCNC: 1.6 MMOL/L — SIGNIFICANT CHANGE UP (ref 0.5–2)
LYMPHOCYTES # BLD AUTO: 2.15 K/UL — SIGNIFICANT CHANGE UP (ref 1–3.3)
LYMPHOCYTES # BLD AUTO: 30.2 % — SIGNIFICANT CHANGE UP (ref 13–44)
M PNEUMO DNA SPEC QL NAA+PROBE: SIGNIFICANT CHANGE UP
MCHC RBC-ENTMCNC: 26.5 PG — LOW (ref 27–34)
MCHC RBC-ENTMCNC: 29.4 GM/DL — LOW (ref 32–36)
MCV RBC AUTO: 90.3 FL — SIGNIFICANT CHANGE UP (ref 80–100)
MONOCYTES # BLD AUTO: 0.75 K/UL — SIGNIFICANT CHANGE UP (ref 0–0.9)
MONOCYTES NFR BLD AUTO: 10.5 % — SIGNIFICANT CHANGE UP (ref 2–14)
NEUTROPHILS # BLD AUTO: 3.78 K/UL — SIGNIFICANT CHANGE UP (ref 1.8–7.4)
NEUTROPHILS NFR BLD AUTO: 53.1 % — SIGNIFICANT CHANGE UP (ref 43–77)
NRBC # BLD: 0 /100 WBCS — SIGNIFICANT CHANGE UP
NRBC # FLD: 0.02 K/UL — HIGH
PCO2 BLDV: 59 MMHG — HIGH (ref 42–55)
PH BLDV: 7.33 — SIGNIFICANT CHANGE UP (ref 7.32–7.43)
PLATELET # BLD AUTO: 157 K/UL — SIGNIFICANT CHANGE UP (ref 150–400)
PO2 BLDV: 43 MMHG — SIGNIFICANT CHANGE UP
POTASSIUM BLDV-SCNC: 4.7 MMOL/L — SIGNIFICANT CHANGE UP (ref 3.5–5.1)
POTASSIUM SERPL-MCNC: 5.6 MMOL/L — HIGH (ref 3.5–5.3)
POTASSIUM SERPL-SCNC: 5.6 MMOL/L — HIGH (ref 3.5–5.3)
PROT SERPL-MCNC: 8.1 G/DL — SIGNIFICANT CHANGE UP (ref 6–8.3)
PROTHROM AB SERPL-ACNC: 15 SEC — HIGH (ref 10.6–13.6)
RAPID RVP RESULT: DETECTED
RBC # BLD: 3.73 M/UL — LOW (ref 4.2–5.8)
RBC # FLD: 18.6 % — HIGH (ref 10.3–14.5)
RH IG SCN BLD-IMP: POSITIVE — SIGNIFICANT CHANGE UP
RSV RNA SPEC QL NAA+PROBE: SIGNIFICANT CHANGE UP
RV+EV RNA SPEC QL NAA+PROBE: DETECTED
SAO2 % BLDV: 69.2 % — SIGNIFICANT CHANGE UP
SARS-COV-2 RNA SPEC QL NAA+PROBE: SIGNIFICANT CHANGE UP
SODIUM SERPL-SCNC: 137 MMOL/L — SIGNIFICANT CHANGE UP (ref 135–145)
TROPONIN T, HIGH SENSITIVITY RESULT: 56 NG/L — CRITICAL HIGH
WBC # BLD: 7.13 K/UL — SIGNIFICANT CHANGE UP (ref 3.8–10.5)
WBC # FLD AUTO: 7.13 K/UL — SIGNIFICANT CHANGE UP (ref 3.8–10.5)

## 2021-10-15 PROCEDURE — 71045 X-RAY EXAM CHEST 1 VIEW: CPT | Mod: 26

## 2021-10-15 PROCEDURE — 93010 ELECTROCARDIOGRAM REPORT: CPT

## 2021-10-15 PROCEDURE — 99285 EMERGENCY DEPT VISIT HI MDM: CPT | Mod: 25

## 2021-10-15 PROCEDURE — 99223 1ST HOSP IP/OBS HIGH 75: CPT

## 2021-10-15 RX ORDER — SODIUM ZIRCONIUM CYCLOSILICATE 10 G/10G
10 POWDER, FOR SUSPENSION ORAL ONCE
Refills: 0 | Status: COMPLETED | OUTPATIENT
Start: 2021-10-15 | End: 2021-10-15

## 2021-10-15 RX ORDER — MORPHINE SULFATE 50 MG/1
4 CAPSULE, EXTENDED RELEASE ORAL ONCE
Refills: 0 | Status: DISCONTINUED | OUTPATIENT
Start: 2021-10-15 | End: 2021-10-15

## 2021-10-15 RX ORDER — PIPERACILLIN AND TAZOBACTAM 4; .5 G/20ML; G/20ML
3.38 INJECTION, POWDER, LYOPHILIZED, FOR SOLUTION INTRAVENOUS ONCE
Refills: 0 | Status: COMPLETED | OUTPATIENT
Start: 2021-10-15 | End: 2021-10-15

## 2021-10-15 RX ORDER — VANCOMYCIN HCL 1 G
1000 VIAL (EA) INTRAVENOUS ONCE
Refills: 0 | Status: COMPLETED | OUTPATIENT
Start: 2021-10-15 | End: 2021-10-15

## 2021-10-15 RX ORDER — CALCIUM GLUCONATE 100 MG/ML
1 VIAL (ML) INTRAVENOUS ONCE
Refills: 0 | Status: COMPLETED | OUTPATIENT
Start: 2021-10-15 | End: 2021-10-15

## 2021-10-15 RX ADMIN — PIPERACILLIN AND TAZOBACTAM 200 GRAM(S): 4; .5 INJECTION, POWDER, LYOPHILIZED, FOR SOLUTION INTRAVENOUS at 22:04

## 2021-10-15 RX ADMIN — Medication 250 MILLIGRAM(S): at 23:08

## 2021-10-15 RX ADMIN — PIPERACILLIN AND TAZOBACTAM 3.38 GRAM(S): 4; .5 INJECTION, POWDER, LYOPHILIZED, FOR SOLUTION INTRAVENOUS at 23:08

## 2021-10-15 RX ADMIN — Medication 100 GRAM(S): at 20:45

## 2021-10-15 RX ADMIN — MORPHINE SULFATE 4 MILLIGRAM(S): 50 CAPSULE, EXTENDED RELEASE ORAL at 19:43

## 2021-10-15 RX ADMIN — SODIUM ZIRCONIUM CYCLOSILICATE 10 GRAM(S): 10 POWDER, FOR SUSPENSION ORAL at 20:44

## 2021-10-15 NOTE — ED ADULT TRIAGE NOTE - CHIEF COMPLAINT QUOTE
hx ESRD on HD T/TH/Sat via new AV fistula (placed 7/1/21), c/o AV fistula "clotting" while at dialysis this past Tuesday. C/o bruising and swelling to areas where fistula was accessed. Pt states had fever last night. Pt had coughing during triage. States having productive cough with yellowish, sometimes brownish red sputum that started Tuesday. NONfunctioning fistula to left arm. Perm cath noted to right chest.

## 2021-10-15 NOTE — CONSULT NOTE ADULT - ASSESSMENT
67M w/ hx of HTN, Afib on Eliquis, ESRD on HD (TTS) via RUE radiocephalic fistula (created 6/3/21) presenting with R arm swelling and pain that began with HD on 10/12/21, likely due to infiltration    - No acute vascular intervention  - Continue HD through permacath for now  - Will order fistula duplex  - Work up of cough and SOB per medicine  - Seen with vascular fellow    C team surgery  Pager [phone] 67M w/ hx of HTN, Afib on Eliquis, ESRD on HD (TTS) via RUE radiocephalic fistula (created 6/3/21) presenting with R arm swelling and pain that began with HD on 10/12/21, likely due to infiltration    - No acute vascular intervention  - Continue HD through permacath for now  recommend to allow pt a  rue avf holiday for the next 4-6 weeks and continue hd via rt permacath  to allow for  right forearm hematoma to resolve   - Will order fistula duplex to eval s/o pseudoann   - Work up of cough and SOB per medicine  - Seen with vascular fellow    C team surgery  Pager [phone]

## 2021-10-15 NOTE — ED ADULT NURSE NOTE - OBJECTIVE STATEMENT
Pt received to rm 17 c/o pain to R AVF since Tuesday. AxOx4 and ambulatory at baseline. Pt had dialysis through R AVF on Tuesday and has since been c/o pain and swelling to R arm. Pt received dialysis on thursday through R chest permacath. Redness/swelling noted to R arm, pt appears uncomfortable with arm movement. Pt appears comfortable at rest, in NAD, respirations even/unlabored. Pt endorses productive cough x couple days with yellow sputum and fever last night. Pt afebrile at this time. Pt denies headache, dizziness, chest pain, SOB, nausea, vomiting, diarrhea, chills. PMH of ESRD, HTN. 22G IV placed to L forearm, labs drawn and sent. Inactive L AVF noted. Awaiting results, will continue to monitor.

## 2021-10-15 NOTE — CONSULT NOTE ADULT - ASSESSMENT
67 y/o M w/ESRD on HD TTS, HTN. Renal consulted for ESRD Mx.  	  ESRD on HD TTS  not in chf  new RFA AVF s/p infiltration w/hematoma, used PC for HD 10/14 outpt    no indication for hd tonight  Informed consent for HD obtained from pt  plan for routine HD Saturday/tomorrow w/2k bath using PC  dose all meds for ESRD  renal diet  pl call vas sx eval for possible angioplasty of avf    HTN-bp high  resume home bp meds-coreg, Nifedipine    w/u, admission per ER   For any question, call:  Cell # [phone]  Pager # [phone]  office # [phone]

## 2021-10-15 NOTE — ED PROVIDER NOTE - CLINICAL SUMMARY MEDICAL DECISION MAKING FREE TEXT BOX
66 year old male with PMD of ESRD (HD on T/T/S) presents to the ED for RFA AVF malfunctioning. 3 days ago during dialysis session, infiltration with a hematoma occurred, and now has pain and swelling at site. Also complains of cough. HD stable. Swelling and tenderness noted at right AV fistula. Concern for infiltration and occlusion. Plan for labs, renal consult and surg consult for possible angioplasty of the right AVF, reassess. 66 year old male with PMD of ESRD (HD on T/T/S) presents to the ED for RFA AVF malfunctioning. 3 days ago during dialysis session, infiltration with a hematoma occurred, and now has pain and swelling at site. Also complains of cough. HD stable. Swelling and tenderness noted at right AV fistula. Concern for infiltration and occlusion. Cough, unvaccinated, concern for Covid. Plan for labs, renal consult and surg consult for possible angioplasty of the right AVF, reassess.

## 2021-10-15 NOTE — ED PROVIDER NOTE - ATTENDING CONTRIBUTION TO CARE
66M ESRD x 6 ya HD x 6 y, HTN with 3 meds, Pt spoke to his vasc Dr CARROLL Delcid.  getting HD TTH Sat, trying to do HD R arm, unable to due to clot?  Pt was able to have HD via catheter.  Then pt started having pain RUE with numbness and tingling rad down RUE.  Pt also reports cough and fever.  No CP/palps.  Pt not vaccinated vs covid.  Bilat crackles and wheezing in lungs.  Arm is severely tender to touch.  Both hands are cool, R>L, but palpable radial pulse.  Thrill palpable pulse RUE.  Possible RUE infection vs clot.  Pt on eliquis.  RUE tenderness over AVF.  Will rx pain meds, check labs and cultures. Nephr Dr Bojorquez on [place].   Pt was able to get full HD yesterday.  EKG SR at 69 no paolo.  Diffuse STD v2-v6, I, aVL.  Poss LVH but different from previous.  Vasc consult.  VA duplex not available at this time, will need to admit for that eval.  Vasc recc resting AVF and using catheter for HD for a period of time.    VS:  unremarkable except HTN    GEN - NAD;  malaise;   A+O x3   HEAD - NC/AT     ENT - PEERL, EOMI, mucous membranes  dry , no discharge      NECK: Neck supple, non-tender without lymphadenopathy, no masses, no JVD  PULM - CTA b/l,  symmetric breath sounds  R ACW permacath c/d/i.   COR -  normal heart sounds    ABD - , ND, NT, soft,  BACK - no CVA tenderness, nontender spine     EXTREMS - no edema, no deformity, warm and well perfused  except RUE AVF (+)thrill, diffuse swelling around AVF, tender, warm.  No purulence or crepitus or bleeding at site.  Bulging veins RUE.  Distal hand warm (+)radial pulse and NVT intact distally.  SKIN - no rash    or bruising      NEUROLOGIC - alert, face symmetric, speech fluent, sensation nl, motor no focal deficit.

## 2021-10-15 NOTE — CONSULT NOTE ADULT - GUM GEN PE MLT EXAM PC
Discussed d/c information with parents and patient. No questions at this time. Discontinued IV, patient tolerated well. All personal belongings, medications, and equipment taken by family and patient. Patient d/c home with parents via private car.   not examined

## 2021-10-15 NOTE — ED PROVIDER NOTE - OBJECTIVE STATEMENT
66 year old male with PMD of ESRD (HD on T/T/S) presents to the ED for RFA AVF malfunctioning. 3 days ago during dialysis session, infiltration with a hematoma occurred, and now has pain and swelling at site. Also complains of cough, denies chest pain, dyspnea, fevers and chills. Denies any other complaints. Of note, pt is not vaccinated against covid.

## 2021-10-15 NOTE — CONSULT NOTE ADULT - PROBLEM SELECTOR RECOMMENDATION 2
MALLORY Feldman MD have seen and examined the patient today and agree with  the  evaluation, assessment and plan of the surgical house officer  MALLORY Feldman MD have personally seen and examined the patient at bedside today at  9 pm

## 2021-10-15 NOTE — CONSULT NOTE ADULT - VASCULAR DETAILS
rt arm avf w good bruit and thrill  sig for forearm  swelling  c/w  hematoma  and  s/o  mild  forearm cellulitis

## 2021-10-15 NOTE — CONSULT NOTE ADULT - SUBJECTIVE AND OBJECTIVE BOX
[facility] - S Alicia /DEEDEE Caballero/ SERA Bojorquez/ SERA Faust/ Jovan Garrison / GAYATRI Garcia/ O Gregorio  service -[phone], office [phone]  ---------------------------------------------------------------------------------------------------------------  Patient seen and examined in triage area    67 y/o M w/ESRD on HD TTS at [facility], well known to me; HTN. per pt he was advised to come to ER by his primary vas sx. pt has a new RFA AVF, was used sucesfully for 3 hd rxs but had infiltration 10/12 now w/hematoma. pt had HD yesterday using PC. Renal consulted for ESRD Mx. Pt currently c/o pain at avf site, otherwise no complaints. denied cp/sob.     PAST MEDICAL & SURGICAL HISTORY:  HTN (Hypertension)    Chronic kidney disease    Kidney stones    Hemodialysis access, AV graft  Left upper extremity    Hyperparathyroidism    Anemia    Thrombocytopenia    Atrial fibrillation  on Eliquis    S/P Nephrectomy  (L) 2007    Acquired arteriovenous fistula  left wrist  1/2015 - [facility], Left upper arm 4/2015 (approximate date)    AVF (arteriovenous fistula)      Allergies: No Known Allergies    Home Medications Reviewed  Home Medications:  Coreg 12.5 mg oral tablet: 1 tab(s) orally 2 times a day (02 Sep 2021 09:33)  folic acid 1 mg oral tablet: 1 tab(s) orally once a day (02 Sep 2021 09:33)  levothyroxine 100 mcg (0.1 mg) oral tablet: 1 tab(s) orally once a day (02 Sep 2021 09:33)  NexIUM 40 mg oral delayed release capsule: 1 cap(s) orally once a day (02 Sep 2021 09:33)  NIFEdipine 30 mg oral tablet, extended release: 1 tab(s) orally once a day (02 Sep 2021 09:33)  Renvela 800 mg oral tablet: 1 tab(s) orally 3 times a day (with meals) (02 Sep 2021 09:33)    Hospital Medications:   MEDICATIONS  (STANDING):    SOCIAL HISTORY:  Denies ETOh,Smoking, illicit drug use  FAMILY HISTORY:  No pertinent family history    REVIEW OF SYSTEMS:  CONSTITUTIONAL: No weakness, fevers or chills  EYES/ENT: No visual changes;  No vertigo or throat pain   NECK: No pain or stiffness  RESPIRATORY: No cough, wheezing, hemoptysis; No shortness of breath  CARDIOVASCULAR: No chest pain or palpitations.  GASTROINTESTINAL: No abdominal or epigastric pain. No nausea, vomiting, or hematemesis; No diarrhea or constipation. No melena or hematochezia.  NEUROLOGICAL: No numbness or weakness  SKIN: No itching, burning, rashes, or lesions   VASCULAR: No bilateral lower extremity edema.   All other review of systems is negative unless indicated above.    VITALS:  T(F): 97.8 (10-15-21 @ 15:48), Max: 97.8 (10-15-21 @ 15:48)  HR: 65 (10-15-21 @ 15:48)  BP: 160/97 (10-15-21 @ 15:48)  RR: 16 (10-15-21 @ 15:48)  SpO2: 100% (10-15-21 @ 15:48)  Wt(kg): --    Height (cm): 175.3 (10-15 @ 15:48)    PHYSICAL EXAM:  Constitutional: NAD  HEENT: anicteric sclera  Neck: No JVD  Respiratory: CTAB, no wheezes, rales or rhonchi  Cardiovascular: S1, S2, RRR  Gastrointestinal: BS+, soft, NT/ND  Extremities: No peripheral edema  Neurological: A/O x 3  Psychiatric: Normal mood, normal affect  : chronic siegel.   Vascular Access: rt IJ PC+; RFA AVF +hematoma, tenderness      LABS:        Creatinine Trend:     Urine Studies:        RADIOLOGY & ADDITIONAL STUDIES:

## 2021-10-15 NOTE — CONSULT NOTE ADULT - SUBJECTIVE AND OBJECTIVE BOX
VACULAR SURGERY CONSULT NOTE  --------------------------------------------------------------------------------------------    HPI: 67M w/ hx of HTN, Afib on Eliquis, ESRD on HD (TTS) via RUE radiocephalic fistula (created 6/3/21) presenting with R arm swelling and pain. He began using the fistula last week and used it Tues, Thurs, Sat without any issues. Was at HD this week on Tuesday 10/12/21 and had swelling and pain at the fistula site, likely due to infiltration. He has a R IJ permacath that he used for HD yesterday. Currently complaining of pain at the fistula site as well as numbness in his R hand. He had no numbness or pain during the first 3 HD sessions.    He is also complaining of cough and SOB on presentation and is not vaccinated against COVID.    PAST MEDICAL & SURGICAL HISTORY:  HTN (Hypertension)    Chronic kidney disease    Kidney stones    Hemodialysis access, AV graft  Left upper extremity    Hyperparathyroidism    Anemia    Thrombocytopenia    Atrial fibrillation  on Eliquis    S/P Nephrectomy  (L) 2007    Acquired arteriovenous fistula  left wrist  1/2015 - [facility], Left upper arm 4/2015 (approximate date)    AVF (arteriovenous fistula)    --------------------------------------------------------------------------------------------    Vitals:   T(C): 36.9 (10-15-21 @ 18:47), Max: 36.9 (10-15-21 @ 18:47)  HR: 68 (10-15-21 @ 18:47) (65 - 68)  BP: 173/99 (10-15-21 @ 18:47) (160/97 - 173/99)  RR: 18 (10-15-21 @ 18:47) (16 - 18)  SpO2: 98% (10-15-21 @ 18:47) (98% - 100%)    PHYSICAL EXAM:  General: Alert, NAD, A+Ox3  Vasc:  RUE AVF w/ good thrill, mildly tender, no erythema, palpable radial and ulnar pulses, sensation slightly diminished on R compared to L hand, motor function intact and equal bilaterally  R IJ permacath w/ dressing c/d/i    --------------------------------------------------------------------------------------------    LABS  CBC (10-15 @ 18:52)                              9.9<L>                         7.13    )----------------(  157        53.1  % Neutrophils, 30.2  % Lymphocytes, ANC: 3.78                                33.7<L>    BMP (10-15 @ 18:52)             137     |  96<L>   |  42<H> 		Ca++ --      Ca --                 ---------------------------------( 82    		Mg --                 5.6<H>  |  24      |  7.24<H>			Ph --        LFTs (10-15 @ 18:52)      TPro 8.1 / Alb 4.2 / TBili 0.3 / DBili -- / AST 30 / ALT 20 / AlkPhos 165<H>    Coags (10-15 @ 18:52)  aPTT 33.1 / INR 1.33<H> / PT 15.0<H>        VBG (10-15 @ 18:52)     7.33 / 59<H> / 43 / 31<H> / 4.1<H> / 69.2%     Lactate: 1.6    --------------------------------------------------------------------------------------------     VACULAR SURGERY CONSULT NOTE  --------------------------------------------------------------------------------------------    HPI: 67M w/ hx of HTN, Afib on Eliquis, ESRD on HD (TTS) via RUE radiocephalic fistula (created 6/3/21) presenting with R arm swelling and pain. He began using the fistula last week and used it Tues, Thurs, Sat without any issues. Was at HD this week on Tuesday 10/12/21 and had swelling and pain at the fistula site, likely due to infiltration. He has a R IJ permacath that he used for HD yesterday. Currently complaining of pain at the fistula site as well as numbness in his R hand. He had no numbness or pain during the first 3 HD sessions.  VASCULAR SURG ATT ADDENDUM  Pt states he underwent 3 hd sessions w  2 needle access  in rt forearm  avf w last hd session w development of hematoma    He is also complaining of cough and SOB on presentation and is not vaccinated against COVID.    PAST MEDICAL & SURGICAL HISTORY:  HTN (Hypertension)    Chronic kidney disease    Kidney stones    Hemodialysis access, AV graft  Left upper extremity    Hyperparathyroidism    Anemia    Thrombocytopenia    Atrial fibrillation  on Eliquis    S/P Nephrectomy  (L) 2007    Acquired arteriovenous fistula  left wrist  1/2015 - [facility], Left upper arm 4/2015 (approximate date)    AVF (arteriovenous fistula)    --------------------------------------------------------------------------------------------    Vitals:   T(C): 36.9 (10-15-21 @ 18:47), Max: 36.9 (10-15-21 @ 18:47)  HR: 68 (10-15-21 @ 18:47) (65 - 68)  BP: 173/99 (10-15-21 @ 18:47) (160/97 - 173/99)  RR: 18 (10-15-21 @ 18:47) (16 - 18)  SpO2: 98% (10-15-21 @ 18:47) (98% - 100%)    PHYSICAL EXAM:  General: Alert, NAD, A+Ox3  Vasc:  RUE AVF w/ good thrill, mildly tender, no erythema, palpable radial and ulnar pulses, sensation slightly diminished on R compared to L hand, motor function intact and equal bilaterally  R IJ permacath w/ dressing c/d/i    --------------------------------------------------------------------------------------------    LABS  CBC (10-15 @ 18:52)                              9.9<L>                         7.13    )----------------(  157        53.1  % Neutrophils, 30.2  % Lymphocytes, ANC: 3.78                                33.7<L>    BMP (10-15 @ 18:52)             137     |  96<L>   |  42<H> 		Ca++ --      Ca --                 ---------------------------------( 82    		Mg --                 5.6<H>  |  24      |  7.24<H>			Ph --        LFTs (10-15 @ 18:52)      TPro 8.1 / Alb 4.2 / TBili 0.3 / DBili -- / AST 30 / ALT 20 / AlkPhos 165<H>    Coags (10-15 @ 18:52)  aPTT 33.1 / INR 1.33<H> / PT 15.0<H>        VBG (10-15 @ 18:52)     7.33 / 59<H> / 43 / 31<H> / 4.1<H> / 69.2%     Lactate: 1.6    --------------------------------------------------------------------------------------------

## 2021-10-16 DIAGNOSIS — E83.51 HYPOCALCEMIA: ICD-10-CM

## 2021-10-16 DIAGNOSIS — B34.8 OTHER VIRAL INFECTIONS OF UNSPECIFIED SITE: ICD-10-CM

## 2021-10-16 DIAGNOSIS — R77.8 OTHER SPECIFIED ABNORMALITIES OF PLASMA PROTEINS: ICD-10-CM

## 2021-10-16 DIAGNOSIS — T82.7XXA INFECTION AND INFLAMMATORY REACTION DUE TO OTHER CARDIAC AND VASCULAR DEVICES, IMPLANTS AND GRAFTS, INITIAL ENCOUNTER: ICD-10-CM

## 2021-10-16 DIAGNOSIS — N18.6 END STAGE RENAL DISEASE: ICD-10-CM

## 2021-10-16 DIAGNOSIS — I10 ESSENTIAL (PRIMARY) HYPERTENSION: ICD-10-CM

## 2021-10-16 DIAGNOSIS — I48.20 CHRONIC ATRIAL FIBRILLATION, UNSPECIFIED: ICD-10-CM

## 2021-10-16 LAB
ALBUMIN SERPL ELPH-MCNC: 3.7 G/DL — SIGNIFICANT CHANGE UP (ref 3.3–5)
ALP SERPL-CCNC: 142 U/L — HIGH (ref 40–120)
ALT FLD-CCNC: 19 U/L — SIGNIFICANT CHANGE UP (ref 4–41)
ANION GAP SERPL CALC-SCNC: 13 MMOL/L — SIGNIFICANT CHANGE UP (ref 7–14)
APPEARANCE UR: CLEAR — SIGNIFICANT CHANGE UP
AST SERPL-CCNC: 24 U/L — SIGNIFICANT CHANGE UP (ref 4–40)
BACTERIA # UR AUTO: NEGATIVE — SIGNIFICANT CHANGE UP
BASOPHILS # BLD AUTO: 0.05 K/UL — SIGNIFICANT CHANGE UP (ref 0–0.2)
BASOPHILS NFR BLD AUTO: 0.8 % — SIGNIFICANT CHANGE UP (ref 0–2)
BILIRUB SERPL-MCNC: 0.3 MG/DL — SIGNIFICANT CHANGE UP (ref 0.2–1.2)
BILIRUB UR-MCNC: NEGATIVE — SIGNIFICANT CHANGE UP
BUN SERPL-MCNC: 47 MG/DL — HIGH (ref 7–23)
CALCIUM SERPL-MCNC: 6.2 MG/DL — CRITICAL LOW (ref 8.4–10.5)
CHLORIDE SERPL-SCNC: 97 MMOL/L — LOW (ref 98–107)
CO2 SERPL-SCNC: 25 MMOL/L — SIGNIFICANT CHANGE UP (ref 22–31)
COLOR SPEC: SIGNIFICANT CHANGE UP
CREAT SERPL-MCNC: 8.17 MG/DL — HIGH (ref 0.5–1.3)
DIFF PNL FLD: NEGATIVE — SIGNIFICANT CHANGE UP
EOSINOPHIL # BLD AUTO: 0.37 K/UL — SIGNIFICANT CHANGE UP (ref 0–0.5)
EOSINOPHIL NFR BLD AUTO: 5.8 % — SIGNIFICANT CHANGE UP (ref 0–6)
EPI CELLS # UR: 2 /HPF — SIGNIFICANT CHANGE UP (ref 0–5)
FERRITIN SERPL-MCNC: 1088 NG/ML — HIGH (ref 30–400)
GLUCOSE SERPL-MCNC: 89 MG/DL — SIGNIFICANT CHANGE UP (ref 70–99)
GLUCOSE UR QL: NEGATIVE — SIGNIFICANT CHANGE UP
HCT VFR BLD CALC: 31 % — LOW (ref 39–50)
HGB BLD-MCNC: 9.6 G/DL — LOW (ref 13–17)
HYALINE CASTS # UR AUTO: 3 /LPF — SIGNIFICANT CHANGE UP (ref 0–7)
IANC: 3.4 K/UL — SIGNIFICANT CHANGE UP (ref 1.5–8.5)
IMM GRANULOCYTES NFR BLD AUTO: 0.5 % — SIGNIFICANT CHANGE UP (ref 0–1.5)
IRON SATN MFR SERPL: 25 % — SIGNIFICANT CHANGE UP (ref 14–50)
IRON SATN MFR SERPL: 52 UG/DL — SIGNIFICANT CHANGE UP (ref 45–165)
KETONES UR-MCNC: NEGATIVE — SIGNIFICANT CHANGE UP
LEUKOCYTE ESTERASE UR-ACNC: ABNORMAL
LYMPHOCYTES # BLD AUTO: 1.8 K/UL — SIGNIFICANT CHANGE UP (ref 1–3.3)
LYMPHOCYTES # BLD AUTO: 28.3 % — SIGNIFICANT CHANGE UP (ref 13–44)
MCHC RBC-ENTMCNC: 27.6 PG — SIGNIFICANT CHANGE UP (ref 27–34)
MCHC RBC-ENTMCNC: 31 GM/DL — LOW (ref 32–36)
MCV RBC AUTO: 89.1 FL — SIGNIFICANT CHANGE UP (ref 80–100)
MONOCYTES # BLD AUTO: 0.7 K/UL — SIGNIFICANT CHANGE UP (ref 0–0.9)
MONOCYTES NFR BLD AUTO: 11 % — SIGNIFICANT CHANGE UP (ref 2–14)
NEUTROPHILS # BLD AUTO: 3.4 K/UL — SIGNIFICANT CHANGE UP (ref 1.8–7.4)
NEUTROPHILS NFR BLD AUTO: 53.6 % — SIGNIFICANT CHANGE UP (ref 43–77)
NITRITE UR-MCNC: NEGATIVE — SIGNIFICANT CHANGE UP
NRBC # BLD: 0 /100 WBCS — SIGNIFICANT CHANGE UP
NRBC # FLD: 0 K/UL — SIGNIFICANT CHANGE UP
PH UR: 7.5 — SIGNIFICANT CHANGE UP (ref 5–8)
PLATELET # BLD AUTO: 136 K/UL — LOW (ref 150–400)
POTASSIUM SERPL-MCNC: 4.9 MMOL/L — SIGNIFICANT CHANGE UP (ref 3.5–5.3)
POTASSIUM SERPL-SCNC: 4.9 MMOL/L — SIGNIFICANT CHANGE UP (ref 3.5–5.3)
PROCALCITONIN SERPL-MCNC: 1.12 NG/ML — HIGH (ref 0.02–0.1)
PROT SERPL-MCNC: 7.1 G/DL — SIGNIFICANT CHANGE UP (ref 6–8.3)
PROT UR-MCNC: ABNORMAL
RBC # BLD: 3.48 M/UL — LOW (ref 4.2–5.8)
RBC # FLD: 18.5 % — HIGH (ref 10.3–14.5)
RBC CASTS # UR COMP ASSIST: 2 /HPF — SIGNIFICANT CHANGE UP (ref 0–4)
SODIUM SERPL-SCNC: 135 MMOL/L — SIGNIFICANT CHANGE UP (ref 135–145)
SP GR SPEC: 1.01 — SIGNIFICANT CHANGE UP (ref 1–1.05)
TIBC SERPL-MCNC: 204 UG/DL — LOW (ref 220–430)
TROPONIN T, HIGH SENSITIVITY RESULT: 54 NG/L — CRITICAL HIGH
UIBC SERPL-MCNC: 152 UG/DL — SIGNIFICANT CHANGE UP (ref 110–370)
UROBILINOGEN FLD QL: SIGNIFICANT CHANGE UP
WBC # BLD: 6.35 K/UL — SIGNIFICANT CHANGE UP (ref 3.8–10.5)
WBC # FLD AUTO: 6.35 K/UL — SIGNIFICANT CHANGE UP (ref 3.8–10.5)
WBC UR QL: 23 /HPF — HIGH (ref 0–5)

## 2021-10-16 PROCEDURE — 99232 SBSQ HOSP IP/OBS MODERATE 35: CPT

## 2021-10-16 RX ORDER — ATORVASTATIN CALCIUM 80 MG/1
40 TABLET, FILM COATED ORAL AT BEDTIME
Refills: 0 | Status: DISCONTINUED | OUTPATIENT
Start: 2021-10-16 | End: 2021-11-05

## 2021-10-16 RX ORDER — HYDRALAZINE HCL 50 MG
0 TABLET ORAL
Qty: 0 | Refills: 3 | DISCHARGE

## 2021-10-16 RX ORDER — PANTOPRAZOLE SODIUM 20 MG/1
40 TABLET, DELAYED RELEASE ORAL
Refills: 0 | Status: DISCONTINUED | OUTPATIENT
Start: 2021-10-16 | End: 2021-11-05

## 2021-10-16 RX ORDER — LEVOTHYROXINE SODIUM 125 MCG
0 TABLET ORAL
Qty: 0 | Refills: 1 | DISCHARGE

## 2021-10-16 RX ORDER — ASPIRIN/CALCIUM CARB/MAGNESIUM 324 MG
0 TABLET ORAL
Qty: 0 | Refills: 3 | DISCHARGE

## 2021-10-16 RX ORDER — ACETAMINOPHEN 500 MG
650 TABLET ORAL EVERY 6 HOURS
Refills: 0 | Status: DISCONTINUED | OUTPATIENT
Start: 2021-10-16 | End: 2021-11-05

## 2021-10-16 RX ORDER — NIFEDIPINE 30 MG
0 TABLET, EXTENDED RELEASE 24 HR ORAL
Qty: 0 | Refills: 3 | DISCHARGE

## 2021-10-16 RX ORDER — CARVEDILOL PHOSPHATE 80 MG/1
25 CAPSULE, EXTENDED RELEASE ORAL EVERY 12 HOURS
Refills: 0 | Status: DISCONTINUED | OUTPATIENT
Start: 2021-10-16 | End: 2021-10-16

## 2021-10-16 RX ORDER — INFLUENZA VIRUS VACCINE 15; 15; 15; 15 UG/.5ML; UG/.5ML; UG/.5ML; UG/.5ML
0.5 SUSPENSION INTRAMUSCULAR ONCE
Refills: 0 | Status: DISCONTINUED | OUTPATIENT
Start: 2021-10-16 | End: 2021-10-17

## 2021-10-16 RX ORDER — ATORVASTATIN CALCIUM 80 MG/1
0 TABLET, FILM COATED ORAL
Qty: 0 | Refills: 1 | DISCHARGE

## 2021-10-16 RX ORDER — ESOMEPRAZOLE MAGNESIUM 40 MG/1
1 CAPSULE, DELAYED RELEASE ORAL
Qty: 0 | Refills: 0 | DISCHARGE

## 2021-10-16 RX ORDER — FERROUS SULFATE 325(65) MG
1 TABLET ORAL
Qty: 0 | Refills: 0 | DISCHARGE

## 2021-10-16 RX ORDER — APIXABAN 2.5 MG/1
0 TABLET, FILM COATED ORAL
Qty: 0 | Refills: 3 | DISCHARGE

## 2021-10-16 RX ORDER — FERROUS SULFATE 325(65) MG
325 TABLET ORAL DAILY
Refills: 0 | Status: DISCONTINUED | OUTPATIENT
Start: 2021-10-16 | End: 2021-10-18

## 2021-10-16 RX ORDER — HYDRALAZINE HCL 50 MG
10 TABLET ORAL EVERY 8 HOURS
Refills: 0 | Status: DISCONTINUED | OUTPATIENT
Start: 2021-10-16 | End: 2021-10-25

## 2021-10-16 RX ORDER — FOLIC ACID 0.8 MG
1 TABLET ORAL DAILY
Refills: 0 | Status: DISCONTINUED | OUTPATIENT
Start: 2021-10-16 | End: 2021-11-05

## 2021-10-16 RX ORDER — ASPIRIN/CALCIUM CARB/MAGNESIUM 324 MG
81 TABLET ORAL DAILY
Refills: 0 | Status: DISCONTINUED | OUTPATIENT
Start: 2021-10-16 | End: 2021-11-05

## 2021-10-16 RX ORDER — CALCIUM GLUCONATE 100 MG/ML
2 VIAL (ML) INTRAVENOUS ONCE
Refills: 0 | Status: COMPLETED | OUTPATIENT
Start: 2021-10-16 | End: 2021-10-16

## 2021-10-16 RX ORDER — FOLIC ACID 0.8 MG
0 TABLET ORAL
Qty: 0 | Refills: 0 | DISCHARGE

## 2021-10-16 RX ORDER — APIXABAN 2.5 MG/1
5 TABLET, FILM COATED ORAL
Refills: 0 | Status: DISCONTINUED | OUTPATIENT
Start: 2021-10-16 | End: 2021-10-20

## 2021-10-16 RX ORDER — NIFEDIPINE 30 MG
60 TABLET, EXTENDED RELEASE 24 HR ORAL DAILY
Refills: 0 | Status: DISCONTINUED | OUTPATIENT
Start: 2021-10-16 | End: 2021-10-16

## 2021-10-16 RX ORDER — SEVELAMER CARBONATE 2400 MG/1
0 POWDER, FOR SUSPENSION ORAL
Qty: 0 | Refills: 3 | DISCHARGE

## 2021-10-16 RX ORDER — LEVOTHYROXINE SODIUM 125 MCG
100 TABLET ORAL DAILY
Refills: 0 | Status: DISCONTINUED | OUTPATIENT
Start: 2021-10-16 | End: 2021-11-05

## 2021-10-16 RX ORDER — NIFEDIPINE 30 MG
60 TABLET, EXTENDED RELEASE 24 HR ORAL DAILY
Refills: 0 | Status: DISCONTINUED | OUTPATIENT
Start: 2021-10-16 | End: 2021-11-05

## 2021-10-16 RX ORDER — CARVEDILOL PHOSPHATE 80 MG/1
1 CAPSULE, EXTENDED RELEASE ORAL
Qty: 0 | Refills: 0 | DISCHARGE

## 2021-10-16 RX ORDER — SEVELAMER CARBONATE 2400 MG/1
1 POWDER, FOR SUSPENSION ORAL
Qty: 0 | Refills: 0 | DISCHARGE

## 2021-10-16 RX ORDER — SEVELAMER CARBONATE 2400 MG/1
800 POWDER, FOR SUSPENSION ORAL THREE TIMES A DAY
Refills: 0 | Status: DISCONTINUED | OUTPATIENT
Start: 2021-10-16 | End: 2021-10-23

## 2021-10-16 RX ORDER — CARVEDILOL PHOSPHATE 80 MG/1
25 CAPSULE, EXTENDED RELEASE ORAL EVERY 12 HOURS
Refills: 0 | Status: DISCONTINUED | OUTPATIENT
Start: 2021-10-16 | End: 2021-11-05

## 2021-10-16 RX ORDER — CEFAZOLIN SODIUM 1 G
1000 VIAL (EA) INJECTION EVERY 24 HOURS
Refills: 0 | Status: COMPLETED | OUTPATIENT
Start: 2021-10-16 | End: 2021-10-20

## 2021-10-16 RX ORDER — CARVEDILOL PHOSPHATE 80 MG/1
0 CAPSULE, EXTENDED RELEASE ORAL
Qty: 0 | Refills: 3 | DISCHARGE

## 2021-10-16 RX ORDER — NIFEDIPINE 30 MG
1 TABLET, EXTENDED RELEASE 24 HR ORAL
Qty: 0 | Refills: 0 | DISCHARGE

## 2021-10-16 RX ORDER — ERYTHROPOIETIN 10000 [IU]/ML
8000 INJECTION, SOLUTION INTRAVENOUS; SUBCUTANEOUS
Refills: 0 | Status: DISCONTINUED | OUTPATIENT
Start: 2021-10-16 | End: 2021-10-18

## 2021-10-16 RX ADMIN — ATORVASTATIN CALCIUM 40 MILLIGRAM(S): 80 TABLET, FILM COATED ORAL at 21:45

## 2021-10-16 RX ADMIN — Medication 200 GRAM(S): at 21:46

## 2021-10-16 RX ADMIN — Medication 10 MILLIGRAM(S): at 21:45

## 2021-10-16 RX ADMIN — ERYTHROPOIETIN 8000 UNIT(S): 10000 INJECTION, SOLUTION INTRAVENOUS; SUBCUTANEOUS at 17:27

## 2021-10-16 RX ADMIN — PANTOPRAZOLE SODIUM 40 MILLIGRAM(S): 20 TABLET, DELAYED RELEASE ORAL at 09:15

## 2021-10-16 RX ADMIN — CARVEDILOL PHOSPHATE 25 MILLIGRAM(S): 80 CAPSULE, EXTENDED RELEASE ORAL at 05:47

## 2021-10-16 RX ADMIN — Medication 1 MILLIGRAM(S): at 11:39

## 2021-10-16 RX ADMIN — SEVELAMER CARBONATE 800 MILLIGRAM(S): 2400 POWDER, FOR SUSPENSION ORAL at 05:46

## 2021-10-16 RX ADMIN — Medication 60 MILLIGRAM(S): at 05:47

## 2021-10-16 RX ADMIN — Medication 100 MICROGRAM(S): at 05:46

## 2021-10-16 RX ADMIN — Medication 100 MILLIGRAM(S): at 05:45

## 2021-10-16 RX ADMIN — Medication 325 MILLIGRAM(S): at 11:39

## 2021-10-16 RX ADMIN — APIXABAN 5 MILLIGRAM(S): 2.5 TABLET, FILM COATED ORAL at 21:45

## 2021-10-16 RX ADMIN — Medication 81 MILLIGRAM(S): at 11:40

## 2021-10-16 RX ADMIN — SEVELAMER CARBONATE 800 MILLIGRAM(S): 2400 POWDER, FOR SUSPENSION ORAL at 14:31

## 2021-10-16 NOTE — H&P ADULT - NSHPLABSRESULTS_GEN_ALL_CORE
9.9    7.13  )-----------( 157      ( 15 Oct 2021 18:52 )             33.7       10-15    137  |  96<L>  |  42<H>  ----------------------------<  82  5.6<H>   |  24  |  7.24<H>    Ca    6.3<LL>      15 Oct 2021 18:52    TPro  8.1  /  Alb  4.2  /  TBili  0.3  /  DBili  x   /  AST  30  /  ALT  20  /  AlkPhos  165<H>  10-15            PT/INR - ( 15 Oct 2021 18:52 )   PT: 15.0 sec;   INR: 1.33 ratio         PTT - ( 15 Oct 2021 18:52 )  PTT:33.1 sec    18:52 - VBG - pH: 7.33  | pCO2: 59    | pO2: 43    | Lactate: 1.6

## 2021-10-16 NOTE — PROGRESS NOTE ADULT - SUBJECTIVE AND OBJECTIVE BOX
[facility] - S Alicia / Ej S /D Federico/ SERA Bojorquez/ SERA Faust/ Jovan Garrison / GAYATRI Matsonu/ O Gregorio  service -[phone], office [phone]  ---------------------------------------------------------------------------------------------------------------    Patient seen and examined bedside    Subjective and Objective: No overnight events, sob resolved. No complaints today. feeling better    Allergies: No Known Allergies      Hospital Medications:   MEDICATIONS  (STANDING):  apixaban 5 milliGRAM(s) Oral two times a day  aspirin enteric coated 81 milliGRAM(s) Oral daily  atorvastatin 40 milliGRAM(s) Oral at bedtime  carvedilol 25 milliGRAM(s) Oral every 12 hours  ceFAZolin   IVPB 1000 milliGRAM(s) IV Intermittent every 24 hours  epoetin tammi-epbx (RETACRIT) Injectable 8000 Unit(s) IV Push <User Schedule>  ferrous    sulfate 325 milliGRAM(s) Oral daily  folic acid 1 milliGRAM(s) Oral daily  hydrALAZINE 10 milliGRAM(s) Oral every 8 hours  levothyroxine 100 MICROGram(s) Oral daily  NIFEdipine XL 60 milliGRAM(s) Oral daily  pantoprazole    Tablet 40 milliGRAM(s) Oral before breakfast  sevelamer carbonate 800 milliGRAM(s) Oral three times a day      REVIEW OF SYSTEMS:  CONSTITUTIONAL: No weakness, fevers or chills  EYES/ENT: No visual changes;  No vertigo or throat pain   NECK: No pain or stiffness  RESPIRATORY: No cough, wheezing, hemoptysis; No shortness of breath  CARDIOVASCULAR: No chest pain or palpitations.  GASTROINTESTINAL: No abdominal or epigastric pain. No nausea, vomiting, or hematemesis; No diarrhea or constipation. No melena or hematochezia.  GENITOURINARY: No dysuria, frequency, foamy urine, urinary urgency, incontinence or hematuria  NEUROLOGICAL: No numbness or weakness  SKIN: No itching, burning, rashes, or lesions   VASCULAR: No bilateral lower extremity edema.   All other review of systems is negative unless indicated above.    VITALS:  T(F): 97.1 (10-16-21 @ 16:17), Max: 98.5 (10-15-21 @ 18:47)  HR: 69 (10-16-21 @ 16:17)  BP: 131/65 (10-16-21 @ 16:17)  RR: 17 (10-16-21 @ 16:17)  SpO2: 100% (10-16-21 @ 12:47)  Wt(kg): --        PHYSICAL EXAM:  Constitutional: NAD  HEENT: anicteric sclera, oropharynx clear  Neck: No JVD  Respiratory: CTAB, no wheezes, rales or rhonchi  Cardiovascular: S1, S2, RRR  Gastrointestinal: BS+, soft, NT/ND  Extremities: No cyanosis or clubbing. No peripheral edema  Neurological: A/O x 3, no focal deficits  Psychiatric: Normal mood, normal affect  : No CVA tenderness. No siegel.   Skin: No rashes  Vascular Access:    LABS:  10-16    135  |  97<L>  |  47<H>  ----------------------------<  89  4.9   |  25  |  8.17<H>    Ca    6.2<LL>      16 Oct 2021 06:06    TPro  7.1  /  Alb  3.7  /  TBili  0.3  /  DBili      /  AST  24  /  ALT  19  /  AlkPhos  142<H>  10-16    Creatinine Trend: 8.17 <--, 7.24 <--                        9.6    6.35  )-----------( 136      ( 16 Oct 2021 06:06 )             31.0     Urine Studies:  Urinalysis Basic - ( 16 Oct 2021 08:14 )    Color: Light Yellow / Appearance: Clear / S.011 / pH:   Gluc:  / Ketone: Negative  / Bili: Negative / Urobili: <2 mg/dL   Blood:  / Protein: 300 mg/dL / Nitrite: Negative   Leuk Esterase: Small / RBC: 2 /HPF / WBC 23 /HPF   Sq Epi:  / Non Sq Epi: 2 /HPF / Bacteria: Negative          RADIOLOGY & ADDITIONAL STUDIES:   [facility] - S Alicia / Ej S /D Federico/ S Maryellen/ SERA Faust/ Jovan Garrison / M Jose/ O Gregorio  service -[phone], office [phone]  ---------------------------------------------------------------------------------------------------------------    Patient seen and examined bedside during hd, in hd unit    Subjective and Objective: No overnight events, denied sob. No new complaints today.     Allergies: No Known Allergies      Hospital Medications:   MEDICATIONS  (STANDING):  apixaban 5 milliGRAM(s) Oral two times a day  aspirin enteric coated 81 milliGRAM(s) Oral daily  atorvastatin 40 milliGRAM(s) Oral at bedtime  carvedilol 25 milliGRAM(s) Oral every 12 hours  ceFAZolin   IVPB 1000 milliGRAM(s) IV Intermittent every 24 hours  epoetin tammi-epbx (RETACRIT) Injectable 8000 Unit(s) IV Push <User Schedule>  ferrous    sulfate 325 milliGRAM(s) Oral daily  folic acid 1 milliGRAM(s) Oral daily  hydrALAZINE 10 milliGRAM(s) Oral every 8 hours  levothyroxine 100 MICROGram(s) Oral daily  NIFEdipine XL 60 milliGRAM(s) Oral daily  pantoprazole    Tablet 40 milliGRAM(s) Oral before breakfast  sevelamer carbonate 800 milliGRAM(s) Oral three times a day    VITALS:  T(F): 97.1 (10-16-21 @ 16:17), Max: 98.5 (10-15-21 @ 18:47)  HR: 69 (10-16-21 @ 16:17)  BP: 131/65 (10-16-21 @ 16:17)  RR: 17 (10-16-21 @ 16:17)  SpO2: 100% (10-16-21 @ 12:47)  Wt(kg): --      PHYSICAL EXAM:  Constitutional: NAD  HEENT: anicteric sclera  Neck: No JVD  Respiratory: CTAB, no wheezes, rales or rhonchi  Cardiovascular: S1, S2, RRR  Gastrointestinal: BS+, soft, NT/ND  Extremities: No peripheral edema  Neurological: A/O x 3  Psychiatric: Normal mood, normal affect  : no siegel.  Vascular Access: rt IJ PC+; RFA AVF+    LABS:  10-16    135  |  97<L>  |  47<H>  ----------------------------<  89  4.9   |  25  |  8.17<H>    Ca    6.2<LL>      16 Oct 2021 06:06    TPro  7.1  /  Alb  3.7  /  TBili  0.3  /  DBili      /  AST  24  /  ALT  19  /  AlkPhos  142<H>  10-16    Creatinine Trend: 8.17 <--, 7.24 <--                        9.6    6.35  )-----------( 136      ( 16 Oct 2021 06:06 )             31.0     Urine Studies:  Urinalysis Basic - ( 16 Oct 2021 08:14 )    Color: Light Yellow / Appearance: Clear / S.011 / pH:   Gluc:  / Ketone: Negative  / Bili: Negative / Urobili: <2 mg/dL   Blood:  / Protein: 300 mg/dL / Nitrite: Negative   Leuk Esterase: Small / RBC: 2 /HPF / WBC 23 /HPF   Sq Epi:  / Non Sq Epi: 2 /HPF / Bacteria: Negative          RADIOLOGY & ADDITIONAL STUDIES:

## 2021-10-16 NOTE — PATIENT PROFILE ADULT - NSPROIMPLANTSMEDDEV_GEN_A_NUR
AV Fistula  R upper arm AV fistula with pain/celulitis  L upper arm AV fistula non-functioning > 1 year

## 2021-10-16 NOTE — ED ADULT NURSE REASSESSMENT NOTE - NS ED NURSE REASSESS COMMENT FT1
CDU RN: Receiving pt. from day RN. A&Ox4. Pt. has c/o mild pain to right arm but is refusing pain medication at this time. No warmth or swelling noted to TREVOR. Old AV fistula noted to ERIC. Right chest wall permacath covered with dressing. Dressing is clean, dry and intact. 20G IV noted to left hand and 22G IV noted to left forearm. Sinus bradycardia on cardiac monitor. VSS.

## 2021-10-16 NOTE — PROGRESS NOTE ADULT - ASSESSMENT
67 y/o M w/ESRD on HD TTS, HTN. Renal following for ESRD Mx.  	  ESRD on HD TTS  K, vol acceptable  new RFA AVF s/p infiltration w/hematoma, used PC for HD 10/14 outpt    c/w HD w/2k bath using PC, net uf 2kg as tolerated, uneventful so far  dose all meds for ESRD  renal diet  f/u w/ vas sx eval for possible angioplasty of avf  Anemia in ckd Hb<goal- added epo 8k tiw w/hd    HTN, controlled-bp good today  c/w home bp meds-coreg, Nifedipine, hydrALAZINE       labs, chart reviewed  For any question, call:  Cell # [phone]  Pager # [phone]  office # [phone]

## 2021-10-16 NOTE — H&P ADULT - PROBLEM SELECTOR PLAN 8
- will obtain iron panel, need to r/o iron deficiency anemia  - recommend EPO during dialysis in the setting of ESRD, will consult with nephro

## 2021-10-16 NOTE — PHARMACOTHERAPY INTERVENTION NOTE - COMMENTS
Medication history is complete and was verified with patient and [facility]. Medication list updated in Outpatient Medication Record (OMR). Please call spectra [phone] if you have any questions.

## 2021-10-16 NOTE — H&P ADULT - PROBLEM SELECTOR PLAN 1
Assessment:  - patient presented with AV fistula pain  - physical exam findings concerning for cellulitis or clot  - patient has no leukocytosis  - no chart fevers while in the ED    Plan:  - patient reported spiking fevers at home, physical exam findings cannot rule out cellulitis. Will start cefazolin for 5 days  - MRSA swab  - vascular surgery consult appreciated: will f/u with doppler of fistula  - pain control with tylenol prn  - monitor for fevers and sepsis

## 2021-10-16 NOTE — H&P ADULT - HISTORY OF PRESENT ILLNESS
This is a 65 y/o M with pmhx of ESRD on HD, afib on eliquis, anemia presented to the ED for R arm pain. On tuesday, the patient used AV fistula for dialysis however the machine malfunctioned and pulled out a clot. Since then, the patient had pain and swelling on the AV fistula site. The AV fistula was accessed 3 times last week and was uncomplicated. He also endorsed fevers at home however did not check temperature. He endorsed some mild congestion and coughing around the same time however denies shortness of breath and chest pain. No sputum production. No sick contacts at home. He is not vaccinated for covid19. The patient did not notice erythema on the AV fistula area. Denies chills, n/v. ROS otherwise negative. Denies muscle pains.

## 2021-10-16 NOTE — PROGRESS NOTE ADULT - SUBJECTIVE AND OBJECTIVE BOX
Patient is a 66y old  Male who presents with a chief complaint of R arm pain on Av fistula site (16 Oct 2021 20:11)      SUBJECTIVE / OVERNIGHT EVENTS:    Events noted.  CONSTITUTIONAL: No fever,  or fatigue  RESPIRATORY: No cough, wheezing,  No shortness of breath  CARDIOVASCULAR: No chest pain, palpitations, dizziness, or leg swelling  GASTROINTESTINAL: No abdominal or epigastric pain.   NEUROLOGICAL: No headaches,     MEDICATIONS  (STANDING):  apixaban 5 milliGRAM(s) Oral two times a day  aspirin enteric coated 81 milliGRAM(s) Oral daily  atorvastatin 40 milliGRAM(s) Oral at bedtime  carvedilol 25 milliGRAM(s) Oral every 12 hours  ceFAZolin   IVPB 1000 milliGRAM(s) IV Intermittent every 24 hours  epoetin tammi-epbx (RETACRIT) Injectable 8000 Unit(s) IV Push <User Schedule>  ferrous    sulfate 325 milliGRAM(s) Oral daily  folic acid 1 milliGRAM(s) Oral daily  hydrALAZINE 10 milliGRAM(s) Oral every 8 hours  influenza  Vaccine (HIGH DOSE) 0.7 milliLiter(s) IntraMuscular once  levothyroxine 100 MICROGram(s) Oral daily  NIFEdipine XL 60 milliGRAM(s) Oral daily  pantoprazole    Tablet 40 milliGRAM(s) Oral before breakfast  sevelamer carbonate 800 milliGRAM(s) Oral three times a day    MEDICATIONS  (PRN):  acetaminophen   Tablet .. 650 milliGRAM(s) Oral every 6 hours PRN Temp greater or equal to 38C (100.4F), Mild Pain (1 - 3)        CAPILLARY BLOOD GLUCOSE        I&O's Summary    16 Oct 2021 07:01  -  17 Oct 2021 00:48  --------------------------------------------------------  IN: 400 mL / OUT: 2400 mL / NET: -2000 mL        T(C): 36.8 (10-16-21 @ 23:41), Max: 36.8 (10-16-21 @ 23:41)  HR: 71 (10-16-21 @ 23:41) (62 - 75)  BP: 143/91 (10-16-21 @ 23:41) (131/52 - 163/94)  RR: 16 (10-- @ 23:41) (16 - 18)  SpO2: 98% (10-- @ 23:41) (97% - 100%)    PHYSICAL EXAM:  GENERAL: NAD  NECK: Supple, No JVD  CHEST/LUNG: Clear to auscultation bilaterally; No wheezing.  HEART: Regular rate and rhythm; No murmurs, rubs, or gallops  ABDOMEN: Soft, Nontender, Nondistended; Bowel sounds present  EXTREMITIES:   No edema  NEUROLOGY: AAO X 3      LABS:                        9.6    6.35  )-----------( 136      ( 16 Oct 2021 06:06 )             31.0     10-    135  |  97<L>  |  47<H>  ----------------------------<  89  4.9   |  25  |  8.17<H>    Ca    6.2<LL>      16 Oct 2021 06:06    TPro  7.1  /  Alb  3.7  /  TBili  0.3  /  DBili  x   /  AST  24  /  ALT  19  /  AlkPhos  142<H>  10-16    PT/INR - ( 15 Oct 2021 18:52 )   PT: 15.0 sec;   INR: 1.33 ratio         PTT - ( 15 Oct 2021 18:52 )  PTT:33.1 sec      Urinalysis Basic - ( 16 Oct 2021 08:14 )    Color: Light Yellow / Appearance: Clear / S.011 / pH: x  Gluc: x / Ketone: Negative  / Bili: Negative / Urobili: <2 mg/dL   Blood: x / Protein: 300 mg/dL / Nitrite: Negative   Leuk Esterase: Small / RBC: 2 /HPF / WBC 23 /HPF   Sq Epi: x / Non Sq Epi: 2 /HPF / Bacteria: Negative      CAPILLARY BLOOD GLUCOSE            RADIOLOGY & ADDITIONAL TESTS:    Imaging Personally Reviewed:    Consultant(s) Notes Reviewed:      Care Discussed with Consultants/Other Providers:    Wilfred Christian MD, CMD, FACP    [address] [place] [address]  Office Tel: [phone]  Cell: [phone]

## 2021-10-16 NOTE — H&P ADULT - PROBLEM SELECTOR PLAN 4
- patient taking eliquis for afib  - no afib on EKG  - safe to hold eliquis for now pending possible vascular intervention

## 2021-10-16 NOTE — H&P ADULT - NSHPPHYSICALEXAM_GEN_ALL_CORE
PHYSICAL EXAM:  VITALS: Vital Signs Last 24 Hrs  T(C): 36.7 (15 Oct 2021 21:21), Max: 36.9 (15 Oct 2021 18:47)  T(F): 98.1 (15 Oct 2021 21:21), Max: 98.5 (15 Oct 2021 18:47)  HR: 69 (15 Oct 2021 21:21) (65 - 69)  BP: 156/92 (15 Oct 2021 21:21) (148/88 - 173/99)  BP(mean): --  RR: 16 (15 Oct 2021 21:21) (16 - 18)  SpO2: 98% (15 Oct 2021 21:21) (98% - 100%)  GENERAL: NAD, well-developed, well-nourished  HEAD:  Atraumatic, Normocephalic  EYES: EOMI, PERRL, conjunctiva and sclera clear  ENT: Moist Mucus Membranes present, no ulcers appreciated  NECK: Supple, No JVD  CHEST/LUNG: Clear to auscultation bilaterally; No wheezes, rales or rhonchi, no accessory muscle use, + permacath  HEART: Regular rate and rhythm; No murmurs, rubs, or gallops, (+)S1, S2  ABDOMEN: Soft, Nontender, Nondistended; Normal Bowel sounds   EXTREMITIES:  2+ Peripheral Pulses, No clubbing, cyanosis, or edema, there is swelling, tenderness to palpation of the AV fistula on the R arm, possible mild erythema, palpable thrill  PSYCH: normal mood and affect  NEUROLOGY: AAOx3, non-focal  SKIN: No rashes or lesions

## 2021-10-16 NOTE — PROGRESS NOTE ADULT - SUBJECTIVE AND OBJECTIVE BOX
Patient is a 66y old  Male who presents with a chief complaint of R arm pain on Av fistula site (16 Oct 2021 16:56)      Vascular Surgery Attending Progress Note    Interval HPI: pt states right forearm  is feeling better  pt is receiving  hd via r permacath     Medications:  acetaminophen   Tablet .. 650 milliGRAM(s) Oral every 6 hours PRN  apixaban 5 milliGRAM(s) Oral two times a day  aspirin enteric coated 81 milliGRAM(s) Oral daily  atorvastatin 40 milliGRAM(s) Oral at bedtime  calcium gluconate IVPB 2 Gram(s) IV Intermittent once  carvedilol 25 milliGRAM(s) Oral every 12 hours  ceFAZolin   IVPB 1000 milliGRAM(s) IV Intermittent every 24 hours  epoetin tammi-epbx (RETACRIT) Injectable 8000 Unit(s) IV Push <User Schedule>  ferrous    sulfate 325 milliGRAM(s) Oral daily  folic acid 1 milliGRAM(s) Oral daily  hydrALAZINE 10 milliGRAM(s) Oral every 8 hours  levothyroxine 100 MICROGram(s) Oral daily  NIFEdipine XL 60 milliGRAM(s) Oral daily  pantoprazole    Tablet 40 milliGRAM(s) Oral before breakfast  sevelamer carbonate 800 milliGRAM(s) Oral three times a day      Vital Signs Last 24 Hrs  T(C): 36.2 (16 Oct 2021 16:17), Max: 36.7 (15 Oct 2021 21:21)  T(F): 97.1 (16 Oct 2021 16:17), Max: 98.1 (15 Oct 2021 21:21)  HR: 69 (16 Oct 2021 16:17) (62 - 69)  BP: 131/65 (16 Oct 2021 16:17) (131/65 - 163/94)  BP(mean): 98 (16 Oct 2021 05:16) (98 - 98)  RR: 17 (16 Oct 2021 16:17) (16 - 18)  SpO2: 100% (16 Oct 2021 12:47) (98% - 100%)  I&O's Summary      Physical Exam:  Neuro  A&Ox3 VSS  Vascular:  mild dec in rt forearm edema and cellulitis   avf w good bruit and thrill    LABS:                        9.6    6.35  )-----------( 136      ( 16 Oct 2021 06:06 )             31.0     10-16    135  |  97<L>  |  47<H>  ----------------------------<  89  4.9   |  25  |  8.17<H>    Ca    6.2<LL>      16 Oct 2021 06:06    TPro  7.1  /  Alb  3.7  /  TBili  0.3  /  DBili  x   /  AST  24  /  ALT  19  /  AlkPhos  142<H>  10-16    PT/INR - ( 15 Oct 2021 18:52 )   PT: 15.0 sec;   INR: 1.33 ratio         PTT - ( 15 Oct 2021 18:52 )  PTT:33.1 sec    LILIYA GODFREY MD  [phone] Cell [phone]

## 2021-10-16 NOTE — ED ADULT NURSE REASSESSMENT NOTE - NS ED NURSE REASSESS COMMENT FT1
Pt A&Ox4 resting on stretcher. Respirations even and unlabored, speaking in full sentences without any difficulty, no accessory muscle use. Pt denies any chest pain, dyspnea, dizziness, blurry vision, nausea, vomiting or diarrhea at this time. Morning labs drawn and medications administered as per MD order. Pt tolerated well. Pt awaiting bed assignment. Will continue to monitor.

## 2021-10-16 NOTE — PROGRESS NOTE ADULT - ASSESSMENT
67M w/ hx of HTN, Afib on Eliquis, ESRD on HD (TTS) via RUE radiocephalic fistula (created 6/3/21) presenting with R arm swelling and pain that began with HD on 10/12/21, likely due to infiltration    - No acute vascular intervention  - Continue HD through permacath for now  continue rue elevation  will follow    C team surgery  Pager [phone]

## 2021-10-16 NOTE — H&P ADULT - PROBLEM SELECTOR PLAN 3
Assessment:  - troponin 60, 56, trending down  - denies chest pain  - EKG shows T wave inversions which were seen on previous EKGs, no ST changes, NSR    Plan:  - monitor for chest pain  - mildly elevated troponins likely from ESRD

## 2021-10-16 NOTE — H&P ADULT - NSHPREVIEWOFSYSTEMS_GEN_ALL_CORE
REVIEW OF SYSTEMS:    CONSTITUTIONAL: No weakness, fevers or chills  EYES/ENT: No visual changes;  No dysphagia; No sore throat; No rhinorrhea; No sinus pain/pressure  NECK: No pain or stiffness  RESPIRATORY: + cough, no wheezing, hemoptysis; No shortness of breath  CARDIOVASCULAR: No chest pain or palpitations; No lower extremity edema  GASTROINTESTINAL: No abdominal or epigastric pain. No nausea, vomiting, or hematemesis; No diarrhea or constipation. No melena or hematochezia.  GENITOURINARY: No dysuria, frequency or hematuria  NEUROLOGICAL: No numbness or weakness  MSK: ambulates without assistance, + AV fistula pain  SKIN: No itching, burning, rashes, or lesions   All other review of systems is negative unless indicated above.

## 2021-10-16 NOTE — H&P ADULT - PROBLEM SELECTOR PLAN 2
Assessment:  - also found to have rhinovirus positive  - can be the cause of fevers at home    Plan:  - monitor and treat with symptom management  - tylenol prn

## 2021-10-16 NOTE — H&P ADULT - ASSESSMENT
65 y/o M with pmhx of ESRD on HD, afib on eliquis, anemia presented to the ED for R arm pain. Found to have rhinovirus pos and AV fistula clot/infection. Admitted for treatment and vascular management of AV fistula.

## 2021-10-16 NOTE — H&P ADULT - PROBLEM SELECTOR PLAN 5
- continue all BP meds  - patient takes hydralazine but known frequency, will clarify with medHxpharmacist. Email sent

## 2021-10-16 NOTE — PROGRESS NOTE ADULT - ASSESSMENT
67 y/o M with pmhx of ESRD on HD, afib on eliquis, anemia presented to the ED for R arm pain. Found to have rhinovirus pos and AV fistula clot/infection. Admitted for treatment and vascular management of AV fistula.

## 2021-10-17 LAB
ANION GAP SERPL CALC-SCNC: 15 MMOL/L — HIGH (ref 7–14)
BUN SERPL-MCNC: 27 MG/DL — HIGH (ref 7–23)
CALCIUM SERPL-MCNC: 6.8 MG/DL — LOW (ref 8.4–10.5)
CHLORIDE SERPL-SCNC: 92 MMOL/L — LOW (ref 98–107)
CO2 SERPL-SCNC: 24 MMOL/L — SIGNIFICANT CHANGE UP (ref 22–31)
COVID-19 SPIKE DOMAIN AB INTERP: NEGATIVE — SIGNIFICANT CHANGE UP
COVID-19 SPIKE DOMAIN ANTIBODY RESULT: 0.4 U/ML — SIGNIFICANT CHANGE UP
CREAT SERPL-MCNC: 5.48 MG/DL — HIGH (ref 0.5–1.3)
GLUCOSE SERPL-MCNC: 86 MG/DL — SIGNIFICANT CHANGE UP (ref 70–99)
HCT VFR BLD CALC: 33.9 % — LOW (ref 39–50)
HGB BLD-MCNC: 10.6 G/DL — LOW (ref 13–17)
MAGNESIUM SERPL-MCNC: 2 MG/DL — SIGNIFICANT CHANGE UP (ref 1.6–2.6)
MCHC RBC-ENTMCNC: 27.2 PG — SIGNIFICANT CHANGE UP (ref 27–34)
MCHC RBC-ENTMCNC: 31.3 GM/DL — LOW (ref 32–36)
MCV RBC AUTO: 86.9 FL — SIGNIFICANT CHANGE UP (ref 80–100)
MRSA PCR RESULT.: SIGNIFICANT CHANGE UP
NRBC # BLD: 0 /100 WBCS — SIGNIFICANT CHANGE UP
NRBC # FLD: 0 K/UL — SIGNIFICANT CHANGE UP
PHOSPHATE SERPL-MCNC: 4.1 MG/DL — SIGNIFICANT CHANGE UP (ref 2.5–4.5)
PLATELET # BLD AUTO: 155 K/UL — SIGNIFICANT CHANGE UP (ref 150–400)
POTASSIUM SERPL-MCNC: 4.8 MMOL/L — SIGNIFICANT CHANGE UP (ref 3.5–5.3)
POTASSIUM SERPL-SCNC: 4.8 MMOL/L — SIGNIFICANT CHANGE UP (ref 3.5–5.3)
RBC # BLD: 3.9 M/UL — LOW (ref 4.2–5.8)
RBC # FLD: 18.9 % — HIGH (ref 10.3–14.5)
S AUREUS DNA NOSE QL NAA+PROBE: SIGNIFICANT CHANGE UP
SARS-COV-2 IGG+IGM SERPL QL IA: 0.4 U/ML — SIGNIFICANT CHANGE UP
SARS-COV-2 IGG+IGM SERPL QL IA: NEGATIVE — SIGNIFICANT CHANGE UP
SODIUM SERPL-SCNC: 131 MMOL/L — LOW (ref 135–145)
WBC # BLD: 4.97 K/UL — SIGNIFICANT CHANGE UP (ref 3.8–10.5)
WBC # FLD AUTO: 4.97 K/UL — SIGNIFICANT CHANGE UP (ref 3.8–10.5)

## 2021-10-17 PROCEDURE — 99232 SBSQ HOSP IP/OBS MODERATE 35: CPT

## 2021-10-17 RX ORDER — CHLORHEXIDINE GLUCONATE 213 G/1000ML
1 SOLUTION TOPICAL DAILY
Refills: 0 | Status: DISCONTINUED | OUTPATIENT
Start: 2021-10-17 | End: 2021-11-05

## 2021-10-17 RX ORDER — INFLUENZA VIRUS VACCINE 15; 15; 15; 15 UG/.5ML; UG/.5ML; UG/.5ML; UG/.5ML
0.7 SUSPENSION INTRAMUSCULAR ONCE
Refills: 0 | Status: COMPLETED | OUTPATIENT
Start: 2021-10-17 | End: 2021-11-01

## 2021-10-17 RX ORDER — CALCIUM GLUCONATE 100 MG/ML
1 VIAL (ML) INTRAVENOUS ONCE
Refills: 0 | Status: COMPLETED | OUTPATIENT
Start: 2021-10-17 | End: 2021-10-17

## 2021-10-17 RX ORDER — CALCIUM GLUCONATE 100 MG/ML
1 VIAL (ML) INTRAVENOUS ONCE
Refills: 0 | Status: DISCONTINUED | OUTPATIENT
Start: 2021-10-17 | End: 2021-10-17

## 2021-10-17 RX ADMIN — Medication 10 MILLIGRAM(S): at 14:03

## 2021-10-17 RX ADMIN — Medication 81 MILLIGRAM(S): at 12:49

## 2021-10-17 RX ADMIN — Medication 1 MILLIGRAM(S): at 12:49

## 2021-10-17 RX ADMIN — CARVEDILOL PHOSPHATE 25 MILLIGRAM(S): 80 CAPSULE, EXTENDED RELEASE ORAL at 07:04

## 2021-10-17 RX ADMIN — CARVEDILOL PHOSPHATE 25 MILLIGRAM(S): 80 CAPSULE, EXTENDED RELEASE ORAL at 19:06

## 2021-10-17 RX ADMIN — Medication 60 MILLIGRAM(S): at 07:04

## 2021-10-17 RX ADMIN — Medication 10 MILLIGRAM(S): at 21:44

## 2021-10-17 RX ADMIN — ATORVASTATIN CALCIUM 40 MILLIGRAM(S): 80 TABLET, FILM COATED ORAL at 21:44

## 2021-10-17 RX ADMIN — SEVELAMER CARBONATE 800 MILLIGRAM(S): 2400 POWDER, FOR SUSPENSION ORAL at 09:00

## 2021-10-17 RX ADMIN — APIXABAN 5 MILLIGRAM(S): 2.5 TABLET, FILM COATED ORAL at 10:12

## 2021-10-17 RX ADMIN — Medication 325 MILLIGRAM(S): at 12:49

## 2021-10-17 RX ADMIN — Medication 10 MILLIGRAM(S): at 07:04

## 2021-10-17 RX ADMIN — Medication 100 MILLIGRAM(S): at 07:04

## 2021-10-17 RX ADMIN — Medication 100 MICROGRAM(S): at 07:04

## 2021-10-17 RX ADMIN — SEVELAMER CARBONATE 800 MILLIGRAM(S): 2400 POWDER, FOR SUSPENSION ORAL at 19:07

## 2021-10-17 RX ADMIN — PANTOPRAZOLE SODIUM 40 MILLIGRAM(S): 20 TABLET, DELAYED RELEASE ORAL at 07:04

## 2021-10-17 RX ADMIN — APIXABAN 5 MILLIGRAM(S): 2.5 TABLET, FILM COATED ORAL at 19:07

## 2021-10-17 RX ADMIN — CHLORHEXIDINE GLUCONATE 1 APPLICATION(S): 213 SOLUTION TOPICAL at 11:05

## 2021-10-17 RX ADMIN — Medication 50 GRAM(S): at 21:44

## 2021-10-17 NOTE — PROGRESS NOTE ADULT - SUBJECTIVE AND OBJECTIVE BOX
CHAVEZ MARQUEZ  66y  Male      Patient is a 66y old  Male who presents with a chief complaint of R arm pain on Av fistula site (17 Oct 2021 13:03)      REVIEW OF SYSTEMS:  CONSTITUTIONAL: No fever  RESPIRATORY: No cough, hemoptysis or shortness of breath  CARDIOVASCULAR: No chest pain, palpitations, dizziness, or leg swelling  GASTROINTESTINAL: No abdominal pain. nausea, vomiting, hematemesis  GENITOURINARY: No dysuria, frequency, hematuria   NEUROLOGICAL: No headaches, no dizziness  MUSCULOSKELETAL: No joint pain or swelling;     INTERVAL HPI/OVERNIGHT EVENTS:  T(C): 36.7 (10-17-21 @ 14:27), Max: 36.8 (10-16-21 @ 23:41)  HR: 68 (10-17-21 @ :27) (64 - 75)  BP: 101/62 (10-17-21 @ 14:27) ( - /)  RR: 17 (10-17-21 @ 14:27) ( - )  SpO2: 100% (10-17-21 @ 14:27) (97% - 100%)  Wt(kg): --  I&O's Summary    16 Oct 2021 07:  -  17 Oct 2021 07:00  --------------------------------------------------------  IN: 600 mL / OUT: 2400 mL / NET: -1800 mL    17 Oct 2021 07:  -  17 Oct 2021 17:28  --------------------------------------------------------  IN: 480 mL / OUT: 200 mL / NET: 280 mL      T(C): 36.7 (10-17-21 @ 14:27), Max: 36.8 (10-16-21 @ 23:41)  HR: 68 (10-17-21 @ 14:27) (64 - 75)  BP: 101/62 (10-17-21 @ 14:27) (101/62 - 145/84)  RR: 17 (10-17- @ 14:27) (16 - 17)  SpO2: 100% (10-17-21 @ 14:27) (97% - 100%)  Wt(kg): --Vital Signs Last 24 Hrs  T(C): 36.7 (17 Oct 2021 14:27), Max: 36.8 (16 Oct 2021 23:41)  T(F): 98 (17 Oct 2021 14:27), Max: 98.3 (16 Oct 2021 23:41)  HR: 68 (17 Oct 2021 14:27) (64 - 75)  BP: 101/62 (17 Oct 2021 14:27) (101/62 - 145/84)  BP(mean): --  RR: 17 (17 Oct 2021 14:27) (16 - 17)  SpO2: 100% (17 Oct 2021 14:27) (97% - 100%)    LABS:                        10.6   4.97  )-----------( 155      ( 17 Oct 2021 07:22 )             33.9     10-17    131<L>  |  92<L>  |  27<H>  ----------------------------<  86  4.8   |  24  |  5.48<H>    Ca    6.8<L>      17 Oct 2021 07:22  Phos  4.1     10-17  Mg     2.00     10-17    TPro  7.1  /  Alb  3.7  /  TBili  0.3  /  DBili  x   /  AST  24  /  ALT  19  /  AlkPhos  142<H>  10-16    PT/INR - ( 15 Oct 2021 18:52 )   PT: 15.0 sec;   INR: 1.33 ratio         PTT - ( 15 Oct 2021 18:52 )  PTT:33.1 sec  Urinalysis Basic - ( 16 Oct 2021 08:14 )    Color: Light Yellow / Appearance: Clear / S.011 / pH: x  Gluc: x / Ketone: Negative  / Bili: Negative / Urobili: <2 mg/dL   Blood: x / Protein: 300 mg/dL / Nitrite: Negative   Leuk Esterase: Small / RBC: 2 /HPF / WBC 23 /HPF   Sq Epi: x / Non Sq Epi: 2 /HPF / Bacteria: Negative      CAPILLARY BLOOD GLUCOSE            Urinalysis Basic - ( 16 Oct 2021 08:14 )    Color: Light Yellow / Appearance: Clear / S.011 / pH: x  Gluc: x / Ketone: Negative  / Bili: Negative / Urobili: <2 mg/dL   Blood: x / Protein: 300 mg/dL / Nitrite: Negative   Leuk Esterase: Small / RBC: 2 /HPF / WBC 23 /HPF   Sq Epi: x / Non Sq Epi: 2 /HPF / Bacteria: Negative        PAST MEDICAL & SURGICAL HISTORY:  HTN (Hypertension)    Chronic kidney disease    Kidney stones    Hemodialysis access, AV graft  Left upper extremity    Hyperparathyroidism    Anemia    Thrombocytopenia    Atrial fibrillation  on Eliquis    S/P Nephrectomy  (L)  for kidney stones    Acquired arteriovenous fistula  left wrist  2015 - [facility], Left upper arm 2015 (approximate date)    AVF (arteriovenous fistula)        MEDICATIONS  (STANDING):  apixaban 5 milliGRAM(s) Oral two times a day  aspirin enteric coated 81 milliGRAM(s) Oral daily  atorvastatin 40 milliGRAM(s) Oral at bedtime  carvedilol 25 milliGRAM(s) Oral every 12 hours  ceFAZolin   IVPB 1000 milliGRAM(s) IV Intermittent every 24 hours  chlorhexidine 2% Cloths 1 Application(s) Topical daily  epoetin tammi-epbx (RETACRIT) Injectable 8000 Unit(s) IV Push <User Schedule>  ferrous    sulfate 325 milliGRAM(s) Oral daily  folic acid 1 milliGRAM(s) Oral daily  hydrALAZINE 10 milliGRAM(s) Oral every 8 hours  influenza  Vaccine (HIGH DOSE) 0.7 milliLiter(s) IntraMuscular once  levothyroxine 100 MICROGram(s) Oral daily  NIFEdipine XL 60 milliGRAM(s) Oral daily  pantoprazole    Tablet 40 milliGRAM(s) Oral before breakfast  sevelamer carbonate 800 milliGRAM(s) Oral three times a day    MEDICATIONS  (PRN):  acetaminophen   Tablet .. 650 milliGRAM(s) Oral every 6 hours PRN Temp greater or equal to 38C (100.4F), Mild Pain (1 - 3)  Patient was seen and examined.chart reviewed.lesly Mahan.rt.arm edema-improving,seen by vascular,no intervention.no fever,no sob,no cp      RADIOLOGY & ADDITIONAL TESTS:    Imaging Personally Reviewed:  [ ] YES  [ ] NO    Consultant(s) Notes Reviewed:  [x ] YES  [ ] NO    PHYSICAL EXAM:  GENERAL: Alert and awake lying in bed in no distress  HEAD:  Atraumatic, Normocephalic  EYES: EOMI, LILIAM, conjunctiva and sclera clear  NECK: Supple, No JVD, Normal thyroid  NERVOUS SYSTEM:  Alert & Oriented X3, Motor and sensory systems are intact,   CHEST/LUNG: Bilateral clear breath sounds, no rhochi, no wheezing, no crepitations,  HEART: Regular rate and rhythm; No murmurs, rubs, or gallops  ABDOMEN: Soft, Nontender, Nondistended; Bowel sounds present  EXTREMITIES:   Peripheral Pulses are palpable,   edema-rt.arm-minimal        Care Discussed with Consultants/Other Providers [x ] YES  [ ] NO      Code Status: [] Full Code [] DNR [] DNI [] Goals of Care:   Disposition: [] ICU [] Stroke Unit [] RCU []PCU []Floor [] Discharge Home         ADALGISA McwilliamsP

## 2021-10-17 NOTE — PROGRESS NOTE ADULT - ATTENDING COMMENTS
I Abimael Feldman MD have seen and examined the patient today and agree with  the  evaluation, assessment and plan of the surgical house officer  MALLORY Feldman MD have personally seen and examined the patient at bedside today at 3 pm

## 2021-10-17 NOTE — PROGRESS NOTE ADULT - SUBJECTIVE AND OBJECTIVE BOX
SUBJECTIVE:  RUE arm feels slightly numb but overall improved compared to admission.    OBJECTIVE:  Vital Signs Last 24 Hrs  T(C): 36.5 (17 Oct 2021 09:20), Max: 36.8 (16 Oct 2021 23:41)  T(F): 97.7 (17 Oct 2021 09:20), Max: 98.3 (16 Oct 2021 23:41)  HR: 69 (17 Oct 2021 09:20) (64 - 75)  BP: 140/86 (17 Oct 2021 09:20) (131/52 - 145/84)  BP(mean): --  RR: 17 (17 Oct 2021 09:20) (16 - 17)  SpO2: 99% (17 Oct 2021 09:20) (97% - 99%)      10-16-21 @ 07:01  -  10-17-21 @ 07:00  --------------------------------------------------------  IN: 600 mL / OUT: 2400 mL / NET: -1800 mL    10-17-21 @ 07:01  -  10-17-21 @ 13:04  --------------------------------------------------------  IN: 240 mL / OUT: 200 mL / NET: 40 mL        Physical Examination:  GEN: NAD, resting quietly  PULM: symmetric chest rise bilaterally, no increased WOB  ABD: soft, appropriately tender, nondistended, no rebound or guarding, incision CDI  EXTR: no LE erythema, moving all extremities      LABS:                        10.6   4.97  )-----------( 155      ( 17 Oct 2021 07:22 )             33.9       10-17    131<L>  |  92<L>  |  27<H>  ----------------------------<  86  4.8   |  24  |  5.48<H>    Ca    6.8<L>      17 Oct 2021 07:22  Phos  4.1     10-17  Mg     2.00     10-17    TPro  7.1  /  Alb  3.7  /  TBili  0.3  /  DBili  x   /  AST  24  /  ALT  19  /  AlkPhos  142<H>  10-16         SUBJECTIVE:  RUE arm feels slightly numb but overall improved compared to admission.    OBJECTIVE:  Vital Signs Last 24 Hrs  T(C): 36.5 (17 Oct 2021 09:20), Max: 36.8 (16 Oct 2021 23:41)  T(F): 97.7 (17 Oct 2021 09:20), Max: 98.3 (16 Oct 2021 23:41)  HR: 69 (17 Oct 2021 09:20) (64 - 75)  BP: 140/86 (17 Oct 2021 09:20) (131/52 - 145/84)  BP(mean): --  RR: 17 (17 Oct 2021 09:20) (16 - 17)  SpO2: 99% (17 Oct 2021 09:20) (97% - 99%)      10-16-21 @ 07:01  -  10-17-21 @ 07:00  --------------------------------------------------------  IN: 600 mL / OUT: 2400 mL / NET: -1800 mL    10-17-21 @ 07:01  -  10-17-21 @ 13:04  --------------------------------------------------------  IN: 240 mL / OUT: 200 mL / NET: 40 mL        Physical Examination:  GEN: NAD, resting quietly  PULM: symmetric chest rise bilaterally, no increased WOB  EXTR: RUE edema stable, mild numbness of hand, no motor deficit, palpable thrill over AVF      LABS:                        10.6   4.97  )-----------( 155      ( 17 Oct 2021 07:22 )             33.9       10-17    131<L>  |  92<L>  |  27<H>  ----------------------------<  86  4.8   |  24  |  5.48<H>    Ca    6.8<L>      17 Oct 2021 07:22  Phos  4.1     10-17  Mg     2.00     10-17    TPro  7.1  /  Alb  3.7  /  TBili  0.3  /  DBili  x   /  AST  24  /  ALT  19  /  AlkPhos  142<H>  10-16         SUBJECTIVE:  RUE arm feels slightly numb but overall improved compared to admission.    OBJECTIVE:  Vital Signs Last 24 Hrs  T(C): 36.5 (17 Oct 2021 09:20), Max: 36.8 (16 Oct 2021 23:41)  T(F): 97.7 (17 Oct 2021 09:20), Max: 98.3 (16 Oct 2021 23:41)  HR: 69 (17 Oct 2021 09:20) (64 - 75)  BP: 140/86 (17 Oct 2021 09:20) (131/52 - 145/84)  BP(mean): --  RR: 17 (17 Oct 2021 09:20) (16 - 17)  SpO2: 99% (17 Oct 2021 09:20) (97% - 99%)      10-16-21 @ 07:01  -  10-17-21 @ 07:00  --------------------------------------------------------  IN: 600 mL / OUT: 2400 mL / NET: -1800 mL    10-17-21 @ 07:01  -  10-17-21 @ 13:04  --------------------------------------------------------  IN: 240 mL / OUT: 200 mL / NET: 40 mL        Physical Examination:  GEN: NAD, resting quietly  PULM: symmetric chest rise bilaterally, no increased WOB  EXTR: RUE edema w mod dec, mild numbness of hand, no motor deficit, palpable thrill over AVF  cellulitis sig decreased      LABS:                        10.6   4.97  )-----------( 155      ( 17 Oct 2021 07:22 )             33.9       10-17    131<L>  |  92<L>  |  27<H>  ----------------------------<  86  4.8   |  24  |  5.48<H>    Ca    6.8<L>      17 Oct 2021 07:22  Phos  4.1     10-17  Mg     2.00     10-17    TPro  7.1  /  Alb  3.7  /  TBili  0.3  /  DBili  x   /  AST  24  /  ALT  19  /  AlkPhos  142<H>  10-16

## 2021-10-17 NOTE — PROGRESS NOTE ADULT - ASSESSMENT
67M w/ hx of HTN, Afib on Eliquis, ESRD on HD (TTS) via RUE radiocephalic fistula (created 6/3/21) presenting with R arm swelling and pain that began with HD on 10/12/21, likely due to infiltration.    Plan/Recommendations:  - No acute vascular intervention  - F/u AVF duplex  - Continue HD through permacath for now  - Continue RUE elevation    C Team Surgery   [id number]    67M w/ hx of HTN, Afib on Eliquis, ESRD on HD (TTS) via RUE radiocephalic fistula (created 6/3/21) presenting with R arm swelling and pain that began with HD on 10/12/21, likely due to infiltration.    Plan/Recommendations:  clinically inproving   - No acute vascular intervention  - F/u AVF duplex  - Continue HD through permacath for now  - Continue RUE elevation  Dr CARROLL Delcid to f/u care starting  10/18/2021    C Team Surgery   [id number]

## 2021-10-18 LAB
ANION GAP SERPL CALC-SCNC: 17 MMOL/L — HIGH (ref 7–14)
BUN SERPL-MCNC: 40 MG/DL — HIGH (ref 7–23)
CALCIUM SERPL-MCNC: 10.1 MG/DL — SIGNIFICANT CHANGE UP (ref 8.4–10.5)
CHLORIDE SERPL-SCNC: 97 MMOL/L — LOW (ref 98–107)
CO2 SERPL-SCNC: 20 MMOL/L — LOW (ref 22–31)
CREAT SERPL-MCNC: 7.26 MG/DL — HIGH (ref 0.5–1.3)
FERRITIN SERPL-MCNC: 947 NG/ML — HIGH (ref 30–400)
GLUCOSE SERPL-MCNC: 77 MG/DL — SIGNIFICANT CHANGE UP (ref 70–99)
HCT VFR BLD CALC: 28.8 % — LOW (ref 39–50)
HGB BLD-MCNC: 9 G/DL — LOW (ref 13–17)
IRON SATN MFR SERPL: 26 % — SIGNIFICANT CHANGE UP (ref 14–50)
IRON SATN MFR SERPL: 40 UG/DL — LOW (ref 45–165)
MAGNESIUM SERPL-MCNC: 1.8 MG/DL — SIGNIFICANT CHANGE UP (ref 1.6–2.6)
MCHC RBC-ENTMCNC: 27.1 PG — SIGNIFICANT CHANGE UP (ref 27–34)
MCHC RBC-ENTMCNC: 31.3 GM/DL — LOW (ref 32–36)
MCV RBC AUTO: 86.7 FL — SIGNIFICANT CHANGE UP (ref 80–100)
NRBC # BLD: 0 /100 WBCS — SIGNIFICANT CHANGE UP
NRBC # FLD: 0 K/UL — SIGNIFICANT CHANGE UP
PHOSPHATE SERPL-MCNC: 3.7 MG/DL — SIGNIFICANT CHANGE UP (ref 2.5–4.5)
PLATELET # BLD AUTO: 127 K/UL — LOW (ref 150–400)
POTASSIUM SERPL-MCNC: 4 MMOL/L — SIGNIFICANT CHANGE UP (ref 3.5–5.3)
POTASSIUM SERPL-SCNC: 4 MMOL/L — SIGNIFICANT CHANGE UP (ref 3.5–5.3)
RBC # BLD: 3.32 M/UL — LOW (ref 4.2–5.8)
RBC # FLD: 18.6 % — HIGH (ref 10.3–14.5)
SODIUM SERPL-SCNC: 134 MMOL/L — LOW (ref 135–145)
TIBC SERPL-MCNC: 155 UG/DL — LOW (ref 220–430)
UIBC SERPL-MCNC: 115 UG/DL — SIGNIFICANT CHANGE UP (ref 110–370)
WBC # BLD: 4.83 K/UL — SIGNIFICANT CHANGE UP (ref 3.8–10.5)
WBC # FLD AUTO: 4.83 K/UL — SIGNIFICANT CHANGE UP (ref 3.8–10.5)

## 2021-10-18 RX ORDER — ERYTHROPOIETIN 10000 [IU]/ML
10000 INJECTION, SOLUTION INTRAVENOUS; SUBCUTANEOUS
Refills: 0 | Status: DISCONTINUED | OUTPATIENT
Start: 2021-10-18 | End: 2021-11-04

## 2021-10-18 RX ADMIN — PANTOPRAZOLE SODIUM 40 MILLIGRAM(S): 20 TABLET, DELAYED RELEASE ORAL at 05:37

## 2021-10-18 RX ADMIN — Medication 81 MILLIGRAM(S): at 12:59

## 2021-10-18 RX ADMIN — APIXABAN 5 MILLIGRAM(S): 2.5 TABLET, FILM COATED ORAL at 18:11

## 2021-10-18 RX ADMIN — Medication 100 MICROGRAM(S): at 05:38

## 2021-10-18 RX ADMIN — Medication 10 MILLIGRAM(S): at 05:37

## 2021-10-18 RX ADMIN — Medication 10 MILLIGRAM(S): at 13:00

## 2021-10-18 RX ADMIN — CHLORHEXIDINE GLUCONATE 1 APPLICATION(S): 213 SOLUTION TOPICAL at 12:05

## 2021-10-18 RX ADMIN — Medication 325 MILLIGRAM(S): at 13:01

## 2021-10-18 RX ADMIN — SEVELAMER CARBONATE 800 MILLIGRAM(S): 2400 POWDER, FOR SUSPENSION ORAL at 08:48

## 2021-10-18 RX ADMIN — APIXABAN 5 MILLIGRAM(S): 2.5 TABLET, FILM COATED ORAL at 05:37

## 2021-10-18 RX ADMIN — Medication 100 MILLIGRAM(S): at 05:38

## 2021-10-18 RX ADMIN — CARVEDILOL PHOSPHATE 25 MILLIGRAM(S): 80 CAPSULE, EXTENDED RELEASE ORAL at 18:11

## 2021-10-18 RX ADMIN — CARVEDILOL PHOSPHATE 25 MILLIGRAM(S): 80 CAPSULE, EXTENDED RELEASE ORAL at 05:37

## 2021-10-18 RX ADMIN — Medication 10 MILLIGRAM(S): at 22:24

## 2021-10-18 RX ADMIN — Medication 1 MILLIGRAM(S): at 13:02

## 2021-10-18 RX ADMIN — SEVELAMER CARBONATE 800 MILLIGRAM(S): 2400 POWDER, FOR SUSPENSION ORAL at 18:12

## 2021-10-18 RX ADMIN — Medication 60 MILLIGRAM(S): at 05:37

## 2021-10-18 RX ADMIN — ATORVASTATIN CALCIUM 40 MILLIGRAM(S): 80 TABLET, FILM COATED ORAL at 22:24

## 2021-10-18 RX ADMIN — SEVELAMER CARBONATE 800 MILLIGRAM(S): 2400 POWDER, FOR SUSPENSION ORAL at 13:00

## 2021-10-18 NOTE — PROGRESS NOTE ADULT - ASSESSMENT
67M w/ hx of HTN, Afib on Eliquis, ESRD on HD (TTS) via RUE radiocephalic fistula (created 6/3/21) presenting with R arm swelling and pain that began with HD on 10/12/21, likely due to infiltration.    Plan/Recommendations:  - F/u AVF duplex  - Continue HD through permacath for now  - Continue RUE elevation  - Potentially stable for discharge today pending fistula duplex, please expedite.     C Team Surgery   [id number]    67M w/ hx of HTN, Afib on Eliquis, ESRD on HD (TTS) via RUE radiocephalic fistula (created 6/3/21) presenting with R arm swelling and pain that began with HD on 10/12/21, likely due to infiltration.    Plan/Recommendations:  - Plan for fistulogram later this week with Dr. Robertson.   - Continue HD through permacath for now.  - Continue RUE elevation.  - Please hold off on discharge pending fistulogram plan.      C Team Surgery   [id number]

## 2021-10-18 NOTE — PROGRESS NOTE ADULT - SUBJECTIVE AND OBJECTIVE BOX
[facility] - S Alicia / Ej S /D Federico/ S Maryellen/ SERA Faust/ Jovan Garrison / M Danou/ O Gregorio  service -[phone], office [phone]  ---------------------------------------------------------------------------------------------------------------    Patient seen and examined bedside    Subjective and Objective: No overnight events, sob resolved. No complaints today. feeling better    Allergies: No Known Allergies      Hospital Medications:   MEDICATIONS  (STANDING):  apixaban 5 milliGRAM(s) Oral two times a day  aspirin enteric coated 81 milliGRAM(s) Oral daily  atorvastatin 40 milliGRAM(s) Oral at bedtime  carvedilol 25 milliGRAM(s) Oral every 12 hours  ceFAZolin   IVPB 1000 milliGRAM(s) IV Intermittent every 24 hours  chlorhexidine 2% Cloths 1 Application(s) Topical daily  epoetin tammi-epbx (RETACRIT) Injectable 8000 Unit(s) IV Push <User Schedule>  ferrous    sulfate 325 milliGRAM(s) Oral daily  folic acid 1 milliGRAM(s) Oral daily  hydrALAZINE 10 milliGRAM(s) Oral every 8 hours  influenza  Vaccine (HIGH DOSE) 0.7 milliLiter(s) IntraMuscular once  levothyroxine 100 MICROGram(s) Oral daily  NIFEdipine XL 60 milliGRAM(s) Oral daily  pantoprazole    Tablet 40 milliGRAM(s) Oral before breakfast  sevelamer carbonate 800 milliGRAM(s) Oral three times a day      REVIEW OF SYSTEMS:  CONSTITUTIONAL: No weakness, fevers or chills  EYES/ENT: No visual changes;  No vertigo or throat pain   NECK: No pain or stiffness  RESPIRATORY: No cough, wheezing, hemoptysis; No shortness of breath  CARDIOVASCULAR: No chest pain or palpitations.  GASTROINTESTINAL: No abdominal or epigastric pain. No nausea, vomiting, or hematemesis; No diarrhea or constipation. No melena or hematochezia.  GENITOURINARY: No dysuria, frequency, foamy urine, urinary urgency, incontinence or hematuria  NEUROLOGICAL: No numbness or weakness  SKIN: No itching, burning, rashes, or lesions   VASCULAR: No bilateral lower extremity edema.   All other review of systems is negative unless indicated above.    VITALS:  T(F): 98.3 (10-18-21 @ 17:47), Max: 98.3 (10-18-21 @ 17:47)  HR: 79 (10-18-21 @ 17:47)  BP: 128/82 (10-18-21 @ 17:47)  RR: 18 (10-18-21 @ 17:47)  SpO2: 98% (10-18-21 @ :47)  Wt(kg): --    10-17 @ 07:01  -  10-18 @ 07:00  --------------------------------------------------------  IN: 1070 mL / OUT: 500 mL / NET: 570 mL    10-18 @ 07:01  -  10-18 @ 18:29  --------------------------------------------------------  IN: 240 mL / OUT: 200 mL / NET: 40 mL          PHYSICAL EXAM:  Constitutional: NAD  HEENT: anicteric sclera, oropharynx clear  Neck: No JVD  Respiratory: CTAB, no wheezes, rales or rhonchi  Cardiovascular: S1, S2, RRR  Gastrointestinal: BS+, soft, NT/ND  Extremities: No cyanosis or clubbing. No peripheral edema  Neurological: A/O x 3, no focal deficits  Psychiatric: Normal mood, normal affect  : No CVA tenderness. No siegel.   Skin: No rashes  Vascular Access:    LABS:  10-18    134<L>  |  97<L>  |  40<H>  ----------------------------<  77  4.0   |  20<L>  |  7.26<H>    Ca    10.1      18 Oct 2021 07:48  Phos  3.7     10-18  Mg     1.80     10-18      Creatinine Trend: 7.26 <--, 5.48 <--, 8.17 <--, 7.24 <--                        9.0    4.83  )-----------( 127      ( 18 Oct 2021 07:48 )             28.8     Urine Studies:  Urinalysis Basic - ( 16 Oct 2021 08:14 )    Color: Light Yellow / Appearance: Clear / S.011 / pH:   Gluc:  / Ketone: Negative  / Bili: Negative / Urobili: <2 mg/dL   Blood:  / Protein: 300 mg/dL / Nitrite: Negative   Leuk Esterase: Small / RBC: 2 /HPF / WBC 23 /HPF   Sq Epi:  / Non Sq Epi: 2 /HPF / Bacteria: Negative          RADIOLOGY & ADDITIONAL STUDIES:   [facility] - S Alicia / Ej S /D Federico/ S Maryellen/ S Govind/ Jovan Garrison / GAYATRI Garcia/ O Gregorio  service -[phone], office [phone]  ---------------------------------------------------------------------------------------------------------------    Patient seen and examined bedside    Subjective and Objective: No overnight events, denied sob. No complaints today. feeling better    Allergies: No Known Allergies      Hospital Medications:   MEDICATIONS  (STANDING):  apixaban 5 milliGRAM(s) Oral two times a day  aspirin enteric coated 81 milliGRAM(s) Oral daily  atorvastatin 40 milliGRAM(s) Oral at bedtime  carvedilol 25 milliGRAM(s) Oral every 12 hours  ceFAZolin   IVPB 1000 milliGRAM(s) IV Intermittent every 24 hours  chlorhexidine 2% Cloths 1 Application(s) Topical daily  epoetin tammi-epbx (RETACRIT) Injectable 8000 Unit(s) IV Push <User Schedule>  ferrous    sulfate 325 milliGRAM(s) Oral daily  folic acid 1 milliGRAM(s) Oral daily  hydrALAZINE 10 milliGRAM(s) Oral every 8 hours  influenza  Vaccine (HIGH DOSE) 0.7 milliLiter(s) IntraMuscular once  levothyroxine 100 MICROGram(s) Oral daily  NIFEdipine XL 60 milliGRAM(s) Oral daily  pantoprazole    Tablet 40 milliGRAM(s) Oral before breakfast  sevelamer carbonate 800 milliGRAM(s) Oral three times a day      VITALS:  T(F): 98.3 (10-18-21 @ 17:47), Max: 98.3 (10-18-21 @ 17:47)  HR: 79 (10-18-21 @ 17:47)  BP: 128/82 (10-18-21 @ 17:47)  RR: 18 (10-18-21 @ 17:47)  SpO2: 98% (10-18-21 @ 17:47)  Wt(kg): --    10-17 @ 07:01  -  10-18 @ 07:00  --------------------------------------------------------  IN: 1070 mL / OUT: 500 mL / NET: 570 mL    10-18 @ 07:01  -  10-18 @ 18:29  --------------------------------------------------------  IN: 240 mL / OUT: 200 mL / NET: 40 mL      PHYSICAL EXAM:  HEENT: anicteric sclera  Neck: No JVD  Respiratory: CTAB, no wheezes, rales or rhonchi  Cardiovascular: S1, S2, RRR  Gastrointestinal: BS+, soft, NT/ND  Extremities: No peripheral edema  Neurological: A/O x 3  Psychiatric: Normal mood, normal affect  : no siegel.  Vascular Access: rt IJ PC+; RFA AVF+    LABS:  10-18    134<L>  |  97<L>  |  40<H>  ----------------------------<  77  4.0   |  20<L>  |  7.26<H>    Ca    10.1      18 Oct 2021 07:48  Phos  3.7     10-18  Mg     1.80     10-18      Creatinine Trend: 7.26 <--, 5.48 <--, 8.17 <--, 7.24 <--                        9.0    4.83  )-----------( 127      ( 18 Oct 2021 07:48 )             28.8     Urine Studies:  Urinalysis Basic - ( 16 Oct 2021 08:14 )    Color: Light Yellow / Appearance: Clear / S.011 / pH:   Gluc:  / Ketone: Negative  / Bili: Negative / Urobili: <2 mg/dL   Blood:  / Protein: 300 mg/dL / Nitrite: Negative   Leuk Esterase: Small / RBC: 2 /HPF / WBC 23 /HPF   Sq Epi:  / Non Sq Epi: 2 /HPF / Bacteria: Negative          RADIOLOGY & ADDITIONAL STUDIES:

## 2021-10-18 NOTE — PROGRESS NOTE ADULT - SUBJECTIVE AND OBJECTIVE BOX
Vascular Surgery Progress Note     Subjective/24hour Events:   Patient seen and examined.   No acute events overnight.   States R arm significantly improved, minimal symptoms now.     Vital Signs:  Vital Signs Last 24 Hrs  T(C): 36.5 (18 Oct 2021 09:48), Max: 36.7 (17 Oct 2021 14:27)  T(F): 97.7 (18 Oct 2021 09:48), Max: 98 (17 Oct 2021 14:27)  HR: 70 (18 Oct 2021 09:48) (65 - 74)  BP: 114/76 (18 Oct 2021 09:48) (101/62 - 120/76)  BP(mean): --  RR: 18 (18 Oct 2021 09:48) (17 - 18)  SpO2: 96% (18 Oct 2021 09:48) (96% - 100%)    CAPILLARY BLOOD GLUCOSE          I&O's Detail    17 Oct 2021 07:01  -  18 Oct 2021 07:00  --------------------------------------------------------  IN:    IV PiggyBack: 100 mL    Oral Fluid: 970 mL  Total IN: 1070 mL    OUT:    Voided (mL): 500 mL  Total OUT: 500 mL    Total NET: 570 mL          MEDICATIONS  (STANDING):  apixaban 5 milliGRAM(s) Oral two times a day  aspirin enteric coated 81 milliGRAM(s) Oral daily  atorvastatin 40 milliGRAM(s) Oral at bedtime  carvedilol 25 milliGRAM(s) Oral every 12 hours  ceFAZolin   IVPB 1000 milliGRAM(s) IV Intermittent every 24 hours  chlorhexidine 2% Cloths 1 Application(s) Topical daily  epoetin tammi-epbx (RETACRIT) Injectable 8000 Unit(s) IV Push <User Schedule>  ferrous    sulfate 325 milliGRAM(s) Oral daily  folic acid 1 milliGRAM(s) Oral daily  hydrALAZINE 10 milliGRAM(s) Oral every 8 hours  influenza  Vaccine (HIGH DOSE) 0.7 milliLiter(s) IntraMuscular once  levothyroxine 100 MICROGram(s) Oral daily  NIFEdipine XL 60 milliGRAM(s) Oral daily  pantoprazole    Tablet 40 milliGRAM(s) Oral before breakfast  sevelamer carbonate 800 milliGRAM(s) Oral three times a day    MEDICATIONS  (PRN):  acetaminophen   Tablet .. 650 milliGRAM(s) Oral every 6 hours PRN Temp greater or equal to 38C (100.4F), Mild Pain (1 - 3)      Physical Exam:  GEN: NAD, resting quietly  PULM: symmetric chest rise bilaterally, no increased WOB  EXTR: RUE fistula with palpable thrill, minimal swelling, motor and sensation grossly intact.     Labs:    10-18    134<L>  |  97<L>  |  40<H>  ----------------------------<  77  4.0   |  20<L>  |  7.26<H>    Ca    10.1      18 Oct 2021 07:48  Phos  3.7     10-18  Mg     1.80     10-18                              9.0    4.83  )-----------( 127      ( 18 Oct 2021 07:48 )             28.8

## 2021-10-18 NOTE — PROGRESS NOTE ADULT - PROBLEM SELECTOR PLAN 8
- will obtain iron panel, need to r/o iron deficiency anemia  - recommend EPO during dialysis in the setting of ESRD, Nephro f/up
- will obtain iron panel, need to r/o iron deficiency anemia  - recommend EPO during dialysis in the setting of ESRD, Nephro f/up
ACD  Epogen

## 2021-10-18 NOTE — PROGRESS NOTE ADULT - ASSESSMENT
67 y/o M w/ESRD on HD TTS, HTN. Renal following for ESRD Mx.  	  ESRD on HD TTS  K, vol acceptable  new RFA AVF s/p infiltration w/hematoma outpt    s/p last hd SAT 10/16 using PC  plan for next routine HD tomorrow w/2k bath, using PC, net uf 2-2.5kg as tolerated  dose all meds for ESRD  renal diet  f/u w/ vas sx eval- note reviewed-- Plan for fistulogram later this week with Dr. Robertson. Continue HD through permacath for now.  Anemia in ckd Hb<goal- inc epo 8>10k tiw w/hd    HTN, controlled-bp good today  c/w home bp meds-coreg, Nifedipine, hydrALAZINE     poc d/w pt  labs, chart reviewed  For any question, call:  Cell # [phone]  Pager # [phone]  office # [phone]

## 2021-10-18 NOTE — PROGRESS NOTE ADULT - SUBJECTIVE AND OBJECTIVE BOX
Patient is a 66y old  Male who presents with a chief complaint of R arm pain on Av fistula site (18 Oct 2021 18:29)      SUBJECTIVE / OVERNIGHT EVENTS:    Events noted.  CONSTITUTIONAL: No fever,  or fatigue  RESPIRATORY: No cough, wheezing,  No shortness of breath  CARDIOVASCULAR: No chest pain, palpitations, dizziness, or leg swelling  GASTROINTESTINAL: No abdominal or epigastric pain.   NEUROLOGICAL: No headaches,     MEDICATIONS  (STANDING):  apixaban 5 milliGRAM(s) Oral two times a day  aspirin enteric coated 81 milliGRAM(s) Oral daily  atorvastatin 40 milliGRAM(s) Oral at bedtime  carvedilol 25 milliGRAM(s) Oral every 12 hours  ceFAZolin   IVPB 1000 milliGRAM(s) IV Intermittent every 24 hours  chlorhexidine 2% Cloths 1 Application(s) Topical daily  epoetin tammi-epbx (RETACRIT) Injectable 14099 Unit(s) IV Push <User Schedule>  folic acid 1 milliGRAM(s) Oral daily  hydrALAZINE 10 milliGRAM(s) Oral every 8 hours  influenza  Vaccine (HIGH DOSE) 0.7 milliLiter(s) IntraMuscular once  levothyroxine 100 MICROGram(s) Oral daily  NIFEdipine XL 60 milliGRAM(s) Oral daily  pantoprazole    Tablet 40 milliGRAM(s) Oral before breakfast  sevelamer carbonate 800 milliGRAM(s) Oral three times a day    MEDICATIONS  (PRN):  acetaminophen   Tablet .. 650 milliGRAM(s) Oral every 6 hours PRN Temp greater or equal to 38C (100.4F), Mild Pain (1 - 3)        CAPILLARY BLOOD GLUCOSE        I&O's Summary    17 Oct 2021 07:01  -  18 Oct 2021 07:00  --------------------------------------------------------  IN: 1070 mL / OUT: 500 mL / NET: 570 mL    18 Oct 2021 07:01  -  19 Oct 2021 00:41  --------------------------------------------------------  IN: 480 mL / OUT: 400 mL / NET: 80 mL        T(C): 36.9 (10-18-21 @ 21:49), Max: 36.9 (10-18-21 @ 21:49)  HR: 72 (10-18-21 @ 21:49) (65 - 79)  BP: 106/68 (10-18-21 @ 21:49) (106/68 - 128/82)  RR: 17 (10-18-21 @ 21:49) (17 - 18)  SpO2: 95% (10-18-21 @ 21:49) (95% - 98%)    PHYSICAL EXAM:  GENERAL: NAD  NECK: Supple, No JVD  CHEST/LUNG: Clear to auscultation bilaterally; No wheezing.  HEART: Regular rate and rhythm; No murmurs, rubs, or gallops  ABDOMEN: Soft, Nontender, Nondistended; Bowel sounds present  EXTREMITIES:   No edema  NEUROLOGY: AAO X 3      LABS:                        9.0    4.83  )-----------( 127      ( 18 Oct 2021 07:48 )             28.8     10-18    134<L>  |  97<L>  |  40<H>  ----------------------------<  77  4.0   |  20<L>  |  7.26<H>    Ca    10.1      18 Oct 2021 07:48  Phos  3.7     10-18  Mg     1.80     10-18              CAPILLARY BLOOD GLUCOSE            RADIOLOGY & ADDITIONAL TESTS:    Imaging Personally Reviewed:    Consultant(s) Notes Reviewed:      Care Discussed with Consultants/Other Providers:    Wilfred Christian MD, CMD, FACP    [address] [place] [address]  Office Tel: [phone]  Cell: [phone]

## 2021-10-18 NOTE — PROGRESS NOTE ADULT - PROBLEM SELECTOR PLAN 6
- nephrology for dialysis via permacath

## 2021-10-18 NOTE — PROGRESS NOTE ADULT - PROBLEM SELECTOR PLAN 4
seen by vascular,no intervention.Resume Eliquis
- patient taking eliquis for afib  - no afib on EKG  - safe to hold eliquis for now pending possible vascular intervention
seen by vascular, no intervention. Resume Eliquis

## 2021-10-18 NOTE — PROGRESS NOTE ADULT - PROBLEM SELECTOR PLAN 7
- s/p calcium gluconate in the ED  - will trend

## 2021-10-18 NOTE — PROGRESS NOTE ADULT - PROBLEM SELECTOR PROBLEM 8
Anemia in end-stage renal disease

## 2021-10-19 LAB
ALBUMIN SERPL ELPH-MCNC: 3.9 G/DL — SIGNIFICANT CHANGE UP (ref 3.3–5)
ANION GAP SERPL CALC-SCNC: 21 MMOL/L — HIGH (ref 7–14)
ANION GAP SERPL CALC-SCNC: 21 MMOL/L — HIGH (ref 7–14)
BUN SERPL-MCNC: 63 MG/DL — HIGH (ref 7–23)
BUN SERPL-MCNC: 77 MG/DL — HIGH (ref 7–23)
CALCIUM SERPL-MCNC: 4.9 MG/DL — CRITICAL LOW (ref 8.4–10.5)
CALCIUM SERPL-MCNC: 5.2 MG/DL — CRITICAL LOW (ref 8.4–10.5)
CHLORIDE SERPL-SCNC: 88 MMOL/L — LOW (ref 98–107)
CHLORIDE SERPL-SCNC: 88 MMOL/L — LOW (ref 98–107)
CO2 SERPL-SCNC: 21 MMOL/L — LOW (ref 22–31)
CO2 SERPL-SCNC: 21 MMOL/L — LOW (ref 22–31)
CREAT SERPL-MCNC: 10.51 MG/DL — HIGH (ref 0.5–1.3)
CREAT SERPL-MCNC: 9.74 MG/DL — HIGH (ref 0.5–1.3)
GLUCOSE SERPL-MCNC: 101 MG/DL — HIGH (ref 70–99)
GLUCOSE SERPL-MCNC: 118 MG/DL — HIGH (ref 70–99)
HCT VFR BLD CALC: 32.4 % — LOW (ref 39–50)
HGB BLD-MCNC: 10.4 G/DL — LOW (ref 13–17)
MAGNESIUM SERPL-MCNC: 2 MG/DL — SIGNIFICANT CHANGE UP (ref 1.6–2.6)
MAGNESIUM SERPL-MCNC: 2.1 MG/DL — SIGNIFICANT CHANGE UP (ref 1.6–2.6)
MCHC RBC-ENTMCNC: 27.2 PG — SIGNIFICANT CHANGE UP (ref 27–34)
MCHC RBC-ENTMCNC: 32.1 GM/DL — SIGNIFICANT CHANGE UP (ref 32–36)
MCV RBC AUTO: 84.8 FL — SIGNIFICANT CHANGE UP (ref 80–100)
NRBC # BLD: 0 /100 WBCS — SIGNIFICANT CHANGE UP
NRBC # FLD: 0 K/UL — SIGNIFICANT CHANGE UP
PHOSPHATE SERPL-MCNC: 4.6 MG/DL — HIGH (ref 2.5–4.5)
PHOSPHATE SERPL-MCNC: 4.8 MG/DL — HIGH (ref 2.5–4.5)
PLATELET # BLD AUTO: 147 K/UL — LOW (ref 150–400)
POTASSIUM SERPL-MCNC: 4.3 MMOL/L — SIGNIFICANT CHANGE UP (ref 3.5–5.3)
POTASSIUM SERPL-MCNC: 4.6 MMOL/L — SIGNIFICANT CHANGE UP (ref 3.5–5.3)
POTASSIUM SERPL-SCNC: 4.3 MMOL/L — SIGNIFICANT CHANGE UP (ref 3.5–5.3)
POTASSIUM SERPL-SCNC: 4.6 MMOL/L — SIGNIFICANT CHANGE UP (ref 3.5–5.3)
PTH-INTACT FLD-MCNC: 30 PG/ML — SIGNIFICANT CHANGE UP (ref 15–65)
RBC # BLD: 3.82 M/UL — LOW (ref 4.2–5.8)
RBC # FLD: 18.5 % — HIGH (ref 10.3–14.5)
SODIUM SERPL-SCNC: 130 MMOL/L — LOW (ref 135–145)
SODIUM SERPL-SCNC: 130 MMOL/L — LOW (ref 135–145)
WBC # BLD: 7.11 K/UL — SIGNIFICANT CHANGE UP (ref 3.8–10.5)
WBC # FLD AUTO: 7.11 K/UL — SIGNIFICANT CHANGE UP (ref 3.8–10.5)

## 2021-10-19 PROCEDURE — 93010 ELECTROCARDIOGRAM REPORT: CPT

## 2021-10-19 RX ORDER — CALCIUM GLUCONATE 100 MG/ML
1 VIAL (ML) INTRAVENOUS
Refills: 0 | Status: DISCONTINUED | OUTPATIENT
Start: 2021-10-19 | End: 2021-10-19

## 2021-10-19 RX ORDER — CALCIUM GLUCONATE 100 MG/ML
1 VIAL (ML) INTRAVENOUS
Refills: 0 | Status: COMPLETED | OUTPATIENT
Start: 2021-10-19 | End: 2021-10-19

## 2021-10-19 RX ORDER — CALCIUM CARBONATE 500(1250)
2 TABLET ORAL THREE TIMES A DAY
Refills: 0 | Status: DISCONTINUED | OUTPATIENT
Start: 2021-10-19 | End: 2021-10-22

## 2021-10-19 RX ADMIN — Medication 2 TABLET(S): at 22:29

## 2021-10-19 RX ADMIN — APIXABAN 5 MILLIGRAM(S): 2.5 TABLET, FILM COATED ORAL at 19:45

## 2021-10-19 RX ADMIN — Medication 100 MILLIGRAM(S): at 05:10

## 2021-10-19 RX ADMIN — PANTOPRAZOLE SODIUM 40 MILLIGRAM(S): 20 TABLET, DELAYED RELEASE ORAL at 05:10

## 2021-10-19 RX ADMIN — Medication 50 GRAM(S): at 23:19

## 2021-10-19 RX ADMIN — ATORVASTATIN CALCIUM 40 MILLIGRAM(S): 80 TABLET, FILM COATED ORAL at 22:22

## 2021-10-19 RX ADMIN — Medication 100 MICROGRAM(S): at 05:10

## 2021-10-19 RX ADMIN — Medication 81 MILLIGRAM(S): at 12:34

## 2021-10-19 RX ADMIN — CARVEDILOL PHOSPHATE 25 MILLIGRAM(S): 80 CAPSULE, EXTENDED RELEASE ORAL at 18:44

## 2021-10-19 RX ADMIN — APIXABAN 5 MILLIGRAM(S): 2.5 TABLET, FILM COATED ORAL at 05:10

## 2021-10-19 RX ADMIN — Medication 10 MILLIGRAM(S): at 19:14

## 2021-10-19 RX ADMIN — Medication 1 MILLIGRAM(S): at 12:34

## 2021-10-19 RX ADMIN — Medication 2 TABLET(S): at 13:35

## 2021-10-19 RX ADMIN — Medication 10 MILLIGRAM(S): at 22:22

## 2021-10-19 RX ADMIN — SEVELAMER CARBONATE 800 MILLIGRAM(S): 2400 POWDER, FOR SUSPENSION ORAL at 05:10

## 2021-10-19 RX ADMIN — CHLORHEXIDINE GLUCONATE 1 APPLICATION(S): 213 SOLUTION TOPICAL at 12:50

## 2021-10-19 RX ADMIN — Medication 50 GRAM(S): at 22:15

## 2021-10-19 RX ADMIN — SEVELAMER CARBONATE 800 MILLIGRAM(S): 2400 POWDER, FOR SUSPENSION ORAL at 13:34

## 2021-10-19 NOTE — PROGRESS NOTE ADULT - ASSESSMENT
65 y/o M with pmhx of ESRD on HD, afib on eliquis, anemia presented to the ED for R arm pain. Found to have rhinovirus pos and AV fistula clot/infection. Admitted for treatment and vascular management of AV fistula.    AVF malfunction:     RUE fistulogram, possible angioplasty on Thursday, 10/21

## 2021-10-19 NOTE — CHART NOTE - NSCHARTNOTEFT_GEN_A_CORE
Patient seen and examined on behalf of Dr. Sharon Robertson.  S/P RUE AVF creation in June 2021.  Recently infiltrated with overlying hematoma  Palpable pulse along course of fistula.  RUE fistulogram, possible angioplasty on Thursday, 10/21  Continue HD via tunneled dialysis catheter as per nephrology

## 2021-10-19 NOTE — PROGRESS NOTE ADULT - SUBJECTIVE AND OBJECTIVE BOX
[facility] : Ans Serv [phone], Office [phone]  Dr Caballero/Dr Vargas  /Dr Jordan etienne /Dr SERA Bojorquez/Dr Dagoberto Faust/Dr Jovan Garrison /Dr GAYATRI Garcia  _______________________________________________________________________________________________    seen and examined today for renal failure on hemodialysis   Interval : denies any cramps   VITALS:  T(F): 98 (10-19-21 @ 05:07), Max: 98.4 (10-18-21 @ 21:49)  HR: 65 (10-19-21 @ 05:07)  BP: 126/76 (10-19-21 @ 05:07)  RR: 16 (10-19-21 @ 05:07)  SpO2: 96% (10-19-21 @ 05:07)    10-18 @ 07:01  -  10-19 @ 07:00  --------------------------------------------------------  IN: 530 mL / OUT: 400 mL / NET: 130 mL    Physical Exam :-  Constitutional: NAD  Neck: Supple.  Respiratory: Bilateral equal breath sounds, no Crackles present.  Cardiovascular: S1, S2 normal, positive Murmur  Gastrointestinal: Bowel Sounds present, soft, non tender.  Extremities: no Edema Feet  Neurological: Alert and Oriented x 3  Psychiatric: Normal mood, normal affect  Data:-  Allergies :   No Known Allergies    Hospital Medications:   MEDICATIONS  (STANDING):  apixaban 5 milliGRAM(s) Oral two times a day  aspirin enteric coated 81 milliGRAM(s) Oral daily  atorvastatin 40 milliGRAM(s) Oral at bedtime  carvedilol 25 milliGRAM(s) Oral every 12 hours  ceFAZolin   IVPB 1000 milliGRAM(s) IV Intermittent every 24 hours  chlorhexidine 2% Cloths 1 Application(s) Topical daily  epoetin tammi-epbx (RETACRIT) Injectable 37481 Unit(s) IV Push <User Schedule>  folic acid 1 milliGRAM(s) Oral daily  hydrALAZINE 10 milliGRAM(s) Oral every 8 hours  influenza  Vaccine (HIGH DOSE) 0.7 milliLiter(s) IntraMuscular once  levothyroxine 100 MICROGram(s) Oral daily  NIFEdipine XL 60 milliGRAM(s) Oral daily  pantoprazole    Tablet 40 milliGRAM(s) Oral before breakfast  sevelamer carbonate 800 milliGRAM(s) Oral three times a day    10-19    130<L>  |  88<L>  |  63<H>  ----------------------------<  101<H>  4.3   |  21<L>  |  9.74<H>    Ca    5.2<LL>      19 Oct 2021 06:58  Phos  4.8     10-19  Mg     2.00     10-19      Creatinine Trend: 9.74 <--, 7.26 <--, 5.48 <--, 8.17 <--, 7.24 <--                        10.4   7.11  )-----------( 147      ( 19 Oct 2021 06:58 )             32.4

## 2021-10-19 NOTE — PROGRESS NOTE ADULT - ASSESSMENT
65 y/o M w/ESRD on HD TTS, HTN. Renal following for ESRD Mx.  	  ESRD on HD TTS  K, vol acceptable  new RFA AVF s/p infiltration w/hematoma outpt  s/p last hd SAT 10/16 using PC    Plan  low serum balwinder level noted, no symptoms, no hx of hypocalcemia, serum phos level normal, use 3.5 balwinder bath on hemodialysis, rechecking serum balwinder level with albumin level , if repeat balwinder lower than continue calcium supplement, balwinder carbonate  also have low serum Na level- recheck with basic metabolic panel, likely dilutional   dose all meds for ESRD  renal diet  f/u w/ vas sx eval- note reviewed-- Plan for fistulogram later this week with Dr. Robertson. Continue HD through [facility] for now.  Anemia in ckd Hb<goal- inc epo 8>10k tiw w/hd    HTN, controlled-bp good today  c/w home bp meds-coreg, Nifedipine, hydrALAZINE     Dr Caballero  Nephrology   cell [phone]  office [phone]  ans serv- [phone]  [email or web address] - [facility] ( wkend coverage for Hospital)

## 2021-10-19 NOTE — CHART NOTE - NSCHARTNOTEFT_GEN_A_CORE
Called for notification of critical calcium level of 5.2 on AM BMP. Will repeat labs prior to start of HD and will not replete at this time as per Nephrology Dr. Vargas recs.

## 2021-10-19 NOTE — CHART NOTE - NSCHARTNOTEFT_GEN_A_CORE
Notified of critically low calcium level of 4.9 on repeat BMP. Will supplement with Calcium gluconate 1 gm IVP x 2 doses 1 hr apart as per d/w Nephrology Dr. Caballero. Will follow up EKG and repeat BMP after IV repletion.

## 2021-10-19 NOTE — PROGRESS NOTE ADULT - SUBJECTIVE AND OBJECTIVE BOX
Patient is a 66y old  Male who presents with a chief complaint of R arm pain on Av fistula site (19 Oct 2021 09:59)      SUBJECTIVE / OVERNIGHT EVENTS:    Events noted.  CONSTITUTIONAL: No fever,  or fatigue  RESPIRATORY: No cough, wheezing,  No shortness of breath  CARDIOVASCULAR: No chest pain, palpitations, dizziness, or leg swelling  GASTROINTESTINAL: No abdominal or epigastric pain. No nausea, vomiting.  NEUROLOGICAL: No headaches,     MEDICATIONS  (STANDING):  apixaban 5 milliGRAM(s) Oral two times a day  aspirin enteric coated 81 milliGRAM(s) Oral daily  atorvastatin 40 milliGRAM(s) Oral at bedtime  calcium carbonate   1250 mG (OsCal) 2 Tablet(s) Oral three times a day  carvedilol 25 milliGRAM(s) Oral every 12 hours  ceFAZolin   IVPB 1000 milliGRAM(s) IV Intermittent every 24 hours  chlorhexidine 2% Cloths 1 Application(s) Topical daily  epoetin tammi-epbx (RETACRIT) Injectable 60658 Unit(s) IV Push <User Schedule>  folic acid 1 milliGRAM(s) Oral daily  hydrALAZINE 10 milliGRAM(s) Oral every 8 hours  influenza  Vaccine (HIGH DOSE) 0.7 milliLiter(s) IntraMuscular once  levothyroxine 100 MICROGram(s) Oral daily  NIFEdipine XL 60 milliGRAM(s) Oral daily  pantoprazole    Tablet 40 milliGRAM(s) Oral before breakfast  sevelamer carbonate 800 milliGRAM(s) Oral three times a day    MEDICATIONS  (PRN):  acetaminophen   Tablet .. 650 milliGRAM(s) Oral every 6 hours PRN Temp greater or equal to 38C (100.4F), Mild Pain (1 - 3)        CAPILLARY BLOOD GLUCOSE        I&O's Summary    18 Oct 2021 07:01  -  19 Oct 2021 07:00  --------------------------------------------------------  IN: 530 mL / OUT: 400 mL / NET: 130 mL    19 Oct 2021 07:01  -  20 Oct 2021 00:00  --------------------------------------------------------  IN: 880 mL / OUT: 2900 mL / NET: -2020 mL        T(C): 37.1 (10-19-21 @ 21:35), Max: 37.1 (10-19-21 @ 16:25)  HR: 78 (10-19-21 @ 22:20) (65 - 83)  BP: 160/81 (10-19-21 @ 22:20) (113/59 - 182/85)  RR: 16 (10-19-21 @ 21:35) (16 - 18)  SpO2: 96% (10-19-21 @ 21:35) (95% - 100%)    PHYSICAL EXAM:  GENERAL: NAD  NECK: Supple, No JVD  CHEST/LUNG: Clear to auscultation bilaterally; No wheezing.  HEART: Regular rate and rhythm; No murmurs, rubs, or gallops  ABDOMEN: Soft, Nontender, Nondistended; Bowel sounds present  EXTREMITIES:   No edema  NEUROLOGY: AAO X 3      LABS:                        10.4   7.11  )-----------( 147      ( 19 Oct 2021 06:58 )             32.4     10-19    130<L>  |  88<L>  |  77<H>  ----------------------------<  118<H>  4.6   |  21<L>  |  10.51<H>    Ca    4.9<LL>      19 Oct 2021 17:38  Phos  4.6     10-19  Mg     2.10     10-19    TPro  x   /  Alb  3.9  /  TBili  x   /  DBili  x   /  AST  x   /  ALT  x   /  AlkPhos  x   10-19            CAPILLARY BLOOD GLUCOSE            RADIOLOGY & ADDITIONAL TESTS:    Imaging Personally Reviewed:    Consultant(s) Notes Reviewed:      Care Discussed with Consultants/Other Providers:    Wilfred Christian MD, CMD, FACP    [address] [place] [address]  Office Tel: [phone]  Cell: [phone]

## 2021-10-20 LAB
ANION GAP SERPL CALC-SCNC: 17 MMOL/L — HIGH (ref 7–14)
ANION GAP SERPL CALC-SCNC: 20 MMOL/L — HIGH (ref 7–14)
BUN SERPL-MCNC: 47 MG/DL — HIGH (ref 7–23)
BUN SERPL-MCNC: 52 MG/DL — HIGH (ref 7–23)
CA-I BLD-SCNC: 0.86 MMOL/L — LOW (ref 1.15–1.29)
CALCIUM SERPL-MCNC: 7.1 MG/DL — LOW (ref 8.4–10.5)
CALCIUM SERPL-MCNC: 7.6 MG/DL — LOW (ref 8.4–10.5)
CHLORIDE SERPL-SCNC: 93 MMOL/L — LOW (ref 98–107)
CHLORIDE SERPL-SCNC: 94 MMOL/L — LOW (ref 98–107)
CO2 SERPL-SCNC: 21 MMOL/L — LOW (ref 22–31)
CO2 SERPL-SCNC: 22 MMOL/L — SIGNIFICANT CHANGE UP (ref 22–31)
CREAT SERPL-MCNC: 6.8 MG/DL — HIGH (ref 0.5–1.3)
CREAT SERPL-MCNC: 7.52 MG/DL — HIGH (ref 0.5–1.3)
CULTURE RESULTS: SIGNIFICANT CHANGE UP
CULTURE RESULTS: SIGNIFICANT CHANGE UP
GLUCOSE SERPL-MCNC: 107 MG/DL — HIGH (ref 70–99)
GLUCOSE SERPL-MCNC: 141 MG/DL — HIGH (ref 70–99)
HCT VFR BLD CALC: 32.9 % — LOW (ref 39–50)
HGB BLD-MCNC: 10.2 G/DL — LOW (ref 13–17)
MAGNESIUM SERPL-MCNC: 2.1 MG/DL — SIGNIFICANT CHANGE UP (ref 1.6–2.6)
MCHC RBC-ENTMCNC: 26.6 PG — LOW (ref 27–34)
MCHC RBC-ENTMCNC: 31 GM/DL — LOW (ref 32–36)
MCV RBC AUTO: 85.7 FL — SIGNIFICANT CHANGE UP (ref 80–100)
NRBC # BLD: 0 /100 WBCS — SIGNIFICANT CHANGE UP
NRBC # FLD: 0 K/UL — SIGNIFICANT CHANGE UP
PHOSPHATE SERPL-MCNC: 4.6 MG/DL — HIGH (ref 2.5–4.5)
PLATELET # BLD AUTO: 160 K/UL — SIGNIFICANT CHANGE UP (ref 150–400)
POTASSIUM SERPL-MCNC: 4.3 MMOL/L — SIGNIFICANT CHANGE UP (ref 3.5–5.3)
POTASSIUM SERPL-MCNC: 4.4 MMOL/L — SIGNIFICANT CHANGE UP (ref 3.5–5.3)
POTASSIUM SERPL-SCNC: 4.3 MMOL/L — SIGNIFICANT CHANGE UP (ref 3.5–5.3)
POTASSIUM SERPL-SCNC: 4.4 MMOL/L — SIGNIFICANT CHANGE UP (ref 3.5–5.3)
RBC # BLD: 3.84 M/UL — LOW (ref 4.2–5.8)
RBC # FLD: 18.6 % — HIGH (ref 10.3–14.5)
SARS-COV-2 RNA SPEC QL NAA+PROBE: SIGNIFICANT CHANGE UP
SODIUM SERPL-SCNC: 133 MMOL/L — LOW (ref 135–145)
SODIUM SERPL-SCNC: 134 MMOL/L — LOW (ref 135–145)
SPECIMEN SOURCE: SIGNIFICANT CHANGE UP
SPECIMEN SOURCE: SIGNIFICANT CHANGE UP
WBC # BLD: 6.78 K/UL — SIGNIFICANT CHANGE UP (ref 3.8–10.5)
WBC # FLD AUTO: 6.78 K/UL — SIGNIFICANT CHANGE UP (ref 3.8–10.5)

## 2021-10-20 PROCEDURE — 93010 ELECTROCARDIOGRAM REPORT: CPT

## 2021-10-20 RX ADMIN — CARVEDILOL PHOSPHATE 25 MILLIGRAM(S): 80 CAPSULE, EXTENDED RELEASE ORAL at 18:48

## 2021-10-20 RX ADMIN — CHLORHEXIDINE GLUCONATE 1 APPLICATION(S): 213 SOLUTION TOPICAL at 14:23

## 2021-10-20 RX ADMIN — PANTOPRAZOLE SODIUM 40 MILLIGRAM(S): 20 TABLET, DELAYED RELEASE ORAL at 05:34

## 2021-10-20 RX ADMIN — SEVELAMER CARBONATE 800 MILLIGRAM(S): 2400 POWDER, FOR SUSPENSION ORAL at 22:36

## 2021-10-20 RX ADMIN — Medication 100 MILLIGRAM(S): at 06:33

## 2021-10-20 RX ADMIN — SEVELAMER CARBONATE 800 MILLIGRAM(S): 2400 POWDER, FOR SUSPENSION ORAL at 11:12

## 2021-10-20 RX ADMIN — ATORVASTATIN CALCIUM 40 MILLIGRAM(S): 80 TABLET, FILM COATED ORAL at 22:36

## 2021-10-20 RX ADMIN — APIXABAN 5 MILLIGRAM(S): 2.5 TABLET, FILM COATED ORAL at 05:29

## 2021-10-20 RX ADMIN — CARVEDILOL PHOSPHATE 25 MILLIGRAM(S): 80 CAPSULE, EXTENDED RELEASE ORAL at 05:33

## 2021-10-20 RX ADMIN — Medication 100 MICROGRAM(S): at 05:31

## 2021-10-20 RX ADMIN — SEVELAMER CARBONATE 800 MILLIGRAM(S): 2400 POWDER, FOR SUSPENSION ORAL at 14:23

## 2021-10-20 RX ADMIN — Medication 10 MILLIGRAM(S): at 05:30

## 2021-10-20 RX ADMIN — Medication 60 MILLIGRAM(S): at 05:30

## 2021-10-20 RX ADMIN — Medication 2 TABLET(S): at 05:32

## 2021-10-20 RX ADMIN — Medication 10 MILLIGRAM(S): at 14:23

## 2021-10-20 RX ADMIN — Medication 2 TABLET(S): at 14:23

## 2021-10-20 RX ADMIN — Medication 2 TABLET(S): at 22:36

## 2021-10-20 RX ADMIN — Medication 81 MILLIGRAM(S): at 11:11

## 2021-10-20 RX ADMIN — Medication 1 MILLIGRAM(S): at 11:12

## 2021-10-20 NOTE — PROGRESS NOTE ADULT - SUBJECTIVE AND OBJECTIVE BOX
Subjective:   Patient seen and examined.   No acute events overnight.   Reports improved symptoms     Objective:  Vital Signs  T(C): 37.1 (10-20 @ 22:17), Max: 37.1 (10-20 @ 22:17)  HR: 73 (10-20 @ 22:17) (70 - 83)  BP: 101/62 (10-20 @ 22:17) (100/61 - 156/95)  RR: 18 (10-20 @ 22:17) (16 - 18)  SpO2: 97% (10-20 @ 22:17) (97% - 100%)  10-19-21 @ 07:01  -  10-20-21 @ 07:00  --------------------------------------------------------  IN:  Total IN: 0 mL    OUT:    Voided (mL): 0 mL  Total OUT: 0 mL    Total NET: 0 mL    Physical Exam:  GEN: NAD, resting quietly  PULM: symmetric chest rise bilaterally, no increased WOB  EXTR: RUE fistula with palpable thrill, minimal swelling, motor and sensation grossly intact.    Labs:                        10.2   6.78  )-----------( 160      ( 20 Oct 2021 07:42 )             32.9   10-20    134<L>  |  93<L>  |  52<H>  ----------------------------<  107<H>  4.4   |  21<L>  |  7.52<H>    Ca    7.1<L>      20 Oct 2021 07:48  Phos  4.6     10-20  Mg     2.10     10-20    TPro  x   /  Alb  3.9  /  TBili  x   /  DBili  x   /  AST  x   /  ALT  x   /  AlkPhos  x   10-19    CAPILLARY BLOOD GLUCOSE          Microbiology:      Medications:   MEDICATIONS  (STANDING):  aspirin enteric coated 81 milliGRAM(s) Oral daily  atorvastatin 40 milliGRAM(s) Oral at bedtime  calcium carbonate   1250 mG (OsCal) 2 Tablet(s) Oral three times a day  carvedilol 25 milliGRAM(s) Oral every 12 hours  chlorhexidine 2% Cloths 1 Application(s) Topical daily  epoetin tammi-epbx (RETACRIT) Injectable 15914 Unit(s) IV Push <User Schedule>  folic acid 1 milliGRAM(s) Oral daily  hydrALAZINE 10 milliGRAM(s) Oral every 8 hours  influenza  Vaccine (HIGH DOSE) 0.7 milliLiter(s) IntraMuscular once  levothyroxine 100 MICROGram(s) Oral daily  NIFEdipine XL 60 milliGRAM(s) Oral daily  pantoprazole    Tablet 40 milliGRAM(s) Oral before breakfast  sevelamer carbonate 800 milliGRAM(s) Oral three times a day    MEDICATIONS  (PRN):  acetaminophen   Tablet .. 650 milliGRAM(s) Oral every 6 hours PRN Temp greater or equal to 38C (100.4F), Mild Pain (1 - 3)

## 2021-10-20 NOTE — PROGRESS NOTE ADULT - ASSESSMENT
65 y/o M w/ESRD on HD TTS, HTN. Renal following for ESRD Mx.  	  ESRD on HD TTS  K, vol acceptable  new RFA AVF s/p infiltration w/hematoma outpt    s/p last hd 10/19 using PC, Rx sheet reviewed, net UF 2.5kg, tolerated well. uneventful.  plan for next routine HD tomorrow after OR w/2k bath, net uf 2kg as tolerated  dose all meds for ESRD  renal diet  f/u w/ vas sx eval- note reviewed--  OR 10/21/21 for RUE fistulogram, possible angioplasty with Dr. Robertson  no renal objection for OR tomorrow if serum potassium remains wnl  Anemia in ckd -Hb now at goal- c/w epo 10k tiw w/hd  HTN, controlled-bp stable  c/w home bp meds-coreg, Nifedipine, hydrALAZINE -hold hydrALAZINE  prehd    poc d/w pt, HD RN  labs, chart reviewed  For any question, call:  Cell # [phone]  Pager # [phone]  office # [phone]

## 2021-10-20 NOTE — PROGRESS NOTE ADULT - SUBJECTIVE AND OBJECTIVE BOX
[facility] - S Alicia / Ej S /D Federico/ S Maryellen/ S Govind/ Jovan Garrison / M Danou/ O Gregorio  service -[phone], office [phone]  ---------------------------------------------------------------------------------------------------------------    Patient seen and examined bedside    Subjective and Objective: No overnight events, sob resolved. No complaints today. feeling better    Allergies: No Known Allergies      Hospital Medications:   MEDICATIONS  (STANDING):  aspirin enteric coated 81 milliGRAM(s) Oral daily  atorvastatin 40 milliGRAM(s) Oral at bedtime  calcium carbonate   1250 mG (OsCal) 2 Tablet(s) Oral three times a day  carvedilol 25 milliGRAM(s) Oral every 12 hours  chlorhexidine 2% Cloths 1 Application(s) Topical daily  epoetin tammi-epbx (RETACRIT) Injectable 87347 Unit(s) IV Push <User Schedule>  folic acid 1 milliGRAM(s) Oral daily  hydrALAZINE 10 milliGRAM(s) Oral every 8 hours  influenza  Vaccine (HIGH DOSE) 0.7 milliLiter(s) IntraMuscular once  levothyroxine 100 MICROGram(s) Oral daily  NIFEdipine XL 60 milliGRAM(s) Oral daily  pantoprazole    Tablet 40 milliGRAM(s) Oral before breakfast  sevelamer carbonate 800 milliGRAM(s) Oral three times a day      REVIEW OF SYSTEMS:  CONSTITUTIONAL: No weakness, fevers or chills  EYES/ENT: No visual changes;  No vertigo or throat pain   NECK: No pain or stiffness  RESPIRATORY: No cough, wheezing, hemoptysis; No shortness of breath  CARDIOVASCULAR: No chest pain or palpitations.  GASTROINTESTINAL: No abdominal or epigastric pain. No nausea, vomiting, or hematemesis; No diarrhea or constipation. No melena or hematochezia.  GENITOURINARY: No dysuria, frequency, foamy urine, urinary urgency, incontinence or hematuria  NEUROLOGICAL: No numbness or weakness  SKIN: No itching, burning, rashes, or lesions   VASCULAR: No bilateral lower extremity edema.   All other review of systems is negative unless indicated above.    VITALS:  T(F): 97.8 (10-20-21 @ 14:23), Max: 98.7 (10-19-21 @ 21:35)  HR: 71 (10-20-21 @ 14:23)  BP: 113/73 (10-20-21 @ 14:23)  RR: 18 (10-20-21 @ 14:23)  SpO2: 97% (10-20-21 @ 14:23)  Wt(kg): --    10-19 @ 07:01  -  10-20 @ 07:00  --------------------------------------------------------  IN: 1330 mL / OUT: 2900 mL / NET: -1570 mL          PHYSICAL EXAM:  Constitutional: NAD  HEENT: anicteric sclera, oropharynx clear  Neck: No JVD  Respiratory: CTAB, no wheezes, rales or rhonchi  Cardiovascular: S1, S2, RRR  Gastrointestinal: BS+, soft, NT/ND  Extremities: No cyanosis or clubbing. No peripheral edema  Neurological: A/O x 3, no focal deficits  Psychiatric: Normal mood, normal affect  : No CVA tenderness. No siegel.   Skin: No rashes  Vascular Access:    LABS:  10-20    134<L>  |  93<L>  |  52<H>  ----------------------------<  107<H>  4.4   |  21<L>  |  7.52<H>    Ca    7.1<L>      20 Oct 2021 07:48  Phos  4.6     10-20  Mg     2.10     10-20    TPro      /  Alb  3.9  /  TBili      /  DBili      /  AST      /  ALT      /  AlkPhos      10-19    Creatinine Trend: 7.52 <--, 6.80 <--, 10.51 <--, 9.74 <--, 7.26 <--, 5.48 <--, 8.17 <--, 7.24 <--                        10.2   6.78  )-----------( 160      ( 20 Oct 2021 07:42 )             32.9     Urine Studies:  Urinalysis Basic - ( 16 Oct 2021 08:14 )    Color: Light Yellow / Appearance: Clear / S.011 / pH:   Gluc:  / Ketone: Negative  / Bili: Negative / Urobili: <2 mg/dL   Blood:  / Protein: 300 mg/dL / Nitrite: Negative   Leuk Esterase: Small / RBC: 2 /HPF / WBC 23 /HPF   Sq Epi:  / Non Sq Epi: 2 /HPF / Bacteria: Negative          RADIOLOGY & ADDITIONAL STUDIES:   [facility] - S Alicia / Ej S /D Federico/ S Maryellen/ S Govind/ Jovan Garrison / M Danou/ O Gregorio  service -[phone], office [phone]  ---------------------------------------------------------------------------------------------------------------    Patient seen and examined bedside    Subjective and Objective: No overnight events. No complaints today. feeling better    Allergies: No Known Allergies      Hospital Medications:   MEDICATIONS  (STANDING):  aspirin enteric coated 81 milliGRAM(s) Oral daily  atorvastatin 40 milliGRAM(s) Oral at bedtime  calcium carbonate   1250 mG (OsCal) 2 Tablet(s) Oral three times a day  carvedilol 25 milliGRAM(s) Oral every 12 hours  chlorhexidine 2% Cloths 1 Application(s) Topical daily  epoetin tammi-epbx (RETACRIT) Injectable 44464 Unit(s) IV Push <User Schedule>  folic acid 1 milliGRAM(s) Oral daily  hydrALAZINE 10 milliGRAM(s) Oral every 8 hours  influenza  Vaccine (HIGH DOSE) 0.7 milliLiter(s) IntraMuscular once  levothyroxine 100 MICROGram(s) Oral daily  NIFEdipine XL 60 milliGRAM(s) Oral daily  pantoprazole    Tablet 40 milliGRAM(s) Oral before breakfast  sevelamer carbonate 800 milliGRAM(s) Oral three times a day    VITALS:  T(F): 97.8 (10-20-21 @ 14:23), Max: 98.7 (10-19-21 @ 21:35)  HR: 71 (10-20-21 @ 14:23)  BP: 113/73 (10-20-21 @ 14:23)  RR: 18 (10-20-21 @ 14:23)  SpO2: 97% (10-20-21 @ 14:23)  Wt(kg): --    10-19 @ 07:01  -  10-20 @ 07:00  --------------------------------------------------------  IN: 1330 mL / OUT: 2900 mL / NET: -1570 mL    PHYSICAL EXAM:  HEENT: anicteric sclera  Neck: No JVD  Respiratory: CTAB, no wheezes, rales or rhonchi  Cardiovascular: S1, S2, RRR  Gastrointestinal: BS+, soft, NT/ND  Extremities: No peripheral edema  Neurological: A/O x 3  Psychiatric: Normal mood, normal affect  : no siegel.  Vascular Access: rt IJ PC+; RFA AVF+    LABS:  10-20    134<L>  |  93<L>  |  52<H>  ----------------------------<  107<H>  4.4   |  21<L>  |  7.52<H>    Ca    7.1<L>      20 Oct 2021 07:48  Phos  4.6     10-20  Mg     2.10     10-20    TPro      /  Alb  3.9  /  TBili      /  DBili      /  AST      /  ALT      /  AlkPhos      10-19    Creatinine Trend: 7.52 <--, 6.80 <--, 10.51 <--, 9.74 <--, 7.26 <--, 5.48 <--, 8.17 <--, 7.24 <--                        10.2   6.78  )-----------( 160      ( 20 Oct 2021 07:42 )             32.9     Urine Studies:  Urinalysis Basic - ( 16 Oct 2021 08:14 )    Color: Light Yellow / Appearance: Clear / S.011 / pH:   Gluc:  / Ketone: Negative  / Bili: Negative / Urobili: <2 mg/dL   Blood:  / Protein: 300 mg/dL / Nitrite: Negative   Leuk Esterase: Small / RBC: 2 /HPF / WBC 23 /HPF   Sq Epi:  / Non Sq Epi: 2 /HPF / Bacteria: Negative          RADIOLOGY & ADDITIONAL STUDIES:

## 2021-10-20 NOTE — PROGRESS NOTE ADULT - ASSESSMENT
65 y/o M with pmhx of ESRD on HD, afib on eliquis, anemia presented to the ED for R arm pain. Found to have rhinovirus pos and AV fistula clot/infection. Admitted for treatment and vascular management of AV fistula.    AVF malfunction:     RUE fistulogram, possible angioplasty on Thursday, 10/21    Considering pt's medical condition and nature of sx, pt is at intermediate risk for the surgery.

## 2021-10-20 NOTE — PROGRESS NOTE ADULT - SUBJECTIVE AND OBJECTIVE BOX
Patient is a 66y old  Male who presents with a chief complaint of R arm pain on Av fistula site (20 Oct 2021 18:17)      SUBJECTIVE / OVERNIGHT EVENTS:    Events noted.  CONSTITUTIONAL: No fever,  or fatigue  RESPIRATORY: No cough, wheezing,  No shortness of breath  CARDIOVASCULAR: No chest pain, palpitations  GASTROINTESTINAL: No abdominal or epigastric pain.   NEUROLOGICAL: No headaches,     MEDICATIONS  (STANDING):  aspirin enteric coated 81 milliGRAM(s) Oral daily  atorvastatin 40 milliGRAM(s) Oral at bedtime  calcium carbonate   1250 mG (OsCal) 2 Tablet(s) Oral three times a day  carvedilol 25 milliGRAM(s) Oral every 12 hours  chlorhexidine 2% Cloths 1 Application(s) Topical daily  epoetin tammi-epbx (RETACRIT) Injectable 87384 Unit(s) IV Push <User Schedule>  folic acid 1 milliGRAM(s) Oral daily  hydrALAZINE 10 milliGRAM(s) Oral every 8 hours  influenza  Vaccine (HIGH DOSE) 0.7 milliLiter(s) IntraMuscular once  levothyroxine 100 MICROGram(s) Oral daily  NIFEdipine XL 60 milliGRAM(s) Oral daily  pantoprazole    Tablet 40 milliGRAM(s) Oral before breakfast  sevelamer carbonate 800 milliGRAM(s) Oral three times a day    MEDICATIONS  (PRN):  acetaminophen   Tablet .. 650 milliGRAM(s) Oral every 6 hours PRN Temp greater or equal to 38C (100.4F), Mild Pain (1 - 3)        CAPILLARY BLOOD GLUCOSE        I&O's Summary    19 Oct 2021 07:01  -  20 Oct 2021 07:00  --------------------------------------------------------  IN: 1330 mL / OUT: 2900 mL / NET: -1570 mL    20 Oct 2021 07:01  -  21 Oct 2021 01:43  --------------------------------------------------------  IN: 920 mL / OUT: 0 mL / NET: 920 mL        T(C): 37.1 (10-20-21 @ 22:17), Max: 37.1 (10-20-21 @ 22:17)  HR: 73 (10-20-21 @ 22:17) (70 - 83)  BP: 101/62 (10-20-21 @ 22:17) (100/61 - 148/93)  RR: 18 (10-20-21 @ 22:17) (16 - 18)  SpO2: 97% (10-20-21 @ 22:17) (97% - 99%)    PHYSICAL EXAM:  GENERAL: NAD  NECK: Supple, No JVD  CHEST/LUNG: Clear to auscultation bilaterally; No wheezing.  HEART: Regular rate and rhythm; No murmurs, rubs, or gallops  ABDOMEN: Soft, Nontender, Nondistended; Bowel sounds present  EXTREMITIES:   No edema  NEUROLOGY: AAO X 3      LABS:                        10.2   6.78  )-----------( 160      ( 20 Oct 2021 07:42 )             32.9     10-20    134<L>  |  93<L>  |  52<H>  ----------------------------<  107<H>  4.4   |  21<L>  |  7.52<H>    Ca    7.1<L>      20 Oct 2021 07:48  Phos  4.6     10-20  Mg     2.10     10-20    TPro  x   /  Alb  3.9  /  TBili  x   /  DBili  x   /  AST  x   /  ALT  x   /  AlkPhos  x   10-19            CAPILLARY BLOOD GLUCOSE            RADIOLOGY & ADDITIONAL TESTS:    Imaging Personally Reviewed:    Consultant(s) Notes Reviewed:      Care Discussed with Consultants/Other Providers:    Wilfred Christian MD, CMD, FACP    [address] [address]  Office Tel: [phone]  Cell: [phone]

## 2021-10-20 NOTE — CHART NOTE - NSCHARTNOTEFT_GEN_A_CORE
Preop Dx:   Surgeon:   Procedure:    Vital Signs Last 24 Hrs  T(C): 36.7 (20 Oct 2021 09:39), Max: 37.1 (19 Oct 2021 16:25)  T(F): 98 (20 Oct 2021 09:39), Max: 98.7 (19 Oct 2021 16:25)  HR: 70 (20 Oct 2021 09:39) (70 - 83)  BP: 100/61 (20 Oct 2021 09:39) (100/61 - 182/85)  BP(mean): --  RR: 18 (20 Oct 2021 09:39) (16 - 18)  SpO2: 98% (20 Oct 2021 09:39) (96% - 100%)                        10.2   6.78  )-----------( 160      ( 20 Oct 2021 07:42 )             32.9     10-20    134<L>  |  93<L>  |  52<H>  ----------------------------<  107<H>  4.4   |  21<L>  |  7.52<H>    Ca    7.1<L>      20 Oct 2021 07:48  Phos  4.6     10-20  Mg     2.10     10-20    TPro  x   /  Alb  3.9  /  TBili  x   /  DBili  x   /  AST  x   /  ALT  x   /  AlkPhos  x   10-19      Daily     Daily Weight in k.5 (19 Oct 2021 19:30)      A/P: 66y Male s/p RUE AVF creation in 2021 infiltrated with overlying hematoma.    - OR 10/21/21 for RUE fistulogram possible angioplasty with Dr. Robertson  - NPO past midnight, except medications  - Consent to be signed and placed in chart  - Medical clearance for OR  - needs COVID PCR  - preop labs    Vascular Surgery  [id number] Preop Dx: RUE AVF malfunction  Surgeon:  Dr. Robertson  Procedure: RUE fisutlogram possible angioplasty    Vital Signs Last 24 Hrs  T(C): 36.7 (20 Oct 2021 09:39), Max: 37.1 (19 Oct 2021 16:25)  T(F): 98 (20 Oct 2021 09:39), Max: 98.7 (19 Oct 2021 16:25)  HR: 70 (20 Oct 2021 09:39) (70 - 83)  BP: 100/61 (20 Oct 2021 09:39) (100/61 - 182/85)  BP(mean): --  RR: 18 (20 Oct 2021 09:39) (16 - 18)  SpO2: 98% (20 Oct 2021 09:39) (96% - 100%)                        10.2   6.78  )-----------( 160      ( 20 Oct 2021 07:42 )             32.9     10-20    134<L>  |  93<L>  |  52<H>  ----------------------------<  107<H>  4.4   |  21<L>  |  7.52<H>    Ca    7.1<L>      20 Oct 2021 07:48  Phos  4.6     10-20  Mg     2.10     10-20    TPro  x   /  Alb  3.9  /  TBili  x   /  DBili  x   /  AST  x   /  ALT  x   /  AlkPhos  x   10-19      Daily     Daily Weight in k.5 (19 Oct 2021 19:30)      A/P: 66y Male s/p RUE AVF creation in 2021 infiltrated with overlying hematoma.    - OR 10/21/21 for RUE fistulogram possible angioplasty with Dr. Robertson  - NPO past midnight, except medications  - Consent to be signed and placed in chart  - Medical clearance for OR  - needs COVID PCR  - preop labs    Discussed with Medicine ACP, appreciate communication.     Vascular Surgery  [id number] Preop Dx: RUE AVF malfunction  Surgeon:  Dr. Robertson  Procedure: RUE fisutlogram possible angioplasty    Vital Signs Last 24 Hrs  T(C): 36.7 (20 Oct 2021 09:39), Max: 37.1 (19 Oct 2021 16:25)  T(F): 98 (20 Oct 2021 09:39), Max: 98.7 (19 Oct 2021 16:25)  HR: 70 (20 Oct 2021 09:39) (70 - 83)  BP: 100/61 (20 Oct 2021 09:39) (100/61 - 182/85)  BP(mean): --  RR: 18 (20 Oct 2021 09:39) (16 - 18)  SpO2: 98% (20 Oct 2021 09:39) (96% - 100%)                        10.2   6.78  )-----------( 160      ( 20 Oct 2021 07:42 )             32.9     10-20    134<L>  |  93<L>  |  52<H>  ----------------------------<  107<H>  4.4   |  21<L>  |  7.52<H>    Ca    7.1<L>      20 Oct 2021 07:48  Phos  4.6     10-20  Mg     2.10     10-20    TPro  x   /  Alb  3.9  /  TBili  x   /  DBili  x   /  AST  x   /  ALT  x   /  AlkPhos  x   10-19      Daily     Daily Weight in k.5 (19 Oct 2021 19:30)      A/P: 66y Male s/p RUE AVF creation in 2021 infiltrated with overlying hematoma.    - please HOLD Eliquis preoperatively today and tomorrow.  - OR 10/21/21 for RUE fistulogram possible angioplasty with Dr. Robertson  - NPO past midnight, except medications  - Consent to be signed and placed in chart  - Medical clearance for OR  - needs COVID PCR  - preop labs    Discussed with Medicine ACP, appreciate communication.     Vascular Surgery  [id number]

## 2021-10-21 LAB
ANION GAP SERPL CALC-SCNC: 22 MMOL/L — HIGH (ref 7–14)
APTT BLD: 33 SEC — SIGNIFICANT CHANGE UP (ref 27–36.3)
BLD GP AB SCN SERPL QL: NEGATIVE — SIGNIFICANT CHANGE UP
BUN SERPL-MCNC: 77 MG/DL — HIGH (ref 7–23)
CALCIUM SERPL-MCNC: 6 MG/DL — CRITICAL LOW (ref 8.4–10.5)
CHLORIDE SERPL-SCNC: 94 MMOL/L — LOW (ref 98–107)
CO2 SERPL-SCNC: 19 MMOL/L — LOW (ref 22–31)
CREAT SERPL-MCNC: 9.13 MG/DL — HIGH (ref 0.5–1.3)
GLUCOSE SERPL-MCNC: 111 MG/DL — HIGH (ref 70–99)
HCT VFR BLD CALC: 30.4 % — LOW (ref 39–50)
HGB BLD-MCNC: 9.4 G/DL — LOW (ref 13–17)
INR BLD: 1.3 RATIO — HIGH (ref 0.88–1.16)
MAGNESIUM SERPL-MCNC: 2 MG/DL — SIGNIFICANT CHANGE UP (ref 1.6–2.6)
MCHC RBC-ENTMCNC: 26.7 PG — LOW (ref 27–34)
MCHC RBC-ENTMCNC: 30.9 GM/DL — LOW (ref 32–36)
MCV RBC AUTO: 86.4 FL — SIGNIFICANT CHANGE UP (ref 80–100)
NRBC # BLD: 0 /100 WBCS — SIGNIFICANT CHANGE UP
NRBC # FLD: 0 K/UL — SIGNIFICANT CHANGE UP
PHOSPHATE SERPL-MCNC: 3.9 MG/DL — SIGNIFICANT CHANGE UP (ref 2.5–4.5)
PLATELET # BLD AUTO: 136 K/UL — LOW (ref 150–400)
POTASSIUM SERPL-MCNC: 4.8 MMOL/L — SIGNIFICANT CHANGE UP (ref 3.5–5.3)
POTASSIUM SERPL-SCNC: 4.8 MMOL/L — SIGNIFICANT CHANGE UP (ref 3.5–5.3)
PROTHROM AB SERPL-ACNC: 14.8 SEC — HIGH (ref 10.6–13.6)
RBC # BLD: 3.52 M/UL — LOW (ref 4.2–5.8)
RBC # FLD: 18.4 % — HIGH (ref 10.3–14.5)
RH IG SCN BLD-IMP: POSITIVE — SIGNIFICANT CHANGE UP
SODIUM SERPL-SCNC: 135 MMOL/L — SIGNIFICANT CHANGE UP (ref 135–145)
WBC # BLD: 7.96 K/UL — SIGNIFICANT CHANGE UP (ref 3.8–10.5)
WBC # FLD AUTO: 7.96 K/UL — SIGNIFICANT CHANGE UP (ref 3.8–10.5)

## 2021-10-21 PROCEDURE — 99152 MOD SED SAME PHYS/QHP 5/>YRS: CPT

## 2021-10-21 PROCEDURE — 76937 US GUIDE VASCULAR ACCESS: CPT | Mod: 26

## 2021-10-21 PROCEDURE — 36902 INTRO CATH DIALYSIS CIRCUIT: CPT | Mod: RT

## 2021-10-21 RX ORDER — CALCIUM GLUCONATE 100 MG/ML
1 VIAL (ML) INTRAVENOUS
Refills: 0 | Status: COMPLETED | OUTPATIENT
Start: 2021-10-21 | End: 2021-10-21

## 2021-10-21 RX ORDER — APIXABAN 2.5 MG/1
5 TABLET, FILM COATED ORAL
Refills: 0 | Status: DISCONTINUED | OUTPATIENT
Start: 2021-10-21 | End: 2021-11-05

## 2021-10-21 RX ADMIN — Medication 100 MICROGRAM(S): at 05:27

## 2021-10-21 RX ADMIN — Medication 2 TABLET(S): at 22:12

## 2021-10-21 RX ADMIN — ERYTHROPOIETIN 10000 UNIT(S): 10000 INJECTION, SOLUTION INTRAVENOUS; SUBCUTANEOUS at 17:22

## 2021-10-21 RX ADMIN — Medication 2 TABLET(S): at 14:04

## 2021-10-21 RX ADMIN — CHLORHEXIDINE GLUCONATE 1 APPLICATION(S): 213 SOLUTION TOPICAL at 11:51

## 2021-10-21 RX ADMIN — SEVELAMER CARBONATE 800 MILLIGRAM(S): 2400 POWDER, FOR SUSPENSION ORAL at 14:03

## 2021-10-21 RX ADMIN — Medication 81 MILLIGRAM(S): at 11:51

## 2021-10-21 RX ADMIN — Medication 100 GRAM(S): at 06:01

## 2021-10-21 RX ADMIN — CARVEDILOL PHOSPHATE 25 MILLIGRAM(S): 80 CAPSULE, EXTENDED RELEASE ORAL at 05:27

## 2021-10-21 RX ADMIN — Medication 2 TABLET(S): at 05:28

## 2021-10-21 RX ADMIN — ATORVASTATIN CALCIUM 40 MILLIGRAM(S): 80 TABLET, FILM COATED ORAL at 22:12

## 2021-10-21 RX ADMIN — APIXABAN 5 MILLIGRAM(S): 2.5 TABLET, FILM COATED ORAL at 22:11

## 2021-10-21 RX ADMIN — Medication 1 MILLIGRAM(S): at 11:51

## 2021-10-21 RX ADMIN — Medication 10 MILLIGRAM(S): at 22:11

## 2021-10-21 RX ADMIN — SEVELAMER CARBONATE 800 MILLIGRAM(S): 2400 POWDER, FOR SUSPENSION ORAL at 22:11

## 2021-10-21 RX ADMIN — Medication 100 GRAM(S): at 06:36

## 2021-10-21 RX ADMIN — SEVELAMER CARBONATE 800 MILLIGRAM(S): 2400 POWDER, FOR SUSPENSION ORAL at 05:27

## 2021-10-21 NOTE — CHART NOTE - NSCHARTNOTEFT_GEN_A_CORE
Patient is s/p fistulogram today. Right wrist site check.  Right Radial site without bleeding or hematoma.  Dressing is intact.  Positive Pulses, Capillary refill less than two seconds.

## 2021-10-21 NOTE — PROGRESS NOTE ADULT - SUBJECTIVE AND OBJECTIVE BOX
[facility] - S Alicia / Ej S /D Federico/ SERA Bojorquez/ SERA Faust/ Jovan Garrison / GAYATRI Matsonu/ O Gregorio  service -[phone], office [phone]  ---------------------------------------------------------------------------------------------------------------    Patient seen and examined bedside    Subjective and Objective: No overnight events, sob resolved. No complaints today. feeling better    Allergies: No Known Allergies      Hospital Medications:   MEDICATIONS  (STANDING):  apixaban 5 milliGRAM(s) Oral two times a day  aspirin enteric coated 81 milliGRAM(s) Oral daily  atorvastatin 40 milliGRAM(s) Oral at bedtime  calcium carbonate   1250 mG (OsCal) 2 Tablet(s) Oral three times a day  carvedilol 25 milliGRAM(s) Oral every 12 hours  chlorhexidine 2% Cloths 1 Application(s) Topical daily  epoetin tammi-epbx (RETACRIT) Injectable 35676 Unit(s) IV Push <User Schedule>  folic acid 1 milliGRAM(s) Oral daily  hydrALAZINE 10 milliGRAM(s) Oral every 8 hours  influenza  Vaccine (HIGH DOSE) 0.7 milliLiter(s) IntraMuscular once  levothyroxine 100 MICROGram(s) Oral daily  NIFEdipine XL 60 milliGRAM(s) Oral daily  pantoprazole    Tablet 40 milliGRAM(s) Oral before breakfast  sevelamer carbonate 800 milliGRAM(s) Oral three times a day      REVIEW OF SYSTEMS:  CONSTITUTIONAL: No weakness, fevers or chills  EYES/ENT: No visual changes;  No vertigo or throat pain   NECK: No pain or stiffness  RESPIRATORY: No cough, wheezing, hemoptysis; No shortness of breath  CARDIOVASCULAR: No chest pain or palpitations.  GASTROINTESTINAL: No abdominal or epigastric pain. No nausea, vomiting, or hematemesis; No diarrhea or constipation. No melena or hematochezia.  GENITOURINARY: No dysuria, frequency, foamy urine, urinary urgency, incontinence or hematuria  NEUROLOGICAL: No numbness or weakness  SKIN: No itching, burning, rashes, or lesions   VASCULAR: No bilateral lower extremity edema.   All other review of systems is negative unless indicated above.    VITALS:  T(F): 98.4 (10-21-21 @ 15:45), Max: 99 (10-21-21 @ 05:10)  HR: 80 (10-21-21 @ 15:45)  BP: 123/70 (10-21-21 @ 15:45)  RR: 17 (10-21-21 @ 15:45)  SpO2: 97% (10-21-21 @ 15:45)  Wt(kg): --    10-20 @ 07:01  -  10-21 @ 07:00  --------------------------------------------------------  IN: 1160 mL / OUT: 0 mL / NET: 1160 mL          PHYSICAL EXAM:  Constitutional: NAD  HEENT: anicteric sclera, oropharynx clear  Neck: No JVD  Respiratory: CTAB, no wheezes, rales or rhonchi  Cardiovascular: S1, S2, RRR  Gastrointestinal: BS+, soft, NT/ND  Extremities: No cyanosis or clubbing. No peripheral edema  Neurological: A/O x 3, no focal deficits  Psychiatric: Normal mood, normal affect  : No CVA tenderness. No siegel.   Skin: No rashes  Vascular Access:    LABS:  10-21    135  |  94<L>  |  77<H>  ----------------------------<  111<H>  4.8   |  19<L>  |  9.13<H>    Ca    6.0<LL>      21 Oct 2021 04:44  Phos  3.9     10-21  Mg     2.00     10-21      Creatinine Trend: 9.13 <--, 7.52 <--, 6.80 <--, 10.51 <--, 9.74 <--, 7.26 <--, 5.48 <--, 8.17 <--, 7.24 <--                        9.4    7.96  )-----------( 136      ( 21 Oct 2021 04:44 )             30.4     Urine Studies:  Urinalysis Basic - ( 16 Oct 2021 08:14 )    Color: Light Yellow / Appearance: Clear / S.011 / pH:   Gluc:  / Ketone: Negative  / Bili: Negative / Urobili: <2 mg/dL   Blood:  / Protein: 300 mg/dL / Nitrite: Negative   Leuk Esterase: Small / RBC: 2 /HPF / WBC 23 /HPF   Sq Epi:  / Non Sq Epi: 2 /HPF / Bacteria: Negative          RADIOLOGY & ADDITIONAL STUDIES:   [facility] - S Alicia / Ej S /D Federico/ S Maryellen/ S Govind/ Jovan Garrison / GAYATRI Garcia/ O Gregorio  service -[phone], office [phone]  ---------------------------------------------------------------------------------------------------------------    Patient seen and examined bedside    Subjective and Objective: No overnight events, denied sob. No complaints today. feeling better    Allergies: No Known Allergies      Hospital Medications:   MEDICATIONS  (STANDING):  apixaban 5 milliGRAM(s) Oral two times a day  aspirin enteric coated 81 milliGRAM(s) Oral daily  atorvastatin 40 milliGRAM(s) Oral at bedtime  calcium carbonate   1250 mG (OsCal) 2 Tablet(s) Oral three times a day  carvedilol 25 milliGRAM(s) Oral every 12 hours  chlorhexidine 2% Cloths 1 Application(s) Topical daily  epoetin tammi-epbx (RETACRIT) Injectable 59755 Unit(s) IV Push <User Schedule>  folic acid 1 milliGRAM(s) Oral daily  hydrALAZINE 10 milliGRAM(s) Oral every 8 hours  influenza  Vaccine (HIGH DOSE) 0.7 milliLiter(s) IntraMuscular once  levothyroxine 100 MICROGram(s) Oral daily  NIFEdipine XL 60 milliGRAM(s) Oral daily  pantoprazole    Tablet 40 milliGRAM(s) Oral before breakfast  sevelamer carbonate 800 milliGRAM(s) Oral three times a day      VITALS:  T(F): 98.4 (10-21-21 @ 15:45), Max: 99 (10-21-21 @ 05:10)  HR: 80 (10-21-21 @ 15:45)  BP: 123/70 (10-21-21 @ 15:45)  RR: 17 (10-21-21 @ 15:45)  SpO2: 97% (10-21-21 @ 15:45)  Wt(kg): --    10-20 @ 07:01  -  10-21 @ 07:00  --------------------------------------------------------  IN: 1160 mL / OUT: 0 mL / NET: 1160 mL      PHYSICAL EXAM:  HEENT: anicteric sclera  Neck: No JVD  Respiratory: CTAB, no wheezes, rales or rhonchi  Cardiovascular: S1, S2, RRR  Gastrointestinal: BS+, soft, NT/ND  Extremities: No peripheral edema  Neurological: A/O x 3  Psychiatric: Normal mood, normal affect  : no siegel.  Vascular Access: rt IJ PC+; RFA AVF+    LABS:  10-21    135  |  94<L>  |  77<H>  ----------------------------<  111<H>  4.8   |  19<L>  |  9.13<H>    Ca    6.0<LL>      21 Oct 2021 04:44  Phos  3.9     10-21  Mg     2.00     10-21      Creatinine Trend: 9.13 <--, 7.52 <--, 6.80 <--, 10.51 <--, 9.74 <--, 7.26 <--, 5.48 <--, 8.17 <--, 7.24 <--                        9.4    7.96  )-----------( 136      ( 21 Oct 2021 04:44 )             30.4     Urine Studies:  Urinalysis Basic - ( 16 Oct 2021 08:14 )    Color: Light Yellow / Appearance: Clear / S.011 / pH:   Gluc:  / Ketone: Negative  / Bili: Negative / Urobili: <2 mg/dL   Blood:  / Protein: 300 mg/dL / Nitrite: Negative   Leuk Esterase: Small / RBC: 2 /HPF / WBC 23 /HPF   Sq Epi:  / Non Sq Epi: 2 /HPF / Bacteria: Negative          RADIOLOGY & ADDITIONAL STUDIES:

## 2021-10-21 NOTE — PROGRESS NOTE ADULT - SUBJECTIVE AND OBJECTIVE BOX
Patient is a 66y old  Male who presents with a chief complaint of R arm pain on Av fistula site (20 Oct 2021 18:17)      SUBJECTIVE / OVERNIGHT EVENTS:    Events noted.  CONSTITUTIONAL: No fever,  or fatigue  RESPIRATORY: No cough, wheezing,  No shortness of breath  CARDIOVASCULAR: No chest pain, palpitations  GASTROINTESTINAL: No abdominal or epigastric pain.   NEUROLOGICAL: No headaches,     MEDICATIONS  (STANDING):  aspirin enteric coated 81 milliGRAM(s) Oral daily  atorvastatin 40 milliGRAM(s) Oral at bedtime  calcium carbonate   1250 mG (OsCal) 2 Tablet(s) Oral three times a day  carvedilol 25 milliGRAM(s) Oral every 12 hours  chlorhexidine 2% Cloths 1 Application(s) Topical daily  epoetin tammi-epbx (RETACRIT) Injectable 95262 Unit(s) IV Push <User Schedule>  folic acid 1 milliGRAM(s) Oral daily  hydrALAZINE 10 milliGRAM(s) Oral every 8 hours  influenza  Vaccine (HIGH DOSE) 0.7 milliLiter(s) IntraMuscular once  levothyroxine 100 MICROGram(s) Oral daily  NIFEdipine XL 60 milliGRAM(s) Oral daily  pantoprazole    Tablet 40 milliGRAM(s) Oral before breakfast  sevelamer carbonate 800 milliGRAM(s) Oral three times a day    MEDICATIONS  (PRN):  acetaminophen   Tablet .. 650 milliGRAM(s) Oral every 6 hours PRN Temp greater or equal to 38C (100.4F), Mild Pain (1 - 3)        CAPILLARY BLOOD GLUCOSE        I&O's Summary    19 Oct 2021 07:01  -  20 Oct 2021 07:00  --------------------------------------------------------  IN: 1330 mL / OUT: 2900 mL / NET: -1570 mL    20 Oct 2021 07:01  -  21 Oct 2021 01:43  --------------------------------------------------------  IN: 920 mL / OUT: 0 mL / NET: 920 mL        T(C): 37.1 (10-20-21 @ 22:17), Max: 37.1 (10-20-21 @ 22:17)  HR: 73 (10-20-21 @ 22:17) (70 - 83)  BP: 101/62 (10-20-21 @ 22:17) (100/61 - 148/93)  RR: 18 (10-20-21 @ 22:17) (16 - 18)  SpO2: 97% (10-20-21 @ 22:17) (97% - 99%)    PHYSICAL EXAM:  GENERAL: NAD  NECK: Supple, No JVD  CHEST/LUNG: Clear to auscultation bilaterally; No wheezing.  HEART: Regular rate and rhythm; No murmurs, rubs, or gallops  ABDOMEN: Soft, Nontender, Nondistended; Bowel sounds present  EXTREMITIES:   No edema  NEUROLOGY: AAO X 3      LABS:                        10.2   6.78  )-----------( 160      ( 20 Oct 2021 07:42 )             32.9     10-20    134<L>  |  93<L>  |  52<H>  ----------------------------<  107<H>  4.4   |  21<L>  |  7.52<H>    Ca    7.1<L>      20 Oct 2021 07:48  Phos  4.6     10-20  Mg     2.10     10-20    TPro  x   /  Alb  3.9  /  TBili  x   /  DBili  x   /  AST  x   /  ALT  x   /  AlkPhos  x   10-19            CAPILLARY BLOOD GLUCOSE            RADIOLOGY & ADDITIONAL TESTS:    Imaging Personally Reviewed:    Consultant(s) Notes Reviewed:      Care Discussed with Consultants/Other Providers:    Wilfred Christian MD, CMD, FACP    [id number] [address]  Office Tel: [phone]  Cell: [phone]

## 2021-10-21 NOTE — PROGRESS NOTE ADULT - ASSESSMENT
67 y/o M w/ESRD on HD TTS, HTN. Renal following for ESRD Mx.  	  ESRD on HD TTS  K, vol acceptable  new RFA AVF s/p infiltration w/hematoma outpt -s/p OR angiogram today    c/w HD using PC w/2k bath, net uf 2-2.5kg as tolerated  dose all meds for ESRD  renal diet  f/u w/ vas sx reg clearance to use AVF w/next hd  hypocalcemia noted-asympt. c/w po caco3 tid b/w meals. will use 3ca bath w/hd. check ipth, [id number]  Anemia in ckd -Hb < goal- c/w epo 10k tiw w/hd  HTN, controlled-bp stable  c/w home bp meds-coreg, Nifedipine, hydrALAZINE -hold hydrALAZINE  prehd    poc d/w pt, HD RN  labs, chart reviewed  For any question, call:  Cell # [phone]  Pager # [phone]  office # [phone]

## 2021-10-21 NOTE — PROGRESS NOTE ADULT - SUBJECTIVE AND OBJECTIVE BOX
Vascular Surgery POC    s/p RUE fistulogram with radial access    Patient seen and examined at bedside.    Vitals  T(C): 36.6 (10-21-21 @ 12:01), Max: 37.2 (10-21-21 @ 05:10)  HR: 74 (10-21-21 @ 12:01) (73 - 83)  BP: 119/76 (10-21-21 @ 12:01) (101/62 - 148/93)  BP(mean): --  ABP: --  ABP(mean): --  RR: 16 (10-21-21 @ 12:01) (16 - 18)  SpO2: 97% (10-21-21 @ 12:01) (96% - 99%)  Wt(kg): --  CVP(mm Hg): --  CI: --  CAPILLARY BLOOD GLUCOSE       N/A      10-20 @ 07:01  -  10-21 @ 07:00  --------------------------------------------------------  IN:    Oral Fluid: 1160 mL  Total IN: 1160 mL    OUT:    IV PiggyBack: 0 mL    Voided (mL): 0 mL  Total OUT: 0 mL    Total NET: 1160 mL      EXAM  Gen: NAD  RUE:     LABS  CBC (10-21 @ 04:44)                              9.4<L>                         7.96    )----------------(  136<L>     --    % Neutrophils, --    % Lymphocytes, ANC: --                                  30.4<L>  CBC (10-20 @ 07:42)                              10.2<L>                         6.78    )----------------(  160        --    % Neutrophils, --    % Lymphocytes, ANC: --                                  32.9<L>    BMP (10-21 @ 04:44)             135     |  94<L>   |  77<H> 		Ca++ --      Ca 6.0<LL>             ---------------------------------( 111<H>		Mg 2.00               4.8     |  19<L>   |  9.13<H>			Ph 3.9     BMP (10-20 @ 07:48)             134<L>  |  93<L>   |  52<H> 		Ca++ --      Ca 7.1<L>             ---------------------------------( 107<H>		Mg 2.10               4.4     |  21<L>   |  7.52<H>			Ph 4.6<H>      Coags (10-21 @ 04:44)  aPTT 33.0 / INR 1.30<H> / PT 14.8<H>    -> .Blood Blood-Peripheral Culture (10-15 @ 18:42)     NG    NG    No Growth Final    -> .Blood Blood-Venous Culture (10-15 @ 18:32)     NG    NG    No Growth Final    A/P 66M s/p RUE fistulogram  - pain control  - may access fistula  - continue diet  - OOB/ambulating    [id number] Vascular Surgery POC    s/p RUE fistulogram with radial access    Patient seen and examined at bedside.    Vitals  T(C): 36.6 (10-21-21 @ 12:01), Max: 37.2 (10-21-21 @ 05:10)  HR: 74 (10-21-21 @ 12:01) (73 - 83)  BP: 119/76 (10-21-21 @ 12:01) (101/62 - 148/93)  BP(mean): --  ABP: --  ABP(mean): --  RR: 16 (10-21-21 @ 12:01) (16 - 18)  SpO2: 97% (10-21-21 @ 12:01) (96% - 99%)  Wt(kg): --  CVP(mm Hg): --  CI: --  CAPILLARY BLOOD GLUCOSE       N/A      10-20 @ 07:01  -  10-21 @ 07:00  --------------------------------------------------------  IN:    Oral Fluid: 1160 mL  Total IN: 1160 mL    OUT:    IV PiggyBack: 0 mL    Voided (mL): 0 mL  Total OUT: 0 mL    Total NET: 1160 mL      EXAM  Gen: NAD  RUE:     LABS  CBC (10-21 @ 04:44)                              9.4<L>                         7.96    )----------------(  136<L>     --    % Neutrophils, --    % Lymphocytes, ANC: --                                  30.4<L>  CBC (10-20 @ 07:42)                              10.2<L>                         6.78    )----------------(  160        --    % Neutrophils, --    % Lymphocytes, ANC: --                                  32.9<L>    BMP (10-21 @ 04:44)             135     |  94<L>   |  77<H> 		Ca++ --      Ca 6.0<LL>             ---------------------------------( 111<H>		Mg 2.00               4.8     |  19<L>   |  9.13<H>			Ph 3.9     BMP (10-20 @ 07:48)             134<L>  |  93<L>   |  52<H> 		Ca++ --      Ca 7.1<L>             ---------------------------------( 107<H>		Mg 2.10               4.4     |  21<L>   |  7.52<H>			Ph 4.6<H>      Coags (10-21 @ 04:44)  aPTT 33.0 / INR 1.30<H> / PT 14.8<H>    -> .Blood Blood-Peripheral Culture (10-15 @ 18:42)     NG    NG    No Growth Final    -> .Blood Blood-Venous Culture (10-15 @ 18:32)     NG    NG    No Growth Final    A/P 66M s/p RUE fistulogram  - pain control  - continue diet  - OOB/ambulating    [id number] Vascular Surgery POC    s/p RUE fistulogram with radial access    Patient seen and examined at bedside.    Vitals  T(C): 36.6 (10-21-21 @ 12:01), Max: 37.2 (10-21-21 @ 05:10)  HR: 74 (10-21-21 @ 12:01) (73 - 83)  BP: 119/76 (10-21-21 @ 12:01) (101/62 - 148/93)  BP(mean): --  ABP: --  ABP(mean): --  RR: 16 (10-21-21 @ 12:01) (16 - 18)  SpO2: 97% (10-21-21 @ 12:01) (96% - 99%)  Wt(kg): --  CVP(mm Hg): --  CI: --  CAPILLARY BLOOD GLUCOSE       N/A      10-20 @ 07:01  -  10-21 @ 07:00  --------------------------------------------------------  IN:    Oral Fluid: 1160 mL  Total IN: 1160 mL    OUT:    IV PiggyBack: 0 mL    Voided (mL): 0 mL  Total OUT: 0 mL    Total NET: 1160 mL      EXAM  Gen: NAD  RUE: Palpable radial and ulnar pulse, dressing is c/d/i, palpable thrill    LABS  CBC (10-21 @ 04:44)                              9.4<L>                         7.96    )----------------(  136<L>     --    % Neutrophils, --    % Lymphocytes, ANC: --                                  30.4<L>  CBC (10-20 @ 07:42)                              10.2<L>                         6.78    )----------------(  160        --    % Neutrophils, --    % Lymphocytes, ANC: --                                  32.9<L>    BMP (10-21 @ 04:44)             135     |  94<L>   |  77<H> 		Ca++ --      Ca 6.0<LL>             ---------------------------------( 111<H>		Mg 2.00               4.8     |  19<L>   |  9.13<H>			Ph 3.9     BMP (10-20 @ 07:48)             134<L>  |  93<L>   |  52<H> 		Ca++ --      Ca 7.1<L>             ---------------------------------( 107<H>		Mg 2.10               4.4     |  21<L>   |  7.52<H>			Ph 4.6<H>      Coags (10-21 @ 04:44)  aPTT 33.0 / INR 1.30<H> / PT 14.8<H>    -> .Blood Blood-Peripheral Culture (10-15 @ 18:42)     NG    NG    No Growth Final    -> .Blood Blood-Venous Culture (10-15 @ 18:32)     NG    NG    No Growth Final    A/P 66M s/p RUE fistulogram  - pain control  - continue diet  - OOB/ambulating    [id number]

## 2021-10-22 LAB
24R-OH-CALCIDIOL SERPL-MCNC: 27.5 NG/ML — LOW (ref 30–80)
ALBUMIN SERPL ELPH-MCNC: 4 G/DL — SIGNIFICANT CHANGE UP (ref 3.3–5)
ANION GAP SERPL CALC-SCNC: 22 MMOL/L — HIGH (ref 7–14)
BUN SERPL-MCNC: 57 MG/DL — HIGH (ref 7–23)
CALCIUM SERPL-MCNC: 6.6 MG/DL — LOW (ref 8.4–10.5)
CHLORIDE SERPL-SCNC: 95 MMOL/L — LOW (ref 98–107)
CO2 SERPL-SCNC: 22 MMOL/L — SIGNIFICANT CHANGE UP (ref 22–31)
CREAT SERPL-MCNC: 7.12 MG/DL — HIGH (ref 0.5–1.3)
GLUCOSE SERPL-MCNC: 99 MG/DL — SIGNIFICANT CHANGE UP (ref 70–99)
HCT VFR BLD CALC: 30.6 % — LOW (ref 39–50)
HGB BLD-MCNC: 9.7 G/DL — LOW (ref 13–17)
MAGNESIUM SERPL-MCNC: 2.1 MG/DL — SIGNIFICANT CHANGE UP (ref 1.6–2.6)
MCHC RBC-ENTMCNC: 26.9 PG — LOW (ref 27–34)
MCHC RBC-ENTMCNC: 31.7 GM/DL — LOW (ref 32–36)
MCV RBC AUTO: 84.8 FL — SIGNIFICANT CHANGE UP (ref 80–100)
NRBC # BLD: 0 /100 WBCS — SIGNIFICANT CHANGE UP
NRBC # FLD: 0 K/UL — SIGNIFICANT CHANGE UP
PHOSPHATE SERPL-MCNC: 3.5 MG/DL — SIGNIFICANT CHANGE UP (ref 2.5–4.5)
PLATELET # BLD AUTO: 149 K/UL — LOW (ref 150–400)
POTASSIUM SERPL-MCNC: 4 MMOL/L — SIGNIFICANT CHANGE UP (ref 3.5–5.3)
POTASSIUM SERPL-SCNC: 4 MMOL/L — SIGNIFICANT CHANGE UP (ref 3.5–5.3)
PTH-INTACT FLD-MCNC: 25 PG/ML — SIGNIFICANT CHANGE UP (ref 15–65)
RBC # BLD: 3.61 M/UL — LOW (ref 4.2–5.8)
RBC # FLD: 18.3 % — HIGH (ref 10.3–14.5)
SODIUM SERPL-SCNC: 139 MMOL/L — SIGNIFICANT CHANGE UP (ref 135–145)
WBC # BLD: 8.08 K/UL — SIGNIFICANT CHANGE UP (ref 3.8–10.5)
WBC # FLD AUTO: 8.08 K/UL — SIGNIFICANT CHANGE UP (ref 3.8–10.5)

## 2021-10-22 RX ORDER — CALCIUM GLUCONATE 100 MG/ML
1 VIAL (ML) INTRAVENOUS ONCE
Refills: 0 | Status: DISCONTINUED | OUTPATIENT
Start: 2021-10-22 | End: 2021-10-23

## 2021-10-22 RX ORDER — CALCIUM CARBONATE 500(1250)
2 TABLET ORAL
Refills: 0 | Status: DISCONTINUED | OUTPATIENT
Start: 2021-10-22 | End: 2021-10-26

## 2021-10-22 RX ORDER — CALCITRIOL 0.5 UG/1
0.5 CAPSULE ORAL DAILY
Refills: 0 | Status: DISCONTINUED | OUTPATIENT
Start: 2021-10-22 | End: 2021-10-26

## 2021-10-22 RX ORDER — CHOLECALCIFEROL (VITAMIN D3) 125 MCG
2000 CAPSULE ORAL DAILY
Refills: 0 | Status: DISCONTINUED | OUTPATIENT
Start: 2021-10-22 | End: 2021-10-26

## 2021-10-22 RX ADMIN — Medication 81 MILLIGRAM(S): at 15:06

## 2021-10-22 RX ADMIN — CARVEDILOL PHOSPHATE 25 MILLIGRAM(S): 80 CAPSULE, EXTENDED RELEASE ORAL at 05:24

## 2021-10-22 RX ADMIN — Medication 100 MICROGRAM(S): at 05:24

## 2021-10-22 RX ADMIN — Medication 60 MILLIGRAM(S): at 05:26

## 2021-10-22 RX ADMIN — APIXABAN 5 MILLIGRAM(S): 2.5 TABLET, FILM COATED ORAL at 05:25

## 2021-10-22 RX ADMIN — Medication 10 MILLIGRAM(S): at 15:06

## 2021-10-22 RX ADMIN — Medication 1 MILLIGRAM(S): at 15:06

## 2021-10-22 RX ADMIN — PANTOPRAZOLE SODIUM 40 MILLIGRAM(S): 20 TABLET, DELAYED RELEASE ORAL at 06:31

## 2021-10-22 RX ADMIN — ATORVASTATIN CALCIUM 40 MILLIGRAM(S): 80 TABLET, FILM COATED ORAL at 21:54

## 2021-10-22 RX ADMIN — APIXABAN 5 MILLIGRAM(S): 2.5 TABLET, FILM COATED ORAL at 18:45

## 2021-10-22 RX ADMIN — SEVELAMER CARBONATE 800 MILLIGRAM(S): 2400 POWDER, FOR SUSPENSION ORAL at 05:23

## 2021-10-22 RX ADMIN — CARVEDILOL PHOSPHATE 25 MILLIGRAM(S): 80 CAPSULE, EXTENDED RELEASE ORAL at 18:32

## 2021-10-22 RX ADMIN — Medication 2 TABLET(S): at 15:06

## 2021-10-22 RX ADMIN — CALCITRIOL 0.5 MICROGRAM(S): 0.5 CAPSULE ORAL at 18:44

## 2021-10-22 RX ADMIN — Medication 2 TABLET(S): at 21:54

## 2021-10-22 RX ADMIN — CHLORHEXIDINE GLUCONATE 1 APPLICATION(S): 213 SOLUTION TOPICAL at 15:06

## 2021-10-22 RX ADMIN — Medication 2000 UNIT(S): at 18:46

## 2021-10-22 RX ADMIN — Medication 10 MILLIGRAM(S): at 05:25

## 2021-10-22 RX ADMIN — Medication 2 TABLET(S): at 05:24

## 2021-10-22 RX ADMIN — SEVELAMER CARBONATE 800 MILLIGRAM(S): 2400 POWDER, FOR SUSPENSION ORAL at 15:06

## 2021-10-22 NOTE — PROGRESS NOTE ADULT - ASSESSMENT
67M w/ hx of HTN, Afib on Eliquis, ESRD on HD (TTS) via RUE radiocephalic fistula (created 6/3/21) presenting with R arm swelling and pain that began with HD on 10/12/21, likely due to infiltration, now s/p R fistulogram on 10/22.    Plan/Recommendations:  - Okay to use AVF for HD, fistula must be used to assess functionality  in order to expedite removal of catheter removal   - HD per nephrology  - Rest of care per primary team    C Team Surgery   [id number]

## 2021-10-22 NOTE — DIETITIAN INITIAL EVALUATION ADULT. - PERTINENT MEDS FT
MEDICATIONS  (STANDING):  apixaban 5 milliGRAM(s) Oral two times a day  aspirin enteric coated 81 milliGRAM(s) Oral daily  atorvastatin 40 milliGRAM(s) Oral at bedtime  calcitriol   Capsule 0.5 MICROGram(s) Oral daily  calcium carbonate   1250 mG (OsCal) 2 Tablet(s) Oral four times a day  calcium gluconate IVPB 1 Gram(s) IV Intermittent once  carvedilol 25 milliGRAM(s) Oral every 12 hours  chlorhexidine 2% Cloths 1 Application(s) Topical daily  cholecalciferol 2000 Unit(s) Oral daily  epoetin tammi-epbx (RETACRIT) Injectable 03353 Unit(s) IV Push <User Schedule>  folic acid 1 milliGRAM(s) Oral daily  hydrALAZINE 10 milliGRAM(s) Oral every 8 hours  influenza  Vaccine (HIGH DOSE) 0.7 milliLiter(s) IntraMuscular once  levothyroxine 100 MICROGram(s) Oral daily  NIFEdipine XL 60 milliGRAM(s) Oral daily  pantoprazole    Tablet 40 milliGRAM(s) Oral before breakfast  sevelamer carbonate 800 milliGRAM(s) Oral three times a day

## 2021-10-22 NOTE — CHART NOTE - NSCHARTNOTEFT_GEN_A_CORE
Labs reviewed w/ Nephrologist Dr. Vargas. Pt's Ca+ 6.6 today, PTH 25, Vit D 27.5. Recommended, give calcium gluconate 1g IV x 1, Calcitriol 0.5mg qd, vitamin D 2000u qd, increase Calcium carbonate to 2tabs QID. Labs reviewed w/ Nephrologist Dr. Vargas. Pt's Ca+ 6.6 today, PTH 25, Vit D 27.5. Recommended, give calcium gluconate 1g IV x 1, start Calcitriol 0.5mg qd, vitamin D 2000u qd, increase Calcium carbonate to 2tabs QID.

## 2021-10-22 NOTE — DIETITIAN INITIAL EVALUATION ADULT. - ORAL INTAKE PTA/DIET HISTORY
Patient reports good PO intake PTA. Diet recall consists of high biologic value proteins, fruits, vegetables. Reports use of Nepro supplements occasionally. Confirms NKFA.

## 2021-10-22 NOTE — PROGRESS NOTE ADULT - SUBJECTIVE AND OBJECTIVE BOX
[facility] - S Alicia / Ej S /D Federico/ SERA Bojorquez/ SERA Faust/ Jovan Garrison / GAYATRI Matsonu/ O Gregorio  service -[phone], office [phone]  ---------------------------------------------------------------------------------------------------------------    Patient seen and examined bedside    Subjective and Objective: No overnight events, sob resolved. No complaints today. feeling better    Allergies: No Known Allergies      Hospital Medications:   MEDICATIONS  (STANDING):  apixaban 5 milliGRAM(s) Oral two times a day  aspirin enteric coated 81 milliGRAM(s) Oral daily  atorvastatin 40 milliGRAM(s) Oral at bedtime  calcium carbonate   1250 mG (OsCal) 2 Tablet(s) Oral three times a day  carvedilol 25 milliGRAM(s) Oral every 12 hours  chlorhexidine 2% Cloths 1 Application(s) Topical daily  epoetin tammi-epbx (RETACRIT) Injectable 93110 Unit(s) IV Push <User Schedule>  folic acid 1 milliGRAM(s) Oral daily  hydrALAZINE 10 milliGRAM(s) Oral every 8 hours  influenza  Vaccine (HIGH DOSE) 0.7 milliLiter(s) IntraMuscular once  levothyroxine 100 MICROGram(s) Oral daily  NIFEdipine XL 60 milliGRAM(s) Oral daily  pantoprazole    Tablet 40 milliGRAM(s) Oral before breakfast  sevelamer carbonate 800 milliGRAM(s) Oral three times a day      REVIEW OF SYSTEMS:  CONSTITUTIONAL: No weakness, fevers or chills  EYES/ENT: No visual changes;  No vertigo or throat pain   NECK: No pain or stiffness  RESPIRATORY: No cough, wheezing, hemoptysis; No shortness of breath  CARDIOVASCULAR: No chest pain or palpitations.  GASTROINTESTINAL: No abdominal or epigastric pain. No nausea, vomiting, or hematemesis; No diarrhea or constipation. No melena or hematochezia.  GENITOURINARY: No dysuria, frequency, foamy urine, urinary urgency, incontinence or hematuria  NEUROLOGICAL: No numbness or weakness  SKIN: No itching, burning, rashes, or lesions   VASCULAR: No bilateral lower extremity edema.   All other review of systems is negative unless indicated above.    VITALS:  T(F): 98.1 (10-22-21 @ 14:32), Max: 99.6 (10-22-21 @ 02:05)  HR: 77 (10-22-21 @ 14:32)  BP: 121/63 (10-22-21 @ 14:32)  RR: 18 (10-22-21 @ 14:32)  SpO2: 96% (10-22-21 @ 14:32)  Wt(kg): --    10-21 @ 07:01  -  10-22 @ 07:00  --------------------------------------------------------  IN: 1130 mL / OUT: 2600 mL / NET: -1470 mL          PHYSICAL EXAM:  Constitutional: NAD  HEENT: anicteric sclera, oropharynx clear  Neck: No JVD  Respiratory: CTAB, no wheezes, rales or rhonchi  Cardiovascular: S1, S2, RRR  Gastrointestinal: BS+, soft, NT/ND  Extremities: No cyanosis or clubbing. No peripheral edema  Neurological: A/O x 3, no focal deficits  Psychiatric: Normal mood, normal affect  : No CVA tenderness. No siegel.   Skin: No rashes  Vascular Access:    LABS:  10-22    139  |  95<L>  |  57<H>  ----------------------------<  99  4.0   |    |  7.12<H>    Ca    6.6<L>      22 Oct 2021 08:02  Phos  3.5     10-22  Mg     2.10     10-22    TPro      /  Alb  4.0  /  TBili      /  DBili      /  AST      /  ALT      /  AlkPhos      10-22    Creatinine Trend: 7.12 <--, 9.13 <--, 7.52 <--, 6.80 <--, 10.51 <--, 9.74 <--, 7.26 <--, 5.48 <--, 8.17 <--, 7.24 <--                        9.7    8.08  )-----------( 149      ( 22 Oct 2021 08:02 )             30.6     Urine Studies:  Urinalysis Basic - ( 16 Oct 2021 08:14 )    Color: Light Yellow / Appearance: Clear / S.011 / pH:   Gluc:  / Ketone: Negative  / Bili: Negative / Urobili: <2 mg/dL   Blood:  / Protein: 300 mg/dL / Nitrite: Negative   Leuk Esterase: Small / RBC: 2 /HPF / WBC 23 /HPF   Sq Epi:  / Non Sq Epi: 2 /HPF / Bacteria: Negative          RADIOLOGY & ADDITIONAL STUDIES:   Subjective:      Patient ID: Amy Garcia is a 80 y.o. female. Chief Complaint   Patient presents with    Follow-up    Back Pain    Other     all over body pain, recently fell     HPI Here today for routine pain clinic recheck for chronic low back pain. Previous right L4, L5 with relief. Recent fall approximately one week ago, also have left sided low back pain. Pain Assessment  Location of Pain: Back  Location Modifiers: Left (low)  Severity of Pain: 10  Quality of Pain: Aching, Sharp  Duration of Pain: Persistent  Frequency of Pain: Constant  Aggravating Factors: Walking, Standing, Squatting, Kneeling  Limiting Behavior: Yes  Relieving Factors: Ice  Result of Injury: No  Work-Related Injury: No  Are there other pain locations you wish to document?: No    Allergies   Allergen Reactions    Bee Venom Anaphylaxis     Allergic to bee stings    Codeine     Sulfa Antibiotics        No outpatient prescriptions have been marked as taking for the 1/19/18 encounter (Office Visit) with Radha Walsh NP.        Past Medical History:   Diagnosis Date    Allergic rhinitis     Alzheimer's dementia     Arterial ischemic stroke ([facility] [address] 2000 and 2001    Chronic cerebral ischemia     Chronic lumbar radiculopathy     Confusion     Depression     Diverticulosis     Dizziness     Elevated fasting glucose     Essential tremor     Fatigue     GERD (gastroesophageal reflux disease)     Herpes keratitis     left eye    Hyperactivity of bladder     Hyperlipidemia     Left shoulder pain     Memory problem     ARIADNA on CPAP     Osteoarthritis     Peripheral neuropathy (HCC)     Pseudophakia     bilateral    Right leg pain     s/p fall    TIA (transient ischemic attack)     Transient ischemic attack     Trigeminal neuralgia     possible, left side    Urinary incontinence     Vascular dementia        Past Surgical History:   Procedure Laterality Date    BLADDER SUSPENSION  2002    BLADDER SUSPENSION  2003 [facility] - S Alicia / Ej S /D Federico/ S Maryellen/ S Govind/ Jovan Garrison / GAYATRI Matsonu/ O Gregorio  service -[phone], office [phone]  ---------------------------------------------------------------------------------------------------------------    Patient seen and examined bedside    Subjective and Objective: No overnight events, denied sob. No complaints today. feeling better    Allergies: No Known Allergies      Hospital Medications:   MEDICATIONS  (STANDING):  apixaban 5 milliGRAM(s) Oral two times a day  aspirin enteric coated 81 milliGRAM(s) Oral daily  atorvastatin 40 milliGRAM(s) Oral at bedtime  calcium carbonate   1250 mG (OsCal) 2 Tablet(s) Oral three times a day  carvedilol 25 milliGRAM(s) Oral every 12 hours  chlorhexidine 2% Cloths 1 Application(s) Topical daily  epoetin tammi-epbx (RETACRIT) Injectable 08113 Unit(s) IV Push <User Schedule>  folic acid 1 milliGRAM(s) Oral daily  hydrALAZINE 10 milliGRAM(s) Oral every 8 hours  influenza  Vaccine (HIGH DOSE) 0.7 milliLiter(s) IntraMuscular once  levothyroxine 100 MICROGram(s) Oral daily  NIFEdipine XL 60 milliGRAM(s) Oral daily  pantoprazole    Tablet 40 milliGRAM(s) Oral before breakfast  sevelamer carbonate 800 milliGRAM(s) Oral three times a day    VITALS:  T(F): 98.1 (10-22-21 @ 14:32), Max: 99.6 (10-22-21 @ 02:05)  HR: 77 (10-22-21 @ 14:32)  BP: 121/63 (10-22-21 @ 14:32)  RR: 18 (10-22-21 @ 14:32)  SpO2: 96% (10-22-21 @ 14:32)  Wt(kg): --    10-21 @ 07:01  -  10-22 @ 07:00  --------------------------------------------------------  IN: 1130 mL / OUT: 2600 mL / NET: -1470 mL      PHYSICAL EXAM:  HEENT: anicteric sclera  Neck: No JVD  Respiratory: CTAB, no wheezes, rales or rhonchi  Cardiovascular: S1, S2, RRR  Gastrointestinal: BS+, soft, NT/ND  Extremities: No peripheral edema  Neurological: A/O x 3  Psychiatric: Normal mood, normal affect  : no siegel.  Vascular Access: rt IJ PC+; RFA AVF+    LABS:  10-22    139  |  95<L>  |  57<H>  ----------------------------<  99  4.0   |  22  |  7.12<H>    Ca    6.6<L>      22 Oct 2021 08:02  Phos  3.5     10-22  Mg     2.10     10-22    TPro      /  Alb  4.0  /  TBili      /  DBili      /  AST      /  ALT      /  AlkPhos      10-22    Creatinine Trend: 7.12 <--, 9.13 <--, 7.52 <--, 6.80 <--, 10.51 <--, 9.74 <--, 7.26 <--, 5.48 <--, 8.17 <--, 7.24 <--                        9.7    8.08  )-----------( 149      ( 22 Oct 2021 08:02 )             30.6     Urine Studies:  Urinalysis Basic - ( 16 Oct 2021 08:14 )    Color: Light Yellow / Appearance: Clear / S.011 / pH:   Gluc:  / Ketone: Negative  / Bili: Negative / Urobili: <2 mg/dL   Blood:  / Protein: 300 mg/dL / Nitrite: Negative   Leuk Esterase: Small / RBC: 2 /HPF / WBC 23 /HPF   Sq Epi:  / Non Sq Epi: 2 /HPF / Bacteria: Negative          RADIOLOGY & ADDITIONAL STUDIES:   measures 5-6 mm in the midline. L5-S1: There is a moderate posterior broad-based disc protrusion and severe hypertrophic facet disease causing severe narrowing of the neural foramina, left greater than right. The central thecal sac measures 8 mm in the midline. Multilevel degenerative disc disease, scoliosis, and spondylosis, in particular, at L4-5 and L5-S1, where there is severe central and lateral spinal stenosis. RECOMMENDATIONS: The findings were sent to the Radiology Results [address] at 5:53 pm on 9/27/2017to be communicated to a licensed caregiver. Ct Abdomen Pelvis W Iv Contrast    Result Date: 9/18/2017  EXAMINATION: CT OF THE ABDOMEN AND PELVIS WITH CONTRAST 9/18/2017 1:23 pm TECHNIQUE: CT of the abdomen and pelvis was performed with the administration of intravenous contrast. Multiplanar reformatted images are provided for review. Dose modulation, iterative reconstruction, and/or weight based adjustment of the mA/kV was utilized to reduce the radiation dose to as low as reasonably achievable. COMPARISON: April 2, 2017 HISTORY: ORDERING SYSTEM PROVIDED HISTORY: Right sided abd pain TECHNOLOGIST PROVIDED HISTORY: IV Contrast only Ordering Physician Provided Reason for Exam: Right abd pain, low back pain Acuity: Acute Type of Exam: Initial FINDINGS: Evaluation is limited by motion. Lower Chest: Mild cardiomegaly. No pleural effusions. Moderate-sized hiatal hernia. Organs: Hepatic steatosis. Stable hypodense lesion is seen within the left hepatic lobe measuring less than 1 cm. On the previous study density characteristics are consistent with a cyst requiring no additional follow-up. No gallstones. No pancreatic lesion. No splenomegaly. No adrenal lesion. No hydronephrosis. No renal calculus. Hypodensity within the lower pole of the right kidney measuring less than 1 cm likely represents a cyst requiring no additional follow-up.  GI/Bowel: Evaluation of the gastrointestinal tract is suboptimal without oral contrast.  Moderate-sized hiatal hernia. No bowel obstruction. Sigmoid diverticulosis without acute diverticulitis. No acute inflammatory process is seen. Pelvis: No free fluid. No bladder calculus. Peritoneum/Retroperitoneum: No abdominal aortic aneurysm. No adenopathy. Bones/Soft Tissues: Small umbilical hernia contains fat. Degenerative changes are seen involving the hips and lumbar spine. No acute findings. Fluoro For Surgical Procedures    Result Date: 9/15/2017  Radiology exam is complete. No Radiologist dictation. Please follow up with ordering provider. Xr Hip 2-3 Vw W Pelvis Right    Result Date: 9/20/2017  EXAMINATION: 3 VIEWS OF THE LUMBAR SPINE; SINGLE VIEW OF THE PELVIS AND 2 VIEWS RIGHT HIP 9/20/2017 11:24 am COMPARISON: AP pelvis 08/25/2011. HISTORY: ORDERING SYSTEM PROVIDED HISTORY: Low back pain radiating to right leg TECHNOLOGIST PROVIDED HISTORY: Reason for exam:->low back pain with radiculopathy Ordering Physician Provided Reason for Exam: Low back pain 2-3 years; pain is worse since having injections 5 days ago FINDINGS: Lumbar spine, three views: Lumbar vertebral alignment is maintained. Mild osteopenia with no acute fracture. Advanced multilevel lumbar spondylosis, most prevalent at T12-L1 anteriorly and on the right at L1-2. Mild multilevel degenerative disc space narrowing. Advanced multilevel facet arthropathy. Mild scoliotic curvature, apex to the right centered at L2. Scattered visceral vascular calcification. AP pelvis right hip: No acute fracture deformity. Moderate osteoarthritic changes of the hips, with axial narrowing and marginal spurring. The SI joints are unremarkable. No focal soft tissue abnormality. Postoperative hardware in the low pelvis. 1. No acute osseous abnormality of the lumbar spine. Advanced multilevel lumbar spondylosis and facet arthropathy. 2. No acute osseous abnormality of the pelvis or right hip. Moderate osteoarthritic changes of the hips bilaterally. Neurological: She is alert and oriented to person, place, and time. No cranial nerve deficit. GCS eye subscore is 4. GCS verbal subscore is 5. GCS motor subscore is 6. Skin: Skin is warm, dry and intact. No rash noted. She is not diaphoretic. No cyanosis or erythema. No pallor. Nails show no clubbing. Psychiatric: She has a normal mood and affect. Her speech is normal and behavior is normal.   Vitals reviewed. Assessment:      1. Neural foraminal stenosis of lumbar spine    2. Lumbar radiculopathy    3. Chronic bilateral low back pain, with sciatica presence unspecified          Plan:    Schedule bilateral L4 TFE, if no relief, bilateral L5 TFE  Informed consent has been obtained for procedure. Jhoan Nazario  on blood thinners. Medications to hold have been reviewed with patient. Blood thinners must be held with permission from your cardiologist or primary care physician. A letter is  required to besent to PCP/Cardiologist regarding holding medications for procedure to decrease bleeding risk. Controlled Substances Monitoring:     Attestation: The Prescription Monitoring Report for this patient was reviewed today. Lynne Chin NP)  Documentation: No signs of potential drug abuse or diversion identified.  Lynne Chin NP)

## 2021-10-22 NOTE — PROGRESS NOTE ADULT - ASSESSMENT
67 y/o M w/ESRD on HD TTS, HTN. Renal following for ESRD Mx.  	  ESRD on HD TTS  K, vol acceptable  new RFA AVF s/p infiltration w/hematoma outpt -s/p OR angiogram today    c/w HD using PC w/2k bath, net uf 2-2.5kg as tolerated  dose all meds for ESRD  renal diet  f/u w/ vas sx reg clearance to use AVF w/next hd  hypocalcemia noted-asympt. c/w po caco3 tid b/w meals. will use 3ca bath w/hd. check ipth, [id number]  Anemia in ckd -Hb < goal- c/w epo 10k tiw w/hd  HTN, controlled-bp stable  c/w home bp meds-coreg, Nifedipine, hydrALAZINE -hold hydrALAZINE  prehd    poc d/w pt, HD RN  labs, chart reviewed  For any question, call:  Cell # [phone]  Pager # [phone]  office # [phone]       65 y/o M w/ESRD on HD TTS, HTN. Renal following for ESRD Mx.  	  ESRD on HD TTS  K, vol acceptable  new RFA AVF s/p infiltration w/hematoma outpt -s/p OR angiogram 10/21    s/p HD yesterday using PC, Rx sheet reviewed, net UF 2.2kg, tolerated well. uneventful.  plan for HD tomorrow, using AVF w/2k bath, net uf 2kg as tolerated, w/16G needles  dose all meds for ESRD  renal diet  hypocalcemia noted-asympt. inc po caco3 4xday b/w meals. will use 3ca bath w/hd. ipth low, [id number] low 29 noted-start vitD 2k po qd, calcitriol 0.5mcg qd  Anemia in ckd -Hb < goal- c/w epo 10k tiw w/hd  HTN, controlled-bp stable  c/w home bp meds-coreg, Nifedipine, hydrALAZINE -hold hydrALAZINE  prehd    poc d/w pt, covering PA  labs, chart reviewed  For any question, call:  Cell # [phone]  Pager # [phone]  office # [phone]

## 2021-10-22 NOTE — DIETITIAN INITIAL EVALUATION ADULT. - OTHER INFO
Patient reports good appetite with 100% PO intake in-house. Denies GI distress (nausea/vomiting/diarrhea/constipation), last BM 10/22 per patient. States no difficulty chewing/swallowing. Per chart, patient's weights have fluctuated between 60-68kg x5 months. Weight variability likely in setting of fluid shifts from HD. Patient reports following with an RD at HD, is able to verbalize HD nutrition therapy. Patient with no questions regarding HD nutrition education at this time, but accepted written education materials. Patient amenable to addition of oral nutrition supplement to optimize PO intake.

## 2021-10-22 NOTE — PROGRESS NOTE ADULT - ASSESSMENT
67 y/o M with pmhx of ESRD on HD, afib on eliquis, anemia presented to the ED for R arm pain. Found to have rhinovirus pos and AV fistula clot/infection. Admitted for treatment and vascular management of AV fistula.    AVF malfunction:     S/p RUE fistulogram    Hypocalcemia:    Ca supp

## 2021-10-22 NOTE — CHART NOTE - NSCHARTNOTEFT_GEN_A_CORE
Notified by RN, Pt IV acces infiltrated prior to receiving IV Calcium. Multiple attempt made obtain access, only LUE use d/t RUE AVF. Spoke with Pt in regards to re-attempting obtaining access, pt wants to hold off for now. Will continue with PO supplement.

## 2021-10-22 NOTE — PROGRESS NOTE ADULT - SUBJECTIVE AND OBJECTIVE BOX
Patient is a 66y old  Male who presents with a chief complaint of R arm pain on Av fistula site (22 Oct 2021 16:50)      SUBJECTIVE / OVERNIGHT EVENTS:    Events noted.  CONSTITUTIONAL: No fever,  or fatigue  RESPIRATORY: No cough, wheezing,  No shortness of breath  CARDIOVASCULAR: No chest pain, palpitations, dizziness, or leg swelling  GASTROINTESTINAL: No abdominal or epigastric pain. No nausea, vomiting.  NEUROLOGICAL: No headaches,     MEDICATIONS  (STANDING):  apixaban 5 milliGRAM(s) Oral two times a day  aspirin enteric coated 81 milliGRAM(s) Oral daily  atorvastatin 40 milliGRAM(s) Oral at bedtime  calcitriol   Capsule 0.5 MICROGram(s) Oral daily  calcium carbonate   1250 mG (OsCal) 2 Tablet(s) Oral four times a day  calcium gluconate IVPB 1 Gram(s) IV Intermittent once  carvedilol 25 milliGRAM(s) Oral every 12 hours  chlorhexidine 2% Cloths 1 Application(s) Topical daily  cholecalciferol 2000 Unit(s) Oral daily  epoetin tammi-epbx (RETACRIT) Injectable 65958 Unit(s) IV Push <User Schedule>  folic acid 1 milliGRAM(s) Oral daily  hydrALAZINE 10 milliGRAM(s) Oral every 8 hours  influenza  Vaccine (HIGH DOSE) 0.7 milliLiter(s) IntraMuscular once  levothyroxine 100 MICROGram(s) Oral daily  NIFEdipine XL 60 milliGRAM(s) Oral daily  pantoprazole    Tablet 40 milliGRAM(s) Oral before breakfast  sevelamer carbonate 800 milliGRAM(s) Oral three times a day    MEDICATIONS  (PRN):  acetaminophen   Tablet .. 650 milliGRAM(s) Oral every 6 hours PRN Temp greater or equal to 38C (100.4F), Mild Pain (1 - 3)        CAPILLARY BLOOD GLUCOSE        I&O's Summary    21 Oct 2021 07:01  -  22 Oct 2021 07:00  --------------------------------------------------------  IN: 1130 mL / OUT: 2600 mL / NET: -1470 mL    22 Oct 2021 07:01  -  23 Oct 2021 01:53  --------------------------------------------------------  IN: 0 mL / OUT: 0 mL / NET: 0 mL        T(C): 37.4 (10-22-21 @ 21:55), Max: 37.6 (10-22-21 @ 02:05)  HR: 76 (10-22-21 @ 21:55) (74 - 87)  BP: 99/60 (10-22-21 @ 21:55) (99/60 - 137/74)  RR: 16 (10-22-21 @ 21:55) (16 - 18)  SpO2: 98% (10-22-21 @ 21:55) (96% - 98%)    PHYSICAL EXAM:  GENERAL: NAD  NECK: Supple, No JVD  CHEST/LUNG: Clear to auscultation bilaterally; No wheezing.  HEART: Regular rate and rhythm; No murmurs, rubs, or gallops  ABDOMEN: Soft, Nontender, Nondistended; Bowel sounds present  EXTREMITIES:   No edema  NEUROLOGY: AAO X 3      LABS:                        9.7    8.08  )-----------( 149      ( 22 Oct 2021 08:02 )             30.6     10-22    139  |  95<L>  |  57<H>  ----------------------------<  99  4.0   |  22  |  7.12<H>    Ca    6.6<L>      22 Oct 2021 08:02  Phos  3.5     10-22  Mg     2.10     10-22    TPro  x   /  Alb  4.0  /  TBili  x   /  DBili  x   /  AST  x   /  ALT  x   /  AlkPhos  x   10-22    PT/INR - ( 21 Oct 2021 04:44 )   PT: 14.8 sec;   INR: 1.30 ratio         PTT - ( 21 Oct 2021 04:44 )  PTT:33.0 sec        CAPILLARY BLOOD GLUCOSE            RADIOLOGY & ADDITIONAL TESTS:    Imaging Personally Reviewed:    Consultant(s) Notes Reviewed:      Care Discussed with Consultants/Other Providers:    Wilfred Christian MD, CMD, FACP    [phone] [id number]  Office Tel: [phone]  Cell: [phone]

## 2021-10-22 NOTE — PROGRESS NOTE ADULT - SUBJECTIVE AND OBJECTIVE BOX
Subjective:   Patient seen and examined  POD1 R fistulogram  No acute events overnight  Reports improved symptoms     Objective:  Vital Signs  T(C): 36.7 (10-22 @ 14:32), Max: 37.6 (10-22 @ 02:05)  HR: 77 (10-22 @ 14:32) (74 - 94)  BP: 121/63 (10-22 @ 14:32) (111/61 - 156/76)  RR: 18 (10-22 @ 14:32) (16 - 18)  SpO2: 96% (10-22 @ 14:32) (96% - 98%)  10-21-21 @ 07:01  -  10-22-21 @ 07:00  --------------------------------------------------------  IN:  Total IN: 0 mL    OUT:    Voided (mL): 0 mL  Total OUT: 0 mL    Total NET: 0 mL      Physical Exam:  GEN: NAD, resting quietly  PULM: symmetric chest rise bilaterally, no increased WOB  EXTR: RUE fistula with palpable thrill, minimal swelling, motor and sensation grossly intact.    Labs:                        9.7    8.08  )-----------( 149      ( 22 Oct 2021 08:02 )             30.6   10-22    139  |  95<L>  |  57<H>  ----------------------------<  99  4.0   |  22  |  7.12<H>    Ca    6.6<L>      22 Oct 2021 08:02  Phos  3.5     10-22  Mg     2.10     10-22    TPro  x   /  Alb  4.0  /  TBili  x   /  DBili  x   /  AST  x   /  ALT  x   /  AlkPhos  x   10-22    CAPILLARY BLOOD GLUCOSE          Microbiology:      Medications:   MEDICATIONS  (STANDING):  apixaban 5 milliGRAM(s) Oral two times a day  aspirin enteric coated 81 milliGRAM(s) Oral daily  atorvastatin 40 milliGRAM(s) Oral at bedtime  calcium carbonate   1250 mG (OsCal) 2 Tablet(s) Oral three times a day  carvedilol 25 milliGRAM(s) Oral every 12 hours  chlorhexidine 2% Cloths 1 Application(s) Topical daily  epoetin tammi-epbx (RETACRIT) Injectable 64357 Unit(s) IV Push <User Schedule>  folic acid 1 milliGRAM(s) Oral daily  hydrALAZINE 10 milliGRAM(s) Oral every 8 hours  influenza  Vaccine (HIGH DOSE) 0.7 milliLiter(s) IntraMuscular once  levothyroxine 100 MICROGram(s) Oral daily  NIFEdipine XL 60 milliGRAM(s) Oral daily  pantoprazole    Tablet 40 milliGRAM(s) Oral before breakfast  sevelamer carbonate 800 milliGRAM(s) Oral three times a day    MEDICATIONS  (PRN):  acetaminophen   Tablet .. 650 milliGRAM(s) Oral every 6 hours PRN Temp greater or equal to 38C (100.4F), Mild Pain (1 - 3)

## 2021-10-22 NOTE — DIETITIAN INITIAL EVALUATION ADULT. - CHIEF COMPLAINT
The patient is a 65 y/o M with pmhx of ESRD on HD, afib on eliquis, anemia presented to the ED for R arm pain. Found to have rhinovirus pos and AV fistula clot/infection. Admitted for treatment and vascular management of AV fistula.

## 2021-10-23 LAB
ANION GAP SERPL CALC-SCNC: 19 MMOL/L — HIGH (ref 7–14)
ANION GAP SERPL CALC-SCNC: 22 MMOL/L — HIGH (ref 7–14)
BUN SERPL-MCNC: 34 MG/DL — HIGH (ref 7–23)
BUN SERPL-MCNC: 76 MG/DL — HIGH (ref 7–23)
CALCIUM SERPL-MCNC: 5.9 MG/DL — CRITICAL LOW (ref 8.4–10.5)
CALCIUM SERPL-MCNC: 8.5 MG/DL — SIGNIFICANT CHANGE UP (ref 8.4–10.5)
CHLORIDE SERPL-SCNC: 92 MMOL/L — LOW (ref 98–107)
CHLORIDE SERPL-SCNC: 93 MMOL/L — LOW (ref 98–107)
CO2 SERPL-SCNC: 19 MMOL/L — LOW (ref 22–31)
CO2 SERPL-SCNC: 24 MMOL/L — SIGNIFICANT CHANGE UP (ref 22–31)
CREAT SERPL-MCNC: 5.03 MG/DL — HIGH (ref 0.5–1.3)
CREAT SERPL-MCNC: 9.62 MG/DL — HIGH (ref 0.5–1.3)
GLUCOSE SERPL-MCNC: 105 MG/DL — HIGH (ref 70–99)
GLUCOSE SERPL-MCNC: 114 MG/DL — HIGH (ref 70–99)
HCT VFR BLD CALC: 28 % — LOW (ref 39–50)
HGB BLD-MCNC: 8.7 G/DL — LOW (ref 13–17)
MAGNESIUM SERPL-MCNC: 1.9 MG/DL — SIGNIFICANT CHANGE UP (ref 1.6–2.6)
MAGNESIUM SERPL-MCNC: 2.1 MG/DL — SIGNIFICANT CHANGE UP (ref 1.6–2.6)
MCHC RBC-ENTMCNC: 26.6 PG — LOW (ref 27–34)
MCHC RBC-ENTMCNC: 31.1 GM/DL — LOW (ref 32–36)
MCV RBC AUTO: 85.6 FL — SIGNIFICANT CHANGE UP (ref 80–100)
NRBC # BLD: 0 /100 WBCS — SIGNIFICANT CHANGE UP
NRBC # FLD: 0 K/UL — SIGNIFICANT CHANGE UP
PHOSPHATE SERPL-MCNC: 2.5 MG/DL — SIGNIFICANT CHANGE UP (ref 2.5–4.5)
PHOSPHATE SERPL-MCNC: 3.2 MG/DL — SIGNIFICANT CHANGE UP (ref 2.5–4.5)
PLATELET # BLD AUTO: 152 K/UL — SIGNIFICANT CHANGE UP (ref 150–400)
POTASSIUM SERPL-MCNC: 3.9 MMOL/L — SIGNIFICANT CHANGE UP (ref 3.5–5.3)
POTASSIUM SERPL-MCNC: 4 MMOL/L — SIGNIFICANT CHANGE UP (ref 3.5–5.3)
POTASSIUM SERPL-SCNC: 3.9 MMOL/L — SIGNIFICANT CHANGE UP (ref 3.5–5.3)
POTASSIUM SERPL-SCNC: 4 MMOL/L — SIGNIFICANT CHANGE UP (ref 3.5–5.3)
RBC # BLD: 3.27 M/UL — LOW (ref 4.2–5.8)
RBC # FLD: 18 % — HIGH (ref 10.3–14.5)
SODIUM SERPL-SCNC: 133 MMOL/L — LOW (ref 135–145)
SODIUM SERPL-SCNC: 136 MMOL/L — SIGNIFICANT CHANGE UP (ref 135–145)
WBC # BLD: 7.66 K/UL — SIGNIFICANT CHANGE UP (ref 3.8–10.5)
WBC # FLD AUTO: 7.66 K/UL — SIGNIFICANT CHANGE UP (ref 3.8–10.5)

## 2021-10-23 RX ORDER — MAGNESIUM SULFATE 500 MG/ML
1 VIAL (ML) INJECTION ONCE
Refills: 0 | Status: DISCONTINUED | OUTPATIENT
Start: 2021-10-23 | End: 2021-11-01

## 2021-10-23 RX ORDER — CALCIUM GLUCONATE 100 MG/ML
1 VIAL (ML) INTRAVENOUS ONCE
Refills: 0 | Status: COMPLETED | OUTPATIENT
Start: 2021-10-23 | End: 2021-10-23

## 2021-10-23 RX ADMIN — APIXABAN 5 MILLIGRAM(S): 2.5 TABLET, FILM COATED ORAL at 05:13

## 2021-10-23 RX ADMIN — CALCITRIOL 0.5 MICROGRAM(S): 0.5 CAPSULE ORAL at 17:15

## 2021-10-23 RX ADMIN — APIXABAN 5 MILLIGRAM(S): 2.5 TABLET, FILM COATED ORAL at 17:15

## 2021-10-23 RX ADMIN — Medication 100 GRAM(S): at 09:35

## 2021-10-23 RX ADMIN — Medication 1 MILLIGRAM(S): at 17:15

## 2021-10-23 RX ADMIN — Medication 81 MILLIGRAM(S): at 17:08

## 2021-10-23 RX ADMIN — ERYTHROPOIETIN 10000 UNIT(S): 10000 INJECTION, SOLUTION INTRAVENOUS; SUBCUTANEOUS at 13:53

## 2021-10-23 RX ADMIN — CARVEDILOL PHOSPHATE 25 MILLIGRAM(S): 80 CAPSULE, EXTENDED RELEASE ORAL at 17:15

## 2021-10-23 RX ADMIN — Medication 10 MILLIGRAM(S): at 21:40

## 2021-10-23 RX ADMIN — Medication 100 MICROGRAM(S): at 05:13

## 2021-10-23 RX ADMIN — Medication 2 TABLET(S): at 17:07

## 2021-10-23 RX ADMIN — SEVELAMER CARBONATE 800 MILLIGRAM(S): 2400 POWDER, FOR SUSPENSION ORAL at 09:05

## 2021-10-23 RX ADMIN — Medication 2000 UNIT(S): at 17:17

## 2021-10-23 RX ADMIN — PANTOPRAZOLE SODIUM 40 MILLIGRAM(S): 20 TABLET, DELAYED RELEASE ORAL at 07:31

## 2021-10-23 RX ADMIN — Medication 2 TABLET(S): at 21:40

## 2021-10-23 RX ADMIN — Medication 2 TABLET(S): at 05:13

## 2021-10-23 RX ADMIN — ATORVASTATIN CALCIUM 40 MILLIGRAM(S): 80 TABLET, FILM COATED ORAL at 21:39

## 2021-10-23 NOTE — PROGRESS NOTE ADULT - ASSESSMENT
65 y/o M w/ESRD on HD TTS, HTN. Renal following for ESRD Mx.  	  ESRD on HD TTS  K, vol acceptable  new RFA AVF s/p infiltration w/hematoma outpt -s/p OR angiogram 10/21    plabn    hypocalcemia noted-asympt. inc po caco3 4xday b/w meals. will use 3ca bath w/hd. ipth low, [id number] low 29 noted-start vitD 2k po qd, calcitriol 0.5mcg dailydose all meds for ESRD  renal diet    hypocalcemia - symptomatic - etiology unclear likely secondary -  intact pth 25-30 noted     # holding off renvela  # iv calcium given today   # cont balwinder carbonate 2 cap 4 times per day  calcitriol 0.5 mg daily  # using high calcium bath - 3 balwinder on dialysis,     Anemia in ckd -Hb < goal- c/w epo 10k tiw w/hd  HTN, controlled-bp stable  c/w home bp meds-coreg, Nifedipine, hydrALAZINE -hold hydrALAZINE  prehd

## 2021-10-23 NOTE — PROGRESS NOTE ADULT - SUBJECTIVE AND OBJECTIVE BOX
Patient is a 66y old  Male who presents with a chief complaint of R arm pain on Av fistula site (23 Oct 2021 11:20)      SUBJECTIVE / OVERNIGHT EVENTS:    Events noted.  CONSTITUTIONAL: No fever,  or fatigue  RESPIRATORY: No cough, wheezing,  No shortness of breath  CARDIOVASCULAR: No chest pain, palpitations, dizziness, or leg swelling  GASTROINTESTINAL: No abdominal or epigastric pain. No nausea, vomiting.  NEUROLOGICAL: No headaches,     MEDICATIONS  (STANDING):  apixaban 5 milliGRAM(s) Oral two times a day  aspirin enteric coated 81 milliGRAM(s) Oral daily  atorvastatin 40 milliGRAM(s) Oral at bedtime  calcitriol   Capsule 0.5 MICROGram(s) Oral daily  calcium carbonate   1250 mG (OsCal) 2 Tablet(s) Oral four times a day  carvedilol 25 milliGRAM(s) Oral every 12 hours  chlorhexidine 2% Cloths 1 Application(s) Topical daily  cholecalciferol 2000 Unit(s) Oral daily  epoetin tammi-epbx (RETACRIT) Injectable 46466 Unit(s) IV Push <User Schedule>  folic acid 1 milliGRAM(s) Oral daily  hydrALAZINE 10 milliGRAM(s) Oral every 8 hours  influenza  Vaccine (HIGH DOSE) 0.7 milliLiter(s) IntraMuscular once  levothyroxine 100 MICROGram(s) Oral daily  magnesium sulfate  IVPB 1 Gram(s) IV Intermittent once  NIFEdipine XL 60 milliGRAM(s) Oral daily  pantoprazole    Tablet 40 milliGRAM(s) Oral before breakfast    MEDICATIONS  (PRN):  acetaminophen   Tablet .. 650 milliGRAM(s) Oral every 6 hours PRN Temp greater or equal to 38C (100.4F), Mild Pain (1 - 3)        CAPILLARY BLOOD GLUCOSE        I&O's Summary    22 Oct 2021 07:01  -  23 Oct 2021 07:00  --------------------------------------------------------  IN: 200 mL / OUT: 0 mL / NET: 200 mL    23 Oct 2021 07:01  -  24 Oct 2021 02:21  --------------------------------------------------------  IN: 750 mL / OUT: 2500 mL / NET: -1750 mL        T(C): 37.2 (10-23-21 @ 21:30), Max: 37.6 (10-23-21 @ 05:39)  HR: 86 (10-23-21 @ 21:30) (61 - 95)  BP: 111/75 (10-23-21 @ 21:30) (111/75 - 150/85)  RR: 17 (10-23-21 @ 21:30) (16 - 18)  SpO2: 98% (10-23-21 @ 21:30) (95% - 99%)    PHYSICAL EXAM:  GENERAL: NAD  NECK: Supple, No JVD  CHEST/LUNG: Clear to auscultation bilaterally; No wheezing.  HEART: Regular rate and rhythm; No murmurs, rubs, or gallops  ABDOMEN: Soft, Nontender, Nondistended; Bowel sounds present  EXTREMITIES:   No edema  NEUROLOGY: AAO X 3      LABS:                        8.7    7.66  )-----------( 152      ( 23 Oct 2021 07:33 )             28.0     10-23    136  |  93<L>  |  34<H>  ----------------------------<  105<H>  3.9   |  24  |  5.03<H>    Ca    8.5      23 Oct 2021 15:38  Phos  2.5     10-23  Mg     2.10     10-23    TPro  x   /  Alb  4.0  /  TBili  x   /  DBili  x   /  AST  x   /  ALT  x   /  AlkPhos  x   10-22            CAPILLARY BLOOD GLUCOSE            RADIOLOGY & ADDITIONAL TESTS:    Imaging Personally Reviewed:    Consultant(s) Notes Reviewed:      Care Discussed with Consultants/Other Providers:    Wilfred Christian MD, CMD, FACP    [address] [place] [address]  Office Tel: [phone]  Cell: [phone]

## 2021-10-23 NOTE — PROGRESS NOTE ADULT - ASSESSMENT
65 y/o M with pmhx of ESRD on HD, afib on eliquis, anemia presented to the ED for R arm pain. Found to have rhinovirus pos and AV fistula clot/infection. Admitted for treatment and vascular management of AV fistula.    AVF malfunction:     S/p RUE fistulogram    Hypocalcemia:    Ca supp

## 2021-10-23 NOTE — PROGRESS NOTE ADULT - SUBJECTIVE AND OBJECTIVE BOX
[facility] : Ans Serv [phone], Office [phone]  Dr Caballero/Dr Vargas  /Dr Jordan etienne /Dr SERA oBjorquez/Dr Dagoberto Faust/Dr Jovan Garrison /Dr GAYATRI Garcia  _______________________________________________________________________________________________    seen and examined today for End Stage Renal Disease on Dialysis   Interval :  VITALS:  T(F): 98.4 (10-23-21 @ 10:05), Max: 99.7 (10-23-21 @ 05:39)  HR: 81 (10-23-21 @ 10:05)  BP: 115/67 (10-23-21 @ 10:05)  RR: 18 (10-23-21 @ 10:05)  SpO2: 99% (10-23-21 @ 10:05)  Wt(kg): --    10-22 @ 07:01  -  10-23 @ 07:00  --------------------------------------------------------  IN: 200 mL / OUT: 0 mL / NET: 200 mL      Physical Exam :-  Constitutional: NAD  Neck: Supple.  Respiratory: Bilateral equal breath sounds, no Crackles present.  Cardiovascular: S1, S2 normal, positive Murmur  Gastrointestinal: Bowel Sounds present, soft, non tender.  Extremities: no Edema Feet  Neurological: Alert and Oriented x 3  Psychiatric: Normal mood, normal affect  Data:-  Allergies :   No Known Allergies    Hospital Medications:   MEDICATIONS  (STANDING):  apixaban 5 milliGRAM(s) Oral two times a day  aspirin enteric coated 81 milliGRAM(s) Oral daily  atorvastatin 40 milliGRAM(s) Oral at bedtime  calcitriol   Capsule 0.5 MICROGram(s) Oral daily  calcium carbonate   1250 mG (OsCal) 2 Tablet(s) Oral four times a day  carvedilol 25 milliGRAM(s) Oral every 12 hours  chlorhexidine 2% Cloths 1 Application(s) Topical daily  cholecalciferol 2000 Unit(s) Oral daily  epoetin tammi-epbx (RETACRIT) Injectable 93370 Unit(s) IV Push <User Schedule>  folic acid 1 milliGRAM(s) Oral daily  hydrALAZINE 10 milliGRAM(s) Oral every 8 hours  influenza  Vaccine (HIGH DOSE) 0.7 milliLiter(s) IntraMuscular once  levothyroxine 100 MICROGram(s) Oral daily  magnesium sulfate  IVPB 1 Gram(s) IV Intermittent once  NIFEdipine XL 60 milliGRAM(s) Oral daily  pantoprazole    Tablet 40 milliGRAM(s) Oral before breakfast  sevelamer carbonate 800 milliGRAM(s) Oral three times a day    10-23    133<L>  |  92<L>  |  76<H>  ----------------------------<  114<H>  4.0   |  19<L>  |  9.62<H>    Ca    5.9<LL>      23 Oct 2021 07:33  Phos  3.2     10-23  Mg     1.90     10-23    TPro      /  Alb  4.0  /  TBili      /  DBili      /  AST      /  ALT      /  AlkPhos      10-22    Creatinine Trend: 9.62 <--, 7.12 <--, 9.13 <--, 7.52 <--, 6.80 <--, 10.51 <--, 9.74 <--, 7.26 <--, 5.48 <--                        8.7    7.66  )-----------( 152      ( 23 Oct 2021 07:33 )             28.0

## 2021-10-24 LAB
ANION GAP SERPL CALC-SCNC: 19 MMOL/L — HIGH (ref 7–14)
ANION GAP SERPL CALC-SCNC: 20 MMOL/L — HIGH (ref 7–14)
BUN SERPL-MCNC: 54 MG/DL — HIGH (ref 7–23)
BUN SERPL-MCNC: 74 MG/DL — HIGH (ref 7–23)
CALCIUM SERPL-MCNC: 6.5 MG/DL — CRITICAL LOW (ref 8.4–10.5)
CALCIUM SERPL-MCNC: 7.1 MG/DL — LOW (ref 8.4–10.5)
CHLORIDE SERPL-SCNC: 91 MMOL/L — LOW (ref 98–107)
CHLORIDE SERPL-SCNC: 95 MMOL/L — LOW (ref 98–107)
CO2 SERPL-SCNC: 22 MMOL/L — SIGNIFICANT CHANGE UP (ref 22–31)
CO2 SERPL-SCNC: 23 MMOL/L — SIGNIFICANT CHANGE UP (ref 22–31)
CREAT SERPL-MCNC: 7.02 MG/DL — HIGH (ref 0.5–1.3)
CREAT SERPL-MCNC: 8.56 MG/DL — HIGH (ref 0.5–1.3)
GLUCOSE SERPL-MCNC: 116 MG/DL — HIGH (ref 70–99)
GLUCOSE SERPL-MCNC: 98 MG/DL — SIGNIFICANT CHANGE UP (ref 70–99)
HCT VFR BLD CALC: 31.7 % — LOW (ref 39–50)
HGB BLD-MCNC: 9.8 G/DL — LOW (ref 13–17)
MAGNESIUM SERPL-MCNC: 2 MG/DL — SIGNIFICANT CHANGE UP (ref 1.6–2.6)
MAGNESIUM SERPL-MCNC: 2 MG/DL — SIGNIFICANT CHANGE UP (ref 1.6–2.6)
MCHC RBC-ENTMCNC: 26.8 PG — LOW (ref 27–34)
MCHC RBC-ENTMCNC: 30.9 GM/DL — LOW (ref 32–36)
MCV RBC AUTO: 86.6 FL — SIGNIFICANT CHANGE UP (ref 80–100)
NRBC # BLD: 0 /100 WBCS — SIGNIFICANT CHANGE UP
NRBC # FLD: 0 K/UL — SIGNIFICANT CHANGE UP
PHOSPHATE SERPL-MCNC: 3.3 MG/DL — SIGNIFICANT CHANGE UP (ref 2.5–4.5)
PHOSPHATE SERPL-MCNC: 3.8 MG/DL — SIGNIFICANT CHANGE UP (ref 2.5–4.5)
PLATELET # BLD AUTO: 175 K/UL — SIGNIFICANT CHANGE UP (ref 150–400)
POTASSIUM SERPL-MCNC: 3.7 MMOL/L — SIGNIFICANT CHANGE UP (ref 3.5–5.3)
POTASSIUM SERPL-MCNC: 4.1 MMOL/L — SIGNIFICANT CHANGE UP (ref 3.5–5.3)
POTASSIUM SERPL-SCNC: 3.7 MMOL/L — SIGNIFICANT CHANGE UP (ref 3.5–5.3)
POTASSIUM SERPL-SCNC: 4.1 MMOL/L — SIGNIFICANT CHANGE UP (ref 3.5–5.3)
RBC # BLD: 3.66 M/UL — LOW (ref 4.2–5.8)
RBC # FLD: 18.2 % — HIGH (ref 10.3–14.5)
SODIUM SERPL-SCNC: 133 MMOL/L — LOW (ref 135–145)
SODIUM SERPL-SCNC: 137 MMOL/L — SIGNIFICANT CHANGE UP (ref 135–145)
WBC # BLD: 6.74 K/UL — SIGNIFICANT CHANGE UP (ref 3.8–10.5)
WBC # FLD AUTO: 6.74 K/UL — SIGNIFICANT CHANGE UP (ref 3.8–10.5)

## 2021-10-24 RX ORDER — LIDOCAINE AND PRILOCAINE CREAM 25; 25 MG/G; MG/G
1 CREAM TOPICAL
Refills: 0 | Status: DISCONTINUED | OUTPATIENT
Start: 2021-10-24 | End: 2021-11-05

## 2021-10-24 RX ORDER — CALCIUM GLUCONATE 100 MG/ML
2 VIAL (ML) INTRAVENOUS ONCE
Refills: 0 | Status: COMPLETED | OUTPATIENT
Start: 2021-10-24 | End: 2021-10-24

## 2021-10-24 RX ADMIN — Medication 60 MILLIGRAM(S): at 06:35

## 2021-10-24 RX ADMIN — CARVEDILOL PHOSPHATE 25 MILLIGRAM(S): 80 CAPSULE, EXTENDED RELEASE ORAL at 19:31

## 2021-10-24 RX ADMIN — Medication 100 MICROGRAM(S): at 06:35

## 2021-10-24 RX ADMIN — Medication 10 MILLIGRAM(S): at 21:51

## 2021-10-24 RX ADMIN — Medication 10 MILLIGRAM(S): at 06:35

## 2021-10-24 RX ADMIN — CALCITRIOL 0.5 MICROGRAM(S): 0.5 CAPSULE ORAL at 11:41

## 2021-10-24 RX ADMIN — Medication 2000 UNIT(S): at 11:41

## 2021-10-24 RX ADMIN — Medication 200 GRAM(S): at 21:50

## 2021-10-24 RX ADMIN — CHLORHEXIDINE GLUCONATE 1 APPLICATION(S): 213 SOLUTION TOPICAL at 11:41

## 2021-10-24 RX ADMIN — Medication 81 MILLIGRAM(S): at 11:41

## 2021-10-24 RX ADMIN — APIXABAN 5 MILLIGRAM(S): 2.5 TABLET, FILM COATED ORAL at 19:32

## 2021-10-24 RX ADMIN — Medication 2 TABLET(S): at 06:35

## 2021-10-24 RX ADMIN — PANTOPRAZOLE SODIUM 40 MILLIGRAM(S): 20 TABLET, DELAYED RELEASE ORAL at 06:35

## 2021-10-24 RX ADMIN — ATORVASTATIN CALCIUM 40 MILLIGRAM(S): 80 TABLET, FILM COATED ORAL at 21:51

## 2021-10-24 RX ADMIN — Medication 2 TABLET(S): at 19:31

## 2021-10-24 RX ADMIN — APIXABAN 5 MILLIGRAM(S): 2.5 TABLET, FILM COATED ORAL at 06:36

## 2021-10-24 RX ADMIN — Medication 1 MILLIGRAM(S): at 11:41

## 2021-10-24 RX ADMIN — Medication 2 TABLET(S): at 21:50

## 2021-10-24 RX ADMIN — Medication 2 TABLET(S): at 11:42

## 2021-10-24 RX ADMIN — Medication 10 MILLIGRAM(S): at 14:58

## 2021-10-24 RX ADMIN — CARVEDILOL PHOSPHATE 25 MILLIGRAM(S): 80 CAPSULE, EXTENDED RELEASE ORAL at 06:35

## 2021-10-24 NOTE — PROGRESS NOTE ADULT - SUBJECTIVE AND OBJECTIVE BOX
[facility] : [facility] [phone], Office [phone]  Dr Caballero/Dr Vargas  /Dr Jordan etienne /Dr SERA Bojorquez/Dr Dagoberto Faust/Dr Jovan Garrison /Dr GAYATRI Garcia  _______________________________________________________________________________________________    seen and examined today for renal failrue  Interval :  VITALS:  T(F): 97.3 (10-24-21 @ 09:39), Max: 99 (10-23-21 @ 21:30)  HR: 72 (10-24-21 @ 09:39)  BP: 113/74 (10-24-21 @ 09:39)  RR: 18 (10-24-21 @ 09:39)  SpO2: 98% (10-24-21 @ 09:39)  Wt(kg): --    10-23 @ 07:01  -  10-24 @ 07:00  --------------------------------------------------------  IN: 1000 mL / OUT: 2500 mL / NET: -1500 mL    Physical Exam :-  Constitutional: NAD  Neck: Supple.  Respiratory: Bilateral equal breath sounds, no Crackles present.  Cardiovascular: S1, S2 normal, positive Murmur  Gastrointestinal: Bowel Sounds present, soft, non tender.  Extremities: no Edema Feet  Neurological: Alert and Oriented x 3  Psychiatric: Normal mood, normal affect  Data:-  Allergies :   No Known Allergies    Hospital Medications:   MEDICATIONS  (STANDING):  apixaban 5 milliGRAM(s) Oral two times a day  aspirin enteric coated 81 milliGRAM(s) Oral daily  atorvastatin 40 milliGRAM(s) Oral at bedtime  calcitriol   Capsule 0.5 MICROGram(s) Oral daily  calcium carbonate   1250 mG (OsCal) 2 Tablet(s) Oral four times a day  carvedilol 25 milliGRAM(s) Oral every 12 hours  chlorhexidine 2% Cloths 1 Application(s) Topical daily  cholecalciferol 2000 Unit(s) Oral daily  epoetin tammi-epbx (RETACRIT) Injectable 19847 Unit(s) IV Push <User Schedule>  folic acid 1 milliGRAM(s) Oral daily  hydrALAZINE 10 milliGRAM(s) Oral every 8 hours  influenza  Vaccine (HIGH DOSE) 0.7 milliLiter(s) IntraMuscular once  levothyroxine 100 MICROGram(s) Oral daily  magnesium sulfate  IVPB 1 Gram(s) IV Intermittent once  NIFEdipine XL 60 milliGRAM(s) Oral daily  pantoprazole    Tablet 40 milliGRAM(s) Oral before breakfast    10-24    133<L>  |  91<L>  |  54<H>  ----------------------------<  98  3.7   |  22  |  7.02<H>    Ca    7.1<L>      24 Oct 2021 07:38  Phos  3.3     10-24  Mg     2.00     10-24      Creatinine Trend: 7.02 <--, 5.03 <--, 9.62 <--, 7.12 <--, 9.13 <--, 7.52 <--, 6.80 <--, 10.51 <--, 9.74 <--, 7.26 <--                        9.8    6.74  )-----------( 175      ( 24 Oct 2021 07:38 )             31.7

## 2021-10-24 NOTE — PROGRESS NOTE ADULT - ASSESSMENT
67 y/o M with pmhx of ESRD on HD, afib on eliquis, anemia presented to the ED for R arm pain. Found to have rhinovirus pos and AV fistula clot/infection. Admitted for treatment and vascular management of AV fistula.    AVF malfunction:     S/p RUE fistulogram    Hypocalcemia:  iv calcitonin  Ca supp

## 2021-10-24 NOTE — PROGRESS NOTE ADULT - SUBJECTIVE AND OBJECTIVE BOX
CHAVEZ MARQUEZ  66y  Male      Patient is a 66y old  Male who presents with a chief complaint of R arm pain on Av fistula site (24 Oct 2021 13:23)  patient was seen and examined.chart reviewed.covering .feels better,mild discomfort in rt.arm.  no sob,no cp,no fever,no cough    REVIEW OF SYSTEMS:  CONSTITUTIONAL: No fever  RESPIRATORY: No cough, hemoptysis or shortness of breath  CARDIOVASCULAR: No chest pain, palpitations, dizziness, or leg swelling  GASTROINTESTINAL: No abdominal pain. nausea, vomiting, hematemesis  INTERVAL HPI/OVERNIGHT EVENTS:  T(C): 36.9 (10-24-21 @ 17:32), Max: 37.2 (10-23-21 @ 21:30)  HR: 77 (10-24-21 @ 17:32) (72 - 100)  BP: 104/66 (10-24-21 @ 17:32) (104/66 - 116/83)  RR: 17 (10-24-21 @ 17:32) (17 - 18)  SpO2: 96% (10-24-21 @ 17:32) (96% - 98%)  Wt(kg): --  I&O's Summary    23 Oct 2021 07:01  -  24 Oct 2021 07:00  --------------------------------------------------------  IN: 1000 mL / OUT: 2500 mL / NET: -1500 mL    24 Oct 2021 07:01  -  24 Oct 2021 20:12  --------------------------------------------------------  IN: 700 mL / OUT: 100 mL / NET: 600 mL      T(C): 36.9 (10-24-21 @ 17:32), Max: 37.2 (10-23-21 @ 21:30)  HR: 77 (10-24-21 @ 17:32) (72 - 100)  BP: 104/66 (10-24-21 @ 17:32) (104/66 - 116/83)  RR: 17 (10-24-21 @ 17:32) (17 - 18)  SpO2: 96% (10-24-21 @ 17:32) (96% - 98%)  Wt(kg): --Vital Signs Last 24 Hrs  T(C): 36.9 (24 Oct 2021 17:32), Max: 37.2 (23 Oct 2021 21:30)  T(F): 98.4 (24 Oct 2021 17:32), Max: 99 (23 Oct 2021 21:30)  HR: 77 (24 Oct 2021 17:32) (72 - 100)  BP: 104/66 (24 Oct 2021 17:32) (104/66 - 116/83)  BP(mean): --  RR: 17 (24 Oct 2021 17:32) (17 - 18)  SpO2: 96% (24 Oct 2021 17:32) (96% - 98%)    LABS:                        9.8    6.74  )-----------( 175      ( 24 Oct 2021 07:38 )             31.7     10-24    133<L>  |  91<L>  |  54<H>  ----------------------------<  98  3.7   |  22  |  7.02<H>    Ca    7.1<L>      24 Oct 2021 07:38  Phos  3.3     10-24  Mg     2.00     10-24          CAPILLARY BLOOD GLUCOSE                PAST MEDICAL & SURGICAL HISTORY:  HTN (Hypertension)    Chronic kidney disease    Kidney stones    Hemodialysis access, AV graft  Left upper extremity    Hyperparathyroidism    Anemia    Thrombocytopenia    Atrial fibrillation  on Eliquis    S/P Nephrectomy  (L) 2007 for kidney stones    Acquired arteriovenous fistula  left wrist  1/2015 - [facility], Left upper arm 4/2015 (approximate date)    AVF (arteriovenous fistula)        MEDICATIONS  (STANDING):  apixaban 5 milliGRAM(s) Oral two times a day  aspirin enteric coated 81 milliGRAM(s) Oral daily  atorvastatin 40 milliGRAM(s) Oral at bedtime  calcitriol   Capsule 0.5 MICROGram(s) Oral daily  calcium carbonate   1250 mG (OsCal) 2 Tablet(s) Oral four times a day  carvedilol 25 milliGRAM(s) Oral every 12 hours  chlorhexidine 2% Cloths 1 Application(s) Topical daily  cholecalciferol 2000 Unit(s) Oral daily  epoetin tammi-epbx (RETACRIT) Injectable 86741 Unit(s) IV Push <User Schedule>  folic acid 1 milliGRAM(s) Oral daily  hydrALAZINE 10 milliGRAM(s) Oral every 8 hours  influenza  Vaccine (HIGH DOSE) 0.7 milliLiter(s) IntraMuscular once  levothyroxine 100 MICROGram(s) Oral daily  lidocaine/prilocaine Cream 1 Application(s) Topical <User Schedule>  magnesium sulfate  IVPB 1 Gram(s) IV Intermittent once  NIFEdipine XL 60 milliGRAM(s) Oral daily  pantoprazole    Tablet 40 milliGRAM(s) Oral before breakfast    MEDICATIONS  (PRN):  acetaminophen   Tablet .. 650 milliGRAM(s) Oral every 6 hours PRN Temp greater or equal to 38C (100.4F), Mild Pain (1 - 3)        RADIOLOGY & ADDITIONAL TESTS:    Imaging Personally Reviewed:  [ ] YES  [ ] NO    Consultant(s) Notes Reviewed:  [ ] YES  [ ] NO    PHYSICAL EXAM:  GENERAL: Alert and awake lying in bed in no distress  HEAD:  Atraumatic, Normocephalic  EYES: EOMI, LILIAM, conjunctiva and sclera clear  NECK: Supple, No JVD, Normal thyroid  NERVOUS SYSTEM:  Alert & Oriented X3, Motor and sensory systems are intact,   CHEST/LUNG: Bilateral clear breath sounds, no rhochi, no wheezing, no crepitations,  HEART: Regular rate and rhythm; No murmurs, rubs, or gallops  ABDOMEN: Soft, Nontender, Nondistended; Bowel sounds present  EXTREMITIES:   Peripheral Pulses are palpable, no  edema        Care Discussed with Consultants/Other Providers [x ] YES  [ ] NO      Code Status: [] Full Code [] DNR [] DNI [] Goals of Care:   Disposition: [] ICU [] Stroke Unit [] RCU []PCU []Floor [] Discharge Home         MARGARITA Mcwilliams

## 2021-10-24 NOTE — PROGRESS NOTE ADULT - ASSESSMENT
65 y/o M w/ESRD on HD TTS, HTN. Renal following for ESRD Mx.  	  ESRD on HD TTS  K, vol acceptable  new RFA AVF s/p infiltration w/hematoma outpt -s/p OR angiogram 10/21    plan  hypocalcemia noted-asympt. inc po caco3 4xday b/w meals. will use 3ca bath w/hd. ipth low, [id number] low 29 noted-start vitD 2k po qd, calcitriol 0.5mcg daily   dose all meds for ESRD  renal diet  next hemodialysis tuesday    hypocalcemia - symptomatic - etiology unclear likely secondary -  intact pth 25-30 noted     # holding off renvela  # iv calcium given today   # cont balwinder carbonate 2 cap 4 times per day  calcitriol 0.5 mg daily  # using high calcium bath - 3 balwinder on dialysis,     Anemia in ckd -Hb < goal- c/w epo 10k tiw w/hd  HTN, controlled-bp stable  c/w home bp meds-coreg, Nifedipine, hydrALAZINE -hold hydrALAZINE  prehd

## 2021-10-25 LAB
ANION GAP SERPL CALC-SCNC: 21 MMOL/L — HIGH (ref 7–14)
BUN SERPL-MCNC: 86 MG/DL — HIGH (ref 7–23)
CALCIUM SERPL-MCNC: 7 MG/DL — LOW (ref 8.4–10.5)
CHLORIDE SERPL-SCNC: 92 MMOL/L — LOW (ref 98–107)
CO2 SERPL-SCNC: 22 MMOL/L — SIGNIFICANT CHANGE UP (ref 22–31)
CREAT SERPL-MCNC: 9.52 MG/DL — HIGH (ref 0.5–1.3)
GLUCOSE SERPL-MCNC: 139 MG/DL — HIGH (ref 70–99)
HCT VFR BLD CALC: 33.1 % — LOW (ref 39–50)
HGB BLD-MCNC: 10.2 G/DL — LOW (ref 13–17)
MAGNESIUM SERPL-MCNC: 2.1 MG/DL — SIGNIFICANT CHANGE UP (ref 1.6–2.6)
MCHC RBC-ENTMCNC: 26.2 PG — LOW (ref 27–34)
MCHC RBC-ENTMCNC: 30.8 GM/DL — LOW (ref 32–36)
MCV RBC AUTO: 84.9 FL — SIGNIFICANT CHANGE UP (ref 80–100)
NRBC # BLD: 0 /100 WBCS — SIGNIFICANT CHANGE UP
NRBC # FLD: 0 K/UL — SIGNIFICANT CHANGE UP
PHOSPHATE SERPL-MCNC: 3.9 MG/DL — SIGNIFICANT CHANGE UP (ref 2.5–4.5)
PLATELET # BLD AUTO: 251 K/UL — SIGNIFICANT CHANGE UP (ref 150–400)
POTASSIUM SERPL-MCNC: 4.2 MMOL/L — SIGNIFICANT CHANGE UP (ref 3.5–5.3)
POTASSIUM SERPL-SCNC: 4.2 MMOL/L — SIGNIFICANT CHANGE UP (ref 3.5–5.3)
RBC # BLD: 3.9 M/UL — LOW (ref 4.2–5.8)
RBC # FLD: 18.2 % — HIGH (ref 10.3–14.5)
SODIUM SERPL-SCNC: 135 MMOL/L — SIGNIFICANT CHANGE UP (ref 135–145)
WBC # BLD: 8.31 K/UL — SIGNIFICANT CHANGE UP (ref 3.8–10.5)
WBC # FLD AUTO: 8.31 K/UL — SIGNIFICANT CHANGE UP (ref 3.8–10.5)

## 2021-10-25 RX ORDER — HYDRALAZINE HCL 50 MG
10 TABLET ORAL EVERY 12 HOURS
Refills: 0 | Status: DISCONTINUED | OUTPATIENT
Start: 2021-10-25 | End: 2021-11-05

## 2021-10-25 RX ADMIN — Medication 2 TABLET(S): at 17:56

## 2021-10-25 RX ADMIN — CHLORHEXIDINE GLUCONATE 1 APPLICATION(S): 213 SOLUTION TOPICAL at 11:33

## 2021-10-25 RX ADMIN — APIXABAN 5 MILLIGRAM(S): 2.5 TABLET, FILM COATED ORAL at 05:01

## 2021-10-25 RX ADMIN — CARVEDILOL PHOSPHATE 25 MILLIGRAM(S): 80 CAPSULE, EXTENDED RELEASE ORAL at 17:57

## 2021-10-25 RX ADMIN — Medication 10 MILLIGRAM(S): at 14:11

## 2021-10-25 RX ADMIN — Medication 2 TABLET(S): at 11:35

## 2021-10-25 RX ADMIN — Medication 60 MILLIGRAM(S): at 05:01

## 2021-10-25 RX ADMIN — Medication 100 MICROGRAM(S): at 05:00

## 2021-10-25 RX ADMIN — Medication 10 MILLIGRAM(S): at 05:01

## 2021-10-25 RX ADMIN — APIXABAN 5 MILLIGRAM(S): 2.5 TABLET, FILM COATED ORAL at 17:57

## 2021-10-25 RX ADMIN — CALCITRIOL 0.5 MICROGRAM(S): 0.5 CAPSULE ORAL at 11:33

## 2021-10-25 RX ADMIN — Medication 2 TABLET(S): at 05:00

## 2021-10-25 RX ADMIN — CARVEDILOL PHOSPHATE 25 MILLIGRAM(S): 80 CAPSULE, EXTENDED RELEASE ORAL at 05:02

## 2021-10-25 RX ADMIN — PANTOPRAZOLE SODIUM 40 MILLIGRAM(S): 20 TABLET, DELAYED RELEASE ORAL at 06:00

## 2021-10-25 RX ADMIN — Medication 1 MILLIGRAM(S): at 11:36

## 2021-10-25 RX ADMIN — Medication 2 TABLET(S): at 22:16

## 2021-10-25 RX ADMIN — Medication 81 MILLIGRAM(S): at 11:34

## 2021-10-25 RX ADMIN — ATORVASTATIN CALCIUM 40 MILLIGRAM(S): 80 TABLET, FILM COATED ORAL at 22:16

## 2021-10-25 RX ADMIN — Medication 2000 UNIT(S): at 11:34

## 2021-10-25 NOTE — PROGRESS NOTE ADULT - ASSESSMENT
67 y/o M w/ESRD on HD TTS, HTN. Renal following for ESRD Mx.  	  ESRD on HD TTS  K, vol acceptable  new RFA AVF s/p infiltration w/hematoma outpt -s/p OR angiogram 10/21, now workign well     plan  next hemodialysis tuesday/tomorrow w/2k, 3ca bath, uf 2kg as tolerated, using avf  dose all meds for ESRD  renal diet    hypocalcemia - symptomatic - etiology unclear likely secondary -  intact pth 25-30 noted    ipth low, [id number] low 29 noted-staredt vitD 2k po qd, calcitriol 0.5mcg daily   ca improving  # holding off renvela. phos at goal  # cont balwinder carbonate 2 cap 4 times per day  # c/w calcitriol 0.5 mg daily  # using high calcium bath - 3 balwinder on dialysis,     Anemia in ckd -Hb at goal- c/w epo 10k tiw w/hd  HTN, controlled-bp rel low.   c/w home bp meds-coreg, hydrALAZINE -dec hydrALAZINE  to bid     labs, chart reviewed  Nephrology   For any question, call:  Cell # [phone]  Pager # [phone]  office # [phone]

## 2021-10-25 NOTE — PROGRESS NOTE ADULT - SUBJECTIVE AND OBJECTIVE BOX
[facility] - S Alicia / Ej S /D Federico/ S Maryellen/ SERA Faust/ Jovan Garrison / M Danou/ O Gregorio  service -[phone], office [phone]  ---------------------------------------------------------------------------------------------------------------    Patient seen and examined bedside    Subjective and Objective: No overnight events, sob resolved. No complaints today. feeling better    Allergies: No Known Allergies      Hospital Medications:   MEDICATIONS  (STANDING):  apixaban 5 milliGRAM(s) Oral two times a day  aspirin enteric coated 81 milliGRAM(s) Oral daily  atorvastatin 40 milliGRAM(s) Oral at bedtime  calcitriol   Capsule 0.5 MICROGram(s) Oral daily  calcium carbonate   1250 mG (OsCal) 2 Tablet(s) Oral four times a day  carvedilol 25 milliGRAM(s) Oral every 12 hours  chlorhexidine 2% Cloths 1 Application(s) Topical daily  cholecalciferol 2000 Unit(s) Oral daily  epoetin tammi-epbx (RETACRIT) Injectable 22743 Unit(s) IV Push <User Schedule>  folic acid 1 milliGRAM(s) Oral daily  hydrALAZINE 10 milliGRAM(s) Oral every 8 hours  influenza  Vaccine (HIGH DOSE) 0.7 milliLiter(s) IntraMuscular once  levothyroxine 100 MICROGram(s) Oral daily  lidocaine/prilocaine Cream 1 Application(s) Topical <User Schedule>  magnesium sulfate  IVPB 1 Gram(s) IV Intermittent once  NIFEdipine XL 60 milliGRAM(s) Oral daily  pantoprazole    Tablet 40 milliGRAM(s) Oral before breakfast      REVIEW OF SYSTEMS:  CONSTITUTIONAL: No weakness, fevers or chills  EYES/ENT: No visual changes;  No vertigo or throat pain   NECK: No pain or stiffness  RESPIRATORY: No cough, wheezing, hemoptysis; No shortness of breath  CARDIOVASCULAR: No chest pain or palpitations.  GASTROINTESTINAL: No abdominal or epigastric pain. No nausea, vomiting, or hematemesis; No diarrhea or constipation. No melena or hematochezia.  GENITOURINARY: No dysuria, frequency, foamy urine, urinary urgency, incontinence or hematuria  NEUROLOGICAL: No numbness or weakness  SKIN: No itching, burning, rashes, or lesions   VASCULAR: No bilateral lower extremity edema.   All other review of systems is negative unless indicated above.    VITALS:  T(F): 98.4 (10-25-21 @ 05:52), Max: 98.9 (10-24-21 @ 21:29)  HR: 78 (10-25-21 @ 05:52)  BP: 106/64 (10-25-21 @ 05:52)  RR: 18 (10-25-21 @ 05:52)  SpO2: 97% (10-25-21 @ 05:52)  Wt(kg): --    10-24 @ 07:01  -  10-25 @ 07:00  --------------------------------------------------------  IN: 700 mL / OUT: 200 mL / NET: 500 mL    10-25 @ 07:01  -  10-25 @ 13:20  --------------------------------------------------------  IN: 250 mL / OUT: 0 mL / NET: 250 mL          PHYSICAL EXAM:  Constitutional: NAD  HEENT: anicteric sclera, oropharynx clear  Neck: No JVD  Respiratory: CTAB, no wheezes, rales or rhonchi  Cardiovascular: S1, S2, RRR  Gastrointestinal: BS+, soft, NT/ND  Extremities: No cyanosis or clubbing. No peripheral edema  Neurological: A/O x 3, no focal deficits  Psychiatric: Normal mood, normal affect  : No CVA tenderness. No siegel.   Skin: No rashes  Vascular Access:    LABS:  10-25    135  |  92<L>  |  86<H>  ----------------------------<  139<H>  4.2   |  22  |  9.52<H>    Ca    7.0<L>      25 Oct 2021 08:15  Phos  3.9     10-25  Mg     2.10     10-25      Creatinine Trend: 9.52 <--, 8.56 <--, 7.02 <--, 5.03 <--, 9.62 <--, 7.12 <--, 9.13 <--, 7.52 <--, 6.80 <--, 10.51 <--, 9.74 <--                        10.2   8.31  )-----------( 251      ( 25 Oct 2021 08:15 )             33.1     Urine Studies:        RADIOLOGY & ADDITIONAL STUDIES:   [facility] - S Alicia / Ej S /D Federico/ S Maryellen/ S Govind/ Jovan Garrison / GAYATRI Matsonu/ O Gregorio  service -[phone], office [phone]  ---------------------------------------------------------------------------------------------------------------    Patient seen and examined bedside    Subjective and Objective: No overnight events, denied sob. No complaints today. feeling better    Allergies: No Known Allergies      Hospital Medications:   MEDICATIONS  (STANDING):  apixaban 5 milliGRAM(s) Oral two times a day  aspirin enteric coated 81 milliGRAM(s) Oral daily  atorvastatin 40 milliGRAM(s) Oral at bedtime  calcitriol   Capsule 0.5 MICROGram(s) Oral daily  calcium carbonate   1250 mG (OsCal) 2 Tablet(s) Oral four times a day  carvedilol 25 milliGRAM(s) Oral every 12 hours  chlorhexidine 2% Cloths 1 Application(s) Topical daily  cholecalciferol 2000 Unit(s) Oral daily  epoetin tammi-epbx (RETACRIT) Injectable 19986 Unit(s) IV Push <User Schedule>  folic acid 1 milliGRAM(s) Oral daily  hydrALAZINE 10 milliGRAM(s) Oral every 8 hours  influenza  Vaccine (HIGH DOSE) 0.7 milliLiter(s) IntraMuscular once  levothyroxine 100 MICROGram(s) Oral daily  lidocaine/prilocaine Cream 1 Application(s) Topical <User Schedule>  magnesium sulfate  IVPB 1 Gram(s) IV Intermittent once  NIFEdipine XL 60 milliGRAM(s) Oral daily  pantoprazole    Tablet 40 milliGRAM(s) Oral before breakfast    VITALS:  T(F): 98.4 (10-25-21 @ 05:52), Max: 98.9 (10-24-21 @ 21:29)  HR: 78 (10-25-21 @ 05:52)  BP: 106/64 (10-25-21 @ 05:52)  RR: 18 (10-25-21 @ 05:52)  SpO2: 97% (10-25-21 @ 05:52)  Wt(kg): --    10-24 @ 07:01  -  10-25 @ 07:00  --------------------------------------------------------  IN: 700 mL / OUT: 200 mL / NET: 500 mL    10-25 @ 07:01  -  10-25 @ 13:20  --------------------------------------------------------  IN: 250 mL / OUT: 0 mL / NET: 250 mL      PHYSICAL EXAM:  Constitutional: NAD  HEENT: anicteric sclera  Neck: No JVD  Respiratory: CTAB, no wheezes, rales or rhonchi  Cardiovascular: S1, S2, RRR  Gastrointestinal: BS+, soft, NT/ND  Extremities: No peripheral edema  Neurological: A/O x 3, no focal deficits  Psychiatric: Normal mood, normal affect  : No siegel.   Vascular Access: rt IJ PC+, RFA AVF+     LABS:  10-25    135  |  92<L>  |  86<H>  ----------------------------<  139<H>  4.2   |  22  |  9.52<H>    Ca    7.0<L>      25 Oct 2021 08:15  Phos  3.9     10-25  Mg     2.10     10-25      Creatinine Trend: 9.52 <--, 8.56 <--, 7.02 <--, 5.03 <--, 9.62 <--, 7.12 <--, 9.13 <--, 7.52 <--, 6.80 <--, 10.51 <--, 9.74 <--                        10.2   8.31  )-----------( 251      ( 25 Oct 2021 08:15 )             33.1     Urine Studies:        RADIOLOGY & ADDITIONAL STUDIES:

## 2021-10-25 NOTE — PROGRESS NOTE ADULT - SUBJECTIVE AND OBJECTIVE BOX
CHAVEZ MARQUEZ  66y  Male      Patient is a 66y old  Male who presents with a chief complaint of R arm pain on Av fistula site (25 Oct 2021 13:20)  feels better.no new c/o    REVIEW OF SYSTEMS:  CONSTITUTIONAL: No fever  RESPIRATORY: No cough, hemoptysis or shortness of breath  CARDIOVASCULAR: No chest pain, palpitations, dizziness, or leg swelling  GASTROINTESTINAL: No abdominal pain. nausea, vomiting, hematemesis  INTERVAL HPI/OVERNIGHT EVENTS:  T(C): 36.6 (10-25-21 @ 18:01), Max: 37.1 (10-25-21 @ 02:25)  HR: 78 (10-25-21 @ 18:01) (78 - 78)  BP: 126/72 (10-25-21 @ 18:01) (101/61 - 126/72)  RR: 18 (10-25-21 @ 18:01) (17 - 18)  SpO2: 96% (10-25-21 @ 18:01) (96% - 98%)  Wt(kg): --  I&O's Summary    24 Oct 2021 07:01  -  25 Oct 2021 07:00  --------------------------------------------------------  IN: 700 mL / OUT: 200 mL / NET: 500 mL    25 Oct 2021 07:01  -  25 Oct 2021 21:32  --------------------------------------------------------  IN: 700 mL / OUT: 0 mL / NET: 700 mL      T(C): 36.6 (10-25-21 @ 18:01), Max: 37.1 (10-25-21 @ 02:25)  HR: 78 (10-25-21 @ 18:01) (78 - 78)  BP: 126/72 (10-25-21 @ 18:01) (101/61 - 126/72)  RR: 18 (10-25-21 @ 18:01) (17 - 18)  SpO2: 96% (10-25-21 @ 18:01) (96% - 98%)  Wt(kg): --Vital Signs Last 24 Hrs  T(C): 36.6 (25 Oct 2021 18:01), Max: 37.1 (25 Oct 2021 02:25)  T(F): 97.8 (25 Oct 2021 18:01), Max: 98.7 (25 Oct 2021 02:25)  HR: 78 (25 Oct 2021 18:01) (78 - 78)  BP: 126/72 (25 Oct 2021 18:01) (101/61 - 126/72)  BP(mean): --  RR: 18 (25 Oct 2021 18:01) (17 - 18)  SpO2: 96% (25 Oct 2021 18:01) (96% - 98%)    LABS:                        10.2   8.31  )-----------( 251      ( 25 Oct 2021 08:15 )             33.1     10-25    135  |  92<L>  |  86<H>  ----------------------------<  139<H>  4.2   |  22  |  9.52<H>    Ca    7.0<L>      25 Oct 2021 08:15  Phos  3.9     10-25  Mg     2.10     10-25          CAPILLARY BLOOD GLUCOSE                PAST MEDICAL & SURGICAL HISTORY:  HTN (Hypertension)    Chronic kidney disease    Kidney stones    Hemodialysis access, AV graft  Left upper extremity    Hyperparathyroidism    Anemia    Thrombocytopenia    Atrial fibrillation  on Eliquis    S/P Nephrectomy  (L) 2007 for kidney stones    Acquired arteriovenous fistula  left wrist  1/2015 - [facility], Left upper arm 4/2015 (approximate date)    AVF (arteriovenous fistula)        MEDICATIONS  (STANDING):  apixaban 5 milliGRAM(s) Oral two times a day  aspirin enteric coated 81 milliGRAM(s) Oral daily  atorvastatin 40 milliGRAM(s) Oral at bedtime  calcitriol   Capsule 0.5 MICROGram(s) Oral daily  calcium carbonate   1250 mG (OsCal) 2 Tablet(s) Oral four times a day  carvedilol 25 milliGRAM(s) Oral every 12 hours  chlorhexidine 2% Cloths 1 Application(s) Topical daily  cholecalciferol 2000 Unit(s) Oral daily  epoetin tammi-epbx (RETACRIT) Injectable 74224 Unit(s) IV Push <User Schedule>  folic acid 1 milliGRAM(s) Oral daily  hydrALAZINE 10 milliGRAM(s) Oral every 12 hours  influenza  Vaccine (HIGH DOSE) 0.7 milliLiter(s) IntraMuscular once  levothyroxine 100 MICROGram(s) Oral daily  lidocaine/prilocaine Cream 1 Application(s) Topical <User Schedule>  magnesium sulfate  IVPB 1 Gram(s) IV Intermittent once  NIFEdipine XL 60 milliGRAM(s) Oral daily  pantoprazole    Tablet 40 milliGRAM(s) Oral before breakfast    MEDICATIONS  (PRN):  acetaminophen   Tablet .. 650 milliGRAM(s) Oral every 6 hours PRN Temp greater or equal to 38C (100.4F), Mild Pain (1 - 3)        RADIOLOGY & ADDITIONAL TESTS:    Imaging Personally Reviewed:  [ ] YES  [ ] NO    Consultant(s) Notes Reviewed:  [ x] YES  [ ] NO    PHYSICAL EXAM:  GENERAL: Alert and awake lying in bed in no distress  HEAD:  Atraumatic, Normocephalic  EYES: EOMI, LILIAM, conjunctiva and sclera clear  NECK: Supple, No JVD, Normal thyroid  NERVOUS SYSTEM:  Alert & Oriented X3, Motor and sensory systems are intact,   CHEST/LUNG: Bilateral clear breath sounds, no rhochi, no wheezing, no crepitations,  HEART: Regular rate and rhythm; No murmurs, rubs, or gallops  ABDOMEN: Soft, Nontender, Nondistended; Bowel sounds present  EXTREMITIES:   Peripheral Pulses are palpable, no  edema        Care Discussed with Consultants/Other Providers [ ] YES  [ ] NO      Code Status: [] Full Code [] DNR [] DNI [] Goals of Care:   Disposition: [] ICU [] Stroke Unit [] RCU []PCU []Floor [] Discharge Home         ADALGISA McwilliamsP

## 2021-10-26 LAB
ALBUMIN SERPL ELPH-MCNC: 3.9 G/DL — SIGNIFICANT CHANGE UP (ref 3.3–5)
ALP SERPL-CCNC: 173 U/L — HIGH (ref 40–120)
ALT FLD-CCNC: 14 U/L — SIGNIFICANT CHANGE UP (ref 4–41)
ANION GAP SERPL CALC-SCNC: 17 MMOL/L — HIGH (ref 7–14)
ANION GAP SERPL CALC-SCNC: 18 MMOL/L — HIGH (ref 7–14)
AST SERPL-CCNC: 32 U/L — SIGNIFICANT CHANGE UP (ref 4–40)
BILIRUB DIRECT SERPL-MCNC: <0.2 MG/DL — SIGNIFICANT CHANGE UP (ref 0–0.2)
BILIRUB INDIRECT FLD-MCNC: >0.1 MG/DL — SIGNIFICANT CHANGE UP (ref 0–1)
BILIRUB SERPL-MCNC: 0.3 MG/DL — SIGNIFICANT CHANGE UP (ref 0.2–1.2)
BUN SERPL-MCNC: 104 MG/DL — HIGH (ref 7–23)
BUN SERPL-MCNC: 47 MG/DL — HIGH (ref 7–23)
CALCIUM SERPL-MCNC: 6.2 MG/DL — CRITICAL LOW (ref 8.4–10.5)
CALCIUM SERPL-MCNC: 7.5 MG/DL — LOW (ref 8.4–10.5)
CHLORIDE SERPL-SCNC: 91 MMOL/L — LOW (ref 98–107)
CHLORIDE SERPL-SCNC: 93 MMOL/L — LOW (ref 98–107)
CO2 SERPL-SCNC: 18 MMOL/L — LOW (ref 22–31)
CO2 SERPL-SCNC: 24 MMOL/L — SIGNIFICANT CHANGE UP (ref 22–31)
CREAT SERPL-MCNC: 10.85 MG/DL — HIGH (ref 0.5–1.3)
CREAT SERPL-MCNC: 6.25 MG/DL — HIGH (ref 0.5–1.3)
GLUCOSE SERPL-MCNC: 109 MG/DL — HIGH (ref 70–99)
GLUCOSE SERPL-MCNC: 172 MG/DL — HIGH (ref 70–99)
HCT VFR BLD CALC: 30.6 % — LOW (ref 39–50)
HGB BLD-MCNC: 9.5 G/DL — LOW (ref 13–17)
MAGNESIUM SERPL-MCNC: 1.8 MG/DL — SIGNIFICANT CHANGE UP (ref 1.6–2.6)
MAGNESIUM SERPL-MCNC: 2.1 MG/DL — SIGNIFICANT CHANGE UP (ref 1.6–2.6)
MCHC RBC-ENTMCNC: 26.5 PG — LOW (ref 27–34)
MCHC RBC-ENTMCNC: 31 GM/DL — LOW (ref 32–36)
MCV RBC AUTO: 85.2 FL — SIGNIFICANT CHANGE UP (ref 80–100)
NRBC # BLD: 0 /100 WBCS — SIGNIFICANT CHANGE UP
NRBC # FLD: 0 K/UL — SIGNIFICANT CHANGE UP
PHOSPHATE SERPL-MCNC: 3.8 MG/DL — SIGNIFICANT CHANGE UP (ref 2.5–4.5)
PHOSPHATE SERPL-MCNC: 4.9 MG/DL — HIGH (ref 2.5–4.5)
PLATELET # BLD AUTO: 229 K/UL — SIGNIFICANT CHANGE UP (ref 150–400)
POTASSIUM SERPL-MCNC: 3.8 MMOL/L — SIGNIFICANT CHANGE UP (ref 3.5–5.3)
POTASSIUM SERPL-MCNC: 4.1 MMOL/L — SIGNIFICANT CHANGE UP (ref 3.5–5.3)
POTASSIUM SERPL-SCNC: 3.8 MMOL/L — SIGNIFICANT CHANGE UP (ref 3.5–5.3)
POTASSIUM SERPL-SCNC: 4.1 MMOL/L — SIGNIFICANT CHANGE UP (ref 3.5–5.3)
PROT SERPL-MCNC: 7.3 G/DL — SIGNIFICANT CHANGE UP (ref 6–8.3)
RBC # BLD: 3.59 M/UL — LOW (ref 4.2–5.8)
RBC # FLD: 18 % — HIGH (ref 10.3–14.5)
SODIUM SERPL-SCNC: 127 MMOL/L — LOW (ref 135–145)
SODIUM SERPL-SCNC: 134 MMOL/L — LOW (ref 135–145)
WBC # BLD: 7.05 K/UL — SIGNIFICANT CHANGE UP (ref 3.8–10.5)
WBC # FLD AUTO: 7.05 K/UL — SIGNIFICANT CHANGE UP (ref 3.8–10.5)

## 2021-10-26 PROCEDURE — 36589 REMOVAL TUNNELED CV CATH: CPT | Mod: RT

## 2021-10-26 RX ORDER — CALCIUM CARBONATE 500(1250)
2 TABLET ORAL
Refills: 0 | Status: DISCONTINUED | OUTPATIENT
Start: 2021-10-26 | End: 2021-10-28

## 2021-10-26 RX ORDER — CALCIUM GLUCONATE 100 MG/ML
1 VIAL (ML) INTRAVENOUS ONCE
Refills: 0 | Status: COMPLETED | OUTPATIENT
Start: 2021-10-26 | End: 2021-10-26

## 2021-10-26 RX ORDER — LIDOCAINE HCL 20 MG/ML
30 VIAL (ML) INJECTION ONCE
Refills: 0 | Status: COMPLETED | OUTPATIENT
Start: 2021-10-26 | End: 2021-10-26

## 2021-10-26 RX ORDER — CALCITRIOL 0.5 UG/1
1 CAPSULE ORAL DAILY
Refills: 0 | Status: DISCONTINUED | OUTPATIENT
Start: 2021-10-26 | End: 2021-11-02

## 2021-10-26 RX ORDER — CALCIUM GLUCONATE 100 MG/ML
1 VIAL (ML) INTRAVENOUS DAILY
Refills: 0 | Status: DISCONTINUED | OUTPATIENT
Start: 2021-10-26 | End: 2021-10-26

## 2021-10-26 RX ORDER — CHOLECALCIFEROL (VITAMIN D3) 125 MCG
4000 CAPSULE ORAL DAILY
Refills: 0 | Status: DISCONTINUED | OUTPATIENT
Start: 2021-10-26 | End: 2021-11-05

## 2021-10-26 RX ADMIN — Medication 81 MILLIGRAM(S): at 18:28

## 2021-10-26 RX ADMIN — CARVEDILOL PHOSPHATE 25 MILLIGRAM(S): 80 CAPSULE, EXTENDED RELEASE ORAL at 17:51

## 2021-10-26 RX ADMIN — APIXABAN 5 MILLIGRAM(S): 2.5 TABLET, FILM COATED ORAL at 05:02

## 2021-10-26 RX ADMIN — Medication 2 TABLET(S): at 05:02

## 2021-10-26 RX ADMIN — Medication 2 TABLET(S): at 22:01

## 2021-10-26 RX ADMIN — LIDOCAINE AND PRILOCAINE CREAM 1 APPLICATION(S): 25; 25 CREAM TOPICAL at 10:27

## 2021-10-26 RX ADMIN — Medication 100 GRAM(S): at 17:51

## 2021-10-26 RX ADMIN — PANTOPRAZOLE SODIUM 40 MILLIGRAM(S): 20 TABLET, DELAYED RELEASE ORAL at 06:10

## 2021-10-26 RX ADMIN — Medication 1 MILLIGRAM(S): at 17:51

## 2021-10-26 RX ADMIN — Medication 4000 UNIT(S): at 17:52

## 2021-10-26 RX ADMIN — ATORVASTATIN CALCIUM 40 MILLIGRAM(S): 80 TABLET, FILM COATED ORAL at 22:00

## 2021-10-26 RX ADMIN — Medication 100 MICROGRAM(S): at 05:03

## 2021-10-26 RX ADMIN — CALCITRIOL 1 MICROGRAM(S): 0.5 CAPSULE ORAL at 18:29

## 2021-10-26 RX ADMIN — Medication 10 MILLIGRAM(S): at 17:51

## 2021-10-26 RX ADMIN — CARVEDILOL PHOSPHATE 25 MILLIGRAM(S): 80 CAPSULE, EXTENDED RELEASE ORAL at 05:03

## 2021-10-26 RX ADMIN — Medication 10 MILLIGRAM(S): at 05:03

## 2021-10-26 RX ADMIN — ERYTHROPOIETIN 10000 UNIT(S): 10000 INJECTION, SOLUTION INTRAVENOUS; SUBCUTANEOUS at 11:37

## 2021-10-26 RX ADMIN — Medication 60 MILLIGRAM(S): at 05:04

## 2021-10-26 RX ADMIN — Medication 100 GRAM(S): at 09:19

## 2021-10-26 RX ADMIN — CHLORHEXIDINE GLUCONATE 1 APPLICATION(S): 213 SOLUTION TOPICAL at 10:01

## 2021-10-26 RX ADMIN — Medication 2 TABLET(S): at 17:51

## 2021-10-26 RX ADMIN — Medication 30 MILLILITER(S): at 15:36

## 2021-10-26 RX ADMIN — Medication 2 TABLET(S): at 09:50

## 2021-10-26 RX ADMIN — APIXABAN 5 MILLIGRAM(S): 2.5 TABLET, FILM COATED ORAL at 17:51

## 2021-10-26 NOTE — PROGRESS NOTE ADULT - ASSESSMENT
67M w/ hx of HTN, Afib on Eliquis, ESRD on HD (TTS) via RUE radiocephalic fistula (created 6/3/21) presenting with R arm swelling and pain that began with HD on 10/12/21, likely due to infiltration, now s/p R fistulogram on 10/22.    Plan/Recommendations:  - Okay to use AVF for HD.  - Will plan for permacath removal later today.   - HD per nephrology  - Rest of care per primary team    C Team Surgery   [id number]

## 2021-10-26 NOTE — PROGRESS NOTE ADULT - ASSESSMENT
65 y/o M w/ESRD on HD TTS, HTN. Renal following for ESRD Mx.  	  ESRD on HD TTS  K, acceptable  new RFA AVF s/p infiltration w/hematoma outpt -s/p OR angiogram 10/21, now workign well   Hyponatremia noted-likely 2/2 dilutional. clinically not in chf    plan  s/pHD earlier today, Rx sheet reviewed, net UF 2kg, tolerated well. uneventful.    used avf x2 hd rxs, no issues. agree w/PC removal   dose all meds for ESRD  renal diet, fluid restriction 1.2L/day    hypocalcemia - asymptomatic - etiology unclear likely secondary -  intact pth 25-30 noted    ipth low, [id number] low 29 noted-     # holding off renvela. phos at goal  # stared vitD 2k po qd>inc to 4k qd  # cont balwinder carbonate 2 cap 4 times per day b/w meals  # inc calcitriol 0.5 >1mg daily  # using high calcium bath - 3 balwinder on dialysis,     Anemia in ckd -Hb at goal- c/w epo 10k tiw w/hd  HTN, controlled-bp better, stable  c/w home bp meds-coreg, hydrALAZINE -dec hydrALAZINE  to bid 10/25    d/c plan per sx team  poc d/w pt, covering NP  labs, chart reviewed  Nephrology   For any question, call:  Cell # [phone]  Pager # [phone]  office # [phone]

## 2021-10-26 NOTE — PROGRESS NOTE ADULT - SUBJECTIVE AND OBJECTIVE BOX
[facility] - S Alicia / Ej S /D Federico/ S Maryellen/ S Govind/ Jovan Garrison / GAYATRI Matsonu/ O Gregorio  service -[phone], office [phone]  ---------------------------------------------------------------------------------------------------------------    Patient seen and examined bedside    Subjective and Objective: No overnight events, sob resolved. No complaints today. feeling better    Allergies: No Known Allergies      Hospital Medications:   MEDICATIONS  (STANDING):  apixaban 5 milliGRAM(s) Oral two times a day  aspirin enteric coated 81 milliGRAM(s) Oral daily  atorvastatin 40 milliGRAM(s) Oral at bedtime  calcitriol   Capsule 1 MICROGram(s) Oral daily  calcium carbonate   1250 mG (OsCal) 2 Tablet(s) Oral four times a day  calcium gluconate IVPB 1 Gram(s) IV Intermittent once  carvedilol 25 milliGRAM(s) Oral every 12 hours  chlorhexidine 2% Cloths 1 Application(s) Topical daily  cholecalciferol 4000 Unit(s) Oral daily  epoetin tammi-epbx (RETACRIT) Injectable 50540 Unit(s) IV Push <User Schedule>  folic acid 1 milliGRAM(s) Oral daily  hydrALAZINE 10 milliGRAM(s) Oral every 12 hours  influenza  Vaccine (HIGH DOSE) 0.7 milliLiter(s) IntraMuscular once  levothyroxine 100 MICROGram(s) Oral daily  lidocaine 1% Injectable 30 milliLiter(s) Local Injection once  lidocaine/prilocaine Cream 1 Application(s) Topical <User Schedule>  magnesium sulfate  IVPB 1 Gram(s) IV Intermittent once  NIFEdipine XL 60 milliGRAM(s) Oral daily  pantoprazole    Tablet 40 milliGRAM(s) Oral before breakfast      REVIEW OF SYSTEMS:  CONSTITUTIONAL: No weakness, fevers or chills  EYES/ENT: No visual changes;  No vertigo or throat pain   NECK: No pain or stiffness  RESPIRATORY: No cough, wheezing, hemoptysis; No shortness of breath  CARDIOVASCULAR: No chest pain or palpitations.  GASTROINTESTINAL: No abdominal or epigastric pain. No nausea, vomiting, or hematemesis; No diarrhea or constipation. No melena or hematochezia.  GENITOURINARY: No dysuria, frequency, foamy urine, urinary urgency, incontinence or hematuria  NEUROLOGICAL: No numbness or weakness  SKIN: No itching, burning, rashes, or lesions   VASCULAR: No bilateral lower extremity edema.   All other review of systems is negative unless indicated above.    VITALS:  T(F): 98 (10-26-21 @ 14:00), Max: 98.2 (10-25-21 @ 21:51)  HR: 88 (10-26-21 @ 14:00)  BP: 121/80 (10-26-21 @ 14:00)  RR: 17 (10-26-21 @ 14:00)  SpO2: 100% (10-26-21 @ 10:50)  Wt(kg): --    10-25 @ 07:01  -  10-26 @ 07:00  --------------------------------------------------------  IN: 700 mL / OUT: 0 mL / NET: 700 mL    10-26 @ 07:01  -  10-26 @ 14:50  --------------------------------------------------------  IN: 400 mL / OUT: 2400 mL / NET: -2000 mL          PHYSICAL EXAM:  Constitutional: NAD  HEENT: anicteric sclera, oropharynx clear  Neck: No JVD  Respiratory: CTAB, no wheezes, rales or rhonchi  Cardiovascular: S1, S2, RRR  Gastrointestinal: BS+, soft, NT/ND  Extremities: No cyanosis or clubbing. No peripheral edema  Neurological: A/O x 3, no focal deficits  Psychiatric: Normal mood, normal affect  : No CVA tenderness. No siegel.   Skin: No rashes  Vascular Access:    LABS:  10-26    127<L>  |  91<L>  |  104<H>  ----------------------------<  109<H>  4.1   |  18<L>  |  10.85<H>    Ca    6.2<LL>      26 Oct 2021 07:09  Phos  4.9     10-26  Mg     2.10     10-26    TPro  7.3  /  Alb  3.9  /  TBili  0.3  /  DBili  <0.2  /  AST  32  /  ALT  14  /  AlkPhos  173<H>  10-26    Creatinine Trend: 10.85 <--, 9.52 <--, 8.56 <--, 7.02 <--, 5.03 <--, 9.62 <--, 7.12 <--, 9.13 <--, 7.52 <--, 6.80 <--, 10.51 <--                        9.5    7.05  )-----------( 229      ( 26 Oct 2021 07:09 )             30.6     Urine Studies:        RADIOLOGY & ADDITIONAL STUDIES:   [facility] - S Alicia / Ej S /D Federico/ S Maryellen/ S Govind/ Jovan Garrison / GAYATRI Matsonu/ O Gregorio  service -[phone], office [phone]  ---------------------------------------------------------------------------------------------------------------    Patient seen and examined bedside    Subjective and Objective: No overnight events, denied sob. No complaints today. feeling better    Allergies: No Known Allergies      Hospital Medications:   MEDICATIONS  (STANDING):  apixaban 5 milliGRAM(s) Oral two times a day  aspirin enteric coated 81 milliGRAM(s) Oral daily  atorvastatin 40 milliGRAM(s) Oral at bedtime  calcitriol   Capsule 1 MICROGram(s) Oral daily  calcium carbonate   1250 mG (OsCal) 2 Tablet(s) Oral four times a day  calcium gluconate IVPB 1 Gram(s) IV Intermittent once  carvedilol 25 milliGRAM(s) Oral every 12 hours  chlorhexidine 2% Cloths 1 Application(s) Topical daily  cholecalciferol 4000 Unit(s) Oral daily  epoetin tammi-epbx (RETACRIT) Injectable 40526 Unit(s) IV Push <User Schedule>  folic acid 1 milliGRAM(s) Oral daily  hydrALAZINE 10 milliGRAM(s) Oral every 12 hours  influenza  Vaccine (HIGH DOSE) 0.7 milliLiter(s) IntraMuscular once  levothyroxine 100 MICROGram(s) Oral daily  lidocaine 1% Injectable 30 milliLiter(s) Local Injection once  lidocaine/prilocaine Cream 1 Application(s) Topical <User Schedule>  magnesium sulfate  IVPB 1 Gram(s) IV Intermittent once  NIFEdipine XL 60 milliGRAM(s) Oral daily  pantoprazole    Tablet 40 milliGRAM(s) Oral before breakfast    VITALS:  T(F): 98 (10-26-21 @ 14:00), Max: 98.2 (10-25-21 @ 21:51)  HR: 88 (10-26-21 @ 14:00)  BP: 121/80 (10-26-21 @ 14:00)  RR: 17 (10-26-21 @ 14:00)  SpO2: 100% (10-26-21 @ 10:50)  Wt(kg): --    10-25 @ 07:01  -  10-26 @ 07:00  --------------------------------------------------------  IN: 700 mL / OUT: 0 mL / NET: 700 mL    10-26 @ 07:01  -  10-26 @ 14:50  --------------------------------------------------------  IN: 400 mL / OUT: 2400 mL / NET: -2000 mL    PHYSICAL EXAM:  Constitutional: NAD  HEENT: anicteric sclera  Neck: No JVD  Respiratory: CTAB, no wheezes, rales or rhonchi  Cardiovascular: S1, S2, RRR  Gastrointestinal: BS+, soft, NT/ND  Extremities: No peripheral edema  Neurological: A/O x 3, no focal deficits  Psychiatric: Normal mood, normal affect  : No siegel.   Vascular Access: rt IJ PC+, RFA AVF+     LABS:  10-26    127<L>  |  91<L>  |  104<H>  ----------------------------<  109<H>  4.1   |  18<L>  |  10.85<H>    Ca    6.2<LL>      26 Oct 2021 07:09  Phos  4.9     10-26  Mg     2.10     10-26    TPro  7.3  /  Alb  3.9  /  TBili  0.3  /  DBili  <0.2  /  AST  32  /  ALT  14  /  AlkPhos  173<H>  10-26    Creatinine Trend: 10.85 <--, 9.52 <--, 8.56 <--, 7.02 <--, 5.03 <--, 9.62 <--, 7.12 <--, 9.13 <--, 7.52 <--, 6.80 <--, 10.51 <--                        9.5    7.05  )-----------( 858      ( 26 Oct 2021 07:09 )             30.6     Urine Studies:        RADIOLOGY & ADDITIONAL STUDIES:

## 2021-10-26 NOTE — PROGRESS NOTE ADULT - TIME BILLING
d/w staff
d/w staff.Renal f/u.d/c planning if no further intervention
-hYPONATREMIA-Monitor,asymtomatic,on HD  d/w staff.Renal f/u./VASCULAR F/U NOTED.D/C Planning.
d/w staff

## 2021-10-26 NOTE — PROGRESS NOTE ADULT - SUBJECTIVE AND OBJECTIVE BOX
CHAVEZ MARQUEZ  66y  Male      Patient is a 66y old  Male who presents with a chief complaint of R arm pain on Av fistula site (26 Oct 2021 14:49)  s/p perm.cath removal.doing well.no other c/o    REVIEW OF SYSTEMS:  CONSTITUTIONAL: No fever  RESPIRATORY: No cough, hemoptysis or shortness of breath  CARDIOVASCULAR: No chest pain, palpitations, dizziness, or leg swelling  GASTROINTESTINAL: No abdominal pain. nausea, vomiting, hematemesis  INTERVAL HPI/OVERNIGHT EVENTS:  T(C): 36.9 (10-26-21 @ 17:20), Max: 36.9 (10-26-21 @ 17:20)  HR: 100 (10-26-21 @ 17:20) (75 - 100)  BP: 126/85 (10-26-21 @ 17:20) (114/59 - 140/87)  RR: 18 (10-26-21 @ 17:20) (16 - 18)  SpO2: 97% (10-26-21 @ 17:20) (96% - 100%)  Wt(kg): --  I&O's Summary    25 Oct 2021 07:01  -  26 Oct 2021 07:00  --------------------------------------------------------  IN: 700 mL / OUT: 0 mL / NET: 700 mL    26 Oct 2021 07:01  -  26 Oct 2021 19:49  --------------------------------------------------------  IN: 900 mL / OUT: 2400 mL / NET: -1500 mL      T(C): 36.9 (10-26-21 @ 17:20), Max: 36.9 (10-26-21 @ 17:20)  HR: 100 (10-26-21 @ 17:20) (75 - 100)  BP: 126/85 (10-26-21 @ 17:20) (114/59 - 140/87)  RR: 18 (10-26-21 @ 17:20) (16 - 18)  SpO2: 97% (10-26-21 @ 17:20) (96% - 100%)  Wt(kg): --Vital Signs Last 24 Hrs  T(C): 36.9 (26 Oct 2021 17:20), Max: 36.9 (26 Oct 2021 17:20)  T(F): 98.4 (26 Oct 2021 17:20), Max: 98.4 (26 Oct 2021 17:20)  HR: 100 (26 Oct 2021 17:20) (75 - 100)  BP: 126/85 (26 Oct 2021 17:20) (114/59 - 140/87)  BP(mean): --  RR: 18 (26 Oct 2021 17:20) (16 - 18)  SpO2: 97% (26 Oct 2021 17:20) (96% - 100%)    LABS:                        9.5    7.05  )-----------( 229      ( 26 Oct 2021 07:09 )             30.6     10-26    127<L>  |  91<L>  |  104<H>  ----------------------------<  109<H>  4.1   |  18<L>  |  10.85<H>    Ca    6.2<LL>      26 Oct 2021 07:09  Phos  4.9     10-26  Mg     2.10     10-26    TPro  7.3  /  Alb  3.9  /  TBili  0.3  /  DBili  <0.2  /  AST  32  /  ALT  14  /  AlkPhos  173<H>  10-26        CAPILLARY BLOOD GLUCOSE                PAST MEDICAL & SURGICAL HISTORY:  HTN (Hypertension)    Chronic kidney disease    Kidney stones    Hemodialysis access, AV graft  Left upper extremity    Hyperparathyroidism    Anemia    Thrombocytopenia    Atrial fibrillation  on Eliquis    S/P Nephrectomy  (L) 2007 for kidney stones    Acquired arteriovenous fistula  left wrist  1/2015 - [facility], Left upper arm 4/2015 (approximate date)    AVF (arteriovenous fistula)        MEDICATIONS  (STANDING):  apixaban 5 milliGRAM(s) Oral two times a day  aspirin enteric coated 81 milliGRAM(s) Oral daily  atorvastatin 40 milliGRAM(s) Oral at bedtime  calcitriol   Capsule 1 MICROGram(s) Oral daily  calcium carbonate   1250 mG (OsCal) 2 Tablet(s) Oral four times a day  carvedilol 25 milliGRAM(s) Oral every 12 hours  chlorhexidine 2% Cloths 1 Application(s) Topical daily  cholecalciferol 4000 Unit(s) Oral daily  epoetin tammi-epbx (RETACRIT) Injectable 48211 Unit(s) IV Push <User Schedule>  folic acid 1 milliGRAM(s) Oral daily  hydrALAZINE 10 milliGRAM(s) Oral every 12 hours  influenza  Vaccine (HIGH DOSE) 0.7 milliLiter(s) IntraMuscular once  levothyroxine 100 MICROGram(s) Oral daily  lidocaine/prilocaine Cream 1 Application(s) Topical <User Schedule>  magnesium sulfate  IVPB 1 Gram(s) IV Intermittent once  NIFEdipine XL 60 milliGRAM(s) Oral daily  pantoprazole    Tablet 40 milliGRAM(s) Oral before breakfast    MEDICATIONS  (PRN):  acetaminophen   Tablet .. 650 milliGRAM(s) Oral every 6 hours PRN Temp greater or equal to 38C (100.4F), Mild Pain (1 - 3)        RADIOLOGY & ADDITIONAL TESTS:    Imaging Personally Reviewed:  [ ] YES  [ ] NO    Consultant(s) Notes Reviewed:  [ ] YES  [ ] NO    PHYSICAL EXAM:  GENERAL: Alert and awake lying in bed in no distress  HEAD:  Atraumatic, Normocephalic  EYES: EOMI, LILIAM, conjunctiva and sclera clear  NECK: Supple, No JVD, Normal thyroid  NERVOUS SYSTEM:  Alert & Oriented X3, Motor and sensory systems are intact,   CHEST/LUNG: Bilateral clear breath sounds, no rhochi, no wheezing, no crepitations,  HEART: Regular rate and rhythm; No murmurs, rubs, or gallops  ABDOMEN: Soft, Nontender, Nondistended; Bowel sounds present  EXTREMITIES:   Peripheral Pulses are palpable, no  edema        Care Discussed with Consultants/Other Providers [ ] YES  [ ] NO      Code Status: [] Full Code [] DNR [] DNI [] Goals of Care:   Disposition: [] ICU [] Stroke Unit [] RCU []PCU []Floor [] Discharge Home         YEE McwilliamsFACP

## 2021-10-26 NOTE — PROCEDURE NOTE - SUPERVISORY STATEMENT
R chest wall area was prepped and draped. 10cc of 1% Lidocaine was used to anesthetize the area around the catheter. Subcutaneous tissues were dissected around the cuff and catheter was removed. Catheter appeared to be intact. Manual pressure was held over the site for 10min. Dry dressing was applied. Pt. tolerated procedure well.

## 2021-10-26 NOTE — PROGRESS NOTE ADULT - SUBJECTIVE AND OBJECTIVE BOX
Vascular Surgery Progress Note     Subjective/24hour Events:   Patient seen and examined in dialysis unit.   Receiving HD via fistula.   No acute events overnight.   Pain controlled.     Vital Signs:  Vital Signs Last 24 Hrs  T(C): 36.4 (26 Oct 2021 10:50), Max: 36.8 (25 Oct 2021 21:51)  T(F): 97.5 (26 Oct 2021 10:50), Max: 98.2 (25 Oct 2021 21:51)  HR: 76 (26 Oct 2021 10:50) (75 - 79)  BP: 114/59 (26 Oct 2021 10:50) (114/59 - 140/87)  BP(mean): --  RR: 17 (26 Oct 2021 10:50) (16 - 18)  SpO2: 100% (26 Oct 2021 10:50) (96% - 100%)    CAPILLARY BLOOD GLUCOSE          I&O's Detail    25 Oct 2021 07:01  -  26 Oct 2021 07:00  --------------------------------------------------------  IN:    Oral Fluid: 700 mL  Total IN: 700 mL    OUT:    Voided (mL): 0 mL  Total OUT: 0 mL    Total NET: 700 mL          MEDICATIONS  (STANDING):  apixaban 5 milliGRAM(s) Oral two times a day  aspirin enteric coated 81 milliGRAM(s) Oral daily  atorvastatin 40 milliGRAM(s) Oral at bedtime  calcitriol   Capsule 1 MICROGram(s) Oral daily  calcium carbonate   1250 mG (OsCal) 2 Tablet(s) Oral four times a day  calcium gluconate IVPB 1 Gram(s) IV Intermittent once  carvedilol 25 milliGRAM(s) Oral every 12 hours  chlorhexidine 2% Cloths 1 Application(s) Topical daily  cholecalciferol 4000 Unit(s) Oral daily  epoetin tammi-epbx (RETACRIT) Injectable 07062 Unit(s) IV Push <User Schedule>  folic acid 1 milliGRAM(s) Oral daily  hydrALAZINE 10 milliGRAM(s) Oral every 12 hours  influenza  Vaccine (HIGH DOSE) 0.7 milliLiter(s) IntraMuscular once  levothyroxine 100 MICROGram(s) Oral daily  lidocaine/prilocaine Cream 1 Application(s) Topical <User Schedule>  magnesium sulfate  IVPB 1 Gram(s) IV Intermittent once  NIFEdipine XL 60 milliGRAM(s) Oral daily  pantoprazole    Tablet 40 milliGRAM(s) Oral before breakfast    MEDICATIONS  (PRN):  acetaminophen   Tablet .. 650 milliGRAM(s) Oral every 6 hours PRN Temp greater or equal to 38C (100.4F), Mild Pain (1 - 3)      Physical Exam:  GEN: NAD, resting quietly  PULM: symmetric chest rise bilaterally, no increased WOB  EXTR: RUE fistula in place with dialysis.       Labs:    10-26    127<L>  |  91<L>  |  104<H>  ----------------------------<  109<H>  4.1   |  18<L>  |  10.85<H>    Ca    6.2<LL>      26 Oct 2021 07:09  Phos  4.9     10-26  Mg     2.10     10-26    TPro  7.3  /  Alb  3.9  /  TBili  0.3  /  DBili  <0.2  /  AST  32  /  ALT  14  /  AlkPhos  173<H>  10-26    LIVER FUNCTIONS - ( 26 Oct 2021 07:12 )  Alb: 3.9 g/dL / Pro: 7.3 g/dL / ALK PHOS: 173 U/L / ALT: 14 U/L / AST: 32 U/L / GGT: x                                 9.5    7.05  )-----------( 229      ( 26 Oct 2021 07:09 )             30.6

## 2021-10-26 NOTE — PROCEDURE NOTE - GENERAL PROCEDURE DETAILS
Permacath removed without complication, intact. Pressure held over IJ for 6 minutes, adequate hemostasis.

## 2021-10-27 LAB
ALBUMIN SERPL ELPH-MCNC: 3.8 G/DL — SIGNIFICANT CHANGE UP (ref 3.3–5)
ALP SERPL-CCNC: 178 U/L — HIGH (ref 40–120)
ALT FLD-CCNC: 15 U/L — SIGNIFICANT CHANGE UP (ref 4–41)
ANION GAP SERPL CALC-SCNC: 19 MMOL/L — HIGH (ref 7–14)
ANION GAP SERPL CALC-SCNC: 20 MMOL/L — HIGH (ref 7–14)
ANION GAP SERPL CALC-SCNC: 23 MMOL/L — HIGH (ref 7–14)
AST SERPL-CCNC: 29 U/L — SIGNIFICANT CHANGE UP (ref 4–40)
BILIRUB SERPL-MCNC: 0.3 MG/DL — SIGNIFICANT CHANGE UP (ref 0.2–1.2)
BUN SERPL-MCNC: 57 MG/DL — HIGH (ref 7–23)
BUN SERPL-MCNC: 74 MG/DL — HIGH (ref 7–23)
BUN SERPL-MCNC: 76 MG/DL — HIGH (ref 7–23)
CALCIUM SERPL-MCNC: 6.3 MG/DL — CRITICAL LOW (ref 8.4–10.5)
CALCIUM SERPL-MCNC: 6.6 MG/DL — LOW (ref 8.4–10.5)
CALCIUM SERPL-MCNC: 6.8 MG/DL — LOW (ref 8.4–10.5)
CHLORIDE SERPL-SCNC: 93 MMOL/L — LOW (ref 98–107)
CHLORIDE SERPL-SCNC: 94 MMOL/L — LOW (ref 98–107)
CHLORIDE SERPL-SCNC: 94 MMOL/L — LOW (ref 98–107)
CO2 SERPL-SCNC: 17 MMOL/L — LOW (ref 22–31)
CO2 SERPL-SCNC: 21 MMOL/L — LOW (ref 22–31)
CO2 SERPL-SCNC: 22 MMOL/L — SIGNIFICANT CHANGE UP (ref 22–31)
CREAT SERPL-MCNC: 7.63 MG/DL — HIGH (ref 0.5–1.3)
CREAT SERPL-MCNC: 8.24 MG/DL — HIGH (ref 0.5–1.3)
CREAT SERPL-MCNC: 8.8 MG/DL — HIGH (ref 0.5–1.3)
GLUCOSE SERPL-MCNC: 105 MG/DL — HIGH (ref 70–99)
GLUCOSE SERPL-MCNC: 124 MG/DL — HIGH (ref 70–99)
GLUCOSE SERPL-MCNC: 145 MG/DL — HIGH (ref 70–99)
HCT VFR BLD CALC: 30.8 % — LOW (ref 39–50)
HGB BLD-MCNC: 9.4 G/DL — LOW (ref 13–17)
MAGNESIUM SERPL-MCNC: 1.9 MG/DL — SIGNIFICANT CHANGE UP (ref 1.6–2.6)
MAGNESIUM SERPL-MCNC: 1.9 MG/DL — SIGNIFICANT CHANGE UP (ref 1.6–2.6)
MAGNESIUM SERPL-MCNC: 2 MG/DL — SIGNIFICANT CHANGE UP (ref 1.6–2.6)
MCHC RBC-ENTMCNC: 26.6 PG — LOW (ref 27–34)
MCHC RBC-ENTMCNC: 30.5 GM/DL — LOW (ref 32–36)
MCV RBC AUTO: 87 FL — SIGNIFICANT CHANGE UP (ref 80–100)
NRBC # BLD: 0 /100 WBCS — SIGNIFICANT CHANGE UP
NRBC # FLD: 0 K/UL — SIGNIFICANT CHANGE UP
PHOSPHATE SERPL-MCNC: 4.5 MG/DL — SIGNIFICANT CHANGE UP (ref 2.5–4.5)
PHOSPHATE SERPL-MCNC: 4.9 MG/DL — HIGH (ref 2.5–4.5)
PHOSPHATE SERPL-MCNC: 5 MG/DL — HIGH (ref 2.5–4.5)
PLATELET # BLD AUTO: 240 K/UL — SIGNIFICANT CHANGE UP (ref 150–400)
POTASSIUM SERPL-MCNC: 3.6 MMOL/L — SIGNIFICANT CHANGE UP (ref 3.5–5.3)
POTASSIUM SERPL-MCNC: 4.6 MMOL/L — SIGNIFICANT CHANGE UP (ref 3.5–5.3)
POTASSIUM SERPL-MCNC: 5.4 MMOL/L — HIGH (ref 3.5–5.3)
POTASSIUM SERPL-SCNC: 3.6 MMOL/L — SIGNIFICANT CHANGE UP (ref 3.5–5.3)
POTASSIUM SERPL-SCNC: 4.6 MMOL/L — SIGNIFICANT CHANGE UP (ref 3.5–5.3)
POTASSIUM SERPL-SCNC: 5.4 MMOL/L — HIGH (ref 3.5–5.3)
PROT SERPL-MCNC: 7.5 G/DL — SIGNIFICANT CHANGE UP (ref 6–8.3)
RBC # BLD: 3.54 M/UL — LOW (ref 4.2–5.8)
RBC # FLD: 17.8 % — HIGH (ref 10.3–14.5)
SODIUM SERPL-SCNC: 133 MMOL/L — LOW (ref 135–145)
SODIUM SERPL-SCNC: 135 MMOL/L — SIGNIFICANT CHANGE UP (ref 135–145)
SODIUM SERPL-SCNC: 135 MMOL/L — SIGNIFICANT CHANGE UP (ref 135–145)
WBC # BLD: 5.01 K/UL — SIGNIFICANT CHANGE UP (ref 3.8–10.5)
WBC # FLD AUTO: 5.01 K/UL — SIGNIFICANT CHANGE UP (ref 3.8–10.5)

## 2021-10-27 RX ORDER — CALCIUM GLUCONATE 100 MG/ML
2 VIAL (ML) INTRAVENOUS ONCE
Refills: 0 | Status: DISCONTINUED | OUTPATIENT
Start: 2021-10-27 | End: 2021-10-27

## 2021-10-27 RX ORDER — CALCIUM GLUCONATE 100 MG/ML
1 VIAL (ML) INTRAVENOUS DAILY
Refills: 0 | Status: COMPLETED | OUTPATIENT
Start: 2021-10-27 | End: 2021-10-28

## 2021-10-27 RX ORDER — CALCIUM GLUCONATE 100 MG/ML
1 VIAL (ML) INTRAVENOUS ONCE
Refills: 0 | Status: COMPLETED | OUTPATIENT
Start: 2021-10-27 | End: 2021-10-28

## 2021-10-27 RX ADMIN — Medication 60 MILLIGRAM(S): at 05:29

## 2021-10-27 RX ADMIN — Medication 100 GRAM(S): at 15:06

## 2021-10-27 RX ADMIN — Medication 2 TABLET(S): at 05:27

## 2021-10-27 RX ADMIN — CARVEDILOL PHOSPHATE 25 MILLIGRAM(S): 80 CAPSULE, EXTENDED RELEASE ORAL at 19:01

## 2021-10-27 RX ADMIN — ATORVASTATIN CALCIUM 40 MILLIGRAM(S): 80 TABLET, FILM COATED ORAL at 22:11

## 2021-10-27 RX ADMIN — Medication 4000 UNIT(S): at 15:09

## 2021-10-27 RX ADMIN — Medication 2 TABLET(S): at 15:07

## 2021-10-27 RX ADMIN — Medication 10 MILLIGRAM(S): at 19:02

## 2021-10-27 RX ADMIN — Medication 100 MICROGRAM(S): at 05:27

## 2021-10-27 RX ADMIN — PANTOPRAZOLE SODIUM 40 MILLIGRAM(S): 20 TABLET, DELAYED RELEASE ORAL at 05:27

## 2021-10-27 RX ADMIN — APIXABAN 5 MILLIGRAM(S): 2.5 TABLET, FILM COATED ORAL at 19:02

## 2021-10-27 RX ADMIN — CALCITRIOL 1 MICROGRAM(S): 0.5 CAPSULE ORAL at 15:07

## 2021-10-27 RX ADMIN — Medication 2 TABLET(S): at 22:11

## 2021-10-27 RX ADMIN — Medication 1 MILLIGRAM(S): at 15:08

## 2021-10-27 RX ADMIN — CHLORHEXIDINE GLUCONATE 1 APPLICATION(S): 213 SOLUTION TOPICAL at 15:10

## 2021-10-27 RX ADMIN — CARVEDILOL PHOSPHATE 25 MILLIGRAM(S): 80 CAPSULE, EXTENDED RELEASE ORAL at 05:27

## 2021-10-27 RX ADMIN — Medication 81 MILLIGRAM(S): at 15:09

## 2021-10-27 RX ADMIN — APIXABAN 5 MILLIGRAM(S): 2.5 TABLET, FILM COATED ORAL at 05:26

## 2021-10-27 NOTE — PROGRESS NOTE ADULT - SUBJECTIVE AND OBJECTIVE BOX
[facility] - S Alicia / Ej S /D Federico/ S Maryellen/ S Govind/ Jovan Garrison / M Danou/ O Gregorio  service -[phone], office [phone]  ---------------------------------------------------------------------------------------------------------------    Patient seen and examined bedside    Subjective and Objective: No overnight events, sob resolved. No complaints today. feeling better    Allergies: No Known Allergies      Hospital Medications:   MEDICATIONS  (STANDING):  apixaban 5 milliGRAM(s) Oral two times a day  aspirin enteric coated 81 milliGRAM(s) Oral daily  atorvastatin 40 milliGRAM(s) Oral at bedtime  calcitriol   Capsule 1 MICROGram(s) Oral daily  calcium carbonate   1250 mG (OsCal) 2 Tablet(s) Oral four times a day  calcium gluconate IVPB 1 Gram(s) IV Intermittent daily  carvedilol 25 milliGRAM(s) Oral every 12 hours  chlorhexidine 2% Cloths 1 Application(s) Topical daily  cholecalciferol 4000 Unit(s) Oral daily  epoetin tammi-epbx (RETACRIT) Injectable 17914 Unit(s) IV Push <User Schedule>  folic acid 1 milliGRAM(s) Oral daily  hydrALAZINE 10 milliGRAM(s) Oral every 12 hours  influenza  Vaccine (HIGH DOSE) 0.7 milliLiter(s) IntraMuscular once  levothyroxine 100 MICROGram(s) Oral daily  lidocaine/prilocaine Cream 1 Application(s) Topical <User Schedule>  magnesium sulfate  IVPB 1 Gram(s) IV Intermittent once  NIFEdipine XL 60 milliGRAM(s) Oral daily  pantoprazole    Tablet 40 milliGRAM(s) Oral before breakfast      REVIEW OF SYSTEMS:  CONSTITUTIONAL: No weakness, fevers or chills  EYES/ENT: No visual changes;  No vertigo or throat pain   NECK: No pain or stiffness  RESPIRATORY: No cough, wheezing, hemoptysis; No shortness of breath  CARDIOVASCULAR: No chest pain or palpitations.  GASTROINTESTINAL: No abdominal or epigastric pain. No nausea, vomiting, or hematemesis; No diarrhea or constipation. No melena or hematochezia.  GENITOURINARY: No dysuria, frequency, foamy urine, urinary urgency, incontinence or hematuria  NEUROLOGICAL: No numbness or weakness  SKIN: No itching, burning, rashes, or lesions   VASCULAR: No bilateral lower extremity edema.   All other review of systems is negative unless indicated above.    VITALS:  T(F): 97.6 (10-27-21 @ 18:20), Max: 98.3 (10-26-21 @ 22:22)  HR: 75 (10-27-21 @ 18:20)  BP: 140/89 (10-27-21 @ 18:20)  RR: 18 (10-27-21 @ 18:20)  SpO2: 100% (10-27-21 @ 18:20)  Wt(kg): --    10-26 @ 07:01  -  10-27 @ 07:00  --------------------------------------------------------  IN: 1170 mL / OUT: 2400 mL / NET: -1230 mL          PHYSICAL EXAM:  Constitutional: NAD  HEENT: anicteric sclera, oropharynx clear  Neck: No JVD  Respiratory: CTAB, no wheezes, rales or rhonchi  Cardiovascular: S1, S2, RRR  Gastrointestinal: BS+, soft, NT/ND  Extremities: No cyanosis or clubbing. No peripheral edema  Neurological: A/O x 3, no focal deficits  Psychiatric: Normal mood, normal affect  : No CVA tenderness. No siegel.   Skin: No rashes  Vascular Access:    LABS:  10-27    135  |  94<L>  |  57<H>  ----------------------------<  105<H>  3.6   |  22  |  7.63<H>    Ca    6.8<L>      27 Oct 2021 07:06  Phos  4.5     10-27  Mg     2.00     10-27    TPro  7.5  /  Alb  3.8  /  TBili  0.3  /  DBili      /  AST  29  /  ALT  15  /  AlkPhos  178<H>  10-27    Creatinine Trend: 7.63 <--, 6.25 <--, 10.85 <--, 9.52 <--, 8.56 <--, 7.02 <--, 5.03 <--, 9.62 <--, 7.12 <--, 9.13 <--                        9.4    5.01  )-----------( 240      ( 27 Oct 2021 07:06 )             30.8     Urine Studies:        RADIOLOGY & ADDITIONAL STUDIES:   [facility] - S Alicia / Ej S /D Federico/ S Maryellen/ S Govind/ Jovan Garrison / M Danou/ O Gregorio  service [phone], office [phone]  ---------------------------------------------------------------------------------------------------------------    Patient seen and examined bedside    Subjective and Objective: No overnight events, No complaints today.   S/p PC removal by Vascular Surgery team    Allergies: No Known Allergies      Hospital Medications:   MEDICATIONS  (STANDING):  apixaban 5 milliGRAM(s) Oral two times a day  aspirin enteric coated 81 milliGRAM(s) Oral daily  atorvastatin 40 milliGRAM(s) Oral at bedtime  calcitriol   Capsule 1 MICROGram(s) Oral daily  calcium carbonate   1250 mG (OsCal) 2 Tablet(s) Oral four times a day  calcium gluconate IVPB 1 Gram(s) IV Intermittent daily  carvedilol 25 milliGRAM(s) Oral every 12 hours  chlorhexidine 2% Cloths 1 Application(s) Topical daily  cholecalciferol 4000 Unit(s) Oral daily  epoetin tammi-epbx (RETACRIT) Injectable 79777 Unit(s) IV Push <User Schedule>  folic acid 1 milliGRAM(s) Oral daily  hydrALAZINE 10 milliGRAM(s) Oral every 12 hours  influenza  Vaccine (HIGH DOSE) 0.7 milliLiter(s) IntraMuscular once  levothyroxine 100 MICROGram(s) Oral daily  lidocaine/prilocaine Cream 1 Application(s) Topical <User Schedule>  magnesium sulfate  IVPB 1 Gram(s) IV Intermittent once  NIFEdipine XL 60 milliGRAM(s) Oral daily  pantoprazole    Tablet 40 milliGRAM(s) Oral before breakfast      VITALS:  T(F): 97.6 (10-27-21 @ 18:20), Max: 98.3 (10-26-21 @ 22:22)  HR: 75 (10-27-21 @ 18:20)  BP: 140/89 (10-27-21 @ 18:20)  RR: 18 (10-27-21 @ 18:20)  SpO2: 100% (10-27-21 @ 18:20)  Wt(kg): --    10-26 @ 07:01  -  10-27 @ 07:00  --------------------------------------------------------  IN: 1170 mL / OUT: 2400 mL / NET: -1230 mL      PHYSICAL EXAM:  Constitutional: NAD  HEENT: anicteric sclera  Neck: No JVD  Respiratory: CTAB, no wheezes, rales or rhonchi  Cardiovascular: S1, S2, RRR  Gastrointestinal: BS+, soft, NT/ND  Extremities: No peripheral edema  Neurological: A/O x 3, no focal deficits  Psychiatric: Normal mood, normal affect  : No siegel.   Vascular Access: RFA AVF+     LABS:  10-27    135  |  94<L>  |  57<H>  ----------------------------<  105<H>  3.6   |  22  |  7.63<H>    Ca    6.8<L>      27 Oct 2021 07:06  Phos  4.5     10-27  Mg     2.00     10-27    TPro  7.5  /  Alb  3.8  /  TBili  0.3  /  DBili      /  AST  29  /  ALT  15  /  AlkPhos  178<H>  10-27    Creatinine Trend: 7.63 <--, 6.25 <--, 10.85 <--, 9.52 <--, 8.56 <--, 7.02 <--, 5.03 <--, 9.62 <--, 7.12 <--, 9.13 <--                        9.4    5.01  )-----------( 240      ( 27 Oct 2021 07:06 )             30.8     Urine Studies:        RADIOLOGY & ADDITIONAL STUDIES:

## 2021-10-27 NOTE — CHART NOTE - NSCHARTNOTEFT_GEN_A_CORE
Hypocalcemia noted in AM labs, Ca today is 6.8  Discussed w/ renal, Dr. Vargas - per recs, given Calcium Gluconate 1g   Will repeat BMP in afternoon      Angie Farrell NP-BC  Department of Medicine  In House Pager #[phone]

## 2021-10-27 NOTE — PROGRESS NOTE ADULT - ASSESSMENT
67M w/ hx of HTN, Afib on Eliquis, ESRD on HD (TTS) via RUE radiocephalic fistula (created 6/3/21) presenting with R arm swelling and pain that began with HD on 10/12/21, likely due to infiltration, now s/p R fistulogram on 10/22.    Plan/Recommendations:  - Monitor prior PC site  - Continued HD via AVF  - HD per nephrology  - Rest of care per primary team    C Team Surgery   [id number]

## 2021-10-27 NOTE — PROGRESS NOTE ADULT - SUBJECTIVE AND OBJECTIVE BOX
Patient is a 66y old  Male who presents with a chief complaint of R arm pain on Av fistula site (27 Oct 2021 18:45)      SUBJECTIVE / OVERNIGHT EVENTS:    Events noted.  CONSTITUTIONAL: No fever,  or fatigue  RESPIRATORY: No cough, wheezing,  No shortness of breath  CARDIOVASCULAR: No chest pain, palpitations  GASTROINTESTINAL: No abdominal or epigastric pain.   NEUROLOGICAL: No headaches,     MEDICATIONS  (STANDING):  apixaban 5 milliGRAM(s) Oral two times a day  aspirin enteric coated 81 milliGRAM(s) Oral daily  atorvastatin 40 milliGRAM(s) Oral at bedtime  calcitriol   Capsule 1 MICROGram(s) Oral daily  calcium carbonate   1250 mG (OsCal) 2 Tablet(s) Oral four times a day  calcium gluconate IVPB 1 Gram(s) IV Intermittent daily  calcium gluconate IVPB 1 Gram(s) IV Intermittent once  carvedilol 25 milliGRAM(s) Oral every 12 hours  chlorhexidine 2% Cloths 1 Application(s) Topical daily  cholecalciferol 4000 Unit(s) Oral daily  epoetin tammi-epbx (RETACRIT) Injectable 87096 Unit(s) IV Push <User Schedule>  folic acid 1 milliGRAM(s) Oral daily  hydrALAZINE 10 milliGRAM(s) Oral every 12 hours  influenza  Vaccine (HIGH DOSE) 0.7 milliLiter(s) IntraMuscular once  levothyroxine 100 MICROGram(s) Oral daily  lidocaine/prilocaine Cream 1 Application(s) Topical <User Schedule>  magnesium sulfate  IVPB 1 Gram(s) IV Intermittent once  NIFEdipine XL 60 milliGRAM(s) Oral daily  pantoprazole    Tablet 40 milliGRAM(s) Oral before breakfast    MEDICATIONS  (PRN):  acetaminophen   Tablet .. 650 milliGRAM(s) Oral every 6 hours PRN Temp greater or equal to 38C (100.4F), Mild Pain (1 - 3)        CAPILLARY BLOOD GLUCOSE        I&O's Summary    26 Oct 2021 07:01  -  27 Oct 2021 07:00  --------------------------------------------------------  IN: 1170 mL / OUT: 2400 mL / NET: -1230 mL        T(C): 36.8 (10-27-21 @ 22:16), Max: 36.8 (10-27-21 @ 02:22)  HR: 70 (10-27-21 @ 22:16) (68 - 77)  BP: 133/79 (10-27-21 @ 22:16) (103/61 - 140/89)  RR: 17 (10-27-21 @ 22:16) (17 - 18)  SpO2: 100% (10-27-21 @ 22:16) (95% - 100%)    PHYSICAL EXAM:  GENERAL: NAD  NECK: Supple, No JVD  CHEST/LUNG: Clear to auscultation bilaterally; No wheezing.  HEART: Regular rate and rhythm; No murmurs, rubs, or gallops  ABDOMEN: Soft, Nontender, Nondistended; Bowel sounds present  EXTREMITIES:   No edema  NEUROLOGY: AAO X 3      LABS:                        9.4    5.01  )-----------( 240      ( 27 Oct 2021 07:06 )             30.8     10-27    135  |  94<L>  |  76<H>  ----------------------------<  124<H>  4.6   |  21<L>  |  8.80<H>    Ca    6.3<LL>      27 Oct 2021 22:37  Phos  4.9     10-27  Mg     1.90     10-27    TPro  7.5  /  Alb  3.8  /  TBili  0.3  /  DBili  x   /  AST  29  /  ALT  15  /  AlkPhos  178<H>  10-27            CAPILLARY BLOOD GLUCOSE            RADIOLOGY & ADDITIONAL TESTS:    Imaging Personally Reviewed:    Consultant(s) Notes Reviewed:      Care Discussed with Consultants/Other Providers:    Wilfred Crhistian MD, CMD, FACP    [address] [place] [address]  Office Tel: [phone]  Cell: [phone]

## 2021-10-27 NOTE — PROGRESS NOTE ADULT - SUBJECTIVE AND OBJECTIVE BOX
Subjective:   S/p PC removal by Vascular Surgery team  Patient examined at bedside this AM  Prior PC site without issues    Objective:  Vital Signs  T(C): 36.6 (10-27 @ 05:23), Max: 36.9 (10-26 @ 17:20)  HR: 71 (10-27 @ 05:23) (70 - 100)  BP: 113/70 (10-27 @ 05:23) (98/61 - 126/85)  RR: 17 (10-27 @ 05:23) (17 - 18)  SpO2: 98% (10-27 @ 05:23) (96% - 100%)  10-25-21 @ 07:01  -  10-26-21 @ 07:00  --------------------------------------------------------  IN:  Total IN: 0 mL    OUT:    Voided (mL): 0 mL  Total OUT: 0 mL    Total NET: 0 mL      10-26-21 @ 07:01  -  10-27-21 @ 05:54  --------------------------------------------------------  IN:  Total IN: 0 mL    OUT:    Voided (mL): 0 mL  Total OUT: 0 mL    Total NET: 0 mL    Physical Exam:  GEN: NAD, resting quietly  PULM: symmetric chest rise bilaterally, no increased WOB  EXTR: RUE fistula +thrill     Labs:                        9.5    7.05  )-----------( 229      ( 26 Oct 2021 07:09 )             30.6   10-26    134<L>  |  93<L>  |  47<H>  ----------------------------<  172<H>  3.8   |  24  |  6.25<H>    Ca    7.5<L>      26 Oct 2021 19:40  Phos  3.8     10-26  Mg     1.80     10-26    TPro  7.3  /  Alb  3.9  /  TBili  0.3  /  DBili  <0.2  /  AST  32  /  ALT  14  /  AlkPhos  173<H>  10-26    CAPILLARY BLOOD GLUCOSE          Microbiology:      Medications:   MEDICATIONS  (STANDING):  apixaban 5 milliGRAM(s) Oral two times a day  aspirin enteric coated 81 milliGRAM(s) Oral daily  atorvastatin 40 milliGRAM(s) Oral at bedtime  calcitriol   Capsule 1 MICROGram(s) Oral daily  calcium carbonate   1250 mG (OsCal) 2 Tablet(s) Oral four times a day  carvedilol 25 milliGRAM(s) Oral every 12 hours  chlorhexidine 2% Cloths 1 Application(s) Topical daily  cholecalciferol 4000 Unit(s) Oral daily  epoetin tammi-epbx (RETACRIT) Injectable 19808 Unit(s) IV Push <User Schedule>  folic acid 1 milliGRAM(s) Oral daily  hydrALAZINE 10 milliGRAM(s) Oral every 12 hours  influenza  Vaccine (HIGH DOSE) 0.7 milliLiter(s) IntraMuscular once  levothyroxine 100 MICROGram(s) Oral daily  lidocaine/prilocaine Cream 1 Application(s) Topical <User Schedule>  magnesium sulfate  IVPB 1 Gram(s) IV Intermittent once  NIFEdipine XL 60 milliGRAM(s) Oral daily  pantoprazole    Tablet 40 milliGRAM(s) Oral before breakfast    MEDICATIONS  (PRN):  acetaminophen   Tablet .. 650 milliGRAM(s) Oral every 6 hours PRN Temp greater or equal to 38C (100.4F), Mild Pain (1 - 3)

## 2021-10-27 NOTE — PROGRESS NOTE ADULT - ASSESSMENT
67 y/o M w/ESRD on HD TTS, HTN. Renal following for ESRD Mx.  	  ESRD on HD TTS  K, acceptable  new RFA AVF s/p infiltration w/hematoma outpt -s/p OR angiogram 10/21, now workign well   Hyponatremia noted-likely 2/2 dilutional. clinically not in chf    plan  s/p HD 10/26, Rx sheet reviewed, net UF 2kg, tolerated well. uneventful.   plan for rpt hemodialysis tomorrow w/2k bath,  net UF 2kg as tolerated     used avf x2 hd rxs, no issues. s/p PC removal   dose all meds for ESRD  renal diet, fluid restriction 1.2L/day    hypocalcemia - asymptomatic - etiology unclear likely secondary -  intact pth 25-30 noted    ipth low, [id number] low 29 noted-                 s/p cagluconate 1gm ivpb today  # holding off renvela. phos at goal  # stared vitD 2k po qd>increased to 4k qd  # cont balwinder carbonate 2 cap 4 times per day b/w meals  # increased calcitriol 0.5 >1mg daily 10/26  # using high calcium bath - 3 balwinder on dialysis,     Anemia in ckd -Hb at goal- c/w epo 10k tiw w/hd  HTN, controlled-bp better, stable  c/w home bp meds-coreg, hydrALAZINE -dec hydrALAZINE  to bid 10/25    poc d/w pt, covering NP earlier  labs, chart reviewed  Nephrology   For any question, call:  Cell # [phone]  Pager # [phone]  office # [phone]

## 2021-10-27 NOTE — PROGRESS NOTE ADULT - ASSESSMENT
65 y/o M with pmhx of ESRD on HD, afib on eliquis, anemia presented to the ED for R arm pain. Found to have rhinovirus pos and AV fistula clot/infection. Admitted for treatment and vascular management of AV fistula.    AVF malfunction:     S/p RUE fistulogram    Hypocalcemia:  S/p iv calcitonin  Ca supp

## 2021-10-28 PROBLEM — I51.89 DIASTOLIC DYSFUNCTION: Status: ACTIVE | Noted: 2021-10-28

## 2021-10-28 PROBLEM — I34.0 MODERATE MITRAL REGURGITATION: Status: ACTIVE | Noted: 2021-10-28

## 2021-10-28 PROBLEM — Z87.09 H/O ACUTE PULMONARY EDEMA: Status: ACTIVE | Noted: 2021-08-20

## 2021-10-28 LAB
ALBUMIN SERPL ELPH-MCNC: 3.6 G/DL — SIGNIFICANT CHANGE UP (ref 3.3–5)
ALP SERPL-CCNC: 180 U/L — HIGH (ref 40–120)
ALT FLD-CCNC: 20 U/L — SIGNIFICANT CHANGE UP (ref 4–41)
ANION GAP SERPL CALC-SCNC: 23 MMOL/L — HIGH (ref 7–14)
AST SERPL-CCNC: 36 U/L — SIGNIFICANT CHANGE UP (ref 4–40)
BILIRUB SERPL-MCNC: 0.3 MG/DL — SIGNIFICANT CHANGE UP (ref 0.2–1.2)
BUN SERPL-MCNC: 80 MG/DL — HIGH (ref 7–23)
CA-I BLD-SCNC: 0.76 MMOL/L — LOW (ref 1.15–1.29)
CALCIUM SERPL-MCNC: 6.8 MG/DL — LOW (ref 8.4–10.5)
CHLORIDE SERPL-SCNC: 93 MMOL/L — LOW (ref 98–107)
CO2 SERPL-SCNC: 21 MMOL/L — LOW (ref 22–31)
CREAT SERPL-MCNC: 9.15 MG/DL — HIGH (ref 0.5–1.3)
GLUCOSE SERPL-MCNC: 90 MG/DL — SIGNIFICANT CHANGE UP (ref 70–99)
HCT VFR BLD CALC: 29.2 % — LOW (ref 39–50)
HGB BLD-MCNC: 9 G/DL — LOW (ref 13–17)
MAGNESIUM SERPL-MCNC: 1.9 MG/DL — SIGNIFICANT CHANGE UP (ref 1.6–2.6)
MCHC RBC-ENTMCNC: 26.8 PG — LOW (ref 27–34)
MCHC RBC-ENTMCNC: 30.8 GM/DL — LOW (ref 32–36)
MCV RBC AUTO: 86.9 FL — SIGNIFICANT CHANGE UP (ref 80–100)
NRBC # BLD: 0 /100 WBCS — SIGNIFICANT CHANGE UP
NRBC # FLD: 0 K/UL — SIGNIFICANT CHANGE UP
PHOSPHATE SERPL-MCNC: 5.2 MG/DL — HIGH (ref 2.5–4.5)
PLATELET # BLD AUTO: 236 K/UL — SIGNIFICANT CHANGE UP (ref 150–400)
POTASSIUM SERPL-MCNC: 4.3 MMOL/L — SIGNIFICANT CHANGE UP (ref 3.5–5.3)
POTASSIUM SERPL-SCNC: 4.3 MMOL/L — SIGNIFICANT CHANGE UP (ref 3.5–5.3)
PROT SERPL-MCNC: 7.1 G/DL — SIGNIFICANT CHANGE UP (ref 6–8.3)
RBC # BLD: 3.36 M/UL — LOW (ref 4.2–5.8)
RBC # FLD: 17.8 % — HIGH (ref 10.3–14.5)
SODIUM SERPL-SCNC: 137 MMOL/L — SIGNIFICANT CHANGE UP (ref 135–145)
WBC # BLD: 6.18 K/UL — SIGNIFICANT CHANGE UP (ref 3.8–10.5)
WBC # FLD AUTO: 6.18 K/UL — SIGNIFICANT CHANGE UP (ref 3.8–10.5)

## 2021-10-28 RX ORDER — CALCIUM CARBONATE 500(1250)
3 TABLET ORAL
Refills: 0 | Status: DISCONTINUED | OUTPATIENT
Start: 2021-10-28 | End: 2021-11-05

## 2021-10-28 RX ADMIN — Medication 10 MILLIGRAM(S): at 05:40

## 2021-10-28 RX ADMIN — Medication 100 GRAM(S): at 11:07

## 2021-10-28 RX ADMIN — CARVEDILOL PHOSPHATE 25 MILLIGRAM(S): 80 CAPSULE, EXTENDED RELEASE ORAL at 21:29

## 2021-10-28 RX ADMIN — Medication 10 MILLIGRAM(S): at 21:29

## 2021-10-28 RX ADMIN — Medication 2 TABLET(S): at 11:04

## 2021-10-28 RX ADMIN — Medication 3 TABLET(S): at 22:09

## 2021-10-28 RX ADMIN — Medication 60 MILLIGRAM(S): at 05:40

## 2021-10-28 RX ADMIN — Medication 650 MILLIGRAM(S): at 23:42

## 2021-10-28 RX ADMIN — APIXABAN 5 MILLIGRAM(S): 2.5 TABLET, FILM COATED ORAL at 05:40

## 2021-10-28 RX ADMIN — Medication 2 TABLET(S): at 05:40

## 2021-10-28 RX ADMIN — PANTOPRAZOLE SODIUM 40 MILLIGRAM(S): 20 TABLET, DELAYED RELEASE ORAL at 05:40

## 2021-10-28 RX ADMIN — CHLORHEXIDINE GLUCONATE 1 APPLICATION(S): 213 SOLUTION TOPICAL at 11:09

## 2021-10-28 RX ADMIN — Medication 650 MILLIGRAM(S): at 23:12

## 2021-10-28 RX ADMIN — CALCITRIOL 1 MICROGRAM(S): 0.5 CAPSULE ORAL at 11:04

## 2021-10-28 RX ADMIN — ATORVASTATIN CALCIUM 40 MILLIGRAM(S): 80 TABLET, FILM COATED ORAL at 21:29

## 2021-10-28 RX ADMIN — Medication 100 MICROGRAM(S): at 05:41

## 2021-10-28 RX ADMIN — APIXABAN 5 MILLIGRAM(S): 2.5 TABLET, FILM COATED ORAL at 21:29

## 2021-10-28 RX ADMIN — Medication 81 MILLIGRAM(S): at 11:08

## 2021-10-28 RX ADMIN — Medication 1 MILLIGRAM(S): at 11:04

## 2021-10-28 RX ADMIN — Medication 100 GRAM(S): at 00:29

## 2021-10-28 RX ADMIN — LIDOCAINE AND PRILOCAINE CREAM 1 APPLICATION(S): 25; 25 CREAM TOPICAL at 14:04

## 2021-10-28 RX ADMIN — CARVEDILOL PHOSPHATE 25 MILLIGRAM(S): 80 CAPSULE, EXTENDED RELEASE ORAL at 05:40

## 2021-10-28 RX ADMIN — Medication 2 TABLET(S): at 00:27

## 2021-10-28 RX ADMIN — Medication 4000 UNIT(S): at 11:03

## 2021-10-28 NOTE — PROGRESS NOTE ADULT - SUBJECTIVE AND OBJECTIVE BOX
[facility] - S Alicia / Ej S /D Federico/ S Maryellen/ S Govind/ Jovan Garrison / M Danou/ O Gregorio  service [phone], office [phone]  ---------------------------------------------------------------------------------------------------------------    Patient seen and examined bedside    Subjective and Objective: No overnight events, sob resolved. No complaints today. feeling better    Allergies: No Known Allergies      Hospital Medications:   MEDICATIONS  (STANDING):  apixaban 5 milliGRAM(s) Oral two times a day  aspirin enteric coated 81 milliGRAM(s) Oral daily  atorvastatin 40 milliGRAM(s) Oral at bedtime  calcitriol   Capsule 1 MICROGram(s) Oral daily  calcium carbonate   1250 mG (OsCal) 2 Tablet(s) Oral four times a day  carvedilol 25 milliGRAM(s) Oral every 12 hours  chlorhexidine 2% Cloths 1 Application(s) Topical daily  cholecalciferol 4000 Unit(s) Oral daily  epoetin tammi-epbx (RETACRIT) Injectable 67552 Unit(s) IV Push <User Schedule>  folic acid 1 milliGRAM(s) Oral daily  hydrALAZINE 10 milliGRAM(s) Oral every 12 hours  influenza  Vaccine (HIGH DOSE) 0.7 milliLiter(s) IntraMuscular once  levothyroxine 100 MICROGram(s) Oral daily  lidocaine/prilocaine Cream 1 Application(s) Topical <User Schedule>  magnesium sulfate  IVPB 1 Gram(s) IV Intermittent once  NIFEdipine XL 60 milliGRAM(s) Oral daily  pantoprazole    Tablet 40 milliGRAM(s) Oral before breakfast      REVIEW OF SYSTEMS:  CONSTITUTIONAL: No weakness, fevers or chills  EYES/ENT: No visual changes;  No vertigo or throat pain   NECK: No pain or stiffness  RESPIRATORY: No cough, wheezing, hemoptysis; No shortness of breath  CARDIOVASCULAR: No chest pain or palpitations.  GASTROINTESTINAL: No abdominal or epigastric pain. No nausea, vomiting, or hematemesis; No diarrhea or constipation. No melena or hematochezia.  GENITOURINARY: No dysuria, frequency, foamy urine, urinary urgency, incontinence or hematuria  NEUROLOGICAL: No numbness or weakness  SKIN: No itching, burning, rashes, or lesions   VASCULAR: No bilateral lower extremity edema.   All other review of systems is negative unless indicated above.    VITALS:  T(F): 97.7 (10-28-21 @ 17:27), Max: 98.6 (10-28-21 @ 05:37)  HR: 71 (10-28-21 @ 17:27)  BP: 137/81 (10-28-21 @ 17:27)  RR: 17 (10-28-21 @ 17:27)  SpO2: 100% (10-28-21 @ 17:27)  Wt(kg): --    10-27 @ 07:01  -  10-28 @ 07:00  --------------------------------------------------------  IN: 510 mL / OUT: 0 mL / NET: 510 mL    10-28 @ 07:01  -  10-28 @ 18:52  --------------------------------------------------------  IN: 300 mL / OUT: 0 mL / NET: 300 mL          PHYSICAL EXAM:  Constitutional: NAD  HEENT: anicteric sclera, oropharynx clear  Neck: No JVD  Respiratory: CTAB, no wheezes, rales or rhonchi  Cardiovascular: S1, S2, RRR  Gastrointestinal: BS+, soft, NT/ND  Extremities: No cyanosis or clubbing. No peripheral edema  Neurological: A/O x 3, no focal deficits  Psychiatric: Normal mood, normal affect  : No CVA tenderness. No siegel.   Skin: No rashes  Vascular Access:    LABS:  10-28    137  |  93<L>  |  80<H>  ----------------------------<  90  4.3   |  21<L>  |  9.15<H>    Ca    6.8<L>      28 Oct 2021 03:03  Phos  5.2     10-28  Mg     1.90     10-28    TPro  7.1  /  Alb  3.6  /  TBili  0.3  /  DBili      /  AST  36  /  ALT  20  /  AlkPhos  180<H>  10-28    Creatinine Trend: 9.15 <--, 8.80 <--, 8.24 <--, 7.63 <--, 6.25 <--, 10.85 <--, 9.52 <--, 8.56 <--, 7.02 <--, 5.03 <--, 9.62 <--, 7.12 <--                        9.0    6.18  )-----------( 236      ( 28 Oct 2021 03:19 )             29.2     Urine Studies:        RADIOLOGY & ADDITIONAL STUDIES:   [facility] - S Alicia / Ej S /D Federico/ S Maryellen/ S Govind/ Jovan Garrison / GAYATRI Matsonu/ O Gregorio  service [phone], office [phone]  ---------------------------------------------------------------------------------------------------------------    Patient seen and examined bedside    Subjective and Objective: No overnight events, denied sob. No complaints today. feeling better    Allergies: No Known Allergies      Hospital Medications:   MEDICATIONS  (STANDING):  apixaban 5 milliGRAM(s) Oral two times a day  aspirin enteric coated 81 milliGRAM(s) Oral daily  atorvastatin 40 milliGRAM(s) Oral at bedtime  calcitriol   Capsule 1 MICROGram(s) Oral daily  calcium carbonate   1250 mG (OsCal) 2 Tablet(s) Oral four times a day  carvedilol 25 milliGRAM(s) Oral every 12 hours  chlorhexidine 2% Cloths 1 Application(s) Topical daily  cholecalciferol 4000 Unit(s) Oral daily  epoetin tammi-epbx (RETACRIT) Injectable 67553 Unit(s) IV Push <User Schedule>  folic acid 1 milliGRAM(s) Oral daily  hydrALAZINE 10 milliGRAM(s) Oral every 12 hours  influenza  Vaccine (HIGH DOSE) 0.7 milliLiter(s) IntraMuscular once  levothyroxine 100 MICROGram(s) Oral daily  lidocaine/prilocaine Cream 1 Application(s) Topical <User Schedule>  magnesium sulfate  IVPB 1 Gram(s) IV Intermittent once  NIFEdipine XL 60 milliGRAM(s) Oral daily  pantoprazole    Tablet 40 milliGRAM(s) Oral before breakfast      VITALS:  T(F): 97.7 (10-28-21 @ 17:27), Max: 98.6 (10-28-21 @ 05:37)  HR: 71 (10-28-21 @ 17:27)  BP: 137/81 (10-28-21 @ 17:27)  RR: 17 (10-28-21 @ 17:27)  SpO2: 100% (10-28-21 @ 17:27)  Wt(kg): --    10-27 @ 07:01  -  10-28 @ 07:00  --------------------------------------------------------  IN: 510 mL / OUT: 0 mL / NET: 510 mL    10-28 @ 07:01  -  10-28 @ 18:52  --------------------------------------------------------  IN: 300 mL / OUT: 0 mL / NET: 300 mL      PHYSICAL EXAM:  Constitutional: NAD  HEENT: anicteric sclera  Neck: No JVD  Respiratory: CTAB, no wheezes, rales or rhonchi  Cardiovascular: S1, S2, RRR  Gastrointestinal: BS+, soft, NT/ND  Extremities: No peripheral edema  Neurological: A/O x 3, no focal deficits  Psychiatric: Normal mood, normal affect  : No siegel.   Vascular Access: RFA AVF+       LABS:  10-28    137  |  93<L>  |  80<H>  ----------------------------<  90  4.3   |  21<L>  |  9.15<H>    Ca    6.8<L>      28 Oct 2021 03:03  Phos  5.2     10-28  Mg     1.90     10-28    TPro  7.1  /  Alb  3.6  /  TBili  0.3  /  DBili      /  AST  36  /  ALT  20  /  AlkPhos  180<H>  10-28    Creatinine Trend: 9.15 <--, 8.80 <--, 8.24 <--, 7.63 <--, 6.25 <--, 10.85 <--, 9.52 <--, 8.56 <--, 7.02 <--, 5.03 <--, 9.62 <--, 7.12 <--                        9.0    6.18  )-----------( 236      ( 28 Oct 2021 03:19 )             29.2     Urine Studies:        RADIOLOGY & ADDITIONAL STUDIES:

## 2021-10-28 NOTE — PROGRESS NOTE ADULT - ASSESSMENT
65 y/o M w/ESRD on HD TTS, HTN. Renal following for ESRD Mx.  	  ESRD on HD TTS  K, acceptable  new RFA AVF s/p infiltration w/hematoma outpt -s/p OR angiogram 10/21, now workign well    using avf, no issues. s/p PC removal   Hyponatremia noted-likely 2/2 dilutional. clinically not in chf. much improved    plan    unable to dialyze today 2/2 scheduling issue. no indication for HD tonight.   plan for next hemodialysis tomorrow w/2k bath,  net UF 2kg as tolerated    dose all meds for ESRD  renal diet, fluid restriction 1.2L/day    hypocalcemia - asymptomatic - etiology unclear likely secondary -  intact pth 25-30 noted    ipth low, [id number] low 29 noted-   ca remains low                s/p cagluconate 1gm ivpb   # holding off renvela. phos at goal  # stared vitD 2k po qd>increased to 4k qd  # inc balwinder carbonate 2 >3 caps 4 times per day b/w meals  # increased calcitriol 0.5 >1mg daily 10/26  # using high calcium bath - 3 balwinder on dialysis,     Anemia in ckd -Hb at goal- c/w epo 10k tiw w/hd  HTN, controlled-bp better, stable  c/w home bp meds-coreg, hydrALAZINE -dec hydrALAZINE  to bid 10/25    poc d/w pt, HD RN  labs, chart reviewed  Nephrology   For any question, call:  Cell # [phone]  Pager # [phone]  office # [phone]

## 2021-10-28 NOTE — REASON FOR VISIT
[FreeTextEntry1] : I had the pleasure of evaluating Mr. Mendes in the office today.  He is here to establish care.\par \par He is a 65 yo M with HTN, anemia, thrombocytopenia, hyperparathyroidism, and ESRD on \par HD (T/Th/Sat) via permacath who had presented to [facility] in late June 2021 with complaints of SOB and weakness likely secondary to fluid  overload.  \par He was admitted to the ICU for urgent HD. Course complicated by hypoxic/ hypercapneic respiratory failure requiring BIPAP and nasal cannula. Patient weaned off supplemental O2 and stable on room air. Patient developed fever without acute symptoms. CXR with multifocal opacities suspicious for PNA. RVP/COVID negative. \par \par He was placed on antibiotics in the ICU. Cultures negative and ABX discontinued. \par Eliquis for atrial fibrillation held and Hgb remained stable. Per Dr. Christian, resume Eliquis on discharge. \par \par Since discharge, the patient reports that he has remained stable without new cardiac complaints.\par He states that his volume status has remained stable.\par He remains on Eliquis without side effects.\par \par My review of his last echocardiogram from 6/13/21 noted mild-moderate global LV systolic dysfunction, moderate MR, severe reversible diastolic dysfunction, moderate pericardial effusion.\par \par My review of his last stress test from 5/30/21 notes the presence of a small mild defefct in the ingerior wall that is mostly reversible suggestive of mild ischemia and is of questionable clinical significance. LVEf 45%.\par \par Review of his medications show that he is on an appropriate medical regimen\par ASA 81\par Coreg 12.5 BID\par Atorvastatin 49\par Eliquis 5 BID\par \par He is also on hydralazine and nifedipine.\par \par /66\par My read of his 12-lead ECG notes SR, normal axis, normal intervals, non-specific T waves.\par Physical exam -- Grade 3/6 SM. appears euvolemic on exam.\par \par At this time, recommend:\par 1. Continue current medications. Reminded patient to adhere to CHF recommendations -- salt and fluid restriction, remain compliant with medications and HD.\par 2. Will reassess LVEF and pericardial effusion with echocardiogram.\par 3. F/u in 3 months.\par Patient CHAVEZ MARQUEZision MRN [id number] Hospital Visit [id number] [facility] - Attending Physician Raul Yancey \par Status Complete \par  \par \par Hospital Course: \par Discharge Date	01-Jul-2021 \par Admission Date	28-Jun-2021 22:32 \par Reason for Admission	Fluid overload \par Hospital Course	 \par 66M with PMHx HTN, anemia, thrombocytopenia, hyperparathyroidism, and ESRD on \par HD (T/Th/Sat) via permacath p/w SOB and weakness likely secondary to fluid \par overload. Admitted to ICU for urgent HD. Course complicated by hypoxic/ \par hypercapneic respiratory failure requiring BIPAP and nasal cannula. Patient \par weaned off supplemental O2 and stable on room air. Patient developed fever \par without acute symptoms. CXR with multifocal opacities suspicious for PNA. RVP/ \par COVID negative. Placed on antibiotics in the ICU. Cultures negative and ABX \par discontinued. Per nephrology, patient ok to be discharged today and resume HD \par on 7/3 at outpatient site. Patient noted to be anemic and is s/p PRBC this \par admission. Per nephrology, likely AOCD. Eliquis for atrial fibrillation held \par and Hgb remained stable. Per Dr. Christian, resume Eliquis on discharge. \par \par Patient seen and evaluated. Reviewed discharge medications with patient and \par attending. All new medications requiring new prescriptions were sent to the \par pharmacy of patient's choice. Reviewed need for prescription for previous home \par medications and new prescriptions sent if requested. Medically cleared/stable \par for discharge as per Dr. Christian on 7/1/2021 with appropriate follow up. Patient \par understands and agrees with plan of care. \par \par \par \par \par \par Med Reconciliation: \par Override IMPROVE-DD recommendations due to:	IMPROVE-DD Application Not \par Available \par Recommended Post-Discharge VTE Prophylaxis	IMPROVE-DD Application Not Available \par Medication Reconciliation Status	Admission Reconciliation is Completed \par Discharge Reconciliation is Completed \par \par Discharge Medications	aspirin 81 mg oral delayed release tablet: 1 tab(s) \par orally once a day \par atorvastatin 40 mg oral tablet: 1 tab(s) orally once a day (at bedtime) \par Coreg 12.5 mg oral tablet: 1 tab(s) orally 2 times a day \par Eliquis 5 mg oral tablet: 1 tab(s) orally 2 times a day \par ferrous sulfate 325 mg (65 mg elemental iron) oral tablet: 1 tab(s) orally once \par a day \par folic acid 1 mg oral tablet: 1 tab(s) orally once a day \par hydrALAZINE 10 mg oral tablet: 1 tab(s) orally every 6 hours \par levothyroxine 100 mcg (0.1 mg) oral tablet: 1 tab(s) orally once a day \par NexIUM 40 mg oral delayed release capsule: 1 cap(s) orally once a day \par NIFEdipine 60 mg oral tablet, extended release: 1 tab(s) orally once a day \par Renvela 800 mg oral tablet: 1 tab(s) orally 3 times a day (with meals) \par \par , \par , \par \par Care Plan/Procedures: \par Discharge Diagnoses, Assessment and Plan of Treatment	PRINCIPAL DISCHARGE \par DIAGNOSIS \par Diagnosis: Acute pulmonary edema \par Assessment and Plan of Treatment: You came to the hospital with shortness of \par breath likely from volume overload. You had urgent dialysis in the ICU and your \par symptoms has improved. You were placed on BIPAP to improve your respiratory \par status and now are stable off oxygen. Your dialysis is to resume on 7/3/2021. \par Follow up with your primary care physician or nephrologist within one week of \par discharge. \par \par \par SECONDARY DISCHARGE DIAGNOSES \par Diagnosis: Atrial fibrillation \par Assessment and Plan of Treatment: Continue with your Eliquis for \par anticoagulation for atrial fibrillation. Your blood work demonstrates anemia \par and you received blood transfusions this admission. Your eliquis was stopped in \par the hospital because of this and is being started gain for discharge. Please \par follow up with your primary care physician within one week for blood work to \par make sure your blood counts are stable. \par \par Diagnosis: HTN (hypertension) \par Assessment and Plan of Treatment: Your blood pressure was elevated this \par admission and your blood pressure medications were adjusted. Please take as \par prescribed anf ollow up with your PCP within one week of discharge for blood \par pressure check and medication management. \par \par Goal(s)	To get better and follow your care plan as instructed. \par \par Follow Up: \par Diet Instructions	Low salt, low fat and low cholesterol diet \par No concentrated potassium or phosphorus \par Additional Instructions	See above. \par \par For new or worsening symptoms, contact your physician or return to the \par hospital. \par Care Providers for Follow up (PCP/Outpatient Provider)	Sharon Robertson) \par Surgery; Vascular Surgery \par [address] [facility], [address] \[place] [address] \par Phone: [phone] \par Fax: [phone] \par Follow Up Time: \par \par Patient's Scheduled Appointments	CHAVEZ MARQUEZ ; 08/16/2021 ; [facility]PP Med \par [address] \par Additional Scheduled Appointments	Follow up with vascular Dr. Robertson within two \par weeks of discharge for monitoring of your AV fistula. \par \par Quality Measures: \par Patient Condition	Stable \par Hospice Patient	No \par Does the patient have difficulty running errands alone like visiting a doctors \par office or shopping?	No \par Does the patient have difficulty climbing stairs?	No \par Cognition: The patient has	No difficulties \par Does the patient have a principal diagnosis of ischemic stroke, hemorrhagic \par stroke, or TIA?	No \par Does the patient have a principal diagnosis of Acute Myocardial Infarction?	No \par Has the patient had a Percutaneous Coronary Intervention?	No \par \par Document Complete: \par Physician Section Complete	This document is complete and the patient is ready \par for discharge. \par For questions about your prescriptions, please call:	[phone] \par Care Provider Seen in [facility]	Wilfred Christian \par Is this contact telephone number correct?	Yes \par \par

## 2021-10-28 NOTE — PHYSICAL EXAM
[Well Developed] : well developed [Well Nourished] : well nourished [No Acute Distress] : no acute distress [Normal Conjunctiva] : normal conjunctiva [Normal Venous Pressure] : normal venous pressure [No Carotid Bruit] : no carotid bruit [Normal S1, S2] : normal S1, S2 [No Rub] : no rub [No Gallop] : no gallop [Clear Lung Fields] : clear lung fields [Good Air Entry] : good air entry [No Respiratory Distress] : no respiratory distress  [Soft] : abdomen soft [Non Tender] : non-tender [No Masses/organomegaly] : no masses/organomegaly [Normal Bowel Sounds] : normal bowel sounds [Normal Gait] : normal gait [No Edema] : no edema [No Cyanosis] : no cyanosis [No Clubbing] : no clubbing [No Varicosities] : no varicosities [No Rash] : no rash [No Skin Lesions] : no skin lesions [Moves all extremities] : moves all extremities [No Focal Deficits] : no focal deficits [Normal Speech] : normal speech [Alert and Oriented] : alert and oriented [Normal memory] : normal memory [de-identified] : Grade 3/6 SM

## 2021-10-28 NOTE — PROGRESS NOTE ADULT - ASSESSMENT
67 y/o M with pmhx of ESRD on HD, afib on eliquis, anemia presented to the ED for R arm pain. Found to have rhinovirus pos and AV fistula clot/infection. Admitted for treatment and vascular management of AV fistula.    AVF malfunction:     S/p RUE fistulogram    Hypocalcemia:    BMP  Nephro f/up noted

## 2021-10-28 NOTE — PROVIDER CONTACT NOTE (OTHER) - ACTION/TREATMENT ORDERED:
Per Md Alfaro (ACP) hold meds until after dialysis Per Md Angelina Sheffield (ACP) hold meds until after dialysis

## 2021-10-28 NOTE — PROGRESS NOTE ADULT - SUBJECTIVE AND OBJECTIVE BOX
Patient is a 66y old  Male who presents with a chief complaint of R arm pain on Av fistula site (28 Oct 2021 18:52)      SUBJECTIVE / OVERNIGHT EVENTS:    Events noted.  CONSTITUTIONAL: No fever,  or fatigue  RESPIRATORY: No cough, wheezing,  No shortness of breath  CARDIOVASCULAR: No chest pain, palpitations, dizziness, or leg swelling  GASTROINTESTINAL: No abdominal or epigastric pain.   NEUROLOGICAL: No headaches,     MEDICATIONS  (STANDING):  apixaban 5 milliGRAM(s) Oral two times a day  aspirin enteric coated 81 milliGRAM(s) Oral daily  atorvastatin 40 milliGRAM(s) Oral at bedtime  calcitriol   Capsule 1 MICROGram(s) Oral daily  calcium carbonate   1250 mG (OsCal) 3 Tablet(s) Oral four times a day  carvedilol 25 milliGRAM(s) Oral every 12 hours  chlorhexidine 2% Cloths 1 Application(s) Topical daily  cholecalciferol 4000 Unit(s) Oral daily  epoetin tammi-epbx (RETACRIT) Injectable 41192 Unit(s) IV Push <User Schedule>  folic acid 1 milliGRAM(s) Oral daily  hydrALAZINE 10 milliGRAM(s) Oral every 12 hours  influenza  Vaccine (HIGH DOSE) 0.7 milliLiter(s) IntraMuscular once  levothyroxine 100 MICROGram(s) Oral daily  lidocaine/prilocaine Cream 1 Application(s) Topical <User Schedule>  magnesium sulfate  IVPB 1 Gram(s) IV Intermittent once  NIFEdipine XL 60 milliGRAM(s) Oral daily  pantoprazole    Tablet 40 milliGRAM(s) Oral before breakfast    MEDICATIONS  (PRN):  acetaminophen   Tablet .. 650 milliGRAM(s) Oral every 6 hours PRN Temp greater or equal to 38C (100.4F), Mild Pain (1 - 3)        CAPILLARY BLOOD GLUCOSE        I&O's Summary    27 Oct 2021 07:01  -  28 Oct 2021 07:00  --------------------------------------------------------  IN: 510 mL / OUT: 0 mL / NET: 510 mL    28 Oct 2021 07:01  -  29 Oct 2021 00:57  --------------------------------------------------------  IN: 420 mL / OUT: 0 mL / NET: 420 mL        T(C): 37 (10-28-21 @ 21:23), Max: 37 (10-28-21 @ 05:37)  HR: 77 (10-28-21 @ 21:23) (68 - 77)  BP: 131/84 (10-28-21 @ 21:23) (120/75 - 137/81)  RR: 16 (10-28-21 @ 21:23) (16 - 18)  SpO2: 100% (10-28-21 @ 21:23) (96% - 100%)    PHYSICAL EXAM:  GENERAL: NAD  NECK: Supple, No JVD  CHEST/LUNG: Clear to auscultation bilaterally; No wheezing.  HEART: Regular rate and rhythm; No murmurs, rubs, or gallops  ABDOMEN: Soft, Nontender, Nondistended; Bowel sounds present  EXTREMITIES:   No edema  NEUROLOGY: AAO X 3      LABS:                        9.0    6.18  )-----------( 236      ( 28 Oct 2021 03:19 )             29.2     10-28    137  |  93<L>  |  80<H>  ----------------------------<  90  4.3   |  21<L>  |  9.15<H>    Ca    6.8<L>      28 Oct 2021 03:03  Phos  5.2     10-28  Mg     1.90     10-28    TPro  7.1  /  Alb  3.6  /  TBili  0.3  /  DBili  x   /  AST  36  /  ALT  20  /  AlkPhos  180<H>  10-28            CAPILLARY BLOOD GLUCOSE            RADIOLOGY & ADDITIONAL TESTS:    Imaging Personally Reviewed:    Consultant(s) Notes Reviewed:      Care Discussed with Consultants/Other Providers:    Wilfred Christian MD, CMD, FACP    [address] [place] [address]  Office Tel: [phone]  Cell: [phone]

## 2021-10-29 LAB
ANION GAP SERPL CALC-SCNC: 26 MMOL/L — HIGH (ref 7–14)
BUN SERPL-MCNC: 99 MG/DL — HIGH (ref 7–23)
CALCIUM SERPL-MCNC: 6 MG/DL — CRITICAL LOW (ref 8.4–10.5)
CHLORIDE SERPL-SCNC: 91 MMOL/L — LOW (ref 98–107)
CO2 SERPL-SCNC: 16 MMOL/L — LOW (ref 22–31)
CREAT SERPL-MCNC: 11.05 MG/DL — HIGH (ref 0.5–1.3)
GLUCOSE SERPL-MCNC: 146 MG/DL — HIGH (ref 70–99)
HCT VFR BLD CALC: 27.1 % — LOW (ref 39–50)
HGB BLD-MCNC: 8.5 G/DL — LOW (ref 13–17)
MAGNESIUM SERPL-MCNC: 1.9 MG/DL — SIGNIFICANT CHANGE UP (ref 1.6–2.6)
MCHC RBC-ENTMCNC: 26.6 PG — LOW (ref 27–34)
MCHC RBC-ENTMCNC: 31.4 GM/DL — LOW (ref 32–36)
MCV RBC AUTO: 84.7 FL — SIGNIFICANT CHANGE UP (ref 80–100)
NRBC # BLD: 0 /100 WBCS — SIGNIFICANT CHANGE UP
NRBC # FLD: 0 K/UL — SIGNIFICANT CHANGE UP
PHOSPHATE SERPL-MCNC: 6 MG/DL — HIGH (ref 2.5–4.5)
PLATELET # BLD AUTO: 236 K/UL — SIGNIFICANT CHANGE UP (ref 150–400)
POTASSIUM SERPL-MCNC: 4.4 MMOL/L — SIGNIFICANT CHANGE UP (ref 3.5–5.3)
POTASSIUM SERPL-SCNC: 4.4 MMOL/L — SIGNIFICANT CHANGE UP (ref 3.5–5.3)
RBC # BLD: 3.2 M/UL — LOW (ref 4.2–5.8)
RBC # FLD: 17.9 % — HIGH (ref 10.3–14.5)
SODIUM SERPL-SCNC: 133 MMOL/L — LOW (ref 135–145)
WBC # BLD: 7.12 K/UL — SIGNIFICANT CHANGE UP (ref 3.8–10.5)
WBC # FLD AUTO: 7.12 K/UL — SIGNIFICANT CHANGE UP (ref 3.8–10.5)

## 2021-10-29 RX ORDER — CALCIUM GLUCONATE 100 MG/ML
1 VIAL (ML) INTRAVENOUS
Refills: 0 | Status: COMPLETED | OUTPATIENT
Start: 2021-10-29 | End: 2021-10-29

## 2021-10-29 RX ADMIN — PANTOPRAZOLE SODIUM 40 MILLIGRAM(S): 20 TABLET, DELAYED RELEASE ORAL at 05:16

## 2021-10-29 RX ADMIN — Medication 10 MILLIGRAM(S): at 10:35

## 2021-10-29 RX ADMIN — Medication 3 TABLET(S): at 05:16

## 2021-10-29 RX ADMIN — Medication 100 GRAM(S): at 11:29

## 2021-10-29 RX ADMIN — CARVEDILOL PHOSPHATE 25 MILLIGRAM(S): 80 CAPSULE, EXTENDED RELEASE ORAL at 10:35

## 2021-10-29 RX ADMIN — Medication 100 MICROGRAM(S): at 05:16

## 2021-10-29 RX ADMIN — CHLORHEXIDINE GLUCONATE 1 APPLICATION(S): 213 SOLUTION TOPICAL at 11:30

## 2021-10-29 RX ADMIN — Medication 1 MILLIGRAM(S): at 11:30

## 2021-10-29 RX ADMIN — Medication 60 MILLIGRAM(S): at 10:35

## 2021-10-29 RX ADMIN — APIXABAN 5 MILLIGRAM(S): 2.5 TABLET, FILM COATED ORAL at 05:16

## 2021-10-29 RX ADMIN — Medication 3 TABLET(S): at 17:52

## 2021-10-29 RX ADMIN — Medication 650 MILLIGRAM(S): at 18:30

## 2021-10-29 RX ADMIN — APIXABAN 5 MILLIGRAM(S): 2.5 TABLET, FILM COATED ORAL at 17:52

## 2021-10-29 RX ADMIN — Medication 3 TABLET(S): at 11:32

## 2021-10-29 RX ADMIN — ERYTHROPOIETIN 10000 UNIT(S): 10000 INJECTION, SOLUTION INTRAVENOUS; SUBCUTANEOUS at 07:54

## 2021-10-29 RX ADMIN — Medication 81 MILLIGRAM(S): at 11:30

## 2021-10-29 RX ADMIN — Medication 100 GRAM(S): at 13:50

## 2021-10-29 RX ADMIN — ATORVASTATIN CALCIUM 40 MILLIGRAM(S): 80 TABLET, FILM COATED ORAL at 21:51

## 2021-10-29 RX ADMIN — Medication 4000 UNIT(S): at 11:30

## 2021-10-29 RX ADMIN — CARVEDILOL PHOSPHATE 25 MILLIGRAM(S): 80 CAPSULE, EXTENDED RELEASE ORAL at 17:53

## 2021-10-29 RX ADMIN — Medication 10 MILLIGRAM(S): at 17:54

## 2021-10-29 RX ADMIN — CALCITRIOL 1 MICROGRAM(S): 0.5 CAPSULE ORAL at 11:31

## 2021-10-29 RX ADMIN — Medication 650 MILLIGRAM(S): at 17:58

## 2021-10-29 NOTE — PROVIDER CONTACT NOTE (CRITICAL VALUE NOTIFICATION) - BACKGROUND
Admitted for infected AVF
s/p fistulogram; PMH a fib, anemia, hypocalcemia
AVF clot/infection
infection of graft
hypocalcemia
pt infection in AVF
patient s/p R.chest permacath

## 2021-10-29 NOTE — PROGRESS NOTE ADULT - ASSESSMENT
65 y/o M w/ESRD on HD TTS, HTN. Renal following for ESRD Mx.  	  ESRD on HD TTS  K, acceptable  new RFA AVF s/p infiltration w/hematoma outpt -s/p OR angiogram 10/21, now workign well    using avf, no issues. s/p PC removal   Hyponatremia noted-likely 2/2 dilutional. clinically not in chf. much improved    plan    tolerating hemodialysis well  2 k bath, Ultrafiltration on Dialysis as tolerated  no cramps    hypocalcemia - asymptomatic - etiology unclear likely secondary -  intact pth 25-30 noted    ipth low, [id number] low 29 noted-   ca remains low                s/p cagluconate 1gm ivpb   # holding off renvela. phos at goal  # stared vitD 2k po qd>increased to 4k qd  # inc balwinder carbonate 2 >3 caps 4 times per day b/w meals  # increased calcitriol 0.5 >1mg daily 10/26  # using high calcium bath - 3 balwinder on dialysis,     Anemia in ckd -Hb at goal- c/w epo 10k tiw w/hd  HTN, controlled-bp better, stable  c/w home bp meds-coreg, hydrALAZINE -dec hydrALAZINE  to bid 10/25

## 2021-10-29 NOTE — PROGRESS NOTE ADULT - ASSESSMENT
65 y/o M with pmhx of ESRD on HD, afib on eliquis, anemia presented to the ED for R arm pain. Found to have rhinovirus pos and AV fistula clot/infection. Admitted for treatment and vascular management of AV fistula.    AVF malfunction:     S/p RUE fistulogram    Hypocalcemia:    BMP  Nephro f/up noted

## 2021-10-29 NOTE — PROGRESS NOTE ADULT - SUBJECTIVE AND OBJECTIVE BOX
[facility] : Ans Serv [phone], Office [phone]  Dr Caballero/Dr Vargas  /Dr Jordan etienne /Dr SERA Bojorquez/Dr Dagoberto Faust/Dr Jovan Garrison /Dr GAYATRI Garcia  _______________________________________________________________________________________________    seen and examined today for End Stage Renal Disease on Dialysis     Interval : seen on hemodialysis   VITALS:  T(F): 97.6 (10-29-21 @ 10:31), Max: 98.6 (10-28-21 @ 21:23)  HR: 68 (10-29-21 @ 10:31)  BP: 165/98 (10-29-21 @ 10:31)  RR: 18 (10-29-21 @ 10:31)  SpO2: 97% (10-29-21 @ 10:31)  Wt(kg): --    10-28 @ 07:01  -  10-29 @ 07:00  --------------------------------------------------------  IN: 420 mL / OUT: 0 mL / NET: 420 mL    Physical Exam :-  Constitutional: NAD  Neck: Supple.  Respiratory: Bilateral equal breath sounds, no Crackles present.  Cardiovascular: S1, S2 normal, positive Murmur  Gastrointestinal: Bowel Sounds present, soft, non tender.  Extremities: no Edema Feet  Neurological: Alert and Oriented x 3  Psychiatric: Normal mood, normal affect  Data:-  Allergies :   No Known Allergies    Hospital Medications:   MEDICATIONS  (STANDING):  apixaban 5 milliGRAM(s) Oral two times a day  aspirin enteric coated 81 milliGRAM(s) Oral daily  atorvastatin 40 milliGRAM(s) Oral at bedtime  calcitriol   Capsule 1 MICROGram(s) Oral daily  calcium carbonate   1250 mG (OsCal) 3 Tablet(s) Oral four times a day  calcium gluconate IVPB 1 Gram(s) IV Intermittent every 2 hours  carvedilol 25 milliGRAM(s) Oral every 12 hours  chlorhexidine 2% Cloths 1 Application(s) Topical daily  cholecalciferol 4000 Unit(s) Oral daily  epoetin tammi-epbx (RETACRIT) Injectable 12099 Unit(s) IV Push <User Schedule>  folic acid 1 milliGRAM(s) Oral daily  hydrALAZINE 10 milliGRAM(s) Oral every 12 hours  influenza  Vaccine (HIGH DOSE) 0.7 milliLiter(s) IntraMuscular once  levothyroxine 100 MICROGram(s) Oral daily  lidocaine/prilocaine Cream 1 Application(s) Topical <User Schedule>  magnesium sulfate  IVPB 1 Gram(s) IV Intermittent once  NIFEdipine XL 60 milliGRAM(s) Oral daily  pantoprazole    Tablet 40 milliGRAM(s) Oral before breakfast    10-29    133<L>  |  91<L>  |  99<H>  ----------------------------<  146<H>  4.4   |  16<L>  |  11.05<H>    Ca    6.0<LL>      29 Oct 2021 07:20  Phos  6.0     10-29  Mg     1.90     10-29    TPro  7.1  /  Alb  3.6  /  TBili  0.3  /  DBili      /  AST  36  /  ALT  20  /  AlkPhos  180<H>  10-28    Creatinine Trend: 11.05 <--, 9.15 <--, 8.80 <--, 8.24 <--, 7.63 <--, 6.25 <--, 10.85 <--, 9.52 <--, 8.56 <--, 7.02 <--, 5.03 <--, 9.62 <--                        8.5    7.12  )-----------( 236      ( 29 Oct 2021 07:20 )             27.1

## 2021-10-29 NOTE — PROVIDER CONTACT NOTE (CRITICAL VALUE NOTIFICATION) - ASSESSMENT
pt. at dialysis
pt asymptomatic
Awake no acute distress noted
5.2
asymptomatic
4.9
asymptomatic
Patient laying calmly in bed. vitals stable. no complaints of pain.

## 2021-10-29 NOTE — PROVIDER CONTACT NOTE (CRITICAL VALUE NOTIFICATION) - NAME OF MD/NP/PA/DO NOTIFIED:
Leah Chin  [id number]
Lula Virk
Sejal
Srinivasa Jha
Magda Zarate ACP
Narayan Montgomery #[id number]
Sejal
Niki Birch [facility] x [phone]

## 2021-10-29 NOTE — PROVIDER CONTACT NOTE (CRITICAL VALUE NOTIFICATION) - ACTION/TREATMENT ORDERED:
md made aware. to order supplements for calcium
MD made aware. critical lab value was read back by provider
EKG, will speak to nephrologist
Ordered calcium gluconate
Please place PIV, will order Iv med
continue to assess
MD aware. will order calcium gluconate.
MD notified.

## 2021-10-29 NOTE — PROGRESS NOTE ADULT - SUBJECTIVE AND OBJECTIVE BOX
Patient is a 66y old  Male who presents with a chief complaint of R arm pain on Av fistula site (29 Oct 2021 11:04)      SUBJECTIVE / OVERNIGHT EVENTS:    Events noted.  CONSTITUTIONAL: No fever,  or fatigue  RESPIRATORY: No cough, wheezing,  No shortness of breath  CARDIOVASCULAR: No chest pain, palpitations, dizziness, or leg swelling  GASTROINTESTINAL: No abdominal or epigastric pain. No nausea, vomiting.  NEUROLOGICAL: No headaches,     MEDICATIONS  (STANDING):  apixaban 5 milliGRAM(s) Oral two times a day  aspirin enteric coated 81 milliGRAM(s) Oral daily  atorvastatin 40 milliGRAM(s) Oral at bedtime  calcitriol   Capsule 1 MICROGram(s) Oral daily  calcium carbonate   1250 mG (OsCal) 3 Tablet(s) Oral four times a day  carvedilol 25 milliGRAM(s) Oral every 12 hours  chlorhexidine 2% Cloths 1 Application(s) Topical daily  cholecalciferol 4000 Unit(s) Oral daily  epoetin tammi-epbx (RETACRIT) Injectable 76925 Unit(s) IV Push <User Schedule>  folic acid 1 milliGRAM(s) Oral daily  hydrALAZINE 10 milliGRAM(s) Oral every 12 hours  influenza  Vaccine (HIGH DOSE) 0.7 milliLiter(s) IntraMuscular once  levothyroxine 100 MICROGram(s) Oral daily  lidocaine/prilocaine Cream 1 Application(s) Topical <User Schedule>  magnesium sulfate  IVPB 1 Gram(s) IV Intermittent once  NIFEdipine XL 60 milliGRAM(s) Oral daily  pantoprazole    Tablet 40 milliGRAM(s) Oral before breakfast    MEDICATIONS  (PRN):  acetaminophen   Tablet .. 650 milliGRAM(s) Oral every 6 hours PRN Temp greater or equal to 38C (100.4F), Mild Pain (1 - 3)        CAPILLARY BLOOD GLUCOSE        I&O's Summary    28 Oct 2021 07:01  -  29 Oct 2021 07:00  --------------------------------------------------------  IN: 420 mL / OUT: 0 mL / NET: 420 mL    29 Oct 2021 07:01  -  29 Oct 2021 21:02  --------------------------------------------------------  IN: 740 mL / OUT: 2900 mL / NET: -2160 mL        T(C): 36.8 (10-29-21 @ 17:40), Max: 37 (10-28-21 @ 21:23)  HR: 72 (10-29-21 @ 17:40) (63 - 78)  BP: 129/81 (10-29-21 @ 17:40) (129/81 - 165/98)  RR: 18 (10-29-21 @ 17:40) (16 - 18)  SpO2: 99% (10-29-21 @ 17:40) (97% - 100%)    PHYSICAL EXAM:  GENERAL: NAD  NECK: Supple, No JVD  CHEST/LUNG: Clear to auscultation bilaterally; No wheezing.  HEART: Regular rate and rhythm; No murmurs, rubs, or gallops  ABDOMEN: Soft, Nontender, Nondistended; Bowel sounds present  EXTREMITIES:   No edema  NEUROLOGY: AAO X 3      LABS:                        8.5    7.12  )-----------( 236      ( 29 Oct 2021 07:20 )             27.1     10-29    133<L>  |  91<L>  |  99<H>  ----------------------------<  146<H>  4.4   |  16<L>  |  11.05<H>    Ca    6.0<LL>      29 Oct 2021 07:20  Phos  6.0     10-29  Mg     1.90     10-29    TPro  7.1  /  Alb  3.6  /  TBili  0.3  /  DBili  x   /  AST  36  /  ALT  20  /  AlkPhos  180<H>  10-28            CAPILLARY BLOOD GLUCOSE            RADIOLOGY & ADDITIONAL TESTS:    Imaging Personally Reviewed:    Consultant(s) Notes Reviewed:      Care Discussed with Consultants/Other Providers:    Wilfred Christian MD, CMD, FACP    [address] [place] [address]  Office Tel: [phone]  Cell: [phone]

## 2021-10-29 NOTE — PROVIDER CONTACT NOTE (CRITICAL VALUE NOTIFICATION) - PERSON GIVING RESULT:
Ever IBARRA
GAYATRI Tillman
Lab
KEITH Gomez
Patricia H
Wayne Gomez, [facility]
SERA Chiu [facility]
Bhavana [facility]

## 2021-10-29 NOTE — PROVIDER CONTACT NOTE (CRITICAL VALUE NOTIFICATION) - SITUATION
Calcium 6.0
Patient Premchand, Janie results with critical value for Calcium, 4.9
critical calcium
critical lab value for calcium 6.2
Calcium 5.9
critical lab value for calcium 6.3
Lab notified of total calcium level of 6.0.
calcium level 6.5

## 2021-10-30 LAB
ANION GAP SERPL CALC-SCNC: 20 MMOL/L — HIGH (ref 7–14)
BASOPHILS # BLD AUTO: 0.07 K/UL — SIGNIFICANT CHANGE UP (ref 0–0.2)
BASOPHILS NFR BLD AUTO: 1.2 % — SIGNIFICANT CHANGE UP (ref 0–2)
BUN SERPL-MCNC: 66 MG/DL — HIGH (ref 7–23)
CALCIUM SERPL-MCNC: 6.7 MG/DL — LOW (ref 8.4–10.5)
CHLORIDE SERPL-SCNC: 92 MMOL/L — LOW (ref 98–107)
CO2 SERPL-SCNC: 23 MMOL/L — SIGNIFICANT CHANGE UP (ref 22–31)
CREAT SERPL-MCNC: 7.27 MG/DL — HIGH (ref 0.5–1.3)
EOSINOPHIL # BLD AUTO: 0.33 K/UL — SIGNIFICANT CHANGE UP (ref 0–0.5)
EOSINOPHIL NFR BLD AUTO: 5.5 % — SIGNIFICANT CHANGE UP (ref 0–6)
GLUCOSE SERPL-MCNC: 108 MG/DL — HIGH (ref 70–99)
HCT VFR BLD CALC: 32.6 % — LOW (ref 39–50)
HGB BLD-MCNC: 9.7 G/DL — LOW (ref 13–17)
IANC: 3.36 K/UL — SIGNIFICANT CHANGE UP (ref 1.5–8.5)
IMM GRANULOCYTES NFR BLD AUTO: 0.8 % — SIGNIFICANT CHANGE UP (ref 0–1.5)
LYMPHOCYTES # BLD AUTO: 1.45 K/UL — SIGNIFICANT CHANGE UP (ref 1–3.3)
LYMPHOCYTES # BLD AUTO: 24.4 % — SIGNIFICANT CHANGE UP (ref 13–44)
MAGNESIUM SERPL-MCNC: 2 MG/DL — SIGNIFICANT CHANGE UP (ref 1.6–2.6)
MCHC RBC-ENTMCNC: 26.1 PG — LOW (ref 27–34)
MCHC RBC-ENTMCNC: 29.8 GM/DL — LOW (ref 32–36)
MCV RBC AUTO: 87.9 FL — SIGNIFICANT CHANGE UP (ref 80–100)
MONOCYTES # BLD AUTO: 0.69 K/UL — SIGNIFICANT CHANGE UP (ref 0–0.9)
MONOCYTES NFR BLD AUTO: 11.6 % — SIGNIFICANT CHANGE UP (ref 2–14)
NEUTROPHILS # BLD AUTO: 3.36 K/UL — SIGNIFICANT CHANGE UP (ref 1.8–7.4)
NEUTROPHILS NFR BLD AUTO: 56.5 % — SIGNIFICANT CHANGE UP (ref 43–77)
NRBC # BLD: 0 /100 WBCS — SIGNIFICANT CHANGE UP
NRBC # FLD: 0 K/UL — SIGNIFICANT CHANGE UP
PHOSPHATE SERPL-MCNC: 4.7 MG/DL — HIGH (ref 2.5–4.5)
PLATELET # BLD AUTO: 255 K/UL — SIGNIFICANT CHANGE UP (ref 150–400)
POTASSIUM SERPL-MCNC: 4.2 MMOL/L — SIGNIFICANT CHANGE UP (ref 3.5–5.3)
POTASSIUM SERPL-SCNC: 4.2 MMOL/L — SIGNIFICANT CHANGE UP (ref 3.5–5.3)
RBC # BLD: 3.71 M/UL — LOW (ref 4.2–5.8)
RBC # FLD: 18 % — HIGH (ref 10.3–14.5)
SODIUM SERPL-SCNC: 135 MMOL/L — SIGNIFICANT CHANGE UP (ref 135–145)
WBC # BLD: 5.95 K/UL — SIGNIFICANT CHANGE UP (ref 3.8–10.5)
WBC # FLD AUTO: 5.95 K/UL — SIGNIFICANT CHANGE UP (ref 3.8–10.5)

## 2021-10-30 RX ADMIN — APIXABAN 5 MILLIGRAM(S): 2.5 TABLET, FILM COATED ORAL at 05:59

## 2021-10-30 RX ADMIN — CHLORHEXIDINE GLUCONATE 1 APPLICATION(S): 213 SOLUTION TOPICAL at 11:15

## 2021-10-30 RX ADMIN — Medication 3 TABLET(S): at 11:11

## 2021-10-30 RX ADMIN — Medication 10 MILLIGRAM(S): at 05:58

## 2021-10-30 RX ADMIN — Medication 1 MILLIGRAM(S): at 11:10

## 2021-10-30 RX ADMIN — Medication 3 TABLET(S): at 00:02

## 2021-10-30 RX ADMIN — Medication 10 MILLIGRAM(S): at 17:31

## 2021-10-30 RX ADMIN — Medication 81 MILLIGRAM(S): at 11:10

## 2021-10-30 RX ADMIN — ERYTHROPOIETIN 10000 UNIT(S): 10000 INJECTION, SOLUTION INTRAVENOUS; SUBCUTANEOUS at 20:56

## 2021-10-30 RX ADMIN — APIXABAN 5 MILLIGRAM(S): 2.5 TABLET, FILM COATED ORAL at 17:32

## 2021-10-30 RX ADMIN — Medication 60 MILLIGRAM(S): at 05:59

## 2021-10-30 RX ADMIN — CARVEDILOL PHOSPHATE 25 MILLIGRAM(S): 80 CAPSULE, EXTENDED RELEASE ORAL at 06:00

## 2021-10-30 RX ADMIN — Medication 3 TABLET(S): at 17:31

## 2021-10-30 RX ADMIN — Medication 4000 UNIT(S): at 11:09

## 2021-10-30 RX ADMIN — PANTOPRAZOLE SODIUM 40 MILLIGRAM(S): 20 TABLET, DELAYED RELEASE ORAL at 05:59

## 2021-10-30 RX ADMIN — Medication 100 MICROGRAM(S): at 05:59

## 2021-10-30 RX ADMIN — Medication 3 TABLET(S): at 05:57

## 2021-10-30 RX ADMIN — CARVEDILOL PHOSPHATE 25 MILLIGRAM(S): 80 CAPSULE, EXTENDED RELEASE ORAL at 17:32

## 2021-10-30 RX ADMIN — ATORVASTATIN CALCIUM 40 MILLIGRAM(S): 80 TABLET, FILM COATED ORAL at 22:40

## 2021-10-30 RX ADMIN — Medication 3 TABLET(S): at 23:15

## 2021-10-30 RX ADMIN — CALCITRIOL 1 MICROGRAM(S): 0.5 CAPSULE ORAL at 11:10

## 2021-10-30 NOTE — PROGRESS NOTE ADULT - SUBJECTIVE AND OBJECTIVE BOX
Patient is a 66y old  Male who presents with a chief complaint of R arm pain on Av fistula site (29 Oct 2021 11:04)      SUBJECTIVE / OVERNIGHT EVENTS:    Events noted.  CONSTITUTIONAL: No fever,  or fatigue  RESPIRATORY: No cough, wheezing,  No shortness of breath  CARDIOVASCULAR: No chest pain, palpitations, dizziness, or leg swelling  GASTROINTESTINAL: No abdominal or epigastric pain. No nausea, vomiting.  NEUROLOGICAL: No headaches,     MEDICATIONS  (STANDING):  apixaban 5 milliGRAM(s) Oral two times a day  aspirin enteric coated 81 milliGRAM(s) Oral daily  atorvastatin 40 milliGRAM(s) Oral at bedtime  calcitriol   Capsule 1 MICROGram(s) Oral daily  calcium carbonate   1250 mG (OsCal) 3 Tablet(s) Oral four times a day  carvedilol 25 milliGRAM(s) Oral every 12 hours  chlorhexidine 2% Cloths 1 Application(s) Topical daily  cholecalciferol 4000 Unit(s) Oral daily  epoetin tammi-epbx (RETACRIT) Injectable 42995 Unit(s) IV Push <User Schedule>  folic acid 1 milliGRAM(s) Oral daily  hydrALAZINE 10 milliGRAM(s) Oral every 12 hours  influenza  Vaccine (HIGH DOSE) 0.7 milliLiter(s) IntraMuscular once  levothyroxine 100 MICROGram(s) Oral daily  lidocaine/prilocaine Cream 1 Application(s) Topical <User Schedule>  magnesium sulfate  IVPB 1 Gram(s) IV Intermittent once  NIFEdipine XL 60 milliGRAM(s) Oral daily  pantoprazole    Tablet 40 milliGRAM(s) Oral before breakfast    MEDICATIONS  (PRN):  acetaminophen   Tablet .. 650 milliGRAM(s) Oral every 6 hours PRN Temp greater or equal to 38C (100.4F), Mild Pain (1 - 3)        CAPILLARY BLOOD GLUCOSE        I&O's Summary    28 Oct 2021 07:01  -  29 Oct 2021 07:00  --------------------------------------------------------  IN: 420 mL / OUT: 0 mL / NET: 420 mL    29 Oct 2021 07:01  -  29 Oct 2021 21:02  --------------------------------------------------------  IN: 740 mL / OUT: 2900 mL / NET: -2160 mL        T(C): 36.8 (10-29-21 @ 17:40), Max: 37 (10-28-21 @ 21:23)  HR: 72 (10-29-21 @ 17:40) (63 - 78)  BP: 129/81 (10-29-21 @ 17:40) (129/81 - 165/98)  RR: 18 (10-29-21 @ 17:40) (16 - 18)  SpO2: 99% (10-29-21 @ 17:40) (97% - 100%)    PHYSICAL EXAM:  GENERAL: NAD  NECK: Supple, No JVD  CHEST/LUNG: Clear to auscultation bilaterally; No wheezing.  HEART: Regular rate and rhythm; No murmurs, rubs, or gallops  ABDOMEN: Soft, Nontender, Nondistended; Bowel sounds present  EXTREMITIES:   No edema  NEUROLOGY: AAO X 3      LABS:                        8.5    7.12  )-----------( 236      ( 29 Oct 2021 07:20 )             27.1     10-29    133<L>  |  91<L>  |  99<H>  ----------------------------<  146<H>  4.4   |  16<L>  |  11.05<H>    Ca    6.0<LL>      29 Oct 2021 07:20  Phos  6.0     10-29  Mg     1.90     10-29    TPro  7.1  /  Alb  3.6  /  TBili  0.3  /  DBili  x   /  AST  36  /  ALT  20  /  AlkPhos  180<H>  10-28            CAPILLARY BLOOD GLUCOSE            RADIOLOGY & ADDITIONAL TESTS:    Imaging Personally Reviewed:    Consultant(s) Notes Reviewed:      Care Discussed with Consultants/Other Providers:    Wilfred Christian MD, CMD, FACP    [address] [place] [address]  Office Tel: [phone]  Cell: [phone]

## 2021-10-30 NOTE — PROGRESS NOTE ADULT - SUBJECTIVE AND OBJECTIVE BOX
[facility] - MARK Vargas / MARK Pagan / JASMIN Caballero/ MARK Bojorquez/ MARK Faust/ TELLO Garrison / RODNEY Garcia / HAILEY Perkins  ---------------------------------------------------------------------------------------------------------------  seen and examined today for ESRD  Interval : NAD  VITALS:  T(F): 97.3 (10-30-21 @ 09:53), Max: 98.3 (10-29-21 @ 17:40)  HR: 66 (10-30-21 @ 09:53)  BP: 134/87 (10-30-21 @ 09:53)  RR: 18 (10-30-21 @ 09:53)  SpO2: 100% (10-30-21 @ 09:53)  Wt(kg): --    10-29 @ 07:01  -  10-30 @ 07:00  --------------------------------------------------------  IN: 940 mL / OUT: 2900 mL / NET: -1960 mL      Physical Exam :-  Constitutional: NAD  Neck: Supple.  Respiratory: Bilateral equal breath sounds,  Cardiovascular: S1, S2 normal,  Gastrointestinal: Bowel Sounds present, soft, non tender.  Extremities: No edema  Neurological: Alert and Oriented x 3, no focal deficits  Psychiatric: Normal mood, normal affect  Data:-  Allergies :   No Known Allergies    Hospital Medications:   MEDICATIONS  (STANDING):  apixaban 5 milliGRAM(s) Oral two times a day  aspirin enteric coated 81 milliGRAM(s) Oral daily  atorvastatin 40 milliGRAM(s) Oral at bedtime  calcitriol   Capsule 1 MICROGram(s) Oral daily  calcium carbonate   1250 mG (OsCal) 3 Tablet(s) Oral four times a day  carvedilol 25 milliGRAM(s) Oral every 12 hours  chlorhexidine 2% Cloths 1 Application(s) Topical daily  cholecalciferol 4000 Unit(s) Oral daily  epoetin tammi-epbx (RETACRIT) Injectable 06607 Unit(s) IV Push <User Schedule>  folic acid 1 milliGRAM(s) Oral daily  hydrALAZINE 10 milliGRAM(s) Oral every 12 hours  influenza  Vaccine (HIGH DOSE) 0.7 milliLiter(s) IntraMuscular once  levothyroxine 100 MICROGram(s) Oral daily  lidocaine/prilocaine Cream 1 Application(s) Topical <User Schedule>  magnesium sulfate  IVPB 1 Gram(s) IV Intermittent once  NIFEdipine XL 60 milliGRAM(s) Oral daily  pantoprazole    Tablet 40 milliGRAM(s) Oral before breakfast    10-30    135  |  92<L>  |  66<H>  ----------------------------<  108<H>  4.2   |  23  |  7.27<H>    Ca    6.7<L>      30 Oct 2021 07:44  Phos  4.7     10-30  Mg     2.00     10-30      Creatinine Trend: 7.27 <--, 11.05 <--, 9.15 <--, 8.80 <--, 8.24 <--, 7.63 <--, 6.25 <--, 10.85 <--, 9.52 <--, 8.56 <--, 7.02 <--, 5.03 <--                        9.7    5.95  )-----------( 255      ( 30 Oct 2021 07:44 )             32.6

## 2021-10-30 NOTE — PROGRESS NOTE ADULT - ASSESSMENT
67 y/o M w/ESRD on HD TTS, HTN. Renal following for ESRD Mx.  	  ESRD on HD TTS  K, acceptable  new RFA AVF s/p infiltration w/hematoma outpt -s/p OR angiogram 10/21, now workign well    using avf, no issues. s/p PC removal   Hyponatremia noted-likely 2/2 dilutional. clinically not in chf. much improved    plan  HD today to return to TTS schedule  no cramps    hypocalcemia - asymptomatic - etiology unclear likely secondary -  intact pth 25-30 noted    ipth low, [id number] low 29 noted-   ca remains low                s/p cagluconate 1gm ivpb   # holding off renvela. phos at goal  # stared vitD 2k po qd>increased to 4k qd  # inc balwinder carbonate 2 >3 caps 4 times per day b/w meals  # increased calcitriol 0.5 >1mg daily 10/26  # using high calcium bath - 3 balwinder on dialysis,     Anemia in ckd -Hb at goal- c/w epo 10k tiw w/hd  HTN, controlled-bp better, stable  c/w home bp meds-coreg, hydrALAZINE -dec hydrALAZINE  to bid 10/25        For any question, call:  Cell # [phone]  Pager # [phone]  Callback # [phone]

## 2021-10-31 LAB
ANION GAP SERPL CALC-SCNC: 15 MMOL/L — HIGH (ref 7–14)
BASOPHILS # BLD AUTO: 0.05 K/UL — SIGNIFICANT CHANGE UP (ref 0–0.2)
BASOPHILS NFR BLD AUTO: 0.9 % — SIGNIFICANT CHANGE UP (ref 0–2)
BUN SERPL-MCNC: 39 MG/DL — HIGH (ref 7–23)
CALCIUM SERPL-MCNC: 7.4 MG/DL — LOW (ref 8.4–10.5)
CHLORIDE SERPL-SCNC: 94 MMOL/L — LOW (ref 98–107)
CO2 SERPL-SCNC: 27 MMOL/L — SIGNIFICANT CHANGE UP (ref 22–31)
CREAT SERPL-MCNC: 5.44 MG/DL — HIGH (ref 0.5–1.3)
EOSINOPHIL # BLD AUTO: 0.28 K/UL — SIGNIFICANT CHANGE UP (ref 0–0.5)
EOSINOPHIL NFR BLD AUTO: 5.1 % — SIGNIFICANT CHANGE UP (ref 0–6)
GLUCOSE SERPL-MCNC: 85 MG/DL — SIGNIFICANT CHANGE UP (ref 70–99)
HCT VFR BLD CALC: 30 % — LOW (ref 39–50)
HGB BLD-MCNC: 9.5 G/DL — LOW (ref 13–17)
IANC: 3.11 K/UL — SIGNIFICANT CHANGE UP (ref 1.5–8.5)
IMM GRANULOCYTES NFR BLD AUTO: 1.3 % — SIGNIFICANT CHANGE UP (ref 0–1.5)
LYMPHOCYTES # BLD AUTO: 1.22 K/UL — SIGNIFICANT CHANGE UP (ref 1–3.3)
LYMPHOCYTES # BLD AUTO: 22.4 % — SIGNIFICANT CHANGE UP (ref 13–44)
MAGNESIUM SERPL-MCNC: 1.8 MG/DL — SIGNIFICANT CHANGE UP (ref 1.6–2.6)
MCHC RBC-ENTMCNC: 26.9 PG — LOW (ref 27–34)
MCHC RBC-ENTMCNC: 31.7 GM/DL — LOW (ref 32–36)
MCV RBC AUTO: 85 FL — SIGNIFICANT CHANGE UP (ref 80–100)
MONOCYTES # BLD AUTO: 0.72 K/UL — SIGNIFICANT CHANGE UP (ref 0–0.9)
MONOCYTES NFR BLD AUTO: 13.2 % — SIGNIFICANT CHANGE UP (ref 2–14)
NEUTROPHILS # BLD AUTO: 3.11 K/UL — SIGNIFICANT CHANGE UP (ref 1.8–7.4)
NEUTROPHILS NFR BLD AUTO: 57.1 % — SIGNIFICANT CHANGE UP (ref 43–77)
NRBC # BLD: 0 /100 WBCS — SIGNIFICANT CHANGE UP
NRBC # FLD: 0 K/UL — SIGNIFICANT CHANGE UP
PHOSPHATE SERPL-MCNC: 4.1 MG/DL — SIGNIFICANT CHANGE UP (ref 2.5–4.5)
PLATELET # BLD AUTO: 219 K/UL — SIGNIFICANT CHANGE UP (ref 150–400)
POTASSIUM SERPL-MCNC: 3.6 MMOL/L — SIGNIFICANT CHANGE UP (ref 3.5–5.3)
POTASSIUM SERPL-SCNC: 3.6 MMOL/L — SIGNIFICANT CHANGE UP (ref 3.5–5.3)
RBC # BLD: 3.53 M/UL — LOW (ref 4.2–5.8)
RBC # FLD: 17.9 % — HIGH (ref 10.3–14.5)
SODIUM SERPL-SCNC: 136 MMOL/L — SIGNIFICANT CHANGE UP (ref 135–145)
WBC # BLD: 5.45 K/UL — SIGNIFICANT CHANGE UP (ref 3.8–10.5)
WBC # FLD AUTO: 5.45 K/UL — SIGNIFICANT CHANGE UP (ref 3.8–10.5)

## 2021-10-31 RX ADMIN — Medication 3 TABLET(S): at 12:39

## 2021-10-31 RX ADMIN — CALCITRIOL 1 MICROGRAM(S): 0.5 CAPSULE ORAL at 12:40

## 2021-10-31 RX ADMIN — Medication 4000 UNIT(S): at 12:40

## 2021-10-31 RX ADMIN — Medication 10 MILLIGRAM(S): at 17:41

## 2021-10-31 RX ADMIN — ATORVASTATIN CALCIUM 40 MILLIGRAM(S): 80 TABLET, FILM COATED ORAL at 22:47

## 2021-10-31 RX ADMIN — Medication 1 MILLIGRAM(S): at 12:39

## 2021-10-31 RX ADMIN — Medication 3 TABLET(S): at 17:31

## 2021-10-31 RX ADMIN — APIXABAN 5 MILLIGRAM(S): 2.5 TABLET, FILM COATED ORAL at 05:38

## 2021-10-31 RX ADMIN — Medication 60 MILLIGRAM(S): at 05:39

## 2021-10-31 RX ADMIN — Medication 3 TABLET(S): at 05:38

## 2021-10-31 RX ADMIN — CARVEDILOL PHOSPHATE 25 MILLIGRAM(S): 80 CAPSULE, EXTENDED RELEASE ORAL at 17:41

## 2021-10-31 RX ADMIN — Medication 81 MILLIGRAM(S): at 12:41

## 2021-10-31 RX ADMIN — APIXABAN 5 MILLIGRAM(S): 2.5 TABLET, FILM COATED ORAL at 17:41

## 2021-10-31 RX ADMIN — CARVEDILOL PHOSPHATE 25 MILLIGRAM(S): 80 CAPSULE, EXTENDED RELEASE ORAL at 05:39

## 2021-10-31 RX ADMIN — Medication 3 TABLET(S): at 23:21

## 2021-10-31 RX ADMIN — Medication 100 MICROGRAM(S): at 05:38

## 2021-10-31 RX ADMIN — Medication 10 MILLIGRAM(S): at 05:39

## 2021-10-31 RX ADMIN — PANTOPRAZOLE SODIUM 40 MILLIGRAM(S): 20 TABLET, DELAYED RELEASE ORAL at 05:43

## 2021-10-31 RX ADMIN — CHLORHEXIDINE GLUCONATE 1 APPLICATION(S): 213 SOLUTION TOPICAL at 13:01

## 2021-10-31 NOTE — PROGRESS NOTE ADULT - SUBJECTIVE AND OBJECTIVE BOX
Patient is a 66y old  Male who presents with a chief complaint of R arm pain on Av fistula site (30 Oct 2021 17:28)      SUBJECTIVE / OVERNIGHT EVENTS:    Events noted.  CONSTITUTIONAL: No fever,  or fatigue  RESPIRATORY: No cough, wheezing,  No shortness of breath  CARDIOVASCULAR: No chest pain, palpitations, dizziness, or leg swelling  GASTROINTESTINAL: No abdominal or epigastric pain. No nausea, vomiting.  NEUROLOGICAL: No headaches,     MEDICATIONS  (STANDING):  apixaban 5 milliGRAM(s) Oral two times a day  aspirin enteric coated 81 milliGRAM(s) Oral daily  atorvastatin 40 milliGRAM(s) Oral at bedtime  calcitriol   Capsule 1 MICROGram(s) Oral daily  calcium carbonate   1250 mG (OsCal) 3 Tablet(s) Oral four times a day  carvedilol 25 milliGRAM(s) Oral every 12 hours  chlorhexidine 2% Cloths 1 Application(s) Topical daily  cholecalciferol 4000 Unit(s) Oral daily  epoetin tammi-epbx (RETACRIT) Injectable 34478 Unit(s) IV Push <User Schedule>  folic acid 1 milliGRAM(s) Oral daily  hydrALAZINE 10 milliGRAM(s) Oral every 12 hours  influenza  Vaccine (HIGH DOSE) 0.7 milliLiter(s) IntraMuscular once  levothyroxine 100 MICROGram(s) Oral daily  lidocaine/prilocaine Cream 1 Application(s) Topical <User Schedule>  magnesium sulfate  IVPB 1 Gram(s) IV Intermittent once  NIFEdipine XL 60 milliGRAM(s) Oral daily  pantoprazole    Tablet 40 milliGRAM(s) Oral before breakfast    MEDICATIONS  (PRN):  acetaminophen   Tablet .. 650 milliGRAM(s) Oral every 6 hours PRN Temp greater or equal to 38C (100.4F), Mild Pain (1 - 3)        CAPILLARY BLOOD GLUCOSE        I&O's Summary    30 Oct 2021 07:01  -  31 Oct 2021 07:00  --------------------------------------------------------  IN: 400 mL / OUT: 1900 mL / NET: -1500 mL        T(C): 37.1 (10-31-21 @ 18:05), Max: 37.1 (10-31-21 @ 18:05)  HR: 73 (10-31-21 @ 18:05) (62 - 76)  BP: 155/93 (10-31-21 @ 18:05) (131/81 - 155/93)  RR: 18 (10-31-21 @ 18:05) (18 - 18)  SpO2: 100% (10-31-21 @ 18:05) (100% - 100%)    PHYSICAL EXAM:  GENERAL: NAD  NECK: Supple, No JVD  CHEST/LUNG: Clear to auscultation bilaterally; No wheezing.  HEART: Regular rate and rhythm; No murmurs, rubs, or gallops  ABDOMEN: Soft, Nontender, Nondistended; Bowel sounds present  EXTREMITIES:   No edema  NEUROLOGY: AAO X 3      LABS:                        9.5    5.45  )-----------( 219      ( 31 Oct 2021 06:57 )             30.0     10-31    136  |  94<L>  |  39<H>  ----------------------------<  85  3.6   |  27  |  5.44<H>    Ca    7.4<L>      31 Oct 2021 06:57  Phos  4.1     10-31  Mg     1.80     10-31              CAPILLARY BLOOD GLUCOSE            RADIOLOGY & ADDITIONAL TESTS:    Imaging Personally Reviewed:    Consultant(s) Notes Reviewed:      Care Discussed with Consultants/Other Providers:    Wilfred Christian MD, CMD, FACP    [address] [place] [address]  Office Tel: [phone]  Cell: [phone]

## 2021-10-31 NOTE — PROGRESS NOTE ADULT - ASSESSMENT
65 y/o M with pmhx of ESRD on HD, afib on eliquis, anemia presented to the ED for R arm pain. Found to have rhinovirus pos and AV fistula clot/infection. Admitted for treatment and vascular management of AV fistula.    ESRD:  On HD  Nephro f/up noted    Hypocalcemia:    Ca improving

## 2021-11-01 LAB
ALBUMIN SERPL ELPH-MCNC: 3.9 G/DL — SIGNIFICANT CHANGE UP (ref 3.3–5)
ANION GAP SERPL CALC-SCNC: 19 MMOL/L — HIGH (ref 7–14)
BASOPHILS # BLD AUTO: 0.05 K/UL — SIGNIFICANT CHANGE UP (ref 0–0.2)
BASOPHILS NFR BLD AUTO: 0.8 % — SIGNIFICANT CHANGE UP (ref 0–2)
BUN SERPL-MCNC: 73 MG/DL — HIGH (ref 7–23)
CA-I BLD-SCNC: 0.76 MMOL/L — LOW (ref 1.15–1.29)
CALCIUM SERPL-MCNC: 6.6 MG/DL — LOW (ref 8.4–10.5)
CHLORIDE SERPL-SCNC: 96 MMOL/L — LOW (ref 98–107)
CO2 SERPL-SCNC: 22 MMOL/L — SIGNIFICANT CHANGE UP (ref 22–31)
CREAT SERPL-MCNC: 7.94 MG/DL — HIGH (ref 0.5–1.3)
EOSINOPHIL # BLD AUTO: 0.41 K/UL — SIGNIFICANT CHANGE UP (ref 0–0.5)
EOSINOPHIL NFR BLD AUTO: 6.3 % — HIGH (ref 0–6)
GLUCOSE SERPL-MCNC: 90 MG/DL — SIGNIFICANT CHANGE UP (ref 70–99)
HCT VFR BLD CALC: 28.5 % — LOW (ref 39–50)
HGB BLD-MCNC: 8.4 G/DL — LOW (ref 13–17)
IANC: 3.54 K/UL — SIGNIFICANT CHANGE UP (ref 1.5–8.5)
IMM GRANULOCYTES NFR BLD AUTO: 1.7 % — HIGH (ref 0–1.5)
LYMPHOCYTES # BLD AUTO: 1.63 K/UL — SIGNIFICANT CHANGE UP (ref 1–3.3)
LYMPHOCYTES # BLD AUTO: 24.9 % — SIGNIFICANT CHANGE UP (ref 13–44)
MAGNESIUM SERPL-MCNC: 1.9 MG/DL — SIGNIFICANT CHANGE UP (ref 1.6–2.6)
MCHC RBC-ENTMCNC: 26.2 PG — LOW (ref 27–34)
MCHC RBC-ENTMCNC: 29.5 GM/DL — LOW (ref 32–36)
MCV RBC AUTO: 88.8 FL — SIGNIFICANT CHANGE UP (ref 80–100)
MONOCYTES # BLD AUTO: 0.81 K/UL — SIGNIFICANT CHANGE UP (ref 0–0.9)
MONOCYTES NFR BLD AUTO: 12.4 % — SIGNIFICANT CHANGE UP (ref 2–14)
NEUTROPHILS # BLD AUTO: 3.54 K/UL — SIGNIFICANT CHANGE UP (ref 1.8–7.4)
NEUTROPHILS NFR BLD AUTO: 53.9 % — SIGNIFICANT CHANGE UP (ref 43–77)
NRBC # BLD: 0 /100 WBCS — SIGNIFICANT CHANGE UP
NRBC # FLD: 0.03 K/UL — HIGH
PHOSPHATE SERPL-MCNC: 5.1 MG/DL — HIGH (ref 2.5–4.5)
PLATELET # BLD AUTO: 221 K/UL — SIGNIFICANT CHANGE UP (ref 150–400)
POTASSIUM SERPL-MCNC: 4.3 MMOL/L — SIGNIFICANT CHANGE UP (ref 3.5–5.3)
POTASSIUM SERPL-SCNC: 4.3 MMOL/L — SIGNIFICANT CHANGE UP (ref 3.5–5.3)
RBC # BLD: 3.21 M/UL — LOW (ref 4.2–5.8)
RBC # FLD: 17.9 % — HIGH (ref 10.3–14.5)
SODIUM SERPL-SCNC: 137 MMOL/L — SIGNIFICANT CHANGE UP (ref 135–145)
WBC # BLD: 6.55 K/UL — SIGNIFICANT CHANGE UP (ref 3.8–10.5)
WBC # FLD AUTO: 6.55 K/UL — SIGNIFICANT CHANGE UP (ref 3.8–10.5)

## 2021-11-01 RX ORDER — CALCIUM GLUCONATE 100 MG/ML
1 VIAL (ML) INTRAVENOUS ONCE
Refills: 0 | Status: COMPLETED | OUTPATIENT
Start: 2021-11-01 | End: 2021-11-01

## 2021-11-01 RX ADMIN — CARVEDILOL PHOSPHATE 25 MILLIGRAM(S): 80 CAPSULE, EXTENDED RELEASE ORAL at 06:24

## 2021-11-01 RX ADMIN — CALCITRIOL 1 MICROGRAM(S): 0.5 CAPSULE ORAL at 11:02

## 2021-11-01 RX ADMIN — APIXABAN 5 MILLIGRAM(S): 2.5 TABLET, FILM COATED ORAL at 17:14

## 2021-11-01 RX ADMIN — PANTOPRAZOLE SODIUM 40 MILLIGRAM(S): 20 TABLET, DELAYED RELEASE ORAL at 06:24

## 2021-11-01 RX ADMIN — ATORVASTATIN CALCIUM 40 MILLIGRAM(S): 80 TABLET, FILM COATED ORAL at 22:15

## 2021-11-01 RX ADMIN — Medication 3 TABLET(S): at 11:03

## 2021-11-01 RX ADMIN — CARVEDILOL PHOSPHATE 25 MILLIGRAM(S): 80 CAPSULE, EXTENDED RELEASE ORAL at 17:13

## 2021-11-01 RX ADMIN — INFLUENZA VIRUS VACCINE 0.7 MILLILITER(S): 15; 15; 15; 15 SUSPENSION INTRAMUSCULAR at 07:20

## 2021-11-01 RX ADMIN — Medication 100 MICROGRAM(S): at 06:24

## 2021-11-01 RX ADMIN — Medication 3 TABLET(S): at 06:24

## 2021-11-01 RX ADMIN — Medication 3 TABLET(S): at 23:43

## 2021-11-01 RX ADMIN — Medication 10 MILLIGRAM(S): at 06:24

## 2021-11-01 RX ADMIN — APIXABAN 5 MILLIGRAM(S): 2.5 TABLET, FILM COATED ORAL at 06:24

## 2021-11-01 RX ADMIN — CHLORHEXIDINE GLUCONATE 1 APPLICATION(S): 213 SOLUTION TOPICAL at 11:03

## 2021-11-01 RX ADMIN — Medication 10 MILLIGRAM(S): at 17:14

## 2021-11-01 RX ADMIN — Medication 3 TABLET(S): at 17:13

## 2021-11-01 RX ADMIN — Medication 4000 UNIT(S): at 11:04

## 2021-11-01 RX ADMIN — Medication 60 MILLIGRAM(S): at 06:24

## 2021-11-01 RX ADMIN — Medication 50 GRAM(S): at 11:02

## 2021-11-01 RX ADMIN — Medication 1 MILLIGRAM(S): at 11:02

## 2021-11-01 RX ADMIN — Medication 81 MILLIGRAM(S): at 11:19

## 2021-11-01 NOTE — CHART NOTE - NSCHARTNOTEFT_GEN_A_CORE
Source: Patient [X]    Family [ ]     other [X ] electronic chart, RN    Diet : Diet, Renal Restrictions:   For patients receiving Renal Replacement - No Protein Restr, No Conc K, No Conc Phos, Low Sodium  Supplement Feeding Modality:  Oral  Nepro Cans or Servings Per Day:  1       Frequency:  Daily (10-22-21 @ 18:44)    Nutrition Follow-up note. Patient reports excellent appetite and PO intake, observed 100% of meal consumed. Also having PO supplement Nepro between meals. Patient denies any nausea/vomiting/diarrhea/constipation or difficulty chewing and swallowing. Patient had questions about therapeutic diet. RD reviewed diet recommendations. All questions and concerns answered to patient satisfaction. Continue good po intake encouraged. Importance of having well balanced meals and including High Biological Value Protein sources (i.e. chicken, turkey, beef, fish, eggs, etc.) emphasized. RD remains available, patient made aware.      Weight: POST HD weights:  (10/30)- 63.5kg, (10/29)-62.4kg, (10/26)-62.5kg, (10/23) 64.8kg, (10/21) 67kg.  Weight fluctuation likely related to fluid shift, patient on HD.       Pertinent Medications: atorvastatin  calcitriol   Capsule  calcium carbonate   1250 mG (OsCal)  carvedilol  cholecalciferol  folic acid  hydrALAZINE  levothyroxine  NIFEdipine XL  pantoprazole    Tablet    Pertinent Labs:  11-01 Na137 mmol/L Glu 90 mg/dL K+ 4.3 mmol/L Cr  7.94 mg/dL<H> BUN 73 mg/dL<H> 11-01 Phos 5.1 mg/dL<H> 11-01 Alb 3.9 g/dL      Skin: surgical incision.   No edema noted.     Estimated Needs:   [ X] no change since previous assessment  [ ] recalculated:       Previous Nutrition Diagnosis:      [X] Increased Nutrient Needs      Nutrition Diagnosis is [X] ongoing  [ ] resolved [ ] not applicable     Additional Recommendations:     1. Continue current diet order, which remains appropriate at this time.   2. Monitor weights, labs, BM's, skin integrity, p.o. intake.   3. Honor food and beverage preferences within diet restriction of patient in an effort to maximize level of nutrient intake   4. Consider Nephro-kiara daily.

## 2021-11-01 NOTE — PROGRESS NOTE ADULT - SUBJECTIVE AND OBJECTIVE BOX
[facility] - S Alicia / Ej S /D Federico/ SERA Bojorquez/ SERA Faust/ Jovan Garrison / M Danou/ O Gregorio  service -[phone], office [phone]  ---------------------------------------------------------------------------------------------------------------    Patient seen and examined bedside    Subjective and Objective: No overnight events, sob resolved. No complaints today. feeling better    Allergies: No Known Allergies      Hospital Medications:   MEDICATIONS  (STANDING):  apixaban 5 milliGRAM(s) Oral two times a day  aspirin enteric coated 81 milliGRAM(s) Oral daily  atorvastatin 40 milliGRAM(s) Oral at bedtime  calcitriol   Capsule 1 MICROGram(s) Oral daily  calcium carbonate   1250 mG (OsCal) 3 Tablet(s) Oral four times a day  carvedilol 25 milliGRAM(s) Oral every 12 hours  chlorhexidine 2% Cloths 1 Application(s) Topical daily  cholecalciferol 4000 Unit(s) Oral daily  epoetin tammi-epbx (RETACRIT) Injectable 72523 Unit(s) IV Push <User Schedule>  folic acid 1 milliGRAM(s) Oral daily  hydrALAZINE 10 milliGRAM(s) Oral every 12 hours  levothyroxine 100 MICROGram(s) Oral daily  lidocaine/prilocaine Cream 1 Application(s) Topical <User Schedule>  NIFEdipine XL 60 milliGRAM(s) Oral daily  pantoprazole    Tablet 40 milliGRAM(s) Oral before breakfast      REVIEW OF SYSTEMS:  CONSTITUTIONAL: No weakness, fevers or chills  EYES/ENT: No visual changes;  No vertigo or throat pain   NECK: No pain or stiffness  RESPIRATORY: No cough, wheezing, hemoptysis; No shortness of breath  CARDIOVASCULAR: No chest pain or palpitations.  GASTROINTESTINAL: No abdominal or epigastric pain. No nausea, vomiting, or hematemesis; No diarrhea or constipation. No melena or hematochezia.  GENITOURINARY: No dysuria, frequency, foamy urine, urinary urgency, incontinence or hematuria  NEUROLOGICAL: No numbness or weakness  SKIN: No itching, burning, rashes, or lesions   VASCULAR: No bilateral lower extremity edema.   All other review of systems is negative unless indicated above.    VITALS:  T(F): 98 (11-01-21 @ 09:32), Max: 98.8 (10-31-21 @ 18:05)  HR: 69 (11-01-21 @ 09:32)  BP: 155/93 (11-01-21 @ 09:32)  RR: 18 (11-01-21 @ 09:32)  SpO2: 100% (11-01-21 @ 09:32)  Wt(kg): --    10-31 @ 07:01  -  11-01 @ 07:00  --------------------------------------------------------  IN: 0 mL / OUT: 0 mL / NET: 0 mL    11-01 @ 07:01  -  11-01 @ 13:20  --------------------------------------------------------  IN: 0 mL / OUT: 0 mL / NET: 0 mL          PHYSICAL EXAM:  Constitutional: NAD  HEENT: anicteric sclera, oropharynx clear  Neck: No JVD  Respiratory: CTAB, no wheezes, rales or rhonchi  Cardiovascular: S1, S2, RRR  Gastrointestinal: BS+, soft, NT/ND  Extremities: No cyanosis or clubbing. No peripheral edema  Neurological: A/O x 3, no focal deficits  Psychiatric: Normal mood, normal affect  : No CVA tenderness. No siegel.   Skin: No rashes  Vascular Access:    LABS:  11-01    137  |  96<L>  |  73<H>  ----------------------------<  90  4.3   |  22  |  7.94<H>    Ca    6.6<L>      01 Nov 2021 07:14  Phos  5.1     11-01  Mg     1.90     11-01    TPro      /  Alb  3.9  /  TBili      /  DBili      /  AST      /  ALT      /  AlkPhos      11-01    Creatinine Trend: 7.94 <--, 5.44 <--, 7.27 <--, 11.05 <--, 9.15 <--, 8.80 <--, 8.24 <--, 7.63 <--, 6.25 <--, 10.85 <--                        8.4    6.55  )-----------( 221      ( 01 Nov 2021 07:14 )             28.5     Urine Studies:        RADIOLOGY & ADDITIONAL STUDIES:   [facility] - S Alicia / Ej S /D Federico/ S Maryellen/ S Govind/ Jovan Garrison / GAYATRI Matsonu/ O Gregorio  service -[phone], office [phone]  ---------------------------------------------------------------------------------------------------------------    Patient seen and examined bedside    Subjective and Objective: No overnight events, denied sob. No complaints today. feeling better    Allergies: No Known Allergies      Hospital Medications:   MEDICATIONS  (STANDING):  apixaban 5 milliGRAM(s) Oral two times a day  aspirin enteric coated 81 milliGRAM(s) Oral daily  atorvastatin 40 milliGRAM(s) Oral at bedtime  calcitriol   Capsule 1 MICROGram(s) Oral daily  calcium carbonate   1250 mG (OsCal) 3 Tablet(s) Oral four times a day  carvedilol 25 milliGRAM(s) Oral every 12 hours  chlorhexidine 2% Cloths 1 Application(s) Topical daily  cholecalciferol 4000 Unit(s) Oral daily  epoetin tammi-epbx (RETACRIT) Injectable 27494 Unit(s) IV Push <User Schedule>  folic acid 1 milliGRAM(s) Oral daily  hydrALAZINE 10 milliGRAM(s) Oral every 12 hours  levothyroxine 100 MICROGram(s) Oral daily  lidocaine/prilocaine Cream 1 Application(s) Topical <User Schedule>  NIFEdipine XL 60 milliGRAM(s) Oral daily  pantoprazole    Tablet 40 milliGRAM(s) Oral before breakfast    VITALS:  T(F): 98 (11-01-21 @ 09:32), Max: 98.8 (10-31-21 @ 18:05)  HR: 69 (11-01-21 @ 09:32)  BP: 155/93 (11-01-21 @ 09:32)  RR: 18 (11-01-21 @ 09:32)  SpO2: 100% (11-01-21 @ 09:32)  Wt(kg): --    10-31 @ 07:01  -  11-01 @ 07:00  --------------------------------------------------------  IN: 0 mL / OUT: 0 mL / NET: 0 mL    11-01 @ 07:01  -  11-01 @ 13:20  --------------------------------------------------------  IN: 0 mL / OUT: 0 mL / NET: 0 mL      PHYSICAL EXAM:  Constitutional: NAD  HEENT: anicteric sclera  Neck: No JVD  Respiratory: CTAB, no wheezes, rales or rhonchi  Cardiovascular: S1, S2, RRR  Gastrointestinal: BS+, soft, NT/ND  Extremities: No peripheral edema  Neurological: A/O x 3  Psychiatric: Normal mood, normal affect  : no siegel.   Vascular Access: RFA AVF+thrill     LABS:  11-01    137  |  96<L>  |  73<H>  ----------------------------<  90  4.3   |  22  |  7.94<H>    Ca    6.6<L>      01 Nov 2021 07:14  Phos  5.1     11-01  Mg     1.90     11-01    TPro      /  Alb  3.9  /  TBili      /  DBili      /  AST      /  ALT      /  AlkPhos      11-01    Creatinine Trend: 7.94 <--, 5.44 <--, 7.27 <--, 11.05 <--, 9.15 <--, 8.80 <--, 8.24 <--, 7.63 <--, 6.25 <--, 10.85 <--                        8.4    6.55  )-----------( 221      ( 01 Nov 2021 07:14 )             28.5     Urine Studies:        RADIOLOGY & ADDITIONAL STUDIES:

## 2021-11-01 NOTE — PROVIDER CONTACT NOTE (OTHER) - ASSESSMENT
Patient BP is 101/62. HR is 73
pt's vitals are stable
Patient BP is 107/66. HR is 82
Pt is sitting up in bed, A&Ox4 and denies any headaches at this time

## 2021-11-01 NOTE — PROVIDER CONTACT NOTE (OTHER) - REASON
Pt hypertensive to 170/100
Holding Hydralazine
Holding Hydralazine
Question about holding meds before hemodialysis

## 2021-11-01 NOTE — PROVIDER CONTACT NOTE (OTHER) - SITUATION
Pt hypertensive to 170/100
Holding Hydralazine
holding Hydralazine
Pt going for hemodialysis at 8:30 pm, pt requested to hold meds until after so they are not dialysed out of his body

## 2021-11-01 NOTE — PROVIDER CONTACT NOTE (OTHER) - BACKGROUND
Patient was admitted for ESRD, s/p RUE AVF placement on 6/3, and SOB and volume overload due to missed HD session, hx of HTN, HLD, thrombocytopenia, anemia, hyperkalemia
Pt on Hemodialysis
Pt s/p fistulogram with angioplasty of RUE
Patient was admitted for ESRD, s/p RUE AVF placement on 6/3, and SOB and volume overload due to missed HD session, hx of HTN, HLD, thrombocytopenia, anemia, hyperkalemia

## 2021-11-01 NOTE — PROGRESS NOTE ADULT - ASSESSMENT
65 y/o M w/ESRD on HD TTS, HTN. Renal following for ESRD Mx.  	  ESRD on HD TTS  K, acceptable  new RFA AVF s/p infiltration w/hematoma outpt -s/p OR angiogram 10/21, now workign well    using avf, no issues. s/p PC removal   Hyponatremia noted-likely 2/2 dilutional. clinically not in chf. resolved now    plan  HD tomorrow w/2k, 3 ca bath uf 2kg as tolearted  dose all meds for ESRD  renal diet, fluid restriction     hypocalcemia - asymptomatic - etiology unclear likely secondary -  intact pth 25-30 noted    ipth low, [id number] low 29 noted-   ca remains low                           # cagluconate 1gm ivpb again today  # holding off renvela. phos at goal  # stared vitD 2k po qd>increased to 4k qd  # c/w balwinder carbonate 3 caps 4 times per day b/w meals-taking w/meals-advised to take 2hrs after or 1 hr before meals for better ca absorption   # increased calcitriol 0.5 >1mg daily 10/26  # using high calcium bath - 3 balwinder on dialysis,     Anemia in ckd -Hb at goal- c/w epo 10k tiw w/hd  HTN, controlled-bp better, stable  c/w home bp meds-coreg, hydrALAZINE -dec hydrALAZINE  to bid 10/25      labs, chart reviewed  For any question, call:  Cell # [phone]  Pager # [phone]  Callback # [phone]

## 2021-11-01 NOTE — PROGRESS NOTE ADULT - SUBJECTIVE AND OBJECTIVE BOX
Patient is a 66y old  Male who presents with a chief complaint of R arm pain on Av fistula site (30 Oct 2021 17:28)      SUBJECTIVE / OVERNIGHT EVENTS:    Events noted.  CONSTITUTIONAL: No fever,  or fatigue  RESPIRATORY: No cough, wheezing,  No shortness of breath  CARDIOVASCULAR: No chest pain, palpitations, dizziness, or leg swelling  GASTROINTESTINAL: No abdominal or epigastric pain. No nausea, vomiting.  NEUROLOGICAL: No headaches,     MEDICATIONS  (STANDING):  apixaban 5 milliGRAM(s) Oral two times a day  aspirin enteric coated 81 milliGRAM(s) Oral daily  atorvastatin 40 milliGRAM(s) Oral at bedtime  calcitriol   Capsule 1 MICROGram(s) Oral daily  calcium carbonate   1250 mG (OsCal) 3 Tablet(s) Oral four times a day  carvedilol 25 milliGRAM(s) Oral every 12 hours  chlorhexidine 2% Cloths 1 Application(s) Topical daily  cholecalciferol 4000 Unit(s) Oral daily  epoetin tammi-epbx (RETACRIT) Injectable 92359 Unit(s) IV Push <User Schedule>  folic acid 1 milliGRAM(s) Oral daily  hydrALAZINE 10 milliGRAM(s) Oral every 12 hours  influenza  Vaccine (HIGH DOSE) 0.7 milliLiter(s) IntraMuscular once  levothyroxine 100 MICROGram(s) Oral daily  lidocaine/prilocaine Cream 1 Application(s) Topical <User Schedule>  magnesium sulfate  IVPB 1 Gram(s) IV Intermittent once  NIFEdipine XL 60 milliGRAM(s) Oral daily  pantoprazole    Tablet 40 milliGRAM(s) Oral before breakfast    MEDICATIONS  (PRN):  acetaminophen   Tablet .. 650 milliGRAM(s) Oral every 6 hours PRN Temp greater or equal to 38C (100.4F), Mild Pain (1 - 3)        CAPILLARY BLOOD GLUCOSE        I&O's Summary    30 Oct 2021 07:01  -  31 Oct 2021 07:00  --------------------------------------------------------  IN: 400 mL / OUT: 1900 mL / NET: -1500 mL        T(C): 37.1 (10-31-21 @ 18:05), Max: 37.1 (10-31-21 @ 18:05)  HR: 73 (10-31-21 @ 18:05) (62 - 76)  BP: 155/93 (10-31-21 @ 18:05) (131/81 - 155/93)  RR: 18 (10-31-21 @ 18:05) (18 - 18)  SpO2: 100% (10-31-21 @ 18:05) (100% - 100%)    PHYSICAL EXAM:  GENERAL: NAD  NECK: Supple, No JVD  CHEST/LUNG: Clear to auscultation bilaterally; No wheezing.  HEART: Regular rate and rhythm; No murmurs, rubs, or gallops  ABDOMEN: Soft, Nontender, Nondistended; Bowel sounds present  EXTREMITIES:   No edema  NEUROLOGY: AAO X 3      LABS:                        9.5    5.45  )-----------( 219      ( 31 Oct 2021 06:57 )             30.0     10-31    136  |  94<L>  |  39<H>  ----------------------------<  85  3.6   |  27  |  5.44<H>    Ca    7.4<L>      31 Oct 2021 06:57  Phos  4.1     10-31  Mg     1.80     10-31              CAPILLARY BLOOD GLUCOSE            RADIOLOGY & ADDITIONAL TESTS:    Imaging Personally Reviewed:    Consultant(s) Notes Reviewed:      Care Discussed with Consultants/Other Providers:    Wilfred Christian MD, CMD, FACP    [address] [place] [address]  Office Tel: [phone]  Cell: [phone]

## 2021-11-02 LAB
ALBUMIN SERPL ELPH-MCNC: 3.7 G/DL — SIGNIFICANT CHANGE UP (ref 3.3–5)
ANION GAP SERPL CALC-SCNC: 24 MMOL/L — HIGH (ref 7–14)
BUN SERPL-MCNC: 104 MG/DL — HIGH (ref 7–23)
CALCIUM SERPL-MCNC: 5.7 MG/DL — CRITICAL LOW (ref 8.4–10.5)
CHLORIDE SERPL-SCNC: 93 MMOL/L — LOW (ref 98–107)
CO2 SERPL-SCNC: 17 MMOL/L — LOW (ref 22–31)
CREAT SERPL-MCNC: 10.68 MG/DL — HIGH (ref 0.5–1.3)
GLUCOSE SERPL-MCNC: 117 MG/DL — HIGH (ref 70–99)
HCT VFR BLD CALC: 26.9 % — LOW (ref 39–50)
HGB BLD-MCNC: 8.7 G/DL — LOW (ref 13–17)
MAGNESIUM SERPL-MCNC: 1.7 MG/DL — SIGNIFICANT CHANGE UP (ref 1.6–2.6)
MCHC RBC-ENTMCNC: 27.4 PG — SIGNIFICANT CHANGE UP (ref 27–34)
MCHC RBC-ENTMCNC: 32.3 GM/DL — SIGNIFICANT CHANGE UP (ref 32–36)
MCV RBC AUTO: 84.6 FL — SIGNIFICANT CHANGE UP (ref 80–100)
NRBC # BLD: 0 /100 WBCS — SIGNIFICANT CHANGE UP
NRBC # FLD: 0 K/UL — SIGNIFICANT CHANGE UP
PHOSPHATE SERPL-MCNC: 5.8 MG/DL — HIGH (ref 2.5–4.5)
PLATELET # BLD AUTO: 208 K/UL — SIGNIFICANT CHANGE UP (ref 150–400)
POTASSIUM SERPL-MCNC: 5.1 MMOL/L — SIGNIFICANT CHANGE UP (ref 3.5–5.3)
POTASSIUM SERPL-SCNC: 5.1 MMOL/L — SIGNIFICANT CHANGE UP (ref 3.5–5.3)
RBC # BLD: 3.18 M/UL — LOW (ref 4.2–5.8)
RBC # FLD: 18.4 % — HIGH (ref 10.3–14.5)
SODIUM SERPL-SCNC: 134 MMOL/L — LOW (ref 135–145)
WBC # BLD: 9.04 K/UL — SIGNIFICANT CHANGE UP (ref 3.8–10.5)
WBC # FLD AUTO: 9.04 K/UL — SIGNIFICANT CHANGE UP (ref 3.8–10.5)

## 2021-11-02 PROCEDURE — 93010 ELECTROCARDIOGRAM REPORT: CPT

## 2021-11-02 RX ORDER — CALCIUM GLUCONATE 100 MG/ML
1 VIAL (ML) INTRAVENOUS ONCE
Refills: 0 | Status: COMPLETED | OUTPATIENT
Start: 2021-11-02 | End: 2021-11-02

## 2021-11-02 RX ORDER — CALCITRIOL 0.5 UG/1
1 CAPSULE ORAL
Refills: 0 | Status: DISCONTINUED | OUTPATIENT
Start: 2021-11-02 | End: 2021-11-04

## 2021-11-02 RX ADMIN — CALCITRIOL 1 MICROGRAM(S): 0.5 CAPSULE ORAL at 23:46

## 2021-11-02 RX ADMIN — PANTOPRAZOLE SODIUM 40 MILLIGRAM(S): 20 TABLET, DELAYED RELEASE ORAL at 06:12

## 2021-11-02 RX ADMIN — Medication 100 MICROGRAM(S): at 06:12

## 2021-11-02 RX ADMIN — APIXABAN 5 MILLIGRAM(S): 2.5 TABLET, FILM COATED ORAL at 23:44

## 2021-11-02 RX ADMIN — CARVEDILOL PHOSPHATE 25 MILLIGRAM(S): 80 CAPSULE, EXTENDED RELEASE ORAL at 21:58

## 2021-11-02 RX ADMIN — Medication 50 GRAM(S): at 22:24

## 2021-11-02 RX ADMIN — APIXABAN 5 MILLIGRAM(S): 2.5 TABLET, FILM COATED ORAL at 06:13

## 2021-11-02 RX ADMIN — Medication 1 MILLIGRAM(S): at 12:32

## 2021-11-02 RX ADMIN — CALCITRIOL 1 MICROGRAM(S): 0.5 CAPSULE ORAL at 12:33

## 2021-11-02 RX ADMIN — CHLORHEXIDINE GLUCONATE 1 APPLICATION(S): 213 SOLUTION TOPICAL at 12:33

## 2021-11-02 RX ADMIN — ERYTHROPOIETIN 10000 UNIT(S): 10000 INJECTION, SOLUTION INTRAVENOUS; SUBCUTANEOUS at 22:56

## 2021-11-02 RX ADMIN — Medication 4000 UNIT(S): at 12:32

## 2021-11-02 RX ADMIN — Medication 3 TABLET(S): at 12:32

## 2021-11-02 RX ADMIN — Medication 3 TABLET(S): at 06:11

## 2021-11-02 RX ADMIN — Medication 10 MILLIGRAM(S): at 09:49

## 2021-11-02 RX ADMIN — ATORVASTATIN CALCIUM 40 MILLIGRAM(S): 80 TABLET, FILM COATED ORAL at 23:48

## 2021-11-02 RX ADMIN — Medication 3 TABLET(S): at 23:44

## 2021-11-02 RX ADMIN — CARVEDILOL PHOSPHATE 25 MILLIGRAM(S): 80 CAPSULE, EXTENDED RELEASE ORAL at 06:59

## 2021-11-02 RX ADMIN — Medication 81 MILLIGRAM(S): at 12:32

## 2021-11-02 RX ADMIN — Medication 60 MILLIGRAM(S): at 14:17

## 2021-11-02 RX ADMIN — Medication 10 MILLIGRAM(S): at 21:59

## 2021-11-02 RX ADMIN — LIDOCAINE AND PRILOCAINE CREAM 1 APPLICATION(S): 25; 25 CREAM TOPICAL at 12:33

## 2021-11-02 NOTE — PROGRESS NOTE ADULT - SUBJECTIVE AND OBJECTIVE BOX
[facility] - S Alicia / Ej S /D Federico/ S Maryellen/ S Govind/ Jovan Garrison / GAYATRI Matsonu/ O Gregorio  service -[phone], office [phone]  ---------------------------------------------------------------------------------------------------------------    Patient seen and examined bedside    Subjective and Objective: No overnight events, denied sob. No complaints today. feeling better    Allergies: No Known Allergies      Hospital Medications:   MEDICATIONS  (STANDING):  apixaban 5 milliGRAM(s) Oral two times a day  aspirin enteric coated 81 milliGRAM(s) Oral daily  atorvastatin 40 milliGRAM(s) Oral at bedtime  calcitriol   Capsule 1 MICROGram(s) Oral daily  calcium carbonate   1250 mG (OsCal) 3 Tablet(s) Oral four times a day  carvedilol 25 milliGRAM(s) Oral every 12 hours  chlorhexidine 2% Cloths 1 Application(s) Topical daily  cholecalciferol 4000 Unit(s) Oral daily  epoetin tammi-epbx (RETACRIT) Injectable 42360 Unit(s) IV Push <User Schedule>  folic acid 1 milliGRAM(s) Oral daily  hydrALAZINE 10 milliGRAM(s) Oral every 12 hours  levothyroxine 100 MICROGram(s) Oral daily  lidocaine/prilocaine Cream 1 Application(s) Topical <User Schedule>  NIFEdipine XL 60 milliGRAM(s) Oral daily  pantoprazole    Tablet 40 milliGRAM(s) Oral before breakfast    VITALS:  T(F): 97.9 (11-02-21 @ 09:29), Max: 98.4 (11-02-21 @ 02:02)  HR: 74 (11-02-21 @ 09:29)  BP: 121/75 (11-02-21 @ 09:29)  RR: 18 (11-02-21 @ 09:29)  SpO2: 99% (11-02-21 @ 09:29)  Wt(kg): --    11-01 @ 07:01  -  11-02 @ 07:00  --------------------------------------------------------  IN: 0 mL / OUT: 0 mL / NET: 0 mL      PHYSICAL EXAM:  Constitutional: NAD  HEENT: anicteric sclera  Neck: No JVD  Respiratory: CTAB, no wheezes, rales or rhonchi  Cardiovascular: S1, S2, RRR  Gastrointestinal: BS+, soft, NT/ND  Extremities: No peripheral edema  Neurological: A/O x 3  Psychiatric: Normal mood, normal affect  : no siegel.   Vascular Access: RFA AVF+thrill     LABS:  11-01    137  |  96<L>  |  73<H>  ----------------------------<  90  4.3   |  22  |  7.94<H>    Ca    6.6<L>      01 Nov 2021 07:14  Phos  5.1     11-01  Mg     1.90     11-01    TPro      /  Alb  3.9  /  TBili      /  DBili      /  AST      /  ALT      /  AlkPhos      11-01    Creatinine Trend: 7.94 <--, 5.44 <--, 7.27 <--, 11.05 <--, 9.15 <--, 8.80 <--, 8.24 <--, 7.63 <--, 6.25 <--                        8.4    6.55  )-----------( 221      ( 01 Nov 2021 07:14 )             28.5     Urine Studies:        RADIOLOGY & ADDITIONAL STUDIES:

## 2021-11-02 NOTE — PROGRESS NOTE ADULT - SUBJECTIVE AND OBJECTIVE BOX
Patient is a 66y old  Male who presents with a chief complaint of R arm pain on Av fistula site (02 Nov 2021 14:03)      SUBJECTIVE / OVERNIGHT EVENTS:    Events noted.  CONSTITUTIONAL: No fever,  or fatigue  RESPIRATORY: No cough, wheezing,  No shortness of breath  CARDIOVASCULAR: No chest pain, palpitations, dizziness, or leg swelling  GASTROINTESTINAL: No abdominal or epigastric pain. No nausea, vomiting.  NEUROLOGICAL: No headaches,     MEDICATIONS  (STANDING):  apixaban 5 milliGRAM(s) Oral two times a day  aspirin enteric coated 81 milliGRAM(s) Oral daily  atorvastatin 40 milliGRAM(s) Oral at bedtime  calcitriol   Capsule 1 MICROGram(s) Oral two times a day  calcium carbonate   1250 mG (OsCal) 3 Tablet(s) Oral four times a day  calcium gluconate IVPB 1 Gram(s) IV Intermittent once  carvedilol 25 milliGRAM(s) Oral every 12 hours  chlorhexidine 2% Cloths 1 Application(s) Topical daily  cholecalciferol 4000 Unit(s) Oral daily  epoetin tammi-epbx (RETACRIT) Injectable 69034 Unit(s) IV Push <User Schedule>  folic acid 1 milliGRAM(s) Oral daily  hydrALAZINE 10 milliGRAM(s) Oral every 12 hours  levothyroxine 100 MICROGram(s) Oral daily  lidocaine/prilocaine Cream 1 Application(s) Topical <User Schedule>  NIFEdipine XL 60 milliGRAM(s) Oral daily  pantoprazole    Tablet 40 milliGRAM(s) Oral before breakfast    MEDICATIONS  (PRN):  acetaminophen   Tablet .. 650 milliGRAM(s) Oral every 6 hours PRN Temp greater or equal to 38C (100.4F), Mild Pain (1 - 3)        CAPILLARY BLOOD GLUCOSE        I&O's Summary    01 Nov 2021 07:01  -  02 Nov 2021 07:00  --------------------------------------------------------  IN: 0 mL / OUT: 0 mL / NET: 0 mL    02 Nov 2021 07:01  -  03 Nov 2021 00:18  --------------------------------------------------------  IN: 400 mL / OUT: 2400 mL / NET: -2000 mL        T(C): 37.4 (11-02-21 @ 22:54), Max: 37.4 (11-02-21 @ 22:54)  HR: 85 (11-02-21 @ 22:54) (67 - 85)  BP: 168/74 (11-02-21 @ 22:54) (110/66 - 168/74)  RR: 18 (11-02-21 @ 22:54) (16 - 18)  SpO2: 98% (11-02-21 @ 17:30) (98% - 99%)    PHYSICAL EXAM:  GENERAL: NAD  NECK: Supple, No JVD  CHEST/LUNG: Clear to auscultation bilaterally; No wheezing.  HEART: Regular rate and rhythm; No murmurs, rubs, or gallops  ABDOMEN: Soft, Nontender, Nondistended; Bowel sounds present  EXTREMITIES:   No edema  NEUROLOGY: AAO X 3      LABS:                        8.7    9.04  )-----------( 208      ( 02 Nov 2021 20:18 )             26.9     11-02    134<L>  |  93<L>  |  104<H>  ----------------------------<  117<H>  5.1   |  17<L>  |  10.68<H>    Ca    5.7<LL>      02 Nov 2021 20:18  Phos  5.8     11-02  Mg     1.70     11-02    TPro  x   /  Alb  3.7  /  TBili  x   /  DBili  x   /  AST  x   /  ALT  x   /  AlkPhos  x   11-02            CAPILLARY BLOOD GLUCOSE            RADIOLOGY & ADDITIONAL TESTS:    Imaging Personally Reviewed:    Consultant(s) Notes Reviewed:      Care Discussed with Consultants/Other Providers:    Wilfred Christian MD, CMD, FACP    [address] [place] [address]  Office Tel: [phone]  Cell: [phone]

## 2021-11-02 NOTE — PROGRESS NOTE ADULT - ASSESSMENT
65 y/o M w/ESRD on HD TTS, HTN. Renal following for ESRD Mx.  	  ESRD on HD TTS  K, acceptable  new RFA AVF s/p infiltration w/hematoma outpt -s/p OR angiogram 10/21, now workign well    using avf, no issues. s/p PC removal   Hyponatremia noted-likely 2/2 dilutional. clinically not in chf. resolved now    plan  plan for HD today w/2k, 3 ca bath, uf 2kg as tolerated  dose all meds for ESRD  renal diet, fluid restriction     hypocalcemia - asymptomatic - etiology unclear likely secondary -  intact pth 25-30 noted    ipth low, [id number] low 29 noted-   ca remains low                           # cagluconate 1gm ivpb again today  # holding off renvela. phos at goal  # stared vitD 2k po qd>increased to 4k qd  # c/w balwinder carbonate 3 caps 4 times per day b/w meals-taking w/meals-advised to take 2hrs after or 1 hr before meals for better ca absorption   # increased calcitriol 0.5 >1mg daily 10/26 >inc bid today  # using high calcium bath - 3 balwinder on dialysis,     Anemia in ckd -Hb at goal- c/w epo 10k tiw w/hd  HTN, controlled-bp better, stable  c/w home bp meds-coreg, hydrALAZINE -dec hydrALAZINE  to bid 10/25      labs, chart reviewed  For any question, call:  Cell # [phone]  Pager # [phone]  Callback # [phone]       65 y/o M w/ESRD on HD TTS, HTN. Renal following for ESRD Mx.  	  ESRD on HD TTS  K, acceptable  new RFA AVF s/p infiltration w/hematoma outpt -s/p OR angiogram 10/21, now workign well    using avf, no issues. s/p PC removal   Hyponatremia noted-likely 2/2 dilutional. clinically not in chf. resolved now    plan  plan for HD today w/2k, 3 ca bath, uf 2kg as tolerated  dose all meds for ESRD  renal diet, fluid restriction     hypocalcemia - asymptomatic - etiology unclear likely secondary -  intact pth 25-30 noted    ipth low, [id number] low 29 noted-   ca remains low                           # s/p ca gluconate 1gm ivpb 11/1. bmp to be drawn prehd today, replete if ca remains <7  # holding off renvela. phos at goal  # stared vitD 2k po qd>increased to 4k qd  # c/w balwinder carbonate 3 caps 4 times per day b/w meals-taking w/meals-advised to take 2hrs after or 1 hr before meals for better ca absorption   # increase calcitriol 0.5 >1mg daily > 1mg po bid   # using high calcium bath - 3 balwinder on dialysis,     Anemia in ckd -Hb at goal- c/w epo 10k tiw w/hd  HTN, controlled-bp better, stable  c/w home bp meds-coreg, hydrALAZINE -dec hydrALAZINE  to bid 10/25    poc d/w pt, HD RN, covering NP  labs, chart reviewed  For any question, call:  Cell # [phone]  Pager # [phone]  Callback # [phone]

## 2021-11-02 NOTE — CHART NOTE - NSCHARTNOTEFT_GEN_A_CORE
Patient BMP w/ Ca of 5.7. Case discussed w/ Dr. Vargas from nephro -will give Calcium 1G over 1 hr x2 and obtain EKG. BMP to be repeated in am for Ca monitoring. Will continue to monitor closely.

## 2021-11-03 LAB
ALBUMIN SERPL ELPH-MCNC: 3.6 G/DL — SIGNIFICANT CHANGE UP (ref 3.3–5)
ANION GAP SERPL CALC-SCNC: 15 MMOL/L — HIGH (ref 7–14)
BUN SERPL-MCNC: 48 MG/DL — HIGH (ref 7–23)
CA-I BLD-SCNC: 0.87 MMOL/L — LOW (ref 1.15–1.29)
CALCIUM SERPL-MCNC: 7.2 MG/DL — LOW (ref 8.4–10.5)
CHLORIDE SERPL-SCNC: 94 MMOL/L — LOW (ref 98–107)
CO2 SERPL-SCNC: 25 MMOL/L — SIGNIFICANT CHANGE UP (ref 22–31)
CREAT SERPL-MCNC: 6.62 MG/DL — HIGH (ref 0.5–1.3)
GLUCOSE SERPL-MCNC: 102 MG/DL — HIGH (ref 70–99)
HCT VFR BLD CALC: 28.6 % — LOW (ref 39–50)
HGB BLD-MCNC: 8.8 G/DL — LOW (ref 13–17)
MAGNESIUM SERPL-MCNC: 1.9 MG/DL — SIGNIFICANT CHANGE UP (ref 1.6–2.6)
MCHC RBC-ENTMCNC: 26.4 PG — LOW (ref 27–34)
MCHC RBC-ENTMCNC: 30.8 GM/DL — LOW (ref 32–36)
MCV RBC AUTO: 85.9 FL — SIGNIFICANT CHANGE UP (ref 80–100)
NRBC # BLD: 0 /100 WBCS — SIGNIFICANT CHANGE UP
NRBC # FLD: 0 K/UL — SIGNIFICANT CHANGE UP
PHOSPHATE SERPL-MCNC: 3.6 MG/DL — SIGNIFICANT CHANGE UP (ref 2.5–4.5)
PLATELET # BLD AUTO: 179 K/UL — SIGNIFICANT CHANGE UP (ref 150–400)
POTASSIUM SERPL-MCNC: 3.7 MMOL/L — SIGNIFICANT CHANGE UP (ref 3.5–5.3)
POTASSIUM SERPL-SCNC: 3.7 MMOL/L — SIGNIFICANT CHANGE UP (ref 3.5–5.3)
RBC # BLD: 3.33 M/UL — LOW (ref 4.2–5.8)
RBC # FLD: 18.4 % — HIGH (ref 10.3–14.5)
SODIUM SERPL-SCNC: 134 MMOL/L — LOW (ref 135–145)
WBC # BLD: 6.88 K/UL — SIGNIFICANT CHANGE UP (ref 3.8–10.5)
WBC # FLD AUTO: 6.88 K/UL — SIGNIFICANT CHANGE UP (ref 3.8–10.5)

## 2021-11-03 RX ADMIN — PANTOPRAZOLE SODIUM 40 MILLIGRAM(S): 20 TABLET, DELAYED RELEASE ORAL at 06:02

## 2021-11-03 RX ADMIN — APIXABAN 5 MILLIGRAM(S): 2.5 TABLET, FILM COATED ORAL at 06:04

## 2021-11-03 RX ADMIN — CARVEDILOL PHOSPHATE 25 MILLIGRAM(S): 80 CAPSULE, EXTENDED RELEASE ORAL at 18:06

## 2021-11-03 RX ADMIN — Medication 3 TABLET(S): at 06:03

## 2021-11-03 RX ADMIN — CALCITRIOL 1 MICROGRAM(S): 0.5 CAPSULE ORAL at 18:05

## 2021-11-03 RX ADMIN — CARVEDILOL PHOSPHATE 25 MILLIGRAM(S): 80 CAPSULE, EXTENDED RELEASE ORAL at 06:04

## 2021-11-03 RX ADMIN — Medication 3 TABLET(S): at 12:47

## 2021-11-03 RX ADMIN — Medication 10 MILLIGRAM(S): at 18:07

## 2021-11-03 RX ADMIN — Medication 1 MILLIGRAM(S): at 12:47

## 2021-11-03 RX ADMIN — ATORVASTATIN CALCIUM 40 MILLIGRAM(S): 80 TABLET, FILM COATED ORAL at 21:35

## 2021-11-03 RX ADMIN — APIXABAN 5 MILLIGRAM(S): 2.5 TABLET, FILM COATED ORAL at 18:06

## 2021-11-03 RX ADMIN — Medication 10 MILLIGRAM(S): at 06:05

## 2021-11-03 RX ADMIN — CALCITRIOL 1 MICROGRAM(S): 0.5 CAPSULE ORAL at 06:06

## 2021-11-03 RX ADMIN — Medication 81 MILLIGRAM(S): at 12:47

## 2021-11-03 RX ADMIN — Medication 60 MILLIGRAM(S): at 06:04

## 2021-11-03 RX ADMIN — Medication 4000 UNIT(S): at 12:47

## 2021-11-03 RX ADMIN — CHLORHEXIDINE GLUCONATE 1 APPLICATION(S): 213 SOLUTION TOPICAL at 12:48

## 2021-11-03 RX ADMIN — Medication 3 TABLET(S): at 18:04

## 2021-11-03 RX ADMIN — Medication 100 MICROGRAM(S): at 06:02

## 2021-11-03 RX ADMIN — Medication 50 GRAM(S): at 00:22

## 2021-11-03 NOTE — PROGRESS NOTE ADULT - SUBJECTIVE AND OBJECTIVE BOX
Patient is a 66y old  Male who presents with a chief complaint of R arm pain on Av fistula site (03 Nov 2021 18:45)      SUBJECTIVE / OVERNIGHT EVENTS:    Events noted.  CONSTITUTIONAL: No fever,  or fatigue  RESPIRATORY: No cough, wheezing,  No shortness of breath  CARDIOVASCULAR: No chest pain, palpitations, dizziness, or leg swelling  GASTROINTESTINAL: No abdominal or epigastric pain. No nausea, vomiting.  NEUROLOGICAL: No headaches,     MEDICATIONS  (STANDING):  apixaban 5 milliGRAM(s) Oral two times a day  aspirin enteric coated 81 milliGRAM(s) Oral daily  atorvastatin 40 milliGRAM(s) Oral at bedtime  calcitriol   Capsule 1 MICROGram(s) Oral two times a day  calcium carbonate   1250 mG (OsCal) 3 Tablet(s) Oral four times a day  carvedilol 25 milliGRAM(s) Oral every 12 hours  chlorhexidine 2% Cloths 1 Application(s) Topical daily  cholecalciferol 4000 Unit(s) Oral daily  epoetin tammi-epbx (RETACRIT) Injectable 28786 Unit(s) IV Push <User Schedule>  folic acid 1 milliGRAM(s) Oral daily  hydrALAZINE 10 milliGRAM(s) Oral every 12 hours  levothyroxine 100 MICROGram(s) Oral daily  lidocaine/prilocaine Cream 1 Application(s) Topical <User Schedule>  NIFEdipine XL 60 milliGRAM(s) Oral daily  pantoprazole    Tablet 40 milliGRAM(s) Oral before breakfast    MEDICATIONS  (PRN):  acetaminophen   Tablet .. 650 milliGRAM(s) Oral every 6 hours PRN Temp greater or equal to 38C (100.4F), Mild Pain (1 - 3)        CAPILLARY BLOOD GLUCOSE        I&O's Summary    02 Nov 2021 07:01  -  03 Nov 2021 07:00  --------------------------------------------------------  IN: 400 mL / OUT: 2400 mL / NET: -2000 mL    03 Nov 2021 07:01  -  03 Nov 2021 23:44  --------------------------------------------------------  IN: 240 mL / OUT: 0 mL / NET: 240 mL        T(C): 36.9 (11-03-21 @ 21:08), Max: 37.6 (11-03-21 @ 02:18)  HR: 85 (11-03-21 @ 21:08) (67 - 86)  BP: 135/72 (11-03-21 @ 21:08) (91/50 - 156/79)  RR: 18 (11-03-21 @ 21:08) (16 - 18)  SpO2: 98% (11-03-21 @ 21:08) (95% - 99%)    PHYSICAL EXAM:  GENERAL: NAD  NECK: Supple, No JVD  CHEST/LUNG: Clear to auscultation bilaterally; No wheezing.  HEART: Regular rate and rhythm; No murmurs, rubs, or gallops  ABDOMEN: Soft, Nontender, Nondistended; Bowel sounds present  EXTREMITIES:   No edema  NEUROLOGY: AAO X 3      LABS:                        8.8    6.88  )-----------( 179      ( 03 Nov 2021 08:04 )             28.6     11-03    134<L>  |  94<L>  |  48<H>  ----------------------------<  102<H>  3.7   |  25  |  6.62<H>    Ca    7.2<L>      03 Nov 2021 08:04  Phos  3.6     11-03  Mg     1.90     11-03    TPro  x   /  Alb  3.6  /  TBili  x   /  DBili  x   /  AST  x   /  ALT  x   /  AlkPhos  x   11-03            CAPILLARY BLOOD GLUCOSE            RADIOLOGY & ADDITIONAL TESTS:    Imaging Personally Reviewed:    Consultant(s) Notes Reviewed:      Care Discussed with Consultants/Other Providers:    Wilfred Christian MD, CMD, FACP    [address] [place] [address]  Office Tel: [phone]  Cell: [phone]

## 2021-11-03 NOTE — PROGRESS NOTE ADULT - ASSESSMENT
67 y/o M with pmhx of ESRD on HD, afib on eliquis, anemia presented to the ED for R arm pain. Found to have rhinovirus pos and AV fistula clot/infection. Admitted for treatment and vascular management of AV fistula.    ESRD:  On HD  Nephro f/up noted    Hypocalcemia:    Ca improving    Dc planning

## 2021-11-03 NOTE — PROGRESS NOTE ADULT - ASSESSMENT
65 y/o M w/ESRD on HD TTS, HTN. Renal following for ESRD Mx.  	  ESRD on HD TTS  K, acceptable  new RFA AVF s/p infiltration w/hematoma outpt -s/p OR angiogram 10/21, now workign well    using avf, no issues. s/p PC removal   Hyponatremia noted-likely 2/2 dilutional. clinically not in chf. resolved now    plan  s/p HD earlier today, Rx sheet reviewed, net UF 2kg, tolerated well. uneventful.  using 3 ca bath w/HD  dose all meds for ESRD  renal diet, fluid restriction     hypocalcemia - asymptomatic - etiology unclear likely secondary -  intact pth 25-30 noted    ipth low, [id number] low 29 noted-   ca level improving 7.2 today                            # holding off renvela. phos at goal  # stared vitD 2k po qd>increased to 4k qd  # c/w balwinder carbonate 3 caps 4 times per day b/w meals-taking w/meals-advised to take 2hrs after or 1 hr before meals for better ca absorption   # increased calcitriol 0.5 >1mg daily > 1mg po bid -continue  # using high calcium bath - 3 balwinder on dialysis,     Anemia in ckd -Hb at goal- c/w epo 10k tiw w/hd  HTN, controlled-bp better, stable  c/w home bp meds-coreg, hydrALAZINE -dec hydrALAZINE  to bid 10/25    poc d/w pt  labs, chart reviewed  For any question, call:  Cell # [phone]  Pager # [phone]  Callback # [phone]

## 2021-11-03 NOTE — PROGRESS NOTE ADULT - SUBJECTIVE AND OBJECTIVE BOX
[facility] - S Alicia / Ej S /D Federico/ SERA Bojorquez/ SERA Faust/ Jovan Garrison / GAYATRI Matsonu/ O Gregorio  service -[phone], office [phone]  ---------------------------------------------------------------------------------------------------------------    Patient seen and examined bedside    Subjective and Objective: No overnight events, sob resolved. No complaints today. feeling better    Allergies: No Known Allergies      Hospital Medications:   MEDICATIONS  (STANDING):  apixaban 5 milliGRAM(s) Oral two times a day  aspirin enteric coated 81 milliGRAM(s) Oral daily  atorvastatin 40 milliGRAM(s) Oral at bedtime  calcitriol   Capsule 1 MICROGram(s) Oral two times a day  calcium carbonate   1250 mG (OsCal) 3 Tablet(s) Oral four times a day  carvedilol 25 milliGRAM(s) Oral every 12 hours  chlorhexidine 2% Cloths 1 Application(s) Topical daily  cholecalciferol 4000 Unit(s) Oral daily  epoetin tammi-epbx (RETACRIT) Injectable 30494 Unit(s) IV Push <User Schedule>  folic acid 1 milliGRAM(s) Oral daily  hydrALAZINE 10 milliGRAM(s) Oral every 12 hours  levothyroxine 100 MICROGram(s) Oral daily  lidocaine/prilocaine Cream 1 Application(s) Topical <User Schedule>  NIFEdipine XL 60 milliGRAM(s) Oral daily  pantoprazole    Tablet 40 milliGRAM(s) Oral before breakfast      REVIEW OF SYSTEMS:  CONSTITUTIONAL: No weakness, fevers or chills  EYES/ENT: No visual changes;  No vertigo or throat pain   NECK: No pain or stiffness  RESPIRATORY: No cough, wheezing, hemoptysis; No shortness of breath  CARDIOVASCULAR: No chest pain or palpitations.  GASTROINTESTINAL: No abdominal or epigastric pain. No nausea, vomiting, or hematemesis; No diarrhea or constipation. No melena or hematochezia.  GENITOURINARY: No dysuria, frequency, foamy urine, urinary urgency, incontinence or hematuria  NEUROLOGICAL: No numbness or weakness  SKIN: No itching, burning, rashes, or lesions   VASCULAR: No bilateral lower extremity edema.   All other review of systems is negative unless indicated above.    VITALS:  T(F): 98.4 (11-03-21 @ 17:29), Max: 99.6 (11-03-21 @ 02:18)  HR: 78 (11-03-21 @ 17:29)  BP: 137/78 (11-03-21 @ 17:29)  RR: 17 (11-03-21 @ 17:29)  SpO2: 99% (11-03-21 @ 17:29)  Wt(kg): --    11-02 @ 07:01  -  11-03 @ 07:00  --------------------------------------------------------  IN: 400 mL / OUT: 2400 mL / NET: -2000 mL          PHYSICAL EXAM:  Constitutional: NAD  HEENT: anicteric sclera, oropharynx clear  Neck: No JVD  Respiratory: CTAB, no wheezes, rales or rhonchi  Cardiovascular: S1, S2, RRR  Gastrointestinal: BS+, soft, NT/ND  Extremities: No cyanosis or clubbing. No peripheral edema  Neurological: A/O x 3, no focal deficits  Psychiatric: Normal mood, normal affect  : No CVA tenderness. No siegel.   Skin: No rashes  Vascular Access:    LABS:  11-03    134<L>  |  94<L>  |  48<H>  ----------------------------<  102<H>  3.7   |  25  |  6.62<H>    Ca    7.2<L>      03 Nov 2021 08:04  Phos  3.6     11-03  Mg     1.90     11-03    TPro      /  Alb  3.6  /  TBili      /  DBili      /  AST      /  ALT      /  AlkPhos      11-03    Creatinine Trend: 6.62 <--, 10.68 <--, 7.94 <--, 5.44 <--, 7.27 <--, 11.05 <--, 9.15 <--, 8.80 <--, 8.24 <--                        8.8    6.88  )-----------( 179      ( 03 Nov 2021 08:04 )             28.6     Urine Studies:        RADIOLOGY & ADDITIONAL STUDIES:   [facility] - S Alicia / Ej S /D Federico/ S Maryellen/ S Govind/ Jovan Garrison / GAYATRI Garcia/ O Grgeorio  service [phone], office [phone]  ---------------------------------------------------------------------------------------------------------------    Patient seen and examined bedside    Subjective and Objective: No overnight events, denied sob. No complaints today. feeling better    Allergies: No Known Allergies      Hospital Medications:   MEDICATIONS  (STANDING):  apixaban 5 milliGRAM(s) Oral two times a day  aspirin enteric coated 81 milliGRAM(s) Oral daily  atorvastatin 40 milliGRAM(s) Oral at bedtime  calcitriol   Capsule 1 MICROGram(s) Oral two times a day  calcium carbonate   1250 mG (OsCal) 3 Tablet(s) Oral four times a day  carvedilol 25 milliGRAM(s) Oral every 12 hours  chlorhexidine 2% Cloths 1 Application(s) Topical daily  cholecalciferol 4000 Unit(s) Oral daily  epoetin tammi-epbx (RETACRIT) Injectable 13041 Unit(s) IV Push <User Schedule>  folic acid 1 milliGRAM(s) Oral daily  hydrALAZINE 10 milliGRAM(s) Oral every 12 hours  levothyroxine 100 MICROGram(s) Oral daily  lidocaine/prilocaine Cream 1 Application(s) Topical <User Schedule>  NIFEdipine XL 60 milliGRAM(s) Oral daily  pantoprazole    Tablet 40 milliGRAM(s) Oral before breakfast      VITALS:  T(F): 98.4 (11-03-21 @ 17:29), Max: 99.6 (11-03-21 @ 02:18)  HR: 78 (11-03-21 @ 17:29)  BP: 137/78 (11-03-21 @ 17:29)  RR: 17 (11-03-21 @ 17:29)  SpO2: 99% (11-03-21 @ 17:29)  Wt(kg): --    11-02 @ 07:01  -  11-03 @ 07:00  --------------------------------------------------------  IN: 400 mL / OUT: 2400 mL / NET: -2000 mL      PHYSICAL EXAM:  HEENT: anicteric sclera  Neck: No JVD  Respiratory: CTAB, no wheezes, rales or rhonchi  Cardiovascular: S1, S2, RRR  Gastrointestinal: BS+, soft, NT/ND  Extremities: No peripheral edema  Neurological: A/O x 3  Psychiatric: Normal mood, normal affect  : no siegel.   Vascular Access: RFA AVF+thrill     LABS:  11-03    134<L>  |  94<L>  |  48<H>  ----------------------------<  102<H>  3.7   |  25  |  6.62<H>    Ca    7.2<L>      03 Nov 2021 08:04  Phos  3.6     11-03  Mg     1.90     11-03    TPro      /  Alb  3.6  /  TBili      /  DBili      /  AST      /  ALT      /  AlkPhos      11-03    Creatinine Trend: 6.62 <--, 10.68 <--, 7.94 <--, 5.44 <--, 7.27 <--, 11.05 <--, 9.15 <--, 8.80 <--, 8.24 <--                        8.8    6.88  )-----------( 179      ( 03 Nov 2021 08:04 )             28.6     Urine Studies:        RADIOLOGY & ADDITIONAL STUDIES:

## 2021-11-04 LAB
ANION GAP SERPL CALC-SCNC: 19 MMOL/L — HIGH (ref 7–14)
BUN SERPL-MCNC: 81 MG/DL — HIGH (ref 7–23)
CALCIUM SERPL-MCNC: 6.6 MG/DL — LOW (ref 8.4–10.5)
CHLORIDE SERPL-SCNC: 92 MMOL/L — LOW (ref 98–107)
CO2 SERPL-SCNC: 22 MMOL/L — SIGNIFICANT CHANGE UP (ref 22–31)
CREAT SERPL-MCNC: 8.76 MG/DL — HIGH (ref 0.5–1.3)
GLUCOSE SERPL-MCNC: 87 MG/DL — SIGNIFICANT CHANGE UP (ref 70–99)
HCT VFR BLD CALC: 27.5 % — LOW (ref 39–50)
HGB BLD-MCNC: 8.3 G/DL — LOW (ref 13–17)
MAGNESIUM SERPL-MCNC: 1.9 MG/DL — SIGNIFICANT CHANGE UP (ref 1.6–2.6)
MCHC RBC-ENTMCNC: 26.2 PG — LOW (ref 27–34)
MCHC RBC-ENTMCNC: 30.2 GM/DL — LOW (ref 32–36)
MCV RBC AUTO: 86.8 FL — SIGNIFICANT CHANGE UP (ref 80–100)
NRBC # BLD: 0 /100 WBCS — SIGNIFICANT CHANGE UP
NRBC # FLD: 0 K/UL — SIGNIFICANT CHANGE UP
PHOSPHATE SERPL-MCNC: 6 MG/DL — HIGH (ref 2.5–4.5)
PLATELET # BLD AUTO: 184 K/UL — SIGNIFICANT CHANGE UP (ref 150–400)
POTASSIUM SERPL-MCNC: 3.9 MMOL/L — SIGNIFICANT CHANGE UP (ref 3.5–5.3)
POTASSIUM SERPL-SCNC: 3.9 MMOL/L — SIGNIFICANT CHANGE UP (ref 3.5–5.3)
RBC # BLD: 3.17 M/UL — LOW (ref 4.2–5.8)
RBC # FLD: 18.1 % — HIGH (ref 10.3–14.5)
SODIUM SERPL-SCNC: 133 MMOL/L — LOW (ref 135–145)
WBC # BLD: 5.72 K/UL — SIGNIFICANT CHANGE UP (ref 3.8–10.5)
WBC # FLD AUTO: 5.72 K/UL — SIGNIFICANT CHANGE UP (ref 3.8–10.5)

## 2021-11-04 RX ORDER — CALCIUM GLUCONATE 100 MG/ML
1 VIAL (ML) INTRAVENOUS ONCE
Refills: 0 | Status: COMPLETED | OUTPATIENT
Start: 2021-11-04 | End: 2021-11-04

## 2021-11-04 RX ORDER — ERYTHROPOIETIN 10000 [IU]/ML
14000 INJECTION, SOLUTION INTRAVENOUS; SUBCUTANEOUS
Refills: 0 | Status: DISCONTINUED | OUTPATIENT
Start: 2021-11-04 | End: 2021-11-05

## 2021-11-04 RX ORDER — CALCITRIOL 0.5 UG/1
1.5 CAPSULE ORAL
Refills: 0 | Status: DISCONTINUED | OUTPATIENT
Start: 2021-11-04 | End: 2021-11-05

## 2021-11-04 RX ADMIN — APIXABAN 5 MILLIGRAM(S): 2.5 TABLET, FILM COATED ORAL at 06:13

## 2021-11-04 RX ADMIN — Medication 50 GRAM(S): at 14:45

## 2021-11-04 RX ADMIN — Medication 3 TABLET(S): at 06:12

## 2021-11-04 RX ADMIN — Medication 3 TABLET(S): at 17:30

## 2021-11-04 RX ADMIN — Medication 4000 UNIT(S): at 11:11

## 2021-11-04 RX ADMIN — Medication 60 MILLIGRAM(S): at 06:12

## 2021-11-04 RX ADMIN — Medication 10 MILLIGRAM(S): at 06:13

## 2021-11-04 RX ADMIN — CALCITRIOL 1.5 MICROGRAM(S): 0.5 CAPSULE ORAL at 17:32

## 2021-11-04 RX ADMIN — PANTOPRAZOLE SODIUM 40 MILLIGRAM(S): 20 TABLET, DELAYED RELEASE ORAL at 06:12

## 2021-11-04 RX ADMIN — APIXABAN 5 MILLIGRAM(S): 2.5 TABLET, FILM COATED ORAL at 17:30

## 2021-11-04 RX ADMIN — Medication 100 MICROGRAM(S): at 06:13

## 2021-11-04 RX ADMIN — CARVEDILOL PHOSPHATE 25 MILLIGRAM(S): 80 CAPSULE, EXTENDED RELEASE ORAL at 06:13

## 2021-11-04 RX ADMIN — Medication 3 TABLET(S): at 00:41

## 2021-11-04 RX ADMIN — ERYTHROPOIETIN 14000 UNIT(S): 10000 INJECTION, SOLUTION INTRAVENOUS; SUBCUTANEOUS at 21:18

## 2021-11-04 RX ADMIN — LIDOCAINE AND PRILOCAINE CREAM 1 APPLICATION(S): 25; 25 CREAM TOPICAL at 18:19

## 2021-11-04 RX ADMIN — CHLORHEXIDINE GLUCONATE 1 APPLICATION(S): 213 SOLUTION TOPICAL at 11:13

## 2021-11-04 RX ADMIN — Medication 3 TABLET(S): at 11:13

## 2021-11-04 RX ADMIN — Medication 1 MILLIGRAM(S): at 11:10

## 2021-11-04 RX ADMIN — CALCITRIOL 1 MICROGRAM(S): 0.5 CAPSULE ORAL at 06:15

## 2021-11-04 RX ADMIN — Medication 81 MILLIGRAM(S): at 11:11

## 2021-11-04 NOTE — PROGRESS NOTE ADULT - PROVIDER SPECIALTY LIST ADULT
Nephrology
Vascular Surgery
Nephrology
Vascular Surgery
Internal Medicine
Nephrology
Vascular Surgery
Vascular Surgery
Internal Medicine
Nephrology
Nephrology
Vascular Surgery
Vascular Surgery
Internal Medicine

## 2021-11-04 NOTE — PROVIDER CONTACT NOTE (MEDICATION) - SITUATION
Blood pressure medications due, patient going down to dialysis at 1930
Patient BP in AM reported to MD and advised to wait until dialysis confirms patients treatment time to give all BP meds.

## 2021-11-04 NOTE — PROGRESS NOTE ADULT - SUBJECTIVE AND OBJECTIVE BOX
[facility] - S Alicia / Ej S /D Federico/ S Maryellen/ S Govind/ Jovan Garrison / GAYATRI Matsonu/ O Gregorio  service [phone], office [phone]  ---------------------------------------------------------------------------------------------------------------    Patient seen and examined bedside    Subjective and Objective: No overnight events, denied spasms/cramps/sob. No complaints today.     Allergies: No Known Allergies      Hospital Medications:   MEDICATIONS  (STANDING):  apixaban 5 milliGRAM(s) Oral two times a day  aspirin enteric coated 81 milliGRAM(s) Oral daily  atorvastatin 40 milliGRAM(s) Oral at bedtime  calcitriol   Capsule 1.5 MICROGram(s) Oral two times a day  calcium carbonate   1250 mG (OsCal) 3 Tablet(s) Oral four times a day  carvedilol 25 milliGRAM(s) Oral every 12 hours  chlorhexidine 2% Cloths 1 Application(s) Topical daily  cholecalciferol 4000 Unit(s) Oral daily  epoetin tammi-epbx (RETACRIT) Injectable 43535 Unit(s) IV Push <User Schedule>  folic acid 1 milliGRAM(s) Oral daily  hydrALAZINE 10 milliGRAM(s) Oral every 12 hours  levothyroxine 100 MICROGram(s) Oral daily  lidocaine/prilocaine Cream 1 Application(s) Topical <User Schedule>  NIFEdipine XL 60 milliGRAM(s) Oral daily  pantoprazole    Tablet 40 milliGRAM(s) Oral before breakfast      VITALS:  T(F): 98.5 (11-04-21 @ 17:22), Max: 98.5 (11-04-21 @ 17:22)  HR: 78 (11-04-21 @ 17:22)  BP: 138/86 (11-04-21 @ 17:22)  RR: 17 (11-04-21 @ 17:22)  SpO2: 97% (11-04-21 @ 17:22)  Wt(kg): --    11-03 @ 07:01  -  11-04 @ 07:00  --------------------------------------------------------  IN: 490 mL / OUT: 0 mL / NET: 490 mL    11-04 @ 07:01  -  11-04 @ 18:39  --------------------------------------------------------  IN: 480 mL / OUT: 0 mL / NET: 480 mL    PHYSICAL EXAM:  Constitutional: NAD  HEENT: anicteric sclera  Neck: No JVD  Respiratory: CTAB, no wheezes, rales or rhonchi  Cardiovascular: S1, S2, RRR  Gastrointestinal: BS+, soft, NT/ND  Extremities: No peripheral edema  Neurological: A/O x 3  Psychiatric: Normal mood, normal affect  : no siegel.   Vascular Access: RFA AVF+thrill       LABS:  11-04    133<L>  |  92<L>  |  81<H>  ----------------------------<  87  3.9   |  22  |  8.76<H>    Ca    6.6<L>      04 Nov 2021 06:23  Phos  6.0     11-04  Mg     1.90     11-04    TPro      /  Alb  3.6  /  TBili      /  DBili      /  AST      /  ALT      /  AlkPhos      11-03    Creatinine Trend: 8.76 <--, 6.62 <--, 10.68 <--, 7.94 <--, 5.44 <--, 7.27 <--, 11.05 <--                        8.3    5.72  )-----------( 184      ( 04 Nov 2021 06:23 )             27.5     Urine Studies:        RADIOLOGY & ADDITIONAL STUDIES:

## 2021-11-04 NOTE — PROGRESS NOTE ADULT - PROBLEM SELECTOR PLAN 3
- nephrology f/up noted  Continue HD via tunneled dialysis catheter.
- nephrology f/up noted  Continue HD via tunneled dialysis catheter.
- nephrology f/up noted  -HD per Renal.
- nephrology f/up noted  -HD per Renal..s/p perm.cath removal.  vascular f/u noted.-Can use RT.AVF
- nephrology f/up noted  -HD per Renal..s/p perm.cath removal.  vascular f/u noted.-Can use RT.AVF
- nephrology f/up noted  Continue HD via tunneled dialysis catheter.
- nephrology f/up noted  -HD per Renal..s/p perm.cath removal.  vascular f/u noted.-Can use RT.AVF
- nephrology f/up noted  -HD per Renal..s/p perm.cath removal.  vascular f/u noted.-Can use RT.AVF
- nephrology f/up noted  Continue HD via tunneled dialysis catheter.
Assessment:  - troponin 60, 56, trending down  - denies chest pain  - EKG shows T wave inversions which were seen on previous EKGs, no ST changes, NSR    Plan:  - monitor for chest pain  - mildly elevated troponins likely from ESRD
Assessment:  - troponin 60, 56, trending down  - denies chest pain  - EKG shows T wave inversions which were seen on previous EKGs, no ST changes, NSR    Plan:  - monitor for chest pain  - mildly elevated troponins likely from ESRD
- nephrology f/up noted  -HD per Renal..s/p perm.cath removal.  vascular f/u noted.-Can use RT.AVF
- nephrology f/up noted  -HD per Renal.
- nephrology f/up noted  -HD per Renal..s/p perm.cath removal.  vascular f/u noted.-Can use RT.AVF
- nephrology f/up noted  Continue HD via tunneled dialysis catheter.
- nephrology f/up noted  -HD per Renal..s/p perm.cath removal.  vascular f/u noted.-Can use RT.AVF
Assessment:  - troponin 60, 56, trending down  - denies chest pain  - EKG shows T wave inversions which were seen on previous EKGs, no ST changes, NSR    Plan:  - monitor for chest pain  - mildly elevated troponins likely from ESRD

## 2021-11-04 NOTE — CHART NOTE - NSCHARTNOTEFT_GEN_A_CORE
Dr. Monalisa Vargas aware of calcium level for today.  Increased Calcitriol to 1.5 mcg BID and ordered Calcium gluc 1 gram over 60 min as recommended by nephrologist.  Will repeat serum Ca in AM and possibly be able to DC pt if level is greater than 7.  Will continue to monitor.

## 2021-11-04 NOTE — PROGRESS NOTE ADULT - PROBLEM SELECTOR PROBLEM 3
ESRD on dialysis
ESRD on dialysis
Elevated troponin
ESRD on dialysis
Elevated troponin
ESRD on dialysis
Elevated troponin

## 2021-11-04 NOTE — PROGRESS NOTE ADULT - ASSESSMENT
65 y/o M with pmhx of ESRD on HD, afib on eliquis, anemia presented to the ED for R arm pain. Found to have rhinovirus pos and AV fistula clot/infection. Admitted for treatment and vascular management of AV fistula.    ESRD:  On HD  Nephro f/up noted    Hypocalcemia:    Ca improving    Dc planning

## 2021-11-04 NOTE — PROGRESS NOTE ADULT - PROBLEM SELECTOR PLAN 2
I Abimael Feldman MD have personally  seen and examined the patient today and have noted the findings and formulated the plan of care.  I Abimael Feldman MD have personally seen and examined the patient at bedside today at  7pm
Hold Eliquis for the procedure
-cont. Eliquis
Assessment:  - also found to have rhinovirus positive  - can be the cause of fevers at home    Plan:  - monitor and treat with symptom management  - tylenol prn
-cont. Eliquis
-cont. Eliquis
Hold Eliquis for the procedure
-cont. Eliquis
Hold Eliquis for the procedure
-cont. Eliquis
Hold Eliquis for the procedure
-cont. Eliquis
Hold Eliquis for the procedure
seen by vascular, no intervention. Resume Eliquis
-cont. Eliquis
-cont. Eliquis
I Abimael Feldman MD have seen and examined the patient today and agree with  the  evaluation, assessment and plan of the surgical house officer  MALLORY Feldman MD have personally seen and examined the patient at bedside today at 3 pm
Assessment:  - also found to have rhinovirus positive  - can be the cause of fevers at home    Plan:  - monitor and treat with symptom management  - tylenol prn
Assessment:  - also found to have rhinovirus positive  - can be the cause of fevers at home    Plan:  - monitor and treat with symptom management  - tylenol prn

## 2021-11-04 NOTE — PROVIDER CONTACT NOTE (MEDICATION) - REASON
Should patient BP be given in HD
Blood pressure medications due, patient going down to dialysis at 1930

## 2021-11-04 NOTE — PROGRESS NOTE ADULT - PROBLEM SELECTOR PROBLEM 1
ESRD on dialysis
Elevated troponin
Dialysis AV fistula infection
Elevated troponin
ESRD on dialysis
Elevated troponin
Dialysis AV fistula infection
Dialysis AV fistula infection

## 2021-11-04 NOTE — PROGRESS NOTE ADULT - SUBJECTIVE AND OBJECTIVE BOX
Patient is a 66y old  Male who presents with a chief complaint of R arm pain on Av fistula site (04 Nov 2021 18:39)      SUBJECTIVE / OVERNIGHT EVENTS:    Events noted.  CONSTITUTIONAL: No fever,  or fatigue  RESPIRATORY: No cough, wheezing,  No shortness of breath  CARDIOVASCULAR: No chest pain, palpitations  GASTROINTESTINAL: No abdominal or epigastric pain.   NEUROLOGICAL: No headaches,     MEDICATIONS  (STANDING):  apixaban 5 milliGRAM(s) Oral two times a day  aspirin enteric coated 81 milliGRAM(s) Oral daily  atorvastatin 40 milliGRAM(s) Oral at bedtime  calcitriol   Capsule 1.5 MICROGram(s) Oral two times a day  calcium carbonate   1250 mG (OsCal) 3 Tablet(s) Oral four times a day  carvedilol 25 milliGRAM(s) Oral every 12 hours  chlorhexidine 2% Cloths 1 Application(s) Topical daily  cholecalciferol 4000 Unit(s) Oral daily  epoetin tammi-epbx (RETACRIT) Injectable 43020 Unit(s) IV Push <User Schedule>  folic acid 1 milliGRAM(s) Oral daily  hydrALAZINE 10 milliGRAM(s) Oral every 12 hours  levothyroxine 100 MICROGram(s) Oral daily  lidocaine/prilocaine Cream 1 Application(s) Topical <User Schedule>  NIFEdipine XL 60 milliGRAM(s) Oral daily  pantoprazole    Tablet 40 milliGRAM(s) Oral before breakfast    MEDICATIONS  (PRN):  acetaminophen   Tablet .. 650 milliGRAM(s) Oral every 6 hours PRN Temp greater or equal to 38C (100.4F), Mild Pain (1 - 3)        CAPILLARY BLOOD GLUCOSE        I&O's Summary    03 Nov 2021 07:01  -  04 Nov 2021 07:00  --------------------------------------------------------  IN: 490 mL / OUT: 0 mL / NET: 490 mL    04 Nov 2021 07:01  -  04 Nov 2021 23:30  --------------------------------------------------------  IN: 480 mL / OUT: 0 mL / NET: 480 mL        T(C): 36.4 (11-04-21 @ 20:40), Max: 36.9 (11-04-21 @ 17:22)  HR: 92 (11-04-21 @ 20:40) (64 - 92)  BP: 159/89 (11-04-21 @ 20:40) (119/66 - 159/89)  RR: 17 (11-04-21 @ 20:40) (16 - 18)  SpO2: 97% (11-04-21 @ 17:22) (95% - 100%)    PHYSICAL EXAM:  GENERAL: NAD  NECK: Supple, No JVD  CHEST/LUNG: Clear to auscultation bilaterally; No wheezing.  HEART: Regular rate and rhythm; No murmurs, rubs, or gallops  ABDOMEN: Soft, Nontender, Nondistended; Bowel sounds present  EXTREMITIES:   No edema  NEUROLOGY: AAO X 3      LABS:                        8.3    5.72  )-----------( 184      ( 04 Nov 2021 06:23 )             27.5     11-04    133<L>  |  92<L>  |  81<H>  ----------------------------<  87  3.9   |  22  |  8.76<H>    Ca    6.6<L>      04 Nov 2021 06:23  Phos  6.0     11-04  Mg     1.90     11-04    TPro  x   /  Alb  3.6  /  TBili  x   /  DBili  x   /  AST  x   /  ALT  x   /  AlkPhos  x   11-03            CAPILLARY BLOOD GLUCOSE            RADIOLOGY & ADDITIONAL TESTS:    Imaging Personally Reviewed:    Consultant(s) Notes Reviewed:      Care Discussed with Consultants/Other Providers:    Wilfred Christian MD, CMD, FACP    [address] [id number]  Office Tel: [phone]  Cell: [phone]

## 2021-11-04 NOTE — PROGRESS NOTE ADULT - REASON FOR ADMISSION
R arm pain on Av fistula site

## 2021-11-04 NOTE — PROGRESS NOTE ADULT - PROBLEM/PLAN-1
DISPLAY PLAN FREE TEXT
None available

## 2021-11-04 NOTE — PROGRESS NOTE ADULT - ASSESSMENT
65 y/o M w/ESRD on HD TTS, HTN. Renal following for ESRD Mx.  	  ESRD on HD TTS  K, acceptable  new RFA AVF s/p infiltration w/hematoma outpt -s/p OR angiogram 10/21, now workign well    using avf, no issues. s/p PC removal   Hyponatremia noted-likely 2/2 dilutional. clinically not in chf. resolved now    plan  plan for HD today w/2k, 3 ca bath, uf 2kg as tolerated  dose all meds for ESRD  renal diet, fluid restriction     hypocalcemia - asymptomatic - etiology unclear likely secondary -  intact pth 25-30 noted    ipth low, [id number] low 29 noted-   ca remains low                           # s/p ca gluconate 1gm ivpb today  # holding off renvela. phos at goal  # stared vitD 2k po qd>increased to 4k qd  # c/w balwinder carbonate 3 caps 4 times per day b/w meals-taking w/meals-advised to take 2hrs after or 1 hr before meals for better ca absorption   # increase calcitriol 0.5 >1mg daily > 1mg po bid >1.5mcg bid  # using high calcium bath - 3 balwinder on dialysis,     Anemia in ckd -Hb <goal- inc epo 10>14k tiw w/hd  HTN, controlled-bp better, stable  c/w home bp meds-coreg, hydrALAZINE -dec hydrALAZINE  to bid 10/25    d/c plan once sr ca level 7 or above  poc d/w pt, HD RN, covering NP  labs, chart reviewed  For any question, call:  Cell # [phone]  Pager # [phone]  Callback # [phone]

## 2021-11-04 NOTE — PROGRESS NOTE ADULT - PROBLEM SELECTOR PROBLEM 2
Chronic atrial fibrillation
Problem with dialysis access
Chronic atrial fibrillation
Problem with dialysis access
Chronic atrial fibrillation
Chronic atrial fibrillation
Rhinovirus infection
Chronic atrial fibrillation
Rhinovirus infection
Rhinovirus infection

## 2021-11-04 NOTE — CHART NOTE - NSCHARTNOTESELECT_GEN_ALL_CORE
ACP NP/Event Note
ACP NP/Event Note
Event Note
Event Note
Post Op Check Note
Event Note
Nutrition Follow-up-Note-Registered Dietitian/Nutrition Services
Pre-Op Vascular Surgery
Vascular Surgery/Event Note

## 2021-11-05 ENCOUNTER — TRANSCRIPTION ENCOUNTER (OUTPATIENT)
Age: 66
End: 2021-11-05

## 2021-11-05 VITALS — HEART RATE: 67 BPM | DIASTOLIC BLOOD PRESSURE: 85 MMHG | SYSTOLIC BLOOD PRESSURE: 137 MMHG

## 2021-11-05 LAB
ANION GAP SERPL CALC-SCNC: 15 MMOL/L — HIGH (ref 7–14)
BUN SERPL-MCNC: 50 MG/DL — HIGH (ref 7–23)
CALCIUM SERPL-MCNC: 8 MG/DL — LOW (ref 8.4–10.5)
CHLORIDE SERPL-SCNC: 95 MMOL/L — LOW (ref 98–107)
CO2 SERPL-SCNC: 26 MMOL/L — SIGNIFICANT CHANGE UP (ref 22–31)
CREAT SERPL-MCNC: 5.97 MG/DL — HIGH (ref 0.5–1.3)
GLUCOSE SERPL-MCNC: 106 MG/DL — HIGH (ref 70–99)
HCT VFR BLD CALC: 27.8 % — LOW (ref 39–50)
HGB BLD-MCNC: 8.5 G/DL — LOW (ref 13–17)
MAGNESIUM SERPL-MCNC: 2 MG/DL — SIGNIFICANT CHANGE UP (ref 1.6–2.6)
MCHC RBC-ENTMCNC: 26.2 PG — LOW (ref 27–34)
MCHC RBC-ENTMCNC: 30.6 GM/DL — LOW (ref 32–36)
MCV RBC AUTO: 85.8 FL — SIGNIFICANT CHANGE UP (ref 80–100)
NRBC # BLD: 0 /100 WBCS — SIGNIFICANT CHANGE UP
NRBC # FLD: 0 K/UL — SIGNIFICANT CHANGE UP
PHOSPHATE SERPL-MCNC: 4.5 MG/DL — SIGNIFICANT CHANGE UP (ref 2.5–4.5)
PLATELET # BLD AUTO: 191 K/UL — SIGNIFICANT CHANGE UP (ref 150–400)
POTASSIUM SERPL-MCNC: 3.4 MMOL/L — LOW (ref 3.5–5.3)
POTASSIUM SERPL-SCNC: 3.4 MMOL/L — LOW (ref 3.5–5.3)
RBC # BLD: 3.24 M/UL — LOW (ref 4.2–5.8)
RBC # FLD: 18 % — HIGH (ref 10.3–14.5)
SODIUM SERPL-SCNC: 136 MMOL/L — SIGNIFICANT CHANGE UP (ref 135–145)
TSH SERPL-MCNC: 1.04 UIU/ML — SIGNIFICANT CHANGE UP (ref 0.27–4.2)
WBC # BLD: 5.44 K/UL — SIGNIFICANT CHANGE UP (ref 3.8–10.5)
WBC # FLD AUTO: 5.44 K/UL — SIGNIFICANT CHANGE UP (ref 3.8–10.5)

## 2021-11-05 RX ORDER — HYDRALAZINE HCL 50 MG
1 TABLET ORAL
Qty: 0 | Refills: 3 | DISCHARGE

## 2021-11-05 RX ORDER — HYDRALAZINE HCL 50 MG
1 TABLET ORAL
Qty: 60 | Refills: 0
Start: 2021-11-05

## 2021-11-05 RX ORDER — CHOLECALCIFEROL (VITAMIN D3) 125 MCG
4000 CAPSULE ORAL
Qty: 30 | Refills: 0
Start: 2021-11-05

## 2021-11-05 RX ORDER — ERYTHROPOIETIN 10000 [IU]/ML
14000 INJECTION, SOLUTION INTRAVENOUS; SUBCUTANEOUS
Qty: 0 | Refills: 0 | DISCHARGE
Start: 2021-11-05

## 2021-11-05 RX ORDER — CALCIUM CARBONATE 500(1250)
3 TABLET ORAL
Qty: 168 | Refills: 0
Start: 2021-11-05 | End: 2021-11-18

## 2021-11-05 RX ORDER — CALCITRIOL 0.5 UG/1
3 CAPSULE ORAL
Qty: 60 | Refills: 0
Start: 2021-11-05

## 2021-11-05 RX ORDER — PANTOPRAZOLE SODIUM 20 MG/1
1 TABLET, DELAYED RELEASE ORAL
Qty: 30 | Refills: 0
Start: 2021-11-05

## 2021-11-05 RX ORDER — SEVELAMER CARBONATE 2400 MG/1
3 POWDER, FOR SUSPENSION ORAL
Qty: 0 | Refills: 3 | DISCHARGE

## 2021-11-05 RX ADMIN — Medication 81 MILLIGRAM(S): at 11:43

## 2021-11-05 RX ADMIN — CARVEDILOL PHOSPHATE 25 MILLIGRAM(S): 80 CAPSULE, EXTENDED RELEASE ORAL at 11:42

## 2021-11-05 RX ADMIN — Medication 4000 UNIT(S): at 11:44

## 2021-11-05 RX ADMIN — CARVEDILOL PHOSPHATE 25 MILLIGRAM(S): 80 CAPSULE, EXTENDED RELEASE ORAL at 00:44

## 2021-11-05 RX ADMIN — CHLORHEXIDINE GLUCONATE 1 APPLICATION(S): 213 SOLUTION TOPICAL at 11:45

## 2021-11-05 RX ADMIN — Medication 10 MILLIGRAM(S): at 00:43

## 2021-11-05 RX ADMIN — APIXABAN 5 MILLIGRAM(S): 2.5 TABLET, FILM COATED ORAL at 06:17

## 2021-11-05 RX ADMIN — Medication 3 TABLET(S): at 11:40

## 2021-11-05 RX ADMIN — CALCITRIOL 1.5 MICROGRAM(S): 0.5 CAPSULE ORAL at 07:53

## 2021-11-05 RX ADMIN — Medication 100 MICROGRAM(S): at 06:26

## 2021-11-05 RX ADMIN — PANTOPRAZOLE SODIUM 40 MILLIGRAM(S): 20 TABLET, DELAYED RELEASE ORAL at 06:26

## 2021-11-05 RX ADMIN — ATORVASTATIN CALCIUM 40 MILLIGRAM(S): 80 TABLET, FILM COATED ORAL at 00:37

## 2021-11-05 RX ADMIN — Medication 1 MILLIGRAM(S): at 11:43

## 2021-11-05 RX ADMIN — Medication 60 MILLIGRAM(S): at 06:17

## 2021-11-05 RX ADMIN — Medication 10 MILLIGRAM(S): at 11:42

## 2021-11-05 RX ADMIN — Medication 3 TABLET(S): at 00:37

## 2021-11-05 RX ADMIN — Medication 3 TABLET(S): at 06:26

## 2021-11-05 NOTE — DISCHARGE NOTE PROVIDER - NSDCMRMEDTOKEN_GEN_ALL_CORE_FT
aspirin 81 mg oral delayed release tablet: 1 tab(s) orally once a day  atorvastatin 40 mg oral tablet: 1 tab(s) orally once a day (at bedtime)  carvedilol 25 mg oral tablet: 1 tab(s) orally 2 times a day  Eliquis 2.5 mg oral tablet: 1 tab(s) orally 2 times a day  folic acid 1 mg oral tablet: 1 tab(s) orally once a day  hydrALAZINE 10 mg oral tablet: 1 tab(s) orally every 6 hours  levothyroxine 100 mcg (0.1 mg) oral tablet: 1 tab(s) orally once a day  NIFEdipine 60 mg oral tablet, extended release: 1 tab(s) orally once a day  sevelamer carbonate 800 mg oral tablet: 3 tab(s) orally 3 times a day (with meals)   aspirin 81 mg oral delayed release tablet: 1 tab(s) orally once a day  atorvastatin 40 mg oral tablet: 1 tab(s) orally once a day (at bedtime)  carvedilol 25 mg oral tablet: 1 tab(s) orally 2 times a day  Eliquis 2.5 mg oral tablet: 1 tab(s) orally 2 times a day  epoetin tammi: 40783 unit(s) intravenous 3 times a week with dialysis   folic acid 1 mg oral tablet: 1 tab(s) orally once a day  hydrALAZINE 10 mg oral tablet: 1 tab(s) orally 2 times a day   levothyroxine 100 mcg (0.1 mg) oral tablet: 1 tab(s) orally once a day  NIFEdipine 60 mg oral tablet, extended release: 1 tab(s) orally once a day  pantoprazole 40 mg oral delayed release tablet: 1 tab(s) orally once a day (before a meal)  sevelamer carbonate 800 mg oral tablet: 3 tab(s) orally 3 times a day (with meals)   aspirin 81 mg oral delayed release tablet: 1 tab(s) orally once a day  atorvastatin 40 mg oral tablet: 1 tab(s) orally once a day (at bedtime)  calcitriol 0.5 mcg oral capsule: 3 cap(s) orally 2 times a day  calcium carbonate 1250 mg (500 mg elemental calcium) oral tablet: 3 tab(s) orally 4 times a day  carvedilol 25 mg oral tablet: 1 tab(s) orally 2 times a day  cholecalciferol oral tablet: 4000 unit(s) orally once a day  Eliquis 2.5 mg oral tablet: 1 tab(s) orally 2 times a day  epoetin tammi: 77454 unit(s) intravenous 3 times a week with dialysis   folic acid 1 mg oral tablet: 1 tab(s) orally once a day  hydrALAZINE 10 mg oral tablet: 1 tab(s) orally 2 times a day   levothyroxine 100 mcg (0.1 mg) oral tablet: 1 tab(s) orally once a day  NIFEdipine 60 mg oral tablet, extended release: 1 tab(s) orally once a day  pantoprazole 40 mg oral delayed release tablet: 1 tab(s) orally once a day (before a meal)

## 2021-11-05 NOTE — DISCHARGE NOTE PROVIDER - CARE PROVIDER_API CALL
Sharon Robertson)  Surgery; Vascular Surgery  [address], [address]  [place] [address]  Phone: [phone]  Fax: [phone]  Follow Up Time:

## 2021-11-05 NOTE — DISCHARGE NOTE PROVIDER - HOSPITAL COURSE
66M h/o ESRD on HD, AFib p/w R arm pain found to have rhinovirus and AV fistula clot/infection s/p fistulogram w/ resolution     Dialysis AV fistula infection.   - Patient presented with AV fistula pain likely due to infiltration now s/p R fistulogram on 10/22 w/ vascular   - Continued w/ HD via permacath removed by vascular on 10/27  - S/p RUE fistulogram w/ angioplasty, RRA accessed 10/21     Cellulitis   - Patient reported spiking fevers at home physical exam findings cannot rule out cellulitis.  - Started on cefazolin IV (10/16-10/21)  - MRSA negative; No leukocytosis; Afebrile     Rhinovirus infection.   - Also found to have rhinovirus positive and could be the cause of fevers at home; Supportive care    Chronic atrial fibrillation.   - No AFib on EKG; Trops elevated likely 2/2 renal disease; No chest pain   - Eliquis held for vascular intervention then resumed; Rate control w/ Coreg    HTN continued on Hydralazine, Procardia, Coreg     ESRD on HD  - Renal consulted  - Hyponatremia noted-likely 2/2 dilutional clinically not in CHF; Now resolved   - Potassium acceptable   - Holding off Renvela as phos at goal  - Started vitD increased to 4k daily  - Hypocalcemia started on Ca2+ carbonate 3 caps 4 times per day b/w meals-taking w/ meals-advised to take 2hrs after or 1 hr before meals for better absorption   - Increase calcitriol 0.5 > 1mg daily > 1mg PO BID > 1.5mcg BID  - PTH 25, Vit D 27.5    Dispo - Home; Medically cleared by Dr. Christian; Medications reviewed.

## 2021-11-05 NOTE — DISCHARGE NOTE NURSING/CASE MANAGEMENT/SOCIAL WORK - NSDCVIVACCINE_GEN_ALL_CORE_FT
influenza, high-dose, quadrivalent; 01-Nov-2021 07:20; Malka Houser (RN); [employer]; [id number] (Exp. Date: 31-May-2022); IntraMuscular; Deltoid Left.; 0.7 milliLiter(s); VIS (VIS Published: 06-Aug-2021, VIS Presented: 01-Nov-2021);

## 2021-11-05 NOTE — DISCHARGE NOTE NURSING/CASE MANAGEMENT/SOCIAL WORK - PATIENT PORTAL LINK FT
You can access the FollowMyHealth Patient Portal offered by [facility] by registering at the following website: [email or web address]. By joining [employer]’s FollowMyHealth portal, you will also be able to view your health information using other applications (apps) compatible with our system.

## 2021-12-01 ENCOUNTER — APPOINTMENT (OUTPATIENT)
Dept: VASCULAR SURGERY | Facility: CLINIC | Age: 66
End: 2021-12-01

## 2022-01-05 ENCOUNTER — APPOINTMENT (OUTPATIENT)
Dept: CARDIOLOGY | Facility: CLINIC | Age: 67
End: 2022-01-05

## 2022-01-10 ENCOUNTER — INPATIENT (INPATIENT)
Facility: HOSPITAL | Age: 67
LOS: 3 days | Discharge: HOME | End: 2022-01-14
Attending: INTERNAL MEDICINE | Admitting: INTERNAL MEDICINE
Payer: MEDICAID

## 2022-01-10 VITALS
HEART RATE: 85 BPM | DIASTOLIC BLOOD PRESSURE: 69 MMHG | SYSTOLIC BLOOD PRESSURE: 132 MMHG | RESPIRATION RATE: 18 BRPM | HEIGHT: 69 IN | TEMPERATURE: 98 F | OXYGEN SATURATION: 98 %

## 2022-01-10 DIAGNOSIS — I77.0 ARTERIOVENOUS FISTULA, ACQUIRED: ICD-10-CM

## 2022-01-10 DIAGNOSIS — E83.51 HYPOCALCEMIA: ICD-10-CM

## 2022-01-10 DIAGNOSIS — E03.9 HYPOTHYROIDISM, UNSPECIFIED: ICD-10-CM

## 2022-01-10 DIAGNOSIS — I77.0 ARTERIOVENOUS FISTULA, ACQUIRED: Chronic | ICD-10-CM

## 2022-01-10 DIAGNOSIS — N18.6 END STAGE RENAL DISEASE: ICD-10-CM

## 2022-01-10 DIAGNOSIS — I48.91 UNSPECIFIED ATRIAL FIBRILLATION: ICD-10-CM

## 2022-01-10 DIAGNOSIS — Z29.9 ENCOUNTER FOR PROPHYLACTIC MEASURES, UNSPECIFIED: ICD-10-CM

## 2022-01-10 DIAGNOSIS — I10 ESSENTIAL (PRIMARY) HYPERTENSION: ICD-10-CM

## 2022-01-10 LAB
ALBUMIN SERPL ELPH-MCNC: 4 G/DL — SIGNIFICANT CHANGE UP (ref 3.3–5)
ALBUMIN SERPL ELPH-MCNC: 4.4 G/DL — SIGNIFICANT CHANGE UP (ref 3.3–5)
ALP SERPL-CCNC: 106 U/L — SIGNIFICANT CHANGE UP (ref 40–120)
ALP SERPL-CCNC: 90 U/L — SIGNIFICANT CHANGE UP (ref 40–120)
ALT FLD-CCNC: 20 U/L — SIGNIFICANT CHANGE UP (ref 4–41)
ALT FLD-CCNC: 22 U/L — SIGNIFICANT CHANGE UP (ref 4–41)
ANION GAP SERPL CALC-SCNC: 21 MMOL/L — HIGH (ref 7–14)
ANION GAP SERPL CALC-SCNC: 22 MMOL/L — HIGH (ref 7–14)
APTT BLD: 30.4 SEC — SIGNIFICANT CHANGE UP (ref 27–36.3)
AST SERPL-CCNC: 16 U/L — SIGNIFICANT CHANGE UP (ref 4–40)
AST SERPL-CCNC: 33 U/L — SIGNIFICANT CHANGE UP (ref 4–40)
BASOPHILS # BLD AUTO: 0.04 K/UL — SIGNIFICANT CHANGE UP (ref 0–0.2)
BASOPHILS NFR BLD AUTO: 0.6 % — SIGNIFICANT CHANGE UP (ref 0–2)
BILIRUB SERPL-MCNC: 0.2 MG/DL — SIGNIFICANT CHANGE UP (ref 0.2–1.2)
BILIRUB SERPL-MCNC: 0.3 MG/DL — SIGNIFICANT CHANGE UP (ref 0.2–1.2)
BLD GP AB SCN SERPL QL: NEGATIVE — SIGNIFICANT CHANGE UP
BUN SERPL-MCNC: 63 MG/DL — HIGH (ref 7–23)
BUN SERPL-MCNC: 63 MG/DL — HIGH (ref 7–23)
CA-I BLD-SCNC: 0.47 MMOL/L — CRITICAL LOW (ref 1.15–1.29)
CALCIUM SERPL-MCNC: 5 MG/DL — CRITICAL LOW (ref 8.4–10.5)
CALCIUM SERPL-MCNC: 5.8 MG/DL — CRITICAL LOW (ref 8.4–10.5)
CHLORIDE SERPL-SCNC: 94 MMOL/L — LOW (ref 98–107)
CHLORIDE SERPL-SCNC: 97 MMOL/L — LOW (ref 98–107)
CO2 SERPL-SCNC: 20 MMOL/L — LOW (ref 22–31)
CO2 SERPL-SCNC: 21 MMOL/L — LOW (ref 22–31)
CREAT SERPL-MCNC: 10.3 MG/DL — HIGH (ref 0.5–1.3)
CREAT SERPL-MCNC: 10.88 MG/DL — HIGH (ref 0.5–1.3)
DIALYSIS INSTRUMENT RESULT - HEPATITIS B SURFACE ANTIGEN: NEGATIVE — SIGNIFICANT CHANGE UP
EOSINOPHIL # BLD AUTO: 0.24 K/UL — SIGNIFICANT CHANGE UP (ref 0–0.5)
EOSINOPHIL NFR BLD AUTO: 3.7 % — SIGNIFICANT CHANGE UP (ref 0–6)
GLUCOSE SERPL-MCNC: 122 MG/DL — HIGH (ref 70–99)
GLUCOSE SERPL-MCNC: 91 MG/DL — SIGNIFICANT CHANGE UP (ref 70–99)
HCT VFR BLD CALC: 43.8 % — SIGNIFICANT CHANGE UP (ref 39–50)
HGB BLD-MCNC: 13 G/DL — SIGNIFICANT CHANGE UP (ref 13–17)
IANC: 4.22 K/UL — SIGNIFICANT CHANGE UP (ref 1.5–8.5)
IMM GRANULOCYTES NFR BLD AUTO: 0.2 % — SIGNIFICANT CHANGE UP (ref 0–1.5)
INR BLD: 1.14 RATIO — SIGNIFICANT CHANGE UP (ref 0.88–1.16)
LIDOCAIN IGE QN: 70 U/L — HIGH (ref 7–60)
LYMPHOCYTES # BLD AUTO: 1.33 K/UL — SIGNIFICANT CHANGE UP (ref 1–3.3)
LYMPHOCYTES # BLD AUTO: 20.4 % — SIGNIFICANT CHANGE UP (ref 13–44)
MAGNESIUM SERPL-MCNC: 2.1 MG/DL — SIGNIFICANT CHANGE UP (ref 1.6–2.6)
MAGNESIUM SERPL-MCNC: 2.2 MG/DL — SIGNIFICANT CHANGE UP (ref 1.6–2.6)
MCHC RBC-ENTMCNC: 28 PG — SIGNIFICANT CHANGE UP (ref 27–34)
MCHC RBC-ENTMCNC: 29.7 GM/DL — LOW (ref 32–36)
MCV RBC AUTO: 94.4 FL — SIGNIFICANT CHANGE UP (ref 80–100)
MONOCYTES # BLD AUTO: 0.67 K/UL — SIGNIFICANT CHANGE UP (ref 0–0.9)
MONOCYTES NFR BLD AUTO: 10.3 % — SIGNIFICANT CHANGE UP (ref 2–14)
NEUTROPHILS # BLD AUTO: 4.22 K/UL — SIGNIFICANT CHANGE UP (ref 1.8–7.4)
NEUTROPHILS NFR BLD AUTO: 64.8 % — SIGNIFICANT CHANGE UP (ref 43–77)
NRBC # BLD: 0 /100 WBCS — SIGNIFICANT CHANGE UP
NRBC # FLD: 0 K/UL — SIGNIFICANT CHANGE UP
PHOSPHATE SERPL-MCNC: 6.1 MG/DL — HIGH (ref 2.5–4.5)
PLATELET # BLD AUTO: 112 K/UL — LOW (ref 150–400)
POTASSIUM SERPL-MCNC: 4.6 MMOL/L — SIGNIFICANT CHANGE UP (ref 3.5–5.3)
POTASSIUM SERPL-MCNC: 6.2 MMOL/L — CRITICAL HIGH (ref 3.5–5.3)
POTASSIUM SERPL-SCNC: 4.6 MMOL/L — SIGNIFICANT CHANGE UP (ref 3.5–5.3)
POTASSIUM SERPL-SCNC: 6.2 MMOL/L — CRITICAL HIGH (ref 3.5–5.3)
PROT SERPL-MCNC: 7.5 G/DL — SIGNIFICANT CHANGE UP (ref 6–8.3)
PROT SERPL-MCNC: 7.8 G/DL — SIGNIFICANT CHANGE UP (ref 6–8.3)
PROTHROM AB SERPL-ACNC: 12.9 SEC — SIGNIFICANT CHANGE UP (ref 10.6–13.6)
RBC # BLD: 4.64 M/UL — SIGNIFICANT CHANGE UP (ref 4.2–5.8)
RBC # FLD: 20 % — HIGH (ref 10.3–14.5)
RH IG SCN BLD-IMP: POSITIVE — SIGNIFICANT CHANGE UP
SARS-COV-2 RNA SPEC QL NAA+PROBE: SIGNIFICANT CHANGE UP
SODIUM SERPL-SCNC: 135 MMOL/L — SIGNIFICANT CHANGE UP (ref 135–145)
SODIUM SERPL-SCNC: 140 MMOL/L — SIGNIFICANT CHANGE UP (ref 135–145)
TROPONIN T, HIGH SENSITIVITY RESULT: 38 NG/L — SIGNIFICANT CHANGE UP
WBC # BLD: 6.51 K/UL — SIGNIFICANT CHANGE UP (ref 3.8–10.5)
WBC # FLD AUTO: 6.51 K/UL — SIGNIFICANT CHANGE UP (ref 3.8–10.5)

## 2022-01-10 PROCEDURE — 99285 EMERGENCY DEPT VISIT HI MDM: CPT | Mod: 25

## 2022-01-10 PROCEDURE — 93010 ELECTROCARDIOGRAM REPORT: CPT

## 2022-01-10 PROCEDURE — 99221 1ST HOSP IP/OBS SF/LOW 40: CPT | Mod: 57

## 2022-01-10 PROCEDURE — 99223 1ST HOSP IP/OBS HIGH 75: CPT

## 2022-01-10 RX ORDER — SODIUM ZIRCONIUM CYCLOSILICATE 10 G/10G
5 POWDER, FOR SUSPENSION ORAL ONCE
Refills: 0 | Status: COMPLETED | OUTPATIENT
Start: 2022-01-10 | End: 2022-01-10

## 2022-01-10 RX ORDER — CALCIUM GLUCONATE 100 MG/ML
2 VIAL (ML) INTRAVENOUS ONCE
Refills: 0 | Status: COMPLETED | OUTPATIENT
Start: 2022-01-10 | End: 2022-01-10

## 2022-01-10 RX ORDER — CALCIUM ACETATE 667 MG
1334 TABLET ORAL
Refills: 0 | Status: DISCONTINUED | OUTPATIENT
Start: 2022-01-10 | End: 2022-01-14

## 2022-01-10 RX ORDER — HYDRALAZINE HCL 50 MG
10 TABLET ORAL
Refills: 0 | Status: DISCONTINUED | OUTPATIENT
Start: 2022-01-10 | End: 2022-01-14

## 2022-01-10 RX ORDER — CALCITRIOL 0.5 UG/1
0.5 CAPSULE ORAL
Refills: 0 | Status: DISCONTINUED | OUTPATIENT
Start: 2022-01-10 | End: 2022-01-11

## 2022-01-10 RX ORDER — APIXABAN 2.5 MG/1
1 TABLET, FILM COATED ORAL
Qty: 0 | Refills: 0 | DISCHARGE

## 2022-01-10 RX ORDER — LANOLIN ALCOHOL/MO/W.PET/CERES
3 CREAM (GRAM) TOPICAL AT BEDTIME
Refills: 0 | Status: DISCONTINUED | OUTPATIENT
Start: 2022-01-10 | End: 2022-01-14

## 2022-01-10 RX ORDER — SODIUM ZIRCONIUM CYCLOSILICATE 10 G/10G
5 POWDER, FOR SUSPENSION ORAL ONCE
Refills: 0 | Status: DISCONTINUED | OUTPATIENT
Start: 2022-01-10 | End: 2022-01-10

## 2022-01-10 RX ORDER — CALCIUM CARBONATE 500(1250)
3 TABLET ORAL
Refills: 0 | Status: DISCONTINUED | OUTPATIENT
Start: 2022-01-10 | End: 2022-01-14

## 2022-01-10 RX ORDER — ACETAMINOPHEN 500 MG
650 TABLET ORAL EVERY 6 HOURS
Refills: 0 | Status: DISCONTINUED | OUTPATIENT
Start: 2022-01-10 | End: 2022-01-14

## 2022-01-10 RX ORDER — CHOLECALCIFEROL (VITAMIN D3) 125 MCG
4000 CAPSULE ORAL DAILY
Refills: 0 | Status: DISCONTINUED | OUTPATIENT
Start: 2022-01-10 | End: 2022-01-14

## 2022-01-10 RX ORDER — HEPARIN SODIUM 5000 [USP'U]/ML
5000 INJECTION INTRAVENOUS; SUBCUTANEOUS EVERY 12 HOURS
Refills: 0 | Status: DISCONTINUED | OUTPATIENT
Start: 2022-01-10 | End: 2022-01-14

## 2022-01-10 RX ORDER — PANTOPRAZOLE SODIUM 20 MG/1
40 TABLET, DELAYED RELEASE ORAL
Refills: 0 | Status: DISCONTINUED | OUTPATIENT
Start: 2022-01-10 | End: 2022-01-14

## 2022-01-10 RX ORDER — FOLIC ACID 0.8 MG
1 TABLET ORAL DAILY
Refills: 0 | Status: DISCONTINUED | OUTPATIENT
Start: 2022-01-10 | End: 2022-01-14

## 2022-01-10 RX ORDER — CARVEDILOL PHOSPHATE 80 MG/1
25 CAPSULE, EXTENDED RELEASE ORAL EVERY 12 HOURS
Refills: 0 | Status: DISCONTINUED | OUTPATIENT
Start: 2022-01-10 | End: 2022-01-14

## 2022-01-10 RX ORDER — ATORVASTATIN CALCIUM 80 MG/1
40 TABLET, FILM COATED ORAL AT BEDTIME
Refills: 0 | Status: DISCONTINUED | OUTPATIENT
Start: 2022-01-10 | End: 2022-01-14

## 2022-01-10 RX ORDER — LEVOTHYROXINE SODIUM 125 MCG
100 TABLET ORAL DAILY
Refills: 0 | Status: DISCONTINUED | OUTPATIENT
Start: 2022-01-10 | End: 2022-01-14

## 2022-01-10 RX ORDER — CHLORHEXIDINE GLUCONATE 213 G/1000ML
1 SOLUTION TOPICAL DAILY
Refills: 0 | Status: DISCONTINUED | OUTPATIENT
Start: 2022-01-10 | End: 2022-01-14

## 2022-01-10 RX ORDER — NIFEDIPINE 30 MG
60 TABLET, EXTENDED RELEASE 24 HR ORAL DAILY
Refills: 0 | Status: DISCONTINUED | OUTPATIENT
Start: 2022-01-10 | End: 2022-01-14

## 2022-01-10 RX ADMIN — ATORVASTATIN CALCIUM 40 MILLIGRAM(S): 80 TABLET, FILM COATED ORAL at 21:16

## 2022-01-10 RX ADMIN — CHLORHEXIDINE GLUCONATE 1 APPLICATION(S): 213 SOLUTION TOPICAL at 23:08

## 2022-01-10 RX ADMIN — Medication 1 MILLIGRAM(S): at 21:17

## 2022-01-10 RX ADMIN — Medication 10 MILLIGRAM(S): at 17:50

## 2022-01-10 RX ADMIN — CALCITRIOL 0.5 MICROGRAM(S): 0.5 CAPSULE ORAL at 17:50

## 2022-01-10 RX ADMIN — Medication 3 TABLET(S): at 23:09

## 2022-01-10 RX ADMIN — HEPARIN SODIUM 5000 UNIT(S): 5000 INJECTION INTRAVENOUS; SUBCUTANEOUS at 17:50

## 2022-01-10 RX ADMIN — Medication 4000 UNIT(S): at 21:17

## 2022-01-10 RX ADMIN — SODIUM ZIRCONIUM CYCLOSILICATE 5 GRAM(S): 10 POWDER, FOR SUSPENSION ORAL at 17:49

## 2022-01-10 RX ADMIN — CARVEDILOL PHOSPHATE 25 MILLIGRAM(S): 80 CAPSULE, EXTENDED RELEASE ORAL at 17:50

## 2022-01-10 RX ADMIN — Medication 200 GRAM(S): at 13:47

## 2022-01-10 NOTE — H&P ADULT - NSHPREVIEWOFSYSTEMS_GEN_ALL_CORE
Constitutional: no recent weight changes, fevers, night sweats. +fatigue and generalized weakness  Dermatologic: no lesions or rashes.  HEENT: denies visual changes, hearing changes, rhinitis, odynophagia, or dysphagia  Cardiovascular: denies palpitations, chest pain, edema  Respiratory: denies SOB, wheezing  Gastrointestinal: denies N/V/D, abdominal pain, hematochezia, melena  : denies dysuria, hematuria  MSK: denies weakness, joint pain  Endocrine: no cold or heat intolerance or excessive thirst or urination  Hematologic: no excessive bleeding or clotting  Neurologic: no headaches, vision changes, dizziness, fainting or numbness, tingling

## 2022-01-10 NOTE — PATIENT PROFILE ADULT - FALL HARM RISK - UNIVERSAL INTERVENTIONS
Bed in lowest position, wheels locked, appropriate side rails in place/Call bell, personal items and telephone in reach/Instruct patient to call for assistance before getting out of bed or chair/Non-slip footwear when patient is out of bed/[place] to call system/Physically safe environment - no spills, clutter or unnecessary equipment/Purposeful Proactive Rounding/Room/bathroom lighting operational, light cord in reach

## 2022-01-10 NOTE — H&P ADULT - HISTORY OF PRESENT ILLNESS
66 year old male with ESRD (on TTS HD), afib (no longer on eliquis, unclear why discontinued?), hx hypocalcemia, presented after outpatient labs revealed hypocalcemia and fistula has not been functioning properly (per patient, past 3 HD sessions ended prematurely secondary to issues with fistula, advised to come to hospital for further evaluation). Patient reports feeling more "weak" overall for the past week. No other symptoms out of the ordinary. No headache, nausea, vomiting, chest pain, palpitations, shortness of breath, abdominal pain, swelling, rash, fever, chills. Still making urine, typically urinates once daily, no dysuria.

## 2022-01-10 NOTE — H&P ADULT - ASSESSMENT
66 year old male with ESRD (on TTS HD), afib (no longer on eliquis, unclear why discontinued?), hx hypocalcemia, presented after outpatient labs revealed hypocalcemia and advised to follow up at ED as fistula has not been functioning properly. Last HD 1/8/22, per patient, HD session ended prematurely due to fistula issue.

## 2022-01-10 NOTE — ED ADULT NURSE NOTE - OBJECTIVE STATEMENT
Pt received to room 2. Pt is A&Ox4, c/o clogged HD fistula and low calcium. Pt states that he had routine blood work drawn this morning and was told his calcium was "dangerously low." Pt states that he received dialysis 3x a week on Tuesdays, Thursdays and Saturdays, pt states his last full treatment was last tuesday 1/4. Pt states he was unable to receive full dialysis treatment on Thursday and Saturday because of the clogged fistula. Pt has fistulas in both right and left arms, left arm is inactive since last year. Pt receives dialysis through right fistula. Bruit heard on auscultation, thrill unable to be palpated. Right extremity unable to be used. Pt denies SOB, muscle twitching, CP, dizziness. RR even and unlabored. Pt on CM, NS noted. VSS and noted. IV access obtained in left hand, 22G, labs sent. Awaiting further orders.

## 2022-01-10 NOTE — ED PROVIDER NOTE - OBJECTIVE STATEMENT
65y/o M w/ h/o ESRD (on HD via R aVF T/Th/S) p/w low calcium. Pt states that he has been feeling weak for the last weak, had calcium checked and was 4.2 told to come to ED. No other complaints. No fevers, chills, nausea, vomiting, chest pain, cough, sob, abdominal pain, back pain, dysuria, numbness, tingling.

## 2022-01-10 NOTE — CONSULT NOTE ADULT - SUBJECTIVE AND OBJECTIVE BOX
[facility] - S Alicia / Ej S /D Federico/ S Maryellen/ SERA Faust/ Jovan Garrison / M Danou/ O Gregorio  service -[phone], office [phone]  ---------------------------------------------------------------------------------------------------------------    Patient is a 66y Male whom presented to the hospital with   Denied recent NSAID use/Abx use/iv contrast studies.    Patient seen and examined    PAST MEDICAL & SURGICAL HISTORY:  HTN (Hypertension)    Chronic kidney disease    Kidney stones    Hemodialysis access, AV graft  Left upper extremity    Hyperparathyroidism    Anemia    Thrombocytopenia    Atrial fibrillation  on Eliquis    S/P Nephrectomy  (L) 2007 for kidney stones    Acquired arteriovenous fistula  left wrist  1/2015 - [facility], Left upper arm 4/2015 (approximate date)    AVF (arteriovenous fistula)        Allergies: No Known Allergies    Home Medications Reviewed  Hospital Medications:   MEDICATIONS  (STANDING):  atorvastatin 40 milliGRAM(s) Oral at bedtime  calcitriol   Capsule 0.5 MICROGram(s) Oral two times a day  calcium carbonate   1250 mG (OsCal) 3 Tablet(s) Oral four times a day  carvedilol 25 milliGRAM(s) Oral every 12 hours  chlorhexidine 2% Cloths 1 Application(s) Topical daily  cholecalciferol 4000 Unit(s) Oral daily  folic acid 1 milliGRAM(s) Oral daily  heparin   Injectable 5000 Unit(s) SubCutaneous every 12 hours  hydrALAZINE 10 milliGRAM(s) Oral two times a day  levothyroxine 100 MICROGram(s) Oral daily  NIFEdipine XL 60 milliGRAM(s) Oral daily  pantoprazole    Tablet 40 milliGRAM(s) Oral before breakfast  sodium zirconium cyclosilicate 5 Gram(s) Oral once    SOCIAL HISTORY:  Denies ETOh,Smoking, illicit drug use  FAMILY HISTORY:      REVIEW OF SYSTEMS:  CONSTITUTIONAL: No weakness, fevers or chills  EYES/ENT: No visual changes;  No vertigo or throat pain   NECK: No pain or stiffness  RESPIRATORY: No cough, wheezing, hemoptysis; No shortness of breath  CARDIOVASCULAR: No chest pain or palpitations.  GASTROINTESTINAL: No abdominal or epigastric pain. No nausea, vomiting, or hematemesis; No diarrhea or constipation. No melena or hematochezia.  GENITOURINARY: No dysuria, frequency, foamy urine, urinary urgency, incontinence or hematuria  NEUROLOGICAL: No numbness or weakness  SKIN: No itching, burning, rashes, or lesions   VASCULAR: No bilateral lower extremity edema.   All other review of systems is negative unless indicated above.    VITALS:  T(F): 97.9 (01-10-22 @ 14:42), Max: 98.4 (01-10-22 @ 13:57)  HR: 68 (01-10-22 @ 14:42)  BP: 117/73 (01-10-22 @ 14:42)  RR: 18 (01-10-22 @ 14:42)  SpO2: 98% (01-10-22 @ 14:42)  Wt(kg): --    Height (cm): 175.3 (01-10 @ 12:04)    PHYSICAL EXAM:  Constitutional: NAD  HEENT: anicteric sclera, oropharynx clear, MMM  Neck: No JVD  Respiratory: CTAB, no wheezes, rales or rhonchi  Cardiovascular: S1, S2, RRR  Gastrointestinal: BS+, soft, NT/ND  Extremities: No cyanosis or clubbing. No peripheral edema  Neurological: A/O x 3, no focal deficits  Psychiatric: Normal mood, normal affect  : No CVA tenderness. No siegel.   Skin: No rashes  Vascular Access:    LABS:  01-10    140  |  97<L>  |  63<H>  ----------------------------<  91  4.6   |  21<L>  |  10.88<H>    Ca    5.8<LL>      10 Jorje 2022 15:45  Phos  6.1     01-10  Mg     2.20     01-10    TPro  7.8  /  Alb  4.4  /  TBili  0.3  /  DBili      /  AST  16  /  ALT  20  /  AlkPhos  106  01-10    Creatinine Trend: 10.88 <--, 10.30 <--                        13.0   6.51  )-----------( 112      ( 10 Jorje 2022 13:12 )             43.8     Urine Studies:        RADIOLOGY & ADDITIONAL STUDIES:                 [facility] - S Alicia / Ej S /D Federico/ S Maryellen/ SERA Faust/ Jovan Garrison / GAYATRI Garcia/ O Gregorio  service -[phone], office [phone]  ---------------------------------------------------------------------------------------------------------------  Patient seen and examined in ER    66 year old male with ESRD (on TTS HD), afib (no longer on eliquis), hx hypocalcemia, presented after outpatient labs revealed hypocalcemia and fistula has not been functioning properly, pt was advised to come to hospital for further evaluation by outpt hd unit. Patient reports feeling more "weak" overall for the past week. Renal consulted for ESRD Mx. Pt well known to me, our gp pt from [facility] hd unit. per patient, past 3 HD sessions ended prematurely secondary to issues with fistula. No other symptoms. denied muscle twitchings/spasms, chest pain, palpitations, shortness of breath, abdominal pain, fever, chills. reports couldn't get ca carbonate and phoslo filled due to insurance coverage issue, was taking otc Tums 6-10/day. recently got approved for both meds.     PAST MEDICAL & SURGICAL HISTORY:  HTN (Hypertension)    Chronic kidney disease    Kidney stones    Hemodialysis access, AV graft  Left upper extremity    Hyperparathyroidism    Anemia    Thrombocytopenia    Atrial fibrillation  on Eliquis    S/P Nephrectomy  (L) 2007 for kidney stones    Acquired arteriovenous fistula  left wrist  1/2015 - [facility], Left upper arm 4/2015 (approximate date)    AVF (arteriovenous fistula)    Allergies: No Known Allergies    Home Medications Reviewed  Hospital Medications:   MEDICATIONS  (STANDING):  atorvastatin 40 milliGRAM(s) Oral at bedtime  calcitriol   Capsule 0.5 MICROGram(s) Oral two times a day  calcium carbonate   1250 mG (OsCal) 3 Tablet(s) Oral four times a day  carvedilol 25 milliGRAM(s) Oral every 12 hours  chlorhexidine 2% Cloths 1 Application(s) Topical daily  cholecalciferol 4000 Unit(s) Oral daily  folic acid 1 milliGRAM(s) Oral daily  heparin   Injectable 5000 Unit(s) SubCutaneous every 12 hours  hydrALAZINE 10 milliGRAM(s) Oral two times a day  levothyroxine 100 MICROGram(s) Oral daily  NIFEdipine XL 60 milliGRAM(s) Oral daily  pantoprazole    Tablet 40 milliGRAM(s) Oral before breakfast  sodium zirconium cyclosilicate 5 Gram(s) Oral once    SOCIAL HISTORY:  Denies ETOh,Smoking, illicit drug use  FAMILY HISTORY:    REVIEW OF SYSTEMS:  CONSTITUTIONAL: No weakness, fevers or chills  EYES/ENT: No visual changes;  No vertigo or throat pain   NECK: No pain or stiffness  RESPIRATORY: No cough, wheezing, hemoptysis; No shortness of breath  CARDIOVASCULAR: No chest pain or palpitations.  GASTROINTESTINAL: No abdominal or epigastric pain. No nausea, vomiting, or hematemesis; No diarrhea or constipation. No melena or hematochezia.  NEUROLOGICAL: No numbness or weakness  SKIN: No itching, burning, rashes, or lesions   VASCULAR: No bilateral lower extremity edema.   All other review of systems is negative unless indicated above.    VITALS:  T(F): 97.9 (01-10-22 @ 14:42), Max: 98.4 (01-10-22 @ 13:57)  HR: 68 (01-10-22 @ 14:42)  BP: 117/73 (01-10-22 @ 14:42)  RR: 18 (01-10-22 @ 14:42)  SpO2: 98% (01-10-22 @ 14:42)  Wt(kg): --    Height (cm): 175.3 (01-10 @ 12:04)    PHYSICAL EXAM:  Constitutional: NAD  HEENT: anicteric sclera  Neck: No JVD  Respiratory: CTAB, no wheezes, rales or rhonchi  Cardiovascular: S1, S2, RRR  Gastrointestinal: BS+, soft, NT/ND  Extremities: No peripheral edema  Neurological: A/O x 3, no focal deficits  Psychiatric: Normal mood, normal affect  : No siegel.   Skin: No rashes  Vascular Access: RFA AVF+thrill     LABS:  01-10    140  |  97<L>  |  63<H>  ----------------------------<  91  4.6   |  21<L>  |  10.88<H>    Ca    5.8<LL>      10 Jorje 2022 15:45  Phos  6.1     01-10  Mg     2.20     01-10    TPro  7.8  /  Alb  4.4  /  TBili  0.3  /  DBili      /  AST  16  /  ALT  20  /  AlkPhos  106  01-10    Creatinine Trend: 10.88 <--, 10.30 <--                        13.0   6.51  )-----------( 112      ( 10 Jorje 2022 13:12 )             43.8     Urine Studies:        RADIOLOGY & ADDITIONAL STUDIES:

## 2022-01-10 NOTE — CONSULT NOTE ADULT - ASSESSMENT
Interventional Radiology    Evaluate for Procedure: Fistulogram     HPI: 66y Male with ESRD (on TTS HD), afib (no longer on eliquis, unclear why discontinued?), hx hypocalcemia, presented after outpatient labs revealed hypocalcemia and fistula has not been functioning properly (per patient, past 3 HD sessions ended prematurely secondary to issues with fistula, advised to come to hospital for further evaluation). IR consulted for fistulogram.    Allergies: NKA    Medications (Abx/Cardiac/Anticoagulation/Blood Products)      Data:  175.3  T(C): 36.6  HR: 68  BP: 117/73  RR: 18  SpO2: 98%    -WBC 6.51 / HgB 13.0 / Hct 43.8 / Plt 112  -Na 140 / Cl 97 / BUN 63 / Glucose 91  -K 4.6 / CO2 21 / Cr 10.88  -ALT 20 / Alk Phos 106 / T.Bili 0.3  -INR 1.14 / PTT 30.4      Radiology: reviewed    Assessment/Plan:     Recommend vascular consult for fistulogram/fistula evaluation and call IR as necessary    --  Lotus Gorman MD  IR Pager [phone]
66y Male with ESRD (on TTS HD) through RUE fistula, afib (no longer on eliquis, unclear why discontinued?), hx hypocalcemia, presented after outpatient labs revealed hypocalcemia and fistula has not been functioning properly (per patient, past 3 HD sessions ended prematurely secondary to issues with fistula, advised to come to hospital for further evaluation). Vascular surgery consulted for evaluation.    Recommendations:  - Will plan for RUE fistulogram this week.  - Will update primary with OR date.    Discussed with vascular surgery fellow Dr. Wynn.    EMILIA Ross, PGY-2  [facility]  C Team Surgery  [id number]
66 year old male with ESRD (on TTS HD), afib (no longer on eliquis, unclear why discontinued?), hx hypocalcemia, presented after outpatient labs revealed hypocalcemia and advised to follow up at ED as fistula has not been functioning properly. Last HD 1/8/22, per patient, HD session ended prematurely due to fistula issue. Renal consulted for ESRD Mx.     ESRD on HD  Routine HD days TTS  Access -rt FA AVF  [facility]  s/p last HD 1/8  K, bicarb, vol acceptable  Anemia in CKD-Hb > goal    no indication for hd today  Informed consent for HD obtained from pt  plan for next routine hd Tuesday/tomorrow using AVF w/2k, 3.5ca(higher) bath, uf 2kg as tolerated   renal diet  dose all meds dose ESRD    AV fistula malfunction  ·  Per patient, last 3 HD sessions ended prematurely due to issues with fistula  - vas sx consulted- plan for fistulogram  Hypocalcemia, asympt  magnesium adequate  s/p iv ca gluconate 2gm  agree w/caco3 po 3tabs 4xday and calcitriol 0.5mcg bid     monitor ca/BMP daily   Hyperphosphatemia - added phoslo 2tidac  HTN, controlled- c/w COREG, HYDRALAZINE, NIFEdipine     poc d/w pt, resident  Thanks for consulting. will follow up.   labs, rad, chart reviewed  For any question, pl call:  Nephrology  Cell -[phone]  Office [phone]  Ans Serv [phone]

## 2022-01-10 NOTE — CONSULT NOTE ADULT - SUBJECTIVE AND OBJECTIVE BOX
Vascular Surgery Consult Note  Attending: Dr. Sharon Robertson  Service: C Team Surgery  [id number]    HPI: 66y Male with ESRD (on TTS HD) through RUE fistula, afib (no longer on eliquis, unclear why discontinued?), hx hypocalcemia, presented after outpatient labs revealed hypocalcemia and fistula has not been functioning properly (per patient, past 3 HD sessions ended prematurely secondary to issues with fistula, advised to come to hospital for further evaluation). Vascular surgery consulted for evaluation.    PAST MEDICAL & SURGICAL HISTORY:  HTN (Hypertension)  Chronic kidney disease  Kidney stones  Hemodialysis access, AV graft  Left upper extremity  Hyperparathyroidism  Anemia  Thrombocytopenia  Atrial fibrillation  on Eliquis  S/P Nephrectomy  (L) 2007 for kidney stones  Acquired arteriovenous fistula  left wrist  1/2015 - [facility], Left upper arm 4/2015 (approximate date)  AVF (arteriovenous fistula)    ALLERGIES:  No Known Allergies    PHYSICAL EXAM:  General: NAD, resting comfortably  HEENT: NC/AT, EOMI, normal hearing, no oral lesions, no LAD, neck supple  Pulmonary: normal resp effort, CTA-B  Cardiovascular: NSR, no murmurs  Abdominal: soft, ND/NT, no organomegaly  Extremities: RUE AVF with palpable thrill  Pulses: palpable distal pulses    VITAL SIGNS:  Vital Signs Last 24 Hrs  T(C): 36.6 (10 Jorje 2022 14:42), Max: 36.9 (10 Jorje 2022 13:57)  T(F): 97.9 (10 Jorje 2022 14:42), Max: 98.4 (10 Jorje 2022 13:57)  HR: 73 (10 Jorje 2022 17:40) (68 - 85)  BP: 149/83 (10 Jorje 2022 17:40) (105/70 - 149/83)  BP(mean): --  RR: 18 (10 Jorje 2022 14:42) (16 - 18)  SpO2: 98% (10 Jorje 2022 14:42) (98% - 100%)    I&O's Summary    LABS:                        13.0   6.51  )-----------( 112      ( 10 Jorje 2022 13:12 )             43.8     01-10    140  |  97<L>  |  63<H>  ----------------------------<  91  4.6   |  21<L>  |  10.88<H>    Ca    5.8<LL>      10 Jorje 2022 15:45  Phos  6.1     01-10  Mg     2.20     01-10    TPro  7.8  /  Alb  4.4  /  TBili  0.3  /  DBili  x   /  AST  16  /  ALT  20  /  AlkPhos  106  01-10    PT/INR - ( 10 Jorje 2022 13:12 )   PT: 12.9 sec;   INR: 1.14 ratio    PTT - ( 10 Jorje 2022 13:12 )  PTT:30.4 sec    LIVER FUNCTIONS - ( 10 Jorje 2022 15:45 )  Alb: 4.4 g/dL / Pro: 7.8 g/dL / ALK PHOS: 106 U/L / ALT: 20 U/L / AST: 16 U/L / GGT: x

## 2022-01-10 NOTE — ED PROVIDER NOTE - NS ED ROS FT
GENERAL: No fever, no chills  EYES: no change in vision  HEENT: no trouble swallowing, no trouble speaking  CARDIAC: no chest pain, no palpitations  PULMONARY: no cough, no SOB  GI: no abdominal pain, no nausea, no vomiting, no diarrhea, no constipation  : no dysuria, no frequency, no change in appearance, no odor of urine  SKIN: no rashes  NEURO: no headache, +weakness  MSK: no joint pain

## 2022-01-10 NOTE — PATIENT PROFILE ADULT - NSPROGENPREVTRANSF_GEN_A_NUR
Reviewed OPTIONS including AVASTIN/EYLEA/LUCENTIS and patient wants to proceed with LUCENTIS treatment AND REPEAT EVERY 5  WKS X 3. no

## 2022-01-10 NOTE — ED PROVIDER NOTE - CLINICAL SUMMARY MEDICAL DECISION MAKING FREE TEXT BOX
Sinan: presents with general weakness. hx of low Ca. now with very low Ca. ESRD on dialysis has not had full dialysis for week +. Not sob, good thrill. Will check labs and probably admit pt. Sinan: presents with general weakness. hx of low Ca. now with very low Ca. ESRD on dialysis has not had full dialysis for week +. Not sob, good thrill. Will check labs, EKG and admit for tele monitoring

## 2022-01-10 NOTE — H&P ADULT - NSHPLABSRESULTS_GEN_ALL_CORE
LABS:                        13.0   6.51  )-----------( 112      ( 10 Jorje 2022 13:12 )             43.8     01-10    135  |  94<L>  |  63<H>  ----------------------------<  122<H>  6.2<HH>   |  20<L>  |  10.30<H>    Ca    5.0<LL>      10 Jorje 2022 13:12  Mg     2.10     01-10    TPro  7.5  /  Alb  4.0  /  TBili  0.2  /  DBili  x   /  AST  33  /  ALT  22  /  AlkPhos  90  01-10    PT/INR - ( 10 Jorje 2022 13:12 )   PT: 12.9 sec;   INR: 1.14 ratio       PTT - ( 10 Jorje 2022 13:12 )  PTT:30.4 sec    No radiology. Labs reviewed.    COORDINATION OF CARE:  Care Discussed with Consultants/Other Providers [Y- Renal Dr. Vargas and Medicine ACP, RN)

## 2022-01-10 NOTE — CONSULT NOTE ADULT - CONSULT REASON
Nonfunctioning RUE fistula 19 y/o F, U[facility] NEG, presents to the ED for left hip pain s/p mechanical slip and fall 2 days ago. she is able to ambulate. she denies head injury, LOC, back pain, paresthesias, motor/sensory deficits, f/c or bowel/bladder incontinence. PE as noted above. XR images reviewed with Dr. Latham- julia fx. pt feels better with toradol. will dc

## 2022-01-10 NOTE — CHART NOTE - NSCHARTNOTEFT_GEN_A_CORE
renal Dr Vargas consulted.     Hyperk - s/p lokelma 5 gm in ED. Another 5 gm ordered by ED.   hypoCa - s/p calcium gluconate 2 gm IV.     Will monitor/repeat labs.     Juan Stacy NP   pager [phone]

## 2022-01-10 NOTE — H&P ADULT - PROBLEM SELECTOR PLAN 5
Continue home coreg  Previously on eliquis. patient reports he is no longer taking this. unclear why discontinued. Follow up to evaluate if patient would benefit from further AC

## 2022-01-10 NOTE — H&P ADULT - PROBLEM SELECTOR PLAN 1
Hypocalcemia to 4s on outpatient labs (drawn on 1/8). Hx hypocalcemia. Patient states he is no longer taking calcium carbonate -unclear if formally discontinued vs patient nonadherence, attempt to further clarify.  - S/p 2g IV calcium gluconate here  - Renal Dr. Vargas consulted, recommending resuming oral calcium carbonate at this time, 1250mg x 3 tabs, 4x daily ordered as previously recommended  - Continue to trend calcium  - continue telemetry

## 2022-01-10 NOTE — H&P ADULT - PROBLEM SELECTOR PLAN 2
HD Tu, Th, Sa. Last 1/8 but reports prior 3 HD sessions ended prematurely due to issues with fistula  - Renal consulted for HD here  - Hyperkalemia on initial BMP but hemolyzed. S/p lokelma. Repeat BMP to be drawn, f/u  - HD per renal

## 2022-01-10 NOTE — ED PROVIDER NOTE - PHYSICAL EXAMINATION
Gen: NAD, non-toxic appearing  Head: normal appearing  HEENT: normal conjunctiva, oral mucosa moist  Lung: no respiratory distress, speaking in full sentences, CTA b/l     CV: regular rate and rhythm, no murmurs, R AVF with palpable thrill  Abd: soft, non distended, non tender   MSK: no visible deformities  Neuro: No focal deficits, AAOx3  Skin: Warm  Psych: normal affect

## 2022-01-10 NOTE — ED PROVIDER NOTE - NSICDXPASTSURGICALHX_GEN_ALL_CORE_FT
PAST SURGICAL HISTORY:  Acquired arteriovenous fistula left wrist  1/2015 - [facility], Left upper arm 4/2015 (approximate date)    AVF (arteriovenous fistula)     S/P Nephrectomy (L) 2007 for kidney stones

## 2022-01-10 NOTE — H&P ADULT - PROBLEM SELECTOR PLAN 3
Per patient, prior 3 HD sessions ended prematurely due to issues with fistula  - Discussed with Renal Dr. Vargas, recommending IR consult for fistulogram  - IR contacted for fistulogram, discussed case, follow up recs

## 2022-01-10 NOTE — PATIENT PROFILE ADULT - STATED REASON FOR ADMISSION
At outpatient doctor's office getting lab work done. Low calcium levels, doctor told pt to come to ED.

## 2022-01-10 NOTE — H&P ADULT - NSHPPHYSICALEXAM_GEN_ALL_CORE
PHYSICAL EXAM:  Vital Signs Last 24 Hrs  T(C): 36.6 (10 Jorje 2022 14:42), Max: 36.9 (10 Jorje 2022 13:57)  T(F): 97.9 (10 Jorje 2022 14:42), Max: 98.4 (10 Jorje 2022 13:57)  HR: 68 (10 Jorje 2022 14:42) (68 - 85)  BP: 117/73 (10 Jorje 2022 14:42) (105/70 - 132/69)  BP(mean): --  RR: 18 (10 Jorje 2022 14:42) (16 - 18)  SpO2: 98% (10 Jorje 2022 14:42) (98% - 100%)  CONSTITUTIONAL: NAD, well-developed, well-groomed  EYES: EOMI, conjunctiva and sclera clear  ENMT: Moist oral mucosa  NECK: Supple, no JVD  RESPIRATORY: Normal respiratory effort; lungs are clear to auscultation bilaterally  CARDIOVASCULAR: Regular rate and rhythm, normal S1 and S2, No lower extremity edema; Peripheral pulses are 2+ bilaterally  ABDOMEN: Nontender to palpation, normoactive bowel sounds, no rebound/guarding; +BS  NEUROLOGY: Nonfocal, moving all extremities  EXTREMITIES: RUE Fistula, +thrill  SKIN: No rashes

## 2022-01-10 NOTE — ED PROVIDER NOTE - ATTENDING CONTRIBUTION TO CARE
I performed a history and physical exam of the patient and discussed their management with the resident and /or advanced care provider. I reviewed the resident and /or ACP's note and agree with the documented findings and plan of care. My medical decision making and observations are found above.  Lungs clear, abd soft, good thrill on graft on rt.

## 2022-01-11 LAB
24R-OH-CALCIDIOL SERPL-MCNC: 29.3 NG/ML — LOW (ref 30–80)
ANION GAP SERPL CALC-SCNC: 17 MMOL/L — HIGH (ref 7–14)
ANION GAP SERPL CALC-SCNC: 23 MMOL/L — HIGH (ref 7–14)
BASOPHILS # BLD AUTO: 0.03 K/UL — SIGNIFICANT CHANGE UP (ref 0–0.2)
BASOPHILS NFR BLD AUTO: 0.5 % — SIGNIFICANT CHANGE UP (ref 0–2)
BUN SERPL-MCNC: 52 MG/DL — HIGH (ref 7–23)
BUN SERPL-MCNC: 68 MG/DL — HIGH (ref 7–23)
CA-I BLD-SCNC: 0.57 MMOL/L — CRITICAL LOW (ref 1.15–1.29)
CALCIUM SERPL-MCNC: 5.4 MG/DL — CRITICAL LOW (ref 8.4–10.5)
CALCIUM SERPL-MCNC: 6.5 MG/DL — CRITICAL LOW (ref 8.4–10.5)
CHLORIDE SERPL-SCNC: 94 MMOL/L — LOW (ref 98–107)
CHLORIDE SERPL-SCNC: 95 MMOL/L — LOW (ref 98–107)
CO2 SERPL-SCNC: 20 MMOL/L — LOW (ref 22–31)
CO2 SERPL-SCNC: 23 MMOL/L — SIGNIFICANT CHANGE UP (ref 22–31)
CREAT SERPL-MCNC: 11.85 MG/DL — HIGH (ref 0.5–1.3)
CREAT SERPL-MCNC: 8.56 MG/DL — HIGH (ref 0.5–1.3)
EOSINOPHIL # BLD AUTO: 0.2 K/UL — SIGNIFICANT CHANGE UP (ref 0–0.5)
EOSINOPHIL NFR BLD AUTO: 3.1 % — SIGNIFICANT CHANGE UP (ref 0–6)
GLUCOSE SERPL-MCNC: 89 MG/DL — SIGNIFICANT CHANGE UP (ref 70–99)
GLUCOSE SERPL-MCNC: 96 MG/DL — SIGNIFICANT CHANGE UP (ref 70–99)
HCT VFR BLD CALC: 40.7 % — SIGNIFICANT CHANGE UP (ref 39–50)
HGB BLD-MCNC: 12.9 G/DL — LOW (ref 13–17)
IANC: 3.92 K/UL — SIGNIFICANT CHANGE UP (ref 1.5–8.5)
IMM GRANULOCYTES NFR BLD AUTO: 0.3 % — SIGNIFICANT CHANGE UP (ref 0–1.5)
LYMPHOCYTES # BLD AUTO: 1.58 K/UL — SIGNIFICANT CHANGE UP (ref 1–3.3)
LYMPHOCYTES # BLD AUTO: 24.8 % — SIGNIFICANT CHANGE UP (ref 13–44)
MAGNESIUM SERPL-MCNC: 1.9 MG/DL — SIGNIFICANT CHANGE UP (ref 1.6–2.6)
MAGNESIUM SERPL-MCNC: 2 MG/DL — SIGNIFICANT CHANGE UP (ref 1.6–2.6)
MCHC RBC-ENTMCNC: 29.1 PG — SIGNIFICANT CHANGE UP (ref 27–34)
MCHC RBC-ENTMCNC: 31.7 GM/DL — LOW (ref 32–36)
MCV RBC AUTO: 91.7 FL — SIGNIFICANT CHANGE UP (ref 80–100)
MONOCYTES # BLD AUTO: 0.61 K/UL — SIGNIFICANT CHANGE UP (ref 0–0.9)
MONOCYTES NFR BLD AUTO: 9.6 % — SIGNIFICANT CHANGE UP (ref 2–14)
NEUTROPHILS # BLD AUTO: 3.92 K/UL — SIGNIFICANT CHANGE UP (ref 1.8–7.4)
NEUTROPHILS NFR BLD AUTO: 61.7 % — SIGNIFICANT CHANGE UP (ref 43–77)
NRBC # BLD: 0 /100 WBCS — SIGNIFICANT CHANGE UP
NRBC # FLD: 0 K/UL — SIGNIFICANT CHANGE UP
PHOSPHATE SERPL-MCNC: 4.6 MG/DL — HIGH (ref 2.5–4.5)
PHOSPHATE SERPL-MCNC: 6.9 MG/DL — HIGH (ref 2.5–4.5)
PLATELET # BLD AUTO: 107 K/UL — LOW (ref 150–400)
POTASSIUM SERPL-MCNC: 3.9 MMOL/L — SIGNIFICANT CHANGE UP (ref 3.5–5.3)
POTASSIUM SERPL-MCNC: 4.6 MMOL/L — SIGNIFICANT CHANGE UP (ref 3.5–5.3)
POTASSIUM SERPL-SCNC: 3.9 MMOL/L — SIGNIFICANT CHANGE UP (ref 3.5–5.3)
POTASSIUM SERPL-SCNC: 4.6 MMOL/L — SIGNIFICANT CHANGE UP (ref 3.5–5.3)
PTH-INTACT FLD-MCNC: 19 PG/ML — SIGNIFICANT CHANGE UP (ref 15–65)
RBC # BLD: 4.44 M/UL — SIGNIFICANT CHANGE UP (ref 4.2–5.8)
RBC # FLD: 19.7 % — HIGH (ref 10.3–14.5)
SODIUM SERPL-SCNC: 135 MMOL/L — SIGNIFICANT CHANGE UP (ref 135–145)
SODIUM SERPL-SCNC: 137 MMOL/L — SIGNIFICANT CHANGE UP (ref 135–145)
WBC # BLD: 6.36 K/UL — SIGNIFICANT CHANGE UP (ref 3.8–10.5)
WBC # FLD AUTO: 6.36 K/UL — SIGNIFICANT CHANGE UP (ref 3.8–10.5)

## 2022-01-11 PROCEDURE — 99152 MOD SED SAME PHYS/QHP 5/>YRS: CPT

## 2022-01-11 PROCEDURE — 36902 INTRO CATH DIALYSIS CIRCUIT: CPT | Mod: RT

## 2022-01-11 PROCEDURE — 76937 US GUIDE VASCULAR ACCESS: CPT | Mod: 26

## 2022-01-11 RX ORDER — CALCIUM GLUCONATE 100 MG/ML
1 VIAL (ML) INTRAVENOUS
Refills: 0 | Status: COMPLETED | OUTPATIENT
Start: 2022-01-11 | End: 2022-01-11

## 2022-01-11 RX ORDER — CALCITRIOL 0.5 UG/1
1 CAPSULE ORAL
Refills: 0 | Status: DISCONTINUED | OUTPATIENT
Start: 2022-01-11 | End: 2022-01-14

## 2022-01-11 RX ADMIN — CHLORHEXIDINE GLUCONATE 1 APPLICATION(S): 213 SOLUTION TOPICAL at 17:26

## 2022-01-11 RX ADMIN — Medication 100 GRAM(S): at 14:12

## 2022-01-11 RX ADMIN — Medication 4000 UNIT(S): at 17:23

## 2022-01-11 RX ADMIN — Medication 1334 MILLIGRAM(S): at 07:58

## 2022-01-11 RX ADMIN — CALCITRIOL 1 MICROGRAM(S): 0.5 CAPSULE ORAL at 17:22

## 2022-01-11 RX ADMIN — Medication 1334 MILLIGRAM(S): at 19:52

## 2022-01-11 RX ADMIN — Medication 100 GRAM(S): at 15:08

## 2022-01-11 RX ADMIN — CALCITRIOL 0.5 MICROGRAM(S): 0.5 CAPSULE ORAL at 05:21

## 2022-01-11 RX ADMIN — Medication 50 GRAM(S): at 22:33

## 2022-01-11 RX ADMIN — HEPARIN SODIUM 5000 UNIT(S): 5000 INJECTION INTRAVENOUS; SUBCUTANEOUS at 05:24

## 2022-01-11 RX ADMIN — Medication 10 MILLIGRAM(S): at 06:07

## 2022-01-11 RX ADMIN — Medication 1 MILLIGRAM(S): at 17:24

## 2022-01-11 RX ADMIN — CARVEDILOL PHOSPHATE 25 MILLIGRAM(S): 80 CAPSULE, EXTENDED RELEASE ORAL at 06:07

## 2022-01-11 RX ADMIN — Medication 50 GRAM(S): at 21:45

## 2022-01-11 RX ADMIN — Medication 60 MILLIGRAM(S): at 06:07

## 2022-01-11 RX ADMIN — Medication 3 TABLET(S): at 23:47

## 2022-01-11 RX ADMIN — Medication 3 TABLET(S): at 17:23

## 2022-01-11 RX ADMIN — Medication 3 TABLET(S): at 05:22

## 2022-01-11 RX ADMIN — Medication 100 MICROGRAM(S): at 05:21

## 2022-01-11 RX ADMIN — ATORVASTATIN CALCIUM 40 MILLIGRAM(S): 80 TABLET, FILM COATED ORAL at 21:44

## 2022-01-11 RX ADMIN — PANTOPRAZOLE SODIUM 40 MILLIGRAM(S): 20 TABLET, DELAYED RELEASE ORAL at 05:21

## 2022-01-11 NOTE — PROGRESS NOTE ADULT - SUBJECTIVE AND OBJECTIVE BOX
Patient is a 66y old  Male who presents with a chief complaint of Hypocalcemia, issues with fistula (11 Jan 2022 17:26)      SUBJECTIVE / OVERNIGHT EVENTS:    Events noted.  CONSTITUTIONAL: No fever,  or fatigue  RESPIRATORY: No cough, wheezing,  No shortness of breath  CARDIOVASCULAR: No chest pain, palpitations, dizziness, or leg swelling  GASTROINTESTINAL: No abdominal or epigastric pain. No nausea, vomiting.      MEDICATIONS  (STANDING):  atorvastatin 40 milliGRAM(s) Oral at bedtime  calcitriol   Capsule 1 MICROGram(s) Oral two times a day  calcium acetate 1334 milliGRAM(s) Oral three times a day with meals  calcium carbonate   1250 mG (OsCal) 3 Tablet(s) Oral four times a day  carvedilol 25 milliGRAM(s) Oral every 12 hours  chlorhexidine 2% Cloths 1 Application(s) Topical daily  cholecalciferol 4000 Unit(s) Oral daily  folic acid 1 milliGRAM(s) Oral daily  heparin   Injectable 5000 Unit(s) SubCutaneous every 12 hours  hydrALAZINE 10 milliGRAM(s) Oral two times a day  levothyroxine 100 MICROGram(s) Oral daily  NIFEdipine XL 60 milliGRAM(s) Oral daily  pantoprazole    Tablet 40 milliGRAM(s) Oral before breakfast    MEDICATIONS  (PRN):  acetaminophen     Tablet .. 650 milliGRAM(s) Oral every 6 hours PRN Temp greater or equal to 38C (100.4F), Mild Pain (1 - 3)  melatonin 3 milliGRAM(s) Oral at bedtime PRN Insomnia        CAPILLARY BLOOD GLUCOSE        I&O's Summary      T(C): 36.7 (01-11-22 @ 20:20), Max: 36.9 (01-11-22 @ 16:51)  HR: 72 (01-11-22 @ 20:20) (60 - 72)  BP: 158/97 (01-11-22 @ 20:20) (121/67 - 158/97)  RR: 17 (01-11-22 @ 20:20) (17 - 19)  SpO2: 94% (01-11-22 @ 20:20) (94% - 100%)    PHYSICAL EXAM:  GENERAL: NAD  NECK: Supple, No JVD  CHEST/LUNG: Clear to auscultation bilaterally; No wheezing.  HEART: Regular rate and rhythm; No murmurs, rubs, or gallops  ABDOMEN: Soft, Nontender, Nondistended; Bowel sounds present  EXTREMITIES:   No edema  NEUROLOGY: AAO X 3      LABS:                        12.9   6.36  )-----------( 107      ( 11 Jan 2022 07:24 )             40.7     01-11    135  |  95<L>  |  52<H>  ----------------------------<  96  3.9   |  23  |  8.56<H>    Ca    6.5<LL>      11 Jan 2022 17:31  Phos  4.6     01-11  Mg     1.90     01-11    TPro  7.8  /  Alb  4.4  /  TBili  0.3  /  DBili  x   /  AST  16  /  ALT  20  /  AlkPhos  106  01-10    PT/INR - ( 10 Jorje 2022 13:12 )   PT: 12.9 sec;   INR: 1.14 ratio         PTT - ( 10 Jorje 2022 13:12 )  PTT:30.4 sec        CAPILLARY BLOOD GLUCOSE            RADIOLOGY & ADDITIONAL TESTS:    Imaging Personally Reviewed:    Consultant(s) Notes Reviewed:      Care Discussed with Consultants/Other Providers:    Wilfred Christian MD, CMD, FACP    [id number] [place] [address]  Office Tel: [phone]  Cell: [phone]

## 2022-01-11 NOTE — PROGRESS NOTE ADULT - SUBJECTIVE AND OBJECTIVE BOX
Surgery Progress Note    SUBJECTIVE:  - Patient seen and examined at bedside   - Patient states feeling well  --------------------------------------------------------------------------------------------------  OBJECTIVE:   Physical Exam:  General: NAD, resting comfortably  HEENT: NC/AT, EOMI, normal hearing, no oral lesions, no LAD, neck supple  Pulmonary: normal resp effort, CTA-B  Cardiovascular: NSR, no murmurs  Abdominal: soft, ND/NT, no organomegaly  Extremities: RUE AVF with palpable thrill  Pulses: palpable distal pulses  --------------------------------------------------------------------------------------------------  V/S:  Vital Signs Last 24 Hrs  T(C): 36.5 (11 Jan 2022 05:17), Max: 36.9 (10 Jorje 2022 13:57)  T(F): 97.7 (11 Jan 2022 05:17), Max: 98.4 (10 Jorje 2022 13:57)  HR: 66 (11 Jan 2022 05:17) (66 - 85)  BP: 125/81 (11 Jan 2022 05:17) (105/70 - 149/83)  BP(mean): --  RR: 18 (11 Jan 2022 05:17) (16 - 18)  SpO2: 100% (11 Jan 2022 05:17) (98% - 100%)    --------------------------------------------------------------------------------------------------  I/Os:    --------------------------------------------------------------------------------------------------  LABS:                        12.9   6.36  )-----------( 107      ( 11 Jan 2022 07:24 )             40.7     11 Jan 2022 07:24    137    |  94     |  68     ----------------------------<  89     4.6     |  20     |  11.85    Ca    5.4        11 Jan 2022 07:24  Phos  6.9       11 Jan 2022 07:24  Mg     2.00      11 Jan 2022 07:24    TPro  7.8    /  Alb  4.4    /  TBili  0.3    /  DBili  x      /  AST  16     /  ALT  20     /  AlkPhos  106    10 Jorje 2022 15:45    PT/INR - ( 10 Jorje 2022 13:12 )   PT: 12.9 sec;   INR: 1.14 ratio         PTT - ( 10 Jorje 2022 13:12 )  PTT:30.4 sec  CAPILLARY BLOOD GLUCOSE            LIVER FUNCTIONS - ( 10 Jorje 2022 15:45 )  Alb: 4.4 g/dL / Pro: 7.8 g/dL / ALK PHOS: 106 U/L / ALT: 20 U/L / AST: 16 U/L / GGT: x               --------------------------------------------------------------------------------------------------  MEDICATIONS  (STANDING):  atorvastatin 40 milliGRAM(s) Oral at bedtime  calcitriol   Capsule 0.5 MICROGram(s) Oral two times a day  calcium acetate 1334 milliGRAM(s) Oral three times a day with meals  calcium carbonate   1250 mG (OsCal) 3 Tablet(s) Oral four times a day  carvedilol 25 milliGRAM(s) Oral every 12 hours  chlorhexidine 2% Cloths 1 Application(s) Topical daily  cholecalciferol 4000 Unit(s) Oral daily  folic acid 1 milliGRAM(s) Oral daily  heparin   Injectable 5000 Unit(s) SubCutaneous every 12 hours  hydrALAZINE 10 milliGRAM(s) Oral two times a day  levothyroxine 100 MICROGram(s) Oral daily  NIFEdipine XL 60 milliGRAM(s) Oral daily  pantoprazole    Tablet 40 milliGRAM(s) Oral before breakfast    MEDICATIONS  (PRN):  acetaminophen     Tablet .. 650 milliGRAM(s) Oral every 6 hours PRN Temp greater or equal to 38C (100.4F), Mild Pain (1 - 3)  melatonin 3 milliGRAM(s) Oral at bedtime PRN Insomnia    --------------------------------------------------------------------------------------------------

## 2022-01-11 NOTE — CHART NOTE - NSCHARTNOTEFT_GEN_A_CORE
Nutrition Consult X Assessment, Education    Pt 67 yo male with ESRD (on TTS HD), A-fib, hypocalcemia, presented with hypocalcemia - per chart review.     RDN attempted to assess/visit, but Pt off the unit at time of visit. Pt went down for procedure reported.   Will reattempt to visit Pt at a later time as able; RDN remains available.

## 2022-01-11 NOTE — PROGRESS NOTE ADULT - SUBJECTIVE AND OBJECTIVE BOX
[facility] - S Alicia / Ej S /D Federico/ SERA Bojorquez/ SERA Faust/ Jovan Garrison / GAYATRI Matsonu/ O Gregorio  service -[phone], office [phone]  ---------------------------------------------------------------------------------------------------------------    Patient seen and examined bedside    Subjective and Objective: No overnight events, sob resolved. No complaints today. feeling better    Allergies: No Known Allergies      Hospital Medications:   MEDICATIONS  (STANDING):  atorvastatin 40 milliGRAM(s) Oral at bedtime  calcitriol   Capsule 1 MICROGram(s) Oral two times a day  calcium acetate 1334 milliGRAM(s) Oral three times a day with meals  calcium carbonate   1250 mG (OsCal) 3 Tablet(s) Oral four times a day  carvedilol 25 milliGRAM(s) Oral every 12 hours  chlorhexidine 2% Cloths 1 Application(s) Topical daily  cholecalciferol 4000 Unit(s) Oral daily  folic acid 1 milliGRAM(s) Oral daily  heparin   Injectable 5000 Unit(s) SubCutaneous every 12 hours  hydrALAZINE 10 milliGRAM(s) Oral two times a day  levothyroxine 100 MICROGram(s) Oral daily  NIFEdipine XL 60 milliGRAM(s) Oral daily  pantoprazole    Tablet 40 milliGRAM(s) Oral before breakfast      REVIEW OF SYSTEMS:  CONSTITUTIONAL: No weakness, fevers or chills  EYES/ENT: No visual changes;  No vertigo or throat pain   NECK: No pain or stiffness  RESPIRATORY: No cough, wheezing, hemoptysis; No shortness of breath  CARDIOVASCULAR: No chest pain or palpitations.  GASTROINTESTINAL: No abdominal or epigastric pain. No nausea, vomiting, or hematemesis; No diarrhea or constipation. No melena or hematochezia.  GENITOURINARY: No dysuria, frequency, foamy urine, urinary urgency, incontinence or hematuria  NEUROLOGICAL: No numbness or weakness  SKIN: No itching, burning, rashes, or lesions   VASCULAR: No bilateral lower extremity edema.   All other review of systems is negative unless indicated above.    VITALS:  T(F): 98.4 (01-11-22 @ 16:51), Max: 98.4 (01-11-22 @ 16:51)  HR: 67 (01-11-22 @ 16:51)  BP: 126/73 (01-11-22 @ 16:51)  RR: 18 (01-11-22 @ 16:51)  SpO2: 98% (01-11-22 @ 16:15)  Wt(kg): --      Weight (kg): 66.1 (01-11 @ 08:39)    PHYSICAL EXAM:  Constitutional: NAD  HEENT: anicteric sclera, oropharynx clear  Neck: No JVD  Respiratory: CTAB, no wheezes, rales or rhonchi  Cardiovascular: S1, S2, RRR  Gastrointestinal: BS+, soft, NT/ND  Extremities: No cyanosis or clubbing. No peripheral edema  Neurological: A/O x 3, no focal deficits  Psychiatric: Normal mood, normal affect  : No CVA tenderness. No siegel.   Skin: No rashes  Vascular Access:    LABS:  01-11    137  |  94<L>  |  68<H>  ----------------------------<  89  4.6   |  20<L>  |  11.85<H>    Ca    5.4<LL>      11 Jan 2022 07:24  Phos  6.9     01-11  Mg     2.00     01-11    TPro  7.8  /  Alb  4.4  /  TBili  0.3  /  DBili      /  AST  16  /  ALT  20  /  AlkPhos  106  01-10    Creatinine Trend: 11.85 <--, 10.88 <--, 10.30 <--                        12.9   6.36  )-----------( 107      ( 11 Jan 2022 07:24 )             40.7     Urine Studies:        RADIOLOGY & ADDITIONAL STUDIES:   [facility] - S Alicia / Ej S /D Federico/ S Maryellen/ S Govind/ Jovan Garrison / M Jose/ O Gregorio  service -[phone], office [phone]  ---------------------------------------------------------------------------------------------------------------    Patient seen and examined bedside in hd unit during hd    Subjective and Objective: No overnight events, denied sob. No complaints today. feeling good    Allergies: No Known Allergies      Hospital Medications:   MEDICATIONS  (STANDING):  atorvastatin 40 milliGRAM(s) Oral at bedtime  calcitriol   Capsule 1 MICROGram(s) Oral two times a day  calcium acetate 1334 milliGRAM(s) Oral three times a day with meals  calcium carbonate   1250 mG (OsCal) 3 Tablet(s) Oral four times a day  carvedilol 25 milliGRAM(s) Oral every 12 hours  chlorhexidine 2% Cloths 1 Application(s) Topical daily  cholecalciferol 4000 Unit(s) Oral daily  folic acid 1 milliGRAM(s) Oral daily  heparin   Injectable 5000 Unit(s) SubCutaneous every 12 hours  hydrALAZINE 10 milliGRAM(s) Oral two times a day  levothyroxine 100 MICROGram(s) Oral daily  NIFEdipine XL 60 milliGRAM(s) Oral daily  pantoprazole    Tablet 40 milliGRAM(s) Oral before breakfast    VITALS:  T(F): 98.4 (01-11-22 @ 16:51), Max: 98.4 (01-11-22 @ 16:51)  HR: 67 (01-11-22 @ 16:51)  BP: 126/73 (01-11-22 @ 16:51)  RR: 18 (01-11-22 @ 16:51)  SpO2: 98% (01-11-22 @ 16:15)  Wt(kg): --      Weight (kg): 66.1 (01-11 @ 08:39)    PHYSICAL EXAM:  Constitutional: NAD  HEENT: anicteric sclera  Neck: No JVD  Respiratory: CTAB, no wheezes, rales or rhonchi  Cardiovascular: S1, S2, RRR  Gastrointestinal: BS+, soft, NT/ND  Extremities: No peripheral edema  Neurological: A/O x 3, no focal deficits  Psychiatric: Normal mood, normal affect  : No siegel.   Vascular Access: RFA AVF+thrill     LABS:  01-11    137  |  94<L>  |  68<H>  ----------------------------<  89  4.6   |  20<L>  |  11.85<H>    Ca    5.4<LL>      11 Jan 2022 07:24  Phos  6.9     01-11  Mg     2.00     01-11    TPro  7.8  /  Alb  4.4  /  TBili  0.3  /  DBili      /  AST  16  /  ALT  20  /  AlkPhos  106  01-10    Creatinine Trend: 11.85 <--, 10.88 <--, 10.30 <--                        12.9   6.36  )-----------( 107      ( 11 Jan 2022 07:24 )             40.7     Urine Studies:        RADIOLOGY & ADDITIONAL STUDIES:

## 2022-01-12 LAB
ANION GAP SERPL CALC-SCNC: 18 MMOL/L — HIGH (ref 7–14)
ANION GAP SERPL CALC-SCNC: 19 MMOL/L — HIGH (ref 7–14)
BUN SERPL-MCNC: 48 MG/DL — HIGH (ref 7–23)
BUN SERPL-MCNC: 68 MG/DL — HIGH (ref 7–23)
CALCIUM SERPL-MCNC: 7 MG/DL — LOW (ref 8.4–10.5)
CALCIUM SERPL-MCNC: 7.1 MG/DL — LOW (ref 8.4–10.5)
CHLORIDE SERPL-SCNC: 97 MMOL/L — LOW (ref 98–107)
CHLORIDE SERPL-SCNC: 97 MMOL/L — LOW (ref 98–107)
CO2 SERPL-SCNC: 20 MMOL/L — LOW (ref 22–31)
CO2 SERPL-SCNC: 23 MMOL/L — SIGNIFICANT CHANGE UP (ref 22–31)
CREAT SERPL-MCNC: 10.11 MG/DL — HIGH (ref 0.5–1.3)
CREAT SERPL-MCNC: 8.24 MG/DL — HIGH (ref 0.5–1.3)
GLUCOSE SERPL-MCNC: 81 MG/DL — SIGNIFICANT CHANGE UP (ref 70–99)
GLUCOSE SERPL-MCNC: 84 MG/DL — SIGNIFICANT CHANGE UP (ref 70–99)
HBV CORE AB SER-ACNC: SIGNIFICANT CHANGE UP
HBV CORE IGM SER-ACNC: SIGNIFICANT CHANGE UP
HBV SURFACE AB SER-ACNC: 696.2 MIU/ML — SIGNIFICANT CHANGE UP
HBV SURFACE AG SER-ACNC: SIGNIFICANT CHANGE UP
HCT VFR BLD CALC: 38.7 % — LOW (ref 39–50)
HCV AB S/CO SERPL IA: 0.54 S/CO — SIGNIFICANT CHANGE UP (ref 0–0.99)
HCV AB SERPL-IMP: SIGNIFICANT CHANGE UP
HGB BLD-MCNC: 12.4 G/DL — LOW (ref 13–17)
MAGNESIUM SERPL-MCNC: 1.9 MG/DL — SIGNIFICANT CHANGE UP (ref 1.6–2.6)
MAGNESIUM SERPL-MCNC: 2 MG/DL — SIGNIFICANT CHANGE UP (ref 1.6–2.6)
MCHC RBC-ENTMCNC: 28.8 PG — SIGNIFICANT CHANGE UP (ref 27–34)
MCHC RBC-ENTMCNC: 32 GM/DL — SIGNIFICANT CHANGE UP (ref 32–36)
MCV RBC AUTO: 90 FL — SIGNIFICANT CHANGE UP (ref 80–100)
MRSA PCR RESULT.: SIGNIFICANT CHANGE UP
NRBC # BLD: 0 /100 WBCS — SIGNIFICANT CHANGE UP
NRBC # FLD: 0 K/UL — SIGNIFICANT CHANGE UP
PHOSPHATE SERPL-MCNC: 6 MG/DL — HIGH (ref 2.5–4.5)
PHOSPHATE SERPL-MCNC: 6.5 MG/DL — HIGH (ref 2.5–4.5)
PLATELET # BLD AUTO: 106 K/UL — LOW (ref 150–400)
POTASSIUM SERPL-MCNC: 4.1 MMOL/L — SIGNIFICANT CHANGE UP (ref 3.5–5.3)
POTASSIUM SERPL-MCNC: 4.8 MMOL/L — SIGNIFICANT CHANGE UP (ref 3.5–5.3)
POTASSIUM SERPL-SCNC: 4.1 MMOL/L — SIGNIFICANT CHANGE UP (ref 3.5–5.3)
POTASSIUM SERPL-SCNC: 4.8 MMOL/L — SIGNIFICANT CHANGE UP (ref 3.5–5.3)
RBC # BLD: 4.3 M/UL — SIGNIFICANT CHANGE UP (ref 4.2–5.8)
RBC # FLD: 19.2 % — HIGH (ref 10.3–14.5)
S AUREUS DNA NOSE QL NAA+PROBE: SIGNIFICANT CHANGE UP
SODIUM SERPL-SCNC: 136 MMOL/L — SIGNIFICANT CHANGE UP (ref 135–145)
SODIUM SERPL-SCNC: 138 MMOL/L — SIGNIFICANT CHANGE UP (ref 135–145)
WBC # BLD: 5.35 K/UL — SIGNIFICANT CHANGE UP (ref 3.8–10.5)
WBC # FLD AUTO: 5.35 K/UL — SIGNIFICANT CHANGE UP (ref 3.8–10.5)

## 2022-01-12 RX ORDER — CALCIUM GLUCONATE 100 MG/ML
1 VIAL (ML) INTRAVENOUS
Refills: 0 | Status: COMPLETED | OUTPATIENT
Start: 2022-01-12 | End: 2022-01-12

## 2022-01-12 RX ADMIN — CARVEDILOL PHOSPHATE 25 MILLIGRAM(S): 80 CAPSULE, EXTENDED RELEASE ORAL at 05:12

## 2022-01-12 RX ADMIN — Medication 3 TABLET(S): at 05:11

## 2022-01-12 RX ADMIN — HEPARIN SODIUM 5000 UNIT(S): 5000 INJECTION INTRAVENOUS; SUBCUTANEOUS at 17:11

## 2022-01-12 RX ADMIN — Medication 10 MILLIGRAM(S): at 17:10

## 2022-01-12 RX ADMIN — PANTOPRAZOLE SODIUM 40 MILLIGRAM(S): 20 TABLET, DELAYED RELEASE ORAL at 05:12

## 2022-01-12 RX ADMIN — Medication 50 GRAM(S): at 14:38

## 2022-01-12 RX ADMIN — Medication 10 MILLIGRAM(S): at 05:11

## 2022-01-12 RX ADMIN — CHLORHEXIDINE GLUCONATE 1 APPLICATION(S): 213 SOLUTION TOPICAL at 11:18

## 2022-01-12 RX ADMIN — CARVEDILOL PHOSPHATE 25 MILLIGRAM(S): 80 CAPSULE, EXTENDED RELEASE ORAL at 17:10

## 2022-01-12 RX ADMIN — Medication 3 TABLET(S): at 17:10

## 2022-01-12 RX ADMIN — Medication 1334 MILLIGRAM(S): at 17:10

## 2022-01-12 RX ADMIN — Medication 50 GRAM(S): at 15:32

## 2022-01-12 RX ADMIN — Medication 1334 MILLIGRAM(S): at 08:45

## 2022-01-12 RX ADMIN — Medication 3 TABLET(S): at 11:18

## 2022-01-12 RX ADMIN — CALCITRIOL 1 MICROGRAM(S): 0.5 CAPSULE ORAL at 05:14

## 2022-01-12 RX ADMIN — Medication 60 MILLIGRAM(S): at 05:11

## 2022-01-12 RX ADMIN — Medication 1334 MILLIGRAM(S): at 11:18

## 2022-01-12 RX ADMIN — ATORVASTATIN CALCIUM 40 MILLIGRAM(S): 80 TABLET, FILM COATED ORAL at 22:15

## 2022-01-12 RX ADMIN — Medication 4000 UNIT(S): at 11:19

## 2022-01-12 RX ADMIN — HEPARIN SODIUM 5000 UNIT(S): 5000 INJECTION INTRAVENOUS; SUBCUTANEOUS at 05:12

## 2022-01-12 RX ADMIN — Medication 1 MILLIGRAM(S): at 11:19

## 2022-01-12 RX ADMIN — Medication 100 MICROGRAM(S): at 05:12

## 2022-01-12 RX ADMIN — CALCITRIOL 1 MICROGRAM(S): 0.5 CAPSULE ORAL at 17:11

## 2022-01-12 NOTE — DIETITIAN INITIAL EVALUATION ADULT. - ORAL INTAKE PTA/DIET HISTORY
At home Pt follows Renal Replacement (no prot restr, no conc K, no conc phos, low sodium) diet restriction reported

## 2022-01-12 NOTE — DIETITIAN INITIAL EVALUATION ADULT. - PERTINENT LABORATORY DATA
(1/12) H/H 12.4/38.7 L,  L, phosphorus 6.0 H, Cl 97 L, BUN 48 H, Creat 8.24 H;      (1/10) Albumin 4.4, lipase 70 H

## 2022-01-12 NOTE — PROGRESS NOTE ADULT - SUBJECTIVE AND OBJECTIVE BOX
[facility] - MARK Vargas / MARK Pagan / JASMIN Caballero/ MARK Bojorquez/ MARK Faust/ TELLO Garrison / RODNEY Garcia / HAILEY Perkins  ---------------------------------------------------------------------------------------------------------------  seen and examined today for ESRD  Interval : NAD  VITALS:  T(F): 98.2 (01-12-22 @ 05:05), Max: 98.4 (01-11-22 @ 16:51)  HR: 65 (01-12-22 @ 05:05)  BP: 143/89 (01-12-22 @ 05:05)  RR: 18 (01-12-22 @ 05:05)  SpO2: 97% (01-12-22 @ 05:05)  Wt(kg): --    01-11 @ 07:01  -  01-12 @ 07:00  --------------------------------------------------------  IN: 400 mL / OUT: 2400 mL / NET: -2000 mL      Physical Exam :-  Constitutional: NAD  Neck: Supple.  Respiratory: Bilateral equal breath sounds,  Cardiovascular: S1, S2 normal,  Gastrointestinal: Bowel Sounds present, soft, non tender.  Extremities: No edema  Neurological: Alert and Oriented x 3, no focal deficits  Psychiatric: Normal mood, normal affect  Data:-  Allergies :   No Known Allergies    Hospital Medications:   MEDICATIONS  (STANDING):  atorvastatin 40 milliGRAM(s) Oral at bedtime  calcitriol   Capsule 1 MICROGram(s) Oral two times a day  calcium acetate 1334 milliGRAM(s) Oral three times a day with meals  calcium carbonate   1250 mG (OsCal) 3 Tablet(s) Oral four times a day  carvedilol 25 milliGRAM(s) Oral every 12 hours  chlorhexidine 2% Cloths 1 Application(s) Topical daily  cholecalciferol 4000 Unit(s) Oral daily  folic acid 1 milliGRAM(s) Oral daily  heparin   Injectable 5000 Unit(s) SubCutaneous every 12 hours  hydrALAZINE 10 milliGRAM(s) Oral two times a day  levothyroxine 100 MICROGram(s) Oral daily  NIFEdipine XL 60 milliGRAM(s) Oral daily  pantoprazole    Tablet 40 milliGRAM(s) Oral before breakfast    01-12    138  |  97<L>  |  48<H>  ----------------------------<  84  4.1   |  23  |  8.24<H>    Ca    7.1<L>      12 Jan 2022 05:30  Phos  6.0     01-12  Mg     1.90     01-12    TPro  7.8  /  Alb  4.4  /  TBili  0.3  /  DBili      /  AST  16  /  ALT  20  /  AlkPhos  106  01-10    Creatinine Trend: 8.24 <--, 8.56 <--, 11.85 <--, 10.88 <--, 10.30 <--                        12.4   5.35  )-----------( 106      ( 12 Jan 2022 05:30 )             38.7

## 2022-01-12 NOTE — PROVIDER CONTACT NOTE (CRITICAL VALUE NOTIFICATION) - ACTION/TREATMENT ORDERED:
Continue to monitor total serum calcium levels
Continue to monitor calcium levels
Administer calcium carbonate as ordered
Pt ordered calcium gluconate IVPB to be given once returned from HD. Medication given once received from pharmacy.

## 2022-01-12 NOTE — PROGRESS NOTE ADULT - SUBJECTIVE AND OBJECTIVE BOX
CHAVEZ MARQUEZ  66y  Male      Patient is a 66y old  Male who presents with a chief complaint of Hypocalcemia, issues with fistula (12 Jan 2022 12:24)  Patient was seen and examined.comfortable,nad.  suscessfull HD.    REVIEW OF SYSTEMS:  CONSTITUTIONAL: No fever  RESPIRATORY: No cough, hemoptysis or shortness of breath  CARDIOVASCULAR: No chest pain, palpitations, dizziness, or leg swelling  GASTROINTESTINAL: No abdominal pain. nausea, vomiting, hematemesis    INTERVAL HPI/OVERNIGHT EVENTS:  T(C): 37.1 (01-12-22 @ 22:13), Max: 37.1 (01-12-22 @ 22:13)  HR: 63 (01-12-22 @ 22:13) (63 - 79)  BP: 148/81 (01-12-22 @ 22:13) (143/89 - 159/79)  RR: 17 (01-12-22 @ 22:13) (17 - 18)  SpO2: 98% (01-12-22 @ 22:13) (97% - 98%)  Wt(kg): --  I&O's Summary    11 Jan 2022 07:01  -  12 Jan 2022 07:00  --------------------------------------------------------  IN: 400 mL / OUT: 2400 mL / NET: -2000 mL      T(C): 37.1 (01-12-22 @ 22:13), Max: 37.1 (01-12-22 @ 22:13)  HR: 63 (01-12-22 @ 22:13) (63 - 79)  BP: 148/81 (01-12-22 @ 22:13) (143/89 - 159/79)  RR: 17 (01-12-22 @ 22:13) (17 - 18)  SpO2: 98% (01-12-22 @ 22:13) (97% - 98%)  Wt(kg): --Vital Signs Last 24 Hrs  T(C): 37.1 (12 Jan 2022 22:13), Max: 37.1 (12 Jan 2022 22:13)  T(F): 98.7 (12 Jan 2022 22:13), Max: 98.7 (12 Jan 2022 22:13)  HR: 63 (12 Jan 2022 22:13) (63 - 79)  BP: 148/81 (12 Jan 2022 22:13) (143/89 - 159/79)  BP(mean): --  RR: 17 (12 Jan 2022 22:13) (17 - 18)  SpO2: 98% (12 Jan 2022 22:13) (97% - 98%)    LABS:                        12.4   5.35  )-----------( 106      ( 12 Jan 2022 05:30 )             38.7     01-12    138  |  97<L>  |  48<H>  ----------------------------<  84  4.1   |  23  |  8.24<H>    Ca    7.1<L>      12 Jan 2022 05:30  Phos  6.0     01-12  Mg     1.90     01-12          CAPILLARY BLOOD GLUCOSE                PAST MEDICAL & SURGICAL HISTORY:  HTN (Hypertension)    Chronic kidney disease    Kidney stones    Hemodialysis access, AV graft  Left upper extremity    Hyperparathyroidism    Anemia    Thrombocytopenia    Atrial fibrillation  on Eliquis    S/P Nephrectomy  (L) 2007 for kidney stones    Acquired arteriovenous fistula  left wrist  1/2015 - [facility], Left upper arm 4/2015 (approximate date)    AVF (arteriovenous fistula)        MEDICATIONS  (STANDING):  atorvastatin 40 milliGRAM(s) Oral at bedtime  calcitriol   Capsule 1 MICROGram(s) Oral two times a day  calcium acetate 1334 milliGRAM(s) Oral three times a day with meals  calcium carbonate   1250 mG (OsCal) 3 Tablet(s) Oral four times a day  carvedilol 25 milliGRAM(s) Oral every 12 hours  chlorhexidine 2% Cloths 1 Application(s) Topical daily  cholecalciferol 4000 Unit(s) Oral daily  folic acid 1 milliGRAM(s) Oral daily  heparin   Injectable 5000 Unit(s) SubCutaneous every 12 hours  hydrALAZINE 10 milliGRAM(s) Oral two times a day  levothyroxine 100 MICROGram(s) Oral daily  NIFEdipine XL 60 milliGRAM(s) Oral daily  pantoprazole    Tablet 40 milliGRAM(s) Oral before breakfast    MEDICATIONS  (PRN):  acetaminophen     Tablet .. 650 milliGRAM(s) Oral every 6 hours PRN Temp greater or equal to 38C (100.4F), Mild Pain (1 - 3)  melatonin 3 milliGRAM(s) Oral at bedtime PRN Insomnia        RADIOLOGY & ADDITIONAL TESTS:    Imaging Personally Reviewed:  [ ] YES  [ ] NO    Consultant(s) Notes Reviewed:  [X ] YES  [ ] NO    PHYSICAL EXAM:  GENERAL: Alert and awake lying in bed in no distress  HEAD:  Atraumatic, Normocephalic  EYES: EOMI, LILIAM, conjunctiva and sclera clear  NECK: Supple, No JVD, Normal thyroid  NERVOUS SYSTEM:  Alert & Oriented X3, Motor and sensory systems are intact,   CHEST/LUNG: Bilateral clear breath sounds, no rhochi, no wheezing, no crepitations,  HEART: Regular rate and rhythm; No murmurs, rubs, or gallops  ABDOMEN: Soft, Nontender, Nondistended; Bowel sounds present  EXTREMITIES:   Peripheral Pulses are palpable, no  edema        Care Discussed with Consultants/Other Providers X[ ] YES  [ ] NO      Code Status: [] Full Code [] DNR [] DNI [] Goals of Care:   Disposition: [] ICU [] Stroke Unit [] RCU []PCU []Floor [] Discharge Home         YEE [email or web address]

## 2022-01-12 NOTE — PROVIDER CONTACT NOTE (CRITICAL VALUE NOTIFICATION) - BACKGROUND
Pt with history of ESRD, afib, hypocalcemia, and HTN. Admitted for hypocalcemia. Pt is s/p fistulogram and received HD today 1/11.
Pt with ESRD with a PMHx of hypocalcemia, atrial fibrillation, thrombocytopenia, anemia, hyperparathyroidism.
Pt with ESRD, PMHX of hypocalcemia, hyperparathyroidism, atrial fibrillation, thrombocytopenia, anemia
Patient admitted for hypocalcemia

## 2022-01-12 NOTE — PROVIDER CONTACT NOTE (CRITICAL VALUE NOTIFICATION) - ASSESSMENT
Patient NSR on tele. No complaints of headache, chest pain, muscle spasms, palpitations or shortness of breath
Patient asymptomatic, resting comfortably in bed. Continous telemetry monitoring. No events on telemetry.
The pt is A&O x 4, VSS and documented, and pt is on room air. The pt denies generalized pain, chest pain, or SOB.
Pt asymptomatic, resting comfortably in bed. Continous telemetry monitoring shows no cardiac events. Pt is Normal Sinus Rhythm.

## 2022-01-12 NOTE — DIETITIAN INITIAL EVALUATION ADULT. - OTHER INFO
Pt 65 yo male with PMHx of ESRD on HD, A-fib, hypocalcemia presented after outpatient labs revealed hypocalcemia - per chart review. Of note, Pt's HD session on 1/8/22 (PTA) ended prematurely due to fistula issue. Pt s/p fistulogram 1/11/22.    At time of visit, Pt awake, alert. Per Pt his appetite good; no chewing or swallowing difficulty; no nausea, vomiting or diarrhea @ this time. Per Pt his height: 69" & his usual body weight: 63 kg. Food preferences discussed; will recommend to add PO supplement: Nepro - 2x daily to diet rx.   At time of visit, Pt anxious about hypocalcemia situation; a list of Calcium content of food/food items provided to Pt. RDN answered concerns related to diet; Pt verbalized understanding. RDN remains available, Pt made aware. Wound Care: Vaseline

## 2022-01-12 NOTE — PROGRESS NOTE ADULT - SUBJECTIVE AND OBJECTIVE BOX
Surgery Progress Note    SUBJECTIVE:  - Patient seen and examined at bedside   - Patient states feeling great, had HD yesterday through fistula and worked fine  --------------------------------------------------------------------------------------------------  OBJECTIVE:   Physical Exam:  General: AAOx3, NAD, lying comfortably in bed  HEENT: NC/AT  Respiratory: nonlabored breathing  Cardiovascular: RRR, normal S1 and S2, no murmurs or gallops  Abdomen: non-distended, soft, non-tender  Extremities: RUE fistula cannulation sites c/d/i, strong palpable radial pulse  --------------------------------------------------------------------------------------------------  V/S:  Vital Signs Last 24 Hrs  T(C): 37 (12 Jan 2022 11:51), Max: 37 (12 Jan 2022 11:51)  T(F): 98.6 (12 Jan 2022 11:51), Max: 98.6 (12 Jan 2022 11:51)  HR: 65 (12 Jan 2022 11:51) (60 - 72)  BP: 154/90 (12 Jan 2022 11:51) (121/67 - 158/97)  BP(mean): --  RR: 18 (12 Jan 2022 11:51) (17 - 19)  SpO2: 97% (12 Jan 2022 11:51) (94% - 100%)    --------------------------------------------------------------------------------------------------  I/Os:    11 Jan 2022 07:01  -  12 Jan 2022 07:00  --------------------------------------------------------  IN:    Other (mL): 400 mL  Total IN: 400 mL    OUT:    Other (mL): 2400 mL  Total OUT: 2400 mL    Total NET: -2000 mL        --------------------------------------------------------------------------------------------------  LABS:                        12.4   5.35  )-----------( 106      ( 12 Jan 2022 05:30 )             38.7     12 Jan 2022 05:30    138    |  97     |  48     ----------------------------<  84     4.1     |  23     |  8.24     Ca    7.1        12 Jan 2022 05:30  Phos  6.0       12 Jan 2022 05:30  Mg     1.90      12 Jan 2022 05:30    TPro  7.8    /  Alb  4.4    /  TBili  0.3    /  DBili  x      /  AST  16     /  ALT  20     /  AlkPhos  106    10 Jorje 2022 15:45    PT/INR - ( 10 Jorje 2022 13:12 )   PT: 12.9 sec;   INR: 1.14 ratio         PTT - ( 10 Jorje 2022 13:12 )  PTT:30.4 sec  CAPILLARY BLOOD GLUCOSE            LIVER FUNCTIONS - ( 10 Jorje 2022 15:45 )  Alb: 4.4 g/dL / Pro: 7.8 g/dL / ALK PHOS: 106 U/L / ALT: 20 U/L / AST: 16 U/L / GGT: x               --------------------------------------------------------------------------------------------------  MEDICATIONS  (STANDING):  atorvastatin 40 milliGRAM(s) Oral at bedtime  calcitriol   Capsule 1 MICROGram(s) Oral two times a day  calcium acetate 1334 milliGRAM(s) Oral three times a day with meals  calcium carbonate   1250 mG (OsCal) 3 Tablet(s) Oral four times a day  carvedilol 25 milliGRAM(s) Oral every 12 hours  chlorhexidine 2% Cloths 1 Application(s) Topical daily  cholecalciferol 4000 Unit(s) Oral daily  folic acid 1 milliGRAM(s) Oral daily  heparin   Injectable 5000 Unit(s) SubCutaneous every 12 hours  hydrALAZINE 10 milliGRAM(s) Oral two times a day  levothyroxine 100 MICROGram(s) Oral daily  NIFEdipine XL 60 milliGRAM(s) Oral daily  pantoprazole    Tablet 40 milliGRAM(s) Oral before breakfast    MEDICATIONS  (PRN):  acetaminophen     Tablet .. 650 milliGRAM(s) Oral every 6 hours PRN Temp greater or equal to 38C (100.4F), Mild Pain (1 - 3)  melatonin 3 milliGRAM(s) Oral at bedtime PRN Insomnia    --------------------------------------------------------------------------------------------------

## 2022-01-12 NOTE — PROVIDER CONTACT NOTE (CRITICAL VALUE NOTIFICATION) - RECOMMENDATIONS
Pt ordered calcium gluconate IVPB to be given once returned from HD.
Continue to monitor calcium levels
Continue to monitor total serum calcium levels

## 2022-01-12 NOTE — DIETITIAN INITIAL EVALUATION ADULT. - ADD RECOMMEND
1. Encourage & assist Pt with meals; Monitor PO diet tolerance; Honor food preferences;      2. Add Nephro-kiara 1 cap daily for micronutrient coverage;     3. Monitor labs, hydration status;

## 2022-01-12 NOTE — DIETITIAN INITIAL EVALUATION ADULT. - DIET TYPE
Nepro 1 can x 2 daily (provides additional ~850 Kcal, ~38 gm Protein)/renal replacement pts:no protein restr,no conc K & phos, low sodium/supplement (specify)

## 2022-01-13 LAB
ANION GAP SERPL CALC-SCNC: 21 MMOL/L — HIGH (ref 7–14)
BUN SERPL-MCNC: 72 MG/DL — HIGH (ref 7–23)
CA-I BLD-SCNC: 0.85 MMOL/L — LOW (ref 1.15–1.29)
CALCIUM SERPL-MCNC: 6.9 MG/DL — LOW (ref 8.4–10.5)
CHLORIDE SERPL-SCNC: 95 MMOL/L — LOW (ref 98–107)
CO2 SERPL-SCNC: 20 MMOL/L — LOW (ref 22–31)
CREAT SERPL-MCNC: 10.58 MG/DL — HIGH (ref 0.5–1.3)
GLUCOSE SERPL-MCNC: 146 MG/DL — HIGH (ref 70–99)
HCT VFR BLD CALC: 36 % — LOW (ref 39–50)
HGB BLD-MCNC: 11.1 G/DL — LOW (ref 13–17)
MAGNESIUM SERPL-MCNC: 1.9 MG/DL — SIGNIFICANT CHANGE UP (ref 1.6–2.6)
MCHC RBC-ENTMCNC: 28.7 PG — SIGNIFICANT CHANGE UP (ref 27–34)
MCHC RBC-ENTMCNC: 30.8 GM/DL — LOW (ref 32–36)
MCV RBC AUTO: 93 FL — SIGNIFICANT CHANGE UP (ref 80–100)
NRBC # BLD: 0 /100 WBCS — SIGNIFICANT CHANGE UP
NRBC # FLD: 0 K/UL — SIGNIFICANT CHANGE UP
PHOSPHATE SERPL-MCNC: 6.8 MG/DL — HIGH (ref 2.5–4.5)
PLATELET # BLD AUTO: 103 K/UL — LOW (ref 150–400)
POTASSIUM SERPL-MCNC: 3.9 MMOL/L — SIGNIFICANT CHANGE UP (ref 3.5–5.3)
POTASSIUM SERPL-SCNC: 3.9 MMOL/L — SIGNIFICANT CHANGE UP (ref 3.5–5.3)
RBC # BLD: 3.87 M/UL — LOW (ref 4.2–5.8)
RBC # FLD: 18.6 % — HIGH (ref 10.3–14.5)
SODIUM SERPL-SCNC: 136 MMOL/L — SIGNIFICANT CHANGE UP (ref 135–145)
WBC # BLD: 5.51 K/UL — SIGNIFICANT CHANGE UP (ref 3.8–10.5)
WBC # FLD AUTO: 5.51 K/UL — SIGNIFICANT CHANGE UP (ref 3.8–10.5)

## 2022-01-13 RX ORDER — CALCIUM GLUCONATE 100 MG/ML
1 VIAL (ML) INTRAVENOUS
Refills: 0 | Status: COMPLETED | OUTPATIENT
Start: 2022-01-13 | End: 2022-01-13

## 2022-01-13 RX ADMIN — CARVEDILOL PHOSPHATE 25 MILLIGRAM(S): 80 CAPSULE, EXTENDED RELEASE ORAL at 17:33

## 2022-01-13 RX ADMIN — Medication 100 GRAM(S): at 17:41

## 2022-01-13 RX ADMIN — ATORVASTATIN CALCIUM 40 MILLIGRAM(S): 80 TABLET, FILM COATED ORAL at 23:11

## 2022-01-13 RX ADMIN — Medication 3 TABLET(S): at 17:34

## 2022-01-13 RX ADMIN — CHLORHEXIDINE GLUCONATE 1 APPLICATION(S): 213 SOLUTION TOPICAL at 11:54

## 2022-01-13 RX ADMIN — Medication 3 TABLET(S): at 23:12

## 2022-01-13 RX ADMIN — Medication 3 TABLET(S): at 00:17

## 2022-01-13 RX ADMIN — Medication 100 GRAM(S): at 19:59

## 2022-01-13 RX ADMIN — Medication 3 TABLET(S): at 05:20

## 2022-01-13 RX ADMIN — Medication 1 MILLIGRAM(S): at 17:35

## 2022-01-13 RX ADMIN — CALCITRIOL 1 MICROGRAM(S): 0.5 CAPSULE ORAL at 05:24

## 2022-01-13 RX ADMIN — Medication 100 MICROGRAM(S): at 05:20

## 2022-01-13 RX ADMIN — Medication 4000 UNIT(S): at 17:34

## 2022-01-13 RX ADMIN — Medication 1334 MILLIGRAM(S): at 10:34

## 2022-01-13 RX ADMIN — HEPARIN SODIUM 5000 UNIT(S): 5000 INJECTION INTRAVENOUS; SUBCUTANEOUS at 18:30

## 2022-01-13 RX ADMIN — Medication 60 MILLIGRAM(S): at 17:33

## 2022-01-13 RX ADMIN — Medication 10 MILLIGRAM(S): at 12:39

## 2022-01-13 RX ADMIN — CALCITRIOL 1 MICROGRAM(S): 0.5 CAPSULE ORAL at 17:33

## 2022-01-13 RX ADMIN — Medication 1334 MILLIGRAM(S): at 17:34

## 2022-01-13 RX ADMIN — PANTOPRAZOLE SODIUM 40 MILLIGRAM(S): 20 TABLET, DELAYED RELEASE ORAL at 05:20

## 2022-01-13 RX ADMIN — HEPARIN SODIUM 5000 UNIT(S): 5000 INJECTION INTRAVENOUS; SUBCUTANEOUS at 05:19

## 2022-01-13 NOTE — PROGRESS NOTE ADULT - SUBJECTIVE AND OBJECTIVE BOX
CHAVEZ MARQUEZ  66y  Male      Patient is a 66y old  Male who presents with a chief complaint of Hypocalcemia, issues with fistula (13 Jan 2022 14:06)  feels ok.comfortable,,resting,arousable,nad    REVIEW OF SYSTEMS:  CONSTITUTIONAL: No fever  RESPIRATORY: No cough, hemoptysis or shortness of breath  CARDIOVASCULAR: No chest pain, palpitations, dizziness, or leg swelling  GASTROINTESTINAL: No abdominal pain. nausea, vomiting, hematemesis  INTERVAL HPI/OVERNIGHT EVENTS:  T(C): 37.1 (01-13-22 @ 20:16), Max: 37.1 (01-12-22 @ 22:13)  HR: 68 (01-13-22 @ 20:16) (56 - 76)  BP: 160/93 (01-13-22 @ 20:16) (147/68 - 189/107)  RR: 18 (01-13-22 @ 20:16) (17 - 18)  SpO2: 98% (01-13-22 @ 20:16) (98% - 99%)  Wt(kg): --  I&O's Summary    13 Jan 2022 07:01  -  13 Jan 2022 21:53  --------------------------------------------------------  IN: 500 mL / OUT: 2500 mL / NET: -2000 mL      T(C): 37.1 (01-13-22 @ 20:16), Max: 37.1 (01-12-22 @ 22:13)  HR: 68 (01-13-22 @ 20:16) (56 - 76)  BP: 160/93 (01-13-22 @ 20:16) (147/68 - 189/107)  RR: 18 (01-13-22 @ 20:16) (17 - 18)  SpO2: 98% (01-13-22 @ 20:16) (98% - 99%)  Wt(kg): --Vital Signs Last 24 Hrs  T(C): 37.1 (13 Jan 2022 20:16), Max: 37.1 (12 Jan 2022 22:13)  T(F): 98.8 (13 Jan 2022 20:16), Max: 98.8 (13 Jan 2022 17:31)  HR: 68 (13 Jan 2022 20:16) (56 - 76)  BP: 160/93 (13 Jan 2022 20:16) (147/68 - 189/107)  BP(mean): --  RR: 18 (13 Jan 2022 20:16) (17 - 18)  SpO2: 98% (13 Jan 2022 20:16) (98% - 99%)    LABS:                        11.1   5.51  )-----------( 103      ( 13 Jan 2022 10:04 )             36.0     01-13    136  |  95<L>  |  72<H>  ----------------------------<  146<H>  3.9   |  20<L>  |  10.58<H>    Ca    6.9<L>      13 Jan 2022 10:04  Phos  6.8     01-13  Mg     1.90     01-13          CAPILLARY BLOOD GLUCOSE                PAST MEDICAL & SURGICAL HISTORY:  HTN (Hypertension)    Chronic kidney disease    Kidney stones    Hemodialysis access, AV graft  Left upper extremity    Hyperparathyroidism    Anemia    Thrombocytopenia    Atrial fibrillation  on Eliquis    S/P Nephrectomy  (L) 2007 for kidney stones    Acquired arteriovenous fistula  left wrist  1/2015 - [facility], Left upper arm 4/2015 (approximate date)    AVF (arteriovenous fistula)        MEDICATIONS  (STANDING):  atorvastatin 40 milliGRAM(s) Oral at bedtime  calcitriol   Capsule 1 MICROGram(s) Oral two times a day  calcium acetate 1334 milliGRAM(s) Oral three times a day with meals  calcium carbonate   1250 mG (OsCal) 3 Tablet(s) Oral four times a day  carvedilol 25 milliGRAM(s) Oral every 12 hours  chlorhexidine 2% Cloths 1 Application(s) Topical daily  cholecalciferol 4000 Unit(s) Oral daily  folic acid 1 milliGRAM(s) Oral daily  heparin   Injectable 5000 Unit(s) SubCutaneous every 12 hours  hydrALAZINE 10 milliGRAM(s) Oral two times a day  levothyroxine 100 MICROGram(s) Oral daily  NIFEdipine XL 60 milliGRAM(s) Oral daily  pantoprazole    Tablet 40 milliGRAM(s) Oral before breakfast    MEDICATIONS  (PRN):  acetaminophen     Tablet .. 650 milliGRAM(s) Oral every 6 hours PRN Temp greater or equal to 38C (100.4F), Mild Pain (1 - 3)  melatonin 3 milliGRAM(s) Oral at bedtime PRN Insomnia        RADIOLOGY & ADDITIONAL TESTS:    Imaging Personally Reviewed:  [ ] YES  [ ] NO    Consultant(s) Notes Reviewed:  [ ] YES  [ ] NO    PHYSICAL EXAM:  GENERAL: Alert and awake lying in bed in no distress  HEAD:  Atraumatic, Normocephalic  EYES: EOMI, LILIAM, conjunctiva and sclera clear  NECK: Supple, No JVD, Normal thyroid  NERVOUS SYSTEM:  Alert & Oriented X3, Motor and sensory systems are intact,   CHEST/LUNG: Bilateral clear breath sounds, no rhochi, no wheezing, no crepitations,  HEART: Regular rate and rhythm; No murmurs, rubs, or gallops  ABDOMEN: Soft, Nontender, Nondistended; Bowel sounds present  EXTREMITIES:   Peripheral Pulses are palpable, no  edema        Care Discussed with Consultants/Other Providers [ ] YES  [ ] NO      Code Status: [] Full Code [] DNR [] DNI [] Goals of Care:   Disposition: [] ICU [] Stroke Unit [] RCU []PCU []Floor [] Discharge Home         YEE McwilliamsFACP

## 2022-01-13 NOTE — PROGRESS NOTE ADULT - PROBLEM SELECTOR PLAN 2
HD Tu, Th, Sa. Last 1/8 but reports prior 3 HD sessions ended prematurely due to issues with fistula  - Renal f/up noted  - HD per renal

## 2022-01-13 NOTE — PROGRESS NOTE ADULT - PROBLEM SELECTOR PLAN 5
Continue home coreg  Previously on eliquis.

## 2022-01-13 NOTE — PROGRESS NOTE ADULT - SUBJECTIVE AND OBJECTIVE BOX
[facility] - MARK Vargas / MARK Pagan / JASMIN Caballero/ MARK Bojorquez/ MARK Faust/ TELLO Garrison / RODNEY Garcia / HAILEY Perkins  ---------------------------------------------------------------------------------------------------------------  seen and examined today for ESRD  Interval : tolerating HD well  VITALS:  T(F): 97.5 (01-13-22 @ 13:36), Max: 98.7 (01-12-22 @ 22:13)  HR: 62 (01-13-22 @ 13:36)  BP: 147/68 (01-13-22 @ 13:36)  RR: 18 (01-13-22 @ 13:36)  SpO2: 99% (01-13-22 @ 12:30)  Wt(kg): --    01-13 @ 07:01  -  01-13 @ 14:07  --------------------------------------------------------  IN: 500 mL / OUT: 2500 mL / NET: -2000 mL      Physical Exam :-  Constitutional: NAD  Neck: Supple.  Respiratory: Bilateral equal breath sounds,  Cardiovascular: S1, S2 normal,  Gastrointestinal: Bowel Sounds present, soft, non tender.  Extremities: No edema  Neurological: Alert and Oriented x 3, no focal deficits  Psychiatric: Normal mood, normal affect  Data:-  Allergies :   No Known Allergies    Hospital Medications:   MEDICATIONS  (STANDING):  atorvastatin 40 milliGRAM(s) Oral at bedtime  calcitriol   Capsule 1 MICROGram(s) Oral two times a day  calcium acetate 1334 milliGRAM(s) Oral three times a day with meals  calcium carbonate   1250 mG (OsCal) 3 Tablet(s) Oral four times a day  carvedilol 25 milliGRAM(s) Oral every 12 hours  chlorhexidine 2% Cloths 1 Application(s) Topical daily  cholecalciferol 4000 Unit(s) Oral daily  folic acid 1 milliGRAM(s) Oral daily  heparin   Injectable 5000 Unit(s) SubCutaneous every 12 hours  hydrALAZINE 10 milliGRAM(s) Oral two times a day  levothyroxine 100 MICROGram(s) Oral daily  NIFEdipine XL 60 milliGRAM(s) Oral daily  pantoprazole    Tablet 40 milliGRAM(s) Oral before breakfast    01-13    136  |  95<L>  |  72<H>  ----------------------------<  146<H>  3.9   |  20<L>  |  10.58<H>    Ca    6.9<L>      13 Jan 2022 10:04  Phos  6.8     01-13  Mg     1.90     01-13      Creatinine Trend: 10.58 <--, 10.11 <--, 8.24 <--, 8.56 <--, 11.85 <--, 10.88 <--, 10.30 <--                        11.1   5.51  )-----------( 103      ( 13 Jan 2022 10:04 )             36.0

## 2022-01-13 NOTE — PROGRESS NOTE ADULT - PROBLEM SELECTOR PLAN 7
DVT ppx with heparin subcutaneous

## 2022-01-13 NOTE — PROGRESS NOTE ADULT - PROBLEM SELECTOR PLAN 6
Continue home levothyroxine 100mcg qd

## 2022-01-13 NOTE — PHYSICAL THERAPY INITIAL EVALUATION ADULT - GAIT PATTERN USED, PT EVAL
Haley presents today for her OB visit.    Patient would like communication of their results via: [employer]    Patient's current [facility] status: Active.     Chaperone needed:  No    Baby Scripts - Patient is on [employer] Scheduling.            
Routine ob at 24w1d  Doing well   H/o GHTN:  Taking baby ASA, will start urines with next visit  Prev c/s:  Plan on rpt c/s due to h/o vaginal hematoma  Reviewed FM counting  RTC in 4 weeks for routine care    
swing-through gait

## 2022-01-13 NOTE — PROGRESS NOTE ADULT - PROBLEM SELECTOR PLAN 4
Continue home coreg, hydralazine, nifedipine with hold parameters

## 2022-01-13 NOTE — PROGRESS NOTE ADULT - PROBLEM SELECTOR PLAN 1
-Trend.s/p supplement
Hypocalcemia to 4s on outpatient labs (drawn on 1/8). Hx hypocalcemia. Patient states he is no longer taking calcium carbonate -unclear if formally discontinued vs patient nonadherence, attempt to further clarify.  - Nephro f/up noted. On calcium tab  - Continue to trend calcium  - continue telemetry
-Trend.s/p supplement

## 2022-01-14 ENCOUNTER — TRANSCRIPTION ENCOUNTER (OUTPATIENT)
Age: 67
End: 2022-01-14

## 2022-01-14 VITALS
HEART RATE: 68 BPM | TEMPERATURE: 98 F | OXYGEN SATURATION: 98 % | SYSTOLIC BLOOD PRESSURE: 129 MMHG | DIASTOLIC BLOOD PRESSURE: 62 MMHG | RESPIRATION RATE: 18 BRPM

## 2022-01-14 LAB
ANION GAP SERPL CALC-SCNC: 17 MMOL/L — HIGH (ref 7–14)
BUN SERPL-MCNC: 50 MG/DL — HIGH (ref 7–23)
CA-I BLD-SCNC: 1 MMOL/L — LOW (ref 1.15–1.29)
CALCIUM SERPL-MCNC: 8.4 MG/DL — SIGNIFICANT CHANGE UP (ref 8.4–10.5)
CHLORIDE SERPL-SCNC: 97 MMOL/L — LOW (ref 98–107)
CO2 SERPL-SCNC: 23 MMOL/L — SIGNIFICANT CHANGE UP (ref 22–31)
CREAT SERPL-MCNC: 7.85 MG/DL — HIGH (ref 0.5–1.3)
GLUCOSE SERPL-MCNC: 87 MG/DL — SIGNIFICANT CHANGE UP (ref 70–99)
HCT VFR BLD CALC: 37 % — LOW (ref 39–50)
HGB BLD-MCNC: 11.7 G/DL — LOW (ref 13–17)
MCHC RBC-ENTMCNC: 28.6 PG — SIGNIFICANT CHANGE UP (ref 27–34)
MCHC RBC-ENTMCNC: 31.6 GM/DL — LOW (ref 32–36)
MCV RBC AUTO: 90.5 FL — SIGNIFICANT CHANGE UP (ref 80–100)
NRBC # BLD: 0 /100 WBCS — SIGNIFICANT CHANGE UP
NRBC # FLD: 0 K/UL — SIGNIFICANT CHANGE UP
PLATELET # BLD AUTO: 110 K/UL — LOW (ref 150–400)
POTASSIUM SERPL-MCNC: 4 MMOL/L — SIGNIFICANT CHANGE UP (ref 3.5–5.3)
POTASSIUM SERPL-SCNC: 4 MMOL/L — SIGNIFICANT CHANGE UP (ref 3.5–5.3)
RBC # BLD: 4.09 M/UL — LOW (ref 4.2–5.8)
RBC # FLD: 18.3 % — HIGH (ref 10.3–14.5)
SARS-COV-2 RNA SPEC QL NAA+PROBE: SIGNIFICANT CHANGE UP
SODIUM SERPL-SCNC: 137 MMOL/L — SIGNIFICANT CHANGE UP (ref 135–145)
WBC # BLD: 5.26 K/UL — SIGNIFICANT CHANGE UP (ref 3.8–10.5)
WBC # FLD AUTO: 5.26 K/UL — SIGNIFICANT CHANGE UP (ref 3.8–10.5)

## 2022-01-14 RX ORDER — CALCIUM ACETATE 667 MG
1 TABLET ORAL
Qty: 90 | Refills: 0
Start: 2022-01-14 | End: 2022-02-12

## 2022-01-14 RX ORDER — CALCITRIOL 0.5 UG/1
2 CAPSULE ORAL
Qty: 120 | Refills: 0
Start: 2022-01-14 | End: 2022-02-12

## 2022-01-14 RX ORDER — CALCIUM CARBONATE 500(1250)
3 TABLET ORAL
Qty: 168 | Refills: 0
Start: 2022-01-14 | End: 2022-01-27

## 2022-01-14 RX ADMIN — Medication 1334 MILLIGRAM(S): at 08:42

## 2022-01-14 RX ADMIN — Medication 1 MILLIGRAM(S): at 11:41

## 2022-01-14 RX ADMIN — CHLORHEXIDINE GLUCONATE 1 APPLICATION(S): 213 SOLUTION TOPICAL at 11:16

## 2022-01-14 RX ADMIN — CARVEDILOL PHOSPHATE 25 MILLIGRAM(S): 80 CAPSULE, EXTENDED RELEASE ORAL at 05:38

## 2022-01-14 RX ADMIN — PANTOPRAZOLE SODIUM 40 MILLIGRAM(S): 20 TABLET, DELAYED RELEASE ORAL at 05:38

## 2022-01-14 RX ADMIN — Medication 1334 MILLIGRAM(S): at 11:41

## 2022-01-14 RX ADMIN — Medication 60 MILLIGRAM(S): at 05:38

## 2022-01-14 RX ADMIN — HEPARIN SODIUM 5000 UNIT(S): 5000 INJECTION INTRAVENOUS; SUBCUTANEOUS at 05:38

## 2022-01-14 RX ADMIN — Medication 3 TABLET(S): at 05:37

## 2022-01-14 RX ADMIN — Medication 4000 UNIT(S): at 11:41

## 2022-01-14 RX ADMIN — Medication 10 MILLIGRAM(S): at 05:38

## 2022-01-14 RX ADMIN — CALCITRIOL 1 MICROGRAM(S): 0.5 CAPSULE ORAL at 05:37

## 2022-01-14 RX ADMIN — Medication 100 MICROGRAM(S): at 05:38

## 2022-01-14 RX ADMIN — Medication 3 TABLET(S): at 11:41

## 2022-01-14 NOTE — DISCHARGE NOTE NURSING/CASE MANAGEMENT/SOCIAL WORK - NSDCVIVACCINE_GEN_ALL_CORE_FT
influenza, high-dose, quadrivalent; 01-Nov-2021 07:20; Malka Houser (RN); [employer]; Cb570ex (Exp. Date: 31-May-2022); IntraMuscular; Deltoid Left.; 0.7 milliLiter(s); VIS (VIS Published: 06-Aug-2021, VIS Presented: 01-Nov-2021);

## 2022-01-14 NOTE — DISCHARGE NOTE PROVIDER - NSDCMRMEDTOKEN_GEN_ALL_CORE_FT
atorvastatin 40 mg oral tablet: 1 tab(s) orally once a day (at bedtime)  calcitriol 0.5 mcg oral capsule: 3 cap(s) orally 2 times a day  calcium carbonate 1250 mg (500 mg elemental calcium) oral tablet: 3 tab(s) orally 4 times a day  carvedilol 25 mg oral tablet: 1 tab(s) orally 2 times a day  cholecalciferol oral tablet: 4000 unit(s) orally once a day  epoetin tammi: 09624 unit(s) intravenous 3 times a week with dialysis   folic acid 1 mg oral tablet: 1 tab(s) orally once a day  hydrALAZINE 10 mg oral tablet: 1 tab(s) orally 2 times a day   levothyroxine 100 mcg (0.1 mg) oral tablet: 1 tab(s) orally once a day  NIFEdipine 60 mg oral tablet, extended release: 1 tab(s) orally once a day  pantoprazole 40 mg oral delayed release tablet: 1 tab(s) orally once a day (before a meal)   atorvastatin 40 mg oral tablet: 1 tab(s) orally once a day (at bedtime)  calcitriol 0.5 mcg oral capsule: 2 cap(s) orally 2 times a day  calcium acetate 667 mg oral tablet: 1 tab(s) orally 3 times a day (with meals)   calcium carbonate 1250 mg (500 mg elemental calcium) oral tablet: 3 tab(s) orally 4 times a day  carvedilol 25 mg oral tablet: 1 tab(s) orally 2 times a day  cholecalciferol oral tablet: 4000 unit(s) orally once a day  epoetin tammi: 78963 unit(s) intravenous 3 times a week with dialysis   folic acid 1 mg oral tablet: 1 tab(s) orally once a day  hydrALAZINE 10 mg oral tablet: 1 tab(s) orally 2 times a day   levothyroxine 100 mcg (0.1 mg) oral tablet: 1 tab(s) orally once a day  NIFEdipine 60 mg oral tablet, extended release: 1 tab(s) orally once a day  pantoprazole 40 mg oral delayed release tablet: 1 tab(s) orally once a day (before a meal)

## 2022-01-14 NOTE — PROGRESS NOTE ADULT - ASSESSMENT
66 year old male with ESRD (on TTS HD), afib (no longer on eliquis, unclear why discontinued?), hx hypocalcemia, presented after outpatient labs revealed hypocalcemia and advised to follow up at ED as fistula has not been functioning properly. Last HD 1/8/22, per patient, HD session ended prematurely due to fistula issue. Renal following for ESRD Mx.     ESRD on HD  Routine HD days TTS  Access -rt FA AVF  [facility]  K, bicarb, vol acceptable  Anemia in CKD-Hb > goal. no FRANNY  tolerated HD well today  renal diet  dose all meds dose ESRD    AV fistula malfunction  - S/P fistulogram  Hypocalcemia, asympt  magnesium adequate  s/p iv ca gluconate 2gm 1/10  c/w caco3 po 3tabs 4xday   inc calcitriol 0.5>1mcg bid     monitor ca/BMP daily   Hyperphosphatemia - added phoslo 2tidac  HTN, controlled- c/w COREG, HYDRALAZINE, NIFEdipine       labs, chart reviewed  For any question, call:  Cell # [phone]  Pager # [phone]  Callback # [phone]      
66 year old male with ESRD (on TTS HD), afib (no longer on eliquis, unclear why discontinued?), hx hypocalcemia, presented after outpatient labs revealed hypocalcemia and advised to follow up at ED as fistula has not been functioning properly. Last HD 1/8/22, per patient, HD session ended prematurely due to fistula issue. Renal following for ESRD Mx.     ESRD on HD  Routine HD days TTS  Access -rt FA AVF  [facility]  K, bicarb, vol acceptable  Anemia in CKD-Hb > goal. no FRANNY    c/w HD using AVF w/2k, 3.5ca(higher) bath, uf 2kg as tolerated, uneventful so far. 400ml/hr bfr achievable w/avf-working well so far  renal diet  dose all meds dose ESRD    AV fistula malfunction  ·  Per patient, last 3 HD sessions ended prematurely due to issues with fistula  - S/P fistulogram  Hypocalcemia, asympt  magnesium adequate  s/p iv ca gluconate 2gm 1/10  redosed 1gm ivpb x2 today  c/w caco3 po 3tabs 4xday   inc calcitriol 0.5>1mcg bid     monitor ca/BMP daily   Hyperphosphatemia - added phoslo 2tidac  HTN, controlled- c/w COREG, HYDRALAZINE, NIFEdipine     poc d/w pt, HD RN, resident in am  will follow up.   labs, chart reviewed  For any question, call:  Cell # [phone]  Pager # [phone]  Callback # [phone]      
66y Male with ESRD (on TTS HD) through RUE fistula, afib (no longer on eliquis, unclear why discontinued?), hx hypocalcemia, presented after outpatient labs revealed hypocalcemia and fistula has not been functioning properly (per patient, past 3 HD sessions ended prematurely secondary to issues with fistula, advised to come to hospital for further evaluation). Vascular surgery consulted for evaluation.    Recommendations:  - OR planing for fistulogram this week, will update primary team in terms of timing.    EMILIA Ross, PGY-2  [facility]  C Team Surgery  [id number]
66 year old male with ESRD (on TTS HD), afib (no longer on eliquis, unclear why discontinued?), hx hypocalcemia, presented after outpatient labs revealed hypocalcemia and advised to follow up at ED as fistula has not been functioning properly. Last HD 1/8/22, per patient, HD session ended prematurely due to fistula issue. Renal following for ESRD Mx.     ESRD on HD  Routine HD days TTS  Access -rt FA AVF  [facility]  K, bicarb, vol acceptable  Anemia in CKD-Hb > goal. no FRANNY    c/w HD using AVF w/2k, 3.5ca(higher) bath, uf 2kg as tolerated, uneventful so far. 400ml/hr bfr achievable w/avf-working well so far  renal diet  dose all meds dose ESRD    AV fistula malfunction  ·  Per patient, last 3 HD sessions ended prematurely due to issues with fistula  - vas sx consulted- plan for fistulogram  Hypocalcemia, asympt  magnesium adequate  s/p iv ca gluconate 2gm 1/10  redosed 1gm ivpb x2 today  c/w caco3 po 3tabs 4xday   inc calcitriol 0.5>1mcg bid     monitor ca/BMP daily   Hyperphosphatemia - added phoslo 2tidac  HTN, controlled- c/w COREG, HYDRALAZINE, NIFEdipine     poc d/w pt, HD RN, resident in am  will follow up.   labs, chart reviewed  For any question, pl call:  Nephrology  Cell -[phone]  Office [phone]  Ans Serv [phone]    
66 year old male with ESRD (on TTS HD), afib (no longer on eliquis, unclear why discontinued?), hx hypocalcemia, presented after outpatient labs revealed hypocalcemia and advised to follow up at ED as fistula has not been functioning properly. Last HD 1/8/22, per patient, HD session ended prematurely due to fistula issue. Renal following for ESRD Mx.     ESRD on HD  Routine HD days TTS  Access -rt FA AVF  [facility]  K, bicarb, vol acceptable  Anemia in CKD-Hb > goal. no FRANNY  tolerated HD well today  renal diet  dose all meds dose ESRD    AV fistula malfunction  - S/P fistulogram  Hypocalcemia, asympt  magnesium adequate  s/p iv ca gluconate 2gm 1/10  c/w caco3 po 3tabs 4xday   inc calcitriol 0.5>1mcg bid     monitor ca/BMP daily   Hyperphosphatemia - added phoslo 2tidac  HTN, controlled- c/w COREG, HYDRALAZINE, NIFEdipine     poc d/w pt, HD RN, resident in am  will follow up.   labs, chart reviewed  For any question, call:  Cell # [phone]  Pager # [phone]  Callback # [phone]      
66y Male with ESRD (on TTS HD) through RUE fistula, afib (no longer on eliquis, unclear why discontinued?), hx hypocalcemia, presented after outpatient labs revealed hypocalcemia and fistula has not been functioning properly (per patient, past 3 HD sessions ended prematurely secondary to issues with fistula, advised to come to hospital for further evaluation). Vascular surgery consulted for evaluation.    Recommendations:  - Patient had successful full dialysis session through RUE AVF after fistulogram  - No further vascular surgery interventions at this time.  - Please recontact vascular surgery with further questions.    EMILIA Ross, PGY-2  [facility]  C Team Surgery  [id number]
66 year old male with ESRD (on TTS HD), afib (no longer on eliquis, unclear why discontinued?), hx hypocalcemia, presented after outpatient labs revealed hypocalcemia and advised to follow up at ED as fistula has not been functioning properly. Last HD 1/8/22, per patient, HD session ended prematurely due to fistula issue. 

## 2022-01-14 NOTE — DISCHARGE NOTE PROVIDER - HOSPITAL COURSE
Patient Janie Premchand, PMH ESRD (on HD Tues,Thur, Sat), Hypothyroidism (on levothyroxine), Afib (no longer on eliquis), clogged HD fistula, h/o hypocalcemia, admitted to the hospital after malfunctioning of the HD fidstula and routine labs confirmed hypocalcemia (5.0), hyperkalemia (6.2). Patient was given lokelma a total of 10mg in the ED, and 2mg IV calcium gluconate. Recommendations included telemetry monitoring, serial BMP and calcium levels, and to resume oral calcium carbonate.  Radial fistulogram w/ balloon angioplasty was ordered and performed to assess for issues and was fully functioning after the procedure. Heparin was initiated for prophylactic anticoagulation.   Patient continued to improve over the course of his stay, and is medically stable for discharge from Nephrology and Medicine prospective. Covid swab requested for admission to dialysis center.           Patient Janie Premchand, PMH ESRD (on HD Tues,Thur, Sat), Hypothyroidism (on levothyroxine), Afib (no longer on eliquis), clogged HD fistula, h/o hypocalcemia, admitted to the hospital after malfunctioning of the HD fidstula and routine labs confirmed hypocalcemia (5.0), hyperkalemia (6.2). Patient was given lokelma a total of 10mg in the ED, and 2mg IV calcium gluconate. Recommendations included telemetry monitoring, serial BMP and calcium levels, and to resume oral calcium carbonate with increase calcitriol dosage.  Radial fistulogram w/ balloon angioplasty was ordered and performed to assess for issues and was fully functioning after the procedure. Heparin was initiated for prophylactic anticoagulation.     Patient continued to improve over the course of his stay, and is medically stable for discharge from Nephrology and Medicine prospective. Covid swab requested for admission to dialysis center.           Patient Janie Premchand, PMH ESRD (on HD Tues,Thur, Sat), Hypothyroidism (on levothyroxine), Afib (no longer on eliquis), clogged HD fistula, h/o hypocalcemia, admitted to the hospital after malfunctioning of the HD fidstula and routine labs confirmed hypocalcemia (5.0), hyperkalemia (6.2). Patient was given lokelma a total of 10mg in the ED, and 2mg IV calcium gluconate. Recommendations included telemetry monitoring, serial BMP and calcium levels, and to resume oral calcium carbonate with increase calcitriol dosage.  Radial fistulogram w/ balloon angioplasty was ordered and performed to assess for issues and was fully functioning after the procedure. Heparin was initiated for prophylactic anticoagulation.     Patient continued to improve over the course of his stay. pt cleared from Dr Christian on 1/14 for discharge. Reviewed discharge medications with patient; All new medications requiring new prescription sent to pharmacy of patients choice. Reviewed need for prescription for previous home medication and new prescriptions sent if requested. Patient in agreement and understands.

## 2022-01-14 NOTE — PROGRESS NOTE ADULT - PROVIDER SPECIALTY LIST ADULT
Nephrology
Nephrology
Surgery
Nephrology
Nephrology
Vascular Surgery
Internal Medicine

## 2022-01-14 NOTE — DISCHARGE NOTE NURSING/CASE MANAGEMENT/SOCIAL WORK - PATIENT PORTAL LINK FT
You can access the FollowMyHealth Patient Portal offered by [facility] by registering at the following website: [email or web address]. By joining [employer]’s FollowMyHealth portal, you will also be able to view your health information using other applications (apps) compatible with our system.

## 2022-01-14 NOTE — PROGRESS NOTE ADULT - REASON FOR ADMISSION
Hypocalcemia, issues with fistula

## 2022-01-14 NOTE — PROGRESS NOTE ADULT - SUBJECTIVE AND OBJECTIVE BOX
[facility] - MARK Vargas / MARK Pagan / JASMIN Caballero/ MARK Bojorquez/ MARK Faust/ TELLO Garrison / RODNEY Garcia / HAILEY Perkins  ---------------------------------------------------------------------------------------------------------------  seen and examined today for ESRD  Interval : NAD  VITALS:  T(F): 98.5 (01-14-22 @ 05:00), Max: 98.8 (01-13-22 @ 17:31)  HR: 69 (01-14-22 @ 05:00)  BP: 125/70 (01-14-22 @ 05:00)  RR: 18 (01-14-22 @ 05:00)  SpO2: 97% (01-14-22 @ 05:00)  Wt(kg): --    01-13 @ 07:01  -  01-14 @ 07:00  --------------------------------------------------------  IN: 500 mL / OUT: 2500 mL / NET: -2000 mL      Physical Exam :-  Constitutional: NAD  Neck: Supple.  Respiratory: Bilateral equal breath sounds,  Cardiovascular: S1, S2 normal,  Gastrointestinal: Bowel Sounds present, soft, non tender.  Extremities: No edema  Neurological: Alert and Oriented x 3, no focal deficits  Psychiatric: Normal mood, normal affect  Data:-  Allergies :   No Known Allergies    Hospital Medications:   MEDICATIONS  (STANDING):  atorvastatin 40 milliGRAM(s) Oral at bedtime  calcitriol   Capsule 1 MICROGram(s) Oral two times a day  calcium acetate 1334 milliGRAM(s) Oral three times a day with meals  calcium carbonate   1250 mG (OsCal) 3 Tablet(s) Oral four times a day  carvedilol 25 milliGRAM(s) Oral every 12 hours  chlorhexidine 2% Cloths 1 Application(s) Topical daily  cholecalciferol 4000 Unit(s) Oral daily  folic acid 1 milliGRAM(s) Oral daily  heparin   Injectable 5000 Unit(s) SubCutaneous every 12 hours  hydrALAZINE 10 milliGRAM(s) Oral two times a day  levothyroxine 100 MICROGram(s) Oral daily  NIFEdipine XL 60 milliGRAM(s) Oral daily  pantoprazole    Tablet 40 milliGRAM(s) Oral before breakfast    01-14    137  |  97<L>  |  50<H>  ----------------------------<  87  4.0   |  23  |  7.85<H>    Ca    8.4      14 Jan 2022 06:48  Phos  6.8     01-13  Mg     1.90     01-13      Creatinine Trend: 7.85 <--, 10.58 <--, 10.11 <--, 8.24 <--, 8.56 <--, 11.85 <--, 10.88 <--, 10.30 <--                        11.7   5.26  )-----------( 110      ( 14 Jan 2022 06:48 )             37.0

## 2022-01-14 NOTE — DISCHARGE NOTE NURSING/CASE MANAGEMENT/SOCIAL WORK - NSDCPEFALRISK_GEN_ALL_CORE
For information on Fall & Injury Prevention, visit: [email or web address] OR  [email or web address] OR  https://www.cdc.gov/steadi/patient.html

## 2022-01-14 NOTE — DISCHARGE NOTE PROVIDER - NSDCCPCAREPLAN_GEN_ALL_CORE_FT
PRINCIPAL DISCHARGE DIAGNOSIS  Diagnosis: Hypocalcemia  Assessment and Plan of Treatment: You were seen at [facility] due to decreasing calcium levels and malfunctioning HD fistula. Lab work confirmed hypocalcemia, hyperkalemia, and medications were initiated to correct the electrolyte abnormalities.  You are to continue with dialysis once discharged.       PRINCIPAL DISCHARGE DIAGNOSIS  Diagnosis: Hypocalcemia  Assessment and Plan of Treatment: You were seen at [facility] due to decreasing calcium levels and malfunctioning HD fistula. Lab work confirmed hypocalcemia, hyperkalemia, and medications were initiated to correct the electrolyte abnormalities.  You are to continue with dialysis once discharged. Followup with your nephrologist or Dr Perkins on discharge      SECONDARY DISCHARGE DIAGNOSES  Diagnosis: ESRD on dialysis  Assessment and Plan of Treatment: Please follow up with your nephrologist for management, and continue your schedule hemodialysis.    Diagnosis: AV fistula  Assessment and Plan of Treatment: you had fistulagram by vascular this admission, AV fistula now function properly. Followup with your nephrologist on discharge

## 2022-01-14 NOTE — CHART NOTE - NSCHARTNOTEFT_GEN_A_CORE
Spoke with Dr Christian pt cleared for discharge, Lab reviewed with medicine attending and renal attending

## 2022-01-14 NOTE — DISCHARGE NOTE PROVIDER - CARE PROVIDER_API CALL
Gustavo Perkins)  Internal Medicine; Nephrology  [phone] [place] [address]  Phone: [phone]  Fax: [phone]  Follow Up Time:     Wilfred Christian)  Internal Medicine  [address] [facility], [address] [place] [address]  Phone: [phone]  Fax: [phone]  Follow Up Time:

## 2022-03-29 NOTE — H&P ADULT - NSICDXPASTSURGICALHX_GEN_ALL_CORE_FT
PAST SURGICAL HISTORY:  Acquired arteriovenous fistula left wrist  1/2015 - [facility], Left upper arm 4/2015 (approximate date)    AVF (arteriovenous fistula)     S/P Nephrectomy (L) 2007 for kidney stones    
No

## 2022-04-20 NOTE — ED PROVIDER NOTE - CONSTITUTIONAL, MLM
Home normal... Well appearing, well nourished, awake, alert, oriented to person, place, time/situation and in no apparent distress.

## 2022-06-17 ENCOUNTER — INPATIENT (INPATIENT)
Facility: HOSPITAL | Age: 67
LOS: 13 days | Discharge: HOME | End: 2022-07-01
Attending: INTERNAL MEDICINE | Admitting: INTERNAL MEDICINE
Payer: MEDICAID

## 2022-06-17 VITALS
RESPIRATION RATE: 16 BRPM | SYSTOLIC BLOOD PRESSURE: 118 MMHG | TEMPERATURE: 97 F | OXYGEN SATURATION: 99 % | HEART RATE: 84 BPM | DIASTOLIC BLOOD PRESSURE: 66 MMHG | HEIGHT: 69 IN

## 2022-06-17 DIAGNOSIS — I77.0 ARTERIOVENOUS FISTULA, ACQUIRED: Chronic | ICD-10-CM

## 2022-06-17 DIAGNOSIS — E03.9 HYPOTHYROIDISM, UNSPECIFIED: ICD-10-CM

## 2022-06-17 DIAGNOSIS — N18.6 END STAGE RENAL DISEASE: ICD-10-CM

## 2022-06-17 DIAGNOSIS — I48.91 UNSPECIFIED ATRIAL FIBRILLATION: ICD-10-CM

## 2022-06-17 DIAGNOSIS — R47.01 APHASIA: ICD-10-CM

## 2022-06-17 DIAGNOSIS — Z29.9 ENCOUNTER FOR PROPHYLACTIC MEASURES, UNSPECIFIED: ICD-10-CM

## 2022-06-17 DIAGNOSIS — R47.1 DYSARTHRIA AND ANARTHRIA: ICD-10-CM

## 2022-06-17 DIAGNOSIS — I10 ESSENTIAL (PRIMARY) HYPERTENSION: ICD-10-CM

## 2022-06-17 LAB
ALBUMIN SERPL ELPH-MCNC: 4.8 G/DL — SIGNIFICANT CHANGE UP (ref 3.3–5)
ALP SERPL-CCNC: 76 U/L — SIGNIFICANT CHANGE UP (ref 40–120)
ALT FLD-CCNC: 15 U/L — SIGNIFICANT CHANGE UP (ref 4–41)
ANION GAP SERPL CALC-SCNC: 18 MMOL/L — HIGH (ref 7–14)
APTT BLD: 30.7 SEC — SIGNIFICANT CHANGE UP (ref 27–36.3)
AST SERPL-CCNC: 11 U/L — SIGNIFICANT CHANGE UP (ref 4–40)
BASOPHILS # BLD AUTO: 0.03 K/UL — SIGNIFICANT CHANGE UP (ref 0–0.2)
BASOPHILS NFR BLD AUTO: 0.5 % — SIGNIFICANT CHANGE UP (ref 0–2)
BILIRUB SERPL-MCNC: 0.3 MG/DL — SIGNIFICANT CHANGE UP (ref 0.2–1.2)
BUN SERPL-MCNC: 50 MG/DL — HIGH (ref 7–23)
CALCIUM SERPL-MCNC: 8.7 MG/DL — SIGNIFICANT CHANGE UP (ref 8.4–10.5)
CHLORIDE SERPL-SCNC: 94 MMOL/L — LOW (ref 98–107)
CK MB BLD-MCNC: 1.9 % — SIGNIFICANT CHANGE UP (ref 0–2.5)
CK MB CFR SERPL CALC: 1.6 NG/ML — SIGNIFICANT CHANGE UP
CK SERPL-CCNC: 86 U/L — SIGNIFICANT CHANGE UP (ref 30–200)
CO2 SERPL-SCNC: 29 MMOL/L — SIGNIFICANT CHANGE UP (ref 22–31)
CREAT SERPL-MCNC: 8.26 MG/DL — HIGH (ref 0.5–1.3)
EGFR: 7 ML/MIN/1.73M2 — LOW
EOSINOPHIL # BLD AUTO: 0.1 K/UL — SIGNIFICANT CHANGE UP (ref 0–0.5)
EOSINOPHIL NFR BLD AUTO: 1.6 % — SIGNIFICANT CHANGE UP (ref 0–6)
FLUAV AG NPH QL: SIGNIFICANT CHANGE UP
FLUBV AG NPH QL: SIGNIFICANT CHANGE UP
GLUCOSE SERPL-MCNC: 70 MG/DL — SIGNIFICANT CHANGE UP (ref 70–99)
HCT VFR BLD CALC: 42.2 % — SIGNIFICANT CHANGE UP (ref 39–50)
HGB BLD-MCNC: 13.3 G/DL — SIGNIFICANT CHANGE UP (ref 13–17)
IANC: 3.84 K/UL — SIGNIFICANT CHANGE UP (ref 1.8–7.4)
IMM GRANULOCYTES NFR BLD AUTO: 0.3 % — SIGNIFICANT CHANGE UP (ref 0–1.5)
INR BLD: 0.96 RATIO — SIGNIFICANT CHANGE UP (ref 0.88–1.16)
LYMPHOCYTES # BLD AUTO: 1.73 K/UL — SIGNIFICANT CHANGE UP (ref 1–3.3)
LYMPHOCYTES # BLD AUTO: 27 % — SIGNIFICANT CHANGE UP (ref 13–44)
MCHC RBC-ENTMCNC: 30.6 PG — SIGNIFICANT CHANGE UP (ref 27–34)
MCHC RBC-ENTMCNC: 31.5 GM/DL — LOW (ref 32–36)
MCV RBC AUTO: 97.2 FL — SIGNIFICANT CHANGE UP (ref 80–100)
MONOCYTES # BLD AUTO: 0.69 K/UL — SIGNIFICANT CHANGE UP (ref 0–0.9)
MONOCYTES NFR BLD AUTO: 10.8 % — SIGNIFICANT CHANGE UP (ref 2–14)
NEUTROPHILS # BLD AUTO: 3.84 K/UL — SIGNIFICANT CHANGE UP (ref 1.8–7.4)
NEUTROPHILS NFR BLD AUTO: 59.8 % — SIGNIFICANT CHANGE UP (ref 43–77)
NRBC # BLD: 0 /100 WBCS — SIGNIFICANT CHANGE UP
NRBC # FLD: 0 K/UL — SIGNIFICANT CHANGE UP
PLATELET # BLD AUTO: 148 K/UL — LOW (ref 150–400)
POTASSIUM SERPL-MCNC: 4.3 MMOL/L — SIGNIFICANT CHANGE UP (ref 3.5–5.3)
POTASSIUM SERPL-SCNC: 4.3 MMOL/L — SIGNIFICANT CHANGE UP (ref 3.5–5.3)
PROT SERPL-MCNC: 7.7 G/DL — SIGNIFICANT CHANGE UP (ref 6–8.3)
PROTHROM AB SERPL-ACNC: 11.1 SEC — SIGNIFICANT CHANGE UP (ref 10.5–13.4)
RBC # BLD: 4.34 M/UL — SIGNIFICANT CHANGE UP (ref 4.2–5.8)
RBC # FLD: 15.2 % — HIGH (ref 10.3–14.5)
RSV RNA NPH QL NAA+NON-PROBE: SIGNIFICANT CHANGE UP
SARS-COV-2 RNA SPEC QL NAA+PROBE: SIGNIFICANT CHANGE UP
SODIUM SERPL-SCNC: 141 MMOL/L — SIGNIFICANT CHANGE UP (ref 135–145)
TROPONIN T, HIGH SENSITIVITY RESULT: 49 NG/L — SIGNIFICANT CHANGE UP
TROPONIN T, HIGH SENSITIVITY RESULT: 49 NG/L — SIGNIFICANT CHANGE UP
TSH SERPL-MCNC: 0.73 UIU/ML — SIGNIFICANT CHANGE UP (ref 0.27–4.2)
WBC # BLD: 6.41 K/UL — SIGNIFICANT CHANGE UP (ref 3.8–10.5)
WBC # FLD AUTO: 6.41 K/UL — SIGNIFICANT CHANGE UP (ref 3.8–10.5)

## 2022-06-17 PROCEDURE — 99221 1ST HOSP IP/OBS SF/LOW 40: CPT

## 2022-06-17 PROCEDURE — 99284 EMERGENCY DEPT VISIT MOD MDM: CPT

## 2022-06-17 PROCEDURE — 93010 ELECTROCARDIOGRAM REPORT: CPT

## 2022-06-17 PROCEDURE — 99223 1ST HOSP IP/OBS HIGH 75: CPT

## 2022-06-17 PROCEDURE — 71045 X-RAY EXAM CHEST 1 VIEW: CPT | Mod: 26

## 2022-06-17 PROCEDURE — 70450 CT HEAD/BRAIN W/O DYE: CPT | Mod: 26,MA

## 2022-06-17 RX ORDER — CALCIUM ACETATE 667 MG
667 TABLET ORAL
Refills: 0 | Status: DISCONTINUED | OUTPATIENT
Start: 2022-06-17 | End: 2022-06-21

## 2022-06-17 RX ORDER — CARVEDILOL PHOSPHATE 80 MG/1
1 CAPSULE, EXTENDED RELEASE ORAL
Qty: 0 | Refills: 3 | DISCHARGE

## 2022-06-17 RX ORDER — ACETAMINOPHEN 500 MG
650 TABLET ORAL EVERY 6 HOURS
Refills: 0 | Status: DISCONTINUED | OUTPATIENT
Start: 2022-06-17 | End: 2022-07-01

## 2022-06-17 RX ORDER — ASPIRIN/CALCIUM CARB/MAGNESIUM 324 MG
300 TABLET ORAL DAILY
Refills: 0 | Status: DISCONTINUED | OUTPATIENT
Start: 2022-06-17 | End: 2022-06-18

## 2022-06-17 RX ORDER — LEVOTHYROXINE SODIUM 125 MCG
1 TABLET ORAL
Qty: 0 | Refills: 0 | DISCHARGE

## 2022-06-17 RX ORDER — ATORVASTATIN CALCIUM 80 MG/1
80 TABLET, FILM COATED ORAL AT BEDTIME
Refills: 0 | Status: DISCONTINUED | OUTPATIENT
Start: 2022-06-17 | End: 2022-07-01

## 2022-06-17 RX ORDER — ONDANSETRON 8 MG/1
4 TABLET, FILM COATED ORAL EVERY 8 HOURS
Refills: 0 | Status: DISCONTINUED | OUTPATIENT
Start: 2022-06-17 | End: 2022-07-01

## 2022-06-17 RX ORDER — LANOLIN ALCOHOL/MO/W.PET/CERES
3 CREAM (GRAM) TOPICAL AT BEDTIME
Refills: 0 | Status: DISCONTINUED | OUTPATIENT
Start: 2022-06-17 | End: 2022-07-01

## 2022-06-17 RX ORDER — HEPARIN SODIUM 5000 [USP'U]/ML
5000 INJECTION INTRAVENOUS; SUBCUTANEOUS EVERY 8 HOURS
Refills: 0 | Status: DISCONTINUED | OUTPATIENT
Start: 2022-06-17 | End: 2022-06-21

## 2022-06-17 RX ORDER — FOLIC ACID 0.8 MG
1 TABLET ORAL
Qty: 0 | Refills: 0 | DISCHARGE

## 2022-06-17 RX ADMIN — HEPARIN SODIUM 5000 UNIT(S): 5000 INJECTION INTRAVENOUS; SUBCUTANEOUS at 22:04

## 2022-06-17 NOTE — CONSULT NOTE ADULT - SUBJECTIVE AND OBJECTIVE BOX
Neurology - Consult Note - 06-17-22  -  Sam Mcghee MD  PGY-2 Neurology  Spectra: [phone] ([facility]), [phone] ([facility])  -    Name: CHAVEZ MARQUEZ; 67y (1955)    Reason for Admission:     Chief Complaint:     HPI:  67 year old right-handed male w/ PMHx HTN, AFib (no longer on apixaban),  ESRD (on HD Tu/Th/Sat), hypothyroid, no history of stroke (per patient), no prior head imaging on our EMR, p/w slurred speech, right sided weakness and numbness which onset yesterday at around 15:30PM 6/16/22, approximately 30 minutes after completing hemodialysis at 15:00PM. Patient denies having had these symptoms in the past. Patient denies history of diabetes. He reports that the reason he is on hemodialysis is because he had a very large, obstructive kidney stone on the left which required nephrectomy. Patient has been on hemodialysis for years. Patient reporting that his apixaban was stopped years ago due to severe low blood count associated with dark stool.    Code stroke called in the ED. Code stroke cancelled given that symptoms started 24 hours prior to presentation. CT head was obtained w/o acute pathology, but showing chronic left thalamic infarct. Again, patient denies any prior history of stroke or similar symptoms, including slurred speech and right sided weakness.    Patient reporting episode of rigors on the night prior to arrival.    In the ED, after CT head was performed, patient's symptoms improving, including dysarthria and right hemiparesis.    Review of Systems:     CONSTITUTIONAL: +Generalized shaking at home (consistent w/ rigors). No subjective fever.  EYES/ENT: No visual changes or no throat pain   NECK: No pain or stiffness  RESPIRATORY: No hemoptysis  CARDIOVASCULAR: +Chest pain w/ deep inhalation  GASTROINTESTINAL: No melena or hematochezia  GENITOURINARY: No dysuria or hematuria  NEUROLOGICAL: +As stated in HPI above  All other review of systems is negative unless indicated above.    Allergies:      PMHx:   HTN (Hypertension)  Chronic kidney disease  Kidney stones  Hemodialysis access, AV graft  Hyperparathyroidism  Anemia  Thrombocytopenia  Atrial fibrillation      PFHx:   No pertinent family history in first degree relatives      PSuHx:   S/P Nephrectomy  Acquired arteriovenous fistula  AVF (arteriovenous fistula)        Medications:  MEDICATIONS  (STANDING):    MEDICATIONS  (PRN):      Vitals:  T(C): 36.1 (06-17-22 @ 15:15), Max: 36.1 (06-17-22 @ 15:15)  HR: 84 (06-17-22 @ 15:15) (84 - 84)  BP: 118/66 (06-17-22 @ 15:15) (118/66 - 118/66)  RR: 16 (06-17-22 @ 15:15) (16 - 16)  SpO2: 99% (06-17-22 @ 15:15) (99% - 99%)    Physical Examination: INCOMPLETE          Labs:                        13.3   6.41  )-----------( 148      ( 17 Jun 2022 15:52 )             42.2     06-17    141  |  94<L>  |  50<H>  ----------------------------<  70  4.3   |  29  |  8.26<H>    Ca    8.7      17 Jun 2022 15:52    TPro  7.7  /  Alb  4.8  /  TBili  0.3  /  DBili  x   /  AST  11  /  ALT  15  /  AlkPhos  76  06-17    CAPILLARY BLOOD GLUCOSE  82 (17 Jun 2022 16:35)      POCT Blood Glucose.: 82 mg/dL (17 Jun 2022 15:21)    LIVER FUNCTIONS - ( 17 Jun 2022 15:52 )  Alb: 4.8 g/dL / Pro: 7.7 g/dL / ALK PHOS: 76 U/L / ALT: 15 U/L / AST: 11 U/L / GGT: x         PT/INR - ( 17 Jun 2022 15:52 )   PT: 11.1 sec;   INR: 0.96 ratio    PTT - ( 17 Jun 2022 15:52 )  PTT:30.7 sec    Radiology:    CT Brain Stroke Protocol (06.17.22 @ 15:41)    FINDINGS:    Assessment of the posterior fossa and henok is somewhat limited by beam   hardening artifact.  Ventricles and sulci: Parenchymal volume loss is present which is   commensurate with patient age.  Intra-axial: Periventricular small vessel white matter ischemic changes   are appreciated. Old left thalamic lacunar infarct. No intracranial mass,   acute hemorrhage, or midline shift is present.  Extra-axial: No extra-axial fluid collection is identified. Deposition of   calcified plaques in association with the distal intracranial bilateral   vertebral arteries and bilateral carotid siphons.  Calvarium: Intact.    Bilateral optic globes are intact. Left maxillary sinus polyp versus   retention cysts. Bilateral mastoid air cells and middle ear cavities are   predominantly clear.    paranasal sinuses, bilateral mastoid air cells, middle ear cavities are   clear.    IMPRESSION: Age-appropriate involutional changes. No large arterial   distribution acute infarct. Old left thalamic lacunar infarct.    Findings were communicated by Dr. Behr-Ventura to Dr. STACIA Bojorquez at   approximately 3:50 PM 6/17/2022.        Neurology - Consult Note - 06-17-22  -  Sam Mcghee MD  PGY-2 Neurology  Spectra: [phone] ([facility]), [phone] ([facility])  -    Name: CHAVEZ MARQUEZ; 67y (1955)    Reason for Admission:     Chief Complaint:     HPI:  67 year old right-handed male w/ PMHx HTN, AFib (no longer on apixaban),  ESRD (on HD Tu/Th/Sat), hypothyroid, no history of stroke (per patient), no prior head imaging on our EMR, p/w slurred speech, right sided weakness and numbness which onset yesterday at around 15:30PM 6/16/22, approximately 30 minutes after completing hemodialysis at 15:00PM. Patient denies having had these symptoms in the past. Patient denies history of diabetes. He reports that the reason he is on hemodialysis is because he had a very large, obstructive kidney stone on the left which required nephrectomy. Patient has been on hemodialysis for years. Patient reporting that his apixaban was stopped years ago due to severe low blood count associated with dark stool.    Code stroke called in the ED. Code stroke cancelled given that symptoms started 24 hours prior to presentation. CT head was obtained w/o acute pathology, but showing chronic left thalamic infarct. Again, patient denies any prior history of stroke or similar symptoms, including slurred speech and right sided weakness.    Patient reporting episode of rigors on the night prior to arrival.    In the ED, after CT head was performed, patient's symptoms improving, including dysarthria and right hemiparesis.    Review of Systems:     CONSTITUTIONAL: +Generalized shaking at home (consistent w/ rigors). No subjective fever.  EYES/ENT: No visual changes or no throat pain   NECK: No pain or stiffness  RESPIRATORY: No hemoptysis  CARDIOVASCULAR: +Chest pain w/ deep inhalation  GASTROINTESTINAL: No melena or hematochezia  GENITOURINARY: No dysuria or hematuria  NEUROLOGICAL: +As stated in HPI above  All other review of systems is negative unless indicated above.    Allergies:      PMHx:   HTN (Hypertension)  Chronic kidney disease  Kidney stones  Hemodialysis access, AV graft  Hyperparathyroidism  Anemia  Thrombocytopenia  Atrial fibrillation      PFHx:   No pertinent family history in first degree relatives      PSuHx:   S/P Nephrectomy  Acquired arteriovenous fistula  AVF (arteriovenous fistula)        Medications:  MEDICATIONS  (STANDING):    MEDICATIONS  (PRN):      Vitals:  T(C): 36.1 (06-17-22 @ 15:15), Max: 36.1 (06-17-22 @ 15:15)  HR: 84 (06-17-22 @ 15:15) (84 - 84)  BP: 118/66 (06-17-22 @ 15:15) (118/66 - 118/66)  RR: 16 (06-17-22 @ 15:15) (16 - 16)  SpO2: 99% (06-17-22 @ 15:15) (99% - 99%)    Physical Examination: Please note that this examination takes place after the examination documented in the Code Stroke Note authored by the Neurology Resident. At the time of this examination, patient's symptoms had improved.    Constitutional: well-nourished, well-groomed  Eyes: ophthalmoscopic exam deferred secondary to COVID-19 pandemic  Cardiovascular: no swelling, warm-to-touch    Neurological Examination:    - Mental Status: Alert, awake, oriented to person, age, place, month, and year; speech is moderately dysarthric but otherwise fluent with intact naming, repetition, and follows commands; good overall fund of knowledge (aware of current events, relevant past history, and vocabulary appropriate for level of education).     - Cranial Nerves:  II: Blinks to threat b/l; pupils are equal, round, and reactive to light   III, IV, VI: Extraocular movements are intact without nystagmus  V: Facial sensation mildly diminished on the right, mostly lower face.  VII: Mild right facial weakness on smile.  VIII: Hearing is intact to finger rub.  IX, X: Uvula is midline and soft palate rises symmetrically  XI: Shoulder shrug intact  XII: Tongue protrudes in the midline    - Motor/Strength Testing: no drifts x4 extremities                                   Right           Left  Biceps                      5                 5  Triceps                     5                 5  Wrist Ext (radial)       5                 5  Wrist Flex                 5                 5  Hand                  5                 5    Hip Flex                   5                  5  Dorsiflex                  5                  5  Plantarflex               5                  5    - There is no pronator drift.   - Normal muscle bulk and tone throughout.    - Reflexes:   Bicep (C5/C6):                  R 2+ --- L 2+   Brachioradialis (C5/C6) :   R 2+ --- L 2+   Patella (L3/L4) :                 R 2+ --- L 2+   Ankle (S1) :                       R 2+ --- L 2+     - Sensory: Mildly diminished in RUE and RLE.  - Coordination: Finger-nose-finger intact without ataxia or dysmetria.    - Gait: Normal steps, base, arm swing, and turning.        Labs:                        13.3   6.41  )-----------( 148      ( 17 Jun 2022 15:52 )             42.2     06-17    141  |  94<L>  |  50<H>  ----------------------------<  70  4.3   |  29  |  8.26<H>    Ca    8.7      17 Jun 2022 15:52    TPro  7.7  /  Alb  4.8  /  TBili  0.3  /  DBili  x   /  AST  11  /  ALT  15  /  AlkPhos  76  06-17    CAPILLARY BLOOD GLUCOSE  82 (17 Jun 2022 16:35)      POCT Blood Glucose.: 82 mg/dL (17 Jun 2022 15:21)    LIVER FUNCTIONS - ( 17 Jun 2022 15:52 )  Alb: 4.8 g/dL / Pro: 7.7 g/dL / ALK PHOS: 76 U/L / ALT: 15 U/L / AST: 11 U/L / GGT: x         PT/INR - ( 17 Jun 2022 15:52 )   PT: 11.1 sec;   INR: 0.96 ratio    PTT - ( 17 Jun 2022 15:52 )  PTT:30.7 sec    Radiology:    CT Brain Stroke Protocol (06.17.22 @ 15:41)    FINDINGS:    Assessment of the posterior fossa and henok is somewhat limited by beam   hardening artifact.  Ventricles and sulci: Parenchymal volume loss is present which is   commensurate with patient age.  Intra-axial: Periventricular small vessel white matter ischemic changes   are appreciated. Old left thalamic lacunar infarct. No intracranial mass,   acute hemorrhage, or midline shift is present.  Extra-axial: No extra-axial fluid collection is identified. Deposition of   calcified plaques in association with the distal intracranial bilateral   vertebral arteries and bilateral carotid siphons.  Calvarium: Intact.    Bilateral optic globes are intact. Left maxillary sinus polyp versus   retention cysts. Bilateral mastoid air cells and middle ear cavities are   predominantly clear.    paranasal sinuses, bilateral mastoid air cells, middle ear cavities are   clear.    IMPRESSION: Age-appropriate involutional changes. No large arterial   distribution acute infarct. Old left thalamic lacunar infarct.    Findings were communicated by Dr. Behr-Ventura to Dr. STACIA Bojorquez at   approximately 3:50 PM 6/17/2022.

## 2022-06-17 NOTE — ED PROVIDER NOTE - ATTENDING WITH...
Last Visit: 11/29/18  Next Appt: 3/7/19  Previous Refill Encounter: 11/29-60+2    Requested Prescriptions     Pending Prescriptions Disp Refills    gabapentin (NEURONTIN) 300 mg capsule 270 Cap 0     Sig: Take 1 Cap by mouth three (3) times daily.
approved
Resident

## 2022-06-17 NOTE — STROKE CODE NOTE - NIH STROKE SCALE: 5B. MOTOR ARM, RIGHT, QM
(1) Drift; limb holds 90 (or 45) degrees, but drifts down before full 10 secs; does not hit bed or other support
(1) Drift; limb holds 90 (or 45) degrees, but drifts down before full 10 secs; does not hit bed or other support

## 2022-06-17 NOTE — CONSULT NOTE ADULT - ASSESSMENT
Assessment: ***    Impression: ***    Plan:   *** 67 year old right-handed male w/ PMHx HTN, AFib (no longer on apixaban d/t GIB, not on any antithrombotics at home), ESRD (on HD Tu/Th/Sat), hypothyroid, dentures, no history of stroke (per patient), p/w dysarthria and R sensorimotor deficits onset 15:30PM 6/16/22, approximately 30 minutes after completing HD. No similar symptoms in the past. Rigors on the night prior to arrival. He denies h/o diabetes or smoking. Code stroke cancelled as symptoms onset 24 hours prior to presentation. CT head: no acute pathology, chronic left thalamic lacunar infarct. Symptoms improving in the ED.    mRS: 0  LKN: 15:30PM 6/16/22  Initial NIHSS: 7    tPA not offered -- outside of tPA window  Thrombectomy not performed -- outside of thrombectomy window    Impression: Moderate dysarthria with right sensorimotor deficits (all improving) s/p hemodialysis. Symptoms likely localizable to left brain dysfunction. Etiology potentially recrudescence of chronic left thalamic lacunar infarct vs new acute ischemic stroke.    Diagnostics:  [] MRI brain without contrast   [] MRA brain without contrast; MRA neck with contrast   [] TTE w/ bubble   [] Implantable loop recorder  [] Lipid panel, HbA1c  [] CBC, CMP, coagulation panel, troponin    Medications:  [] ASA 81 mg PO (325 per rectum if fails dysphagia)   [] Atorvastatin 80mg (titrate to LDL < 70)   [] Lovenox SQ for DVT prophylaxis     Miscellaneous:  [] NPO unless passes dysphagia screen; swallow eval if fails  [] Keep BP permissive up to 220/110 for 24 hours followed by gradual normotension over 2-3 days   [] Telemonitoring  [] Neurological checks and vital signs per unit protocol  [] BGM goals 140-180  [] PT/OT; S/S evaluation    * Case and plan not final until Attending attestation * 67 year old right-handed male w/ PMHx HTN, AFib (no longer on apixaban d/t GIB, not on any antithrombotics at home), ESRD (on HD Tu/Th/Sat), hypothyroid, dentures, no history of stroke (per patient), p/w dysarthria and R sensorimotor deficits onset 15:30PM 6/16/22, approximately 30 minutes after completing HD. No similar symptoms in the past. Rigors on the night prior to arrival. He denies h/o diabetes or smoking. Code stroke cancelled as symptoms onset 24 hours prior to presentation. CT head: no acute pathology, chronic left thalamic lacunar infarct. Symptoms improving in the ED.    mRS: 0  LKN: 15:30PM 6/16/22  Initial NIHSS: 7    tPA not offered -- outside of tPA window  Thrombectomy not performed -- outside of thrombectomy window    Impression: Moderate dysarthria with right sensorimotor deficits (all improving) s/p hemodialysis. Symptoms likely localizable to left brain dysfunction. Etiology potentially recrudescence of chronic left thalamic lacunar infarct vs new acute ischemic stroke. Recrudescence possibly d/t infectious or metabolic abnormality.    Diagnostics:  [] Infectious workup, including urinalysis  [] MRI brain without contrast   [] MRA brain without contrast; MRA neck with contrast   [] TTE w/ bubble   [] Implantable loop recorder  [] Lipid panel, HbA1c  [] CBC, CMP, coagulation panel, troponin    Medications:  [] ASA 81 mg PO (325 per rectum if fails dysphagia)   [] Atorvastatin 80mg (titrate to LDL < 70)   [] Lovenox SQ for DVT prophylaxis     Miscellaneous:  [] NPO unless passes dysphagia screen; swallow eval if fails  [] Keep BP permissive up to 220/110 for 24 hours followed by gradual normotension over 2-3 days   [] Telemonitoring  [] Neurological checks and vital signs per unit protocol  [] BGM goals 140-180  [] PT/OT; S/S evaluation    * Case and plan not final until Attending attestation * 67 year old right-handed male w/ PMHx HTN, AFib (no longer on apixaban d/t GIB, not on any antithrombotics at home), ESRD (on HD Tu/Th/Sat), hypothyroid, dentures, no history of stroke (per patient), p/w dysarthria and R sensorimotor deficits onset 15:30PM 6/16/22, approximately 30 minutes after completing HD. No similar symptoms in the past. Rigors on the night prior to arrival. He denies h/o diabetes or smoking. Code stroke cancelled as symptoms onset 24 hours prior to presentation. CT head: no acute pathology, chronic left thalamic lacunar infarct. Symptoms improving in the ED.    mRS: 0  LKN: 15:30PM 6/16/22  Initial NIHSS: 7    tPA not offered -- outside of tPA window  Thrombectomy not performed -- outside of thrombectomy window    Impression: Moderate dysarthria with right sensorimotor deficits (all improving) s/p hemodialysis. Symptoms likely localizable to left brain dysfunction. Etiology potentially recrudescence of chronic left thalamic lacunar infarct vs new acute ischemic stroke. Recrudescence possibly d/t infectious or metabolic abnormality.    Diagnostics:  [] Infectious workup, including urinalysis  [] MRI brain without contrast   [] MRA brain without contrast; MRA neck with contrast   [] TTE w/ bubble   [] Lipid panel, HbA1c  [] CBC, CMP, coagulation panel, troponin    Medications:  [] ASA 81 mg PO (325 per rectum if fails dysphagia)   [] Atorvastatin 80mg (titrate to LDL < 70)   [] Lovenox SQ for DVT prophylaxis     Miscellaneous:  [] NPO unless passes dysphagia screen; swallow eval if fails  [] Keep BP permissive up to 220/110 for 24 hours followed by gradual normotension over 2-3 days   [] Telemonitoring  [] Neurological checks and vital signs per unit protocol  [] BGM goals 140-180  [] PT/OT; S/S evaluation    * Case and plan not final until Attending attestation * 67 year old right-handed male w/ PMHx HTN, AFib (no longer on apixaban d/t GIB, not on any antithrombotics at home), ESRD (on HD Tu/Th/Sat), hypothyroid, dentures, no history of stroke (per patient), p/w dysarthria and R sensorimotor deficits onset 15:30PM 6/16/22, approximately 30 minutes after completing HD. No similar symptoms in the past. Rigors on the night prior to arrival. He denies h/o diabetes or smoking. Code stroke cancelled as symptoms onset 24 hours prior to presentation. CT head: no acute pathology, chronic left thalamic lacunar infarct. Symptoms improving in the ED.    mRS: 0  LKN: 15:30PM 6/16/22  Initial NIHSS: 7    tPA not offered -- outside of tPA window  Thrombectomy not performed -- outside of thrombectomy window    Impression: Moderate dysarthria with right sensorimotor deficits (all improving) s/p hemodialysis. Symptoms likely localizable to left brain dysfunction. Etiology potentially recrudescence of chronic left thalamic lacunar infarct vs new acute ischemic stroke. Recrudescence possibly d/t infectious or metabolic abnormality.    Diagnostics:  [] Infectious workup, including urinalysis  [] MRI brain without contrast   [] MRA brain without contrast; MRA neck without contrast   [] TTE w/ bubble   [] Lipid panel, HbA1c  [] CBC, CMP, coagulation panel, troponin    Medications:  [] ASA 81 mg PO (325 per rectum if fails dysphagia)   [] Atorvastatin 80mg (titrate to LDL < 70)   [] Lovenox SQ for DVT prophylaxis     Miscellaneous:  [] NPO unless passes dysphagia screen; swallow eval if fails  [] Keep BP permissive up to 220/110 for 24 hours followed by gradual normotension over 2-3 days   [] Telemonitoring  [] Neurological checks and vital signs per unit protocol  [] BGM goals 140-180  [] PT/OT; S/S evaluation    * Case and plan not final until Attending attestation *

## 2022-06-17 NOTE — CONSULT NOTE ADULT - ATTENDING COMMENTS
I was told about Mr. Nicolle on 6/20/2022 at 11AM.   Aspirin 81 mg daily  MRI brain  MRA H & N without gado

## 2022-06-17 NOTE — ED ADULT TRIAGE NOTE - CHIEF COMPLAINT QUOTE
Pt c/o slurred speech and rt arm numbness beginning yesterday around 3pm. Left sided facial droop noted. Reports he was at dialysis yesterday, session completed then symptoms began. Pt has rt arm fistula, receives dialysis Tu,Thurs, Sat. also endorsing left sided chest pain beginning yesterday. PMHx: afib, CKD, anemia FS 82

## 2022-06-17 NOTE — CONSULT NOTE ADULT - ASSESSMENT
67 year old right-handed male w/ PMHx HTN, AFib (no longer on apixaban d/t GIB, not on any antithrombotics at home), ESRD (on HD Tu/Th/Sat), hypothyroid, dentures, no history of stroke (per patient), p/w dysarthria and R sensorimotor deficits onset 15:30PM 6/16/22, approximately 30 minutes after completing HD. No similar symptoms in the past. Rigors on the night prior to arrival. He denies h/o diabetes or smoking. Code stroke cancelled as symptoms onset 24 hours prior to presentation. CT head: no acute pathology, chronic left thalamic lacunar infarct. Symptoms improving in the ED. RENAL consulted for ESRD/ HD Mx.    End Stage Renal Disease on Dialysis   [facility]   last HD 6/16  K, vol ok   Hb > goal    Informed consent for HD obtained from pt. consent in chart.   no indication for hd tonight  plan for routine HD tomorrow  dose meds for ESRD  renal diet, fluid restriction  HTN, controlled. BP low nml. bp goal per neurology. rec to hold home bp meds if permissive hypertension indicated  CVA vs TIA- d/w neuro resident. CTH no acute infarct/bleed. plan for MRI brain w/o Tadeo. will f/u   h/o Afib not on AC?- couldn't see his cardiologist Dr Romero in 1/2022 as he stopped taking his insurance. rec cariology eval    Thanks for consulting. will closely follow up  poc d/w pt, ER RN  For any question, call:  Cell # [phone]  Pager # [phone]  office# [phone]

## 2022-06-17 NOTE — ED PROVIDER NOTE - ATTENDING CONTRIBUTION TO CARE
Isai Bojorquez DO:  patient seen and evaluated with the resident.  I was present for key portions of the History & Physical, and I agree with the Impression & Plan. 68 yo m pmh ESRD (on HD Tues,Thur, Sat), Hypothyroidism (on levothyroxine), Afib (no longer on eliquis), pw slurred speech. reports came home from dialysis yesterday, full session, at 1500. then noticed slurred speech, r  sided weakness, and gait imbalance. persisted today, called ems. presented to ed after 1500 today, code stroke initiated at triage. cancelled upon arrival to ct after eval by myself and neuro resident as out of 24 h window. however stat ct head performed. denies ac/ap use. denies n/v, cp, sob, cough, congestion. arrives hds, well appearing, neuro deficits as noted. plan for admission. likely will require mr and mra.

## 2022-06-17 NOTE — H&P ADULT - PROBLEM SELECTOR PLAN 3
-appreciate nephro eval  -no urgent need for HD at this time  -plan for routine HD tomorrow  -c/w ca acetate when able to take po

## 2022-06-17 NOTE — STROKE CODE NOTE - NIH STROKE SCALE: 4. FACIAL PALSY, QM
(2) Partial paralysis (total or near-total paralysis of lower face)
(1) Minor paralysis (flattened nasolabial fold, asymmetry on smiling)

## 2022-06-17 NOTE — H&P ADULT - PROBLEM SELECTOR PLAN 1
-Pt p/w acute onset of dysarthria and right sided weakness yesterday. Outside of window for tpa, symptoms resolving  -c/f acute CVA vs TIA in the setting of afib not on a/c   -appreciate neuro eval  -failed dysphagia screen. NPO for now. S&S eval pending  -permissive HTN  -aspirin suppository. Statin when able to take po   -CT head demonstrates chronic thalamic infarct   -MR brain, MRA head and neck ordered  -TTE with bubble study  -check EKG   -monitor on tele  -PT/OT eval pending

## 2022-06-17 NOTE — H&P ADULT - HISTORY OF PRESENT ILLNESS
67 year old right-handed male w/ PMHx HTN, AFib (no longer on apixaban),  ESRD (on HD Tu/Th/Sat), hypothyroid, no history of stroke (per patient), no prior head imaging on our EMR, p/w slurred speech, right sided weakness and numbness which onset yesterday at around 15:30PM 6/16/22, approximately 30 minutes after completing hemodialysis at 15:00PM. Patient denies having had these symptoms in the past. Patient denies history of diabetes. He reports that the reason he is on hemodialysis is because he had a very large, obstructive kidney stone on the left which required nephrectomy. Patient has been on hemodialysis for years. Patient reporting that his apixaban was stopped years ago due to severe low blood count associated with dark stool.    Code stroke called in the ED. Code stroke cancelled given that symptoms started 24 hours prior to presentation. CT head was obtained w/o acute pathology, but showing chronic left thalamic infarct. Currently pt reports improvement in dysathria and right sided weakness. He endorses pleuritic chest pain, denies SOB or cough. Denies fevers or chills.

## 2022-06-17 NOTE — STROKE CODE NOTE - NIH STROKE SCALE: 6B. MOTOR LEG, RIGHT, QM
(1) Drift; leg falls by the end of the 5-sec period but does not hit bed
(1) Drift; leg falls by the end of the 5-sec period but does not hit bed

## 2022-06-17 NOTE — STROKE CODE NOTE - NSMDCONSULT QTN_Y FT
Code stroke cancelled given that last known normal was 24 hours prior to arrival.   Neurology consultation to follow.    -  Sam Mcghee MD  PGY-2 Neurology  Spectra [id number]

## 2022-06-17 NOTE — H&P ADULT - PROBLEM SELECTOR PLAN 4
-pt previously on 100 mg levothyroxine however has not been taking  -check TSH. If elevated, would restart IV synthroid while npo

## 2022-06-17 NOTE — STROKE CODE NOTE - NIH STROKE SCALE: 9. BEST LANGUAGE, QM
(0) No aphasia; normal
(1) Mild-to-moderate aphasia; some obvious loss of fluency or facility of comprehension, without significant limitation on ideas expressed or form of expression. Reduction of speech and/or comprehension, however, makes conversation about provided material difficult or impossible. For example, in conversation about provided materials, examiner can identify picture or naming card content from patient's response.

## 2022-06-17 NOTE — STROKE CODE NOTE - REASON STROKE CODE CANCELLED
Stroke Neurologist and ED Provider agree to cancel Skin normal color for race, warm, dry and intact. No evidence of rash.

## 2022-06-17 NOTE — ED PROVIDER NOTE - OBJECTIVE STATEMENT
67 year old male with PMH of HTN, AFib (not on AC),  ESRD (on HD Tu/Th/Sat), hypothyroidism presents to the ED complaining of slurred speech since 3pm yesterday. Pt reports associated right sided weakness and numbness after he completed his dialysis session. Denies headache, dizziness, chest pain, dyspnea, or any other complaints.

## 2022-06-17 NOTE — CONSULT NOTE ADULT - SUBJECTIVE AND OBJECTIVE BOX
[facility] - S Alicia / Ej S /D Federico/ S Maryellen/ S Govind/ Jovan Garrison / GAYATRI Matsonu/ O Gregorio  service -[phone], office [phone]  ---------------------------------------------------------------------------------------------------------------  Patient seen and examined in ER     67 year old right-handed male w/ PMHx HTN, AFib (no longer on apixaban),  ESRD (on HD Tu/Th/Sat), hypothyroid, no history of stroke (per patient), no prior head imaging on our EMR, p/w slurred speech, right sided weakness and numbness which onset yesterday at around 15:30PM 6/16/22, approximately 30 minutes after completing hemodialysis at 15:00PM. Patient denies having had these symptoms in the past. Patient denies history of diabetes. He reports that the reason he is on hemodialysis is because he had a very large, obstructive kidney stone on the left which required nephrectomy. Patient has been on hemodialysis for years. Patient reporting that his apixaban was stopped years ago due to severe low blood count associated with dark stool.    Code stroke called in the ED. Code stroke cancelled given that symptoms started 24 hours prior to presentation. CT head was obtained w/o acute pathology, but showing chronic left thalamic infarct. Again, patient denies any prior history of stroke or similar symptoms, including slurred speech and right sided weakness.    Patient reporting episode of rigors on the night prior to arrival.    In the ED, after CT head was performed, patient's symptoms improving, including dysarthria and right hemiparesis.  RENAL consulted for ESRD/ HD Mx. t well known to me, our gp HD pt from [facility]. pt had HD yesterday, complte. currently states his RUE weakness has improved but speech is still slurred but I was able to understand him. denied cp/sob.      PAST MEDICAL & SURGICAL HISTORY:  HTN (Hypertension)      Chronic kidney disease      Kidney stones      Hemodialysis access, AV graft  Left upper extremity      Hyperparathyroidism      Anemia      Thrombocytopenia      Atrial fibrillation  on Eliquis      S/P Nephrectomy  (L) 2007 for kidney stones      Acquired arteriovenous fistula  left wrist  1/2015 - [facility], Left upper arm 4/2015 (approximate date)      AVF (arteriovenous fistula)          Allergies: No Known Allergies    Home Medications Reviewed  Home Medications:  carvedilol 25 mg oral tablet: 1 tab(s) orally 2 times a day (10 Jorje 2022 15:19)  epoetin tammi: 67404 unit(s) intravenous 3 times a week with dialysis  (10 Jorje 2022 15:19)  folic acid 1 mg oral tablet: 1 tab(s) orally once a day (10 Jorje 2022 15:19)  levothyroxine 100 mcg (0.1 mg) oral tablet: 1 tab(s) orally once a day (10 Jorje 2022 15:19)  NIFEdipine 60 mg oral tablet, extended release: 1 tab(s) orally once a day (10 Jorje 2022 15:19)    Hospital Medications:   MEDICATIONS  (STANDING):    SOCIAL HISTORY:  Denies ETOh,Smoking, illicit drug use  FAMILY HISTORY:      REVIEW OF SYSTEMS:  CONSTITUTIONAL: No weakness, fevers or chills  EYES/ENT: No visual changes;  No vertigo or throat pain   NECK: No pain or stiffness  RESPIRATORY: No cough, wheezing, hemoptysis; No shortness of breath  CARDIOVASCULAR: No chest pain or palpitations.  GASTROINTESTINAL: No abdominal or epigastric pain. No nausea, vomiting, or hematemesis; No diarrhea or constipation. No melena or hematochezia.  NEUROLOGICAL: RUE weakness, now improving. slurred speech+   SKIN: No itching, burning, rashes, or lesions   VASCULAR: No bilateral lower extremity edema.   All other review of systems is negative unless indicated above.    VITALS:  T(F): 97 (06-17-22 @ 15:15), Max: 97 (06-17-22 @ 15:15)  HR: 84 (06-17-22 @ 15:15)  BP: 118/66 (06-17-22 @ 15:15)  RR: 16 (06-17-22 @ 15:15)  SpO2: 99% (06-17-22 @ 15:15)  Wt(kg): --    Height (cm): 175.3 (06-17 @ 15:15)    PHYSICAL EXAM:  Constitutional: NAD  HEENT: anicteric sclera  Neck: No JVD  Respiratory: CTAB, no wheezes, rales or rhonchi  Cardiovascular: S1, S2, RRR  Gastrointestinal: BS+, soft, NT/ND  Extremities: no pedal edema  Neurological: A/O x 3. slurred speech+   Psychiatric: Normal mood, normal affect  : No siegel.   Vascular Access: RFA AVF+thrill     LABS:  06-17    141  |  94<L>  |  50<H>  ----------------------------<  70  4.3   |  29  |  8.26<H>    Ca    8.7      17 Jun 2022 15:52    TPro  7.7  /  Alb  4.8  /  TBili  0.3  /  DBili      /  AST  11  /  ALT  15  /  AlkPhos  76  06-17    Creatinine Trend: 8.26 <--                        13.3   6.41  )-----------( 148      ( 17 Jun 2022 15:52 )             42.2     Urine Studies:        RADIOLOGY & ADDITIONAL STUDIES:  < from: Xray Chest 1 View- PORTABLE-Urgent (06.17.22 @ 16:36) >  IMPRESSION:    No focal consolidation.    < end of copied text >    < from: CT Brain Stroke Protocol (06.17.22 @ 15:41) >  IMPRESSION: Age-appropriate involutional changes. No large arterial   distribution acute infarct. Old left thalamic lacunar infarct.    Findings were communicated by Dr. Behr-Ventura to Dr. STACIA Bojorquez at   approximately 3:50 PM 6/17/2022.    --- End of Report ---      < end of copied text >

## 2022-06-17 NOTE — ED PROVIDER NOTE - CLINICAL SUMMARY MEDICAL DECISION MAKING FREE TEXT BOX
Att yo m pmh ESRD (on HD Tues,Thur, Sat), Hypothyroidism (on levothyroxine), Afib (no longer on eliquis), pw slurred speech. reports came home from dialysis yesterday, full session, at 1500. then noticed slurred speech, r  sided weakness, and gait imbalance. persisted today, called ems. presented to ed after 1500 today, code stroke initiated at triage. cancelled upon arrival to ct after eval by myself and neuro resident as out of 24 h window. however stat ct head performed. denies ac/ap use. denies n/v, cp, sob, cough, congestion. arrives hds, well appearing, neuro deficits as noted. plan for admission. likely will require mr and mra.

## 2022-06-17 NOTE — H&P ADULT - NSHPPHYSICALEXAM_GEN_ALL_CORE
GENERAL APPEARANCE: Well developed, well nourished, alert and cooperative. NAD.   HEENT:  PERRL, EOMI. External auditory canals normal, hearing grossly intact.  NECK: Neck supple, non-tender without lymphadenopathy, masses or thyromegaly.  CARDIAC: Normal S1 and S2. No S3, S4 or murmurs. Rhythm is regular.  LUNGS: Clear to auscultation and percussion without rales, rhonchi, wheezing or diminished breath sounds.  ABDOMEN: Positive bowel sounds. Soft, nondistended, nontender. No guarding or rebound.   MUSCULOSKELETAL: ROM intact spine and extremities. No joint erythema or tenderness.   BACK: no spinal deformity, symmetry of spinal muscles, without tenderness, decreased range of motion.  EXTREMITIES: No significant deformity or joint abnormality. No edema. Peripheral pulses intact. No varicosities.  NEUROLOGICAL: Dysarthria on exam. CN II-XII intact. Strength and sensation symmetric and intact throughout.   SKIN: Skin normal color, texture and turgor with no lesions or eruptions.  PSYCHIATRIC: AOx3.Normal affect and behavior.

## 2022-06-17 NOTE — PATIENT PROFILE ADULT - FALL HARM RISK - HARM RISK INTERVENTIONS
Assistance with ambulation/Assistance OOB with selected safe patient handling equipment/Communicate Risk of Fall with Harm to all staff/Discuss with provider need for PT consult/Monitor gait and stability/Reinforce activity limits and safety measures with patient and family/Tailored Fall Risk Interventions/Visual Cue: Yellow wristband and red socks/Bed in lowest position, wheels locked, appropriate side rails in place/Call bell, personal items and telephone in reach/Instruct patient to call for assistance before getting out of bed or chair/Non-slip footwear when patient is out of bed/[place] to call system/Physically safe environment - no spills, clutter or unnecessary equipment/Purposeful Proactive Rounding/Room/bathroom lighting operational, light cord in reach

## 2022-06-17 NOTE — ED ADULT NURSE NOTE - NSIMPLEMENTINTERV_GEN_ALL_ED
Implemented All Fall Risk Interventions:  [place] to call system. Call bell, personal items and telephone within reach. Instruct patient to call for assistance. Room bathroom lighting operational. Non-slip footwear when patient is off stretcher. Physically safe environment: no spills, clutter or unnecessary equipment. Stretcher in lowest position, wheels locked, appropriate side rails in place. Provide visual cue, wrist band, yellow gown, etc. Monitor gait and stability. Monitor for mental status changes and reorient to person, place, and time. Review medications for side effects contributing to fall risk. Reinforce activity limits and safety measures with patient and family.

## 2022-06-17 NOTE — ED ADULT NURSE NOTE - OBJECTIVE STATEMENT
pt is A&Ox3, ambulatory, able to make needs known and presents with C/O right sided weakness with numbness and loss of sensation to arm and hands since ending dialysis [place] yesterday at about 3pm. At time of assessment PT has right-sided facial droop with slurred words, PT also reports difficulty ambulating due to his "mind not working right". PT has right arm fistula (T, TH, SAT) and an old/unused fistula to the left upper arm. IV #20 to left arm, labs sent.

## 2022-06-17 NOTE — H&P ADULT - PROBLEM SELECTOR PLAN 2
-elevated CHADSVASC but not on a/c due history of severe bleed  -check EKG and monitor on tele. Will likely need loop recorder to assess afib burden  -consider cards c/s for continued risk vs benefit discussion of a/c  -pt previously on coreg however not taking. Will hold in the setting of permissive HTN -elevated CHADSVASC but not on a/c due history of severe bleed  -check EKG and monitor on tele. Will likely need loop recorder to assess afib burden  -consider cards c/s for continued risk vs benefit discussion of a/c  -pt previously on coreg however not taking. Will hold in the setting of permissive HTN  -pleuritic chest pain on exam. Trop 42. Repeat cardiac enzyme and check EKG

## 2022-06-17 NOTE — STROKE CODE NOTE - IV ALTEPLASE INCLUSION HIDDEN
show
show
I, the Attending Physician, certify the above stated patient is homebound and upon completion of the Face-To-Face encounter, has the need for intermittent skilled nursing, physical therapy and/or speech or occupational therapy services in their home for their current diagnosis as outlined in their initiial plan of care. These services will continue to be monitored by myself or another physician

## 2022-06-17 NOTE — H&P ADULT - ASSESSMENT
67 year old right-handed male w/ PMHx HTN, AFib (no longer on apixaban), ESRD (on HD Tu/Th/Sat), hypothyroid, no history of stroke (per patient), no prior head imaging on our EMR, p/w slurred speech, right sided weakness and numbness admitted for r/o CVA

## 2022-06-17 NOTE — H&P ADULT - NSHPLABSRESULTS_GEN_ALL_CORE
(06-17 @ 15:52)                      13.3  6.41 )-----------( 148                 42.2    Neutrophils = 3.84 (59.8%)  Lymphocytes = 1.73 (27.0%)  Eosinophils = 0.10 (1.6%)  Basophils = 0.03 (0.5%)  Monocytes = 0.69 (10.8%)  Bands = --%    06-17    141  |  94<L>  |  50<H>  ----------------------------<  70  4.3   |  29  |  8.26<H>    Ca    8.7      17 Jun 2022 15:52    TPro  7.7  /  Alb  4.8  /  TBili  0.3  /  DBili  x   /  AST  11  /  ALT  15  /  AlkPhos  76  06-17    ( 17 Jun 2022 15:52 )   PT: 11.1 sec;   INR: 0.96 ratio;       PTT:30.7 sec    < from: CT Brain Stroke Protocol (06.17.22 @ 15:41) >    IMPRESSION: Age-appropriate involutional changes. No large arterial   distribution acute infarct. Old left thalamic lacunar infarct.    < end of copied text >    Labs and imaging reviewed

## 2022-06-18 PROCEDURE — 99222 1ST HOSP IP/OBS MODERATE 55: CPT | Mod: GC

## 2022-06-18 PROCEDURE — 93306 TTE W/DOPPLER COMPLETE: CPT | Mod: 26

## 2022-06-18 RX ORDER — ASPIRIN/CALCIUM CARB/MAGNESIUM 324 MG
81 TABLET ORAL DAILY
Refills: 0 | Status: DISCONTINUED | OUTPATIENT
Start: 2022-06-18 | End: 2022-06-21

## 2022-06-18 RX ORDER — CHLORHEXIDINE GLUCONATE 213 G/1000ML
1 SOLUTION TOPICAL ONCE
Refills: 0 | Status: COMPLETED | OUTPATIENT
Start: 2022-06-18 | End: 2022-06-24

## 2022-06-18 RX ADMIN — Medication 667 MILLIGRAM(S): at 11:51

## 2022-06-18 RX ADMIN — HEPARIN SODIUM 5000 UNIT(S): 5000 INJECTION INTRAVENOUS; SUBCUTANEOUS at 14:58

## 2022-06-18 RX ADMIN — HEPARIN SODIUM 5000 UNIT(S): 5000 INJECTION INTRAVENOUS; SUBCUTANEOUS at 05:22

## 2022-06-18 RX ADMIN — Medication 81 MILLIGRAM(S): at 11:50

## 2022-06-18 RX ADMIN — Medication 667 MILLIGRAM(S): at 17:40

## 2022-06-18 NOTE — SWALLOW BEDSIDE ASSESSMENT ADULT - COMMENTS
H&P "67 year old right-handed male w/ PMHx HTN, AFib (no longer on apixaban), ESRD (on HD Tu/Th/Sat), hypothyroid, no history of stroke (per patient), no prior head imaging on our EMR, p/w slurred speech, right sided weakness and numbness admitted for r/o CV"    CXR 6/17 "No focal consolidation."  CT Head 6/17 " Age-appropriate involutional changes. No large arterial distribution acute infarct. Old left thalamic lacunar infarct."    Patient seen at bedside this AM for an initial assessment of the swallow function, at which time patient was alert and cooperative. Patient able to follow directives and verbalize wants/needs. Patient denies pain or difficulty eating/drinking pre/post today's assessment.

## 2022-06-18 NOTE — PROGRESS NOTE ADULT - PROBLEM SELECTOR PLAN 2
-elevated CHADSVASC but not on a/c due history of severe bleed  -check EKG and monitor on tele. Will likely need loop recorder to assess afib burden  -Cardiology consult called

## 2022-06-18 NOTE — SWALLOW BEDSIDE ASSESSMENT ADULT - SWALLOW EVAL: MANDIBULAR STRENGTH AND MOBILITY
Have Your Spot(S) Been Treated In The Past?: has not been treated Hpi Title: Evaluation of Skin Lesions intact

## 2022-06-18 NOTE — SWALLOW BEDSIDE ASSESSMENT ADULT - SWALLOW EVAL: DIAGNOSIS
1. Functional oral stage for puree, regular solids and thin liquids marked by adequate oral acceptance, collection/chewing and transfer. 2. Functional pharyngeal phase for puree, regular solids and thin liquids marked by a present pharyngeal swallow trigger with hyolaryngeal elevation noted upon digital palpation without evidence of airway penetration/aspiration.

## 2022-06-18 NOTE — PROGRESS NOTE ADULT - SUBJECTIVE AND OBJECTIVE BOX
Patient seen and examined  complains of persistant slurring-- this is new from his known baseline  Likely cva even though not seen on ct scan      No Known Allergies    Hospital Medications:   MEDICATIONS  (STANDING):  aspirin enteric coated 81 milliGRAM(s) Oral daily  atorvastatin 80 milliGRAM(s) Oral at bedtime  calcium acetate 667 milliGRAM(s) Oral three times a day with meals  heparin   Injectable 5000 Unit(s) SubCutaneous every 8 hours        VITALS:  T(F): 98.1 (06-18-22 @ 09:22), Max: 98.1 (06-17-22 @ 21:27)  HR: 72 (06-18-22 @ 09:22)  BP: 141/92 (06-18-22 @ 09:22)  RR: 18 (06-18-22 @ 09:22)  SpO2: 100% (06-18-22 @ 09:22)  Wt(kg): --    Height (cm): 175.3 (06-17 @ 15:15)  PHYSICAL EXAM:  Constitutional: NAD  HEENT: facial droop  Neck: No JVD  Respiratory: b/l rhonchi  Cardiovascular: S1, S2, RRR  Gastrointestinal: BS+, soft, NT/ND  Extremities: no pedal edema  Neurological: A/O x 3. slurred speech+   Vascular Access: RFA AVF+thrill       LABS:  06-17    141  |  94<L>  |  50<H>  ----------------------------<  70  4.3   |  29  |  8.26<H>    Ca    8.7      17 Jun 2022 15:52    TPro  7.7  /  Alb  4.8  /  TBili  0.3  /  DBili      /  AST  11  /  ALT  15  /  AlkPhos  76  06-17    Creatinine Trend: 8.26 <--                        13.3   6.41  )-----------( 148      ( 17 Jun 2022 15:52 )             42.2     Urine Studies:      RADIOLOGY & ADDITIONAL STUDIES:

## 2022-06-18 NOTE — CONSULT NOTE ADULT - SUBJECTIVE AND OBJECTIVE BOX
Ran Velasquez MD  Interventional Cardiology / Endovascular Specialist  [place] Office : [phone] [place] [address] [place] [address]  Tel:   [place] Office : [phone] [facility] [place] [address]  Tel: [phone]    67 year old right-handed male w/ PMHx HTN, AFib (no longer on apixaban),  ESRD (on HD Tu/Th/Sat), hypothyroid, no history of stroke (per patient), no prior head imaging on our EMR, p/w slurred speech, right sided weakness and numbness which onset yesterday at around 15:30PM 6/16/22, approximately 30 minutes after completing hemodialysis at 15:00PM. Patient denies having had these symptoms in the past. Patient denies history of diabetes. He reports that the reason he is on hemodialysis is because he had a very large, obstructive kidney stone on the left which required nephrectomy. Patient has been on hemodialysis for years. Patient reporting that his apixaban was stopped years ago due to severe low blood count associated with dark stool.    Code stroke called in the ED. Code stroke cancelled given that symptoms started 24 hours prior to presentation. CT head was obtained w/o acute pathology, but showing chronic left thalamic infarct. Currently pt reports improvement in dysathria and right sided weakness. He endorses pleuritic chest pain, denies SOB or cough. Denies fevers or chills.       PAST MEDICAL & SURGICAL HISTORY:  HTN (Hypertension)      Chronic kidney disease      Kidney stones      Hemodialysis access, AV graft  Left upper extremity      Hyperparathyroidism      Anemia      Thrombocytopenia      Atrial fibrillation  on Eliquis      S/P Nephrectomy  (L) 2007 for kidney stones      Acquired arteriovenous fistula  left wrist  1/2015 - [facility], Left upper arm 4/2015 (approximate date)      AVF (arteriovenous fistula)          SOCIAL HISTORY: Substance Use (street drugs): ( x ) never used  (  ) other:    FAMILY HISTORY:  Family history of CVA (Father)        REVIEW OF SYSTEMS:  CONSTITUTIONAL: No fever, weight loss, or fatigue  EYES: No eye pain, visual disturbances, or discharge  ENMT:  No difficulty hearing, tinnitus, vertigo; No sinus or throat pain  BREASTS: No pain, masses, or nipple discharge  GASTROINTESTINAL: No abdominal or epigastric pain. No nausea, vomiting, or hematemesis; No diarrhea or constipation. No melena or hematochezia.  GENITOURINARY: No dysuria, frequency, hematuria, or incontinence  NEUROLOGICAL: No headaches, memory loss, loss of strength, numbness, or tremors  ENDOCRINE: No heat or cold intolerance; No hair loss  MUSCULOSKELETAL: No joint pain or swelling; No muscle, back, or extremity pain  PSYCHIATRIC: No depression, anxiety, mood swings, or difficulty sleeping  HEME/LYMPH: No easy bruising, or bleeding gums  All others negative    MEDICATIONS:  aspirin enteric coated 81 milliGRAM(s) Oral daily  heparin   Injectable 5000 Unit(s) SubCutaneous every 8 hours        acetaminophen     Tablet .. 650 milliGRAM(s) Oral every 6 hours PRN  melatonin 3 milliGRAM(s) Oral at bedtime PRN  ondansetron Injectable 4 milliGRAM(s) IV Push every 8 hours PRN    aluminum hydroxide/magnesium hydroxide/simethicone Suspension 30 milliLiter(s) Oral every 4 hours PRN    atorvastatin 80 milliGRAM(s) Oral at bedtime    calcium acetate 667 milliGRAM(s) Oral three times a day with meals  chlorhexidine 2% Cloths 1 Application(s) Topical once      FAMILY HISTORY:  Family history of CVA (Father)          Allergies    No Known Allergies    Intolerances    	      PHYSICAL EXAM:  T(C): 36.8 (06-18-22 @ 21:20), Max: 36.9 (06-18-22 @ 17:22)  HR: 73 (06-18-22 @ 21:20) (71 - 81)  BP: 152/97 (06-18-22 @ 21:20) (119/81 - 152/97)  RR: 18 (06-18-22 @ 17:22) (18 - 19)  SpO2: 100% (06-18-22 @ 17:22) (99% - 100%)  Wt(kg): --  I&O's Summary      GENERAL: NAD  EYES: conjunctiva and sclera clear  ENMT: No tonsillar erythema, exudates, or enlargement  Cardiovascular: Normal S1 S2, No JVD, No murmurs, No edema  Respiratory: Lungs clear to auscultation	  Gastrointestinal:  Soft, Non-tender, + BS	  Extremities: No  edema      LABS:	 	    CARDIAC MARKERS:                                  13.3   6.41  )-----------( 148      ( 17 Jun 2022 15:52 )             42.2     06-17    141  |  94<L>  |  50<H>  ----------------------------<  70  4.3   |  29  |  8.26<H>    Ca    8.7      17 Jun 2022 15:52    TPro  7.7  /  Alb  4.8  /  TBili  0.3  /  DBili  x   /  AST  11  /  ALT  15  /  AlkPhos  76  06-17    proBNP:   Lipid Profile:   HgA1c:   TSH:     Consultant(s) Notes Reviewed:  [x ] YES  [ ] NO    Care Discussed with Consultants/Other Providers [ x] YES  [ ] NO    Imaging Personally Reviewed independently:  [x] YES  [ ] NO    All labs, radiologic studies, vitals, orders and medications list reviewed. Patient is seen and examined at bedside. Case discussed with medical team.    ASSESSMENT/PLAN:

## 2022-06-18 NOTE — PROGRESS NOTE ADULT - SUBJECTIVE AND OBJECTIVE BOX
CHAVEZ MARQUEZ  67y  Male      Patient is a 67y old  Male who presents with a chief complaint of dysarthria and weakness (18 Jun 2022 11:07)  Patient was seen and examined.chart reviewed.Admitted with r/o cva  comfortable,nad,speech clear,no headaches    REVIEW OF SYSTEMS:  CONSTITUTIONAL: No fever  RESPIRATORY: No cough, hemoptysis or shortness of breath  CARDIOVASCULAR: No chest pain, palpitations, dizziness, or leg swelling  GASTROINTESTINAL: No abdominal pain. nausea, vomiting, hematemesis    INTERVAL HPI/OVERNIGHT EVENTS:  T(C): 36.9 (06-18-22 @ 17:22), Max: 36.9 (06-18-22 @ 17:22)  HR: 81 (06-18-22 @ 17:22) (71 - 81)  BP: 142/81 (06-18-22 @ 17:22) (119/81 - 142/86)  RR: 18 (06-18-22 @ 17:22) (18 - 19)  SpO2: 100% (06-18-22 @ 17:22) (98% - 100%)  Wt(kg): --  I&O's Summary    T(C): 36.9 (06-18-22 @ 17:22), Max: 36.9 (06-18-22 @ 17:22)  HR: 81 (06-18-22 @ 17:22) (71 - 81)  BP: 142/81 (06-18-22 @ 17:22) (119/81 - 142/86)  RR: 18 (06-18-22 @ 17:22) (18 - 19)  SpO2: 100% (06-18-22 @ 17:22) (98% - 100%)  Wt(kg): --Vital Signs Last 24 Hrs  T(C): 36.9 (18 Jun 2022 17:22), Max: 36.9 (18 Jun 2022 17:22)  T(F): 98.5 (18 Jun 2022 17:22), Max: 98.5 (18 Jun 2022 17:22)  HR: 81 (18 Jun 2022 17:22) (71 - 81)  BP: 142/81 (18 Jun 2022 17:22) (119/81 - 142/86)  BP(mean): --  RR: 18 (18 Jun 2022 17:22) (18 - 19)  SpO2: 100% (18 Jun 2022 17:22) (98% - 100%)    LABS:                        13.3   6.41  )-----------( 148      ( 17 Jun 2022 15:52 )             42.2     06-17    141  |  94<L>  |  50<H>  ----------------------------<  70  4.3   |  29  |  8.26<H>    Ca    8.7      17 Jun 2022 15:52    TPro  7.7  /  Alb  4.8  /  TBili  0.3  /  DBili  x   /  AST  11  /  ALT  15  /  AlkPhos  76  06-17    PT/INR - ( 17 Jun 2022 15:52 )   PT: 11.1 sec;   INR: 0.96 ratio         PTT - ( 17 Jun 2022 15:52 )  PTT:30.7 sec    CAPILLARY BLOOD GLUCOSE                PAST MEDICAL & SURGICAL HISTORY:  HTN (Hypertension)      Chronic kidney disease      Kidney stones      Hemodialysis access, AV graft  Left upper extremity      Hyperparathyroidism      Anemia      Thrombocytopenia      Atrial fibrillation  on Eliquis      S/P Nephrectomy  (L) 2007 for kidney stones      Acquired arteriovenous fistula  left wrist  1/2015 - [facility], Left upper arm 4/2015 (approximate date)      AVF (arteriovenous fistula)          MEDICATIONS  (STANDING):  aspirin enteric coated 81 milliGRAM(s) Oral daily  atorvastatin 80 milliGRAM(s) Oral at bedtime  calcium acetate 667 milliGRAM(s) Oral three times a day with meals  heparin   Injectable 5000 Unit(s) SubCutaneous every 8 hours    MEDICATIONS  (PRN):  acetaminophen     Tablet .. 650 milliGRAM(s) Oral every 6 hours PRN Temp greater or equal to 38C (100.4F), Mild Pain (1 - 3)  aluminum hydroxide/magnesium hydroxide/simethicone Suspension 30 milliLiter(s) Oral every 4 hours PRN Dyspepsia  melatonin 3 milliGRAM(s) Oral at bedtime PRN Insomnia  ondansetron Injectable 4 milliGRAM(s) IV Push every 8 hours PRN Nausea and/or Vomiting        RADIOLOGY & ADDITIONAL TESTS:    Imaging Personally Reviewed:  [ ] YES  [ ] NO    Consultant(s) Notes Reviewed:  [ ] YES  [ ] NO    PHYSICAL EXAM:  GENERAL: Alert and awake lying in bed in no distress  HEAD:  Atraumatic, Normocephalic  EYES: EOMI, LILIAM, conjunctiva and sclera clear  NECK: Supple, No JVD, Normal thyroid  NERVOUS SYSTEM:  Alert & Oriented X3, Motor and sensory systems are intact,   CHEST/LUNG: Bilateral clear breath sounds, no rhochi, no wheezing, no crepitations,  HEART: Regular rate and rhythm; No murmurs, rubs, or gallops  ABDOMEN: Soft, Nontender, Nondistended; Bowel sounds present  EXTREMITIES:   Peripheral Pulses are palpable, no  edema        Care Discussed with Consultants/Other Providers [ x] YES  [ ] NO      Code Status: [] Full Code [] DNR [] DNI [] Goals of Care:   Disposition: [] ICU [] Stroke Unit [] RCU []PCU []Floor [] Discharge Home         MULU [email or web address]

## 2022-06-18 NOTE — PROGRESS NOTE ADULT - ASSESSMENT
67 year old right-handed male w/ PMHx HTN, AFib (no longer on apixaban d/t GIB, not on any antithrombotics at home), ESRD (on HD Tu/Th/Sat), hypothyroid, dentures, no history of stroke (per patient), p/w dysarthria and R sensorimotor deficits onset 15:30PM 6/16/22, approximately 30 minutes after completing HD. No similar symptoms in the past. Rigors on the night prior to arrival. He denies h/o diabetes or smoking. Code stroke cancelled as symptoms onset 24 hours prior to presentation. CT head: no acute pathology, chronic left thalamic lacunar infarct. Symptoms improving in the ED. RENAL consulted for ESRD/ HD Mx.    End Stage Renal Disease on Dialysis   HD unit -[facility]   Access: Right Forearm AVF  plan for routine HD today  dose meds for ESRD  renal diet, fluid restriction    HTN,   BP currently controlled  off anti hypertensives for permissive HTN  UF as tolerated  Trend bp      CVA vs TIA- d/w neuro resident. CTH no acute infarct/bleed. plan for MRI brain w/o Tadeo. will f/u     h/o Afib not on AC?- couldn't see his cardiologist Dr Romero in 1/2022 as he stopped taking his insurance. rec cariology eval    Dr Bojorquez  [phone]

## 2022-06-18 NOTE — PHYSICAL THERAPY INITIAL EVALUATION ADULT - ADDITIONAL COMMENTS


Pregnant?:  Not Pregnant





 


10/23/17 10:46 98.4 59 16 204/93 (130) 96   








Orders





 Orders


Electrocardiogram (10/23/17 11:34)


Complete Blood Count With Diff (10/23/17 11:34)


Comprehensive Metabolic Panel (10/23/17 11:34)


Influenzae A/B Antigen (10/23/17 11:34)


Chest, Single Ap (10/23/17 11:34)


Ecg Monitoring (10/23/17 11:34)


Iv Access Insert/Monitor (10/23/17 11:34)


Oximetry (10/23/17 11:34)


Sodium Chloride 0.9% Flush (Ns Flush) (10/23/17 11:45)


Urinalysis - C+S If Indicated (10/23/17 11:34)


Ct Brain W/O Iv Contrast(Rout) (10/23/17 11:34)


Sodium Chlor 0.9% 1000 Ml Inj (Ns 1000 M (10/23/17 11:45)


Thyroid Stimulating Hormone (10/23/17 11:34)





Labs





Laboratory Tests








Test


  10/23/17


12:05 10/23/


 


10/23/


 


10/23/17 10:46 98.4 59 16 204/93 (130) 96   








Laboratory Tests








Test


  10/23/17


12:05 10/23/17


12:09


 


Urine Color


  LIGHT-YELLOW


(YELLW/STRAW) 


 


 


Urine Turbidity CLEAR (CLEAR)  


 


Urine pH 7.0 (5.0-8.5)  


 


Urine Specific Gravity


  Magdaleno Bowie MD 


 


Chest X-Ray 10/23/17 1134 











FelizViki DO Oct 23, 2017 11:51 Pt states he lives alone in a basement apartment with 6 stairs down that have bilateral handrails. Pt states he was fully independent in all functional activities without need for a device and was a community ambulator.    Following evaluation, pt was left sitting at edge of bed in no distress, all lines in tact. CATRINA quick.

## 2022-06-18 NOTE — PHYSICAL THERAPY INITIAL EVALUATION ADULT - PERTINENT HX OF CURRENT PROBLEM, REHAB EVAL
67 year old male p/w slurred speech, right sided weakness and numbness admitted for r/o CVA. CT 6/17 - Age-appropriate involutional changes. No large arterial distribution acute infarct. Old left thalamic lacunar infarct.

## 2022-06-18 NOTE — SWALLOW BEDSIDE ASSESSMENT ADULT - ADDITIONAL RECOMMENDATIONS
1. Monitor for any changes in neurological status that may impact oral intake. 2. Reconsult this service as needed.

## 2022-06-18 NOTE — CONSULT NOTE ADULT - ASSESSMENT
EKG SR no ischemic changes     Echo < from: Transthoracic Echocardiogram (06.18.22 @ 07:56) >  CONCLUSIONS:  1. Calcified trileaflet aortic valve with normal opening.  Minimal aortic regurgitation.  2. Mildly dilated left atrium.  LA volume index = 39 cc/m2.  3. Normal left ventricular systolic function. No segmental  wall motion abnormalities.  4. Normal right ventricular size and function.  5. Agitated saline injection demonstrates no obvious  evidence of a patent foramen ovale.    < end of copied text >    Echo < from: Transthoracic Echocardiogram (06.18.22 @ 07:56) >  1. Calcified trileaflet aortic valve with normal opening.  Minimal aortic regurgitation.  2. Mildly dilated left atrium.  LA volume index = 39 cc/m2.  3. Normal left ventricular systolic function. No segmental  wall motion abnormalities.  4. Normal right ventricular size and function.  5. Agitated saline injection demonstrates no obvious  evidence of a patent foramen ovale.    < end of copied text >    Assessment and Plan     1) ? CVA: Echo normal monitor on tele , MRI/MRA pending , pt has h/o Afib would restart eliquis 5 mg po BID if ok with eliquis     2) ESRD on HD f/u renal recs     3) DVT PPX heparin

## 2022-06-18 NOTE — SWALLOW BEDSIDE ASSESSMENT ADULT - ASR SWALLOW RECOMMEND DIAG
Objective testing NOT warranted given no overt signs of penetration/aspiration, WBC is WFL and CXR does NOT indicate pneumonia

## 2022-06-18 NOTE — OCCUPATIONAL THERAPY INITIAL EVALUATION ADULT - PERTINENT HX OF CURRENT PROBLEM, REHAB EVAL
Pt is a 67 year old right-handed male w/ PMH HTN, AFib (no longer on apixaban), ESRD (on HD Tu/Th/Sat), hypothyroid, no history of stroke (per patient), no prior head imaging on our EMR, p/w slurred speech, right sided weakness and numbness admitted for r/o CVA

## 2022-06-19 LAB
A1C WITH ESTIMATED AVERAGE GLUCOSE RESULT: 5.2 % — SIGNIFICANT CHANGE UP (ref 4–5.6)
ANION GAP SERPL CALC-SCNC: 19 MMOL/L — HIGH (ref 7–14)
BUN SERPL-MCNC: 32 MG/DL — HIGH (ref 7–23)
CALCIUM SERPL-MCNC: 8.3 MG/DL — LOW (ref 8.4–10.5)
CHLORIDE SERPL-SCNC: 92 MMOL/L — LOW (ref 98–107)
CHOLEST SERPL-MCNC: 140 MG/DL — SIGNIFICANT CHANGE UP
CO2 SERPL-SCNC: 22 MMOL/L — SIGNIFICANT CHANGE UP (ref 22–31)
CREAT SERPL-MCNC: 6.34 MG/DL — HIGH (ref 0.5–1.3)
EGFR: 9 ML/MIN/1.73M2 — LOW
ESTIMATED AVERAGE GLUCOSE: 103 — SIGNIFICANT CHANGE UP
GLUCOSE SERPL-MCNC: 83 MG/DL — SIGNIFICANT CHANGE UP (ref 70–99)
HCT VFR BLD CALC: 40 % — SIGNIFICANT CHANGE UP (ref 39–50)
HDLC SERPL-MCNC: 64 MG/DL — SIGNIFICANT CHANGE UP
HGB BLD-MCNC: 12.6 G/DL — LOW (ref 13–17)
LIPID PNL WITH DIRECT LDL SERPL: 60 MG/DL — SIGNIFICANT CHANGE UP
MAGNESIUM SERPL-MCNC: 2.2 MG/DL — SIGNIFICANT CHANGE UP (ref 1.6–2.6)
MCHC RBC-ENTMCNC: 30.6 PG — SIGNIFICANT CHANGE UP (ref 27–34)
MCHC RBC-ENTMCNC: 31.5 GM/DL — LOW (ref 32–36)
MCV RBC AUTO: 97.1 FL — SIGNIFICANT CHANGE UP (ref 80–100)
MRSA PCR RESULT.: SIGNIFICANT CHANGE UP
NON HDL CHOLESTEROL: 76 MG/DL — SIGNIFICANT CHANGE UP
NRBC # BLD: 0 /100 WBCS — SIGNIFICANT CHANGE UP
NRBC # FLD: 0 K/UL — SIGNIFICANT CHANGE UP
PHOSPHATE SERPL-MCNC: 4.6 MG/DL — HIGH (ref 2.5–4.5)
PLATELET # BLD AUTO: 106 K/UL — LOW (ref 150–400)
POTASSIUM SERPL-MCNC: 4.9 MMOL/L — SIGNIFICANT CHANGE UP (ref 3.5–5.3)
POTASSIUM SERPL-SCNC: 4.9 MMOL/L — SIGNIFICANT CHANGE UP (ref 3.5–5.3)
RBC # BLD: 4.12 M/UL — LOW (ref 4.2–5.8)
RBC # FLD: 14.3 % — SIGNIFICANT CHANGE UP (ref 10.3–14.5)
S AUREUS DNA NOSE QL NAA+PROBE: SIGNIFICANT CHANGE UP
SODIUM SERPL-SCNC: 133 MMOL/L — LOW (ref 135–145)
T3FREE SERPL-MCNC: 2.93 PG/ML — SIGNIFICANT CHANGE UP (ref 1.8–4.6)
T4 FREE SERPL-MCNC: 1.8 NG/DL — SIGNIFICANT CHANGE UP (ref 0.9–1.8)
TRIGL SERPL-MCNC: 81 MG/DL — SIGNIFICANT CHANGE UP
TSH SERPL-MCNC: 0.75 UIU/ML — SIGNIFICANT CHANGE UP (ref 0.27–4.2)
WBC # BLD: 4.74 K/UL — SIGNIFICANT CHANGE UP (ref 3.8–10.5)
WBC # FLD AUTO: 4.74 K/UL — SIGNIFICANT CHANGE UP (ref 3.8–10.5)

## 2022-06-19 RX ADMIN — HEPARIN SODIUM 5000 UNIT(S): 5000 INJECTION INTRAVENOUS; SUBCUTANEOUS at 01:47

## 2022-06-19 RX ADMIN — HEPARIN SODIUM 5000 UNIT(S): 5000 INJECTION INTRAVENOUS; SUBCUTANEOUS at 05:42

## 2022-06-19 RX ADMIN — Medication 667 MILLIGRAM(S): at 09:06

## 2022-06-19 RX ADMIN — ATORVASTATIN CALCIUM 80 MILLIGRAM(S): 80 TABLET, FILM COATED ORAL at 22:21

## 2022-06-19 RX ADMIN — Medication 81 MILLIGRAM(S): at 12:03

## 2022-06-19 RX ADMIN — HEPARIN SODIUM 5000 UNIT(S): 5000 INJECTION INTRAVENOUS; SUBCUTANEOUS at 12:04

## 2022-06-19 RX ADMIN — Medication 667 MILLIGRAM(S): at 12:03

## 2022-06-19 RX ADMIN — HEPARIN SODIUM 5000 UNIT(S): 5000 INJECTION INTRAVENOUS; SUBCUTANEOUS at 22:21

## 2022-06-19 RX ADMIN — Medication 667 MILLIGRAM(S): at 17:45

## 2022-06-19 RX ADMIN — ATORVASTATIN CALCIUM 80 MILLIGRAM(S): 80 TABLET, FILM COATED ORAL at 01:48

## 2022-06-19 NOTE — PROGRESS NOTE ADULT - ASSESSMENT
EKG SR no ischemic changes     Echo < from: Transthoracic Echocardiogram (06.18.22 @ 07:56) >  CONCLUSIONS:  1. Calcified trileaflet aortic valve with normal opening.  Minimal aortic regurgitation.  2. Mildly dilated left atrium.  LA volume index = 39 cc/m2.  3. Normal left ventricular systolic function. No segmental  wall motion abnormalities.  4. Normal right ventricular size and function.  5. Agitated saline injection demonstrates no obvious  evidence of a patent foramen ovale.    < end of copied text >    Echo < from: Transthoracic Echocardiogram (06.18.22 @ 07:56) >  1. Calcified trileaflet aortic valve with normal opening.  Minimal aortic regurgitation.  2. Mildly dilated left atrium.  LA volume index = 39 cc/m2.  3. Normal left ventricular systolic function. No segmental  wall motion abnormalities.  4. Normal right ventricular size and function.  5. Agitated saline injection demonstrates no obvious  evidence of a patent foramen ovale.    < end of copied text >    Assessment and Plan     1) ?CVA: Echo normal monitor on tele , MRI/MRA pending , pt has h/o Afib would restart eliquis 5 mg po BID if ok with neuro , pt with documented afib no need for ILR     2) ESRD on HD f/u renal recs     3) DVT PPX heparin

## 2022-06-19 NOTE — PROGRESS NOTE ADULT - PROBLEM SELECTOR PLAN 2
-elevated CHADSVASC but not on a/c due history of severe bleed  -check EKG and monitor on tele. Will likely need loop recorder to assess afib burden  -Cardiology consult apreciated -elevated CHADSVASC but not on a/c due history of severe bleed  -check EKG and monitor on tele. Will likely need loop recorder to assess afib burden  -Cardiology consult apreciated.will consider starting on Eliquis

## 2022-06-19 NOTE — PROGRESS NOTE ADULT - SUBJECTIVE AND OBJECTIVE BOX
CHAVEZ MARQUEZ  67y  Male      Patient is a 67y old  Male who presents with a chief complaint of dysarthria and weakness (19 Jun 2022 10:48)      REVIEW OF SYSTEMS:  CONSTITUTIONAL: No fever  RESPIRATORY: No cough, hemoptysis or shortness of breath  CARDIOVASCULAR: No chest pain, palpitations, dizziness, or leg swelling  GASTROINTESTINAL: No abdominal pain. nausea, vomiting, hematemesis  GENITOURINARY: No dysuria, frequency, hematuria   NEUROLOGICAL: No headaches, no dizziness  MUSCULOSKELETAL: No joint pain or swelling;     INTERVAL HPI/OVERNIGHT EVENTS:  T(C): 36.8 (06-19-22 @ 09:20), Max: 36.9 (06-19-22 @ 05:20)  HR: 77 (06-19-22 @ 09:20) (72 - 77)  BP: 142/83 (06-19-22 @ 09:20) (139/73 - 152/97)  RR: 19 (06-19-22 @ 09:20) (18 - 19)  SpO2: 100% (06-19-22 @ 09:20) (100% - 100%)  Wt(kg): --  I&O's Summary    18 Jun 2022 07:01  -  19 Jun 2022 07:00  --------------------------------------------------------  IN: 500 mL / OUT: 2500 mL / NET: -2000 mL      T(C): 36.8 (06-19-22 @ 09:20), Max: 36.9 (06-19-22 @ 05:20)  HR: 77 (06-19-22 @ 09:20) (72 - 77)  BP: 142/83 (06-19-22 @ 09:20) (139/73 - 152/97)  RR: 19 (06-19-22 @ 09:20) (18 - 19)  SpO2: 100% (06-19-22 @ 09:20) (100% - 100%)  Wt(kg): --Vital Signs Last 24 Hrs  T(C): 36.8 (19 Jun 2022 09:20), Max: 36.9 (19 Jun 2022 05:20)  T(F): 98.2 (19 Jun 2022 09:20), Max: 98.5 (19 Jun 2022 05:20)  HR: 77 (19 Jun 2022 09:20) (72 - 77)  BP: 142/83 (19 Jun 2022 09:20) (139/73 - 152/97)  BP(mean): --  RR: 19 (19 Jun 2022 09:20) (18 - 19)  SpO2: 100% (19 Jun 2022 09:20) (100% - 100%)    LABS:                        12.6   4.74  )-----------( 106      ( 19 Jun 2022 05:35 )             40.0     06-19    133<L>  |  92<L>  |  32<H>  ----------------------------<  83  4.9   |  22  |  6.34<H>    Ca    8.3<L>      19 Jun 2022 05:35  Phos  4.6     06-19  Mg     2.20     06-19          CAPILLARY BLOOD GLUCOSE                PAST MEDICAL & SURGICAL HISTORY:  HTN (Hypertension)      Chronic kidney disease      Kidney stones      Hemodialysis access, AV graft  Left upper extremity      Hyperparathyroidism      Anemia      Thrombocytopenia      Atrial fibrillation  on Eliquis      S/P Nephrectomy  (L) 2007 for kidney stones      Acquired arteriovenous fistula  left wrist  1/2015 - [facility], Left upper arm 4/2015 (approximate date)      AVF (arteriovenous fistula)          MEDICATIONS  (STANDING):  aspirin enteric coated 81 milliGRAM(s) Oral daily  atorvastatin 80 milliGRAM(s) Oral at bedtime  calcium acetate 667 milliGRAM(s) Oral three times a day with meals  chlorhexidine 2% Cloths 1 Application(s) Topical once  heparin   Injectable 5000 Unit(s) SubCutaneous every 8 hours    MEDICATIONS  (PRN):  acetaminophen     Tablet .. 650 milliGRAM(s) Oral every 6 hours PRN Temp greater or equal to 38C (100.4F), Mild Pain (1 - 3)  aluminum hydroxide/magnesium hydroxide/simethicone Suspension 30 milliLiter(s) Oral every 4 hours PRN Dyspepsia  melatonin 3 milliGRAM(s) Oral at bedtime PRN Insomnia  ondansetron Injectable 4 milliGRAM(s) IV Push every 8 hours PRN Nausea and/or Vomiting        RADIOLOGY & ADDITIONAL TESTS:    Imaging Personally Reviewed:  [ ] YES  [ ] NO    Consultant(s) Notes Reviewed:  [ ] YES  [ ] NO    PHYSICAL EXAM:  GENERAL: Alert and awake lying in bed in no distress  HEAD:  Atraumatic, Normocephalic  EYES: EOMI, LILIAM, conjunctiva and sclera clear  NECK: Supple, No JVD, Normal thyroid  NERVOUS SYSTEM:  Alert & Oriented X3, Motor and sensory systems are intact,   CHEST/LUNG: Bilateral clear breath sounds, no rhochi, no wheezing, no crepitations,  HEART: Regular rate and rhythm; No murmurs, rubs, or gallops  ABDOMEN: Soft, Nontender, Nondistended; Bowel sounds present  EXTREMITIES:   Peripheral Pulses are palpable, no  edema        Care Discussed with Consultants/Other Providers [ ] YES  [ ] NO      Code Status: [] Full Code [] DNR [] DNI [] Goals of Care:   Disposition: [] ICU [] Stroke Unit [] RCU []PCU []Floor [] Discharge Home         [facility]     CHAVEZ MARQUEZ  67y  Male      Patient is a 67y old  Male who presents with a chief complaint of dysarthria and weakness (19 Jun 2022 10:48)  comfortable,nad    REVIEW OF SYSTEMS:  CONSTITUTIONAL: No fever  RESPIRATORY: No cough, hemoptysis or shortness of breath  CARDIOVASCULAR: No chest pain, palpitations, dizziness, or leg swelling  GASTROINTESTINAL: No abdominal pain. nausea, vomiting, hematemesis  GENITOURINARY: No dysuria, frequency, hematuria   NEUROLOGICAL: No headaches, no dizziness  MUSCULOSKELETAL: No joint pain or swelling;     INTERVAL HPI/OVERNIGHT EVENTS:  T(C): 36.8 (06-19-22 @ 09:20), Max: 36.9 (06-19-22 @ 05:20)  HR: 77 (06-19-22 @ 09:20) (72 - 77)  BP: 142/83 (06-19-22 @ 09:20) (139/73 - 152/97)  RR: 19 (06-19-22 @ 09:20) (18 - 19)  SpO2: 100% (06-19-22 @ 09:20) (100% - 100%)  Wt(kg): --  I&O's Summary    18 Jun 2022 07:01  -  19 Jun 2022 07:00  --------------------------------------------------------  IN: 500 mL / OUT: 2500 mL / NET: -2000 mL      T(C): 36.8 (06-19-22 @ 09:20), Max: 36.9 (06-19-22 @ 05:20)  HR: 77 (06-19-22 @ 09:20) (72 - 77)  BP: 142/83 (06-19-22 @ 09:20) (139/73 - 152/97)  RR: 19 (06-19-22 @ 09:20) (18 - 19)  SpO2: 100% (06-19-22 @ 09:20) (100% - 100%)  Wt(kg): --Vital Signs Last 24 Hrs  T(C): 36.8 (19 Jun 2022 09:20), Max: 36.9 (19 Jun 2022 05:20)  T(F): 98.2 (19 Jun 2022 09:20), Max: 98.5 (19 Jun 2022 05:20)  HR: 77 (19 Jun 2022 09:20) (72 - 77)  BP: 142/83 (19 Jun 2022 09:20) (139/73 - 152/97)  BP(mean): --  RR: 19 (19 Jun 2022 09:20) (18 - 19)  SpO2: 100% (19 Jun 2022 09:20) (100% - 100%)    LABS:                        12.6   4.74  )-----------( 106      ( 19 Jun 2022 05:35 )             40.0     06-19    133<L>  |  92<L>  |  32<H>  ----------------------------<  83  4.9   |  22  |  6.34<H>    Ca    8.3<L>      19 Jun 2022 05:35  Phos  4.6     06-19  Mg     2.20     06-19          CAPILLARY BLOOD GLUCOSE                PAST MEDICAL & SURGICAL HISTORY:  HTN (Hypertension)      Chronic kidney disease      Kidney stones      Hemodialysis access, AV graft  Left upper extremity      Hyperparathyroidism      Anemia      Thrombocytopenia      Atrial fibrillation  on Eliquis      S/P Nephrectomy  (L) 2007 for kidney stones      Acquired arteriovenous fistula  left wrist  1/2015 - [facility], Left upper arm 4/2015 (approximate date)      AVF (arteriovenous fistula)          MEDICATIONS  (STANDING):  aspirin enteric coated 81 milliGRAM(s) Oral daily  atorvastatin 80 milliGRAM(s) Oral at bedtime  calcium acetate 667 milliGRAM(s) Oral three times a day with meals  chlorhexidine 2% Cloths 1 Application(s) Topical once  heparin   Injectable 5000 Unit(s) SubCutaneous every 8 hours    MEDICATIONS  (PRN):  acetaminophen     Tablet .. 650 milliGRAM(s) Oral every 6 hours PRN Temp greater or equal to 38C (100.4F), Mild Pain (1 - 3)  aluminum hydroxide/magnesium hydroxide/simethicone Suspension 30 milliLiter(s) Oral every 4 hours PRN Dyspepsia  melatonin 3 milliGRAM(s) Oral at bedtime PRN Insomnia  ondansetron Injectable 4 milliGRAM(s) IV Push every 8 hours PRN Nausea and/or Vomiting        RADIOLOGY & ADDITIONAL TESTS:    Imaging Personally Reviewed:  [ ] YES  [ ] NO    Consultant(s) Notes Reviewed:  [ ] YES  [ ] NO    PHYSICAL EXAM:  GENERAL: Alert and awake lying in bed in no distress  HEAD:  Atraumatic, Normocephalic  EYES: EOMI, LILIAM, conjunctiva and sclera clear  NECK: Supple, No JVD, Normal thyroid  NERVOUS SYSTEM:  Alert & Oriented X3, Motor and sensory systems are intact,   CHEST/LUNG: Bilateral clear breath sounds, no rhochi, no wheezing, no crepitations,  HEART: Regular rate and rhythm; No murmurs, rubs, or gallops  ABDOMEN: Soft, Nontender, Nondistended; Bowel sounds present  EXTREMITIES:   Peripheral Pulses are palpable, no  edema        Care Discussed with Consultants/Other Providers [x ] YES  [ ] NO      Code Status: [] Full Code [] DNR [] DNI [] Goals of Care:   Disposition: [] ICU [] Stroke Unit [] RCU []PCU []Floor [] Discharge Home         ADALGISA McwilliamsP

## 2022-06-19 NOTE — PROGRESS NOTE ADULT - SUBJECTIVE AND OBJECTIVE BOX
[facility] - MARK Vargas / MARK Pagan / JASMIN Caballero/ MARK Bojorquez/ MARK Faust/ TELLO Garrison / RODNEY Garcia / HAILEY Perkins  ---------------------------------------------------------------------------------------------------------------  seen and examined today for ESRD  Interval : NAD  VITALS:  T(F): 98.5 (06-19-22 @ 05:20), Max: 98.5 (06-18-22 @ 17:22)  HR: 76 (06-19-22 @ 05:20)  BP: 139/73 (06-19-22 @ 05:20)  RR: 19 (06-19-22 @ 05:20)  SpO2: 100% (06-19-22 @ 05:20)  Wt(kg): --    06-18 @ 07:01  -  06-19 @ 07:00  --------------------------------------------------------  IN: 500 mL / OUT: 2500 mL / NET: -2000 mL      Physical Exam :-  Constitutional: NAD  Neck: Supple.  Respiratory: Bilateral equal breath sounds,  Cardiovascular: S1, S2 normal,  Gastrointestinal: Bowel Sounds present, soft, non tender.  Extremities: No edema  Neurological: Alert and Oriented, aphasic  Psychiatric: Normal mood, normal affect  Data:-  Allergies :   No Known Allergies    Hospital Medications:   MEDICATIONS  (STANDING):  aspirin enteric coated 81 milliGRAM(s) Oral daily  atorvastatin 80 milliGRAM(s) Oral at bedtime  calcium acetate 667 milliGRAM(s) Oral three times a day with meals  chlorhexidine 2% Cloths 1 Application(s) Topical once  heparin   Injectable 5000 Unit(s) SubCutaneous every 8 hours    06-19    133<L>  |  92<L>  |  32<H>  ----------------------------<  83  4.9   |  22  |  6.34<H>    Ca    8.3<L>      19 Jun 2022 05:35  Phos  4.6     06-19  Mg     2.20     06-19    TPro  7.7  /  Alb  4.8  /  TBili  0.3  /  DBili      /  AST  11  /  ALT  15  /  AlkPhos  76  06-17    Creatinine Trend: 6.34 <--, 8.26 <--                        12.6   4.74  )-----------( 106      ( 19 Jun 2022 05:35 )             40.0

## 2022-06-19 NOTE — PROGRESS NOTE ADULT - SUBJECTIVE AND OBJECTIVE BOX
Ran Velasquez MD  Interventional Cardiology / Endovascular Specialist  [place] Office : [address] [place] [address] [place] [address]  Tel:   [place] Office : [address] [facility] [place] [address]  Tel: [phone]    pt lying in bed in NAD   	  MEDICATIONS:  aspirin enteric coated 81 milliGRAM(s) Oral daily  heparin   Injectable 5000 Unit(s) SubCutaneous every 8 hours        acetaminophen     Tablet .. 650 milliGRAM(s) Oral every 6 hours PRN  melatonin 3 milliGRAM(s) Oral at bedtime PRN  ondansetron Injectable 4 milliGRAM(s) IV Push every 8 hours PRN    aluminum hydroxide/magnesium hydroxide/simethicone Suspension 30 milliLiter(s) Oral every 4 hours PRN    atorvastatin 80 milliGRAM(s) Oral at bedtime    calcium acetate 667 milliGRAM(s) Oral three times a day with meals  chlorhexidine 2% Cloths 1 Application(s) Topical once      PAST MEDICAL/SURGICAL HISTORY  PAST MEDICAL & SURGICAL HISTORY:  HTN (Hypertension)      Chronic kidney disease      Kidney stones      Hemodialysis access, AV graft  Left upper extremity      Hyperparathyroidism      Anemia      Thrombocytopenia      Atrial fibrillation  on Eliquis      S/P Nephrectomy  (L) 2007 for kidney stones      Acquired arteriovenous fistula  left wrist  1/2015 - [facility], Left upper arm 4/2015 (approximate date)      AVF (arteriovenous fistula)          SOCIAL HISTORY: Substance Use (street drugs): ( x ) never used  (  ) other:    FAMILY HISTORY:  Family history of CVA (Father)        REVIEW OF SYSTEMS:  CONSTITUTIONAL: No fever, weight loss, or fatigue  EYES: No eye pain, visual disturbances, or discharge  ENMT:  No difficulty hearing, tinnitus, vertigo; No sinus or throat pain  BREASTS: No pain, masses, or nipple discharge  GASTROINTESTINAL: No abdominal or epigastric pain. No nausea, vomiting, or hematemesis; No diarrhea or constipation. No melena or hematochezia.  GENITOURINARY: No dysuria, frequency, hematuria, or incontinence  NEUROLOGICAL: No headaches, memory loss, loss of strength, numbness, or tremors  ENDOCRINE: No heat or cold intolerance; No hair loss  MUSCULOSKELETAL: No joint pain or swelling; No muscle, back, or extremity pain  PSYCHIATRIC: No depression, anxiety, mood swings, or difficulty sleeping  HEME/LYMPH: No easy bruising, or bleeding gums  All others negative    PHYSICAL EXAM:  T(C): 36.9 (06-19-22 @ 05:20), Max: 36.9 (06-18-22 @ 17:22)  HR: 76 (06-19-22 @ 05:20) (72 - 81)  BP: 139/73 (06-19-22 @ 05:20) (136/88 - 152/97)  RR: 19 (06-19-22 @ 05:20) (18 - 19)  SpO2: 100% (06-19-22 @ 05:20) (99% - 100%)  Wt(kg): --  I&O's Summary    18 Jun 2022 07:01  -  19 Jun 2022 07:00  --------------------------------------------------------  IN: 500 mL / OUT: 2500 mL / NET: -2000 mL        GENERAL: NAD  EYES: conjunctiva and sclera clear  ENMT: No tonsillar erythema, exudates, or enlargement  Cardiovascular: Normal S1 S2, No JVD, No murmurs, No edema  Respiratory: Lungs clear to auscultation	  Gastrointestinal:  Soft, Non-tender, + BS	  Extremities: No  edema                            12.6   4.74  )-----------( 106      ( 19 Jun 2022 05:35 )             40.0     06-19    133<L>  |  92<L>  |  32<H>  ----------------------------<  83  4.9   |  22  |  6.34<H>    Ca    8.3<L>      19 Jun 2022 05:35  Phos  4.6     06-19  Mg     2.20     06-19    TPro  7.7  /  Alb  4.8  /  TBili  0.3  /  DBili  x   /  AST  11  /  ALT  15  /  AlkPhos  76  06-17    proBNP:   Lipid Profile:   HgA1c:   TSH: Thyroid Stimulating Hormone, Serum: 0.75 uIU/mL (06-19 @ 05:35)      Consultant(s) Notes Reviewed:  [x ] YES  [ ] NO    Care Discussed with Consultants/Other Providers [ x] YES  [ ] NO    Imaging Personally Reviewed independently:  [x] YES  [ ] NO    All labs, radiologic studies, vitals, orders and medications list reviewed. Patient is seen and examined at bedside. Case discussed with medical team.

## 2022-06-19 NOTE — PROGRESS NOTE ADULT - ASSESSMENT
67 year old right-handed male w/ PMHx HTN, AFib (no longer on apixaban d/t GIB, not on any antithrombotics at home), ESRD (on HD Tu/Th/Sat), hypothyroid, dentures, no history of stroke (per patient), p/w dysarthria and R sensorimotor deficits onset 15:30PM 6/16/22, approximately 30 minutes after completing HD. No similar symptoms in the past. Rigors on the night prior to arrival. He denies h/o diabetes or smoking. Code stroke cancelled as symptoms onset 24 hours prior to presentation. CT head: no acute pathology, chronic left thalamic lacunar infarct. Symptoms improving in the ED. RENAL consulted for ESRD/ HD Mx.    End Stage Renal Disease on Dialysis   HD unit -[facility]   Access: Right Forearm AVF  plan for routine HD tuesday  dose meds for ESRD  renal diet, fluid restriction    HTN,   BP currently controlled  off anti hypertensives for permissive HTN  UF as tolerated  Trend bp    CVA vs TIA- d/w neuro resident. CTH no acute infarct/bleed. plan for MRI brain w/o Tadeo. will f/u     h/o Afib not on AC?- couldn't see his cardiologist Dr Romero in 1/2022 as he stopped taking his insurance. rec cariology eval      For any question, call:  Cell # [phone]  Pager # [phone]  Callback # [phone]

## 2022-06-20 LAB
ANION GAP SERPL CALC-SCNC: 23 MMOL/L — HIGH (ref 7–14)
BUN SERPL-MCNC: 64 MG/DL — HIGH (ref 7–23)
CALCIUM SERPL-MCNC: 7.4 MG/DL — LOW (ref 8.4–10.5)
CHLORIDE SERPL-SCNC: 91 MMOL/L — LOW (ref 98–107)
CO2 SERPL-SCNC: 21 MMOL/L — LOW (ref 22–31)
CREAT SERPL-MCNC: 9.05 MG/DL — HIGH (ref 0.5–1.3)
EGFR: 6 ML/MIN/1.73M2 — LOW
GLUCOSE SERPL-MCNC: 73 MG/DL — SIGNIFICANT CHANGE UP (ref 70–99)
HCT VFR BLD CALC: 36.1 % — LOW (ref 39–50)
HGB BLD-MCNC: 11.4 G/DL — LOW (ref 13–17)
MAGNESIUM SERPL-MCNC: 2.2 MG/DL — SIGNIFICANT CHANGE UP (ref 1.6–2.6)
MCHC RBC-ENTMCNC: 31 PG — SIGNIFICANT CHANGE UP (ref 27–34)
MCHC RBC-ENTMCNC: 31.6 GM/DL — LOW (ref 32–36)
MCV RBC AUTO: 98.1 FL — SIGNIFICANT CHANGE UP (ref 80–100)
NRBC # BLD: 0 /100 WBCS — SIGNIFICANT CHANGE UP
NRBC # FLD: 0 K/UL — SIGNIFICANT CHANGE UP
PHOSPHATE SERPL-MCNC: 8.3 MG/DL — HIGH (ref 2.5–4.5)
PLATELET # BLD AUTO: 116 K/UL — LOW (ref 150–400)
POTASSIUM SERPL-MCNC: 4.6 MMOL/L — SIGNIFICANT CHANGE UP (ref 3.5–5.3)
POTASSIUM SERPL-SCNC: 4.6 MMOL/L — SIGNIFICANT CHANGE UP (ref 3.5–5.3)
RBC # BLD: 3.68 M/UL — LOW (ref 4.2–5.8)
RBC # FLD: 14.4 % — SIGNIFICANT CHANGE UP (ref 10.3–14.5)
SODIUM SERPL-SCNC: 135 MMOL/L — SIGNIFICANT CHANGE UP (ref 135–145)
WBC # BLD: 5.07 K/UL — SIGNIFICANT CHANGE UP (ref 3.8–10.5)
WBC # FLD AUTO: 5.07 K/UL — SIGNIFICANT CHANGE UP (ref 3.8–10.5)

## 2022-06-20 PROCEDURE — 99232 SBSQ HOSP IP/OBS MODERATE 35: CPT

## 2022-06-20 PROCEDURE — 70548 MR ANGIOGRAPHY NECK W/DYE: CPT | Mod: 26

## 2022-06-20 PROCEDURE — 70544 MR ANGIOGRAPHY HEAD W/O DYE: CPT | Mod: 26,59

## 2022-06-20 PROCEDURE — 70553 MRI BRAIN STEM W/O & W/DYE: CPT | Mod: 26

## 2022-06-20 RX ORDER — METOPROLOL TARTRATE 50 MG
25 TABLET ORAL DAILY
Refills: 0 | Status: DISCONTINUED | OUTPATIENT
Start: 2022-06-20 | End: 2022-06-25

## 2022-06-20 RX ADMIN — HEPARIN SODIUM 5000 UNIT(S): 5000 INJECTION INTRAVENOUS; SUBCUTANEOUS at 13:17

## 2022-06-20 RX ADMIN — Medication 81 MILLIGRAM(S): at 12:39

## 2022-06-20 RX ADMIN — HEPARIN SODIUM 5000 UNIT(S): 5000 INJECTION INTRAVENOUS; SUBCUTANEOUS at 21:01

## 2022-06-20 RX ADMIN — HEPARIN SODIUM 5000 UNIT(S): 5000 INJECTION INTRAVENOUS; SUBCUTANEOUS at 06:27

## 2022-06-20 RX ADMIN — Medication 3 MILLIGRAM(S): at 21:01

## 2022-06-20 RX ADMIN — Medication 25 MILLIGRAM(S): at 21:14

## 2022-06-20 RX ADMIN — Medication 667 MILLIGRAM(S): at 08:58

## 2022-06-20 RX ADMIN — Medication 667 MILLIGRAM(S): at 21:00

## 2022-06-20 RX ADMIN — ATORVASTATIN CALCIUM 80 MILLIGRAM(S): 80 TABLET, FILM COATED ORAL at 21:01

## 2022-06-20 RX ADMIN — Medication 667 MILLIGRAM(S): at 12:40

## 2022-06-20 NOTE — PROGRESS NOTE ADULT - ASSESSMENT
67 year old right-handed male w/ PMHx HTN, AFib (no longer on apixaban d/t GIB, not on any antithrombotics at home), ESRD (on HD Tu/Th/Sat), hypothyroid, dentures, no history of stroke (per patient), p/w dysarthria and R sensorimotor deficits onset 15:30PM 6/16/22, approximately 30 minutes after completing HD. No similar symptoms in the past. Rigors on the night prior to arrival. He denies h/o diabetes or smoking. Code stroke cancelled as symptoms onset 24 hours prior to presentation. CT head: no acute pathology, chronic left thalamic lacunar infarct. Symptoms improving in the ED. RENAL consulted for ESRD/ HD Mx.    End Stage Renal Disease on Dialysis   HD unit -[facility]   last HD 6/16  K, vol ok   Hb > goal    Patient received MRI WITH gadollinium this morning,   spoke to neuro attending that confirms no contrast required for MRI/MRA for stroke evaluation and did not recommend contrast for study  no coordination with Renal team done  will schedule for HD urgently for today and tomorrow,   HD today will be delayed due to lack of coordination and no available nursing personnel  will receive HD at 3rd shift today  dose meds for ESRD  renal diet, fluid restriction  HTN, controlled. BP low nml. bp goal per neurology. rec to hold home bp meds if permissive hypertension indicated  CVA vs TIA- d/w neuro resident. CTH no acute infarct/bleed.  h/o Afib not on AC?- couldn't see his cardiologist Dr Romero in 1/2022 as he stopped taking his insurance. rec cariology eval      poc d/w pt, ER RN  For any question, call:  Cell # [phone]  Pager # [phone]  office# [phone]

## 2022-06-20 NOTE — PROGRESS NOTE ADULT - PROBLEM SELECTOR PLAN 4
c/w synthroid
-pt previously on 100 mg levothyroxine however has not been taking  -check TSH. If elevated, would restart IV synthroid while npo
c/w synthroid

## 2022-06-20 NOTE — PROGRESS NOTE ADULT - SUBJECTIVE AND OBJECTIVE BOX
He was not able to write and now is writing but still not normal.  Walking and  R HP improving.     Exam:  VFF  Pupils: left slight oval reactive.  Right round reactive. 4-->3, B.   Decreased PP, cold in the right face.  Mild right UMN type facial weakness.   Mild dysarthria - easily understood.     Mild R HP - pronator drift R UE.   Right side: Triceps, FE, HF all 4 and others on the right side 4+ -->5.   Left side 5/5  Able to write a few words but very messy.     Reflexes:  Right knee 3  Others 2  Plantar stimulation: R mute and L downgoing.       < from: MR Angio Neck w/ IV Cont (06.20.22 @ 11:11) >  MRI BRAIN:  1. Acute/subacute left-sided corona radiata infarction with associated   cytotoxic edema and without hemorrhagic transformation.  2. Enhancing mass lesion within the right internal auditory canal, most   compatible with a vestibular schwannoma. Recommend serial imaging over   time to assess for stability or change.  3. Chronic lacunar infarct within the left thalamus.  4. Multiple patchy confluent nonspecific abnormal white matter foci of   T2/FLAIR prolongation statistically favoring microvascular disease.    MRA NECK AND HEAD:  1. No large vessel occlusion or major stenosis.    < end of copied text >      < from: Transthoracic Echocardiogram (06.18.22 @ 07:56) >  CONCLUSIONS:  1. Calcified trileaflet aortic valve with normal opening.  Minimal aortic regurgitation.  2. Mildly dilated left atrium.  LA volume index = 39 cc/m2.  3. Normal left ventricular systolic function. No segmental  wall motion abnormalities.  4. Normal right ventricular size and function.  5. Agitated saline injection demonstrates no obvious  evidence of a patent foramen ovale.    < end of copied text >  A1C with Estimated Average Glucose Result: 5.2:    (06.19.22 @ 05:35)   Cholesterol, Serum: 140 mg/dL   Triglycerides, Serum: 81 mg/dL   HDL Cholesterol, Serum: 64 mg/dL   Non HDL Cholesterol: 76:

## 2022-06-20 NOTE — PROGRESS NOTE ADULT - ASSESSMENT
Mr. Valverde is a 68 yo man with A.Fib with acute stroke.   He has a h/o GI Bleed - we recommend full anticoagulation if ok from GI standpoint.   High dose statin.  Rehab/PT/OT/speech    Incidental imaging finding c/w right vestibular schwannoma - neurosurgery consult as outpatient.     Thank you

## 2022-06-20 NOTE — PROGRESS NOTE ADULT - SUBJECTIVE AND OBJECTIVE BOX
Ran Velasquez MD  Interventional Cardiology / Advance Heart Failure and Cardiac Transplant Specialist  [place] Office : [address] [place] [address] [facility] [address]  Tel:   [place] Office : [address] [facility] [place] [address]  Tel: [phone]       Pt is lying in bed comfortable not in distress, no chest pains no SOB no palpitations  	  MEDICATIONS:  aspirin enteric coated 81 milliGRAM(s) Oral daily  heparin   Injectable 5000 Unit(s) SubCutaneous every 8 hours  metoprolol succinate ER 25 milliGRAM(s) Oral daily        acetaminophen     Tablet .. 650 milliGRAM(s) Oral every 6 hours PRN  melatonin 3 milliGRAM(s) Oral at bedtime PRN  ondansetron Injectable 4 milliGRAM(s) IV Push every 8 hours PRN    aluminum hydroxide/magnesium hydroxide/simethicone Suspension 30 milliLiter(s) Oral every 4 hours PRN    atorvastatin 80 milliGRAM(s) Oral at bedtime    calcium acetate 667 milliGRAM(s) Oral three times a day with meals  chlorhexidine 2% Cloths 1 Application(s) Topical once      PAST MEDICAL/SURGICAL HISTORY  PAST MEDICAL & SURGICAL HISTORY:  HTN (Hypertension)      Chronic kidney disease      Kidney stones      Hemodialysis access, AV graft  Left upper extremity      Hyperparathyroidism      Anemia      Thrombocytopenia      Atrial fibrillation  on Eliquis      S/P Nephrectomy  (L) 2007 for kidney stones      Acquired arteriovenous fistula  left wrist  1/2015 - [facility], Left upper arm 4/2015 (approximate date)      AVF (arteriovenous fistula)          SOCIAL HISTORY: Substance Use (street drugs): ( x ) never used  (  ) other:    FAMILY HISTORY:  Family history of CVA (Father)         PHYSICAL EXAM:  T(C): 36.2 (06-20-22 @ 11:01), Max: 36.9 (06-20-22 @ 06:10)  HR: 70 (06-20-22 @ 11:01) (70 - 79)  BP: 140/100 (06-20-22 @ 11:01) (131/87 - 148/82)  RR: 18 (06-20-22 @ 11:01) (18 - 19)  SpO2: 100% (06-20-22 @ 11:01) (99% - 100%)  Wt(kg): --  I&O's Summary        GENERAL: NAD  EYES:   PERRLA   ENMT:   Moist mucous membranes, Good dentition, No lesions  Cardiovascular: Normal S1 S2, No JVD, No murmurs, No edema  Respiratory: Lungs clear to auscultation	  Gastrointestinal:  Soft, Non-tender, + BS	  Extremities: right sided weakness improved                                  11.4   5.07  )-----------( 116      ( 20 Jun 2022 07:10 )             36.1     06-20    135  |  91<L>  |  64<H>  ----------------------------<  73  4.6   |  21<L>  |  9.05<H>    Ca    7.4<L>      20 Jun 2022 07:10  Phos  8.3     06-20  Mg     2.20     06-20      proBNP:   Lipid Profile:   HgA1c:   TSH:     Consultant(s) Notes Reviewed:  [x ] YES  [ ] NO    Care Discussed with Consultants/Other Providers [ x] YES  [ ] NO    Imaging Personally Reviewed independently:  [x] YES  [ ] NO    All labs, radiologic studies, vitals, orders and medications list reviewed. Patient is seen and examined at bedside. Case discussed with medical team.

## 2022-06-20 NOTE — PROGRESS NOTE ADULT - PROBLEM SELECTOR PLAN 2
-elevated CHADSVASC but not on a/c due history of severe bleed  -check EKG and monitor on tele. Will likely need loop recorder to assess afib burden  -Cardiology consult apreciated. Will start on Eliquis once cleared by GI

## 2022-06-20 NOTE — PROGRESS NOTE ADULT - SUBJECTIVE AND OBJECTIVE BOX
[facility] [place] [facility] - MARK Vargas / MARK Pagan / JASMIN Caballero/ MARK Bojorquez/ MARK Faust/ TELLO Garrison / RODNEY Garcia / HAILEY Perkins  ---------------------------------------------------------------------------------------------------------------  seen and examined today for ESRD  Interval : S/P MRI/MRA with contrast this morning  VITALS:  T(F): 97.1 (06-20-22 @ 11:01), Max: 98.4 (06-20-22 @ 06:10)  HR: 70 (06-20-22 @ 11:01)  BP: 140/100 (06-20-22 @ 11:01)  RR: 18 (06-20-22 @ 11:01)  SpO2: 100% (06-20-22 @ 11:01)  Wt(kg): --    Physical Exam :-  Constitutional: NAD  Neck: Supple.  Respiratory: Bilateral equal breath sounds,  Cardiovascular: S1, S2 normal,  Gastrointestinal: Bowel Sounds present, soft, non tender.  Extremities: No edema  Neurological: Alert and Oriented x 3, no focal deficits  Psychiatric: Normal mood, normal affect  Data:-  Allergies :   No Known Allergies    Hospital Medications:   MEDICATIONS  (STANDING):  aspirin enteric coated 81 milliGRAM(s) Oral daily  atorvastatin 80 milliGRAM(s) Oral at bedtime  calcium acetate 667 milliGRAM(s) Oral three times a day with meals  chlorhexidine 2% Cloths 1 Application(s) Topical once  heparin   Injectable 5000 Unit(s) SubCutaneous every 8 hours    06-20    135  |  91<L>  |  64<H>  ----------------------------<  73  4.6   |  21<L>  |  9.05<H>    Ca    7.4<L>      20 Jun 2022 07:10  Phos  8.3     06-20  Mg     2.20     06-20      Creatinine Trend: 9.05 <--, 6.34 <--, 8.26 <--                        11.4   5.07  )-----------( 116      ( 20 Jun 2022 07:10 )             36.1

## 2022-06-20 NOTE — PROVIDER CONTACT NOTE (OTHER) - ACTION/TREATMENT ORDERED:
Provider was made aware. No new orders at this time. Will continue to monitor. Provider was made aware. Patient has HD planned at 3PM. Provider made aware. Stated to administer dose after HD session. Will continue to monitor.

## 2022-06-20 NOTE — PROVIDER CONTACT NOTE (OTHER) - RECOMMENDATIONS
Provider was made aware. No new orders at this time. Will continue to monitor. Provider was made aware. patient has new order for metoprolol succinate. Will continue to monitor.

## 2022-06-20 NOTE — PROGRESS NOTE ADULT - SUBJECTIVE AND OBJECTIVE BOX
Patient is a 67y old  Male who presents with a chief complaint of dysarthria and weakness (20 Jun 2022 14:32)      SUBJECTIVE / OVERNIGHT EVENTS:    Events noted.  CONSTITUTIONAL: No fever,  or fatigue  RESPIRATORY: No cough, wheezing,  No shortness of breath  CARDIOVASCULAR: No chest pain, palpitations, dizziness, or leg swelling  GASTROINTESTINAL: No abdominal or epigastric pain.       MEDICATIONS  (STANDING):  aspirin enteric coated 81 milliGRAM(s) Oral daily  atorvastatin 80 milliGRAM(s) Oral at bedtime  calcium acetate 667 milliGRAM(s) Oral three times a day with meals  chlorhexidine 2% Cloths 1 Application(s) Topical once  heparin   Injectable 5000 Unit(s) SubCutaneous every 8 hours  metoprolol succinate ER 25 milliGRAM(s) Oral daily    MEDICATIONS  (PRN):  acetaminophen     Tablet .. 650 milliGRAM(s) Oral every 6 hours PRN Temp greater or equal to 38C (100.4F), Mild Pain (1 - 3)  aluminum hydroxide/magnesium hydroxide/simethicone Suspension 30 milliLiter(s) Oral every 4 hours PRN Dyspepsia  melatonin 3 milliGRAM(s) Oral at bedtime PRN Insomnia  ondansetron Injectable 4 milliGRAM(s) IV Push every 8 hours PRN Nausea and/or Vomiting        CAPILLARY BLOOD GLUCOSE        I&O's Summary    20 Jun 2022 07:01  -  20 Jun 2022 23:18  --------------------------------------------------------  IN: 500 mL / OUT: 500 mL / NET: 0 mL        T(C): 36.8 (06-20-22 @ 19:55), Max: 36.9 (06-20-22 @ 06:10)  HR: 76 (06-20-22 @ 19:55) (70 - 79)  BP: 155/98 (06-20-22 @ 19:55) (131/87 - 155/98)  RR: 17 (06-20-22 @ 19:55) (17 - 19)  SpO2: 98% (06-20-22 @ 15:00) (98% - 100%)    PHYSICAL EXAM:  GENERAL: NAD  NECK: Supple, No JVD  CHEST/LUNG: Clear to auscultation bilaterally; No wheezing.  HEART: Regular rate and rhythm; No murmurs, rubs, or gallops  ABDOMEN: Soft, Nontender, Nondistended; Bowel sounds present  EXTREMITIES:   No edema  NEUROLOGY: AAO X 3      LABS:                        11.4   5.07  )-----------( 116      ( 20 Jun 2022 07:10 )             36.1     06-20    135  |  91<L>  |  64<H>  ----------------------------<  73  4.6   |  21<L>  |  9.05<H>    Ca    7.4<L>      20 Jun 2022 07:10  Phos  8.3     06-20  Mg     2.20     06-20              CAPILLARY BLOOD GLUCOSE            RADIOLOGY & ADDITIONAL TESTS:    Imaging Personally Reviewed:    Consultant(s) Notes Reviewed:      Care Discussed with Consultants/Other Providers:    Wilfred Christian MD, CMD, FACP    [address] [address]  Office Tel: [phone]  Cell: [phone]

## 2022-06-20 NOTE — PROGRESS NOTE ADULT - ASSESSMENT
EKG SR no ischemic changes     Echo < from: Transthoracic Echocardiogram (06.18.22 @ 07:56) >  CONCLUSIONS:  1. Calcified trileaflet aortic valve with normal opening.  Minimal aortic regurgitation.  2. Mildly dilated left atrium.  LA volume index = 39 cc/m2.  3. Normal left ventricular systolic function. No segmental  wall motion abnormalities.  4. Normal right ventricular size and function.  5. Agitated saline injection demonstrates no obvious  evidence of a patent foramen ovale.    < end of copied text >    Echo < from: Transthoracic Echocardiogram (06.18.22 @ 07:56) >  1. Calcified trileaflet aortic valve with normal opening.  Minimal aortic regurgitation.  2. Mildly dilated left atrium.  LA volume index = 39 cc/m2.  3. Normal left ventricular systolic function. No segmental  wall motion abnormalities.  4. Normal right ventricular size and function.  5. Agitated saline injection demonstrates no obvious  evidence of a patent foramen ovale.    < end of copied text >    Assessment and Plan     1) ?CVA: Echo normal monitor on tele , MRI/MRA pending , pt has h/o Afib would restart eliquis 5 mg po BID if ok with neuro and GI , pt with documented afib no need for ILR     2) ESRD on HD f/u renal recs     3) NSVT - 10 beats normal LV function , start on toprol 25mg daily     3) DVT PPX heparin

## 2022-06-20 NOTE — PROGRESS NOTE ADULT - PROBLEM SELECTOR PLAN 5
-permissive HTN in the setting of possible CVA/TIA  -monitor VS

## 2022-06-21 LAB
ANION GAP SERPL CALC-SCNC: 16 MMOL/L — HIGH (ref 7–14)
BUN SERPL-MCNC: 35 MG/DL — HIGH (ref 7–23)
CALCIUM SERPL-MCNC: 7.7 MG/DL — LOW (ref 8.4–10.5)
CHLORIDE SERPL-SCNC: 97 MMOL/L — LOW (ref 98–107)
CO2 SERPL-SCNC: 26 MMOL/L — SIGNIFICANT CHANGE UP (ref 22–31)
CREAT SERPL-MCNC: 6.17 MG/DL — HIGH (ref 0.5–1.3)
EGFR: 9 ML/MIN/1.73M2 — LOW
GLUCOSE SERPL-MCNC: 83 MG/DL — SIGNIFICANT CHANGE UP (ref 70–99)
HCT VFR BLD CALC: 36.1 % — LOW (ref 39–50)
HGB BLD-MCNC: 11 G/DL — LOW (ref 13–17)
MAGNESIUM SERPL-MCNC: 1.9 MG/DL — SIGNIFICANT CHANGE UP (ref 1.6–2.6)
MCHC RBC-ENTMCNC: 30.5 GM/DL — LOW (ref 32–36)
MCHC RBC-ENTMCNC: 30.7 PG — SIGNIFICANT CHANGE UP (ref 27–34)
MCV RBC AUTO: 100.8 FL — HIGH (ref 80–100)
NRBC # BLD: 2 /100 WBCS — SIGNIFICANT CHANGE UP
NRBC # FLD: 0.07 K/UL — HIGH
PHOSPHATE SERPL-MCNC: 7 MG/DL — HIGH (ref 2.5–4.5)
PLATELET # BLD AUTO: 104 K/UL — LOW (ref 150–400)
POTASSIUM SERPL-MCNC: 4.2 MMOL/L — SIGNIFICANT CHANGE UP (ref 3.5–5.3)
POTASSIUM SERPL-SCNC: 4.2 MMOL/L — SIGNIFICANT CHANGE UP (ref 3.5–5.3)
RBC # BLD: 3.58 M/UL — LOW (ref 4.2–5.8)
RBC # FLD: 14.2 % — SIGNIFICANT CHANGE UP (ref 10.3–14.5)
SARS-COV-2 RNA SPEC QL NAA+PROBE: SIGNIFICANT CHANGE UP
SODIUM SERPL-SCNC: 139 MMOL/L — SIGNIFICANT CHANGE UP (ref 135–145)
WBC # BLD: 4.67 K/UL — SIGNIFICANT CHANGE UP (ref 3.8–10.5)
WBC # FLD AUTO: 4.67 K/UL — SIGNIFICANT CHANGE UP (ref 3.8–10.5)

## 2022-06-21 RX ORDER — APIXABAN 2.5 MG/1
5 TABLET, FILM COATED ORAL EVERY 12 HOURS
Refills: 0 | Status: DISCONTINUED | OUTPATIENT
Start: 2022-06-21 | End: 2022-07-01

## 2022-06-21 RX ORDER — SEVELAMER CARBONATE 2400 MG/1
1600 POWDER, FOR SUSPENSION ORAL
Refills: 0 | Status: DISCONTINUED | OUTPATIENT
Start: 2022-06-21 | End: 2022-07-01

## 2022-06-21 RX ORDER — PANTOPRAZOLE SODIUM 20 MG/1
40 TABLET, DELAYED RELEASE ORAL
Refills: 0 | Status: DISCONTINUED | OUTPATIENT
Start: 2022-06-21 | End: 2022-07-01

## 2022-06-21 RX ADMIN — Medication 25 MILLIGRAM(S): at 06:17

## 2022-06-21 RX ADMIN — HEPARIN SODIUM 5000 UNIT(S): 5000 INJECTION INTRAVENOUS; SUBCUTANEOUS at 06:17

## 2022-06-21 RX ADMIN — Medication 667 MILLIGRAM(S): at 14:05

## 2022-06-21 RX ADMIN — HEPARIN SODIUM 5000 UNIT(S): 5000 INJECTION INTRAVENOUS; SUBCUTANEOUS at 14:05

## 2022-06-21 RX ADMIN — Medication 667 MILLIGRAM(S): at 17:36

## 2022-06-21 RX ADMIN — ATORVASTATIN CALCIUM 80 MILLIGRAM(S): 80 TABLET, FILM COATED ORAL at 21:10

## 2022-06-21 RX ADMIN — Medication 81 MILLIGRAM(S): at 14:04

## 2022-06-21 RX ADMIN — Medication 667 MILLIGRAM(S): at 09:05

## 2022-06-21 RX ADMIN — APIXABAN 5 MILLIGRAM(S): 2.5 TABLET, FILM COATED ORAL at 17:36

## 2022-06-21 RX ADMIN — PANTOPRAZOLE SODIUM 40 MILLIGRAM(S): 20 TABLET, DELAYED RELEASE ORAL at 17:36

## 2022-06-21 NOTE — PROGRESS NOTE ADULT - ASSESSMENT
67 year old right-handed male w/ PMHx HTN, AFib (no longer on apixaban d/t GIB, not on any antithrombotics at home), ESRD (on HD Tu/Th/Sat), hypothyroid, dentures, no history of stroke (per patient), p/w dysarthria and R sensorimotor deficits onset 15:30PM 6/16/22, approximately 30 minutes after completing HD. No similar symptoms in the past. Rigors on the night prior to arrival. He denies h/o diabetes or smoking. CT head: no acute pathology, chronic left thalamic lacunar infarct. Symptoms improving in the ED. TTE w/ bubble reveals EF of 58%, no PFO, grossly normal otherwise. MRI brain w/o contrast demonstrates acute infarct in the L corona radiata w/o hemorrhage. Today (6/21), patient reports persisting slur & improving strength. Exam as above.      Impression: Improving R sensorimotor syndrome secondary to acute L corona radiata infarct that appears to be more related to small vessel disease than cardioembolism, even in the setting of atrial fibrillation. However, patient should be restarted on a DOAC if cleared by GI as they are at risk for cardioembolism with [id number] score of 4. Otherwise would recommend alternative to long term anticoagulation such as Watchman Device; however, this would require short term course of anticoagulation followed by DAPT regardless.     Recommendations    Imaging:  [] May need TIFFANIE for evaluation for Watchman Device if cleared by GI for AC & cardio in agreement. Can be done outpatient, should not hold discharge.     Meds:  [] C/w ASA 81 mg qd for now. If restarted on AC, can discontinue.   [] Restart on Apixaban 5 mg BID if cleared by GI  [] Atorvastatin 40 mg, titrate to LDL < 70   [] Chemical DVT prophylaxis with Lovenox SQ    Other:  [] Recommend evaluation for Watchman Device as alternative to long term anticoagulation given multiple episodes of GI bleeding.   [] Normotension, avoid hypotension (long term goals between 110-140/<90 mmHg).  [] PT/OT - home, no skilled PT needs at this time  [] C/w regular diet as tolerated  [] HgA1C goals < 7.0 (currently 5.2)  [] Stroke education discussed at bedside  [] GI consult in progress, awaiting recommendations  [] Cardio on board, appreciate recommendations  [] Nephro on board, appreciate recommendations  [] Rest of workup as per primary team  [] Upon discharge, patient should follow up outpatient with Dr. Horn:  [phone] [address] [place] [address]    From neurovascular standpoint, no further inpatient investigations are indicated at this time.     Patient case discussed and seen with stroke attending, Dr. Horn.     Please call with questions: [phone]

## 2022-06-21 NOTE — PROGRESS NOTE ADULT - SUBJECTIVE AND OBJECTIVE BOX
Patient is a 67y old  Male who presents with a chief complaint of dysarthria and weakness (21 Jun 2022 17:24)      SUBJECTIVE / OVERNIGHT EVENTS:    Events noted.  CONSTITUTIONAL: No fever,  or fatigue  RESPIRATORY: No cough, wheezing,  No shortness of breath  CARDIOVASCULAR: No chest pain, palpitations, dizziness, or leg swelling  GASTROINTESTINAL: No abdominal or epigastric pain.       MEDICATIONS  (STANDING):  apixaban 5 milliGRAM(s) Oral every 12 hours  atorvastatin 80 milliGRAM(s) Oral at bedtime  chlorhexidine 2% Cloths 1 Application(s) Topical once  metoprolol succinate ER 25 milliGRAM(s) Oral daily  pantoprazole    Tablet 40 milliGRAM(s) Oral two times a day  sevelamer carbonate 1600 milliGRAM(s) Oral three times a day with meals    MEDICATIONS  (PRN):  acetaminophen     Tablet .. 650 milliGRAM(s) Oral every 6 hours PRN Temp greater or equal to 38C (100.4F), Mild Pain (1 - 3)  aluminum hydroxide/magnesium hydroxide/simethicone Suspension 30 milliLiter(s) Oral every 4 hours PRN Dyspepsia  melatonin 3 milliGRAM(s) Oral at bedtime PRN Insomnia  ondansetron Injectable 4 milliGRAM(s) IV Push every 8 hours PRN Nausea and/or Vomiting        CAPILLARY BLOOD GLUCOSE        I&O's Summary    20 Jun 2022 07:01  -  21 Jun 2022 07:00  --------------------------------------------------------  IN: 500 mL / OUT: 500 mL / NET: 0 mL    21 Jun 2022 07:01  -  21 Jun 2022 23:00  --------------------------------------------------------  IN: 400 mL / OUT: 1400 mL / NET: -1000 mL        T(C): 36.9 (06-21-22 @ 21:10), Max: 36.9 (06-21-22 @ 21:10)  HR: 75 (06-21-22 @ 21:10) (68 - 82)  BP: 141/85 (06-21-22 @ 21:10) (129/95 - 153/90)  RR: 18 (06-21-22 @ 21:10) (16 - 18)  SpO2: 100% (06-21-22 @ 21:10) (98% - 100%)    PHYSICAL EXAM:  GENERAL: NAD  NECK: Supple, No JVD  CHEST/LUNG: Clear to auscultation bilaterally; No wheezing.  HEART: Regular rate and rhythm; No murmurs, rubs, or gallops  ABDOMEN: Soft, Nontender, Nondistended; Bowel sounds present  EXTREMITIES:   No edema  NEUROLOGY: AAO X 3      LABS:                        11.0   4.67  )-----------( 104      ( 21 Jun 2022 06:21 )             36.1     06-21    139  |  97<L>  |  35<H>  ----------------------------<  83  4.2   |  26  |  6.17<H>    Ca    7.7<L>      21 Jun 2022 06:20  Phos  7.0     06-21  Mg     1.90     06-21              CAPILLARY BLOOD GLUCOSE            RADIOLOGY & ADDITIONAL TESTS:    Imaging Personally Reviewed:    Consultant(s) Notes Reviewed:      Care Discussed with Consultants/Other Providers:    Wilfred Christian MD, CMD, FACP    [address] [place] [address]  Office Tel: [phone]  Cell: [phone]

## 2022-06-21 NOTE — PROGRESS NOTE ADULT - ASSESSMENT
67 year old right-handed male w/ PMHx HTN, AFib (no longer on apixaban d/t GIB, not on any antithrombotics at home), ESRD (on HD Tu/Th/Sat), hypothyroid, dentures, no history of stroke (per patient), p/w dysarthria and R sensorimotor deficits onset 15:30PM 6/16/22, approximately 30 minutes after completing HD. No similar symptoms in the past. Rigors on the night prior to arrival. He denies h/o diabetes or smoking. Code stroke cancelled as symptoms onset 24 hours prior to presentation. CT head: no acute pathology, chronic left thalamic lacunar infarct. Symptoms improving in the ED. RENAL consulted for ESRD/ HD Mx.    End Stage Renal Disease on Dialysis   HD unit -[facility] Tu/Th/Sat),  K, vol ok   Hb at goal    s/p MRI WITH gadollinium 6/20 followed by HD yesterday and today    s/p HD today, Rx sheet reviewed, net UF 1kg, tolerated well. uneventful.  plan for nex tHD 6/23  dose meds for ESRD  renal diet, fluid restriction  Hyperphosphatemia- c/w phoslo tidac  HTN, controlled. BP stable  CVA -CTH no acute infarct/bleed. MRI brain w/o contrast demonstrates acute infarct in the L corona radiata w/o hemorrhage.   mx per neuro  h/o Afib not on AC?- couldn't see his cardiologist Dr Romero in 1/2022 as he stopped taking his insurance. f/u w/cariology eval    poc d/w pt  d/c plan per team  will closely follow up  labs, chart reviewed  For any question, call:  Cell # [phone]  Pager # [phone]  office# [phone]   67 year old right-handed male w/ PMHx HTN, AFib (no longer on apixaban d/t GIB, not on any antithrombotics at home), ESRD (on HD Tu/Th/Sat), hypothyroid, dentures, no history of stroke (per patient), p/w dysarthria and R sensorimotor deficits onset 15:30PM 6/16/22, approximately 30 minutes after completing HD. No similar symptoms in the past. Rigors on the night prior to arrival. He denies h/o diabetes or smoking. Code stroke cancelled as symptoms onset 24 hours prior to presentation. CT head: no acute pathology, chronic left thalamic lacunar infarct. Symptoms improving in the ED. RENAL consulted for ESRD/ HD Mx.    End Stage Renal Disease on Dialysis   [facility]  K, vol ok   Hb at goal    s/p MRI WITH gadollinium 6/20 followed by HD yesterday and today    s/p HD today, Rx sheet reviewed, net UF 1kg, tolerated well. uneventful.  plan for nex tHD 6/23  dose meds for ESRD  renal diet, fluid restriction  Hyperphosphatemia- changed phoslo 667 tidac >renvela 2tidac  HTN, controlled. BP stable  CVA -CTH no acute infarct/bleed. MRI brain w/o contrast demonstrates acute infarct in the L corona radiata w/o hemorrhage.   mx per neuro  h/o Afib -restarted on AC. recommend dec dose of eliquis to 2.5mg bid (esrd dosing). f/u w/cariology eval    poc d/w pt  d/c plan per team  will closely follow up  labs, chart reviewed  For any question, call:  Cell # [phone]  Pager # [phone]  office# [phone]

## 2022-06-21 NOTE — CONSULT NOTE ADULT - ASSESSMENT
67 year old female with new onset dysarthria. GI called for AC clearance       History of GI bleed:        Stroke  67 year old female with new onset dysarthria. GI called for AC clearance       History of GI bleed:  As per patient history (poor  historian) he had a self resolving bleed early this January. He reports No history of EGD or colonoscopy He reports no alarm symptoms (ie weight loss rectal bleeding or black stools) Not a candidate for EGD at this time given active issues (high risks) . Suggest starting AC with PPI BD and monitor response         Stroke   As above       I reviewed the overnight course of events on the unit, re-confirming the patient history. I discussed the care with the patient and their family. The plan of care was discussed with the physician assistant and modifications were made to the notation where appropriate. Differential diagnosis and plan of care discussed with patient after the evaluation. Advanced care planning was discussed with patient and family.  Advanced care planning forms were reviewed and discussed.  Risks, benefits and alternatives of gastroenterologic procedures were discussed in detail and all questions were answered. 35 minutes spent on total encounter of which more than fifty percent of the encounter was spent counseling and/or coordinating care by the attending physician.

## 2022-06-21 NOTE — PROGRESS NOTE ADULT - SUBJECTIVE AND OBJECTIVE BOX
INTERVAL HISTORY: Patient seen at bedside by stroke team & attending. No acute events overnight. Pending GI consult. Patient reports that his weakness is improving but that the slurred speech is persisting. Discussed MRI brain w/o contrast, MRA H/N findings and management moving forward.     PAST MEDICAL & SURGICAL HISTORY:  HTN (Hypertension)      Chronic kidney disease      Kidney stones      Hemodialysis access, AV graft  Left upper extremity      Hyperparathyroidism      Anemia      Thrombocytopenia      Atrial fibrillation  on Eliquis      S/P Nephrectomy  (L) 2007 for kidney stones      Acquired arteriovenous fistula  left wrist  1/2015 - [facility], Left upper arm 4/2015 (approximate date)      AVF (arteriovenous fistula)        FAMILY HISTORY:  Family history of CVA (Father)      SOCIAL HISTORY:   T/E/D:   Occupation:   Lives with:     MEDICATIONS (HOME):  Home Medications:  NIFEdipine 60 mg oral tablet, extended release: 1 tab(s) orally once a day (17 Jun 2022 17:50)    MEDICATIONS  (STANDING):  aspirin enteric coated 81 milliGRAM(s) Oral daily  atorvastatin 80 milliGRAM(s) Oral at bedtime  calcium acetate 667 milliGRAM(s) Oral three times a day with meals  chlorhexidine 2% Cloths 1 Application(s) Topical once  heparin   Injectable 5000 Unit(s) SubCutaneous every 8 hours  metoprolol succinate ER 25 milliGRAM(s) Oral daily    MEDICATIONS  (PRN):  acetaminophen     Tablet .. 650 milliGRAM(s) Oral every 6 hours PRN Temp greater or equal to 38C (100.4F), Mild Pain (1 - 3)  aluminum hydroxide/magnesium hydroxide/simethicone Suspension 30 milliLiter(s) Oral every 4 hours PRN Dyspepsia  melatonin 3 milliGRAM(s) Oral at bedtime PRN Insomnia  ondansetron Injectable 4 milliGRAM(s) IV Push every 8 hours PRN Nausea and/or Vomiting    ALLERGIES/INTOLERANCES:  Allergies  No Known Allergies    Intolerances    VITALS & EXAMINATION:  Vital Signs Last 24 Hrs  T(C): 36.7 (21 Jun 2022 05:00), Max: 36.8 (20 Jun 2022 10:00)  T(F): 98.1 (21 Jun 2022 05:00), Max: 98.2 (20 Jun 2022 10:00)  HR: 77 (21 Jun 2022 05:00) (70 - 82)  BP: 148/87 (21 Jun 2022 05:00) (138/82 - 155/98)  BP(mean): --  RR: 17 (21 Jun 2022 05:00) (17 - 19)  SpO2: 98% (21 Jun 2022 05:00) (98% - 100%)    General:  Constitutional: Male, appears stated age, in no apparent distress including pain  Head: Normocephalic & Atraumatic.    Neurological:  MS: Eyes open. Awake, alert, oriented to person, place, situation, time. Follows all commands.    Language: Speech is mild to moderately dysarthric, fluent with good repetition & comprehension.    CNs: VFF. EOMI no nystagmus. V1-3 intact to LT b/l. Moderate R facial palsy. Hearing grossly normal (rubbing fingers) b/l. Tongue midline, normal movements, no atrophy.     Motor: Normal muscle bulk & tone. No noticeable tremor. Pronator drift in RUE, no drift otherwise. No drift in LUE, LLE, RLE. Spontaneous & purposeful	     Sensation: Intact to LT b/l throughout.     Cortical: Extinction on DSS (neglect): none    Reflexes:              Biceps(C5)       BR(C6)     Triceps(C7)               Patellar(L4)    Achilles(S1)    Plantar Resp  R	              2	          2	             2		                              2		    2		      Down   L	              2	          2	             2		                              2		    2		      Down     Coordination: intact rapid-alt movements. No dysmetria to FTN/HTS    Gait:     LABORATORY:  CBC                       11.0   4.67  )-----------( 104      ( 21 Jun 2022 06:21 )             36.1     Chem 06-21    139  |  97<L>  |  35<H>  ----------------------------<  83  4.2   |  26  |  6.17<H>    Ca    7.7<L>      21 Jun 2022 06:20  Phos  7.0     06-21  Mg     1.90     06-21      LFTs   Coagulopathy   Lipid Panel 06-19 Chol 140 LDL -- HDL 64 Trig 81  A1c   Cardiac enzymes     U/A   CSF  Immunological  Other    STUDIES & IMAGING:  Studies (EKG, EEG, EMG, etc):     Radiology (XR, CT, MR, U/S, TTE/TIFFANIE): INTERVAL HISTORY: Patient seen at bedside by stroke team & attending. No acute events overnight. Pending GI consult. Patient reports that his weakness is improving but that the slurred speech is persisting. Discussed MRI brain w/o contrast, MRA H/N findings and management moving forward.     PAST MEDICAL & SURGICAL HISTORY:  HTN (Hypertension)      Chronic kidney disease      Kidney stones      Hemodialysis access, AV graft  Left upper extremity      Hyperparathyroidism      Anemia      Thrombocytopenia      Atrial fibrillation  on Eliquis      S/P Nephrectomy  (L) 2007 for kidney stones      Acquired arteriovenous fistula  left wrist  1/2015 - [facility], Left upper arm 4/2015 (approximate date)      AVF (arteriovenous fistula)        FAMILY HISTORY:  Family history of CVA (Father)      SOCIAL HISTORY:   T/E/D:   Occupation:   Lives with:     MEDICATIONS (HOME):  Home Medications:  NIFEdipine 60 mg oral tablet, extended release: 1 tab(s) orally once a day (17 Jun 2022 17:50)    MEDICATIONS  (STANDING):  aspirin enteric coated 81 milliGRAM(s) Oral daily  atorvastatin 80 milliGRAM(s) Oral at bedtime  calcium acetate 667 milliGRAM(s) Oral three times a day with meals  chlorhexidine 2% Cloths 1 Application(s) Topical once  heparin   Injectable 5000 Unit(s) SubCutaneous every 8 hours  metoprolol succinate ER 25 milliGRAM(s) Oral daily    MEDICATIONS  (PRN):  acetaminophen     Tablet .. 650 milliGRAM(s) Oral every 6 hours PRN Temp greater or equal to 38C (100.4F), Mild Pain (1 - 3)  aluminum hydroxide/magnesium hydroxide/simethicone Suspension 30 milliLiter(s) Oral every 4 hours PRN Dyspepsia  melatonin 3 milliGRAM(s) Oral at bedtime PRN Insomnia  ondansetron Injectable 4 milliGRAM(s) IV Push every 8 hours PRN Nausea and/or Vomiting    ALLERGIES/INTOLERANCES:  Allergies  No Known Allergies    Intolerances    VITALS & EXAMINATION:  Vital Signs Last 24 Hrs  T(C): 36.7 (21 Jun 2022 05:00), Max: 36.8 (20 Jun 2022 10:00)  T(F): 98.1 (21 Jun 2022 05:00), Max: 98.2 (20 Jun 2022 10:00)  HR: 77 (21 Jun 2022 05:00) (70 - 82)  BP: 148/87 (21 Jun 2022 05:00) (138/82 - 155/98)  BP(mean): --  RR: 17 (21 Jun 2022 05:00) (17 - 19)  SpO2: 98% (21 Jun 2022 05:00) (98% - 100%)      General:  Constitutional: Male, appears stated age, in no apparent distress including pain  Head: Normocephalic & Atraumatic.    Neurological:  MS: Eyes open. Awake, alert, oriented to person, place, situation, time. Follows all commands.    Language: Speech is mild to moderately dysarthric, fluent with good repetition & comprehension.    CNs: VFF. EOMI no nystagmus. V1-3 intact to LT b/l. Moderate R facial palsy. Hearing grossly normal (rubbing fingers) b/l. Tongue midline, normal movements, no atrophy.     Motor: Normal muscle bulk & tone. No noticeable tremor. Slight pronator drift in RUE, no drift otherwise. No drift in LUE, LLE, RLE. 5/5 throughout. Spontaneous & purposeful movements throughout. 	     Sensation: Decreased sensation to LT/Temp on R.    Coordination: Slower fast finger tapping on R when compared to L. LUE orbits around RUE with rolling arm motion. No dysmetria to FTN.    Gait: No postural instability. Gait testing deferred.      LABORATORY:  CBC                       11.0   4.67  )-----------( 104      ( 21 Jun 2022 06:21 )             36.1     Chem 06-21    139  |  97<L>  |  35<H>  ----------------------------<  83  4.2   |  26  |  6.17<H>    Ca    7.7<L>      21 Jun 2022 06:20  Phos  7.0     06-21  Mg     1.90     06-21      LFTs   Coagulopathy   Lipid Panel 06-19 Chol 140 LDL 60 HDL 64 Trig 81  A1c 5.2     STUDIES & IMAGING:    Radiology (XR, CT, MR, U/S, TTE/TIFFANIE):      MRI Brain: A wedge-shaped area of restricted diffusion is seen within the   left corona radiata with associated T2/FLAIR hyperintense signal. There   is no hemorrhagic transformation.    A chronic lacunar infarct is again noted within the left thalamus.    Multiple additional patchy confluent nonspecific T2/FLAIR hyperintense   signal changes are noted throughout the bihemispheric white matter   without associated mass effect or restricted diffusion.    There is enhancing mass lesion within the right internal auditory canal   measuring 0.5 x 0.8 cm in the axial plane (AP by transverse) (series 14,   image 43).    Otherwise, no abnormal brain parenchymal or leptomeningeal enhancement is   notable.    Ventricular size and configuration is unremarkable. No abnormal extra   axial fluid collections are seen. Flow-voids are noted throughout the   major intracranial vessels, on the T2 weighted images, consistent with   their patency. The sellar region appears unremarkable.    Polypoidal soft tissue is seen within the left maxillary sinus.   Additional scattered mucosal thickening is seen throughout the ethmoid   complex. The mastoid air cells are clear. Calvarial signal appears   unremarkable. The orbits appear unremarkable apart from bilateral   cataract removal.    Posterior parietal scalp vertex scarring is seen.    MRA Neck: There is a standard anatomic configuration to the aortic arch.    The origins of the great vessels appear unremarkable. The bilateral   common carotid arteries and carotid bifurcations appear unremarkable.    The bilateral cervical internal carotid arteries are within normal limits.    The origins of the bilateral vertebral arteries are normal. The bilateral   cervical vertebral arteries are normal in course and caliber.    MRA [place]: The bilateral intracranial internal carotid,   anterior, and middle cerebral arteries appear unremarkable.    The anterior communicating artery is seen. The bilateral posterior   communicating arteries are not well resolved.    The bilateral intradural vertebral arteries, vertebrobasilar junction,   basilar artery, and basilar tip appear unremarkable as well as the   bilateral posterior cerebral arteries.    IMPRESSION:    MRI BRAIN:  1. Acute/subacute left-sided corona radiata infarction with associated   cytotoxic edema and without hemorrhagic transformation.  2. Enhancing mass lesion within the right internal auditory canal, most   compatible with a vestibular schwannoma. Recommend serial imaging over   time to assess for stability or change.  3. Chronic lacunar infarct within the left thalamus.  4. Multiple patchy confluent nonspecific abnormal white matter foci of   T2/FLAIR prolongation statistically favoring microvascular disease.    MRA NECK AND HEAD:  1. No large vessel occlusion or major stenosis.    --- End of Report ---      < from: Transthoracic Echocardiogram (06.18.22 @ 07:56) >  CONCLUSIONS:  1. Calcified trileaflet aortic valve with normal opening.  Minimal aortic regurgitation.  2. Mildly dilated left atrium.  LA volume index = 39 cc/m2.  3. Normal left ventricular systolic function. No segmental  wall motion abnormalities.  4. Normal right ventricular size and function.  5. Agitated saline injection demonstrates no obvious  evidence of a patent foramen ovale.        CT Brain Stroke Protocol (06.17.22 @ 15:41)    FINDINGS:    Assessment of the posterior fossa and henok is somewhat limited by beam   hardening artifact.  Ventricles and sulci: Parenchymal volume loss is present which is   commensurate with patient age.  Intra-axial: Periventricular small vessel white matter ischemic changes   are appreciated. Old left thalamic lacunar infarct. No intracranial mass,   acute hemorrhage, or midline shift is present.  Extra-axial: No extra-axial fluid collection is identified. Deposition of   calcified plaques in association with the distal intracranial bilateral   vertebral arteries and bilateral carotid siphons.  Calvarium: Intact.    Bilateral optic globes are intact. Left maxillary sinus polyp versus   retention cysts. Bilateral mastoid air cells and middle ear cavities are   predominantly clear.    paranasal sinuses, bilateral mastoid air cells, middle ear cavities are   clear.    IMPRESSION: Age-appropriate involutional changes. No large arterial   distribution acute infarct. Old left thalamic lacunar infarct.    Findings were communicated by Dr. Behr-Ventura to Dr. STACIA Bojorquez at   approximately 3:50 PM 6/17/2022.

## 2022-06-21 NOTE — PROGRESS NOTE ADULT - SUBJECTIVE AND OBJECTIVE BOX
[facility] - S Alicia / Ej S /D Federico/ S Maryellen/ S Govind/ Jovan Garrison / GAYATRI Garcia/ O Gregorio  service -[phone], office [phone]  ---------------------------------------------------------------------------------------------------------------    Patient seen and examined bedside    Subjective and Objective: No overnight events, denied sob. No complaints today. feeling better    Allergies: No Known Allergies      Hospital Medications:   MEDICATIONS  (STANDING):  apixaban 5 milliGRAM(s) Oral every 12 hours  atorvastatin 80 milliGRAM(s) Oral at bedtime  calcium acetate 667 milliGRAM(s) Oral three times a day with meals  chlorhexidine 2% Cloths 1 Application(s) Topical once  metoprolol succinate ER 25 milliGRAM(s) Oral daily  pantoprazole    Tablet 40 milliGRAM(s) Oral two times a day    VITALS:  T(F): 97.6 (06-21-22 @ 12:49), Max: 98.2 (06-20-22 @ 19:55)  HR: 70 (06-21-22 @ 12:49)  BP: 145/80 (06-21-22 @ 12:49)  RR: 16 (06-21-22 @ 12:49)  SpO2: 100% (06-21-22 @ 09:00)  Wt(kg): --    06-20 @ 07:01  -  06-21 @ 07:00  --------------------------------------------------------  IN: 500 mL / OUT: 500 mL / NET: 0 mL    06-21 @ 07:01  -  06-21 @ 17:24  --------------------------------------------------------  IN: 400 mL / OUT: 1400 mL / NET: -1000 mL      PHYSICAL EXAM:  Constitutional: NAD  HEENT: anicteric sclera  Neck: No JVD  Respiratory: CTAB, no wheezes, rales or rhonchi  Cardiovascular: S1, S2, RRR  Gastrointestinal: BS+, soft, NT/ND  Extremities: No peripheral edema  Neurological: A/O x 3  Psychiatric: Normal mood, normal affect  : no siegel   Vascular Access: avf+    LABS:  06-21    139  |  97<L>  |  35<H>  ----------------------------<  83  4.2   |  26  |  6.17<H>    Ca    7.7<L>      21 Jun 2022 06:20  Phos  7.0     06-21  Mg     1.90     06-21      Creatinine Trend: 6.17 <--, 9.05 <--, 6.34 <--, 8.26 <--                        11.0   4.67  )-----------( 104      ( 21 Jun 2022 06:21 )             36.1     Urine Studies:        RADIOLOGY & ADDITIONAL STUDIES:

## 2022-06-21 NOTE — CONSULT NOTE ADULT - SUBJECTIVE AND OBJECTIVE BOX
Patient is a 67y old  Male who presents with a chief complaint of dysarthria and weakness (2022 08:37)     .     HPI:    HPI:  67 year old right-handed male w/ PMHx HTN, AFib (no longer on apixaban),  ESRD (on HD //Sat), hypothyroid, no history of stroke (per patient), no prior head imaging on our EMR, p/w slurred speech, right sided weakness and numbness which onset yesterday at around 15:30PM 22, approximately 30 minutes after completing hemodialysis at 15:00PM. Patient denies having had these symptoms in the past. Patient denies history of diabetes. He reports that the reason he is on hemodialysis is because he had a very large, obstructive kidney stone on the left which required nephrectomy. Patient has been on hemodialysis for years. Patient reporting that his apixaban was stopped years ago due to severe low blood count associated with dark stool.    Code stroke called in the ED. Code stroke cancelled given that symptoms started 24 hours prior to presentation. CT head was obtained w/o acute pathology, but showing chronic left thalamic infarct. Currently pt reports improvement in dysathria and right sided weakness. He endorses pleuritic chest pain, denies SOB or cough. Denies fevers or chills.  (2022 18:10)          REVIEW OF SYSTEMS  Constitutional:   No fever, no fatigue, no pallor, no night sweats, no weight loss.  HEENT:   No eye pain, no vision changes, no icterus, no mouth ulcers.  Respiratory:   No shortness of breath, no cough, no respiratory distress.   Cardiovascular:   No chest pain, no palpitations.   Gastrointestinal: No abdominal pain, no nausea, no vomiting , no diahrrea, no constipation, no hematochezia,no melena.  Skin:   No rashes, no jaundice, no eczema.   Musculoskeletal:   No joint pain, no swelling, no myalgia.   Neurologic:   No headache, no seizure, no weakness.   Genitourinary:   No dysuria, no decreased urine output.  Psychiatric:  No depression, no anxiety,   Endocrine:   No thyroid disease, no diabetes.  Heme/Lymphatic:   No anemia, no blood transfusions, no lymph node enlargement, no bleeding, no bruising.  ___________________________________________________________________________________________  Allergies    No Known Allergies    Intolerances      MEDICATIONS  (STANDING):  aspirin enteric coated 81 milliGRAM(s) Oral daily  atorvastatin 80 milliGRAM(s) Oral at bedtime  calcium acetate 667 milliGRAM(s) Oral three times a day with meals  chlorhexidine 2% Cloths 1 Application(s) Topical once  heparin   Injectable 5000 Unit(s) SubCutaneous every 8 hours  metoprolol succinate ER 25 milliGRAM(s) Oral daily    MEDICATIONS  (PRN):  acetaminophen     Tablet .. 650 milliGRAM(s) Oral every 6 hours PRN Temp greater or equal to 38C (100.4F), Mild Pain (1 - 3)  aluminum hydroxide/magnesium hydroxide/simethicone Suspension 30 milliLiter(s) Oral every 4 hours PRN Dyspepsia  melatonin 3 milliGRAM(s) Oral at bedtime PRN Insomnia  ondansetron Injectable 4 milliGRAM(s) IV Push every 8 hours PRN Nausea and/or Vomiting      PAST MEDICAL & SURGICAL HISTORY:  HTN (Hypertension)      Chronic kidney disease      Kidney stones      Hemodialysis access, AV graft  Left upper extremity      Hyperparathyroidism      Anemia      Thrombocytopenia      Atrial fibrillation  on Eliquis      S/P Nephrectomy  (L)  for kidney stones      Acquired arteriovenous fistula  left wrist  2015 - [facility], Left upper arm 2015 (approximate date)      AVF (arteriovenous fistula)        FAMILY HISTORY:  Family history of CVA (Father)      Social History: No hsitory of : Tobacco use, IVDA, EToH  ______________________________________________________________________________________    PHYSICAL EXAM    Daily     Daily Weight in k (2022 09:15)  BMI: 21.5 (06-17 @ 15:15)  Change in Weight:  Vital Signs Last 24 Hrs  T(C): 36.7 (2022 09:15), Max: 36.8 (2022 19:55)  T(F): 98.1 (2022 09:15), Max: 98.2 (2022 19:55)  HR: 68 (2022 09:15) (68 - 82)  BP: 150/99 (2022 09:15) (138/89 - 155/98)  BP(mean): --  RR: 16 (2022 09:15) (16 - 19)  SpO2: 100% (2022 09:00) (98% - 100%)    General:  Well developed, well nourished, alert and active, no pallor, NAD.  HEENT:    Normal appearance of conjunctiva, ears, nose, lips, oropharynx, and oral mucosa, anicteric.  Neck:  No masses, no asymmetry.  Lymph Nodes:  No lymphadenopathy.   Cardiovascular:  RRR normal S1/S2, no murmur.  Respiratory:  CTA B/L, normal respiratory effort.   Abdominal:   soft, no masses or tenderness, normoactive BS, NT/ND, no HSM.  Extremities:   No clubbing or cyanosis, normal capillary refill, no edema.   Skin:   No rash, jaundice, lesions, eczema.   Musculoskeletal:  No joint swelling, erythema or tenderness.   Neuro: No focal deficits.   Other:   _______________________________________________________________________________________________  Lab Results:                          11.0   4.67  )-----------( 104      ( 2022 06:21 )             36.1     06-21    139  |  97<L>  |  35<H>  ----------------------------<  83  4.2   |  26  |  6.17<H>    Ca    7.7<L>      2022 06:20  Phos  7.0     06-21  Mg     1.90     06-21                Stool Results:          RADIOLOGY RESULTS:    SURGICAL PATHOLOGY:

## 2022-06-21 NOTE — PROGRESS NOTE ADULT - SUBJECTIVE AND OBJECTIVE BOX
Ran Velasquez MD  Interventional Cardiology / Advance Heart Failure and Cardiac Transplant Specialist  [place] Office : [address] [place] [address] [facility] [address]  Tel:   [place] Office : [address] [facility] [place] [address]  Tel: [phone]       Pt is lying in bed comfortable not in distress, no chest pains no SOB no palpitations  	  MEDICATIONS:  apixaban 5 milliGRAM(s) Oral every 12 hours  metoprolol succinate ER 25 milliGRAM(s) Oral daily        acetaminophen     Tablet .. 650 milliGRAM(s) Oral every 6 hours PRN  melatonin 3 milliGRAM(s) Oral at bedtime PRN  ondansetron Injectable 4 milliGRAM(s) IV Push every 8 hours PRN    aluminum hydroxide/magnesium hydroxide/simethicone Suspension 30 milliLiter(s) Oral every 4 hours PRN  pantoprazole    Tablet 40 milliGRAM(s) Oral two times a day    atorvastatin 80 milliGRAM(s) Oral at bedtime    chlorhexidine 2% Cloths 1 Application(s) Topical once      PAST MEDICAL/SURGICAL HISTORY  PAST MEDICAL & SURGICAL HISTORY:  HTN (Hypertension)      Chronic kidney disease      Kidney stones      Hemodialysis access, AV graft  Left upper extremity      Hyperparathyroidism      Anemia      Thrombocytopenia      Atrial fibrillation  on Eliquis      S/P Nephrectomy  (L) 2007 for kidney stones      Acquired arteriovenous fistula  left wrist  1/2015 - [facility], Left upper arm 4/2015 (approximate date)      AVF (arteriovenous fistula)          SOCIAL HISTORY: Substance Use (street drugs): ( x ) never used  (  ) other:    FAMILY HISTORY:  Family history of CVA (Father)         PHYSICAL EXAM:  T(C): 36.9 (06-21-22 @ 21:10), Max: 36.9 (06-21-22 @ 21:10)  HR: 75 (06-21-22 @ 21:10) (68 - 82)  BP: 141/85 (06-21-22 @ 21:10) (129/95 - 153/90)  RR: 18 (06-21-22 @ 21:10) (16 - 18)  SpO2: 100% (06-21-22 @ 21:10) (98% - 100%)  Wt(kg): --  I&O's Summary    20 Jun 2022 07:01  -  21 Jun 2022 07:00  --------------------------------------------------------  IN: 500 mL / OUT: 500 mL / NET: 0 mL    21 Jun 2022 07:01  -  21 Jun 2022 22:15  --------------------------------------------------------  IN: 400 mL / OUT: 1400 mL / NET: -1000 mL             EYES:   PERRLA   ENMT:   Moist mucous membranes, Good dentition, No lesions  Cardiovascular: Normal S1 S2, No JVD, No murmurs, No edema  Respiratory: Lungs clear to auscultation	  Gastrointestinal:  Soft, Non-tender, + BS	  Extremities: no edema                                    11.0   4.67  )-----------( 104      ( 21 Jun 2022 06:21 )             36.1     06-21    139  |  97<L>  |  35<H>  ----------------------------<  83  4.2   |  26  |  6.17<H>    Ca    7.7<L>      21 Jun 2022 06:20  Phos  7.0     06-21  Mg     1.90     06-21      proBNP:   Lipid Profile:   HgA1c:   TSH:     Consultant(s) Notes Reviewed:  [x ] YES  [ ] NO    Care Discussed with Consultants/Other Providers [ x] YES  [ ] NO    Imaging Personally Reviewed independently:  [x] YES  [ ] NO    All labs, radiologic studies, vitals, orders and medications list reviewed. Patient is seen and examined at bedside. Case discussed with medical team.

## 2022-06-21 NOTE — CONSULT NOTE ADULT - CONSULT REASON
Code stroke - moderate dysarthria, right-sided weakness a/w numbness after dialysis 24 hours prior to arrival.
ESRD Mx
_
CVA   Afib

## 2022-06-22 ENCOUNTER — TRANSCRIPTION ENCOUNTER (OUTPATIENT)
Age: 67
End: 2022-06-22

## 2022-06-22 LAB
ANION GAP SERPL CALC-SCNC: 13 MMOL/L — SIGNIFICANT CHANGE UP (ref 7–14)
BUN SERPL-MCNC: 37 MG/DL — HIGH (ref 7–23)
CALCIUM SERPL-MCNC: 7.6 MG/DL — LOW (ref 8.4–10.5)
CHLORIDE SERPL-SCNC: 95 MMOL/L — LOW (ref 98–107)
CO2 SERPL-SCNC: 26 MMOL/L — SIGNIFICANT CHANGE UP (ref 22–31)
CREAT SERPL-MCNC: 5.82 MG/DL — HIGH (ref 0.5–1.3)
EGFR: 10 ML/MIN/1.73M2 — LOW
GLUCOSE SERPL-MCNC: 91 MG/DL — SIGNIFICANT CHANGE UP (ref 70–99)
HCT VFR BLD CALC: 33.1 % — LOW (ref 39–50)
HGB BLD-MCNC: 10.6 G/DL — LOW (ref 13–17)
MAGNESIUM SERPL-MCNC: 1.9 MG/DL — SIGNIFICANT CHANGE UP (ref 1.6–2.6)
MCHC RBC-ENTMCNC: 30.9 PG — SIGNIFICANT CHANGE UP (ref 27–34)
MCHC RBC-ENTMCNC: 32 GM/DL — SIGNIFICANT CHANGE UP (ref 32–36)
MCV RBC AUTO: 96.5 FL — SIGNIFICANT CHANGE UP (ref 80–100)
NRBC # BLD: 0 /100 WBCS — SIGNIFICANT CHANGE UP
NRBC # FLD: 0 K/UL — SIGNIFICANT CHANGE UP
PHOSPHATE SERPL-MCNC: 5.6 MG/DL — HIGH (ref 2.5–4.5)
PLATELET # BLD AUTO: 107 K/UL — LOW (ref 150–400)
POTASSIUM SERPL-MCNC: 4.7 MMOL/L — SIGNIFICANT CHANGE UP (ref 3.5–5.3)
POTASSIUM SERPL-SCNC: 4.7 MMOL/L — SIGNIFICANT CHANGE UP (ref 3.5–5.3)
RBC # BLD: 3.43 M/UL — LOW (ref 4.2–5.8)
RBC # FLD: 14.4 % — SIGNIFICANT CHANGE UP (ref 10.3–14.5)
SODIUM SERPL-SCNC: 134 MMOL/L — LOW (ref 135–145)
WBC # BLD: 4.62 K/UL — SIGNIFICANT CHANGE UP (ref 3.8–10.5)
WBC # FLD AUTO: 4.62 K/UL — SIGNIFICANT CHANGE UP (ref 3.8–10.5)

## 2022-06-22 RX ADMIN — APIXABAN 5 MILLIGRAM(S): 2.5 TABLET, FILM COATED ORAL at 18:07

## 2022-06-22 RX ADMIN — APIXABAN 5 MILLIGRAM(S): 2.5 TABLET, FILM COATED ORAL at 05:20

## 2022-06-22 RX ADMIN — SEVELAMER CARBONATE 1600 MILLIGRAM(S): 2400 POWDER, FOR SUSPENSION ORAL at 09:24

## 2022-06-22 RX ADMIN — SEVELAMER CARBONATE 1600 MILLIGRAM(S): 2400 POWDER, FOR SUSPENSION ORAL at 18:06

## 2022-06-22 RX ADMIN — PANTOPRAZOLE SODIUM 40 MILLIGRAM(S): 20 TABLET, DELAYED RELEASE ORAL at 18:07

## 2022-06-22 RX ADMIN — ATORVASTATIN CALCIUM 80 MILLIGRAM(S): 80 TABLET, FILM COATED ORAL at 21:24

## 2022-06-22 RX ADMIN — Medication 25 MILLIGRAM(S): at 05:20

## 2022-06-22 RX ADMIN — SEVELAMER CARBONATE 1600 MILLIGRAM(S): 2400 POWDER, FOR SUSPENSION ORAL at 11:39

## 2022-06-22 RX ADMIN — PANTOPRAZOLE SODIUM 40 MILLIGRAM(S): 20 TABLET, DELAYED RELEASE ORAL at 05:20

## 2022-06-22 NOTE — DISCHARGE NOTE PROVIDER - NSDCCPCAREPLAN_GEN_ALL_CORE_FT
PRINCIPAL DISCHARGE DIAGNOSIS  Diagnosis: Cerebrovascular accident (CVA)  Assessment and Plan of Treatment: you are found with stroke on MRI, Followup with neurology Dr Horn [phone] [address] [place] [address]. please call for appointment      SECONDARY DISCHARGE DIAGNOSES  Diagnosis: ESRD on dialysis  Assessment and Plan of Treatment: Please follow up with your nephrologist or Dr Perkins for management, and continue your schedule hemodialysis.    Diagnosis: Hypothyroidism  Assessment and Plan of Treatment: Please continue to take your Synthroid as prescribed and follow up with your PMD and endocrinologist for follow up.    Diagnosis: Hypertension  Assessment and Plan of Treatment: Low sodium and fat diet, continue anti-hypertensive medications, and follow up with primary care physician.    Diagnosis: Atrial fibrillation  Assessment and Plan of Treatment: you are started on blood thinner, monitor for signs of bleed/dark stool. Followup with your cardiologist or Dr Velasquez in 1-2 weeks. Discuss with your cardiologist if you are a candidate for watchman procedure    Diagnosis: History of GI bleed  Assessment and Plan of Treatment: you have a history of bleed, you were seen by a gastroenterologist provider this admission who recommended trial of blood thinner given risk of stroke. Followup with your gastroenterologist or Dr Mcmahon for further workup    Diagnosis: Vestibular schwannoma  Assessment and Plan of Treatment: MRI noted you have a benign mass that develops on the balance (vestibular) and hearing, or auditory (cochlear) nerves. please followup with a     PRINCIPAL DISCHARGE DIAGNOSIS  Diagnosis: Cerebrovascular accident (CVA)  Assessment and Plan of Treatment: you are found with stroke on MRI, Followup with neurology Dr Horn [phone] [address] [place] [address]. please call for appointment      SECONDARY DISCHARGE DIAGNOSES  Diagnosis: ESRD on dialysis  Assessment and Plan of Treatment: Please follow up with your nephrologist or Dr Perkins for management, and continue your schedule hemodialysis.    Diagnosis: Hypothyroidism  Assessment and Plan of Treatment: Please continue to take your Synthroid as prescribed and follow up with your PMD and endocrinologist for follow up.    Diagnosis: Hypertension  Assessment and Plan of Treatment: Low sodium and fat diet, continue anti-hypertensive medications, and follow up with primary care physician.    Diagnosis: Atrial fibrillation  Assessment and Plan of Treatment: you are started on blood thinner, monitor for signs of bleed/dark stool. Followup with your cardiologist or Dr Velasquez in 1-2 weeks. Discuss with your cardiologist if you are a candidate for watchman procedure    Diagnosis: History of GI bleed  Assessment and Plan of Treatment: you have a history of bleed, you were seen by a gastroenterologist provider this admission who recommended trial of blood thinner given risk of stroke. Followup with your gastroenterologist or Dr Mcmahon for further workup    Diagnosis: Vestibular schwannoma  Assessment and Plan of Treatment: MRI noted you have a benign mass that develops on the balance (vestibular) and hearing, or auditory (cochlear) nerves. please followup with a ear nose throat provider (ENT)     PRINCIPAL DISCHARGE DIAGNOSIS  Diagnosis: Cerebrovascular accident (CVA)  Assessment and Plan of Treatment: you are found with stroke on MRI, Followup with neurology Dr Horn [phone] [address] [place] [address]. please call for appointment      SECONDARY DISCHARGE DIAGNOSES  Diagnosis: Atrial fibrillation  Assessment and Plan of Treatment: you are started on blood thinner, monitor for signs of bleed/dark stool. Followup with your cardiologist or Dr Velasquez in 1-2 weeks. Discuss with your cardiologist if you are a candidate for watchman procedure    Diagnosis: ESRD on dialysis  Assessment and Plan of Treatment: Please follow up with your nephrologist or Dr Perkins for management, and continue your schedule hemodialysis.    Diagnosis: Hypothyroidism  Assessment and Plan of Treatment: Please continue to take your Synthroid as prescribed and follow up with your PMD and endocrinologist for follow up.    Diagnosis: Hypertension  Assessment and Plan of Treatment: Low sodium and fat diet, continue anti-hypertensive medications, and follow up with primary care physician.    Diagnosis: History of GI bleed  Assessment and Plan of Treatment: you have a history of bleed, you were seen by a gastroenterologist provider this admission who recommended trial of blood thinner given risk of stroke. Followup with your gastroenterologist or Dr Mcmahon for further workup    Diagnosis: Vestibular schwannoma  Assessment and Plan of Treatment: MRI noted you have a benign mass that develops on the balance (vestibular) and hearing, or auditory (cochlear) nerves. please followup with a ear nose throat provider (ENT)

## 2022-06-22 NOTE — PROGRESS NOTE ADULT - SUBJECTIVE AND OBJECTIVE BOX
[facility] - MARK Vargas / MARK Pagan / JASMIN Caballero/ MARK Bojorquez/ MARK Fuast/ TELLO Garrison / RODNEY Garcia / HAILEY Perkins  ---------------------------------------------------------------------------------------------------------------  seen and examined today for ESRD  Interval : NAD  VITALS:  T(F): 97.4 (06-22-22 @ 09:00), Max: 98.4 (06-21-22 @ 21:10)  HR: 72 (06-22-22 @ 09:00)  BP: 140/89 (06-22-22 @ 09:00)  RR: 18 (06-22-22 @ 09:00)  SpO2: 100% (06-22-22 @ 09:00)  Wt(kg): --    06-21 @ 07:01  -  06-22 @ 07:00  --------------------------------------------------------  IN: 400 mL / OUT: 1400 mL / NET: -1000 mL      Physical Exam :-  Constitutional: NAD  Neck: Supple.  Respiratory: Bilateral equal breath sounds,  Cardiovascular: S1, S2 normal,  Gastrointestinal: Bowel Sounds present, soft, non tender.  Extremities: No edema  Neurological: Alert and Oriented x 3, aphasia  Psychiatric: Normal mood, normal affect  Data:-  Allergies :   No Known Allergies    Hospital Medications:   MEDICATIONS  (STANDING):  apixaban 5 milliGRAM(s) Oral every 12 hours  atorvastatin 80 milliGRAM(s) Oral at bedtime  chlorhexidine 2% Cloths 1 Application(s) Topical once  metoprolol succinate ER 25 milliGRAM(s) Oral daily  pantoprazole    Tablet 40 milliGRAM(s) Oral two times a day  sevelamer carbonate 1600 milliGRAM(s) Oral three times a day with meals    06-22    134<L>  |  95<L>  |  37<H>  ----------------------------<  91  4.7   |  26  |  5.82<H>    Ca    7.6<L>      22 Jun 2022 06:40  Phos  5.6     06-22  Mg     1.90     06-22      Creatinine Trend: 5.82 <--, 6.17 <--, 9.05 <--, 6.34 <--, 8.26 <--                        10.6   4.62  )-----------( 107      ( 22 Jun 2022 06:40 )             33.1

## 2022-06-22 NOTE — DISCHARGE NOTE PROVIDER - NSFOLLOWUPCLINICS_GEN_ALL_ED_FT
[facility]  ENT  [address], [address]  [place] [address]  Phone: [phone]  Fax:

## 2022-06-22 NOTE — PROGRESS NOTE ADULT - SUBJECTIVE AND OBJECTIVE BOX
CHAVEZ MARQUEZ  67y  Male      Patient is a 67y old  Male who presents with a chief complaint of dysarthria and weakness (22 Jun 2022 15:07)  comfortable,nad,speech clear    REVIEW OF SYSTEMS:  CONSTITUTIONAL: No fever  RESPIRATORY: No cough, hemoptysis or shortness of breath  CARDIOVASCULAR: No chest pain, palpitations, dizziness, or leg swelling  GASTROINTESTINAL: No abdominal pain. nausea, vomiting, hematemesis  GENITOURINARY: No dysuria, frequency, hematuria   NEUROLOGICAL: No headaches, no dizziness  MUSCULOSKELETAL: No joint pain or swelling;     INTERVAL HPI/OVERNIGHT EVENTS:  T(C): 36.6 (06-22-22 @ 17:00), Max: 36.7 (06-22-22 @ 01:10)  HR: 67 (06-22-22 @ 17:00) (67 - 73)  BP: 153/95 (06-22-22 @ 17:00) (135/82 - 153/95)  RR: 18 (06-22-22 @ 17:00) (17 - 18)  SpO2: 98% (06-22-22 @ 17:00) (98% - 100%)  Wt(kg): --  I&O's Summary    21 Jun 2022 07:01  -  22 Jun 2022 07:00  --------------------------------------------------------  IN: 400 mL / OUT: 1400 mL / NET: -1000 mL      T(C): 36.6 (06-22-22 @ 17:00), Max: 36.7 (06-22-22 @ 01:10)  HR: 67 (06-22-22 @ 17:00) (67 - 73)  BP: 153/95 (06-22-22 @ 17:00) (135/82 - 153/95)  RR: 18 (06-22-22 @ 17:00) (17 - 18)  SpO2: 98% (06-22-22 @ 17:00) (98% - 100%)  Wt(kg): --Vital Signs Last 24 Hrs  T(C): 36.6 (22 Jun 2022 17:00), Max: 36.7 (22 Jun 2022 01:10)  T(F): 97.8 (22 Jun 2022 17:00), Max: 98.1 (22 Jun 2022 01:10)  HR: 67 (22 Jun 2022 17:00) (67 - 73)  BP: 153/95 (22 Jun 2022 17:00) (135/82 - 153/95)  BP(mean): --  RR: 18 (22 Jun 2022 17:00) (17 - 18)  SpO2: 98% (22 Jun 2022 17:00) (98% - 100%)    LABS:                        10.6   4.62  )-----------( 107      ( 22 Jun 2022 06:40 )             33.1     06-22    134<L>  |  95<L>  |  37<H>  ----------------------------<  91  4.7   |  26  |  5.82<H>    Ca    7.6<L>      22 Jun 2022 06:40  Phos  5.6     06-22  Mg     1.90     06-22          CAPILLARY BLOOD GLUCOSE                PAST MEDICAL & SURGICAL HISTORY:  HTN (Hypertension)      Chronic kidney disease      Kidney stones      Hemodialysis access, AV graft  Left upper extremity      Hyperparathyroidism      Anemia      Thrombocytopenia      Atrial fibrillation  on Eliquis      S/P Nephrectomy  (L) 2007 for kidney stones      Acquired arteriovenous fistula  left wrist  1/2015 - [facility], Left upper arm 4/2015 (approximate date)      AVF (arteriovenous fistula)          MEDICATIONS  (STANDING):  apixaban 5 milliGRAM(s) Oral every 12 hours  atorvastatin 80 milliGRAM(s) Oral at bedtime  chlorhexidine 2% Cloths 1 Application(s) Topical once  metoprolol succinate ER 25 milliGRAM(s) Oral daily  pantoprazole    Tablet 40 milliGRAM(s) Oral two times a day  sevelamer carbonate 1600 milliGRAM(s) Oral three times a day with meals    MEDICATIONS  (PRN):  acetaminophen     Tablet .. 650 milliGRAM(s) Oral every 6 hours PRN Temp greater or equal to 38C (100.4F), Mild Pain (1 - 3)  aluminum hydroxide/magnesium hydroxide/simethicone Suspension 30 milliLiter(s) Oral every 4 hours PRN Dyspepsia  melatonin 3 milliGRAM(s) Oral at bedtime PRN Insomnia  ondansetron Injectable 4 milliGRAM(s) IV Push every 8 hours PRN Nausea and/or Vomiting        RADIOLOGY & ADDITIONAL TESTS:    Imaging Personally Reviewed:  [ ] YES  [ ] NO    Consultant(s) Notes Reviewed:  [ ] YES  [ ] NO    PHYSICAL EXAM:  GENERAL: Alert and awake lying in bed in no distress  HEAD:  Atraumatic, Normocephalic  EYES: EOMI, LILIAM, conjunctiva and sclera clear  NECK: Supple, No JVD, Normal thyroid  NERVOUS SYSTEM:  Alert & Oriented X3, Motor and sensory systems are intact,   CHEST/LUNG: Bilateral clear breath sounds, no rhochi, no wheezing, no crepitations,  HEART: Regular rate and rhythm; No murmurs, rubs, or gallops  ABDOMEN: Soft, Nontender, Nondistended; Bowel sounds present  EXTREMITIES:   Peripheral Pulses are palpable, no  edema        Care Discussed with Consultants/Other Providers [ ] YES  [ ] NO      Code Status: [] Full Code [] DNR [] DNI [] Goals of Care:   Disposition: [] ICU [] Stroke Unit [] RCU []PCU []Floor [] Discharge Home         ADALGISA McwilliamsP

## 2022-06-22 NOTE — PROGRESS NOTE ADULT - ASSESSMENT
67 year old right-handed male w/ PMHx HTN, AFib (no longer on apixaban d/t GIB, not on any antithrombotics at home), ESRD (on HD Tu/Th/Sat), hypothyroid, dentures, no history of stroke (per patient), p/w dysarthria and R sensorimotor deficits onset 15:30PM 6/16/22, approximately 30 minutes after completing HD. No similar symptoms in the past. Rigors on the night prior to arrival. He denies h/o diabetes or smoking. Code stroke cancelled as symptoms onset 24 hours prior to presentation. CT head: no acute pathology, chronic left thalamic lacunar infarct. Symptoms improving in the ED. RENAL consulted for ESRD/ HD Mx.    End Stage Renal Disease on Dialysis   HD unit -[facility] Tu/Th/Sat),  K, vol ok   Hb at goal    s/p MRI WITH gadollinium 6/20 followed by HD 6/21 also  plan for next HD 6/23  dose meds for ESRD  renal diet, fluid restriction  Hyperphosphatemia- changed phoslo 667 tidac >renvela 2tidac  HTN, controlled. BP stable  CVA -CTH no acute infarct/bleed. MRI brain w/o contrast demonstrates acute infarct in the L corona radiata w/o hemorrhage.   mx per neuro  h/o Afib -restarted on AC.  f/u w/cariology eval      For any question, call:  Cell # [phone]  Pager # [phone]  Callback # [phone]

## 2022-06-22 NOTE — PROGRESS NOTE ADULT - ASSESSMENT
67 year old female with new onset dysarthria. GI called for AC clearance       History of GI bleed:  On Eliquis   No bleeding appreciated   Hemoglobin appears relatively stable   Continue to monitor   Colonoscopy and Endoscopy (can be done on an outpatient basis) when acute issues have resolved         Stroke   As above       I reviewed the overnight course of events on the unit, re-confirming the patient history. I discussed the care with the patient and their family. The plan of care was discussed with the physician assistant and modifications were made to the notation where appropriate. Differential diagnosis and plan of care discussed with patient after the evaluation. Advanced care planning was discussed with patient and family.  Advanced care planning forms were reviewed and discussed.  Risks, benefits and alternatives of gastroenterologic procedures were discussed in detail and all questions were answered. 35 minutes spent on total encounter of which more than fifty percent of the encounter was spent counseling and/or coordinating care by the attending physician.

## 2022-06-22 NOTE — DISCHARGE NOTE PROVIDER - CARE PROVIDER_API CALL
Gustavo Perkins)  Internal Medicine; Nephrology  [address] [place] [address]  Phone: [phone]  Fax: [phone]  Follow Up Time:     Ran Velasquez  CARDIOVASCULAR DISEASE  [address] [place] [address]  Phone: [phone]  Fax: [phone]  Follow Up Time:     Ashish Horn)  Neurology; Vascular Neurology  [address] [facility] [address]  [place] [address]  Phone: [phone]  Fax: [phone]  Follow Up Time:     David Navarro  Gastroenterology  [address] [place] [address]  Phone: [phone]  Fax: [phone]  Follow Up Time:

## 2022-06-22 NOTE — PROGRESS NOTE ADULT - SUBJECTIVE AND OBJECTIVE BOX
Subjective:  events noted   ___________________________________________________________________________________________  Allergies    No Known Allergies    Intolerances      MEDICATIONS  (STANDING):  aspirin enteric coated 81 milliGRAM(s) Oral daily  atorvastatin 80 milliGRAM(s) Oral at bedtime  calcium acetate 667 milliGRAM(s) Oral three times a day with meals  chlorhexidine 2% Cloths 1 Application(s) Topical once  heparin   Injectable 5000 Unit(s) SubCutaneous every 8 hours  metoprolol succinate ER 25 milliGRAM(s) Oral daily    MEDICATIONS  (PRN):  acetaminophen     Tablet .. 650 milliGRAM(s) Oral every 6 hours PRN Temp greater or equal to 38C (100.4F), Mild Pain (1 - 3)  aluminum hydroxide/magnesium hydroxide/simethicone Suspension 30 milliLiter(s) Oral every 4 hours PRN Dyspepsia  melatonin 3 milliGRAM(s) Oral at bedtime PRN Insomnia  ondansetron Injectable 4 milliGRAM(s) IV Push every 8 hours PRN Nausea and/or Vomiting      PAST MEDICAL & SURGICAL HISTORY:  HTN (Hypertension)      Chronic kidney disease      Kidney stones      Hemodialysis access, AV graft  Left upper extremity      Hyperparathyroidism      Anemia      Thrombocytopenia      Atrial fibrillation  on Eliquis      S/P Nephrectomy  (L)  for kidney stones      Acquired arteriovenous fistula  left wrist  2015 - [facility], Left upper arm 2015 (approximate date)      AVF (arteriovenous fistula)        FAMILY HISTORY:  Family history of CVA (Father)      Social History: No hsitory of : Tobacco use, IVDA, EToH  ______________________________________________________________________________________    PHYSICAL EXAM    Daily     Daily Weight in k (2022 09:15)  BMI: 21.5 (06-17 @ 15:15)  Change in Weight:  Vital Signs Last 24 Hrs  T(C): 36.7 (2022 09:15), Max: 36.8 (2022 19:55)  T(F): 98.1 (2022 09:15), Max: 98.2 (2022 19:55)  HR: 68 (2022 09:15) (68 - 82)  BP: 150/99 (2022 09:15) (138/89 - 155/98)  BP(mean): --  RR: 16 (2022 09:15) (16 - 19)  SpO2: 100% (2022 09:00) (98% - 100%)    General:  Well developed, well nourished, alert and active, no pallor, NAD.  HEENT:    Normal appearance of conjunctiva, ears, nose, lips, oropharynx, and oral mucosa, anicteric.  Neck:  No masses, no asymmetry.  Lymph Nodes:  No lymphadenopathy.   Cardiovascular:  RRR normal S1/S2, no murmur.  Respiratory:  CTA B/L, normal respiratory effort.   Abdominal:   soft, no masses or tenderness, normoactive BS, NT/ND, no HSM.  Extremities:   No clubbing or cyanosis, normal capillary refill, no edema.   Skin:   No rash, jaundice, lesions, eczema.   Musculoskeletal:  No joint swelling, erythema or tenderness.   Neuro: No focal deficits.   Other:   _______________________________________________________________________________________________  Lab Results:                          11.0   4.67  )-----------( 104      ( 2022 06:21 )             36.1     -    139  |  97<L>  |  35<H>  ----------------------------<  83  4.2   |  26  |  6.17<H>    Ca    7.7<L>      2022 06:20  Phos  7.0     06-21  Mg     1.90     -                Stool Results:          RADIOLOGY RESULTS:    SURGICAL PATHOLOGY:

## 2022-06-22 NOTE — DISCHARGE NOTE PROVIDER - CARE PROVIDERS DIRECT ADDRESSES
,DirectAddress_Unknown,DirectAddress_Unknown,DirectAddress_Unknown,[email or web address]

## 2022-06-22 NOTE — DISCHARGE NOTE PROVIDER - NSDCMRMEDTOKEN_GEN_ALL_CORE_FT
calcium acetate 667 mg oral tablet: 1 tab(s) orally 3 times a day (with meals)   NIFEdipine 60 mg oral tablet, extended release: 1 tab(s) orally once a day   apixaban 5 mg oral tablet: 1 tab(s) orally every 12 hours  calcium acetate 667 mg oral tablet: 1 tab(s) orally 3 times a day (with meals)   NIFEdipine 60 mg oral tablet, extended release: 1 tab(s) orally once a day   acetaminophen 325 mg oral tablet: 2 tab(s) orally every 6 hours, As needed, Temp greater or equal to 38C (100.4F), Mild Pain (1 - 3)  aluminum hydroxide-magnesium hydroxide 200 mg-200 mg/5 mL oral suspension: 30 milliliter(s) orally every 4 hours, As needed, Dyspepsia  apixaban 5 mg oral tablet: 1 tab(s) orally every 12 hours  atorvastatin 80 mg oral tablet: 1 tab(s) orally once a day (at bedtime)  melatonin 3 mg oral tablet: 1 tab(s) orally once a day (at bedtime), As needed, Insomnia  metoprolol succinate 25 mg oral tablet, extended release: 3 tab(s) orally once a day  pantoprazole 40 mg oral delayed release tablet: 1 tab(s) orally 2 times a day  sevelamer carbonate 800 mg oral tablet: 2 tab(s) orally 3 times a day (with meals)

## 2022-06-22 NOTE — DISCHARGE NOTE PROVIDER - PROVIDER TOKENS
PROVIDER:[TOKEN:[[id number]]],PROVIDER:[TOKEN:[[id number]]],PROVIDER:[TOKEN:[[id number]]],PROVIDER:[TOKEN:[[id number]]]

## 2022-06-22 NOTE — PROGRESS NOTE ADULT - SUBJECTIVE AND OBJECTIVE BOX
Ran Velasquez MD  Interventional Cardiology / Advance Heart Failure and Cardiac Transplant Specialist  [place] Office : [phone] [place] [id number]  Tel:   [place] Office : [phone] [facility] [place] [address]  Tel: [phone]      Subjective/Overnight events: Pt is lying in bed comfortable not in distress, no chest pains no SOB no palpitations  	  MEDICATIONS:  apixaban 5 milliGRAM(s) Oral every 12 hours  metoprolol succinate ER 25 milliGRAM(s) Oral daily        acetaminophen     Tablet .. 650 milliGRAM(s) Oral every 6 hours PRN  melatonin 3 milliGRAM(s) Oral at bedtime PRN  ondansetron Injectable 4 milliGRAM(s) IV Push every 8 hours PRN    aluminum hydroxide/magnesium hydroxide/simethicone Suspension 30 milliLiter(s) Oral every 4 hours PRN  pantoprazole    Tablet 40 milliGRAM(s) Oral two times a day    atorvastatin 80 milliGRAM(s) Oral at bedtime    chlorhexidine 2% Cloths 1 Application(s) Topical once      PAST MEDICAL/SURGICAL HISTORY  PAST MEDICAL & SURGICAL HISTORY:  HTN (Hypertension)      Chronic kidney disease      Kidney stones      Hemodialysis access, AV graft  Left upper extremity      Hyperparathyroidism      Anemia      Thrombocytopenia      Atrial fibrillation  on Eliquis      S/P Nephrectomy  (L) 2007 for kidney stones      Acquired arteriovenous fistula  left wrist  1/2015 - [facility], Left upper arm 4/2015 (approximate date)      AVF (arteriovenous fistula)          SOCIAL HISTORY: Substance Use (street drugs): ( x ) never used  (  ) other:    FAMILY HISTORY:  Family history of CVA (Father)        PHYSICAL EXAM:  T(C): 36.3 (06-22-22 @ 09:00), Max: 36.9 (06-21-22 @ 21:10)  HR: 72 (06-22-22 @ 09:00) (70 - 75)  BP: 140/89 (06-22-22 @ 09:00) (129/95 - 152/87)  RR: 18 (06-22-22 @ 09:00) (16 - 18)  SpO2: 100% (06-22-22 @ 09:00) (100% - 100%)  Wt(kg): --  I&O's Summary    21 Jun 2022 07:01  -  22 Jun 2022 07:00  --------------------------------------------------------  IN: 400 mL / OUT: 1400 mL / NET: -1000 mL        EYES:   PERRLA   ENMT:   Moist mucous membranes, Good dentition, No lesions  Cardiovascular: Normal S1 S2, No JVD, No murmurs, No edema  Respiratory: Lungs clear to auscultation	  Gastrointestinal:  Soft, Non-tender, + BS	  Extremities: no edema                              10.6   4.62  )-----------( 107      ( 22 Jun 2022 06:40 )             33.1     06-22    134<L>  |  95<L>  |  37<H>  ----------------------------<  91  4.7   |  26  |  5.82<H>    Ca    7.6<L>      22 Jun 2022 06:40  Phos  5.6     06-22  Mg     1.90     06-22      proBNP:   Lipid Profile:   HgA1c:   TSH:     Consultant(s) Notes Reviewed:  [x ] YES  [ ] NO    Care Discussed with Consultants/Other Providers [ x] YES  [ ] NO    Imaging Personally Reviewed independently:  [x] YES  [ ] NO    All labs, radiologic studies, vitals, orders and medications list reviewed. Patient is seen and examined at bedside. Case discussed with medical team.

## 2022-06-22 NOTE — DISCHARGE NOTE PROVIDER - NSDCQMSTROKERISK_NEU_ALL_CORE
Atrial fibrillation/High blood pressure Atrial fibrillation/High blood pressure/History of a stroke or TIA

## 2022-06-22 NOTE — DISCHARGE NOTE PROVIDER - HOSPITAL COURSE
67 year old right-handed male w/ PMHx HTN, AFib (no longer on apixaban - was stopped years ago due to severe low blood count associated with dark stool.), ESRD (on HD Tu/Th/Sat), hypothyroid, no history of stroke (per patient), no prior head imaging on our EMR, p/w slurred speech, right sided weakness and numbness admitted for r/o CVA     + CVA: CTH unremarkable. TTE neg for bubble study. MRI + CVA left-sided corona radiata infarction  + afib start on elqiuis   + NSVT started BB     Hospital course:    Pt admitted with dysarthria and right side weakness concern for CVA. CTH unremarkable. Neurology consulted. Pt s/p permissive HTN. MR brain, MRA head and neck noted Acute/subacute left-sided corona radiata infarction with associated cytotoxic edema and without hemorrhagic transformation. Vestibular schwannoma. No large vessel occlusion or major stenosis. TTE with bubble study: EF 58 mild dilated left atrium. negative bubble study. Stroke likely small vessel disease but given pt with afib and high [id number] score Neurology recommended to restart eliquis. Pt with Hx of GI bleed. GI consulted given current risk scope deferred. Recommended to trial AC and monitor for bleed. Pt started on AC, ASA discontinued to decrease bleed risk. Hgb *****. Pt with hx afib and episode of NSVT started on BB. Consider watchman outpatient if possible. Incidental finding of Vestocular schwannoma recommended outpatient followup with neurosurgery.     PT: no needs       Case discussed with  ****. Reviewed discharge medications with patient; All new medications requiring new prescription sent to pharmacy of patients choice. Reviewed need for prescription for previous home medication and new prescriptions sent if requested. Patient in agreement and understands.      67 year old right-handed male w/ PMHx HTN, AFib (no longer on apixaban - was stopped years ago due to severe low blood count associated with dark stool.), ESRD (on HD Tu/Th/Sat), hypothyroid, no history of stroke (per patient), no prior head imaging on our EMR, p/w slurred speech, right sided weakness and numbness admitted for r/o CVA     + CVA: CTH unremarkable. TTE neg for bubble study. MRI + CVA left-sided corona radiata infarction  + afib start on elqiuis   + NSVT started BB     Hospital course:    Pt admitted with dysarthria and right side weakness concern for CVA. CTH unremarkable. Neurology consulted. Pt s/p permissive HTN. MR brain, MRA head and neck noted Acute/subacute left-sided corona radiata infarction with associated cytotoxic edema and without hemorrhagic transformation. Vestibular schwannoma. No large vessel occlusion or major stenosis. TTE with bubble study: EF 58 mild dilated left atrium. negative bubble study. Stroke likely small vessel disease but given pt with afib and high [id number] score Neurology recommended to restart eliquis. Pt with Hx of GI bleed. GI consulted given current risk scope deferred. Recommended to trial AC and monitor for bleed. Pt started on AC, ASA discontinued to decrease bleed risk. Hgb *****. Pt with hx afib and episode of NSVT started on BB. Consider watchman outpatient if possible. Incidental finding of Vestibular schwannoma recommended outpatient followup with ENT.     PT: no needs       Case discussed with  ****. Reviewed discharge medications with patient; All new medications requiring new prescription sent to pharmacy of patients choice. Reviewed need for prescription for previous home medication and new prescriptions sent if requested. Patient in agreement and understands.      67 year old right-handed male w/ PMHx HTN, AFib (no longer on apixaban - was stopped years ago due to severe low blood count associated with dark stool.), ESRD (on HD Tu/Th/Sat), hypothyroid, no history of stroke (per patient), no prior head imaging on our EMR, p/w slurred speech, right sided weakness and numbness admitted for r/o CVA     + CVA: CTH unremarkable. TTE neg for bubble study. MRI + CVA left-sided corona radiata infarction  + afib start on elqiuis  + NSVT started BB     Hospital course:    Pt admitted with dysarthria and right side weakness concern for CVA. CTH unremarkable. Neurology consulted. Pt s/p permissive HTN. MR brain, MRA head and neck noted Acute/subacute left-sided corona radiata infarction with associated cytotoxic edema and without hemorrhagic transformation. Vestibular schwannoma. No large vessel occlusion or major stenosis. TTE with bubble study: EF 58 mild dilated left atrium. negative bubble study. Stroke likely small vessel disease but given pt with afib and high [id number] score Neurology recommended to restart eliquis. Pt with Hx of GI bleed. GI consulted given current risk scope deferred. Recommended to trial AC and monitor for bleed. Pt started on AC, ASA discontinued to decrease bleed risk. Hgb:  Pt with hx afib and episode of NSVT started on BB. Consider watchman outpatient if possible. Incidental finding of Vestibular schwannoma recommended outpatient followup with ENT.    PT: no needs       Case discussed with Dr. Christian. Reviewed discharge medications with patient; All new medications requiring new prescription sent to pharmacy of patients choice. Reviewed need for prescription for previous home medication and new prescriptions sent if requested. Patient in agreement and understands.      67 year old right-handed male w/ PMHx HTN, AFib (no longer on apixaban - was stopped years ago due to severe low blood count associated with dark stool.), ESRD (on HD Tu/Th/Sat), hypothyroid, no history of stroke (per patient), no prior head imaging on our EMR, p/w slurred speech, right sided weakness and numbness admitted for r/o CVA     + CVA: CTH unremarkable. TTE neg for bubble study. MRI + CVA left-sided corona radiata infarction  + afib start on elqiuis  + NSVT started BB     Hospital course:    Pt admitted with dysarthria and right side weakness concern for CVA. CTH unremarkable. Neurology consulted. Pt s/p permissive HTN. MR brain, MRA head and neck noted Acute/subacute left-sided corona radiata infarction with associated cytotoxic edema and without hemorrhagic transformation. Vestibular schwannoma. No large vessel occlusion or major stenosis. TTE with bubble study: EF 58 mild dilated left atrium. negative bubble study. Stroke likely small vessel disease but given pt with afib and high [id number] score Neurology recommended to restart eliquis. Pt with Hx of GI bleed. GI consulted given current risk scope deferred. Recommended to trial AC and monitor for bleed. Pt started on AC, ASA discontinued to decrease bleed risk. Hgb:9.1  Pt with hx afib and episode of NSVT started on BB. Consider watchman outpatient if possible. Incidental finding of Vestibular schwannoma recommended outpatient followup with ENT.    PT: no needs       Case discussed with Dr. Christian. Reviewed discharge medications with patient; All new medications requiring new prescription sent to pharmacy of patients choice. Reviewed need for prescription for previous home medication and new prescriptions sent if requested. Patient in agreement and understands.

## 2022-06-22 NOTE — PROGRESS NOTE ADULT - ASSESSMENT
EKG SR no ischemic changes       1) ?CVA  -echo normal monitor on tele  - MRI/MRA shows acute CVA  - pt has h/o Afib stoppped 2/2 GIB, GI and neuro on board who gave ok to restart eliquis 5 mg po BID  - pt with documented afib no need for ILR     2) ESRD   -on HD   -f/u renal recs     3) NSVT   - 10 beats   -normal LV function  -c/w toprol 25mg daily     3) DVT PPX  -eliquis

## 2022-06-23 LAB
ANION GAP SERPL CALC-SCNC: 15 MMOL/L — HIGH (ref 7–14)
BILIRUB SERPL-MCNC: 0.3 MG/DL — SIGNIFICANT CHANGE UP (ref 0.2–1.2)
BUN SERPL-MCNC: 60 MG/DL — HIGH (ref 7–23)
CALCIUM SERPL-MCNC: 7.4 MG/DL — LOW (ref 8.4–10.5)
CHLORIDE SERPL-SCNC: 95 MMOL/L — LOW (ref 98–107)
CO2 SERPL-SCNC: 24 MMOL/L — SIGNIFICANT CHANGE UP (ref 22–31)
CREAT SERPL-MCNC: 8.15 MG/DL — HIGH (ref 0.5–1.3)
EGFR: 7 ML/MIN/1.73M2 — LOW
GLUCOSE SERPL-MCNC: 85 MG/DL — SIGNIFICANT CHANGE UP (ref 70–99)
HCT VFR BLD CALC: 34.2 % — LOW (ref 39–50)
HGB BLD-MCNC: 10.6 G/DL — LOW (ref 13–17)
INR BLD: 1.03 RATIO — SIGNIFICANT CHANGE UP (ref 0.88–1.16)
MAGNESIUM SERPL-MCNC: 2 MG/DL — SIGNIFICANT CHANGE UP (ref 1.6–2.6)
MCHC RBC-ENTMCNC: 30.5 PG — SIGNIFICANT CHANGE UP (ref 27–34)
MCHC RBC-ENTMCNC: 31 GM/DL — LOW (ref 32–36)
MCV RBC AUTO: 98.6 FL — SIGNIFICANT CHANGE UP (ref 80–100)
MELD SCORE WITH DIALYSIS: 22 POINTS — SIGNIFICANT CHANGE UP
MELD SCORE WITHOUT DIALYSIS: 22 POINTS — SIGNIFICANT CHANGE UP
NRBC # BLD: 0 /100 WBCS — SIGNIFICANT CHANGE UP
NRBC # FLD: 0 K/UL — SIGNIFICANT CHANGE UP
PHOSPHATE SERPL-MCNC: 4.9 MG/DL — HIGH (ref 2.5–4.5)
PLATELET # BLD AUTO: 100 K/UL — LOW (ref 150–400)
POTASSIUM SERPL-MCNC: 4.6 MMOL/L — SIGNIFICANT CHANGE UP (ref 3.5–5.3)
POTASSIUM SERPL-SCNC: 4.6 MMOL/L — SIGNIFICANT CHANGE UP (ref 3.5–5.3)
PROTHROM AB SERPL-ACNC: 11.9 SEC — SIGNIFICANT CHANGE UP (ref 10.5–13.4)
RBC # BLD: 3.47 M/UL — LOW (ref 4.2–5.8)
RBC # FLD: 14 % — SIGNIFICANT CHANGE UP (ref 10.3–14.5)
SODIUM SERPL-SCNC: 134 MMOL/L — LOW (ref 135–145)
WBC # BLD: 5.78 K/UL — SIGNIFICANT CHANGE UP (ref 3.8–10.5)
WBC # FLD AUTO: 5.78 K/UL — SIGNIFICANT CHANGE UP (ref 3.8–10.5)

## 2022-06-23 RX ORDER — APIXABAN 2.5 MG/1
1 TABLET, FILM COATED ORAL
Qty: 60 | Refills: 0
Start: 2022-06-23 | End: 2022-07-22

## 2022-06-23 RX ADMIN — Medication 25 MILLIGRAM(S): at 05:08

## 2022-06-23 RX ADMIN — PANTOPRAZOLE SODIUM 40 MILLIGRAM(S): 20 TABLET, DELAYED RELEASE ORAL at 19:46

## 2022-06-23 RX ADMIN — APIXABAN 5 MILLIGRAM(S): 2.5 TABLET, FILM COATED ORAL at 05:08

## 2022-06-23 RX ADMIN — PANTOPRAZOLE SODIUM 40 MILLIGRAM(S): 20 TABLET, DELAYED RELEASE ORAL at 05:08

## 2022-06-23 RX ADMIN — SEVELAMER CARBONATE 1600 MILLIGRAM(S): 2400 POWDER, FOR SUSPENSION ORAL at 09:24

## 2022-06-23 RX ADMIN — APIXABAN 5 MILLIGRAM(S): 2.5 TABLET, FILM COATED ORAL at 19:46

## 2022-06-23 RX ADMIN — SEVELAMER CARBONATE 1600 MILLIGRAM(S): 2400 POWDER, FOR SUSPENSION ORAL at 19:46

## 2022-06-23 RX ADMIN — ATORVASTATIN CALCIUM 80 MILLIGRAM(S): 80 TABLET, FILM COATED ORAL at 21:23

## 2022-06-23 RX ADMIN — SEVELAMER CARBONATE 1600 MILLIGRAM(S): 2400 POWDER, FOR SUSPENSION ORAL at 13:33

## 2022-06-23 NOTE — PROGRESS NOTE ADULT - SUBJECTIVE AND OBJECTIVE BOX
CHAVEZ MARQUEZ  67y  Male      Patient is a 67y old  Male who presents with a chief complaint of dysarthria and weakness (23 Jun 2022 12:46)  feels fine.no new c/o    REVIEW OF SYSTEMS:  CONSTITUTIONAL: No fever  RESPIRATORY: No cough, hemoptysis or shortness of breath  CARDIOVASCULAR: No chest pain, palpitations, dizziness, or leg swelling  GASTROINTESTINAL: No abdominal pain. nausea, vomiting, hematemesis  GENITOURINARY: No dysuria, frequency, hematuria   NEUROLOGICAL: No headaches, no dizziness  MUSCULOSKELETAL: No joint pain or swelling;     INTERVAL HPI/OVERNIGHT EVENTS:  T(C): 36.8 (06-23-22 @ 15:50), Max: 37.2 (06-23-22 @ 09:00)  HR: 70 (06-23-22 @ 15:50) (65 - 72)  BP: 158/87 (06-23-22 @ 15:50) (149/87 - 166/101)  RR: 118 (06-23-22 @ 15:50) (17 - 118)  SpO2: 100% (06-23-22 @ 13:00) (98% - 100%)  Wt(kg): --  I&O's Summary    T(C): 36.8 (06-23-22 @ 15:50), Max: 37.2 (06-23-22 @ 09:00)  HR: 70 (06-23-22 @ 15:50) (65 - 72)  BP: 158/87 (06-23-22 @ 15:50) (149/87 - 166/101)  RR: 118 (06-23-22 @ 15:50) (17 - 118)  SpO2: 100% (06-23-22 @ 13:00) (98% - 100%)  Wt(kg): --Vital Signs Last 24 Hrs  T(C): 36.8 (23 Jun 2022 15:50), Max: 37.2 (23 Jun 2022 09:00)  T(F): 98.2 (23 Jun 2022 15:50), Max: 98.9 (23 Jun 2022 09:00)  HR: 70 (23 Jun 2022 15:50) (65 - 72)  BP: 158/87 (23 Jun 2022 15:50) (149/87 - 166/101)  BP(mean): 100 (23 Jun 2022 13:00) (100 - 100)  RR: 118 (23 Jun 2022 15:50) (17 - 118)  SpO2: 100% (23 Jun 2022 13:00) (98% - 100%)    LABS:                        10.6   5.78  )-----------( 100      ( 23 Jun 2022 06:43 )             34.2     06-23    134<L>  |  95<L>  |  60<H>  ----------------------------<  85  4.6   |  24  |  8.15<H>    Ca    7.4<L>      23 Jun 2022 06:43  Phos  4.9     06-23  Mg     2.00     06-23    TPro  x   /  Alb  x   /  TBili  0.3  /  DBili  x   /  AST  x   /  ALT  x   /  AlkPhos  x   06-23    PT/INR - ( 23 Jun 2022 06:43 )   PT: 11.9 sec;   INR: 1.03 ratio             CAPILLARY BLOOD GLUCOSE                PAST MEDICAL & SURGICAL HISTORY:  HTN (Hypertension)      Chronic kidney disease      Kidney stones      Hemodialysis access, AV graft  Left upper extremity      Hyperparathyroidism      Anemia      Thrombocytopenia      Atrial fibrillation  on Eliquis      S/P Nephrectomy  (L) 2007 for kidney stones      Acquired arteriovenous fistula  left wrist  1/2015 - [facility], Left upper arm 4/2015 (approximate date)      AVF (arteriovenous fistula)          MEDICATIONS  (STANDING):  apixaban 5 milliGRAM(s) Oral every 12 hours  atorvastatin 80 milliGRAM(s) Oral at bedtime  chlorhexidine 2% Cloths 1 Application(s) Topical once  metoprolol succinate ER 25 milliGRAM(s) Oral daily  pantoprazole    Tablet 40 milliGRAM(s) Oral two times a day  sevelamer carbonate 1600 milliGRAM(s) Oral three times a day with meals    MEDICATIONS  (PRN):  acetaminophen     Tablet .. 650 milliGRAM(s) Oral every 6 hours PRN Temp greater or equal to 38C (100.4F), Mild Pain (1 - 3)  aluminum hydroxide/magnesium hydroxide/simethicone Suspension 30 milliLiter(s) Oral every 4 hours PRN Dyspepsia  melatonin 3 milliGRAM(s) Oral at bedtime PRN Insomnia  ondansetron Injectable 4 milliGRAM(s) IV Push every 8 hours PRN Nausea and/or Vomiting        RADIOLOGY & ADDITIONAL TESTS:    Imaging Personally Reviewed:  [ ] YES  [ ] NO    Consultant(s) Notes Reviewed:  [ ] YES  [ ] NO    PHYSICAL EXAM:  GENERAL: Alert and awake lying in bed in no distress  HEAD:  Atraumatic, Normocephalic  EYES: EOMI, LILIAM, conjunctiva and sclera clear  NECK: Supple, No JVD, Normal thyroid  NERVOUS SYSTEM:  Alert & Oriented X3, Motor and sensory systems are intact,   CHEST/LUNG: Bilateral clear breath sounds, no rhochi, no wheezing, no crepitations,  HEART: Regular rate and rhythm; No murmurs, rubs, or gallops  ABDOMEN: Soft, Nontender, Nondistended; Bowel sounds present  EXTREMITIES:   Peripheral Pulses are palpable, no  edema        Care Discussed with Consultants/Other Providers [ ] YES  [ ] NO      Code Status: [] Full Code [] DNR [] DNI [] Goals of Care:   Disposition: [] ICU [] Stroke Unit [] RCU []PCU []Floor [] Discharge Home         ADALGISA McwilliamsP

## 2022-06-23 NOTE — PROGRESS NOTE ADULT - SUBJECTIVE AND OBJECTIVE BOX
[facility] - MARK Vargas / MARK Pagan / JASMIN Caballero/ MARK Bojorquez/ MARK Faust/ TELLO Garrison / RODNEY Garcia / HAILEY Perkins  ---------------------------------------------------------------------------------------------------------------  seen and examined today for ESRD  Interval : NAD  VITALS:  T(F): 98.9 (06-23-22 @ 09:00), Max: 98.9 (06-23-22 @ 09:00)  HR: 71 (06-23-22 @ 09:00)  BP: 166/101 (06-23-22 @ 09:00)  RR: 18 (06-23-22 @ 09:00)  SpO2: 99% (06-23-22 @ 09:00)  Wt(kg): --    Physical Exam :-  Constitutional: NAD  Neck: Supple.  Respiratory: Bilateral equal breath sounds,  Cardiovascular: S1, S2 normal,  Gastrointestinal: Bowel Sounds present, soft, non tender.  Extremities: No edema  Neurological: Alert and Oriented x 3, no focal deficits  Psychiatric: Normal mood, normal affect  Data:-  Allergies :   No Known Allergies    Hospital Medications:   MEDICATIONS  (STANDING):  apixaban 5 milliGRAM(s) Oral every 12 hours  atorvastatin 80 milliGRAM(s) Oral at bedtime  chlorhexidine 2% Cloths 1 Application(s) Topical once  metoprolol succinate ER 25 milliGRAM(s) Oral daily  pantoprazole    Tablet 40 milliGRAM(s) Oral two times a day  sevelamer carbonate 1600 milliGRAM(s) Oral three times a day with meals    06-23    134<L>  |  95<L>  |  60<H>  ----------------------------<  85  4.6   |  24  |  8.15<H>    Ca    7.4<L>      23 Jun 2022 06:43  Phos  4.9     06-23  Mg     2.00     06-23    TPro      /  Alb      /  TBili  0.3  /  DBili      /  AST      /  ALT      /  AlkPhos      06-23    Creatinine Trend: 8.15 <--, 5.82 <--, 6.17 <--, 9.05 <--, 6.34 <--, 8.26 <--                        10.6   5.78  )-----------( 100      ( 23 Jun 2022 06:43 )             34.2

## 2022-06-23 NOTE — PROGRESS NOTE ADULT - ASSESSMENT
EKG SR no ischemic changes       1) ?CVA  -echo normal monitor on tele  - MRI/MRA shows acute CVA  - pt has h/o Afib stoppped 2/2 GIB, GI and neuro on board who gave ok to restart eliquis 5 mg po BID  - pt with documented afib no need for ILR     2) ESRD   -on HD   -f/u renal recs     3) NSVT   -normal LV function  -c/w toprol 25mg daily     3) DVT PPX  -eliquis

## 2022-06-23 NOTE — PROGRESS NOTE ADULT - SUBJECTIVE AND OBJECTIVE BOX
Ran Velasquez MD  Interventional Cardiology / Advance Heart Failure and Cardiac Transplant Specialist  [place] Office : [address] [place] [address]  Tel:   [place] Office : [address] [place] [address]  Tel: [phone]      Subjective/Overnight events: Pt is lying in bed comfortable not in distress, no chest pains no SOB no palpitations  	  MEDICATIONS:  apixaban 5 milliGRAM(s) Oral every 12 hours  metoprolol succinate ER 25 milliGRAM(s) Oral daily        acetaminophen     Tablet .. 650 milliGRAM(s) Oral every 6 hours PRN  melatonin 3 milliGRAM(s) Oral at bedtime PRN  ondansetron Injectable 4 milliGRAM(s) IV Push every 8 hours PRN    aluminum hydroxide/magnesium hydroxide/simethicone Suspension 30 milliLiter(s) Oral every 4 hours PRN  pantoprazole    Tablet 40 milliGRAM(s) Oral two times a day    atorvastatin 80 milliGRAM(s) Oral at bedtime    chlorhexidine 2% Cloths 1 Application(s) Topical once      PAST MEDICAL/SURGICAL HISTORY  PAST MEDICAL & SURGICAL HISTORY:  HTN (Hypertension)      Chronic kidney disease      Kidney stones      Hemodialysis access, AV graft  Left upper extremity      Hyperparathyroidism      Anemia      Thrombocytopenia      Atrial fibrillation  on Eliquis      S/P Nephrectomy  (L) 2007 for kidney stones      Acquired arteriovenous fistula  left wrist  1/2015 - [facility], Left upper arm 4/2015 (approximate date)      AVF (arteriovenous fistula)          SOCIAL HISTORY: Substance Use (street drugs): ( x ) never used  (  ) other:    FAMILY HISTORY:  Family history of CVA (Father)          PHYSICAL EXAM:  T(C): 37.2 (06-23-22 @ 09:00), Max: 37.2 (06-23-22 @ 09:00)  HR: 71 (06-23-22 @ 09:00) (65 - 73)  BP: 166/101 (06-23-22 @ 09:00) (149/87 - 166/101)  RR: 18 (06-23-22 @ 09:00) (17 - 18)  SpO2: 99% (06-23-22 @ 09:00) (98% - 100%)  Wt(kg): --  I&O's Summary      EYES:   PERRLA   ENMT:   Moist mucous membranes, Good dentition, No lesions  Cardiovascular: Normal S1 S2, No JVD, No murmurs, No edema  Respiratory: Lungs clear to auscultation	  Gastrointestinal:  Soft, Non-tender, + BS	  Extremities: no edema                            10.6   5.78  )-----------( 100      ( 23 Jun 2022 06:43 )             34.2     06-23    134<L>  |  95<L>  |  60<H>  ----------------------------<  85  4.6   |  24  |  8.15<H>    Ca    7.4<L>      23 Jun 2022 06:43  Phos  4.9     06-23  Mg     2.00     06-23    TPro  x   /  Alb  x   /  TBili  0.3  /  DBili  x   /  AST  x   /  ALT  x   /  AlkPhos  x   06-23    proBNP:   Lipid Profile:   HgA1c:   TSH:     Consultant(s) Notes Reviewed:  [x ] YES  [ ] NO    Care Discussed with Consultants/Other Providers [ x] YES  [ ] NO    Imaging Personally Reviewed independently:  [x] YES  [ ] NO    All labs, radiologic studies, vitals, orders and medications list reviewed. Patient is seen and examined at bedside. Case discussed with medical team.

## 2022-06-23 NOTE — PROGRESS NOTE ADULT - ASSESSMENT
67 year old right-handed male w/ PMHx HTN, AFib (no longer on apixaban d/t GIB, not on any antithrombotics at home), ESRD (on HD Tu/Th/Sat), hypothyroid, dentures, no history of stroke (per patient), p/w dysarthria and R sensorimotor deficits onset 15:30PM 6/16/22, approximately 30 minutes after completing HD. No similar symptoms in the past. Rigors on the night prior to arrival. He denies h/o diabetes or smoking. Code stroke cancelled as symptoms onset 24 hours prior to presentation. CT head: no acute pathology, chronic left thalamic lacunar infarct. Symptoms improving in the ED. RENAL consulted for ESRD/ HD Mx.    End Stage Renal Disease on Dialysis   HD [facility] Tu/Th/Sat),  K, vol ok   Hb at goal    s/p MRI WITH gadollinium 6/20 followed by HD 6/21 also  Next HD today  dose meds for ESRD  renal diet, fluid restriction  Hyperphosphatemia- changed phoslo 667 tidac >renvela 2tidac  HTN, controlled. BP stable  CVA -CTH no acute infarct/bleed. MRI brain w/o contrast demonstrates acute infarct in the L corona radiata w/o hemorrhage.   mx per neuro  h/o Afib -restarted on AC.  f/u w/cariology eval      For any question, call:  Cell # [phone]  Pager # [phone]  Callback # [phone]

## 2022-06-24 LAB
ALBUMIN SERPL ELPH-MCNC: 3.5 G/DL — SIGNIFICANT CHANGE UP (ref 3.3–5)
ALP SERPL-CCNC: 59 U/L — SIGNIFICANT CHANGE UP (ref 40–120)
ALT FLD-CCNC: 41 U/L — SIGNIFICANT CHANGE UP (ref 4–41)
ANION GAP SERPL CALC-SCNC: 12 MMOL/L — SIGNIFICANT CHANGE UP (ref 7–14)
AST SERPL-CCNC: 30 U/L — SIGNIFICANT CHANGE UP (ref 4–40)
BILIRUB SERPL-MCNC: 0.4 MG/DL — SIGNIFICANT CHANGE UP (ref 0.2–1.2)
BUN SERPL-MCNC: 36 MG/DL — HIGH (ref 7–23)
CALCIUM SERPL-MCNC: 7.5 MG/DL — LOW (ref 8.4–10.5)
CHLORIDE SERPL-SCNC: 94 MMOL/L — LOW (ref 98–107)
CO2 SERPL-SCNC: 29 MMOL/L — SIGNIFICANT CHANGE UP (ref 22–31)
CREAT SERPL-MCNC: 5.77 MG/DL — HIGH (ref 0.5–1.3)
EGFR: 10 ML/MIN/1.73M2 — LOW
GLUCOSE SERPL-MCNC: 81 MG/DL — SIGNIFICANT CHANGE UP (ref 70–99)
HCT VFR BLD CALC: 32.4 % — LOW (ref 39–50)
HGB BLD-MCNC: 10.4 G/DL — LOW (ref 13–17)
MAGNESIUM SERPL-MCNC: 1.9 MG/DL — SIGNIFICANT CHANGE UP (ref 1.6–2.6)
MCHC RBC-ENTMCNC: 30.5 PG — SIGNIFICANT CHANGE UP (ref 27–34)
MCHC RBC-ENTMCNC: 32.1 GM/DL — SIGNIFICANT CHANGE UP (ref 32–36)
MCV RBC AUTO: 95 FL — SIGNIFICANT CHANGE UP (ref 80–100)
NRBC # BLD: 0 /100 WBCS — SIGNIFICANT CHANGE UP
NRBC # FLD: 0 K/UL — SIGNIFICANT CHANGE UP
PHOSPHATE SERPL-MCNC: 4.2 MG/DL — SIGNIFICANT CHANGE UP (ref 2.5–4.5)
PLATELET # BLD AUTO: 102 K/UL — LOW (ref 150–400)
POTASSIUM SERPL-MCNC: 4.6 MMOL/L — SIGNIFICANT CHANGE UP (ref 3.5–5.3)
POTASSIUM SERPL-SCNC: 4.6 MMOL/L — SIGNIFICANT CHANGE UP (ref 3.5–5.3)
PROT SERPL-MCNC: 6.4 G/DL — SIGNIFICANT CHANGE UP (ref 6–8.3)
RBC # BLD: 3.41 M/UL — LOW (ref 4.2–5.8)
RBC # FLD: 14.2 % — SIGNIFICANT CHANGE UP (ref 10.3–14.5)
SODIUM SERPL-SCNC: 135 MMOL/L — SIGNIFICANT CHANGE UP (ref 135–145)
WBC # BLD: 5.84 K/UL — SIGNIFICANT CHANGE UP (ref 3.8–10.5)
WBC # FLD AUTO: 5.84 K/UL — SIGNIFICANT CHANGE UP (ref 3.8–10.5)

## 2022-06-24 RX ADMIN — SEVELAMER CARBONATE 1600 MILLIGRAM(S): 2400 POWDER, FOR SUSPENSION ORAL at 17:43

## 2022-06-24 RX ADMIN — APIXABAN 5 MILLIGRAM(S): 2.5 TABLET, FILM COATED ORAL at 17:43

## 2022-06-24 RX ADMIN — ATORVASTATIN CALCIUM 80 MILLIGRAM(S): 80 TABLET, FILM COATED ORAL at 21:17

## 2022-06-24 RX ADMIN — APIXABAN 5 MILLIGRAM(S): 2.5 TABLET, FILM COATED ORAL at 05:44

## 2022-06-24 RX ADMIN — PANTOPRAZOLE SODIUM 40 MILLIGRAM(S): 20 TABLET, DELAYED RELEASE ORAL at 17:43

## 2022-06-24 RX ADMIN — SEVELAMER CARBONATE 1600 MILLIGRAM(S): 2400 POWDER, FOR SUSPENSION ORAL at 11:32

## 2022-06-24 RX ADMIN — CHLORHEXIDINE GLUCONATE 1 APPLICATION(S): 213 SOLUTION TOPICAL at 06:39

## 2022-06-24 RX ADMIN — PANTOPRAZOLE SODIUM 40 MILLIGRAM(S): 20 TABLET, DELAYED RELEASE ORAL at 05:44

## 2022-06-24 RX ADMIN — Medication 25 MILLIGRAM(S): at 05:44

## 2022-06-24 NOTE — DIETITIAN INITIAL EVALUATION ADULT - OTHER INFO
Per chart, pt is 67 year old male PMHx HTN, AFib, ESRD on HD, hypothyroidism presenting with slurred speech, R sided weakness/numbness found to have CVA.     Limited subjective assessment as pt with minimal participation in discussion; comprehensive chart review completed. Pt confirms NKFA. Pt is homeless, social work assisting with placement following discharge. Unclear access to food PTA. History of ESRD on HD, pt declines nutrition education at this time. S/p bedside swallow assessment (6/18) recommending regular solids/thin liquids, in line with current diet order which pt reports tolerating well. Pt with good appetite/PO intake thus far in house, enjoying meals and amenable to provision of Nepro shakes. No noted GI distress, no bowel regimen ordered at this time. Nephrology following, pt continues on Renvela TID.

## 2022-06-24 NOTE — DIETITIAN INITIAL EVALUATION ADULT - PERTINENT LABORATORY DATA
(6/24) Na 135, BUN 36<H>, Cr 5.77<H>, BG 81, K+ 4.6, Phos 4.2, Mg 1.90, Alk Phos 59, ALT/SGPT 41, AST/SGOT 30  (6/19) HbA1c 5.2%

## 2022-06-24 NOTE — PROGRESS NOTE ADULT - SUBJECTIVE AND OBJECTIVE BOX
CHAVEZ MARQUEZ  67y  Male      Patient is a 67y old  Male who presents with a chief complaint of dysarthria and weakness (23 Jun 2022 12:46)  feels fine.no new c/o    REVIEW OF SYSTEMS:  CONSTITUTIONAL: No fever  RESPIRATORY: No cough, hemoptysis or shortness of breath  CARDIOVASCULAR: No chest pain, palpitations, dizziness, or leg swelling  GASTROINTESTINAL: No abdominal pain. nausea, vomiting, hematemesis  GENITOURINARY: No dysuria, frequency, hematuria   NEUROLOGICAL: No headaches, no dizziness  MUSCULOSKELETAL: No joint pain or swelling;     T(C): 36.6 (06-24-22 @ 11:38), Max: 36.6 (06-24-22 @ 11:38)  HR: 75 (06-24-22 @ 11:38) (75 - 75)  BP: 143/80 (06-24-22 @ 11:38) (143/80 - 143/80)  RR: 18 (06-24-22 @ 11:38) (18 - 18)  SpO2: 100% (06-24-22 @ 11:38) (100% - 100%)      MEDICATIONS  (STANDING):  apixaban 5 milliGRAM(s) Oral every 12 hours  atorvastatin 80 milliGRAM(s) Oral at bedtime  metoprolol succinate ER 25 milliGRAM(s) Oral daily  pantoprazole    Tablet 40 milliGRAM(s) Oral two times a day  sevelamer carbonate 1600 milliGRAM(s) Oral three times a day with meals    MEDICATIONS  (PRN):  acetaminophen     Tablet .. 650 milliGRAM(s) Oral every 6 hours PRN Temp greater or equal to 38C (100.4F), Mild Pain (1 - 3)  aluminum hydroxide/magnesium hydroxide/simethicone Suspension 30 milliLiter(s) Oral every 4 hours PRN Dyspepsia  melatonin 3 milliGRAM(s) Oral at bedtime PRN Insomnia  ondansetron Injectable 4 milliGRAM(s) IV Push every 8 hours PRN Nausea and/or Vomiting                CAPILLARY BLOOD GLUCOSE                PAST MEDICAL & SURGICAL HISTORY:  HTN (Hypertension)      Chronic kidney disease      Kidney stones      Hemodialysis access, AV graft  Left upper extremity      Hyperparathyroidism      Anemia      Thrombocytopenia      Atrial fibrillation  on Eliquis      S/P Nephrectomy  (L) 2007 for kidney stones      Acquired arteriovenous fistula  left wrist  1/2015 - [facility], Left upper arm 4/2015 (approximate date)      AVF (arteriovenous fistula)        PHYSICAL EXAM:  GENERAL: Alert and awake lying in bed in no distress  HEAD:  Atraumatic, Normocephalic  EYES: EOMI, LILIAM, conjunctiva and sclera clear  NECK: Supple, No JVD, Normal thyroid  NERVOUS SYSTEM:  Alert & Oriented X3, Motor and sensory systems are intact,   CHEST/LUNG: Bilateral clear breath sounds, no rhochi, no wheezing, no crepitations,  HEART: Regular rate and rhythm; No murmurs, rubs, or gallops  ABDOMEN: Soft, Nontender, Nondistended; Bowel sounds present  EXTREMITIES:   Peripheral Pulses are palpable, no  edema        Care Discussed with Consultants/Other Providers [ ] YES  [ ] NO      Code Status: [] Full Code [] DNR [] DNI [] Goals of Care:   Disposition: [] ICU [] Stroke Unit [] RCU []PCU []Floor [] Discharge Home

## 2022-06-24 NOTE — DIETITIAN INITIAL EVALUATION ADULT - ADD RECOMMEND
1) Recommend renal replacement diet.  2) Recommend addition of Nepro 1 PO 3x daily (provides 425 kcal, 19 gm protein per 8oz serving).  3) RDN remains available to provide nutrition education PRN.  4) Social work involvement to assist with placement/food resources following discharge.  5) Obtain pre/post-HD weights.

## 2022-06-24 NOTE — DIETITIAN INITIAL EVALUATION ADULT - OTHER CALCULATIONS
Post-HD weight (6/23) 147.7 lbs. Recent chart weight (10/26/21) 136 lbs.  Ideal Body Weight: 160 lbs / 72.7 kg +/-10%

## 2022-06-24 NOTE — DIETITIAN INITIAL EVALUATION ADULT - PERTINENT MEDS FT
atorvastatin, pantoprazole Tablet, sevelamer carbonate, aluminum hydroxide/magnesium hydroxide/simethicone Suspension PRN, ondansetron IV PRN

## 2022-06-24 NOTE — PROGRESS NOTE ADULT - ASSESSMENT
67 year old right-handed male w/ PMHx HTN, AFib (no longer on apixaban d/t GIB, not on any antithrombotics at home), ESRD (on HD Tu/Th/Sat), hypothyroid, dentures, no history of stroke (per patient), p/w dysarthria and R sensorimotor deficits onset 15:30PM 6/16/22, approximately 30 minutes after completing HD. No similar symptoms in the past. Rigors on the night prior to arrival. He denies h/o diabetes or smoking. Code stroke cancelled as symptoms onset 24 hours prior to presentation. CT head: no acute pathology, chronic left thalamic lacunar infarct. Symptoms improving in the ED. RENAL consulted for ESRD/ HD Mx.    End Stage Renal Disease on Dialysis   HD unit -[facility] Tu/Th/Sat),  K, vol ok   Hb at goal    s/p MRI WITH gadollinium 6/20 followed by HD 6/21 also  Next HD tomorrow  dose meds for ESRD  renal diet, fluid restriction  Hyperphosphatemia- changed phoslo 667 tidac >renvela 2tidac  HTN, controlled. BP stable  CVA -CTH no acute infarct/bleed. MRI brain w/o contrast demonstrates acute infarct in the L corona radiata w/o hemorrhage.   mx per neuro  h/o Afib -restarted on AC.  f/u w/cariology eval      For any question, call:  Cell # [phone]  Pager # [phone]  Callback # [phone]

## 2022-06-24 NOTE — PROGRESS NOTE ADULT - SUBJECTIVE AND OBJECTIVE BOX
Ran Velasquez MD  Interventional Cardiology / Advance Heart Failure and Cardiac Transplant Specialist  [place] Office : [address] [place] [address] [place]. [address]  Tel:   [place] Office : [address] [facility] [place] [address]  Tel: [phone]       Pt is lying in bed comfortable not in distress, no chest pains no SOB no palpitations  	  MEDICATIONS:  apixaban 5 milliGRAM(s) Oral every 12 hours  metoprolol succinate ER 25 milliGRAM(s) Oral daily        acetaminophen     Tablet .. 650 milliGRAM(s) Oral every 6 hours PRN  melatonin 3 milliGRAM(s) Oral at bedtime PRN  ondansetron Injectable 4 milliGRAM(s) IV Push every 8 hours PRN    aluminum hydroxide/magnesium hydroxide/simethicone Suspension 30 milliLiter(s) Oral every 4 hours PRN  pantoprazole    Tablet 40 milliGRAM(s) Oral two times a day    atorvastatin 80 milliGRAM(s) Oral at bedtime        PAST MEDICAL/SURGICAL HISTORY  PAST MEDICAL & SURGICAL HISTORY:  HTN (Hypertension)      Chronic kidney disease      Kidney stones      Hemodialysis access, AV graft  Left upper extremity      Hyperparathyroidism      Anemia      Thrombocytopenia      Atrial fibrillation  on Eliquis      S/P Nephrectomy  (L) 2007 for kidney stones      Acquired arteriovenous fistula  left wrist  1/2015 - [facility], Left upper arm 4/2015 (approximate date)      AVF (arteriovenous fistula)          SOCIAL HISTORY: Substance Use (street drugs): ( x ) never used  (  ) other:    FAMILY HISTORY:  Family history of CVA (Father)        REVIEW OF SYSTEMS:  CONSTITUTIONAL: No fever, weight loss, or fatigue  EYES: No eye pain, visual disturbances, or discharge  ENMT:  No difficulty hearing, tinnitus, vertigo; No sinus or throat pain  BREASTS: No pain, masses, or nipple discharge  GASTROINTESTINAL: No abdominal or epigastric pain. No nausea, vomiting, or hematemesis; No diarrhea or constipation. No melena or hematochezia.  GENITOURINARY: No dysuria, frequency, hematuria, or incontinence  NEUROLOGICAL: No headaches, memory loss, loss of strength, numbness, or tremors  ENDOCRINE: No heat or cold intolerance; No hair loss  MUSCULOSKELETAL: No joint pain or swelling; No muscle, back, or extremity pain  PSYCHIATRIC: No depression, anxiety, mood swings, or difficulty sleeping  HEME/LYMPH: No easy bruising, or bleeding gums       PHYSICAL EXAM:  T(C): 36.6 (06-24-22 @ 11:38), Max: 37.1 (06-24-22 @ 05:43)  HR: 75 (06-24-22 @ 11:38) (74 - 78)  BP: 143/80 (06-24-22 @ 11:38) (143/80 - 152/75)  RR: 18 (06-24-22 @ 11:38) (17 - 18)  SpO2: 100% (06-24-22 @ 11:38) (98% - 100%)  Wt(kg): --  I&O's Summary    23 Jun 2022 07:01  -  24 Jun 2022 07:00  --------------------------------------------------------  IN: 400 mL / OUT: 1400 mL / NET: -1000 mL          GENERAL: NAD  EYES:   PERRLA   ENMT:   Moist mucous membranes, Good dentition, No lesions  Cardiovascular: Normal S1 S2, No JVD, No murmurs, No edema  Respiratory: Lungs clear to auscultation	  Gastrointestinal:  Soft, Non-tender, + BS	  Extremities: no edema                                    10.4   5.84  )-----------( 102      ( 24 Jun 2022 07:30 )             32.4     06-24    135  |  94<L>  |  36<H>  ----------------------------<  81  4.6   |  29  |  5.77<H>    Ca    7.5<L>      24 Jun 2022 07:30  Phos  4.2     06-24  Mg     1.90     06-24    TPro  6.4  /  Alb  3.5  /  TBili  0.4  /  DBili  x   /  AST  30  /  ALT  41  /  AlkPhos  59  06-24    proBNP:   Lipid Profile:   HgA1c:   TSH:     Consultant(s) Notes Reviewed:  [x ] YES  [ ] NO    Care Discussed with Consultants/Other Providers [ x] YES  [ ] NO    Imaging Personally Reviewed independently:  [x] YES  [ ] NO    All labs, radiologic studies, vitals, orders and medications list reviewed. Patient is seen and examined at bedside. Case discussed with medical team.

## 2022-06-24 NOTE — PROGRESS NOTE ADULT - SUBJECTIVE AND OBJECTIVE BOX
[facility] - MARK Vargas / MARK Pagan / JASMIN Caballero/ MARK Bojorquez/ MARK Faust/ TELLO Garrison / RODNEY Garcia / HAILEY Perkins  ---------------------------------------------------------------------------------------------------------------  seen and examined today for ESRD  Interval : NAD  VITALS:  T(F): 97.9 (06-24-22 @ 11:38), Max: 98.7 (06-24-22 @ 05:43)  HR: 75 (06-24-22 @ 11:38)  BP: 143/80 (06-24-22 @ 11:38)  RR: 18 (06-24-22 @ 11:38)  SpO2: 100% (06-24-22 @ 11:38)  Wt(kg): --    06-23 @ 07:01  -  06-24 @ 07:00  --------------------------------------------------------  IN: 400 mL / OUT: 1400 mL / NET: -1000 mL      Physical Exam :-  Constitutional: NAD  Neck: Supple.  Respiratory: Bilateral equal breath sounds,  Cardiovascular: S1, S2 normal,  Gastrointestinal: Bowel Sounds present, soft, non tender.  Extremities: No edema  Neurological: Alert and Oriented x 3, dysarthria  Psychiatric: Normal mood, normal affect  Data:-  Allergies :   No Known Allergies    Hospital Medications:   MEDICATIONS  (STANDING):  apixaban 5 milliGRAM(s) Oral every 12 hours  atorvastatin 80 milliGRAM(s) Oral at bedtime  metoprolol succinate ER 25 milliGRAM(s) Oral daily  pantoprazole    Tablet 40 milliGRAM(s) Oral two times a day  sevelamer carbonate 1600 milliGRAM(s) Oral three times a day with meals    06-24    135  |  94<L>  |  36<H>  ----------------------------<  81  4.6   |  29  |  5.77<H>    Ca    7.5<L>      24 Jun 2022 07:30  Phos  4.2     06-24  Mg     1.90     06-24    TPro  6.4  /  Alb  3.5  /  TBili  0.4  /  DBili      /  AST  30  /  ALT  41  /  AlkPhos  59  06-24    Creatinine Trend: 5.77 <--, 8.15 <--, 5.82 <--, 6.17 <--, 9.05 <--, 6.34 <--, 8.26 <--                        10.4   5.84  )-----------( 102      ( 24 Jun 2022 07:30 )             32.4

## 2022-06-25 LAB
ANION GAP SERPL CALC-SCNC: 15 MMOL/L — HIGH (ref 7–14)
BUN SERPL-MCNC: 62 MG/DL — HIGH (ref 7–23)
CALCIUM SERPL-MCNC: 7 MG/DL — LOW (ref 8.4–10.5)
CHLORIDE SERPL-SCNC: 95 MMOL/L — LOW (ref 98–107)
CO2 SERPL-SCNC: 23 MMOL/L — SIGNIFICANT CHANGE UP (ref 22–31)
CREAT SERPL-MCNC: 7.85 MG/DL — HIGH (ref 0.5–1.3)
EGFR: 7 ML/MIN/1.73M2 — LOW
GLUCOSE SERPL-MCNC: 83 MG/DL — SIGNIFICANT CHANGE UP (ref 70–99)
HCT VFR BLD CALC: 30.7 % — LOW (ref 39–50)
HGB BLD-MCNC: 9.9 G/DL — LOW (ref 13–17)
MAGNESIUM SERPL-MCNC: 1.9 MG/DL — SIGNIFICANT CHANGE UP (ref 1.6–2.6)
MCHC RBC-ENTMCNC: 30.7 PG — SIGNIFICANT CHANGE UP (ref 27–34)
MCHC RBC-ENTMCNC: 32.2 GM/DL — SIGNIFICANT CHANGE UP (ref 32–36)
MCV RBC AUTO: 95.3 FL — SIGNIFICANT CHANGE UP (ref 80–100)
NRBC # BLD: 0 /100 WBCS — SIGNIFICANT CHANGE UP
NRBC # FLD: 0 K/UL — SIGNIFICANT CHANGE UP
PHOSPHATE SERPL-MCNC: 3.9 MG/DL — SIGNIFICANT CHANGE UP (ref 2.5–4.5)
PLATELET # BLD AUTO: 91 K/UL — LOW (ref 150–400)
POTASSIUM SERPL-MCNC: 4.7 MMOL/L — SIGNIFICANT CHANGE UP (ref 3.5–5.3)
POTASSIUM SERPL-SCNC: 4.7 MMOL/L — SIGNIFICANT CHANGE UP (ref 3.5–5.3)
RBC # BLD: 3.22 M/UL — LOW (ref 4.2–5.8)
RBC # FLD: 14 % — SIGNIFICANT CHANGE UP (ref 10.3–14.5)
SODIUM SERPL-SCNC: 133 MMOL/L — LOW (ref 135–145)
WBC # BLD: 5.8 K/UL — SIGNIFICANT CHANGE UP (ref 3.8–10.5)
WBC # FLD AUTO: 5.8 K/UL — SIGNIFICANT CHANGE UP (ref 3.8–10.5)

## 2022-06-25 RX ORDER — METOPROLOL TARTRATE 50 MG
50 TABLET ORAL DAILY
Refills: 0 | Status: DISCONTINUED | OUTPATIENT
Start: 2022-06-26 | End: 2022-06-28

## 2022-06-25 RX ADMIN — PANTOPRAZOLE SODIUM 40 MILLIGRAM(S): 20 TABLET, DELAYED RELEASE ORAL at 19:50

## 2022-06-25 RX ADMIN — APIXABAN 5 MILLIGRAM(S): 2.5 TABLET, FILM COATED ORAL at 05:11

## 2022-06-25 RX ADMIN — APIXABAN 5 MILLIGRAM(S): 2.5 TABLET, FILM COATED ORAL at 19:50

## 2022-06-25 RX ADMIN — Medication 25 MILLIGRAM(S): at 05:21

## 2022-06-25 RX ADMIN — SEVELAMER CARBONATE 1600 MILLIGRAM(S): 2400 POWDER, FOR SUSPENSION ORAL at 12:57

## 2022-06-25 RX ADMIN — ATORVASTATIN CALCIUM 80 MILLIGRAM(S): 80 TABLET, FILM COATED ORAL at 21:09

## 2022-06-25 RX ADMIN — SEVELAMER CARBONATE 1600 MILLIGRAM(S): 2400 POWDER, FOR SUSPENSION ORAL at 09:25

## 2022-06-25 RX ADMIN — PANTOPRAZOLE SODIUM 40 MILLIGRAM(S): 20 TABLET, DELAYED RELEASE ORAL at 05:11

## 2022-06-25 RX ADMIN — SEVELAMER CARBONATE 1600 MILLIGRAM(S): 2400 POWDER, FOR SUSPENSION ORAL at 19:50

## 2022-06-25 NOTE — PROGRESS NOTE ADULT - SUBJECTIVE AND OBJECTIVE BOX
Ran Velasquez MD  Interventional Cardiology / Advance Heart Failure and Cardiac Transplant Specialist  [place] Office : [address] [place] [address]  Tel:   [place] Office : [address] [facility] [place] [address]  Tel: [phone]      Subjective/Overnight events: Pt is lying in bed comfortable not in distress, no chest pains no SOB no palpitations  	  MEDICATIONS:  apixaban 5 milliGRAM(s) Oral every 12 hours        acetaminophen     Tablet .. 650 milliGRAM(s) Oral every 6 hours PRN  melatonin 3 milliGRAM(s) Oral at bedtime PRN  ondansetron Injectable 4 milliGRAM(s) IV Push every 8 hours PRN    aluminum hydroxide/magnesium hydroxide/simethicone Suspension 30 milliLiter(s) Oral every 4 hours PRN  pantoprazole    Tablet 40 milliGRAM(s) Oral two times a day    atorvastatin 80 milliGRAM(s) Oral at bedtime        PAST MEDICAL/SURGICAL HISTORY  PAST MEDICAL & SURGICAL HISTORY:  HTN (Hypertension)      Chronic kidney disease      Kidney stones      Hemodialysis access, AV graft  Left upper extremity      Hyperparathyroidism      Anemia      Thrombocytopenia      Atrial fibrillation  on Eliquis      S/P Nephrectomy  (L) 2007 for kidney stones      Acquired arteriovenous fistula  left wrist  1/2015 - [facility], Left upper arm 4/2015 (approximate date)      AVF (arteriovenous fistula)          SOCIAL HISTORY: Substance Use (street drugs): ( x ) never used  (  ) other:    FAMILY HISTORY:  Family history of CVA (Father)          PHYSICAL EXAM:  T(C): 36.4 (06-25-22 @ 09:00), Max: 37 (06-25-22 @ 05:02)  HR: 70 (06-25-22 @ 09:00) (70 - 77)  BP: 151/85 (06-25-22 @ 09:00) (127/72 - 151/85)  RR: 18 (06-25-22 @ 09:00) (18 - 18)  SpO2: 100% (06-25-22 @ 09:00) (98% - 100%)  Wt(kg): --  I&O's Summary        GENERAL: NAD  EYES:   PERRLA   ENMT:   Moist mucous membranes, Good dentition, No lesions  Cardiovascular: Normal S1 S2, No JVD, No murmurs, No edema  Respiratory: Lungs clear to auscultation	  Gastrointestinal:  Soft, Non-tender, + BS	  Extremities: no edema                                  9.9    5.80  )-----------( 91       ( 25 Jun 2022 05:29 )             30.7     06-25    133<L>  |  95<L>  |  62<H>  ----------------------------<  83  4.7   |  23  |  7.85<H>    Ca    7.0<L>      25 Jun 2022 05:29  Phos  3.9     06-25  Mg     1.90     06-25    TPro  6.4  /  Alb  3.5  /  TBili  0.4  /  DBili  x   /  AST  30  /  ALT  41  /  AlkPhos  59  06-24    proBNP:   Lipid Profile:   HgA1c:   TSH:     Consultant(s) Notes Reviewed:  [x ] YES  [ ] NO    Care Discussed with Consultants/Other Providers [ x] YES  [ ] NO    Imaging Personally Reviewed independently:  [x] YES  [ ] NO    All labs, radiologic studies, vitals, orders and medications list reviewed. Patient is seen and examined at bedside. Case discussed with medical team.

## 2022-06-25 NOTE — PROGRESS NOTE ADULT - ASSESSMENT
EKG SR no ischemic changes       1) ?CVA  -echo normal monitor on tele  - MRI/MRA shows acute CVA  - pt has h/o Afib stoppped 2/2 GIB, GI and neuro on board who gave ok to restart eliquis 5 mg po BID  - pt with documented afib no need for ILR     2) ESRD   -on HD   -f/u renal recs     3) NSVT   -normal LV function  -Toprol increased to 50mg daily    3) DVT PPX  -eliquis

## 2022-06-25 NOTE — PROGRESS NOTE ADULT - SUBJECTIVE AND OBJECTIVE BOX
[facility] - S Alicia / Ej S /D Federico/ S Maryellen/ S Govind/ Jovan Garrison / M Danou/ O Gregorio  service -[phone], office [phone]  ---------------------------------------------------------------------------------------------------------------    Patient seen and examined bedside in HD unit during hd    Subjective and Objective: No overnight events, denied sob. No complaints today. feeling better  speech more clear    Allergies: No Known Allergies      Hospital Medications:   MEDICATIONS  (STANDING):  apixaban 5 milliGRAM(s) Oral every 12 hours  atorvastatin 80 milliGRAM(s) Oral at bedtime  pantoprazole    Tablet 40 milliGRAM(s) Oral two times a day  sevelamer carbonate 1600 milliGRAM(s) Oral three times a day with meals    VITALS:  T(F): 97.9 (06-25-22 @ 15:32), Max: 98.6 (06-25-22 @ 05:02)  HR: 76 (06-25-22 @ 15:32)  BP: 160/94 (06-25-22 @ 15:32)  RR: 17 (06-25-22 @ 15:32)  SpO2: 100% (06-25-22 @ 13:00)  Wt(kg): --    PHYSICAL EXAM:  Constitutional: NAD  HEENT: anicteric sclera  Neck: No JVD  Respiratory: CTAB, no wheezes, rales or rhonchi  Cardiovascular: S1, S2, RRR  Gastrointestinal: BS+, soft, NT/ND  Extremities: No peripheral edema  Neurological: A/O x 3, no focal deficits  Psychiatric: Normal mood, normal affect  : No siegel.   Vascular Access: avf+    LABS:  06-25    133<L>  |  95<L>  |  62<H>  ----------------------------<  83  4.7   |  23  |  7.85<H>    Ca    7.0<L>      25 Jun 2022 05:29  Phos  3.9     06-25  Mg     1.90     06-25    TPro  6.4  /  Alb  3.5  /  TBili  0.4  /  DBili      /  AST  30  /  ALT  41  /  AlkPhos  59  06-24    Creatinine Trend: 7.85 <--, 5.77 <--, 8.15 <--, 5.82 <--, 6.17 <--, 9.05 <--, 6.34 <--                        9.9    5.80  )-----------( 91       ( 25 Jun 2022 05:29 )             30.7     Urine Studies:        RADIOLOGY & ADDITIONAL STUDIES:

## 2022-06-25 NOTE — PROGRESS NOTE ADULT - SUBJECTIVE AND OBJECTIVE BOX
CHAVEZ MARQUEZ  67y  Male      Patient is a 67y old  Male who presents with a chief complaint of dysarthria and weakness (25 Jun 2022 16:57)      REVIEW OF SYSTEMS:  CONSTITUTIONAL: No fever  RESPIRATORY: No cough, hemoptysis or shortness of breath  CARDIOVASCULAR: No chest pain, palpitations, dizziness, or leg swelling  GASTROINTESTINAL: No abdominal pain. nausea, vomiting, hematemesis  GENITOURINARY: No dysuria, frequency, hematuria   NEUROLOGICAL: No headaches, no dizziness  MUSCULOSKELETAL: No joint pain or swelling;     INTERVAL HPI/OVERNIGHT EVENTS:  T(C): 36.6 (06-25-22 @ 15:32), Max: 37 (06-25-22 @ 05:02)  HR: 76 (06-25-22 @ 15:32) (70 - 77)  BP: 160/94 (06-25-22 @ 15:32) (127/72 - 160/94)  RR: 17 (06-25-22 @ 15:32) (17 - 18)  SpO2: 100% (06-25-22 @ 13:00) (98% - 100%)  Wt(kg): --  I&O's Summary    T(C): 36.6 (06-25-22 @ 15:32), Max: 37 (06-25-22 @ 05:02)  HR: 76 (06-25-22 @ 15:32) (70 - 77)  BP: 160/94 (06-25-22 @ 15:32) (127/72 - 160/94)  RR: 17 (06-25-22 @ 15:32) (17 - 18)  SpO2: 100% (06-25-22 @ 13:00) (98% - 100%)  Wt(kg): --Vital Signs Last 24 Hrs  T(C): 36.6 (25 Jun 2022 15:32), Max: 37 (25 Jun 2022 05:02)  T(F): 97.9 (25 Jun 2022 15:32), Max: 98.6 (25 Jun 2022 05:02)  HR: 76 (25 Jun 2022 15:32) (70 - 77)  BP: 160/94 (25 Jun 2022 15:32) (127/72 - 160/94)  BP(mean): --  RR: 17 (25 Jun 2022 15:32) (17 - 18)  SpO2: 100% (25 Jun 2022 13:00) (98% - 100%)    LABS:                        9.9    5.80  )-----------( 91       ( 25 Jun 2022 05:29 )             30.7     06-25    133<L>  |  95<L>  |  62<H>  ----------------------------<  83  4.7   |  23  |  7.85<H>    Ca    7.0<L>      25 Jun 2022 05:29  Phos  3.9     06-25  Mg     1.90     06-25    TPro  6.4  /  Alb  3.5  /  TBili  0.4  /  DBili  x   /  AST  30  /  ALT  41  /  AlkPhos  59  06-24        CAPILLARY BLOOD GLUCOSE                PAST MEDICAL & SURGICAL HISTORY:  HTN (Hypertension)      Chronic kidney disease      Kidney stones      Hemodialysis access, AV graft  Left upper extremity      Hyperparathyroidism      Anemia      Thrombocytopenia      Atrial fibrillation  on Eliquis      S/P Nephrectomy  (L) 2007 for kidney stones      Acquired arteriovenous fistula  left wrist  1/2015 - [facility], Left upper arm 4/2015 (approximate date)      AVF (arteriovenous fistula)          MEDICATIONS  (STANDING):  apixaban 5 milliGRAM(s) Oral every 12 hours  atorvastatin 80 milliGRAM(s) Oral at bedtime  pantoprazole    Tablet 40 milliGRAM(s) Oral two times a day  sevelamer carbonate 1600 milliGRAM(s) Oral three times a day with meals    MEDICATIONS  (PRN):  acetaminophen     Tablet .. 650 milliGRAM(s) Oral every 6 hours PRN Temp greater or equal to 38C (100.4F), Mild Pain (1 - 3)  aluminum hydroxide/magnesium hydroxide/simethicone Suspension 30 milliLiter(s) Oral every 4 hours PRN Dyspepsia  melatonin 3 milliGRAM(s) Oral at bedtime PRN Insomnia  ondansetron Injectable 4 milliGRAM(s) IV Push every 8 hours PRN Nausea and/or Vomiting        RADIOLOGY & ADDITIONAL TESTS:    Imaging Personally Reviewed:  [ ] YES  [ ] NO    Consultant(s) Notes Reviewed:  [ ] YES  [ ] NO    PHYSICAL EXAM:  GENERAL: Alert and awake lying in bed in no distress  HEAD:  Atraumatic, Normocephalic  EYES: EOMI, LILIAM, conjunctiva and sclera clear  NECK: Supple, No JVD, Normal thyroid  NERVOUS SYSTEM:  Alert & Oriented X3, Motor and sensory systems are intact,   CHEST/LUNG: Bilateral clear breath sounds, no rhochi, no wheezing, no crepitations,  HEART: Regular rate and rhythm; No murmurs, rubs, or gallops  ABDOMEN: Soft, Nontender, Nondistended; Bowel sounds present  EXTREMITIES:   Peripheral Pulses are palpable, no  edema        Care Discussed with Consultants/Other Providers [ ] YES  [ ] NO      Code Status: [] Full Code [] DNR [] DNI [] Goals of Care:   Disposition: [] ICU [] Stroke Unit [] RCU []PCU []Floor [] Discharge Home         MULU [email or web address]     CHAVEZ MARQUEZ  67y  Male      Patient is a 67y old  Male who presents with a chief complaint of dysarthria and weakness (25 Jun 2022 16:57)  comfortable,no new c/o    REVIEW OF SYSTEMS:  CONSTITUTIONAL: No fever  RESPIRATORY: No cough, hemoptysis or shortness of breath  CARDIOVASCULAR: No chest pain, palpitations, dizziness, or leg swelling  GASTROINTESTINAL: No abdominal pain. nausea, vomiting, hematemesis  GENITOURINARY: No dysuria, frequency, hematuria   NEUROLOGICAL: No headaches, no dizziness  MUSCULOSKELETAL: No joint pain or swelling;     INTERVAL HPI/OVERNIGHT EVENTS:  T(C): 36.6 (06-25-22 @ 15:32), Max: 37 (06-25-22 @ 05:02)  HR: 76 (06-25-22 @ 15:32) (70 - 77)  BP: 160/94 (06-25-22 @ 15:32) (127/72 - 160/94)  RR: 17 (06-25-22 @ 15:32) (17 - 18)  SpO2: 100% (06-25-22 @ 13:00) (98% - 100%)  Wt(kg): --  I&O's Summary    T(C): 36.6 (06-25-22 @ 15:32), Max: 37 (06-25-22 @ 05:02)  HR: 76 (06-25-22 @ 15:32) (70 - 77)  BP: 160/94 (06-25-22 @ 15:32) (127/72 - 160/94)  RR: 17 (06-25-22 @ 15:32) (17 - 18)  SpO2: 100% (06-25-22 @ 13:00) (98% - 100%)  Wt(kg): --Vital Signs Last 24 Hrs  T(C): 36.6 (25 Jun 2022 15:32), Max: 37 (25 Jun 2022 05:02)  T(F): 97.9 (25 Jun 2022 15:32), Max: 98.6 (25 Jun 2022 05:02)  HR: 76 (25 Jun 2022 15:32) (70 - 77)  BP: 160/94 (25 Jun 2022 15:32) (127/72 - 160/94)  BP(mean): --  RR: 17 (25 Jun 2022 15:32) (17 - 18)  SpO2: 100% (25 Jun 2022 13:00) (98% - 100%)    LABS:                        9.9    5.80  )-----------( 91       ( 25 Jun 2022 05:29 )             30.7     06-25    133<L>  |  95<L>  |  62<H>  ----------------------------<  83  4.7   |  23  |  7.85<H>    Ca    7.0<L>      25 Jun 2022 05:29  Phos  3.9     06-25  Mg     1.90     06-25    TPro  6.4  /  Alb  3.5  /  TBili  0.4  /  DBili  x   /  AST  30  /  ALT  41  /  AlkPhos  59  06-24        CAPILLARY BLOOD GLUCOSE                PAST MEDICAL & SURGICAL HISTORY:  HTN (Hypertension)      Chronic kidney disease      Kidney stones      Hemodialysis access, AV graft  Left upper extremity      Hyperparathyroidism      Anemia      Thrombocytopenia      Atrial fibrillation  on Eliquis      S/P Nephrectomy  (L) 2007 for kidney stones      Acquired arteriovenous fistula  left wrist  1/2015 - [facility], Left upper arm 4/2015 (approximate date)      AVF (arteriovenous fistula)          MEDICATIONS  (STANDING):  apixaban 5 milliGRAM(s) Oral every 12 hours  atorvastatin 80 milliGRAM(s) Oral at bedtime  pantoprazole    Tablet 40 milliGRAM(s) Oral two times a day  sevelamer carbonate 1600 milliGRAM(s) Oral three times a day with meals    MEDICATIONS  (PRN):  acetaminophen     Tablet .. 650 milliGRAM(s) Oral every 6 hours PRN Temp greater or equal to 38C (100.4F), Mild Pain (1 - 3)  aluminum hydroxide/magnesium hydroxide/simethicone Suspension 30 milliLiter(s) Oral every 4 hours PRN Dyspepsia  melatonin 3 milliGRAM(s) Oral at bedtime PRN Insomnia  ondansetron Injectable 4 milliGRAM(s) IV Push every 8 hours PRN Nausea and/or Vomiting        RADIOLOGY & ADDITIONAL TESTS:    Imaging Personally Reviewed:  [ ] YES  [ ] NO    Consultant(s) Notes Reviewed:  [x ] YES  [ ] NO    PHYSICAL EXAM:  GENERAL: Alert and awake lying in bed in no distress  HEAD:  Atraumatic, Normocephalic  EYES: EOMI, LILIAM, conjunctiva and sclera clear  NECK: Supple, No JVD, Normal thyroid  NERVOUS SYSTEM:  Alert & Oriented X3, Motor and sensory systems are intact,   CHEST/LUNG: Bilateral clear breath sounds, no rhochi, no wheezing, no crepitations,  HEART: Regular rate and rhythm; No murmurs, rubs, or gallops  ABDOMEN: Soft, Nontender, Nondistended; Bowel sounds present  EXTREMITIES:   Peripheral Pulses are palpable, no  edema        Care Discussed with Consultants/Other Providers [ ] YES  [ ] NO      Code Status: [] Full Code [] DNR [] DNI [] Goals of Care:   Disposition: [] ICU [] Stroke Unit [] RCU []PCU []Floor [] Discharge Home         MULU [email or web address]

## 2022-06-25 NOTE — PROGRESS NOTE ADULT - ASSESSMENT
67 year old right-handed male w/ PMHx HTN, AFib (no longer on apixaban d/t GIB, not on any antithrombotics at home), ESRD (on HD Tu/Th/Sat), hypothyroid, dentures, no history of stroke (per patient), p/w dysarthria and R sensorimotor deficits onset 15:30PM 6/16/22, approximately 30 minutes after completing HD. No similar symptoms in the past. Rigors on the night prior to arrival. He denies h/o diabetes or smoking. Code stroke cancelled as symptoms onset 24 hours prior to presentation. CT head: no acute pathology, chronic left thalamic lacunar infarct. Symptoms improving in the ED. RENAL consulted for ESRD/ HD Mx.    End Stage Renal Disease on Dialysis   [facility]  K, vol ok   Hb now < goal    s/p MRI WITH gadollinium 6/20 followed by HD 6/21 also  c/w HD now w/2k bath, bp high, net uf inc to 1.5kg now as tolerated  dose meds for ESRD  renal diet, fluid restriction  watch H/H, will consider adding FRANNY   Hyperphosphatemia- changed phoslo 667 tidac >renvela 2tidac  HTN, controlled. BP stable  CVA -CTH no acute infarct/bleed. MRI brain w/o contrast demonstrates acute infarct in the L corona radiata w/o hemorrhage.   mx per neuro  h/o Afib -restarted on AC.  f/u w/cariology eval      labs, chart reviewed  poc d/w pt, HD RN  For any question, call:  Cell # [phone]  Pager # [phone]

## 2022-06-26 LAB
ANION GAP SERPL CALC-SCNC: 14 MMOL/L — SIGNIFICANT CHANGE UP (ref 7–14)
BUN SERPL-MCNC: 41 MG/DL — HIGH (ref 7–23)
CALCIUM SERPL-MCNC: 7.4 MG/DL — LOW (ref 8.4–10.5)
CHLORIDE SERPL-SCNC: 95 MMOL/L — LOW (ref 98–107)
CO2 SERPL-SCNC: 26 MMOL/L — SIGNIFICANT CHANGE UP (ref 22–31)
CREAT SERPL-MCNC: 5.78 MG/DL — HIGH (ref 0.5–1.3)
EGFR: 10 ML/MIN/1.73M2 — LOW
GLUCOSE SERPL-MCNC: 85 MG/DL — SIGNIFICANT CHANGE UP (ref 70–99)
HCT VFR BLD CALC: 29.8 % — LOW (ref 39–50)
HGB BLD-MCNC: 9.7 G/DL — LOW (ref 13–17)
MAGNESIUM SERPL-MCNC: 1.8 MG/DL — SIGNIFICANT CHANGE UP (ref 1.6–2.6)
MCHC RBC-ENTMCNC: 30.8 PG — SIGNIFICANT CHANGE UP (ref 27–34)
MCHC RBC-ENTMCNC: 32.6 GM/DL — SIGNIFICANT CHANGE UP (ref 32–36)
MCV RBC AUTO: 94.6 FL — SIGNIFICANT CHANGE UP (ref 80–100)
NRBC # BLD: 0 /100 WBCS — SIGNIFICANT CHANGE UP
NRBC # FLD: 0 K/UL — SIGNIFICANT CHANGE UP
PHOSPHATE SERPL-MCNC: 3.9 MG/DL — SIGNIFICANT CHANGE UP (ref 2.5–4.5)
PLATELET # BLD AUTO: 93 K/UL — LOW (ref 150–400)
POTASSIUM SERPL-MCNC: 4.3 MMOL/L — SIGNIFICANT CHANGE UP (ref 3.5–5.3)
POTASSIUM SERPL-SCNC: 4.3 MMOL/L — SIGNIFICANT CHANGE UP (ref 3.5–5.3)
RBC # BLD: 3.15 M/UL — LOW (ref 4.2–5.8)
RBC # FLD: 14 % — SIGNIFICANT CHANGE UP (ref 10.3–14.5)
SODIUM SERPL-SCNC: 135 MMOL/L — SIGNIFICANT CHANGE UP (ref 135–145)
WBC # BLD: 5.25 K/UL — SIGNIFICANT CHANGE UP (ref 3.8–10.5)
WBC # FLD AUTO: 5.25 K/UL — SIGNIFICANT CHANGE UP (ref 3.8–10.5)

## 2022-06-26 RX ORDER — ERYTHROPOIETIN 10000 [IU]/ML
6000 INJECTION, SOLUTION INTRAVENOUS; SUBCUTANEOUS
Refills: 0 | Status: DISCONTINUED | OUTPATIENT
Start: 2022-06-26 | End: 2022-07-01

## 2022-06-26 RX ADMIN — SEVELAMER CARBONATE 1600 MILLIGRAM(S): 2400 POWDER, FOR SUSPENSION ORAL at 13:03

## 2022-06-26 RX ADMIN — PANTOPRAZOLE SODIUM 40 MILLIGRAM(S): 20 TABLET, DELAYED RELEASE ORAL at 18:13

## 2022-06-26 RX ADMIN — PANTOPRAZOLE SODIUM 40 MILLIGRAM(S): 20 TABLET, DELAYED RELEASE ORAL at 05:20

## 2022-06-26 RX ADMIN — Medication 50 MILLIGRAM(S): at 05:24

## 2022-06-26 RX ADMIN — APIXABAN 5 MILLIGRAM(S): 2.5 TABLET, FILM COATED ORAL at 05:20

## 2022-06-26 RX ADMIN — APIXABAN 5 MILLIGRAM(S): 2.5 TABLET, FILM COATED ORAL at 18:13

## 2022-06-26 RX ADMIN — SEVELAMER CARBONATE 1600 MILLIGRAM(S): 2400 POWDER, FOR SUSPENSION ORAL at 18:13

## 2022-06-26 RX ADMIN — SEVELAMER CARBONATE 1600 MILLIGRAM(S): 2400 POWDER, FOR SUSPENSION ORAL at 09:09

## 2022-06-26 RX ADMIN — ATORVASTATIN CALCIUM 80 MILLIGRAM(S): 80 TABLET, FILM COATED ORAL at 21:03

## 2022-06-26 NOTE — PROGRESS NOTE ADULT - SUBJECTIVE AND OBJECTIVE BOX
CHAVEZ MARQUEZ  67y  Male      Patient is a 67y old  Male who presents with a chief complaint of dysarthria and weakness (26 Jun 2022 11:08)  Patient feels fine.no new c/o    REVIEW OF SYSTEMS:  CONSTITUTIONAL: No fever  RESPIRATORY: No cough, hemoptysis or shortness of breath  CARDIOVASCULAR: No chest pain, palpitations, dizziness, or leg swelling  GASTROINTESTINAL: No abdominal pain. nausea, vomiting, hematemesis  GENITOURINARY: No dysuria, frequency, hematuria   NEUROLOGICAL: No headaches, no dizziness  MUSCULOSKELETAL: No joint pain or swelling;     INTERVAL HPI/OVERNIGHT EVENTS:  T(C): 36.8 (06-26-22 @ 13:15), Max: 37.1 (06-25-22 @ 19:26)  HR: 78 (06-26-22 @ 13:15) (67 - 78)  BP: 145/84 (06-26-22 @ 13:15) (121/87 - 171/88)  RR: 18 (06-26-22 @ 13:15) (17 - 18)  SpO2: 99% (06-26-22 @ 13:15) (98% - 100%)  Wt(kg): --  I&O's Summary    25 Jun 2022 07:01  -  26 Jun 2022 07:00  --------------------------------------------------------  IN: 520 mL / OUT: 1900 mL / NET: -1380 mL      T(C): 36.8 (06-26-22 @ 13:15), Max: 37.1 (06-25-22 @ 19:26)  HR: 78 (06-26-22 @ 13:15) (67 - 78)  BP: 145/84 (06-26-22 @ 13:15) (121/87 - 171/88)  RR: 18 (06-26-22 @ 13:15) (17 - 18)  SpO2: 99% (06-26-22 @ 13:15) (98% - 100%)  Wt(kg): --Vital Signs Last 24 Hrs  T(C): 36.8 (26 Jun 2022 13:15), Max: 37.1 (25 Jun 2022 19:26)  T(F): 98.2 (26 Jun 2022 13:15), Max: 98.7 (25 Jun 2022 19:26)  HR: 78 (26 Jun 2022 13:15) (67 - 78)  BP: 145/84 (26 Jun 2022 13:15) (121/87 - 171/88)  BP(mean): --  RR: 18 (26 Jun 2022 13:15) (17 - 18)  SpO2: 99% (26 Jun 2022 13:15) (98% - 100%)    LABS:                        9.7    5.25  )-----------( 93       ( 26 Jun 2022 07:25 )             29.8     06-26    135  |  95<L>  |  41<H>  ----------------------------<  85  4.3   |  26  |  5.78<H>    Ca    7.4<L>      26 Jun 2022 07:25  Phos  3.9     06-26  Mg     1.80     06-26          CAPILLARY BLOOD GLUCOSE                PAST MEDICAL & SURGICAL HISTORY:  HTN (Hypertension)      Chronic kidney disease      Kidney stones      Hemodialysis access, AV graft  Left upper extremity      Hyperparathyroidism      Anemia      Thrombocytopenia      Atrial fibrillation  on Eliquis      S/P Nephrectomy  (L) 2007 for kidney stones      Acquired arteriovenous fistula  left wrist  1/2015 - [facility], Left upper arm 4/2015 (approximate date)      AVF (arteriovenous fistula)          MEDICATIONS  (STANDING):  apixaban 5 milliGRAM(s) Oral every 12 hours  atorvastatin 80 milliGRAM(s) Oral at bedtime  metoprolol succinate ER 50 milliGRAM(s) Oral daily  pantoprazole    Tablet 40 milliGRAM(s) Oral two times a day  sevelamer carbonate 1600 milliGRAM(s) Oral three times a day with meals    MEDICATIONS  (PRN):  acetaminophen     Tablet .. 650 milliGRAM(s) Oral every 6 hours PRN Temp greater or equal to 38C (100.4F), Mild Pain (1 - 3)  aluminum hydroxide/magnesium hydroxide/simethicone Suspension 30 milliLiter(s) Oral every 4 hours PRN Dyspepsia  melatonin 3 milliGRAM(s) Oral at bedtime PRN Insomnia  ondansetron Injectable 4 milliGRAM(s) IV Push every 8 hours PRN Nausea and/or Vomiting        RADIOLOGY & ADDITIONAL TESTS:    Imaging Personally Reviewed:  [ ] YES  [ ] NO    Consultant(s) Notes Reviewed:  [x ] YES  [ ] NO    PHYSICAL EXAM:  GENERAL: Alert and awake lying in bed in no distress  HEAD:  Atraumatic, Normocephalic  EYES: EOMI, LILIAM, conjunctiva and sclera clear  NECK: Supple, No JVD, Normal thyroid  NERVOUS SYSTEM:  Alert & Oriented X3, Motor and sensory systems are intact,   CHEST/LUNG: Bilateral clear breath sounds, no rhochi, no wheezing, no crepitations,  HEART: Regular rate and rhythm; No murmurs, rubs, or gallops  ABDOMEN: Soft, Nontender, Nondistended; Bowel sounds present  EXTREMITIES:   Peripheral Pulses are palpable, no  edema        Care Discussed with Consultants/Other Providers [x ] YES  [ ] NO      Code Status: [] Full Code [] DNR [] DNI [] Goals of Care:   Disposition: [] ICU [] Stroke Unit [] RCU []PCU []Floor [] Discharge Home         ADALGISA McwilliamsP

## 2022-06-26 NOTE — PROGRESS NOTE ADULT - SUBJECTIVE AND OBJECTIVE BOX
Ran Velasquez MD  Interventional Cardiology / Advance Heart Failure and Cardiac Transplant Specialist  [place] Office : [phone] [place] [address] [facility] [address]  Tel:   [place] Office : [address] [facility] [place] [address]  Tel: [phone]      Subjective/Overnight events: Pt is lying in bed comfortable not in distress, no chest pains no SOB no palpitations  	  MEDICATIONS:  apixaban 5 milliGRAM(s) Oral every 12 hours  metoprolol succinate ER 50 milliGRAM(s) Oral daily        acetaminophen     Tablet .. 650 milliGRAM(s) Oral every 6 hours PRN  melatonin 3 milliGRAM(s) Oral at bedtime PRN  ondansetron Injectable 4 milliGRAM(s) IV Push every 8 hours PRN    aluminum hydroxide/magnesium hydroxide/simethicone Suspension 30 milliLiter(s) Oral every 4 hours PRN  pantoprazole    Tablet 40 milliGRAM(s) Oral two times a day    atorvastatin 80 milliGRAM(s) Oral at bedtime        PAST MEDICAL/SURGICAL HISTORY  PAST MEDICAL & SURGICAL HISTORY:  HTN (Hypertension)      Chronic kidney disease      Kidney stones      Hemodialysis access, AV graft  Left upper extremity      Hyperparathyroidism      Anemia      Thrombocytopenia      Atrial fibrillation  on Eliquis      S/P Nephrectomy  (L) 2007 for kidney stones      Acquired arteriovenous fistula  left wrist  1/2015 - [facility], Left upper arm 4/2015 (approximate date)      AVF (arteriovenous fistula)          SOCIAL HISTORY: Substance Use (street drugs): ( x ) never used  (  ) other:    FAMILY HISTORY:  Family history of CVA (Father)        PHYSICAL EXAM:  T(C): 36.7 (06-26-22 @ 09:15), Max: 37.1 (06-25-22 @ 19:26)  HR: 71 (06-26-22 @ 09:15) (67 - 76)  BP: 163/91 (06-26-22 @ 09:15) (121/87 - 171/88)  RR: 18 (06-26-22 @ 09:15) (17 - 18)  SpO2: 98% (06-26-22 @ 09:15) (98% - 100%)  Wt(kg): --  I&O's Summary    25 Jun 2022 07:01  -  26 Jun 2022 07:00  --------------------------------------------------------  IN: 520 mL / OUT: 1900 mL / NET: -1380 mL        GENERAL: NAD  EYES:   PERRLA   ENMT:   Moist mucous membranes, Good dentition, No lesions  Cardiovascular: Normal S1 S2, No JVD, No murmurs, No edema  Respiratory: Lungs clear to auscultation	  Gastrointestinal:  Soft, Non-tender, + BS	  Extremities: no edema                                      9.7    5.25  )-----------( 93       ( 26 Jun 2022 07:25 )             29.8     06-26    135  |  95<L>  |  41<H>  ----------------------------<  85  4.3   |  26  |  5.78<H>    Ca    7.4<L>      26 Jun 2022 07:25  Phos  3.9     06-26  Mg     1.80     06-26      proBNP:   Lipid Profile:   HgA1c:   TSH:     Consultant(s) Notes Reviewed:  [x ] YES  [ ] NO    Care Discussed with Consultants/Other Providers [ x] YES  [ ] NO    Imaging Personally Reviewed independently:  [x] YES  [ ] NO    All labs, radiologic studies, vitals, orders and medications list reviewed. Patient is seen and examined at bedside. Case discussed with medical team.

## 2022-06-27 LAB
ANION GAP SERPL CALC-SCNC: 16 MMOL/L — HIGH (ref 7–14)
BUN SERPL-MCNC: 67 MG/DL — HIGH (ref 7–23)
CALCIUM SERPL-MCNC: 6.8 MG/DL — LOW (ref 8.4–10.5)
CHLORIDE SERPL-SCNC: 95 MMOL/L — LOW (ref 98–107)
CO2 SERPL-SCNC: 23 MMOL/L — SIGNIFICANT CHANGE UP (ref 22–31)
CREAT SERPL-MCNC: 8.29 MG/DL — HIGH (ref 0.5–1.3)
EGFR: 7 ML/MIN/1.73M2 — LOW
GLUCOSE SERPL-MCNC: 78 MG/DL — SIGNIFICANT CHANGE UP (ref 70–99)
HCT VFR BLD CALC: 28.6 % — LOW (ref 39–50)
HGB BLD-MCNC: 9.3 G/DL — LOW (ref 13–17)
MAGNESIUM SERPL-MCNC: 1.8 MG/DL — SIGNIFICANT CHANGE UP (ref 1.6–2.6)
MCHC RBC-ENTMCNC: 31.2 PG — SIGNIFICANT CHANGE UP (ref 27–34)
MCHC RBC-ENTMCNC: 32.5 GM/DL — SIGNIFICANT CHANGE UP (ref 32–36)
MCV RBC AUTO: 96 FL — SIGNIFICANT CHANGE UP (ref 80–100)
NRBC # BLD: 0 /100 WBCS — SIGNIFICANT CHANGE UP
NRBC # FLD: 0 K/UL — SIGNIFICANT CHANGE UP
PHOSPHATE SERPL-MCNC: 4.4 MG/DL — SIGNIFICANT CHANGE UP (ref 2.5–4.5)
PLATELET # BLD AUTO: 90 K/UL — LOW (ref 150–400)
POTASSIUM SERPL-MCNC: 4.6 MMOL/L — SIGNIFICANT CHANGE UP (ref 3.5–5.3)
POTASSIUM SERPL-SCNC: 4.6 MMOL/L — SIGNIFICANT CHANGE UP (ref 3.5–5.3)
RBC # BLD: 2.98 M/UL — LOW (ref 4.2–5.8)
RBC # FLD: 14 % — SIGNIFICANT CHANGE UP (ref 10.3–14.5)
SARS-COV-2 RNA SPEC QL NAA+PROBE: SIGNIFICANT CHANGE UP
SODIUM SERPL-SCNC: 134 MMOL/L — LOW (ref 135–145)
WBC # BLD: 5.82 K/UL — SIGNIFICANT CHANGE UP (ref 3.8–10.5)
WBC # FLD AUTO: 5.82 K/UL — SIGNIFICANT CHANGE UP (ref 3.8–10.5)

## 2022-06-27 RX ADMIN — APIXABAN 5 MILLIGRAM(S): 2.5 TABLET, FILM COATED ORAL at 05:07

## 2022-06-27 RX ADMIN — APIXABAN 5 MILLIGRAM(S): 2.5 TABLET, FILM COATED ORAL at 17:10

## 2022-06-27 RX ADMIN — PANTOPRAZOLE SODIUM 40 MILLIGRAM(S): 20 TABLET, DELAYED RELEASE ORAL at 05:06

## 2022-06-27 RX ADMIN — SEVELAMER CARBONATE 1600 MILLIGRAM(S): 2400 POWDER, FOR SUSPENSION ORAL at 09:46

## 2022-06-27 RX ADMIN — PANTOPRAZOLE SODIUM 40 MILLIGRAM(S): 20 TABLET, DELAYED RELEASE ORAL at 17:09

## 2022-06-27 RX ADMIN — ATORVASTATIN CALCIUM 80 MILLIGRAM(S): 80 TABLET, FILM COATED ORAL at 21:15

## 2022-06-27 RX ADMIN — Medication 50 MILLIGRAM(S): at 05:06

## 2022-06-27 RX ADMIN — SEVELAMER CARBONATE 1600 MILLIGRAM(S): 2400 POWDER, FOR SUSPENSION ORAL at 13:04

## 2022-06-27 RX ADMIN — SEVELAMER CARBONATE 1600 MILLIGRAM(S): 2400 POWDER, FOR SUSPENSION ORAL at 18:39

## 2022-06-27 NOTE — PROGRESS NOTE ADULT - SUBJECTIVE AND OBJECTIVE BOX
Neurology Progress Note     INTERVAL HISTORY: Patient seen at bedside by stroke team  dispo planning      MEDICATIONS (HOME):  Home Medications:  NIFEdipine 60 mg oral tablet, extended release: 1 tab(s) orally once a day (17 Jun 2022 17:50)    MEDICATIONS  (STANDING):  apixaban 5 milliGRAM(s) Oral every 12 hours  atorvastatin 80 milliGRAM(s) Oral at bedtime  epoetin tammi-epbx (RETACRIT) Injectable 6000 Unit(s) IV Push <User Schedule>  metoprolol succinate ER 50 milliGRAM(s) Oral daily  pantoprazole    Tablet 40 milliGRAM(s) Oral two times a day  sevelamer carbonate 1600 milliGRAM(s) Oral three times a day with meals    MEDICATIONS  (PRN):  acetaminophen     Tablet .. 650 milliGRAM(s) Oral every 6 hours PRN Temp greater or equal to 38C (100.4F), Mild Pain (1 - 3)  aluminum hydroxide/magnesium hydroxide/simethicone Suspension 30 milliLiter(s) Oral every 4 hours PRN Dyspepsia  melatonin 3 milliGRAM(s) Oral at bedtime PRN Insomnia  ondansetron Injectable 4 milliGRAM(s) IV Push every 8 hours PRN Nausea and/or Vomiting      ALLERGIES/INTOLERANCES:  Allergies  No Known Allergies    Intolerances    VITALS & EXAMINATION:  Vital Signs Last 24 Hrs  T(C): 36.5 (06-27-22 @ 09:02), Max: 36.9 (06-26-22 @ 17:15)  T(F): 97.7 (06-27-22 @ 09:02), Max: 98.4 (06-26-22 @ 17:15)  HR: 74 (06-27-22 @ 09:02) (72 - 81)  BP: 134/75 (06-27-22 @ 11:50) (129/81 - 162/97)  BP(mean): --  RR: 18 (06-27-22 @ 09:02) (18 - 18)  SpO2: 100% (06-27-22 @ 09:02) (98% - 100%)          General:  Constitutional: Male, appears stated age, in no apparent distress including pain  Head: Normocephalic & Atraumatic.    Neurological:  MS: Eyes open. Awake, alert, oriented to person, place, situation, time. Follows all commands.    Language: Speech is mild to moderately dysarthric, fluent with good repetition & comprehension.    CNs: VFF. EOMI no nystagmus. V1-3 intact to LT b/l. Moderate R facial palsy. Hearing grossly normal (rubbing fingers) b/l. Tongue midline, normal movements, no atrophy.     Motor: Normal muscle bulk & tone. No noticeable tremor. Slight pronator drift in RUE, no drift otherwise. No drift in LUE, LLE, RLE. 5/5 throughout. Spontaneous & purposeful movements throughout. 	     Sensation: Decreased sensation to LT/Temp on R.    Coordination: Slower fast finger tapping on R when compared to L. LUE orbits around RUE with rolling arm motion. No dysmetria to FTN.    Gait: No postural instability. Gait testing deferred.      LABORATORY:  CBC Full  -  ( 27 Jun 2022 05:23 )  WBC Count : 5.82 K/uL  RBC Count : 2.98 M/uL  Hemoglobin : 9.3 g/dL  Hematocrit : 28.6 %  Platelet Count - Automated : 90 K/uL  Mean Cell Volume : 96.0 fL  Mean Cell Hemoglobin : 31.2 pg  Mean Cell Hemoglobin Concentration : 32.5 gm/dL  Auto Neutrophil # : x  Auto Lymphocyte # : x  Auto Monocyte # : x  Auto Eosinophil # : x  Auto Basophil # : x  Auto Neutrophil % : x  Auto Lymphocyte % : x  Auto Monocyte % : x  Auto Eosinophil % : x  Auto Basophil % : x    06-27    134<L>  |  95<L>  |  67<H>  ----------------------------<  78  4.6   |  23  |  8.29<H>    Ca    6.8<L>      27 Jun 2022 05:23  Phos  4.4     06-27  Mg     1.80     06-27      LFTs   Coagulopathy   Lipid Panel 06-19 Chol 140 LDL 60 HDL 64 Trig 81  A1c 5.2     STUDIES & IMAGING:    Radiology (XR, CT, MR, U/S, TTE/TIFFANIE):      MRI Brain: A wedge-shaped area of restricted diffusion is seen within the   left corona radiata with associated T2/FLAIR hyperintense signal. There   is no hemorrhagic transformation.    A chronic lacunar infarct is again noted within the left thalamus.    Multiple additional patchy confluent nonspecific T2/FLAIR hyperintense   signal changes are noted throughout the bihemispheric white matter   without associated mass effect or restricted diffusion.    There is enhancing mass lesion within the right internal auditory canal   measuring 0.5 x 0.8 cm in the axial plane (AP by transverse) (series 14,   image 43).    Otherwise, no abnormal brain parenchymal or leptomeningeal enhancement is   notable.    Ventricular size and configuration is unremarkable. No abnormal extra   axial fluid collections are seen. Flow-voids are noted throughout the   major intracranial vessels, on the T2 weighted images, consistent with   their patency. The sellar region appears unremarkable.    Polypoidal soft tissue is seen within the left maxillary sinus.   Additional scattered mucosal thickening is seen throughout the ethmoid   complex. The mastoid air cells are clear. Calvarial signal appears   unremarkable. The orbits appear unremarkable apart from bilateral   cataract removal.    Posterior parietal scalp vertex scarring is seen.    MRA Neck: There is a standard anatomic configuration to the aortic arch.    The origins of the great vessels appear unremarkable. The bilateral   common carotid arteries and carotid bifurcations appear unremarkable.    The bilateral cervical internal carotid arteries are within normal limits.    The origins of the bilateral vertebral arteries are normal. The bilateral   cervical vertebral arteries are normal in course and caliber.    MRA [place]: The bilateral intracranial internal carotid,   anterior, and middle cerebral arteries appear unremarkable.    The anterior communicating artery is seen. The bilateral posterior   communicating arteries are not well resolved.    The bilateral intradural vertebral arteries, vertebrobasilar junction,   basilar artery, and basilar tip appear unremarkable as well as the   bilateral posterior cerebral arteries.    IMPRESSION:    MRI BRAIN:  1. Acute/subacute left-sided corona radiata infarction with associated   cytotoxic edema and without hemorrhagic transformation.  2. Enhancing mass lesion within the right internal auditory canal, most   compatible with a vestibular schwannoma. Recommend serial imaging over   time to assess for stability or change.  3. Chronic lacunar infarct within the left thalamus.  4. Multiple patchy confluent nonspecific abnormal white matter foci of   T2/FLAIR prolongation statistically favoring microvascular disease.    MRA NECK AND HEAD:  1. No large vessel occlusion or major stenosis.    --- End of Report ---      < from: Transthoracic Echocardiogram (06.18.22 @ 07:56) >  CONCLUSIONS:  1. Calcified trileaflet aortic valve with normal opening.  Minimal aortic regurgitation.  2. Mildly dilated left atrium.  LA volume index = 39 cc/m2.  3. Normal left ventricular systolic function. No segmental  wall motion abnormalities.  4. Normal right ventricular size and function.  5. Agitated saline injection demonstrates no obvious  evidence of a patent foramen ovale.        CT Brain Stroke Protocol (06.17.22 @ 15:41)    FINDINGS:    Assessment of the posterior fossa and henok is somewhat limited by beam   hardening artifact.  Ventricles and sulci: Parenchymal volume loss is present which is   commensurate with patient age.  Intra-axial: Periventricular small vessel white matter ischemic changes   are appreciated. Old left thalamic lacunar infarct. No intracranial mass,   acute hemorrhage, or midline shift is present.  Extra-axial: No extra-axial fluid collection is identified. Deposition of   calcified plaques in association with the distal intracranial bilateral   vertebral arteries and bilateral carotid siphons.  Calvarium: Intact.    Bilateral optic globes are intact. Left maxillary sinus polyp versus   retention cysts. Bilateral mastoid air cells and middle ear cavities are   predominantly clear.    paranasal sinuses, bilateral mastoid air cells, middle ear cavities are   clear.    IMPRESSION: Age-appropriate involutional changes. No large arterial   distribution acute infarct. Old left thalamic lacunar infarct.    Findings were communicated by Dr. Behr-Ventura to Dr. STACIA Bojorquez at   approximately 3:50 PM 6/17/2022.

## 2022-06-27 NOTE — PROGRESS NOTE ADULT - SUBJECTIVE AND OBJECTIVE BOX
CHAVEZ MARQUEZ  67y  Male      Patient is a 67y old  Male who presents with a chief complaint of dysarthria and weakness (27 Jun 2022 17:47)  feels fine.no new c/o    REVIEW OF SYSTEMS:  CONSTITUTIONAL: No fever  RESPIRATORY: No cough, hemoptysis or shortness of breath  CARDIOVASCULAR: No chest pain, palpitations, dizziness, or leg swelling  GASTROINTESTINAL: No abdominal pain. nausea, vomiting, hematemesis  GENITOURINARY: No dysuria, frequency, hematuria   NEUROLOGICAL: No headaches, no dizziness  MUSCULOSKELETAL: No joint pain or swelling;     INTERVAL HPI/OVERNIGHT EVENTS:  T(C): 36.9 (06-27-22 @ 20:39), Max: 36.9 (06-27-22 @ 20:39)  HR: 70 (06-27-22 @ 20:39) (70 - 77)  BP: 162/98 (06-27-22 @ 20:39) (130/77 - 162/98)  RR: 18 (06-27-22 @ 20:39) (17 - 18)  SpO2: 100% (06-27-22 @ 20:39) (98% - 100%)  Wt(kg): --  I&O's Summary    T(C): 36.9 (06-27-22 @ 20:39), Max: 36.9 (06-27-22 @ 20:39)  HR: 70 (06-27-22 @ 20:39) (70 - 77)  BP: 162/98 (06-27-22 @ 20:39) (130/77 - 162/98)  RR: 18 (06-27-22 @ 20:39) (17 - 18)  SpO2: 100% (06-27-22 @ 20:39) (98% - 100%)  Wt(kg): --Vital Signs Last 24 Hrs  T(C): 36.9 (27 Jun 2022 20:39), Max: 36.9 (27 Jun 2022 20:39)  T(F): 98.4 (27 Jun 2022 20:39), Max: 98.4 (27 Jun 2022 20:39)  HR: 70 (27 Jun 2022 20:39) (70 - 77)  BP: 162/98 (27 Jun 2022 20:39) (130/77 - 162/98)  BP(mean): --  RR: 18 (27 Jun 2022 20:39) (17 - 18)  SpO2: 100% (27 Jun 2022 20:39) (98% - 100%)    LABS:                        9.3    5.82  )-----------( 90       ( 27 Jun 2022 05:23 )             28.6     06-27    134<L>  |  95<L>  |  67<H>  ----------------------------<  78  4.6   |  23  |  8.29<H>    Ca    6.8<L>      27 Jun 2022 05:23  Phos  4.4     06-27  Mg     1.80     06-27          CAPILLARY BLOOD GLUCOSE                PAST MEDICAL & SURGICAL HISTORY:  HTN (Hypertension)      Chronic kidney disease      Kidney stones      Hemodialysis access, AV graft  Left upper extremity      Hyperparathyroidism      Anemia      Thrombocytopenia      Atrial fibrillation  on Eliquis      S/P Nephrectomy  (L) 2007 for kidney stones      Acquired arteriovenous fistula  left wrist  1/2015 - [facility], Left upper arm 4/2015 (approximate date)      AVF (arteriovenous fistula)          MEDICATIONS  (STANDING):  apixaban 5 milliGRAM(s) Oral every 12 hours  atorvastatin 80 milliGRAM(s) Oral at bedtime  epoetin tammi-epbx (RETACRIT) Injectable 6000 Unit(s) IV Push <User Schedule>  metoprolol succinate ER 50 milliGRAM(s) Oral daily  pantoprazole    Tablet 40 milliGRAM(s) Oral two times a day  sevelamer carbonate 1600 milliGRAM(s) Oral three times a day with meals    MEDICATIONS  (PRN):  acetaminophen     Tablet .. 650 milliGRAM(s) Oral every 6 hours PRN Temp greater or equal to 38C (100.4F), Mild Pain (1 - 3)  aluminum hydroxide/magnesium hydroxide/simethicone Suspension 30 milliLiter(s) Oral every 4 hours PRN Dyspepsia  melatonin 3 milliGRAM(s) Oral at bedtime PRN Insomnia  ondansetron Injectable 4 milliGRAM(s) IV Push every 8 hours PRN Nausea and/or Vomiting        RADIOLOGY & ADDITIONAL TESTS:    Imaging Personally Reviewed:  [ ] YES  [ ] NO    Consultant(s) Notes Reviewed:  [x ] YES  [ ] NO    PHYSICAL EXAM:  GENERAL: Alert and awake lying in bed in no distress  HEAD:  Atraumatic, Normocephalic  EYES: EOMI, LILIAM, conjunctiva and sclera clear  NECK: Supple, No JVD, Normal thyroid  NERVOUS SYSTEM:  Alert & Oriented X3, Motor and sensory systems are intact,   CHEST/LUNG: Bilateral clear breath sounds, no rhochi, no wheezing, no crepitations,  HEART: Regular rate and rhythm; No murmurs, rubs, or gallops  ABDOMEN: Soft, Nontender, Nondistended; Bowel sounds present  EXTREMITIES:   Peripheral Pulses are palpable, no  edema        Care Discussed with Consultants/Other Providers [ ] YES  [ ] NO      Code Status: [] Full Code [] DNR [] DNI [] Goals of Care:   Disposition: [] ICU [] Stroke Unit [] RCU []PCU []Floor [] Discharge Home         MULU [email or web address]

## 2022-06-27 NOTE — PROGRESS NOTE ADULT - ASSESSMENT
67 year old right-handed male w/ PMHx HTN, AFib (no longer on apixaban d/t GIB, not on any antithrombotics at home), ESRD (on HD Tu/Th/Sat), hypothyroid, dentures, no history of stroke (per patient), p/w dysarthria and R sensorimotor deficits onset 15:30PM 6/16/22, approximately 30 minutes after completing HD. No similar symptoms in the past. Rigors on the night prior to arrival. He denies h/o diabetes or smoking. Code stroke cancelled as symptoms onset 24 hours prior to presentation. CT head: no acute pathology, chronic left thalamic lacunar infarct. Symptoms improving in the ED. RENAL consulted for ESRD/ HD Mx.    End Stage Renal Disease on Dialysis   HD unit -[facility] Tu/Th/Sat),  K, vol ok   Hb now < goal    plan for HD tomorrow w/2k bath, inc net uf to 2-2.5kg as tolerated  dose meds for ESRD  renal diet, fluid restriction  Anemia in CKD Hb <goal. watch H/H, added FRANNY epo 6k tiw w/hd tiw  Hyperphosphatemia- changed phoslo 667 tidac >renvela 2tidac  HTN, controlled. BP stable, suboptimal control. c/w BB  CVA -CTH no acute infarct/bleed. MRI brain w/o contrast demonstrates acute infarct in the L corona radiata w/o hemorrhage.   mx per neuro  h/o Afib -restarted on AC.  f/u w/cariology       labs, chart reviewed  poc d/w pt, HD RN  For any question, call:  Cell # [phone]  Pager # [phone]

## 2022-06-27 NOTE — PROGRESS NOTE ADULT - SUBJECTIVE AND OBJECTIVE BOX
Ran Velasquez MD  Interventional Cardiology / Advance Heart Failure and Cardiac Transplant Specialist  [place] Office : [phone] [place] [address] [facility] [address]  Tel:   [place] Office : [address] [facility] [place] [address]  Tel: [phone]      Subjective/Overnight events: Pt is lying in bed comfortable not in distress, no chest pains no SOB no palpitations  	  MEDICATIONS:  apixaban 5 milliGRAM(s) Oral every 12 hours  metoprolol succinate ER 50 milliGRAM(s) Oral daily        acetaminophen     Tablet .. 650 milliGRAM(s) Oral every 6 hours PRN  melatonin 3 milliGRAM(s) Oral at bedtime PRN  ondansetron Injectable 4 milliGRAM(s) IV Push every 8 hours PRN    aluminum hydroxide/magnesium hydroxide/simethicone Suspension 30 milliLiter(s) Oral every 4 hours PRN  pantoprazole    Tablet 40 milliGRAM(s) Oral two times a day    atorvastatin 80 milliGRAM(s) Oral at bedtime    epoetin tammi-epbx (RETACRIT) Injectable 6000 Unit(s) IV Push <User Schedule>      PAST MEDICAL/SURGICAL HISTORY  PAST MEDICAL & SURGICAL HISTORY:  HTN (Hypertension)      Chronic kidney disease      Kidney stones      Hemodialysis access, AV graft  Left upper extremity      Hyperparathyroidism      Anemia      Thrombocytopenia      Atrial fibrillation  on Eliquis      S/P Nephrectomy  (L) 2007 for kidney stones      Acquired arteriovenous fistula  left wrist  1/2015 - [facility], Left upper arm 4/2015 (approximate date)      AVF (arteriovenous fistula)          SOCIAL HISTORY: Substance Use (street drugs): ( x ) never used  (  ) other:    FAMILY HISTORY:  Family history of CVA (Father)            PHYSICAL EXAM:  T(C): 36.5 (06-27-22 @ 09:02), Max: 36.9 (06-26-22 @ 17:15)  HR: 74 (06-27-22 @ 09:02) (72 - 81)  BP: 162/97 (06-27-22 @ 09:02) (129/81 - 162/97)  RR: 18 (06-27-22 @ 09:02) (18 - 18)  SpO2: 100% (06-27-22 @ 09:02) (98% - 100%)  Wt(kg): --  I&O's Summary        GENERAL: NAD  EYES:   PERRLA   ENMT:   Moist mucous membranes, Good dentition, No lesions  Cardiovascular: Normal S1 S2, No JVD, No murmurs, No edema  Respiratory: Lungs clear to auscultation	  Gastrointestinal:  Soft, Non-tender, + BS	  Extremities: no edema                                  9.3    5.82  )-----------( 90       ( 27 Jun 2022 05:23 )             28.6     06-27    134<L>  |  95<L>  |  67<H>  ----------------------------<  78  4.6   |  23  |  8.29<H>    Ca    6.8<L>      27 Jun 2022 05:23  Phos  4.4     06-27  Mg     1.80     06-27      proBNP:   Lipid Profile:   HgA1c:   TSH:     Consultant(s) Notes Reviewed:  [x ] YES  [ ] NO    Care Discussed with Consultants/Other Providers [ x] YES  [ ] NO    Imaging Personally Reviewed independently:  [x] YES  [ ] NO    All labs, radiologic studies, vitals, orders and medications list reviewed. Patient is seen and examined at bedside. Case discussed with medical team.

## 2022-06-27 NOTE — PROGRESS NOTE ADULT - SUBJECTIVE AND OBJECTIVE BOX
[facility] - S Alicia / Ej S /D Federico/ S Maryellen/ S Govind/ Jovan Garrison / GAYATRI Matsonu/ O Gregorio  service -[phone], office [phone]  ---------------------------------------------------------------------------------------------------------------    Patient seen and examined bedside    Subjective and Objective: No overnight events, sob resolved. No complaints today. feeling better    Allergies: No Known Allergies      Hospital Medications:   MEDICATIONS  (STANDING):  apixaban 5 milliGRAM(s) Oral every 12 hours  atorvastatin 80 milliGRAM(s) Oral at bedtime  epoetin tammi-epbx (RETACRIT) Injectable 6000 Unit(s) IV Push <User Schedule>  metoprolol succinate ER 50 milliGRAM(s) Oral daily  pantoprazole    Tablet 40 milliGRAM(s) Oral two times a day  sevelamer carbonate 1600 milliGRAM(s) Oral three times a day with meals      REVIEW OF SYSTEMS:  CONSTITUTIONAL: No weakness, fevers or chills  EYES/ENT: No visual changes;  No vertigo or throat pain   NECK: No pain or stiffness  RESPIRATORY: No cough, wheezing, hemoptysis; No shortness of breath  CARDIOVASCULAR: No chest pain or palpitations.  GASTROINTESTINAL: No abdominal or epigastric pain. No nausea, vomiting, or hematemesis; No diarrhea or constipation. No melena or hematochezia.  GENITOURINARY: No dysuria, frequency, foamy urine, urinary urgency, incontinence or hematuria  NEUROLOGICAL: No numbness or weakness  SKIN: No itching, burning, rashes, or lesions   VASCULAR: No bilateral lower extremity edema.   All other review of systems is negative unless indicated above.    VITALS:  T(F): 98.3 (06-27-22 @ 17:04), Max: 98.3 (06-27-22 @ 17:04)  HR: 77 (06-27-22 @ 17:04)  BP: 153/87 (06-27-22 @ 17:04)  RR: 17 (06-27-22 @ 17:04)  SpO2: 100% (06-27-22 @ 17:04)  Wt(kg): --        PHYSICAL EXAM:  Constitutional: NAD  HEENT: anicteric sclera, oropharynx clear  Neck: No JVD  Respiratory: CTAB, no wheezes, rales or rhonchi  Cardiovascular: S1, S2, RRR  Gastrointestinal: BS+, soft, NT/ND  Extremities: No cyanosis or clubbing. No peripheral edema  Neurological: A/O x 3, no focal deficits  Psychiatric: Normal mood, normal affect  : No CVA tenderness. No siegel.   Skin: No rashes  Vascular Access:    LABS:  06-27    134<L>  |  95<L>  |  67<H>  ----------------------------<  78  4.6   |  23  |  8.29<H>    Ca    6.8<L>      27 Jun 2022 05:23  Phos  4.4     06-27  Mg     1.80     06-27      Creatinine Trend: 8.29 <--, 5.78 <--, 7.85 <--, 5.77 <--, 8.15 <--, 5.82 <--, 6.17 <--                        9.3    5.82  )-----------( 90       ( 27 Jun 2022 05:23 )             28.6     Urine Studies:        RADIOLOGY & ADDITIONAL STUDIES:   [facility] - S Alicia / Ej S /D Federico/ S Maryellen/ S Govind/ Jovan Garrison / GAYATRI Matsonu/ O Gregorio  service -[phone], office [phone]  ---------------------------------------------------------------------------------------------------------------    Patient seen and examined bedside    Subjective and Objective: No overnight events, denied sob. No complaints today. feeling better  reports  much heavier than his dry weight, asking to inc uf removal w/hd    Allergies: No Known Allergies      Hospital Medications:   MEDICATIONS  (STANDING):  apixaban 5 milliGRAM(s) Oral every 12 hours  atorvastatin 80 milliGRAM(s) Oral at bedtime  epoetin tammi-epbx (RETACRIT) Injectable 6000 Unit(s) IV Push <User Schedule>  metoprolol succinate ER 50 milliGRAM(s) Oral daily  pantoprazole    Tablet 40 milliGRAM(s) Oral two times a day  sevelamer carbonate 1600 milliGRAM(s) Oral three times a day with meals    VITALS:  T(F): 98.3 (06-27-22 @ 17:04), Max: 98.3 (06-27-22 @ 17:04)  HR: 77 (06-27-22 @ 17:04)  BP: 153/87 (06-27-22 @ 17:04)  RR: 17 (06-27-22 @ 17:04)  SpO2: 100% (06-27-22 @ 17:04)  Wt(kg): --    PHYSICAL EXAM:  Constitutional: NAD  HEENT: anicteric sclera  Neck: No JVD  Respiratory: CTAB, no wheezes, rales or rhonchi  Cardiovascular: S1, S2, RRR  Gastrointestinal: BS+, soft, NT/ND  Extremities: No peripheral edema  Neurological: A/O x 3, no focal deficits  Psychiatric: Normal mood, normal affect  : No siegel.   Vascular Access: avf+    LABS:  06-27    134<L>  |  95<L>  |  67<H>  ----------------------------<  78  4.6   |  23  |  8.29<H>    Ca    6.8<L>      27 Jun 2022 05:23  Phos  4.4     06-27  Mg     1.80     06-27      Creatinine Trend: 8.29 <--, 5.78 <--, 7.85 <--, 5.77 <--, 8.15 <--, 5.82 <--, 6.17 <--                        9.3    5.82  )-----------( 90       ( 27 Jun 2022 05:23 )             28.6     Urine Studies:        RADIOLOGY & ADDITIONAL STUDIES:

## 2022-06-27 NOTE — PROGRESS NOTE ADULT - ASSESSMENT
67 year old right-handed male w/ PMHx HTN, AFib (no longer on apixaban d/t GIB, not on any antithrombotics at home), ESRD (on HD Tu/Th/Sat), hypothyroid, dentures, no history of stroke (per patient), p/w dysarthria and R sensorimotor deficits onset 15:30PM 6/16/22, approximately 30 minutes after completing HD. No similar symptoms in the past. Rigors on the night prior to arrival. He denies h/o diabetes or smoking. CT head: no acute pathology, chronic left thalamic lacunar infarct. Symptoms improving in the ED. TTE w/ bubble reveals EF of 58%, no PFO, grossly normal otherwise. MRI brain w/o contrast demonstrates acute infarct in the L corona radiata w/o hemorrhage. Today (6/21), patient reports persisting slur & improving strength. Exam as above.      Impression: Improving R sensorimotor syndrome secondary to acute L corona radiata infarct that appears to be more related to small vessel disease than cardioembolism, even in the setting of atrial fibrillation. However, patient should be restarted on a DOAC if cleared by GI as they are at risk for cardioembolism with [id number] score of 4. Otherwise would recommend alternative to long term anticoagulation such as Watchman Device; however, this would require short term course of anticoagulation followed by DAPT regardless.     Recommendations    Imaging:  [] May need TIFFANIE for evaluation for Watchman Device if cleared by GI for AC & cardio in agreement. Can be done outpatient, should not hold discharge.     Meds:  [] Restart on Apixaban 5 mg BID if cleared by GI, started   [] Atorvastatin 40 mg, titrate to LDL < 70   [] Chemical DVT prophylaxis with Lovenox SQ    Other:  [] Recommend evaluation for Watchman Device as alternative to long term anticoagulation given multiple episodes of GI bleeding.   [] Normotension, avoid hypotension (long term goals between 110-140/<90 mmHg).  [] PT/OT - home, no skilled PT needs at this time  [] C/w regular diet as tolerated  [] HgA1C goals < 7.0 (currently 5.2)  [] Stroke education discussed at bedside  [] GI consult recs appreciated   [] Cardio on board, appreciate recommendations  [] Nephro on board, appreciate recommendations  [] Rest of workup as per primary team  [] Upon discharge, patient should follow up outpatient with Dr. Horn:  [phone] [address] [place] [address]    From neurovascular standpoint, no further inpatient investigations are indicated at this time.      dispo planning   Ashish Horn MD  Vascular Neurology      Please call with questions: [phone]

## 2022-06-28 LAB
ANION GAP SERPL CALC-SCNC: 20 MMOL/L — HIGH (ref 7–14)
BUN SERPL-MCNC: 85 MG/DL — HIGH (ref 7–23)
CALCIUM SERPL-MCNC: 6.7 MG/DL — LOW (ref 8.4–10.5)
CHLORIDE SERPL-SCNC: 95 MMOL/L — LOW (ref 98–107)
CO2 SERPL-SCNC: 21 MMOL/L — LOW (ref 22–31)
CREAT SERPL-MCNC: 10.6 MG/DL — HIGH (ref 0.5–1.3)
EGFR: 5 ML/MIN/1.73M2 — LOW
GLUCOSE SERPL-MCNC: 87 MG/DL — SIGNIFICANT CHANGE UP (ref 70–99)
HCT VFR BLD CALC: 30.4 % — LOW (ref 39–50)
HGB BLD-MCNC: 9.5 G/DL — LOW (ref 13–17)
MAGNESIUM SERPL-MCNC: 1.8 MG/DL — SIGNIFICANT CHANGE UP (ref 1.6–2.6)
MCHC RBC-ENTMCNC: 30.1 PG — SIGNIFICANT CHANGE UP (ref 27–34)
MCHC RBC-ENTMCNC: 31.3 GM/DL — LOW (ref 32–36)
MCV RBC AUTO: 96.2 FL — SIGNIFICANT CHANGE UP (ref 80–100)
NRBC # BLD: 0 /100 WBCS — SIGNIFICANT CHANGE UP
NRBC # FLD: 0 K/UL — SIGNIFICANT CHANGE UP
PHOSPHATE SERPL-MCNC: 4.7 MG/DL — HIGH (ref 2.5–4.5)
PLATELET # BLD AUTO: 101 K/UL — LOW (ref 150–400)
POTASSIUM SERPL-MCNC: 5 MMOL/L — SIGNIFICANT CHANGE UP (ref 3.5–5.3)
POTASSIUM SERPL-SCNC: 5 MMOL/L — SIGNIFICANT CHANGE UP (ref 3.5–5.3)
RBC # BLD: 3.16 M/UL — LOW (ref 4.2–5.8)
RBC # FLD: 14 % — SIGNIFICANT CHANGE UP (ref 10.3–14.5)
SODIUM SERPL-SCNC: 136 MMOL/L — SIGNIFICANT CHANGE UP (ref 135–145)
WBC # BLD: 7.29 K/UL — SIGNIFICANT CHANGE UP (ref 3.8–10.5)
WBC # FLD AUTO: 7.29 K/UL — SIGNIFICANT CHANGE UP (ref 3.8–10.5)

## 2022-06-28 RX ORDER — MAGNESIUM SULFATE 500 MG/ML
1 VIAL (ML) INJECTION ONCE
Refills: 0 | Status: COMPLETED | OUTPATIENT
Start: 2022-06-28 | End: 2022-06-28

## 2022-06-28 RX ORDER — METOPROLOL TARTRATE 50 MG
75 TABLET ORAL DAILY
Refills: 0 | Status: DISCONTINUED | OUTPATIENT
Start: 2022-06-29 | End: 2022-07-01

## 2022-06-28 RX ADMIN — SEVELAMER CARBONATE 1600 MILLIGRAM(S): 2400 POWDER, FOR SUSPENSION ORAL at 21:04

## 2022-06-28 RX ADMIN — PANTOPRAZOLE SODIUM 40 MILLIGRAM(S): 20 TABLET, DELAYED RELEASE ORAL at 21:03

## 2022-06-28 RX ADMIN — PANTOPRAZOLE SODIUM 40 MILLIGRAM(S): 20 TABLET, DELAYED RELEASE ORAL at 06:15

## 2022-06-28 RX ADMIN — Medication 50 MILLIGRAM(S): at 06:15

## 2022-06-28 RX ADMIN — ERYTHROPOIETIN 6000 UNIT(S): 10000 INJECTION, SOLUTION INTRAVENOUS; SUBCUTANEOUS at 18:49

## 2022-06-28 RX ADMIN — SEVELAMER CARBONATE 1600 MILLIGRAM(S): 2400 POWDER, FOR SUSPENSION ORAL at 08:58

## 2022-06-28 RX ADMIN — APIXABAN 5 MILLIGRAM(S): 2.5 TABLET, FILM COATED ORAL at 06:15

## 2022-06-28 RX ADMIN — APIXABAN 5 MILLIGRAM(S): 2.5 TABLET, FILM COATED ORAL at 21:04

## 2022-06-28 RX ADMIN — SEVELAMER CARBONATE 1600 MILLIGRAM(S): 2400 POWDER, FOR SUSPENSION ORAL at 13:01

## 2022-06-28 RX ADMIN — ATORVASTATIN CALCIUM 80 MILLIGRAM(S): 80 TABLET, FILM COATED ORAL at 21:04

## 2022-06-28 RX ADMIN — Medication 100 GRAM(S): at 08:58

## 2022-06-28 NOTE — PROGRESS NOTE ADULT - TIME BILLING
-d/w ACP  -D/C PLANNING
-d/c planning
-d/w ACP
-review of the history and records  -general and neurological examination  -generation of assessment and treatment plan  -communication with the patient/family members  -coordination of care.
-d/w ACP  -D/C PLANNING
-d/c planning
-d/w ACP

## 2022-06-28 NOTE — PROGRESS NOTE ADULT - SUBJECTIVE AND OBJECTIVE BOX
Neurology Progress Note     INTERVAL HISTORY: Patient seen at bedside by stroke team  dispo planning      MEDICATIONS (HOME):  Home Medications:  NIFEdipine 60 mg oral tablet, extended release: 1 tab(s) orally once a day (17 Jun 2022 17:50)      MEDICATIONS  (STANDING):  apixaban 5 milliGRAM(s) Oral every 12 hours  atorvastatin 80 milliGRAM(s) Oral at bedtime  epoetin tammi-epbx (RETACRIT) Injectable 6000 Unit(s) IV Push <User Schedule>  pantoprazole    Tablet 40 milliGRAM(s) Oral two times a day  sevelamer carbonate 1600 milliGRAM(s) Oral three times a day with meals    MEDICATIONS  (PRN):  acetaminophen     Tablet .. 650 milliGRAM(s) Oral every 6 hours PRN Temp greater or equal to 38C (100.4F), Mild Pain (1 - 3)  aluminum hydroxide/magnesium hydroxide/simethicone Suspension 30 milliLiter(s) Oral every 4 hours PRN Dyspepsia  melatonin 3 milliGRAM(s) Oral at bedtime PRN Insomnia  ondansetron Injectable 4 milliGRAM(s) IV Push every 8 hours PRN Nausea and/or Vomiting      ALLERGIES/INTOLERANCES:  Allergies  No Known Allergies    Intolerances    VITALS & EXAMINATION:    Vital Signs Last 24 Hrs  T(C): 36.9 (06-28-22 @ 16:15), Max: 36.9 (06-27-22 @ 20:39)  T(F): 98.4 (06-28-22 @ 16:15), Max: 98.4 (06-27-22 @ 20:39)  HR: 74 (06-28-22 @ 16:15) (70 - 74)  BP: 167/107 (06-28-22 @ 16:15) (134/69 - 167/107)  BP(mean): --  RR: 17 (06-28-22 @ 16:15) (17 - 18)  SpO2: 98% (06-28-22 @ 12:13) (97% - 100%)              General:  Constitutional: Male, appears stated age, in no apparent distress including pain  Head: Normocephalic & Atraumatic.    Neurological:  MS: Eyes open. Awake, alert, oriented to person, place, situation, time. Follows all commands.    Language: Speech is mild to moderately dysarthric, fluent with good repetition & comprehension.    CNs: VFF. EOMI no nystagmus. V1-3 intact to LT b/l. Moderate R facial palsy. Hearing grossly normal (rubbing fingers) b/l. Tongue midline, normal movements, no atrophy.     Motor: Normal muscle bulk & tone. No noticeable tremor. Slight pronator drift in RUE, no drift otherwise. No drift in LUE, LLE, RLE. 5/5 throughout. Spontaneous & purposeful movements throughout. 	     Sensation: Decreased sensation to LT/Temp on R.    Coordination: Slower fast finger tapping on R when compared to L. LUE orbits around RUE with rolling arm motion. No dysmetria to FTN.    Gait: No postural instability. Gait testing deferred.      LABORATORY:  CBC Full  -  ( 28 Jun 2022 06:46 )  WBC Count : 7.29 K/uL  RBC Count : 3.16 M/uL  Hemoglobin : 9.5 g/dL  Hematocrit : 30.4 %  Platelet Count - Automated : 101 K/uL  Mean Cell Volume : 96.2 fL  Mean Cell Hemoglobin : 30.1 pg  Mean Cell Hemoglobin Concentration : 31.3 gm/dL  Auto Neutrophil # : x  Auto Lymphocyte # : x  Auto Monocyte # : x  Auto Eosinophil # : x  Auto Basophil # : x  Auto Neutrophil % : x  Auto Lymphocyte % : x  Auto Monocyte % : x  Auto Eosinophil % : x  Auto Basophil % : x    06-28    136  |  95<L>  |  85<H>  ----------------------------<  87  5.0   |  21<L>  |  10.60<H>    Ca    6.7<L>      28 Jun 2022 06:46  Phos  4.7     06-28  Mg     1.80     06-28          LFTs   Coagulopathy   Lipid Panel 06-19 Chol 140 LDL 60 HDL 64 Trig 81  A1c 5.2     STUDIES & IMAGING:    Radiology (XR, CT, MR, U/S, TTE/TIFFANIE):      MRI Brain: A wedge-shaped area of restricted diffusion is seen within the   left corona radiata with associated T2/FLAIR hyperintense signal. There   is no hemorrhagic transformation.    A chronic lacunar infarct is again noted within the left thalamus.    Multiple additional patchy confluent nonspecific T2/FLAIR hyperintense   signal changes are noted throughout the bihemispheric white matter   without associated mass effect or restricted diffusion.    There is enhancing mass lesion within the right internal auditory canal   measuring 0.5 x 0.8 cm in the axial plane (AP by transverse) (series 14,   image 43).    Otherwise, no abnormal brain parenchymal or leptomeningeal enhancement is   notable.    Ventricular size and configuration is unremarkable. No abnormal extra   axial fluid collections are seen. Flow-voids are noted throughout the   major intracranial vessels, on the T2 weighted images, consistent with   their patency. The sellar region appears unremarkable.    Polypoidal soft tissue is seen within the left maxillary sinus.   Additional scattered mucosal thickening is seen throughout the ethmoid   complex. The mastoid air cells are clear. Calvarial signal appears   unremarkable. The orbits appear unremarkable apart from bilateral   cataract removal.    Posterior parietal scalp vertex scarring is seen.    MRA Neck: There is a standard anatomic configuration to the aortic arch.    The origins of the great vessels appear unremarkable. The bilateral   common carotid arteries and carotid bifurcations appear unremarkable.    The bilateral cervical internal carotid arteries are within normal limits.    The origins of the bilateral vertebral arteries are normal. The bilateral   cervical vertebral arteries are normal in course and caliber.    MRA Holcomb of Birch: The bilateral intracranial internal carotid,   anterior, and middle cerebral arteries appear unremarkable.    The anterior communicating artery is seen. The bilateral posterior   communicating arteries are not well resolved.    The bilateral intradural vertebral arteries, vertebrobasilar junction,   basilar artery, and basilar tip appear unremarkable as well as the   bilateral posterior cerebral arteries.    IMPRESSION:    MRI BRAIN:  1. Acute/subacute left-sided corona radiata infarction with associated   cytotoxic edema and without hemorrhagic transformation.  2. Enhancing mass lesion within the right internal auditory canal, most   compatible with a vestibular schwannoma. Recommend serial imaging over   time to assess for stability or change.  3. Chronic lacunar infarct within the left thalamus.  4. Multiple patchy confluent nonspecific abnormal white matter foci of   T2/FLAIR prolongation statistically favoring microvascular disease.    MRA NECK AND HEAD:  1. No large vessel occlusion or major stenosis.    --- End of Report ---      < from: Transthoracic Echocardiogram (06.18.22 @ 07:56) >  CONCLUSIONS:  1. Calcified trileaflet aortic valve with normal opening.  Minimal aortic regurgitation.  2. Mildly dilated left atrium.  LA volume index = 39 cc/m2.  3. Normal left ventricular systolic function. No segmental  wall motion abnormalities.  4. Normal right ventricular size and function.  5. Agitated saline injection demonstrates no obvious  evidence of a patent foramen ovale.        CT Brain Stroke Protocol (06.17.22 @ 15:41)    FINDINGS:    Assessment of the posterior fossa and henok is somewhat limited by beam   hardening artifact.  Ventricles and sulci: Parenchymal volume loss is present which is   commensurate with patient age.  Intra-axial: Periventricular small vessel white matter ischemic changes   are appreciated. Old left thalamic lacunar infarct. No intracranial mass,   acute hemorrhage, or midline shift is present.  Extra-axial: No extra-axial fluid collection is identified. Deposition of   calcified plaques in association with the distal intracranial bilateral   vertebral arteries and bilateral carotid siphons.  Calvarium: Intact.    Bilateral optic globes are intact. Left maxillary sinus polyp versus   retention cysts. Bilateral mastoid air cells and middle ear cavities are   predominantly clear.    paranasal sinuses, bilateral mastoid air cells, middle ear cavities are   clear.    IMPRESSION: Age-appropriate involutional changes. No large arterial   distribution acute infarct. Old left thalamic lacunar infarct.    Findings were communicated by Dr. Behr-Ventura to Dr. STACIA Bojorquez at   approximately 3:50 PM 6/17/2022.

## 2022-06-28 NOTE — PROVIDER CONTACT NOTE (OTHER) - BACKGROUND
admitted for aphasia. PMH: afib, thrombocytopenia, anemia. hyperparathyroidism, ESRD, kidney stones, HTN.

## 2022-06-28 NOTE — PROGRESS NOTE ADULT - SUBJECTIVE AND OBJECTIVE BOX
Subjective:  Arrythmia   last night   ___________________________________________________________________________________________  Allergies    No Known Allergies    Intolerances      MEDICATIONS  (STANDING):  aspirin enteric coated 81 milliGRAM(s) Oral daily  atorvastatin 80 milliGRAM(s) Oral at bedtime  calcium acetate 667 milliGRAM(s) Oral three times a day with meals  chlorhexidine 2% Cloths 1 Application(s) Topical once  heparin   Injectable 5000 Unit(s) SubCutaneous every 8 hours  metoprolol succinate ER 25 milliGRAM(s) Oral daily    MEDICATIONS  (PRN):  acetaminophen     Tablet .. 650 milliGRAM(s) Oral every 6 hours PRN Temp greater or equal to 38C (100.4F), Mild Pain (1 - 3)  aluminum hydroxide/magnesium hydroxide/simethicone Suspension 30 milliLiter(s) Oral every 4 hours PRN Dyspepsia  melatonin 3 milliGRAM(s) Oral at bedtime PRN Insomnia  ondansetron Injectable 4 milliGRAM(s) IV Push every 8 hours PRN Nausea and/or Vomiting      PAST MEDICAL & SURGICAL HISTORY:  HTN (Hypertension)      Chronic kidney disease      Kidney stones      Hemodialysis access, AV graft  Left upper extremity      Hyperparathyroidism      Anemia      Thrombocytopenia      Atrial fibrillation  on Eliquis      S/P Nephrectomy  (L)  for kidney stones      Acquired arteriovenous fistula  left wrist  2015 - [facility], Left upper arm 2015 (approximate date)      AVF (arteriovenous fistula)        FAMILY HISTORY:  Family history of CVA (Father)      Social History: No hsitory of : Tobacco use, IVDA, EToH  ______________________________________________________________________________________    PHYSICAL EXAM    Daily     Daily Weight in k (2022 09:15)  BMI: 21.5 (06-17 @ 15:15)  Change in Weight:  Vital Signs Last 24 Hrs  T(C): 36.7 (2022 09:15), Max: 36.8 (2022 19:55)  T(F): 98.1 (2022 09:15), Max: 98.2 (2022 19:55)  HR: 68 (2022 09:15) (68 - 82)  BP: 150/99 (2022 09:15) (138/89 - 155/98)  BP(mean): --  RR: 16 (2022 09:15) (16 - 19)  SpO2: 100% (2022 09:00) (98% - 100%)    General:  Well developed, well nourished, alert and active, no pallor, NAD.  HEENT:    Normal appearance of conjunctiva, ears, nose, lips, oropharynx, and oral mucosa, anicteric.  Neck:  No masses, no asymmetry.  Lymph Nodes:  No lymphadenopathy.   Cardiovascular:  RRR normal S1/S2, no murmur.  Respiratory:  CTA B/L, normal respiratory effort.   Abdominal:   soft, no masses or tenderness, normoactive BS, NT/ND, no HSM.  Extremities:   No clubbing or cyanosis, normal capillary refill, no edema.   Skin:   No rash, jaundice, lesions, eczema.   Musculoskeletal:  No joint swelling, erythema or tenderness.   Neuro: No focal deficits.   Other:   _______________________________________________________________________________________________  Lab Results:                          11.0   4.67  )-----------( 104      ( 2022 06:21 )             36.1     06-    139  |  97<L>  |  35<H>  ----------------------------<  83  4.2   |  26  |  6.17<H>    Ca    7.7<L>      2022 06:20  Phos  7.0     06-21  Mg     1.90     06-                Stool Results:          RADIOLOGY RESULTS:    SURGICAL PATHOLOGY:

## 2022-06-28 NOTE — PROGRESS NOTE ADULT - ASSESSMENT
67 year old right-handed male w/ PMHx HTN, AFib (no longer on apixaban d/t GIB, not on any antithrombotics at home), ESRD (on HD Tu/Th/Sat), hypothyroid, dentures, no history of stroke (per patient), p/w dysarthria and R sensorimotor deficits onset 15:30PM 6/16/22, approximately 30 minutes after completing HD. No similar symptoms in the past. Rigors on the night prior to arrival. He denies h/o diabetes or smoking. CT head: no acute pathology, chronic left thalamic lacunar infarct. Symptoms improving in the ED. TTE w/ bubble reveals EF of 58%, no PFO, grossly normal otherwise. MRI brain w/o contrast demonstrates acute infarct in the L corona radiata w/o hemorrhage. Today (6/21), patient reports persisting slur & improving strength. Exam as above.      Impression: Improving R sensorimotor syndrome secondary to acute L corona radiata infarct that appears to be more related to small vessel disease than cardioembolism, even in the setting of atrial fibrillation. However, patient should be restarted on a DOAC if cleared by GI as they are at risk for cardioembolism with [id number] score of 4. Otherwise would recommend alternative to long term anticoagulation such as Watchman Device; however, this would require short term course of anticoagulation followed by DAPT regardless.     Recommendations    Imaging:  [] May need TIFFANIE for evaluation for Watchman Device if cleared by GI for AC & cardio in agreement. Can be done outpatient, should not hold discharge.     Meds:  [] Restart on Apixaban 5 mg BID if cleared by GI, started   [] Atorvastatin 40 mg, titrate to LDL < 70   [] Chemical DVT prophylaxis with Lovenox SQ    Other:  [] Recommend evaluation for Watchman Device as alternative to long term anticoagulation given multiple episodes of GI bleeding.   [] Normotension, avoid hypotension (long term goals between 110-140/<90 mmHg).  [] PT/OT - home, no skilled PT needs at this time  [] C/w regular diet as tolerated  [] HgA1C goals < 7.0 (currently 5.2)  [] Stroke education discussed at bedside  [] GI consult recs appreciated   [] Cardio on board, appreciate recommendations  [] Nephro on board, appreciate recommendations  [] Rest of workup as per primary team  [] Upon discharge, patient should follow up outpatient with Dr. Horn:  [phone] [address] [place] [address]    From neurovascular standpoint, no further inpatient investigations are indicated at this time.      dispo planning   Ashish Horn MD  Vascular Neurology      Please call with questions: [phone]

## 2022-06-28 NOTE — PROVIDER CONTACT NOTE (OTHER) - REASON
patient had 13 beats of vtach with no complaints of chest pain or palpitations
patient bp 162/98
patient had 10 beats of vtach with no complaints of chest pain or palpitations

## 2022-06-28 NOTE — PROVIDER CONTACT NOTE (OTHER) - SITUATION
patient had 10 beats of vtach with no complaints of chest pain or palpitations
patient bp 162/98
patient had 13 beats of vtach with no complaints of chest pain or palpitations

## 2022-06-28 NOTE — PROGRESS NOTE ADULT - ASSESSMENT
EKG SR no ischemic changes       1) ?CVA  -echo normal monitor on tele  - MRI/MRA shows acute CVA  - pt with documented afib no need for ILR   - pt has h/o Afib stoppped 2/2 GIB, GI and neuro on board who gave ok to restart eliquis 5 mg po BID      2) ESRD   -on HD   -f/u renal recs     3) NSVT   -had additional 13bts  -normal LV function  -Toprol increased to 75mg daily    3) DVT PPX  -eliquis

## 2022-06-28 NOTE — PROGRESS NOTE ADULT - ASSESSMENT
67 year old right-handed male w/ PMHx HTN, AFib (no longer on apixaban d/t GIB, not on any antithrombotics at home), ESRD (on HD Tu/Th/Sat), hypothyroid, dentures, no history of stroke (per patient), p/w dysarthria and R sensorimotor deficits onset 15:30PM 6/16/22, approximately 30 minutes after completing HD. No similar symptoms in the past. Rigors on the night prior to arrival. He denies h/o diabetes or smoking. Code stroke cancelled as symptoms onset 24 hours prior to presentation. CT head: no acute pathology, chronic left thalamic lacunar infarct. Symptoms improving in the ED. RENAL following for ESRD/ HD Mx.    End Stage Renal Disease on Dialysis   [facility]  K, vol ok   Hb now < goal    plan for HD today w/2k bath, inc net uf to 2-2.5kg as tolerated  dose meds for ESRD  renal diet, fluid restriction  Anemia in CKD Hb <goal. watch H/H, added FRANNY epo 6k tiw w/hd tiw  Hyperphosphatemia- changed phoslo 667 tidac >renvela 2tidac  HTN, controlled. BP high, suboptimal control. c/w BB. inc uf w/hd  CVA -CTH no acute infarct/bleed. MRI brain w/o contrast demonstrates acute infarct in the L corona radiata w/o hemorrhage.   mx per neuro  h/o Afib -restarted on AC.  f/u w/cariology     plan for rehab placement  labs, chart reviewed  poc d/w pt  For any question, call:  Cell # [phone]  Pager # [phone]

## 2022-06-28 NOTE — PROGRESS NOTE ADULT - SUBJECTIVE AND OBJECTIVE BOX
CHAVEZ MARQUEZ  67y  Male      Patient is a 67y old  Male who presents with a chief complaint of dysarthria and weakness (28 Jun 2022 17:41)      REVIEW OF SYSTEMS:  CONSTITUTIONAL: No fever  RESPIRATORY: No cough, hemoptysis or shortness of breath  CARDIOVASCULAR: No chest pain, palpitations, dizziness, or leg swelling  GASTROINTESTINAL: No abdominal pain. nausea, vomiting, hematemesis  GENITOURINARY: No dysuria, frequency, hematuria   NEUROLOGICAL: No headaches, no dizziness  MUSCULOSKELETAL: No joint pain or swelling;     INTERVAL HPI/OVERNIGHT EVENTS:  T(C): 36.7 (06-28-22 @ 20:41), Max: 37.1 (06-28-22 @ 19:30)  HR: 88 (06-28-22 @ 20:41) (65 - 88)  BP: 198/105 (06-28-22 @ 20:41) (134/69 - 198/105)  RR: 18 (06-28-22 @ 20:41) (17 - 18)  SpO2: 95% (06-28-22 @ 20:41) (95% - 98%)  Wt(kg): --  I&O's Summary    28 Jun 2022 07:01  -  28 Jun 2022 20:58  --------------------------------------------------------  IN: 400 mL / OUT: 2900 mL / NET: -2500 mL      T(C): 36.7 (06-28-22 @ 20:41), Max: 37.1 (06-28-22 @ 19:30)  HR: 88 (06-28-22 @ 20:41) (65 - 88)  BP: 198/105 (06-28-22 @ 20:41) (134/69 - 198/105)  RR: 18 (06-28-22 @ 20:41) (17 - 18)  SpO2: 95% (06-28-22 @ 20:41) (95% - 98%)  Wt(kg): --Vital Signs Last 24 Hrs  T(C): 36.7 (28 Jun 2022 20:41), Max: 37.1 (28 Jun 2022 19:30)  T(F): 98.1 (28 Jun 2022 20:41), Max: 98.7 (28 Jun 2022 19:30)  HR: 88 (28 Jun 2022 20:41) (65 - 88)  BP: 198/105 (28 Jun 2022 20:41) (134/69 - 198/105)  BP(mean): --  RR: 18 (28 Jun 2022 20:41) (17 - 18)  SpO2: 95% (28 Jun 2022 20:41) (95% - 98%)    LABS:                        9.5    7.29  )-----------( 101      ( 28 Jun 2022 06:46 )             30.4     06-28    136  |  95<L>  |  85<H>  ----------------------------<  87  5.0   |  21<L>  |  10.60<H>    Ca    6.7<L>      28 Jun 2022 06:46  Phos  4.7     06-28  Mg     1.80     06-28          CAPILLARY BLOOD GLUCOSE                PAST MEDICAL & SURGICAL HISTORY:  HTN (Hypertension)      Chronic kidney disease      Kidney stones      Hemodialysis access, AV graft  Left upper extremity      Hyperparathyroidism      Anemia      Thrombocytopenia      Atrial fibrillation  on Eliquis      S/P Nephrectomy  (L) 2007 for kidney stones      Acquired arteriovenous fistula  left wrist  1/2015 - [facility], Left upper arm 4/2015 (approximate date)      AVF (arteriovenous fistula)          MEDICATIONS  (STANDING):  apixaban 5 milliGRAM(s) Oral every 12 hours  atorvastatin 80 milliGRAM(s) Oral at bedtime  epoetin tammi-epbx (RETACRIT) Injectable 6000 Unit(s) IV Push <User Schedule>  pantoprazole    Tablet 40 milliGRAM(s) Oral two times a day  sevelamer carbonate 1600 milliGRAM(s) Oral three times a day with meals    MEDICATIONS  (PRN):  acetaminophen     Tablet .. 650 milliGRAM(s) Oral every 6 hours PRN Temp greater or equal to 38C (100.4F), Mild Pain (1 - 3)  aluminum hydroxide/magnesium hydroxide/simethicone Suspension 30 milliLiter(s) Oral every 4 hours PRN Dyspepsia  melatonin 3 milliGRAM(s) Oral at bedtime PRN Insomnia  ondansetron Injectable 4 milliGRAM(s) IV Push every 8 hours PRN Nausea and/or Vomiting        RADIOLOGY & ADDITIONAL TESTS:    Imaging Personally Reviewed:  [ ] YES  [ ] NO    Consultant(s) Notes Reviewed:  [ ] YES  [ ] NO    PHYSICAL EXAM:  GENERAL: Alert and awake lying in bed in no distress  HEAD:  Atraumatic, Normocephalic  EYES: EOMI, LILIAM, conjunctiva and sclera clear  NECK: Supple, No JVD, Normal thyroid  NERVOUS SYSTEM:  Alert & Oriented X3, Motor and sensory systems are intact,   CHEST/LUNG: Bilateral clear breath sounds, no rhochi, no wheezing, no crepitations,  HEART: Regular rate and rhythm; No murmurs, rubs, or gallops  ABDOMEN: Soft, Nontender, Nondistended; Bowel sounds present  EXTREMITIES:   Peripheral Pulses are palpable, no  edema        Care Discussed with Consultants/Other Providers [ ] YES  [ ] NO      Code Status: [] Full Code [] DNR [] DNI [] Goals of Care:   Disposition: [] ICU [] Stroke Unit [] RCU []PCU []Floor [] Discharge Home         ADALGISA McwilliamsP     CHAVEZ MARQUEZ  67y  Male      Patient is a 67y old  Male who presents with a chief complaint of dysarthria and weakness (28 Jun 2022 17:41)  feels better,no new c/o    REVIEW OF SYSTEMS:  CONSTITUTIONAL: No fever  RESPIRATORY: No cough, hemoptysis or shortness of breath  CARDIOVASCULAR: No chest pain, palpitations, dizziness, or leg swelling  GASTROINTESTINAL: No abdominal pain. nausea, vomiting, hematemesis  GENITOURINARY: No dysuria, frequency, hematuria   NEUROLOGICAL: No headaches, no dizziness  MUSCULOSKELETAL: No joint pain or swelling;     INTERVAL HPI/OVERNIGHT EVENTS:  T(C): 36.7 (06-28-22 @ 20:41), Max: 37.1 (06-28-22 @ 19:30)  HR: 88 (06-28-22 @ 20:41) (65 - 88)  BP: 198/105 (06-28-22 @ 20:41) (134/69 - 198/105)  RR: 18 (06-28-22 @ 20:41) (17 - 18)  SpO2: 95% (06-28-22 @ 20:41) (95% - 98%)  Wt(kg): --  I&O's Summary    28 Jun 2022 07:01  -  28 Jun 2022 20:58  --------------------------------------------------------  IN: 400 mL / OUT: 2900 mL / NET: -2500 mL      T(C): 36.7 (06-28-22 @ 20:41), Max: 37.1 (06-28-22 @ 19:30)  HR: 88 (06-28-22 @ 20:41) (65 - 88)  BP: 198/105 (06-28-22 @ 20:41) (134/69 - 198/105)  RR: 18 (06-28-22 @ 20:41) (17 - 18)  SpO2: 95% (06-28-22 @ 20:41) (95% - 98%)  Wt(kg): --Vital Signs Last 24 Hrs  T(C): 36.7 (28 Jun 2022 20:41), Max: 37.1 (28 Jun 2022 19:30)  T(F): 98.1 (28 Jun 2022 20:41), Max: 98.7 (28 Jun 2022 19:30)  HR: 88 (28 Jun 2022 20:41) (65 - 88)  BP: 198/105 (28 Jun 2022 20:41) (134/69 - 198/105)  BP(mean): --  RR: 18 (28 Jun 2022 20:41) (17 - 18)  SpO2: 95% (28 Jun 2022 20:41) (95% - 98%)    LABS:                        9.5    7.29  )-----------( 101      ( 28 Jun 2022 06:46 )             30.4     06-28    136  |  95<L>  |  85<H>  ----------------------------<  87  5.0   |  21<L>  |  10.60<H>    Ca    6.7<L>      28 Jun 2022 06:46  Phos  4.7     06-28  Mg     1.80     06-28          CAPILLARY BLOOD GLUCOSE                PAST MEDICAL & SURGICAL HISTORY:  HTN (Hypertension)      Chronic kidney disease      Kidney stones      Hemodialysis access, AV graft  Left upper extremity      Hyperparathyroidism      Anemia      Thrombocytopenia      Atrial fibrillation  on Eliquis      S/P Nephrectomy  (L) 2007 for kidney stones      Acquired arteriovenous fistula  left wrist  1/2015 - [facility], Left upper arm 4/2015 (approximate date)      AVF (arteriovenous fistula)          MEDICATIONS  (STANDING):  apixaban 5 milliGRAM(s) Oral every 12 hours  atorvastatin 80 milliGRAM(s) Oral at bedtime  epoetin tammi-epbx (RETACRIT) Injectable 6000 Unit(s) IV Push <User Schedule>  pantoprazole    Tablet 40 milliGRAM(s) Oral two times a day  sevelamer carbonate 1600 milliGRAM(s) Oral three times a day with meals    MEDICATIONS  (PRN):  acetaminophen     Tablet .. 650 milliGRAM(s) Oral every 6 hours PRN Temp greater or equal to 38C (100.4F), Mild Pain (1 - 3)  aluminum hydroxide/magnesium hydroxide/simethicone Suspension 30 milliLiter(s) Oral every 4 hours PRN Dyspepsia  melatonin 3 milliGRAM(s) Oral at bedtime PRN Insomnia  ondansetron Injectable 4 milliGRAM(s) IV Push every 8 hours PRN Nausea and/or Vomiting        RADIOLOGY & ADDITIONAL TESTS:    Imaging Personally Reviewed:  [ ] YES  [ ] NO    Consultant(s) Notes Reviewed:  [ ] YES  [ ] NO    PHYSICAL EXAM:  GENERAL: Alert and awake lying in bed in no distress  HEAD:  Atraumatic, Normocephalic  EYES: EOMI, LILIAM, conjunctiva and sclera clear  NECK: Supple, No JVD, Normal thyroid  NERVOUS SYSTEM:  Alert & Oriented X3, Motor and sensory systems are intact,   CHEST/LUNG: Bilateral clear breath sounds, no rhochi, no wheezing, no crepitations,  HEART: Regular rate and rhythm; No murmurs, rubs, or gallops  ABDOMEN: Soft, Nontender, Nondistended; Bowel sounds present  EXTREMITIES:   Peripheral Pulses are palpable, no  edema        Care Discussed with Consultants/Other Providers [ ] YES  [ ] NO      Code Status: [] Full Code [] DNR [] DNI [] Goals of Care:   Disposition: [] ICU [] Stroke Unit [] RCU []PCU []Floor [] Discharge Home         ADALGISA McwilliamsP

## 2022-06-28 NOTE — PROGRESS NOTE ADULT - SUBJECTIVE AND OBJECTIVE BOX
Ran Velasquez MD  Interventional Cardiology / Advance Heart Failure and Cardiac Transplant Specialist  [place] Office : [address] [place] [id number]  Tel:   [place] Office : [address] [facility] [place] [address]  Tel: [phone]      Subjective/Overnight events: Pt is lying in bed comfortable not in distress, no chest pains no SOB no palpitations  	  MEDICATIONS:  apixaban 5 milliGRAM(s) Oral every 12 hours        acetaminophen     Tablet .. 650 milliGRAM(s) Oral every 6 hours PRN  melatonin 3 milliGRAM(s) Oral at bedtime PRN  ondansetron Injectable 4 milliGRAM(s) IV Push every 8 hours PRN    aluminum hydroxide/magnesium hydroxide/simethicone Suspension 30 milliLiter(s) Oral every 4 hours PRN  pantoprazole    Tablet 40 milliGRAM(s) Oral two times a day    atorvastatin 80 milliGRAM(s) Oral at bedtime    epoetin tammi-epbx (RETACRIT) Injectable 6000 Unit(s) IV Push <User Schedule>      PAST MEDICAL/SURGICAL HISTORY  PAST MEDICAL & SURGICAL HISTORY:  HTN (Hypertension)      Chronic kidney disease      Kidney stones      Hemodialysis access, AV graft  Left upper extremity      Hyperparathyroidism      Anemia      Thrombocytopenia      Atrial fibrillation  on Eliquis      S/P Nephrectomy  (L) 2007 for kidney stones      Acquired arteriovenous fistula  left wrist  1/2015 - [facility], Left upper arm 4/2015 (approximate date)      AVF (arteriovenous fistula)          SOCIAL HISTORY: Substance Use (street drugs): ( x ) never used  (  ) other:    FAMILY HISTORY:  Family history of CVA (Father)          PHYSICAL EXAM:  T(C): 36.8 (06-28-22 @ 08:13), Max: 36.9 (06-27-22 @ 20:39)  HR: 71 (06-28-22 @ 09:08) (70 - 77)  BP: 157/86 (06-28-22 @ 09:08) (134/69 - 162/98)  RR: 18 (06-28-22 @ 08:13) (17 - 18)  SpO2: 98% (06-28-22 @ 08:13) (97% - 100%)  Wt(kg): --  I&O's Summary      GENERAL: NAD  EYES:   PERRLA   ENMT:   Moist mucous membranes, Good dentition, No lesions  Cardiovascular: Normal S1 S2, No JVD, No murmurs, No edema  Respiratory: Lungs clear to auscultation	  Gastrointestinal:  Soft, Non-tender, + BS	  Extremities: no edema                                    9.5    7.29  )-----------( 101      ( 28 Jun 2022 06:46 )             30.4     06-28    136  |  95<L>  |  85<H>  ----------------------------<  87  5.0   |  21<L>  |  10.60<H>    Ca    6.7<L>      28 Jun 2022 06:46  Phos  4.7     06-28  Mg     1.80     06-28      proBNP:   Lipid Profile:   HgA1c:   TSH:     Consultant(s) Notes Reviewed:  [x ] YES  [ ] NO    Care Discussed with Consultants/Other Providers [ x] YES  [ ] NO    Imaging Personally Reviewed independently:  [x] YES  [ ] NO    All labs, radiologic studies, vitals, orders and medications list reviewed. Patient is seen and examined at bedside. Case discussed with medical team.

## 2022-06-28 NOTE — PROGRESS NOTE ADULT - SUBJECTIVE AND OBJECTIVE BOX
[facility] - S Alicia / Ej S /D Federico/ SERA Bojorquez/ SERA Faust/ Jovan Garrison / GAYATRI Matsonu/ O Gregorio  service -[phone], office [phone]  ---------------------------------------------------------------------------------------------------------------    Patient seen and examined bedside    Subjective and Objective: No overnight events, sob resolved. No complaints today. feeling better    Allergies: No Known Allergies      Hospital Medications:   MEDICATIONS  (STANDING):  apixaban 5 milliGRAM(s) Oral every 12 hours  atorvastatin 80 milliGRAM(s) Oral at bedtime  epoetin tammi-epbx (RETACRIT) Injectable 6000 Unit(s) IV Push <User Schedule>  pantoprazole    Tablet 40 milliGRAM(s) Oral two times a day  sevelamer carbonate 1600 milliGRAM(s) Oral three times a day with meals      REVIEW OF SYSTEMS:  CONSTITUTIONAL: No weakness, fevers or chills  EYES/ENT: No visual changes;  No vertigo or throat pain   NECK: No pain or stiffness  RESPIRATORY: No cough, wheezing, hemoptysis; No shortness of breath  CARDIOVASCULAR: No chest pain or palpitations.  GASTROINTESTINAL: No abdominal or epigastric pain. No nausea, vomiting, or hematemesis; No diarrhea or constipation. No melena or hematochezia.  GENITOURINARY: No dysuria, frequency, foamy urine, urinary urgency, incontinence or hematuria  NEUROLOGICAL: No numbness or weakness  SKIN: No itching, burning, rashes, or lesions   VASCULAR: No bilateral lower extremity edema.   All other review of systems is negative unless indicated above.    VITALS:  T(F): 98.4 (06-28-22 @ 16:15), Max: 98.4 (06-27-22 @ 20:39)  HR: 74 (06-28-22 @ 16:15)  BP: 167/107 (06-28-22 @ 16:15)  RR: 17 (06-28-22 @ 16:15)  SpO2: 98% (06-28-22 @ 12:13)  Wt(kg): --        PHYSICAL EXAM:  Constitutional: NAD  HEENT: anicteric sclera, oropharynx clear  Neck: No JVD  Respiratory: CTAB, no wheezes, rales or rhonchi  Cardiovascular: S1, S2, RRR  Gastrointestinal: BS+, soft, NT/ND  Extremities: No cyanosis or clubbing. No peripheral edema  Neurological: A/O x 3, no focal deficits  Psychiatric: Normal mood, normal affect  : No CVA tenderness. No siegel.   Skin: No rashes  Vascular Access:    LABS:  06-28    136  |  95<L>  |  85<H>  ----------------------------<  87  5.0   |  21<L>  |  10.60<H>    Ca    6.7<L>      28 Jun 2022 06:46  Phos  4.7     06-28  Mg     1.80     06-28      Creatinine Trend: 10.60 <--, 8.29 <--, 5.78 <--, 7.85 <--, 5.77 <--, 8.15 <--, 5.82 <--                        9.5    7.29  )-----------( 101      ( 28 Jun 2022 06:46 )             30.4     Urine Studies:        RADIOLOGY & ADDITIONAL STUDIES:   [facility] - S Alicia / Ej S /D Federico/ S Maryellen/ S Govind/ Jovan Garrison / M Danou/ O Gregorio  service -[phone], office [phone]  ---------------------------------------------------------------------------------------------------------------    Patient seen and examined bedside    Subjective and Objective: No overnight events, denied sob. No complaints today. feeling better    Allergies: No Known Allergies      Hospital Medications:   MEDICATIONS  (STANDING):  apixaban 5 milliGRAM(s) Oral every 12 hours  atorvastatin 80 milliGRAM(s) Oral at bedtime  epoetin tammi-epbx (RETACRIT) Injectable 6000 Unit(s) IV Push <User Schedule>  pantoprazole    Tablet 40 milliGRAM(s) Oral two times a day  sevelamer carbonate 1600 milliGRAM(s) Oral three times a day with meals    VITALS:  T(F): 98.4 (06-28-22 @ 16:15), Max: 98.4 (06-27-22 @ 20:39)  HR: 74 (06-28-22 @ 16:15)  BP: 167/107 (06-28-22 @ 16:15)  RR: 17 (06-28-22 @ 16:15)  SpO2: 98% (06-28-22 @ 12:13)  Wt(kg): --    PHYSICAL EXAM:  Constitutional: NAD  HEENT: anicteric sclera  Neck: No JVD  Respiratory: CTAB, no wheezes, rales or rhonchi  Cardiovascular: S1, S2, RRR  Gastrointestinal: BS+, soft, NT/ND  Extremities: No peripheral edema  Neurological: A/O x 3, no focal deficits  Psychiatric: Normal mood, normal affect  : No siegel.   Vascular Access: avf+    LABS:  06-28    136  |  95<L>  |  85<H>  ----------------------------<  87  5.0   |  21<L>  |  10.60<H>    Ca    6.7<L>      28 Jun 2022 06:46  Phos  4.7     06-28  Mg     1.80     06-28      Creatinine Trend: 10.60 <--, 8.29 <--, 5.78 <--, 7.85 <--, 5.77 <--, 8.15 <--, 5.82 <--                        9.5    7.29  )-----------( 101      ( 28 Jun 2022 06:46 )             30.4     Urine Studies:        RADIOLOGY & ADDITIONAL STUDIES:

## 2022-06-28 NOTE — PROVIDER CONTACT NOTE (OTHER) - ASSESSMENT
A&Ox4. with no complaints of chest pain or palpitations. vitals as per flowsheet. resting comfortably in bed at this time.

## 2022-06-29 LAB
ANION GAP SERPL CALC-SCNC: 14 MMOL/L — SIGNIFICANT CHANGE UP (ref 7–14)
BUN SERPL-MCNC: 50 MG/DL — HIGH (ref 7–23)
CALCIUM SERPL-MCNC: 7 MG/DL — LOW (ref 8.4–10.5)
CHLORIDE SERPL-SCNC: 99 MMOL/L — SIGNIFICANT CHANGE UP (ref 98–107)
CO2 SERPL-SCNC: 26 MMOL/L — SIGNIFICANT CHANGE UP (ref 22–31)
CREAT SERPL-MCNC: 7.49 MG/DL — HIGH (ref 0.5–1.3)
EGFR: 7 ML/MIN/1.73M2 — LOW
GLUCOSE SERPL-MCNC: 87 MG/DL — SIGNIFICANT CHANGE UP (ref 70–99)
HCT VFR BLD CALC: 28.7 % — LOW (ref 39–50)
HGB BLD-MCNC: 9.1 G/DL — LOW (ref 13–17)
MAGNESIUM SERPL-MCNC: 2 MG/DL — SIGNIFICANT CHANGE UP (ref 1.6–2.6)
MCHC RBC-ENTMCNC: 30.8 PG — SIGNIFICANT CHANGE UP (ref 27–34)
MCHC RBC-ENTMCNC: 31.7 GM/DL — LOW (ref 32–36)
MCV RBC AUTO: 97.3 FL — SIGNIFICANT CHANGE UP (ref 80–100)
NRBC # BLD: 0 /100 WBCS — SIGNIFICANT CHANGE UP
NRBC # FLD: 0 K/UL — SIGNIFICANT CHANGE UP
PHOSPHATE SERPL-MCNC: 3.8 MG/DL — SIGNIFICANT CHANGE UP (ref 2.5–4.5)
PLATELET # BLD AUTO: 88 K/UL — LOW (ref 150–400)
POTASSIUM SERPL-MCNC: 4.3 MMOL/L — SIGNIFICANT CHANGE UP (ref 3.5–5.3)
POTASSIUM SERPL-SCNC: 4.3 MMOL/L — SIGNIFICANT CHANGE UP (ref 3.5–5.3)
RBC # BLD: 2.95 M/UL — LOW (ref 4.2–5.8)
RBC # FLD: 13.7 % — SIGNIFICANT CHANGE UP (ref 10.3–14.5)
SODIUM SERPL-SCNC: 139 MMOL/L — SIGNIFICANT CHANGE UP (ref 135–145)
WBC # BLD: 5.25 K/UL — SIGNIFICANT CHANGE UP (ref 3.8–10.5)
WBC # FLD AUTO: 5.25 K/UL — SIGNIFICANT CHANGE UP (ref 3.8–10.5)

## 2022-06-29 RX ADMIN — APIXABAN 5 MILLIGRAM(S): 2.5 TABLET, FILM COATED ORAL at 06:37

## 2022-06-29 RX ADMIN — PANTOPRAZOLE SODIUM 40 MILLIGRAM(S): 20 TABLET, DELAYED RELEASE ORAL at 06:37

## 2022-06-29 RX ADMIN — PANTOPRAZOLE SODIUM 40 MILLIGRAM(S): 20 TABLET, DELAYED RELEASE ORAL at 17:14

## 2022-06-29 RX ADMIN — ATORVASTATIN CALCIUM 80 MILLIGRAM(S): 80 TABLET, FILM COATED ORAL at 22:36

## 2022-06-29 RX ADMIN — APIXABAN 5 MILLIGRAM(S): 2.5 TABLET, FILM COATED ORAL at 17:14

## 2022-06-29 RX ADMIN — SEVELAMER CARBONATE 1600 MILLIGRAM(S): 2400 POWDER, FOR SUSPENSION ORAL at 17:14

## 2022-06-29 RX ADMIN — Medication 75 MILLIGRAM(S): at 06:37

## 2022-06-29 RX ADMIN — SEVELAMER CARBONATE 1600 MILLIGRAM(S): 2400 POWDER, FOR SUSPENSION ORAL at 12:01

## 2022-06-29 NOTE — PROGRESS NOTE ADULT - SUBJECTIVE AND OBJECTIVE BOX
Neurology Progress Note     INTERVAL HISTORY: Patient seen at bedside by stroke team  dispo planning      MEDICATIONS (HOME):  Home Medications:  NIFEdipine 60 mg oral tablet, extended release: 1 tab(s) orally once a day (17 Jun 2022 17:50)      MEDICATIONS  (STANDING):  apixaban 5 milliGRAM(s) Oral every 12 hours  atorvastatin 80 milliGRAM(s) Oral at bedtime  epoetin tammi-epbx (RETACRIT) Injectable 6000 Unit(s) IV Push <User Schedule>  metoprolol succinate ER 75 milliGRAM(s) Oral daily  pantoprazole    Tablet 40 milliGRAM(s) Oral two times a day  sevelamer carbonate 1600 milliGRAM(s) Oral three times a day with meals    MEDICATIONS  (PRN):  acetaminophen     Tablet .. 650 milliGRAM(s) Oral every 6 hours PRN Temp greater or equal to 38C (100.4F), Mild Pain (1 - 3)  aluminum hydroxide/magnesium hydroxide/simethicone Suspension 30 milliLiter(s) Oral every 4 hours PRN Dyspepsia  melatonin 3 milliGRAM(s) Oral at bedtime PRN Insomnia  ondansetron Injectable 4 milliGRAM(s) IV Push every 8 hours PRN Nausea and/or Vomiting    ALLERGIES/INTOLERANCES:  Allergies  No Known Allergies    Intolerances    VITALS & EXAMINATION:    Vital Signs Last 24 Hrs  T(C): 36.7 (06-29-22 @ 06:37), Max: 37.1 (06-28-22 @ 19:30)  T(F): 98 (06-29-22 @ 06:37), Max: 98.7 (06-28-22 @ 19:30)  HR: 98 (06-29-22 @ 06:37) (65 - 98)  BP: 139/80 (06-29-22 @ 06:37) (139/80 - 198/105)  BP(mean): --  RR: 18 (06-29-22 @ 06:37) (17 - 18)  SpO2: 100% (06-29-22 @ 06:37) (95% - 100%)              General:  Constitutional: Male, appears stated age, in no apparent distress including pain  Head: Normocephalic & Atraumatic.    Neurological:  MS: Eyes open. Awake, alert, oriented to person, place, situation, time. Follows all commands.    Language: Speech is mild to moderately dysarthric, fluent with good repetition & comprehension.    CNs: VFF. EOMI no nystagmus. V1-3 intact to LT b/l. Moderate R facial palsy. Hearing grossly normal (rubbing fingers) b/l. Tongue midline, normal movements, no atrophy.     Motor: Normal muscle bulk & tone. No noticeable tremor. Slight pronator drift in RUE, no drift otherwise. No drift in LUE, LLE, RLE. 5/5 throughout. Spontaneous & purposeful movements throughout. 	     Sensation: Decreased sensation to LT/Temp on R.    Coordination: Slower fast finger tapping on R when compared to L. LUE orbits around RUE with rolling arm motion. No dysmetria to FTN.    Gait: No postural instability. Gait testing deferred.      LABORATORY:  CBC Full  -  ( 29 Jun 2022 06:26 )  WBC Count : 5.25 K/uL  RBC Count : 2.95 M/uL  Hemoglobin : 9.1 g/dL  Hematocrit : 28.7 %  Platelet Count - Automated : 88 K/uL  Mean Cell Volume : 97.3 fL  Mean Cell Hemoglobin : 30.8 pg  Mean Cell Hemoglobin Concentration : 31.7 gm/dL  Auto Neutrophil # : x  Auto Lymphocyte # : x  Auto Monocyte # : x  Auto Eosinophil # : x  Auto Basophil # : x  Auto Neutrophil % : x  Auto Lymphocyte % : x  Auto Monocyte % : x  Auto Eosinophil % : x  Auto Basophil % : x    06-29    139  |  99  |  50<H>  ----------------------------<  87  4.3   |  26  |  7.49<H>    Ca    7.0<L>      29 Jun 2022 06:26  Phos  3.8     06-29  Mg     2.00     06-29           LFTs   Coagulopathy   Lipid Panel 06-19 Chol 140 LDL 60 HDL 64 Trig 81  A1c 5.2     STUDIES & IMAGING:    Radiology (XR, CT, MR, U/S, TTE/TIFFANIE):      MRI Brain: A wedge-shaped area of restricted diffusion is seen within the   left corona radiata with associated T2/FLAIR hyperintense signal. There   is no hemorrhagic transformation.    A chronic lacunar infarct is again noted within the left thalamus.    Multiple additional patchy confluent nonspecific T2/FLAIR hyperintense   signal changes are noted throughout the bihemispheric white matter   without associated mass effect or restricted diffusion.    There is enhancing mass lesion within the right internal auditory canal   measuring 0.5 x 0.8 cm in the axial plane (AP by transverse) (series 14,   image 43).    Otherwise, no abnormal brain parenchymal or leptomeningeal enhancement is   notable.    Ventricular size and configuration is unremarkable. No abnormal extra   axial fluid collections are seen. Flow-voids are noted throughout the   major intracranial vessels, on the T2 weighted images, consistent with   their patency. The sellar region appears unremarkable.    Polypoidal soft tissue is seen within the left maxillary sinus.   Additional scattered mucosal thickening is seen throughout the ethmoid   complex. The mastoid air cells are clear. Calvarial signal appears   unremarkable. The orbits appear unremarkable apart from bilateral   cataract removal.    Posterior parietal scalp vertex scarring is seen.    MRA Neck: There is a standard anatomic configuration to the aortic arch.    The origins of the great vessels appear unremarkable. The bilateral   common carotid arteries and carotid bifurcations appear unremarkable.    The bilateral cervical internal carotid arteries are within normal limits.    The origins of the bilateral vertebral arteries are normal. The bilateral   cervical vertebral arteries are normal in course and caliber.    MRA [place]: The bilateral intracranial internal carotid,   anterior, and middle cerebral arteries appear unremarkable.    The anterior communicating artery is seen. The bilateral posterior   communicating arteries are not well resolved.    The bilateral intradural vertebral arteries, vertebrobasilar junction,   basilar artery, and basilar tip appear unremarkable as well as the   bilateral posterior cerebral arteries.    IMPRESSION:    MRI BRAIN:  1. Acute/subacute left-sided corona radiata infarction with associated   cytotoxic edema and without hemorrhagic transformation.  2. Enhancing mass lesion within the right internal auditory canal, most   compatible with a vestibular schwannoma. Recommend serial imaging over   time to assess for stability or change.  3. Chronic lacunar infarct within the left thalamus.  4. Multiple patchy confluent nonspecific abnormal white matter foci of   T2/FLAIR prolongation statistically favoring microvascular disease.    MRA NECK AND HEAD:  1. No large vessel occlusion or major stenosis.    --- End of Report ---      < from: Transthoracic Echocardiogram (06.18.22 @ 07:56) >  CONCLUSIONS:  1. Calcified trileaflet aortic valve with normal opening.  Minimal aortic regurgitation.  2. Mildly dilated left atrium.  LA volume index = 39 cc/m2.  3. Normal left ventricular systolic function. No segmental  wall motion abnormalities.  4. Normal right ventricular size and function.  5. Agitated saline injection demonstrates no obvious  evidence of a patent foramen ovale.        CT Brain Stroke Protocol (06.17.22 @ 15:41)    FINDINGS:    Assessment of the posterior fossa and henok is somewhat limited by beam   hardening artifact.  Ventricles and sulci: Parenchymal volume loss is present which is   commensurate with patient age.  Intra-axial: Periventricular small vessel white matter ischemic changes   are appreciated. Old left thalamic lacunar infarct. No intracranial mass,   acute hemorrhage, or midline shift is present.  Extra-axial: No extra-axial fluid collection is identified. Deposition of   calcified plaques in association with the distal intracranial bilateral   vertebral arteries and bilateral carotid siphons.  Calvarium: Intact.    Bilateral optic globes are intact. Left maxillary sinus polyp versus   retention cysts. Bilateral mastoid air cells and middle ear cavities are   predominantly clear.    paranasal sinuses, bilateral mastoid air cells, middle ear cavities are   clear.    IMPRESSION: Age-appropriate involutional changes. No large arterial   distribution acute infarct. Old left thalamic lacunar infarct.    Findings were communicated by Dr. Behr-Ventura to Dr. STACIA Bojorquez at   approximately 3:50 PM 6/17/2022.

## 2022-06-29 NOTE — PROGRESS NOTE ADULT - ASSESSMENT
67 year old right-handed male w/ PMHx HTN, AFib (no longer on apixaban d/t GIB, not on any antithrombotics at home), ESRD (on HD Tu/Th/Sat), hypothyroid, dentures, no history of stroke (per patient), p/w dysarthria and R sensorimotor deficits onset 15:30PM 6/16/22, approximately 30 minutes after completing HD. No similar symptoms in the past. Rigors on the night prior to arrival. He denies h/o diabetes or smoking. Code stroke cancelled as symptoms onset 24 hours prior to presentation. CT head: no acute pathology, chronic left thalamic lacunar infarct. Symptoms improving in the ED. RENAL following for ESRD/ HD Mx.    End Stage Renal Disease on Dialysis   HD unit -[facility] Tu/Th/Sat),  K, vol ok   Hb now < goal    s/p HD yesterday, Rx sheet reviewed, net UF 2.5kg achieved, tolerated well. uneventful.   plan for HD tomorrow w/2k bath, net uf to 2.5kg as tolerated  dose meds for ESRD  renal diet, fluid restriction  Anemia in CKD Hb <goal. watch H/H, added FRANNY epo 6k tiw w/hd tiw  Hyperphosphatemia- changed phoslo 667 tidac >renvela 2tidac  HTN, controlled. BP high, suboptimal control. c/w BB. inc uf w/hd  CVA -CTH no acute infarct/bleed. MRI brain w/o contrast demonstrates acute infarct in the L corona radiata w/o hemorrhage.   mx per neuro  h/o Afib -restarted on AC.  f/u w/cariology     plan for rehab placement  labs, chart reviewed  poc d/w pt  For any question, call:  Cell # [phone]  Pager # [phone]

## 2022-06-29 NOTE — PROGRESS NOTE ADULT - ASSESSMENT
67 year old right-handed male w/ PMHx HTN, AFib (no longer on apixaban d/t GIB, not on any antithrombotics at home), ESRD (on HD Tu/Th/Sat), hypothyroid, dentures, no history of stroke (per patient), p/w dysarthria and R sensorimotor deficits onset 15:30PM 6/16/22, approximately 30 minutes after completing HD. No similar symptoms in the past. Rigors on the night prior to arrival. He denies h/o diabetes or smoking. CT head: no acute pathology, chronic left thalamic lacunar infarct. Symptoms improving in the ED. TTE w/ bubble reveals EF of 58%, no PFO, grossly normal otherwise. MRI brain w/o contrast demonstrates acute infarct in the L corona radiata w/o hemorrhage. Today (6/21), patient reports persisting slur & improving strength. Exam as above.      Impression: Improving R sensorimotor syndrome secondary to acute L corona radiata infarct that appears to be more related to small vessel disease than cardioembolism, even in the setting of atrial fibrillation. However, patient should be restarted on a DOAC if cleared by GI as they are at risk for cardioembolism with [id number] score of 4. Otherwise would recommend alternative to long term anticoagulation such as Watchman Device; however, this would require short term course of anticoagulation followed by DAPT regardless.     Recommendations    Imaging:  [] May need TIFFANIE for evaluation for Watchman Device if cleared by GI for AC & cardio in agreement. Can be done outpatient, should not hold discharge.     Meds:  [] Restart on Apixaban 5 mg BID if cleared by GI, started   [] Atorvastatin 40 mg, titrate to LDL < 70   [] Chemical DVT prophylaxis with Lovenox SQ    Other:  [] Recommend evaluation for Watchman Device as alternative to long term anticoagulation given multiple episodes of GI bleeding.   [] Normotension, avoid hypotension (long term goals between 110-140/<90 mmHg).  [] PT/OT - home, no skilled PT needs at this time  [] C/w regular diet as tolerated  [] HgA1C goals < 7.0 (currently 5.2)  [] Stroke education discussed at bedside  [] GI consult recs appreciated   [] Cardio on board, appreciate recommendations  [] Nephro on board, appreciate recommendations  [] Rest of workup as per primary team  [] Upon discharge, patient should follow up outpatient with Dr. Horn:  [phone] [address] [place] [address]    From neurovascular standpoint, no further inpatient investigations are indicated at this time.      dispo planning in progress   Ashish Horn MD  Vascular Neurology      Please call with questions: [phone]

## 2022-06-29 NOTE — PROGRESS NOTE ADULT - ASSESSMENT
EKG SR no ischemic changes       1) ?CVA  -echo normal monitor on tele  - MRI/MRA shows acute CVA  - pt with documented afib no need for ILR   - pt has h/o Afib stoppped 2/2 GIB, GI and neuro on board who gave ok to restart eliquis 5 mg po BID. resumed      2) ESRD   -on HD   -f/u renal recs     3) NSVT   -normal LV function  -Toprol 75mg daily  -no NSVT overnight    3) DVT PPX  -eliquis

## 2022-06-29 NOTE — PROGRESS NOTE ADULT - SUBJECTIVE AND OBJECTIVE BOX
Ran Velasquez MD  Interventional Cardiology / Endovascular Specialist  [place] Office : [address] [place] [id number]  Tel:   [place] Office : [address] [facility] [place] [address]  Tel: [phone]      Subjective/Overnight events: Patient lying in bed comfortably. No acute distress. Denies chest pain, SOB or palpitations  	  MEDICATIONS:  apixaban 5 milliGRAM(s) Oral every 12 hours  metoprolol succinate ER 75 milliGRAM(s) Oral daily        acetaminophen     Tablet .. 650 milliGRAM(s) Oral every 6 hours PRN  melatonin 3 milliGRAM(s) Oral at bedtime PRN  ondansetron Injectable 4 milliGRAM(s) IV Push every 8 hours PRN    aluminum hydroxide/magnesium hydroxide/simethicone Suspension 30 milliLiter(s) Oral every 4 hours PRN  pantoprazole    Tablet 40 milliGRAM(s) Oral two times a day    atorvastatin 80 milliGRAM(s) Oral at bedtime    epoetin tammi-epbx (RETACRIT) Injectable 6000 Unit(s) IV Push <User Schedule>      PAST MEDICAL/SURGICAL HISTORY  PAST MEDICAL & SURGICAL HISTORY:  HTN (Hypertension)      Chronic kidney disease      Kidney stones      Hemodialysis access, AV graft  Left upper extremity      Hyperparathyroidism      Anemia      Thrombocytopenia      Atrial fibrillation  on Eliquis      S/P Nephrectomy  (L) 2007 for kidney stones      Acquired arteriovenous fistula  left wrist  1/2015 - [facility], Left upper arm 4/2015 (approximate date)      AVF (arteriovenous fistula)          SOCIAL HISTORY: Substance Use (street drugs): ( x ) never used  (  ) other:    FAMILY HISTORY:  Family history of CVA (Father)            PHYSICAL EXAM:  T(C): 36.7 (06-29-22 @ 06:37), Max: 37.1 (06-28-22 @ 19:30)  HR: 98 (06-29-22 @ 06:37) (65 - 98)  BP: 139/80 (06-29-22 @ 06:37) (139/80 - 198/105)  RR: 18 (06-29-22 @ 06:37) (17 - 18)  SpO2: 100% (06-29-22 @ 06:37) (95% - 100%)  Wt(kg): --  I&O's Summary    28 Jun 2022 07:01  -  29 Jun 2022 07:00  --------------------------------------------------------  IN: 400 mL / OUT: 2900 mL / NET: -2500 mL        GENERAL: NAD  EYES:   PERRLA   ENMT:   Moist mucous membranes, Good dentition, No lesions  Cardiovascular: Normal S1 S2, No JVD, No murmurs, No edema  Respiratory: Lungs clear to auscultation	  Gastrointestinal:  Soft, Non-tender, + BS	  Extremities: no edema                                  9.1    5.25  )-----------( 88       ( 29 Jun 2022 06:26 )             28.7     06-29    139  |  99  |  50<H>  ----------------------------<  87  4.3   |  26  |  7.49<H>    Ca    7.0<L>      29 Jun 2022 06:26  Phos  3.8     06-29  Mg     2.00     06-29      proBNP:   Lipid Profile:   HgA1c:   TSH:     Consultant(s) Notes Reviewed:  [x ] YES  [ ] NO    Care Discussed with Consultants/Other Providers [ x] YES  [ ] NO    Imaging Personally Reviewed independently:  [x] YES  [ ] NO    All labs, radiologic studies, vitals, orders and medications list reviewed. Patient is seen and examined at bedside. Case discussed with medical team.

## 2022-06-29 NOTE — PROGRESS NOTE ADULT - SUBJECTIVE AND OBJECTIVE BOX
Patient is a 67y old  Male who presents with a chief complaint of dysarthria and weakness (29 Jun 2022 18:16)      SUBJECTIVE / OVERNIGHT EVENTS:    Events noted.  CONSTITUTIONAL: No fever,  or fatigue  RESPIRATORY: No cough, wheezing,  No shortness of breath  CARDIOVASCULAR: No chest pain, palpitations, dizziness, or leg swelling  GASTROINTESTINAL: No abdominal or epigastric pain. No nausea, vomiting.  NEUROLOGICAL: No headache    MEDICATIONS  (STANDING):  apixaban 5 milliGRAM(s) Oral every 12 hours  atorvastatin 80 milliGRAM(s) Oral at bedtime  epoetin tammi-epbx (RETACRIT) Injectable 6000 Unit(s) IV Push <User Schedule>  metoprolol succinate ER 75 milliGRAM(s) Oral daily  pantoprazole    Tablet 40 milliGRAM(s) Oral two times a day  sevelamer carbonate 1600 milliGRAM(s) Oral three times a day with meals    MEDICATIONS  (PRN):  acetaminophen     Tablet .. 650 milliGRAM(s) Oral every 6 hours PRN Temp greater or equal to 38C (100.4F), Mild Pain (1 - 3)  aluminum hydroxide/magnesium hydroxide/simethicone Suspension 30 milliLiter(s) Oral every 4 hours PRN Dyspepsia  melatonin 3 milliGRAM(s) Oral at bedtime PRN Insomnia  ondansetron Injectable 4 milliGRAM(s) IV Push every 8 hours PRN Nausea and/or Vomiting        CAPILLARY BLOOD GLUCOSE        I&O's Summary    28 Jun 2022 07:01  -  29 Jun 2022 07:00  --------------------------------------------------------  IN: 400 mL / OUT: 2900 mL / NET: -2500 mL        T(C): 36.4 (06-29-22 @ 11:55), Max: 36.7 (06-29-22 @ 06:37)  HR: 71 (06-29-22 @ 11:55) (71 - 98)  BP: 146/91 (06-29-22 @ 11:55) (139/80 - 146/91)  RR: 18 (06-29-22 @ 11:55) (18 - 18)  SpO2: 100% (06-29-22 @ 11:55) (100% - 100%)    PHYSICAL EXAM:  GENERAL: NAD  NECK: Supple, No JVD  CHEST/LUNG: Clear to auscultation bilaterally; No wheezing.  HEART: Regular rate and rhythm; No murmurs, rubs, or gallops  ABDOMEN: Soft, Nontender, Nondistended; Bowel sounds present  EXTREMITIES:   No edema  NEUROLOGY: AAO X 3      LABS:                        9.1    5.25  )-----------( 88       ( 29 Jun 2022 06:26 )             28.7     06-29    139  |  99  |  50<H>  ----------------------------<  87  4.3   |  26  |  7.49<H>    Ca    7.0<L>      29 Jun 2022 06:26  Phos  3.8     06-29  Mg     2.00     06-29              CAPILLARY BLOOD GLUCOSE            RADIOLOGY & ADDITIONAL TESTS:    Imaging Personally Reviewed:    Consultant(s) Notes Reviewed:      Care Discussed with Consultants/Other Providers:    Wilfred Christian MD, CMD, FACP    [address] [place] [address]  Office Tel: [phone]  Cell: [phone]

## 2022-06-29 NOTE — PROGRESS NOTE ADULT - SUBJECTIVE AND OBJECTIVE BOX
[facility] - S Alicia / Ej S /D Federico/ S Maryellen/ S Govind/ Jovan Garrison / M Jose/ O Gregorio  service -[phone], office [phone]  ---------------------------------------------------------------------------------------------------------------    Patient seen and examined bedside    Subjective and Objective: No overnight events, denied sob. No complaints today. feeling better    Allergies: No Known Allergies      Hospital Medications:   MEDICATIONS  (STANDING):  apixaban 5 milliGRAM(s) Oral every 12 hours  atorvastatin 80 milliGRAM(s) Oral at bedtime  epoetin tammi-epbx (RETACRIT) Injectable 6000 Unit(s) IV Push <User Schedule>  metoprolol succinate ER 75 milliGRAM(s) Oral daily  pantoprazole    Tablet 40 milliGRAM(s) Oral two times a day  sevelamer carbonate 1600 milliGRAM(s) Oral three times a day with meals    VITALS:  T(F): 97.6 (06-29-22 @ 11:55), Max: 98.7 (06-28-22 @ 19:30)  HR: 71 (06-29-22 @ 11:55)  BP: 146/91 (06-29-22 @ 11:55)  RR: 18 (06-29-22 @ 11:55)  SpO2: 100% (06-29-22 @ 11:55)  Wt(kg): --    06-28 @ 07:01  -  06-29 @ 07:00  --------------------------------------------------------  IN: 400 mL / OUT: 2900 mL / NET: -2500 mL      PHYSICAL EXAM:  Constitutional: NAD  HEENT: anicteric sclera  Neck: No JVD  Respiratory: CTAB, no wheezes, rales or rhonchi  Cardiovascular: S1, S2, RRR  Gastrointestinal: BS+, soft, NT/ND  Extremities: No peripheral edema  Neurological: A/O x 3, no focal deficits  Psychiatric: Normal mood, normal affect  : No siegel.   Vascular Access: avf+    LABS:  06-29    139  |  99  |  50<H>  ----------------------------<  87  4.3   |  26  |  7.49<H>    Ca    7.0<L>      29 Jun 2022 06:26  Phos  3.8     06-29  Mg     2.00     06-29      Creatinine Trend: 7.49 <--, 10.60 <--, 8.29 <--, 5.78 <--, 7.85 <--, 5.77 <--, 8.15 <--                        9.1    5.25  )-----------( 88       ( 29 Jun 2022 06:26 )             28.7     Urine Studies:        RADIOLOGY & ADDITIONAL STUDIES:

## 2022-06-29 NOTE — PROGRESS NOTE ADULT - NS ATTEND AMEND GEN_ALL_CORE FT
I reviewed the overnight course of events on the unit, re-confirming the patient history. I discussed the care with the patient and their family. The plan of care was discussed with the ACP team and modifications were made to the notation where appropriate. Differential diagnosis and plan of care discussed with patient after the evaluation. Advanced care planning was discussed with patient and family.  Advanced care planning forms were reviewed and discussed.  Risks, benefits and alternatives of cardiac procedures were discussed in detail and all questions were answered. 35 minutes spent on total encounter of which more than fifty percent of the encounter was spent counseling and/or coordinating care by the attending physician.
agree with above  unable for AC given h/o GIB with transfusion.    ASA for now, consider watchman device in future  Ashish Horn MD  Vascular Neurology  Office: [phone]    ROS: negative unless noted above

## 2022-06-30 LAB
ANION GAP SERPL CALC-SCNC: 21 MMOL/L — HIGH (ref 7–14)
BUN SERPL-MCNC: 73 MG/DL — HIGH (ref 7–23)
CALCIUM SERPL-MCNC: 6.6 MG/DL — LOW (ref 8.4–10.5)
CHLORIDE SERPL-SCNC: 94 MMOL/L — LOW (ref 98–107)
CO2 SERPL-SCNC: 19 MMOL/L — LOW (ref 22–31)
CREAT SERPL-MCNC: 9.43 MG/DL — HIGH (ref 0.5–1.3)
EGFR: 6 ML/MIN/1.73M2 — LOW
GLUCOSE SERPL-MCNC: 81 MG/DL — SIGNIFICANT CHANGE UP (ref 70–99)
HAV IGM SER-ACNC: SIGNIFICANT CHANGE UP
HBV CORE AB SER-ACNC: SIGNIFICANT CHANGE UP
HBV CORE IGM SER-ACNC: SIGNIFICANT CHANGE UP
HBV SURFACE AB SER-ACNC: 401.9 MIU/ML — SIGNIFICANT CHANGE UP
HBV SURFACE AG SER-ACNC: SIGNIFICANT CHANGE UP
HCT VFR BLD CALC: 28.5 % — LOW (ref 39–50)
HCV AB S/CO SERPL IA: 0.59 S/CO — SIGNIFICANT CHANGE UP (ref 0–0.99)
HCV AB SERPL-IMP: SIGNIFICANT CHANGE UP
HGB BLD-MCNC: 9.1 G/DL — LOW (ref 13–17)
MAGNESIUM SERPL-MCNC: 2 MG/DL — SIGNIFICANT CHANGE UP (ref 1.6–2.6)
MCHC RBC-ENTMCNC: 31.1 PG — SIGNIFICANT CHANGE UP (ref 27–34)
MCHC RBC-ENTMCNC: 31.9 GM/DL — LOW (ref 32–36)
MCV RBC AUTO: 97.3 FL — SIGNIFICANT CHANGE UP (ref 80–100)
NRBC # BLD: 0 /100 WBCS — SIGNIFICANT CHANGE UP
NRBC # FLD: 0 K/UL — SIGNIFICANT CHANGE UP
PHOSPHATE SERPL-MCNC: 3.9 MG/DL — SIGNIFICANT CHANGE UP (ref 2.5–4.5)
PLATELET # BLD AUTO: 88 K/UL — LOW (ref 150–400)
POTASSIUM SERPL-MCNC: 4.5 MMOL/L — SIGNIFICANT CHANGE UP (ref 3.5–5.3)
POTASSIUM SERPL-SCNC: 4.5 MMOL/L — SIGNIFICANT CHANGE UP (ref 3.5–5.3)
RBC # BLD: 2.93 M/UL — LOW (ref 4.2–5.8)
RBC # FLD: 13.9 % — SIGNIFICANT CHANGE UP (ref 10.3–14.5)
SARS-COV-2 RNA SPEC QL NAA+PROBE: SIGNIFICANT CHANGE UP
SODIUM SERPL-SCNC: 134 MMOL/L — LOW (ref 135–145)
WBC # BLD: 6.08 K/UL — SIGNIFICANT CHANGE UP (ref 3.8–10.5)
WBC # FLD AUTO: 6.08 K/UL — SIGNIFICANT CHANGE UP (ref 3.8–10.5)

## 2022-06-30 RX ORDER — METOPROLOL TARTRATE 50 MG
3 TABLET ORAL
Qty: 0 | Refills: 0 | DISCHARGE
Start: 2022-06-30

## 2022-06-30 RX ORDER — HYDRALAZINE HCL 50 MG
5 TABLET ORAL ONCE
Refills: 0 | Status: COMPLETED | OUTPATIENT
Start: 2022-06-30 | End: 2022-06-30

## 2022-06-30 RX ORDER — ACETAMINOPHEN 500 MG
2 TABLET ORAL
Qty: 0 | Refills: 0 | DISCHARGE
Start: 2022-06-30

## 2022-06-30 RX ORDER — SEVELAMER CARBONATE 2400 MG/1
2 POWDER, FOR SUSPENSION ORAL
Qty: 0 | Refills: 0 | DISCHARGE
Start: 2022-06-30

## 2022-06-30 RX ORDER — PANTOPRAZOLE SODIUM 20 MG/1
1 TABLET, DELAYED RELEASE ORAL
Qty: 0 | Refills: 0 | DISCHARGE
Start: 2022-06-30

## 2022-06-30 RX ORDER — ATORVASTATIN CALCIUM 80 MG/1
1 TABLET, FILM COATED ORAL
Qty: 0 | Refills: 0 | DISCHARGE
Start: 2022-06-30

## 2022-06-30 RX ORDER — LANOLIN ALCOHOL/MO/W.PET/CERES
1 CREAM (GRAM) TOPICAL
Qty: 0 | Refills: 0 | DISCHARGE
Start: 2022-06-30

## 2022-06-30 RX ORDER — NIFEDIPINE 30 MG
1 TABLET, EXTENDED RELEASE 24 HR ORAL
Qty: 0 | Refills: 3 | DISCHARGE

## 2022-06-30 RX ADMIN — APIXABAN 5 MILLIGRAM(S): 2.5 TABLET, FILM COATED ORAL at 19:35

## 2022-06-30 RX ADMIN — PANTOPRAZOLE SODIUM 40 MILLIGRAM(S): 20 TABLET, DELAYED RELEASE ORAL at 05:44

## 2022-06-30 RX ADMIN — Medication 75 MILLIGRAM(S): at 05:44

## 2022-06-30 RX ADMIN — APIXABAN 5 MILLIGRAM(S): 2.5 TABLET, FILM COATED ORAL at 05:44

## 2022-06-30 RX ADMIN — Medication 5 MILLIGRAM(S): at 18:07

## 2022-06-30 RX ADMIN — SEVELAMER CARBONATE 1600 MILLIGRAM(S): 2400 POWDER, FOR SUSPENSION ORAL at 19:35

## 2022-06-30 RX ADMIN — PANTOPRAZOLE SODIUM 40 MILLIGRAM(S): 20 TABLET, DELAYED RELEASE ORAL at 19:35

## 2022-06-30 RX ADMIN — ATORVASTATIN CALCIUM 80 MILLIGRAM(S): 80 TABLET, FILM COATED ORAL at 22:19

## 2022-06-30 RX ADMIN — SEVELAMER CARBONATE 1600 MILLIGRAM(S): 2400 POWDER, FOR SUSPENSION ORAL at 09:10

## 2022-06-30 RX ADMIN — SEVELAMER CARBONATE 1600 MILLIGRAM(S): 2400 POWDER, FOR SUSPENSION ORAL at 13:07

## 2022-06-30 NOTE — PROGRESS NOTE ADULT - PROBLEM SELECTOR PROBLEM 2
Atrial fibrillation

## 2022-06-30 NOTE — PROGRESS NOTE ADULT - PROBLEM SELECTOR PLAN 1
Acute CVA:    Neuro f/up appreciated  On Eliquis   -Cardio.f/u noted
-Pt p/w acute onset of dysarthria and right sided weakness yesterday. Outside of window for tpa, symptoms resolving  -c/f acute CVA vs TIA in the setting of afib not on a/c   -appreciate neuro eval  -permissive HTN  -aspirin suppository. Statin when able to take po   -CT head demonstrates chronic thalamic infarct   -MR brain, MRA head and neck ordered  -TTE with bubble study  -check EKG   -monitor on tele  -PT/OT eval ,speech therapy eval
-Pt p/w acute onset of dysarthria and right sided weakness yesterday. Outside of window for tpa, symptoms resolving  -c/f acute CVA vs TIA in the setting of afib not on a/c   -appreciate neuro eval  -permissive HTN  -aspirin suppository. Statin when able to take po   -CT head demonstrates chronic thalamic infarct   -MR brain, MRA head and neck: Acute CVA  -TTE with bubble study  -monitor on tele  -PT/OT eval ,
Acute CVA:    Neuro f/up appreciated  On Eliquis   -seen by Neiro  -Cardio.f/u noted
Acute CVA:    Neuro f/up appreciated  On Eliquis
Acute CVA:    Neuro f/up appreciated  On Eliquis
Acute CVA:    Neuro f/up appreciated  On Eliquis   -seen by Neiro  -Cardio.f/u noted
Acute CVA:    Neuro f/up appreciated  On Eliquis   -Cardio.f/u noted
Acute CVA:    Neuro f/up appreciated  On Eliquis   Neuro following   Crads following
-Pt p/w acute onset of dysarthria and right sided weakness yesterday. Outside of window for tpa, symptoms resolving  -c/f acute CVA vs TIA in the setting of afib not on a/c   -appreciate neuro eval  -permissive HTN  -aspirin suppository. Statin when able to take po   -CT head demonstrates chronic thalamic infarct   -MR brain, MRA head and neck pending  -TTE with bubble study  -monitor on tele  -PT/OT eval ,
Acute CVA:    Neuro f/up appreciated  On Eliquis
Acute CVA:    Neuro f/up appreciated  On Eliquis   Neuro following   -Cardio.f/u noted
Acute CVA:    Neuro f/up appreciated  On Eliquis   -seen by Neiro  -Cardio.f/u noted

## 2022-06-30 NOTE — CHART NOTE - NSCHARTNOTEFT_GEN_A_CORE
[facility] NIGHT MEDICINE COVERAGE    Received on sign-out that pt was pending D/C to rehab, was getting HD in evening and noted to be hypertensive and had AFib w/ rate in 110-120s.  Pt does have h/o Afib, takes Toprol 75mg PO, QD for rate control.  Pt is now s/p HD, manual BP of 162/84 per RN, pt also noted to be in Sinus Rhythm @ 82 bpm.  Pt was noted to have received a dose of Hydralazine 5mg IVP x1 around 6:07 PM.    I spoke to the pt at bedside, he was noted to be resting, he says he feels "well" and tolerated his dialysis w/o issues, offers no complaints presently, A&Ox3.  Explained to the pt that because his BP was labile during the day, w/ Afib in 120s, discharge will be held for the evening to monitor his BP and heart rate overnight.  Discharge will be re-evaluated tomorrow pending control of BP and heart rate.  Pt says he understands and is in agreement w/ the plan.  RN also contacted writer that pt had 3 beats VT on tele, will c/w telemetry monitoring.  Pt stable at this time, will continue to monitor.    ICU Vital Signs Last 24 Hrs  T(C): 36.7 (30 Jun 2022 18:45), Max: 36.8 (29 Jun 2022 22:35)  T(F): 98.1 (30 Jun 2022 18:45), Max: 98.2 (29 Jun 2022 22:35)  HR: 115 (30 Jun 2022 18:45) (71 - 115)  BP: 158/81 (30 Jun 2022 18:45) (151/91 - 190/100)  RR: 18 (30 Jun 2022 18:45) (16 - 18)  SpO2: 98% (30 Jun 2022 18:45) (98% - 100%)    Baldev Jolley PA-C  Department of Medicine - [facility]  In-House Pager: #[phone]
HPI: Patient is a 66y/o M with PMHx of HTN, AFib (no longer on apixaban - was stopped years ago due to severe low blood count associated with dark stool.), ESRD on HD Tu/Th/Sat, hypothyroid p/w slurred speech, right sided weakness and numbness found to have CVA    Notified by RN overnight that patient had 13 beats of NSVT seen on telemetry.  Patient asymptomatic during episode. Patient denying chest pain, shortness of breath, palpitations, headache, nausea, vomiting, constipation, diarrhea and any other complaints.     Vital Signs Last 24 Hrs  T(C): 36.3 (28 Jun 2022 03:54), Max: 36.9 (27 Jun 2022 20:39)  T(F): 97.4 (28 Jun 2022 03:54), Max: 98.4 (27 Jun 2022 20:39)  HR: 70 (28 Jun 2022 03:54) (70 - 77)  BP: 134/69 (28 Jun 2022 03:54) (130/77 - 162/98)  RR: 18 (28 Jun 2022 03:54) (17 - 18)  SpO2: 97% (28 Jun 2022 03:54) (97% - 100%)    PHYSICAL EXAM:  General: Awake and alert.  No acute distress.  Head: Normocephalic, atraumatic.    Neck: Supple.  Full range of motion.  No JVD.  No LAD.  No thyromegaly.  Trachea midline.    Heart: RRR.  Normal S1 and S2.  No murmurs, rubs, or gallops.  No LE edema b/l.   Lungs: Nonlabored breathing.  Good inspiratory effort.  CTAB.  No wheezes, crackles, or rhonchi.    Abdomen: BS+, soft, NT/ND.  No hepatomegaly.   Skin: Warm and dry.  No rashes.  Neuro: A&Ox3.     Plan   - Consider increasing Beta Blocker  - Monitor electrolytes, Will keep K > 4.0  & Mg > 2  - Will continue to monitor patient on telemetry       Samantha Mcclure PA-C  pager [phone]
MRI discussed with neuro recommended AC if clear with medicine/GI given prior GI bleed  Spoke with Dr Chau, GI consult called
Patient started on Eliquis w/ PPI, no bleeding has been noted and Hg has remained stable. Eliquis sent to [facility] for price check --> covered with no copay.  Informed by CM that patient is homeless - he is open to placement. SW aware and working on getting him into rehab.
Cardiology consulted, will f/u recs

## 2022-06-30 NOTE — PROGRESS NOTE ADULT - ASSESSMENT
67 year old right-handed male w/ PMHx HTN, AFib (no longer on apixaban d/t GIB, not on any antithrombotics at home), ESRD (on HD Tu/Th/Sat), hypothyroid, dentures, no history of stroke (per patient), p/w dysarthria and R sensorimotor deficits onset 15:30PM 6/16/22, approximately 30 minutes after completing HD. No similar symptoms in the past. Rigors on the night prior to arrival. He denies h/o diabetes or smoking. Code stroke cancelled as symptoms onset 24 hours prior to presentation. CT head: no acute pathology, chronic left thalamic lacunar infarct. Symptoms improving in the ED. RENAL following for ESRD/ HD Mx.    End Stage Renal Disease on Dialysis   HD unit -[facility] Tu/Th/Sat),  K, vol ok   Hb now < goal    plan for HD today  dose meds for ESRD  renal diet, fluid restriction  Anemia in CKD Hb <goal. watch H/H, added FRANNY epo 6k tiw w/hd tiw  Hyperphosphatemia- changed phoslo 667 tidac >renvela 2tidac  HTN, controlled. BP high, suboptimal control. c/w BB. inc uf w/hd  CVA -CTH no acute infarct/bleed. MRI brain w/o contrast demonstrates acute infarct in the L corona radiata w/o hemorrhage.   mx per neuro  h/o Afib -restarted on AC.  f/u w/cariology       plan for rehab placement  For any question, call:  Cell # [phone]  Pager # [phone]  Callback # [phone]

## 2022-06-30 NOTE — PROGRESS NOTE ADULT - PROBLEM SELECTOR PLAN 3
On HD  Nephro f/up noted
-appreciate nephro eval  -no urgent need for HD at this time  -plan for routine HD tomorrow  -c/w ca acetate when able to take po
On HD  Nephro f/up noted
-appreciate nephro eval  -no urgent need for HD at this time  -plan for routine HD tomorrow  -c/w ca acetate when able to take po
On HD  Nephro f/up noted
-appreciate nephro eval  -no urgent need for HD at this time  -plan for routine HD tomorrow  -c/w ca acetate when able to take po

## 2022-06-30 NOTE — PROGRESS NOTE ADULT - ASSESSMENT
67 year old right-handed male w/ PMHx HTN, AFib (no longer on apixaban d/t GIB, not on any antithrombotics at home), ESRD (on HD Tu/Th/Sat), hypothyroid, dentures, no history of stroke (per patient), p/w dysarthria and R sensorimotor deficits onset 15:30PM 6/16/22, approximately 30 minutes after completing HD. No similar symptoms in the past. Rigors on the night prior to arrival. He denies h/o diabetes or smoking. CT head: no acute pathology, chronic left thalamic lacunar infarct. Symptoms improving in the ED. TTE w/ bubble reveals EF of 58%, no PFO, grossly normal otherwise. MRI brain w/o contrast demonstrates acute infarct in the L corona radiata w/o hemorrhage. Today (6/21), patient reports persisting slur & improving strength. Exam as above.      Impression: Improving R sensorimotor syndrome secondary to acute L corona radiata infarct that appears to be more related to small vessel disease than cardioembolism, even in the setting of atrial fibrillation. However, patient should be restarted on a DOAC if cleared by GI as they are at risk for cardioembolism with [id number] score of 4. Otherwise would recommend alternative to long term anticoagulation such as Watchman Device; however, this would require short term course of anticoagulation followed by DAPT regardless.     Recommendations    Imaging:  - monitor platelets   - May need TIFFANIE for evaluation for Watchman Device if cleared by GI for AC & cardio in agreement. Can be done outpatient, should not hold discharge.   - Restart on Apixaban 5 mg BID if cleared by GI, started   - Atorvastatin 40 mg, titrate to LDL < 70    -Chemical DVT prophylaxis with Lovenox SQ  - Recommend evaluation for Watchman Device as alternative to long term anticoagulation given multiple episodes of GI bleeding.   - Normotension, avoid hypotension (long term goals between 110-140/<90 mmHg).  - PT/OT - home, no skilled PT needs at this time  - C/w regular diet as tolerated  - HgA1C goals < 7.0 (currently 5.2)  - Stroke education discussed at bedside  - GI consult recs appreciated   - Cardio on board, appreciate recommendations  - Nephro on board, appreciate recommendations  - Rest of workup as per primary team  - Upon discharge, patient should follow up outpatient with Dr. Horn:  [phone] [address] [place] [address]    From neurovascular standpoint, no further inpatient investigations are indicated at this time.      dispo planning in progress   Ashish Horn MD  Vascular Neurology      Please call with questions: [phone]

## 2022-06-30 NOTE — PROGRESS NOTE ADULT - PROBLEM SELECTOR PROBLEM 3
ESRD on dialysis

## 2022-06-30 NOTE — PROGRESS NOTE ADULT - SUBJECTIVE AND OBJECTIVE BOX
Ran Velasquez MD  Interventional Cardiology / Endovascular Specialist  [place] Office : [address] [place] [address] [place]. [address]  Tel:   [place] Office : [address] [facility] [place] [address]  Tel: [phone]    Subjective/Overnight events: Patient lying in bed comfortably. No acute distress. Denies chest pain, SOB or palpitations  	  MEDICATIONS:  apixaban 5 milliGRAM(s) Oral every 12 hours  metoprolol succinate ER 75 milliGRAM(s) Oral daily        acetaminophen     Tablet .. 650 milliGRAM(s) Oral every 6 hours PRN  melatonin 3 milliGRAM(s) Oral at bedtime PRN  ondansetron Injectable 4 milliGRAM(s) IV Push every 8 hours PRN    aluminum hydroxide/magnesium hydroxide/simethicone Suspension 30 milliLiter(s) Oral every 4 hours PRN  pantoprazole    Tablet 40 milliGRAM(s) Oral two times a day    atorvastatin 80 milliGRAM(s) Oral at bedtime    epoetin tammi-epbx (RETACRIT) Injectable 6000 Unit(s) IV Push <User Schedule>      PAST MEDICAL/SURGICAL HISTORY  PAST MEDICAL & SURGICAL HISTORY:  HTN (Hypertension)      Chronic kidney disease      Kidney stones      Hemodialysis access, AV graft  Left upper extremity      Hyperparathyroidism      Anemia      Thrombocytopenia      Atrial fibrillation  on Eliquis      S/P Nephrectomy  (L) 2007 for kidney stones      Acquired arteriovenous fistula  left wrist  1/2015 - [facility], Left upper arm 4/2015 (approximate date)      AVF (arteriovenous fistula)          SOCIAL HISTORY: Substance Use (street drugs): ( x ) never used  (  ) other:    FAMILY HISTORY:  Family history of CVA (Father)        REVIEW OF SYSTEMS:  CONSTITUTIONAL: No fever, weight loss, or fatigue  EYES: No eye pain, visual disturbances, or discharge  ENMT:  No difficulty hearing, tinnitus, vertigo; No sinus or throat pain  BREASTS: No pain, masses, or nipple discharge  GASTROINTESTINAL: No abdominal or epigastric pain. No nausea, vomiting, or hematemesis; No diarrhea or constipation. No melena or hematochezia.  GENITOURINARY: No dysuria, frequency, hematuria, or incontinence  NEUROLOGICAL: No headaches, memory loss, loss of strength, numbness, or tremors  ENDOCRINE: No heat or cold intolerance; No hair loss  MUSCULOSKELETAL: No joint pain or swelling; No muscle, back, or extremity pain  PSYCHIATRIC: No depression, anxiety, mood swings, or difficulty sleeping  HEME/LYMPH: No easy bruising, or bleeding gums  All others negative    PHYSICAL EXAM:  T(C): 36.7 (06-30-22 @ 08:55), Max: 36.8 (06-29-22 @ 22:35)  HR: 73 (06-30-22 @ 10:15) (71 - 73)  BP: 151/91 (06-30-22 @ 10:15) (151/91 - 172/96)  RR: 17 (06-30-22 @ 08:55) (17 - 18)  SpO2: 98% (06-30-22 @ 08:55) (98% - 100%)  Wt(kg): --  I&O's Summary    GENERAL: NAD  EYES:   PERRLA   ENMT:   Moist mucous membranes, Good dentition, No lesions  Cardiovascular: Normal S1 S2, No JVD, No murmurs, No edema  Respiratory: Lungs clear to auscultation	  Gastrointestinal:  Soft, Non-tender, + BS	  Extremities: no edema                        9.1    6.08  )-----------( 88       ( 30 Jun 2022 06:37 )             28.5     06-30    134<L>  |  94<L>  |  73<H>  ----------------------------<  81  4.5   |  19<L>  |  9.43<H>    Ca    6.6<L>      30 Jun 2022 06:37  Phos  3.9     06-30  Mg     2.00     06-30      proBNP:   Lipid Profile:   HgA1c:   TSH:     Consultant(s) Notes Reviewed:  [x ] YES  [ ] NO    Care Discussed with Consultants/Other Providers [ x] YES  [ ] NO    Imaging Personally Reviewed independently:  [x] YES  [ ] NO    All labs, radiologic studies, vitals, orders and medications list reviewed. Patient is seen and examined at bedside. Case discussed with medical team.

## 2022-06-30 NOTE — PROGRESS NOTE ADULT - SUBJECTIVE AND OBJECTIVE BOX
Patient is a 67y old  Male who presents with a chief complaint of dysarthria and weakness (30 Jun 2022 15:16)      SUBJECTIVE / OVERNIGHT EVENTS:    Events noted.  CONSTITUTIONAL: No fever,  or fatigue  RESPIRATORY: No cough, wheezing,  No shortness of breath  CARDIOVASCULAR: No chest pain, palpitations, dizziness, or leg swelling  GASTROINTESTINAL: No abdominal or epigastric pain. No nausea, vomiting.  NEUROLOGICAL: No headache    MEDICATIONS  (STANDING):  apixaban 5 milliGRAM(s) Oral every 12 hours  atorvastatin 80 milliGRAM(s) Oral at bedtime  epoetin tammi-epbx (RETACRIT) Injectable 6000 Unit(s) IV Push <User Schedule>  metoprolol succinate ER 75 milliGRAM(s) Oral daily  pantoprazole    Tablet 40 milliGRAM(s) Oral two times a day  sevelamer carbonate 1600 milliGRAM(s) Oral three times a day with meals    MEDICATIONS  (PRN):  acetaminophen     Tablet .. 650 milliGRAM(s) Oral every 6 hours PRN Temp greater or equal to 38C (100.4F), Mild Pain (1 - 3)  aluminum hydroxide/magnesium hydroxide/simethicone Suspension 30 milliLiter(s) Oral every 4 hours PRN Dyspepsia  melatonin 3 milliGRAM(s) Oral at bedtime PRN Insomnia  ondansetron Injectable 4 milliGRAM(s) IV Push every 8 hours PRN Nausea and/or Vomiting        CAPILLARY BLOOD GLUCOSE        I&O's Summary    30 Jun 2022 07:01  -  01 Jul 2022 00:40  --------------------------------------------------------  IN: 400 mL / OUT: 2900 mL / NET: -2500 mL        T(C): 36.7 (06-30-22 @ 18:45), Max: 36.7 (06-30-22 @ 08:55)  HR: 115 (06-30-22 @ 18:45) (72 - 115)  BP: 158/81 (06-30-22 @ 18:45) (151/91 - 190/100)  RR: 18 (06-30-22 @ 18:45) (16 - 18)  SpO2: 98% (06-30-22 @ 18:45) (98% - 100%)    PHYSICAL EXAM:  GENERAL: NAD  NECK: Supple, No JVD  CHEST/LUNG: Clear to auscultation bilaterally; No wheezing.  HEART: Regular rate and rhythm; No murmurs, rubs, or gallops  ABDOMEN: Soft, Nontender, Nondistended; Bowel sounds present  EXTREMITIES:   No edema  NEUROLOGY: AAO X 3      LABS:                        9.1    6.08  )-----------( 88       ( 30 Jun 2022 06:37 )             28.5     06-30    134<L>  |  94<L>  |  73<H>  ----------------------------<  81  4.5   |  19<L>  |  9.43<H>    Ca    6.6<L>      30 Jun 2022 06:37  Phos  3.9     06-30  Mg     2.00     06-30              CAPILLARY BLOOD GLUCOSE            RADIOLOGY & ADDITIONAL TESTS:    Imaging Personally Reviewed:    Consultant(s) Notes Reviewed:      Care Discussed with Consultants/Other Providers:    Wilfred Christian MD, CMD, FACP    [address] [place] [address]  Office Tel: [phone]  Cell: [phone]

## 2022-06-30 NOTE — PROGRESS NOTE ADULT - ASSESSMENT
67 year old female with new onset dysarthria. GI called for AC clearance       History of GI bleed:  On Eliquis   No bleeding appreciated   Hemoglobin appears relatively stable   Continue to monitor    Colonoscopy and Endoscopy (can be done on an outpatient basis) when acute issues have resolved         Stroke   As above       Patient no longer requiring GI input. GI to sign off. If reconsult needed then please contact:   JESSICA pager: [phone]   Email: [email or web address]   Please indicate "non-teaching service"   Thank you for allowing me to participate  in the care of this patient     I reviewed the overnight course of events on the unit, re-confirming the patient history. I discussed the care with the patient and their family. The plan of care was discussed with the physician assistant and modifications were made to the notation where appropriate. Differential diagnosis and plan of care discussed with patient after the evaluation. Advanced care planning was discussed with patient and family.  Advanced care planning forms were reviewed and discussed.  Risks, benefits and alternatives of gastroenterologic procedures were discussed in detail and all questions were answered. 35 minutes spent on total encounter of which more than fifty percent of the encounter was spent counseling and/or coordinating care by the attending physician.

## 2022-06-30 NOTE — PROGRESS NOTE ADULT - PROBLEM SELECTOR PROBLEM 1
Dysarthria

## 2022-06-30 NOTE — PROGRESS NOTE ADULT - SUBJECTIVE AND OBJECTIVE BOX
[facility] - MARK Vargas / MARK Pagan / JASMIN Caballero/ MARK Bojorquez/ MARK Faust/ TELLO Garrison / RODNEY Garcia / HAILEY Perkins  ---------------------------------------------------------------------------------------------------------------  seen and examined today for ESRD  Interval : NAD  VITALS:  T(F): 98 (06-30-22 @ 08:55), Max: 98.2 (06-29-22 @ 22:35)  HR: 73 (06-30-22 @ 10:15)  BP: 151/91 (06-30-22 @ 10:15)  RR: 17 (06-30-22 @ 08:55)  SpO2: 98% (06-30-22 @ 08:55)  Wt(kg): --    Physical Exam :-  Constitutional: NAD  Neck: Supple.  Respiratory: Bilateral equal breath sounds,  Cardiovascular: S1, S2 normal,  Gastrointestinal: Bowel Sounds present, soft, non tender.  Extremities: No edema  Neurological: Alert and Oriented x 3, dysarthria  Psychiatric: Normal mood, normal affect  Data:-  Allergies :   No Known Allergies    Hospital Medications:   MEDICATIONS  (STANDING):  apixaban 5 milliGRAM(s) Oral every 12 hours  atorvastatin 80 milliGRAM(s) Oral at bedtime  epoetin tammi-epbx (RETACRIT) Injectable 6000 Unit(s) IV Push <User Schedule>  metoprolol succinate ER 75 milliGRAM(s) Oral daily  pantoprazole    Tablet 40 milliGRAM(s) Oral two times a day  sevelamer carbonate 1600 milliGRAM(s) Oral three times a day with meals    06-30    134<L>  |  94<L>  |  73<H>  ----------------------------<  81  4.5   |  19<L>  |  9.43<H>    Ca    6.6<L>      30 Jun 2022 06:37  Phos  3.9     06-30  Mg     2.00     06-30      Creatinine Trend: 9.43 <--, 7.49 <--, 10.60 <--, 8.29 <--, 5.78 <--, 7.85 <--, 5.77 <--                        9.1    6.08  )-----------( 88       ( 30 Jun 2022 06:37 )             28.5

## 2022-06-30 NOTE — PROGRESS NOTE ADULT - SUBJECTIVE AND OBJECTIVE BOX
Neurology Progress Note     INTERVAL HISTORY: Patient seen at bedside by stroke team  dispo planning      MEDICATIONS (HOME):  Home Medications:  NIFEdipine 60 mg oral tablet, extended release: 1 tab(s) orally once a day (17 Jun 2022 17:50)        MEDICATIONS  (STANDING):  apixaban 5 milliGRAM(s) Oral every 12 hours  atorvastatin 80 milliGRAM(s) Oral at bedtime  epoetin tammi-epbx (RETACRIT) Injectable 6000 Unit(s) IV Push <User Schedule>  metoprolol succinate ER 75 milliGRAM(s) Oral daily  pantoprazole    Tablet 40 milliGRAM(s) Oral two times a day  sevelamer carbonate 1600 milliGRAM(s) Oral three times a day with meals    MEDICATIONS  (PRN):  acetaminophen     Tablet .. 650 milliGRAM(s) Oral every 6 hours PRN Temp greater or equal to 38C (100.4F), Mild Pain (1 - 3)  aluminum hydroxide/magnesium hydroxide/simethicone Suspension 30 milliLiter(s) Oral every 4 hours PRN Dyspepsia  melatonin 3 milliGRAM(s) Oral at bedtime PRN Insomnia  ondansetron Injectable 4 milliGRAM(s) IV Push every 8 hours PRN Nausea and/or Vomiting      ALLERGIES/INTOLERANCES:  Allergies  No Known Allergies    Intolerances    VITALS & EXAMINATION:      Vital Signs Last 24 Hrs  T(C): 36.7 (06-30-22 @ 08:55), Max: 36.8 (06-29-22 @ 22:35)  T(F): 98 (06-30-22 @ 08:55), Max: 98.2 (06-29-22 @ 22:35)  HR: 73 (06-30-22 @ 10:15) (71 - 73)  BP: 151/91 (06-30-22 @ 10:15) (151/91 - 172/96)  BP(mean): --  RR: 17 (06-30-22 @ 08:55) (17 - 18)  SpO2: 98% (06-30-22 @ 08:55) (98% - 100%)                General:  Constitutional: Male, appears stated age, in no apparent distress including pain  Head: Normocephalic & Atraumatic.    Neurological:  MS: Eyes open. Awake, alert, oriented to person, place, situation, time. Follows all commands.    Language: Speech is mild to moderately dysarthric, fluent with good repetition & comprehension.    CNs: VFF. EOMI no nystagmus. V1-3 intact to LT b/l. Moderate R facial palsy. Hearing grossly normal (rubbing fingers) b/l. Tongue midline, normal movements, no atrophy.     Motor: Normal muscle bulk & tone. No noticeable tremor. Slight pronator drift in RUE, no drift otherwise. No drift in LUE, LLE, RLE. 5/5 throughout. Spontaneous & purposeful movements throughout. 	     Sensation: Decreased sensation to LT/Temp on R.    Coordination: Slower fast finger tapping on R when compared to L. LUE orbits around RUE with rolling arm motion. No dysmetria to FTN.    Gait: No postural instability. Gait testing deferred.      LABORATORY:  CBC Full  -  ( 30 Jun 2022 06:37 )  WBC Count : 6.08 K/uL  RBC Count : 2.93 M/uL  Hemoglobin : 9.1 g/dL  Hematocrit : 28.5 %  Platelet Count - Automated : 88 K/uL  Mean Cell Volume : 97.3 fL  Mean Cell Hemoglobin : 31.1 pg  Mean Cell Hemoglobin Concentration : 31.9 gm/dL  Auto Neutrophil # : x  Auto Lymphocyte # : x  Auto Monocyte # : x  Auto Eosinophil # : x  Auto Basophil # : x  Auto Neutrophil % : x  Auto Lymphocyte % : x  Auto Monocyte % : x  Auto Eosinophil % : x  Auto Basophil % : x     06-30    134<L>  |  94<L>  |  73<H>  ----------------------------<  81  4.5   |  19<L>  |  9.43<H>    Ca    6.6<L>      30 Jun 2022 06:37  Phos  3.9     06-30  Mg     2.00     06-30          LFTs   Coagulopathy   Lipid Panel 06-19 Chol 140 LDL 60 HDL 64 Trig 81  A1c 5.2     STUDIES & IMAGING:    Radiology (XR, CT, MR, U/S, TTE/TIFFANIE):      MRI Brain: A wedge-shaped area of restricted diffusion is seen within the   left corona radiata with associated T2/FLAIR hyperintense signal. There   is no hemorrhagic transformation.    A chronic lacunar infarct is again noted within the left thalamus.    Multiple additional patchy confluent nonspecific T2/FLAIR hyperintense   signal changes are noted throughout the bihemispheric white matter   without associated mass effect or restricted diffusion.    There is enhancing mass lesion within the right internal auditory canal   measuring 0.5 x 0.8 cm in the axial plane (AP by transverse) (series 14,   image 43).    Otherwise, no abnormal brain parenchymal or leptomeningeal enhancement is   notable.    Ventricular size and configuration is unremarkable. No abnormal extra   axial fluid collections are seen. Flow-voids are noted throughout the   major intracranial vessels, on the T2 weighted images, consistent with   their patency. The sellar region appears unremarkable.    Polypoidal soft tissue is seen within the left maxillary sinus.   Additional scattered mucosal thickening is seen throughout the ethmoid   complex. The mastoid air cells are clear. Calvarial signal appears   unremarkable. The orbits appear unremarkable apart from bilateral   cataract removal.    Posterior parietal scalp vertex scarring is seen.    MRA Neck: There is a standard anatomic configuration to the aortic arch.    The origins of the great vessels appear unremarkable. The bilateral   common carotid arteries and carotid bifurcations appear unremarkable.    The bilateral cervical internal carotid arteries are within normal limits.    The origins of the bilateral vertebral arteries are normal. The bilateral   cervical vertebral arteries are normal in course and caliber.    MRA [place]: The bilateral intracranial internal carotid,   anterior, and middle cerebral arteries appear unremarkable.    The anterior communicating artery is seen. The bilateral posterior   communicating arteries are not well resolved.    The bilateral intradural vertebral arteries, vertebrobasilar junction,   basilar artery, and basilar tip appear unremarkable as well as the   bilateral posterior cerebral arteries.    IMPRESSION:    MRI BRAIN:  1. Acute/subacute left-sided corona radiata infarction with associated   cytotoxic edema and without hemorrhagic transformation.  2. Enhancing mass lesion within the right internal auditory canal, most   compatible with a vestibular schwannoma. Recommend serial imaging over   time to assess for stability or change.  3. Chronic lacunar infarct within the left thalamus.  4. Multiple patchy confluent nonspecific abnormal white matter foci of   T2/FLAIR prolongation statistically favoring microvascular disease.    MRA NECK AND HEAD:  1. No large vessel occlusion or major stenosis.    --- End of Report ---      < from: Transthoracic Echocardiogram (06.18.22 @ 07:56) >  CONCLUSIONS:  1. Calcified trileaflet aortic valve with normal opening.  Minimal aortic regurgitation.  2. Mildly dilated left atrium.  LA volume index = 39 cc/m2.  3. Normal left ventricular systolic function. No segmental  wall motion abnormalities.  4. Normal right ventricular size and function.  5. Agitated saline injection demonstrates no obvious  evidence of a patent foramen ovale.        CT Brain Stroke Protocol (06.17.22 @ 15:41)    FINDINGS:    Assessment of the posterior fossa and henok is somewhat limited by beam   hardening artifact.  Ventricles and sulci: Parenchymal volume loss is present which is   commensurate with patient age.  Intra-axial: Periventricular small vessel white matter ischemic changes   are appreciated. Old left thalamic lacunar infarct. No intracranial mass,   acute hemorrhage, or midline shift is present.  Extra-axial: No extra-axial fluid collection is identified. Deposition of   calcified plaques in association with the distal intracranial bilateral   vertebral arteries and bilateral carotid siphons.  Calvarium: Intact.    Bilateral optic globes are intact. Left maxillary sinus polyp versus   retention cysts. Bilateral mastoid air cells and middle ear cavities are   predominantly clear.    paranasal sinuses, bilateral mastoid air cells, middle ear cavities are   clear.    IMPRESSION: Age-appropriate involutional changes. No large arterial   distribution acute infarct. Old left thalamic lacunar infarct.    Findings were communicated by Dr. Behr-Ventura to Dr. STACIA Bojorquez at   approximately 3:50 PM 6/17/2022.

## 2022-07-01 ENCOUNTER — TRANSCRIPTION ENCOUNTER (OUTPATIENT)
Age: 67
End: 2022-07-01

## 2022-07-01 VITALS
TEMPERATURE: 98 F | HEART RATE: 70 BPM | RESPIRATION RATE: 18 BRPM | SYSTOLIC BLOOD PRESSURE: 128 MMHG | OXYGEN SATURATION: 100 % | DIASTOLIC BLOOD PRESSURE: 70 MMHG

## 2022-07-01 LAB
ANION GAP SERPL CALC-SCNC: 14 MMOL/L — SIGNIFICANT CHANGE UP (ref 7–14)
BASOPHILS # BLD AUTO: 0.03 K/UL — SIGNIFICANT CHANGE UP (ref 0–0.2)
BASOPHILS NFR BLD AUTO: 0.6 % — SIGNIFICANT CHANGE UP (ref 0–2)
BUN SERPL-MCNC: 45 MG/DL — HIGH (ref 7–23)
CALCIUM SERPL-MCNC: 7.8 MG/DL — LOW (ref 8.4–10.5)
CHLORIDE SERPL-SCNC: 95 MMOL/L — LOW (ref 98–107)
CO2 SERPL-SCNC: 26 MMOL/L — SIGNIFICANT CHANGE UP (ref 22–31)
CREAT SERPL-MCNC: 7.06 MG/DL — HIGH (ref 0.5–1.3)
EGFR: 8 ML/MIN/1.73M2 — LOW
EOSINOPHIL # BLD AUTO: 0.13 K/UL — SIGNIFICANT CHANGE UP (ref 0–0.5)
EOSINOPHIL NFR BLD AUTO: 2.4 % — SIGNIFICANT CHANGE UP (ref 0–6)
GLUCOSE SERPL-MCNC: 92 MG/DL — SIGNIFICANT CHANGE UP (ref 70–99)
HCT VFR BLD CALC: 28.5 % — LOW (ref 39–50)
HGB BLD-MCNC: 9.3 G/DL — LOW (ref 13–17)
IANC: 3.21 K/UL — SIGNIFICANT CHANGE UP (ref 1.8–7.4)
IMM GRANULOCYTES NFR BLD AUTO: 0.4 % — SIGNIFICANT CHANGE UP (ref 0–1.5)
LYMPHOCYTES # BLD AUTO: 1.45 K/UL — SIGNIFICANT CHANGE UP (ref 1–3.3)
LYMPHOCYTES # BLD AUTO: 26.7 % — SIGNIFICANT CHANGE UP (ref 13–44)
MAGNESIUM SERPL-MCNC: 1.8 MG/DL — SIGNIFICANT CHANGE UP (ref 1.6–2.6)
MCHC RBC-ENTMCNC: 30.5 PG — SIGNIFICANT CHANGE UP (ref 27–34)
MCHC RBC-ENTMCNC: 32.6 GM/DL — SIGNIFICANT CHANGE UP (ref 32–36)
MCV RBC AUTO: 93.4 FL — SIGNIFICANT CHANGE UP (ref 80–100)
MONOCYTES # BLD AUTO: 0.59 K/UL — SIGNIFICANT CHANGE UP (ref 0–0.9)
MONOCYTES NFR BLD AUTO: 10.9 % — SIGNIFICANT CHANGE UP (ref 2–14)
NEUTROPHILS # BLD AUTO: 3.21 K/UL — SIGNIFICANT CHANGE UP (ref 1.8–7.4)
NEUTROPHILS NFR BLD AUTO: 59 % — SIGNIFICANT CHANGE UP (ref 43–77)
NRBC # BLD: 0 /100 WBCS — SIGNIFICANT CHANGE UP
NRBC # FLD: 0 K/UL — SIGNIFICANT CHANGE UP
PHOSPHATE SERPL-MCNC: 3.7 MG/DL — SIGNIFICANT CHANGE UP (ref 2.5–4.5)
PLATELET # BLD AUTO: 108 K/UL — LOW (ref 150–400)
POTASSIUM SERPL-MCNC: 4 MMOL/L — SIGNIFICANT CHANGE UP (ref 3.5–5.3)
POTASSIUM SERPL-SCNC: 4 MMOL/L — SIGNIFICANT CHANGE UP (ref 3.5–5.3)
RBC # BLD: 3.05 M/UL — LOW (ref 4.2–5.8)
RBC # FLD: 14 % — SIGNIFICANT CHANGE UP (ref 10.3–14.5)
SODIUM SERPL-SCNC: 135 MMOL/L — SIGNIFICANT CHANGE UP (ref 135–145)
WBC # BLD: 5.43 K/UL — SIGNIFICANT CHANGE UP (ref 3.8–10.5)
WBC # FLD AUTO: 5.43 K/UL — SIGNIFICANT CHANGE UP (ref 3.8–10.5)

## 2022-07-01 RX ADMIN — SEVELAMER CARBONATE 1600 MILLIGRAM(S): 2400 POWDER, FOR SUSPENSION ORAL at 09:35

## 2022-07-01 RX ADMIN — PANTOPRAZOLE SODIUM 40 MILLIGRAM(S): 20 TABLET, DELAYED RELEASE ORAL at 05:58

## 2022-07-01 RX ADMIN — APIXABAN 5 MILLIGRAM(S): 2.5 TABLET, FILM COATED ORAL at 05:52

## 2022-07-01 RX ADMIN — Medication 75 MILLIGRAM(S): at 05:52

## 2022-07-01 RX ADMIN — SEVELAMER CARBONATE 1600 MILLIGRAM(S): 2400 POWDER, FOR SUSPENSION ORAL at 11:44

## 2022-07-01 NOTE — PROGRESS NOTE ADULT - ASSESSMENT
67 year old right-handed male w/ PMHx HTN, AFib (no longer on apixaban d/t GIB, not on any antithrombotics at home), ESRD (on HD Tu/Th/Sat), hypothyroid, dentures, no history of stroke (per patient), p/w dysarthria and R sensorimotor deficits onset 15:30PM 6/16/22, approximately 30 minutes after completing HD. No similar symptoms in the past. Rigors on the night prior to arrival. He denies h/o diabetes or smoking. Code stroke cancelled as symptoms onset 24 hours prior to presentation. CT head: no acute pathology, chronic left thalamic lacunar infarct. Symptoms improving in the ED. RENAL following for ESRD/ HD Mx.    End Stage Renal Disease on Dialysis   [facility]  K, vol ok   Hb now < goal    s/p HD yesterday, Rx sheet reviewed, net UF 2.5kg achieved, tolerated well. uneventful.   dose meds for ESRD  renal diet, fluid restriction  Anemia in CKD Hb <goal. watch H/H, FRANNY epo 6k tiw w/hd tiw  Hyperphosphatemia- changed phoslo 667 tidac >renvela 2tidac  HTN, controlled. BP now optimal control. c/w BB.   CVA -CTH no acute infarct/bleed. MRI brain w/o contrast demonstrates acute infarct in the L corona radiata w/o hemorrhage.   mx per neuro  h/o Afib -restarted on AC.  f/u w/cariology     stable for d/c to rehab renal stand point  labs, chart reviewed  poc d/w pt  For any question, call:  Cell # [phone]  Pager # [phone]

## 2022-07-01 NOTE — PROGRESS NOTE ADULT - SUBJECTIVE AND OBJECTIVE BOX
Ran Velasquez MD  Interventional Cardiology / Endovascular Specialist  [place] Office : [address] [place] [address] [place]. [address]  Tel:   [place] Office : [address] [facility] [place] [address]  Tel: [phone]    Subjective/Overnight events: Patient lying in bed comfortably. No acute distress. Denies chest pain, SOB or palpitations  	  MEDICATIONS:  apixaban 5 milliGRAM(s) Oral every 12 hours  metoprolol succinate ER 75 milliGRAM(s) Oral daily        acetaminophen     Tablet .. 650 milliGRAM(s) Oral every 6 hours PRN  melatonin 3 milliGRAM(s) Oral at bedtime PRN  ondansetron Injectable 4 milliGRAM(s) IV Push every 8 hours PRN    aluminum hydroxide/magnesium hydroxide/simethicone Suspension 30 milliLiter(s) Oral every 4 hours PRN  pantoprazole    Tablet 40 milliGRAM(s) Oral two times a day    atorvastatin 80 milliGRAM(s) Oral at bedtime    epoetin tammi-epbx (RETACRIT) Injectable 6000 Unit(s) IV Push <User Schedule>      PAST MEDICAL/SURGICAL HISTORY  PAST MEDICAL & SURGICAL HISTORY:  HTN (Hypertension)      Chronic kidney disease      Kidney stones      Hemodialysis access, AV graft  Left upper extremity      Hyperparathyroidism      Anemia      Thrombocytopenia      Atrial fibrillation  on Eliquis      S/P Nephrectomy  (L) 2007 for kidney stones      Acquired arteriovenous fistula  left wrist  1/2015 - [facility], Left upper arm 4/2015 (approximate date)      AVF (arteriovenous fistula)          SOCIAL HISTORY: Substance Use (street drugs): ( x ) never used  (  ) other:    FAMILY HISTORY:  Family history of CVA (Father)        REVIEW OF SYSTEMS:  CONSTITUTIONAL: No fever, weight loss, or fatigue  EYES: No eye pain, visual disturbances, or discharge  ENMT:  No difficulty hearing, tinnitus, vertigo; No sinus or throat pain  BREASTS: No pain, masses, or nipple discharge  GASTROINTESTINAL: No abdominal or epigastric pain. No nausea, vomiting, or hematemesis; No diarrhea or constipation. No melena or hematochezia.  GENITOURINARY: No dysuria, frequency, hematuria, or incontinence  NEUROLOGICAL: No headaches, memory loss, loss of strength, numbness, or tremors  ENDOCRINE: No heat or cold intolerance; No hair loss  MUSCULOSKELETAL: No joint pain or swelling; No muscle, back, or extremity pain  PSYCHIATRIC: No depression, anxiety, mood swings, or difficulty sleeping  HEME/LYMPH: No easy bruising, or bleeding gums  All others negative    PHYSICAL EXAM:  T(C): 36.6 (07-01-22 @ 11:45), Max: 36.8 (06-30-22 @ 22:00)  HR: 70 (07-01-22 @ 11:45) (70 - 115)  BP: 128/70 (07-01-22 @ 11:45) (128/70 - 190/100)  RR: 18 (07-01-22 @ 11:45) (16 - 18)  SpO2: 100% (07-01-22 @ 11:45) (98% - 100%)  Wt(kg): --  I&O's Summary    30 Jun 2022 07:01  -  01 Jul 2022 07:00  --------------------------------------------------------  IN: 400 mL / OUT: 2900 mL / NET: -2500 mL      GENERAL: NAD  EYES:   PERRLA   ENMT:   Moist mucous membranes, Good dentition, No lesions  Cardiovascular: Normal S1 S2, No JVD, No murmurs, No edema  Respiratory: Lungs clear to auscultation	  Gastrointestinal:  Soft, Non-tender, + BS	  Extremities: no edema                              9.3    5.43  )-----------( 108      ( 01 Jul 2022 06:00 )             28.5     07-01    135  |  95<L>  |  45<H>  ----------------------------<  92  4.0   |  26  |  7.06<H>    Ca    7.8<L>      01 Jul 2022 06:00  Phos  3.7     07-01  Mg     1.80     07-01      proBNP:   Lipid Profile:   HgA1c:   TSH:     Consultant(s) Notes Reviewed:  [x ] YES  [ ] NO    Care Discussed with Consultants/Other Providers [ x] YES  [ ] NO    Imaging Personally Reviewed independently:  [x] YES  [ ] NO    All labs, radiologic studies, vitals, orders and medications list reviewed. Patient is seen and examined at bedside. Case discussed with medical team.

## 2022-07-01 NOTE — PROGRESS NOTE ADULT - PROVIDER SPECIALTY LIST ADULT
Cardiology
Cardiology
Gastroenterology
Nephrology
Neurology
Cardiology
Gastroenterology
Internal Medicine
Internal Medicine
Nephrology
Cardiology
Gastroenterology
Internal Medicine
Nephrology
Neurology
Cardiology
Gastroenterology
Nephrology
Neurology
Internal Medicine

## 2022-07-01 NOTE — DISCHARGE NOTE NURSING/CASE MANAGEMENT/SOCIAL WORK - NSDCVIVACCINE_GEN_ALL_CORE_FT
influenza, high-dose, quadrivalent; 01-Nov-2021 07:20; Malka Houser (RN); [employer]; [id number] (Exp. Date: 31-May-2022); IntraMuscular; Deltoid Left.; 0.7 milliLiter(s); VIS (VIS Published: 06-Aug-2021, VIS Presented: 01-Nov-2021);

## 2022-07-01 NOTE — PROGRESS NOTE ADULT - ASSESSMENT
67 year old right-handed male w/ PMHx HTN, AFib (no longer on apixaban d/t GIB, not on any antithrombotics at home), ESRD (on HD Tu/Th/Sat), hypothyroid, dentures, no history of stroke (per patient), p/w dysarthria and R sensorimotor deficits onset 15:30PM 6/16/22, approximately 30 minutes after completing HD. No similar symptoms in the past. Rigors on the night prior to arrival. He denies h/o diabetes or smoking. CT head: no acute pathology, chronic left thalamic lacunar infarct. Symptoms improving in the ED. TTE w/ bubble reveals EF of 58%, no PFO, grossly normal otherwise. MRI brain w/o contrast demonstrates acute infarct in the L corona radiata w/o hemorrhage. Today (6/21), patient reports persisting slur & improving strength. Exam as above.      Impression: Improving R sensorimotor syndrome secondary to acute L corona radiata infarct that appears to be more related to small vessel disease than cardioembolism, even in the setting of atrial fibrillation. However, patient should be restarted on a DOAC if cleared by GI as they are at risk for cardioembolism with [id number] score of 4. Otherwise would recommend alternative to long term anticoagulation such as Watchman Device; however, this would require short term course of anticoagulation followed by DAPT regardless.     Recommendations    Imaging:  - monitor platelets   - May need TIFFANIE for evaluation for Watchman Device if cleared by GI for AC & cardio in agreement. Can be done outpatient, should not hold discharge.   - Restart on Apixaban 5 mg BID if cleared by GI, started   - Atorvastatin 40 mg, titrate to LDL < 70    -Chemical DVT prophylaxis with Lovenox SQ  - Recommend evaluation for Watchman Device as alternative to long term anticoagulation given multiple episodes of GI bleeding.   - Normotension, avoid hypotension (long term goals between 110-140/<90 mmHg).  - PT/OT - home, no skilled PT needs at this time  - C/w regular diet as tolerated  - HgA1C goals < 7.0 (currently 5.2)  - Stroke education discussed at bedside  - GI consult recs appreciated   - Cardio on board, appreciate recommendations  - Nephro on board, appreciate recommendations  - Rest of workup as per primary team  - Upon discharge, patient should follow up outpatient with Dr. Horn:  [phone] [address] [place] [address]    From neurovascular standpoint, no further inpatient investigations are indicated at this time.      dispo planning in progress   Ashish Horn MD  Vascular Neurology      Please call with questions: [phone]

## 2022-07-01 NOTE — PROGRESS NOTE ADULT - REASON FOR ADMISSION
dysarthria and weakness

## 2022-07-01 NOTE — PROGRESS NOTE ADULT - SUBJECTIVE AND OBJECTIVE BOX
Neurology Progress Note     INTERVAL HISTORY: Patient seen at bedside by stroke team  dispo planning      MEDICATIONS (HOME):  Home Medications:  NIFEdipine 60 mg oral tablet, extended release: 1 tab(s) orally once a day (17 Jun 2022 17:50)        MEDICATIONS  (STANDING):  apixaban 5 milliGRAM(s) Oral every 12 hours  atorvastatin 80 milliGRAM(s) Oral at bedtime  epoetin tammi-epbx (RETACRIT) Injectable 6000 Unit(s) IV Push <User Schedule>  metoprolol succinate ER 75 milliGRAM(s) Oral daily  pantoprazole    Tablet 40 milliGRAM(s) Oral two times a day  sevelamer carbonate 1600 milliGRAM(s) Oral three times a day with meals    MEDICATIONS  (PRN):  acetaminophen     Tablet .. 650 milliGRAM(s) Oral every 6 hours PRN Temp greater or equal to 38C (100.4F), Mild Pain (1 - 3)  aluminum hydroxide/magnesium hydroxide/simethicone Suspension 30 milliLiter(s) Oral every 4 hours PRN Dyspepsia  melatonin 3 milliGRAM(s) Oral at bedtime PRN Insomnia  ondansetron Injectable 4 milliGRAM(s) IV Push every 8 hours PRN Nausea and/or Vomiting      ALLERGIES/INTOLERANCES:  Allergies  No Known Allergies    Intolerances    VITALS & EXAMINATION:      Vital Signs Last 24 Hrs  T(C): 36.6 (07-01-22 @ 11:45), Max: 36.8 (06-30-22 @ 22:00)  T(F): 97.8 (07-01-22 @ 11:45), Max: 98.3 (06-30-22 @ 22:00)  HR: 70 (07-01-22 @ 11:45) (70 - 115)  BP: 128/70 (07-01-22 @ 11:45) (128/70 - 190/100)  BP(mean): --  RR: 18 (07-01-22 @ 11:45) (16 - 18)  SpO2: 100% (07-01-22 @ 11:45) (98% - 100%)                General:  Constitutional: Male, appears stated age, in no apparent distress including pain  Head: Normocephalic & Atraumatic.    Neurological:  MS: Eyes open. Awake, alert, oriented to person, place, situation, time. Follows all commands.    Language: Speech is mild to moderately dysarthric, fluent with good repetition & comprehension.    CNs: VFF. EOMI no nystagmus. V1-3 intact to LT b/l. Moderate R facial palsy. Hearing grossly normal (rubbing fingers) b/l. Tongue midline, normal movements, no atrophy.     Motor: Normal muscle bulk & tone. No noticeable tremor. Slight pronator drift in RUE, no drift otherwise. No drift in LUE, LLE, RLE. 5/5 throughout. Spontaneous & purposeful movements throughout. 	     Sensation: Decreased sensation to LT/Temp on R.    Coordination: Slower fast finger tapping on R when compared to L. LUE orbits around RUE with rolling arm motion. No dysmetria to FTN.    Gait: No postural instability. Gait testing deferred.      LABORATORY:  CBC Full  -  ( 01 Jul 2022 06:00 )  WBC Count : 5.43 K/uL  RBC Count : 3.05 M/uL  Hemoglobin : 9.3 g/dL  Hematocrit : 28.5 %  Platelet Count - Automated : 108 K/uL  Mean Cell Volume : 93.4 fL  Mean Cell Hemoglobin : 30.5 pg  Mean Cell Hemoglobin Concentration : 32.6 gm/dL  Auto Neutrophil # : 3.21 K/uL  Auto Lymphocyte # : 1.45 K/uL  Auto Monocyte # : 0.59 K/uL  Auto Eosinophil # : 0.13 K/uL  Auto Basophil # : 0.03 K/uL  Auto Neutrophil % : 59.0 %  Auto Lymphocyte % : 26.7 %  Auto Monocyte % : 10.9 %  Auto Eosinophil % : 2.4 %  Auto Basophil % : 0.6 %    07-01    135  |  95<L>  |  45<H>  ----------------------------<  92  4.0   |  26  |  7.06<H>    Ca    7.8<L>      01 Jul 2022 06:00  Phos  3.7     07-01  Mg     1.80     07-01        LFTs   Coagulopathy   Lipid Panel 06-19 Chol 140 LDL 60 HDL 64 Trig 81  A1c 5.2     STUDIES & IMAGING:    Radiology (XR, CT, MR, U/S, TTE/TIFFANIE):      MRI Brain: A wedge-shaped area of restricted diffusion is seen within the   left corona radiata with associated T2/FLAIR hyperintense signal. There   is no hemorrhagic transformation.    A chronic lacunar infarct is again noted within the left thalamus.    Multiple additional patchy confluent nonspecific T2/FLAIR hyperintense   signal changes are noted throughout the bihemispheric white matter   without associated mass effect or restricted diffusion.    There is enhancing mass lesion within the right internal auditory canal   measuring 0.5 x 0.8 cm in the axial plane (AP by transverse) (series 14,   image 43).    Otherwise, no abnormal brain parenchymal or leptomeningeal enhancement is   notable.    Ventricular size and configuration is unremarkable. No abnormal extra   axial fluid collections are seen. Flow-voids are noted throughout the   major intracranial vessels, on the T2 weighted images, consistent with   their patency. The sellar region appears unremarkable.    Polypoidal soft tissue is seen within the left maxillary sinus.   Additional scattered mucosal thickening is seen throughout the ethmoid   complex. The mastoid air cells are clear. Calvarial signal appears   unremarkable. The orbits appear unremarkable apart from bilateral   cataract removal.    Posterior parietal scalp vertex scarring is seen.    MRA Neck: There is a standard anatomic configuration to the aortic arch.    The origins of the great vessels appear unremarkable. The bilateral   common carotid arteries and carotid bifurcations appear unremarkable.    The bilateral cervical internal carotid arteries are within normal limits.    The origins of the bilateral vertebral arteries are normal. The bilateral   cervical vertebral arteries are normal in course and caliber.    MRA [place]: The bilateral intracranial internal carotid,   anterior, and middle cerebral arteries appear unremarkable.    The anterior communicating artery is seen. The bilateral posterior   communicating arteries are not well resolved.    The bilateral intradural vertebral arteries, vertebrobasilar junction,   basilar artery, and basilar tip appear unremarkable as well as the   bilateral posterior cerebral arteries.    IMPRESSION:    MRI BRAIN:  1. Acute/subacute left-sided corona radiata infarction with associated   cytotoxic edema and without hemorrhagic transformation.  2. Enhancing mass lesion within the right internal auditory canal, most   compatible with a vestibular schwannoma. Recommend serial imaging over   time to assess for stability or change.  3. Chronic lacunar infarct within the left thalamus.  4. Multiple patchy confluent nonspecific abnormal white matter foci of   T2/FLAIR prolongation statistically favoring microvascular disease.    MRA NECK AND HEAD:  1. No large vessel occlusion or major stenosis.    --- End of Report ---      < from: Transthoracic Echocardiogram (06.18.22 @ 07:56) >  CONCLUSIONS:  1. Calcified trileaflet aortic valve with normal opening.  Minimal aortic regurgitation.  2. Mildly dilated left atrium.  LA volume index = 39 cc/m2.  3. Normal left ventricular systolic function. No segmental  wall motion abnormalities.  4. Normal right ventricular size and function.  5. Agitated saline injection demonstrates no obvious  evidence of a patent foramen ovale.        CT Brain Stroke Protocol (06.17.22 @ 15:41)    FINDINGS:    Assessment of the posterior fossa and henok is somewhat limited by beam   hardening artifact.  Ventricles and sulci: Parenchymal volume loss is present which is   commensurate with patient age.  Intra-axial: Periventricular small vessel white matter ischemic changes   are appreciated. Old left thalamic lacunar infarct. No intracranial mass,   acute hemorrhage, or midline shift is present.  Extra-axial: No extra-axial fluid collection is identified. Deposition of   calcified plaques in association with the distal intracranial bilateral   vertebral arteries and bilateral carotid siphons.  Calvarium: Intact.    Bilateral optic globes are intact. Left maxillary sinus polyp versus   retention cysts. Bilateral mastoid air cells and middle ear cavities are   predominantly clear.    paranasal sinuses, bilateral mastoid air cells, middle ear cavities are   clear.    IMPRESSION: Age-appropriate involutional changes. No large arterial   distribution acute infarct. Old left thalamic lacunar infarct.    Findings were communicated by Dr. Behr-Ventura to Dr. STACIA Bojorquez at   approximately 3:50 PM 6/17/2022.

## 2022-07-01 NOTE — PROGRESS NOTE ADULT - SUBJECTIVE AND OBJECTIVE BOX
[facility] - S Alicia / Ej S /D Federico/ S Maryellen/ S Govind/ Jovan Garrison / GAYATRI Matsonu/ O Gregorio  service -[phone], office [phone]  ---------------------------------------------------------------------------------------------------------------    Patient seen and examined bedside    Subjective and Objective: No overnight events, sob resolved. No complaints today. feeling better    Allergies: No Known Allergies      Hospital Medications:   MEDICATIONS  (STANDING):  apixaban 5 milliGRAM(s) Oral every 12 hours  atorvastatin 80 milliGRAM(s) Oral at bedtime  epoetin tammi-epbx (RETACRIT) Injectable 6000 Unit(s) IV Push <User Schedule>  metoprolol succinate ER 75 milliGRAM(s) Oral daily  pantoprazole    Tablet 40 milliGRAM(s) Oral two times a day  sevelamer carbonate 1600 milliGRAM(s) Oral three times a day with meals      REVIEW OF SYSTEMS:  CONSTITUTIONAL: No weakness, fevers or chills  EYES/ENT: No visual changes;  No vertigo or throat pain   NECK: No pain or stiffness  RESPIRATORY: No cough, wheezing, hemoptysis; No shortness of breath  CARDIOVASCULAR: No chest pain or palpitations.  GASTROINTESTINAL: No abdominal or epigastric pain. No nausea, vomiting, or hematemesis; No diarrhea or constipation. No melena or hematochezia.  GENITOURINARY: No dysuria, frequency, foamy urine, urinary urgency, incontinence or hematuria  NEUROLOGICAL: No numbness or weakness  SKIN: No itching, burning, rashes, or lesions   VASCULAR: No bilateral lower extremity edema.   All other review of systems is negative unless indicated above.    VITALS:  T(F): 97.8 (07-01-22 @ 11:45), Max: 98.3 (06-30-22 @ 22:00)  HR: 70 (07-01-22 @ 11:45)  BP: 128/70 (07-01-22 @ 11:45)  RR: 18 (07-01-22 @ 11:45)  SpO2: 100% (07-01-22 @ 11:45)  Wt(kg): --    06-30 @ 07:01  -  07-01 @ 07:00  --------------------------------------------------------  IN: 400 mL / OUT: 2900 mL / NET: -2500 mL          PHYSICAL EXAM:  Constitutional: NAD  HEENT: anicteric sclera, oropharynx clear  Neck: No JVD  Respiratory: CTAB, no wheezes, rales or rhonchi  Cardiovascular: S1, S2, RRR  Gastrointestinal: BS+, soft, NT/ND  Extremities: No cyanosis or clubbing. No peripheral edema  Neurological: A/O x 3, no focal deficits  Psychiatric: Normal mood, normal affect  : No CVA tenderness. No siegel.   Skin: No rashes  Vascular Access:    LABS:  07-01    135  |  95<L>  |  45<H>  ----------------------------<  92  4.0   |  26  |  7.06<H>    Ca    7.8<L>      01 Jul 2022 06:00  Phos  3.7     07-01  Mg     1.80     07-01      Creatinine Trend: 7.06 <--, 9.43 <--, 7.49 <--, 10.60 <--, 8.29 <--, 5.78 <--, 7.85 <--                        9.3    5.43  )-----------( 108      ( 01 Jul 2022 06:00 )             28.5     Urine Studies:        RADIOLOGY & ADDITIONAL STUDIES:   [facility] - S Alicia / Ej S /D Federico/ S Maryellen/ S Govind/ Jovan Garrison / M Jose/ O Gregorio  service -[phone], office [phone]  ---------------------------------------------------------------------------------------------------------------    Patient seen and examined bedside    Subjective and Objective: overnight events noted, denied sob. No complaints today. feeling better    Allergies: No Known Allergies      Hospital Medications:   MEDICATIONS  (STANDING):  apixaban 5 milliGRAM(s) Oral every 12 hours  atorvastatin 80 milliGRAM(s) Oral at bedtime  epoetin tammi-epbx (RETACRIT) Injectable 6000 Unit(s) IV Push <User Schedule>  metoprolol succinate ER 75 milliGRAM(s) Oral daily  pantoprazole    Tablet 40 milliGRAM(s) Oral two times a day  sevelamer carbonate 1600 milliGRAM(s) Oral three times a day with meals    VITALS:  T(F): 97.8 (07-01-22 @ 11:45), Max: 98.3 (06-30-22 @ 22:00)  HR: 70 (07-01-22 @ 11:45)  BP: 128/70 (07-01-22 @ 11:45)  RR: 18 (07-01-22 @ 11:45)  SpO2: 100% (07-01-22 @ 11:45)  Wt(kg): --    06-30 @ 07:01  -  07-01 @ 07:00  --------------------------------------------------------  IN: 400 mL / OUT: 2900 mL / NET: -2500 mL      PHYSICAL EXAM:  Constitutional: NAD  Neck: No JVD  Respiratory: CTAB, no wheezes, rales or rhonchi  Cardiovascular: S1, S2, RRR  Gastrointestinal: BS+, soft, NT/ND  Extremities: No peripheral edema  Neurological: A/O x 3, no focal deficits  Psychiatric: Normal mood, normal affect  : No siegel.   Vascular Access: avf+    LABS:  07-01    135  |  95<L>  |  45<H>  ----------------------------<  92  4.0   |  26  |  7.06<H>    Ca    7.8<L>      01 Jul 2022 06:00  Phos  3.7     07-01  Mg     1.80     07-01      Creatinine Trend: 7.06 <--, 9.43 <--, 7.49 <--, 10.60 <--, 8.29 <--, 5.78 <--, 7.85 <--                        9.3    5.43  )-----------( 108      ( 01 Jul 2022 06:00 )             28.5     Urine Studies:        RADIOLOGY & ADDITIONAL STUDIES:

## 2022-07-01 NOTE — DISCHARGE NOTE NURSING/CASE MANAGEMENT/SOCIAL WORK - PATIENT PORTAL LINK FT
You can access the FollowMyHealth Patient Portal offered by [facility] by registering at the following website: [email or web address]. By joining [email or web address]’s FollowMyHealth portal, you will also be able to view your health information using other applications (apps) compatible with our system.

## 2022-09-28 NOTE — DIETITIAN INITIAL EVALUATION ADULT. - BODY MASS INDEX
Chronic lower extremity edema related to underlying pulmonary hypertension and right heart failure  Currently on diuretics  Also discussed leg elevation and compression stockings 
Chronic right heart failure due to severe pulmonary hypertension  Right atrium severely dilated and right ventricle moderately dilated on recent echo  Echo findings reviewed with the patient and etiology of lower extremity edema was discussed with him  Importance of compliance to BiPAP/ CPAP was discussed with the patient    May require referral to heart failure/ pulmonary hypertension clinic after review of the subsequent cardiac test 
Continue efforts at weight loss including diet modification, increased physical activity and avoidance of calorie dense foods      Mediterranean style plant based diet and calorie restriction will be beneficial 
Elevated troponin during previous history, felt to be related nonischemic myocardial injury from acute respiratory failure  Nevertheless he does have severe coronary artery calcifications probably related to heavy smoking in the past   High risk of myocardial infarction discussed  Will benefit from statin therapy 
Over 100 pack-year smoking history  Does have advanced COPD  Managed by Pulmonary Medicine 
Partially compliant to CPAP in the past   Was offered a switch to BiPAP by pulmonologist 
pulmonologist 
20.5

## 2022-11-29 NOTE — DISCHARGE NOTE NURSING/CASE MANAGEMENT/SOCIAL WORK - NSTOBACCONEVERSMOKERY/N_GEN_A
Yes Localized Dermabrasion With Wire Brush Text: The patient was draped in routine manner.  Localized dermabrasion using 3 x 17 mm wire brush was performed in routine manner to papillary dermis. This spot dermabrasion is being performed to complete skin cancer reconstruction. It also will eliminate the other sun damaged precancerous cells that are known to be part of the regional effect of a lifetime's worth of sun exposure. This localized dermabrasion is therapeutic and should not be considered cosmetic in any regard. Localized Dermabrasion Text: The patient was draped in routine manner.  Localized dermabrasion using 3 x 17 mm wire brush was performed in routine manner to papillary dermis. This spot dermabrasion is being performed to complete skin cancer reconstruction. It also will eliminate the other sun damaged precancerous cells that are known to be part of the regional effect of a lifetime's worth of sun exposure. This localized dermabrasion is therapeutic and should not be considered cosmetic in any regard.

## 2022-12-22 NOTE — ED ADULT TRIAGE NOTE - ADDITIONAL SAFETY/BANDS...
Anesthesia Post Evaluation    Patient: Jillian Osullivan    Procedure(s) Performed: Procedure(s) (LRB):  EXTRACTION, CATARACT, WITH IOL INSERTION (Right)    Final Anesthesia Type: MAC      Patient location during evaluation: OPS  Patient participation: Yes- Able to Participate  Level of consciousness: awake and alert  Post-procedure vital signs: reviewed and stable  Pain management: adequate  Airway patency: patent    PONV status at discharge: No PONV  Anesthetic complications: no      Cardiovascular status: blood pressure returned to baseline and hemodynamically stable  Respiratory status: room air, unassisted and spontaneous ventilation  Hydration status: euvolemic  Follow-up not needed.          Vitals Value Taken Time   /86 12/22/22 0913   Temp 36.8 °C (98.2 °F) 12/22/22 0913   Pulse 57 12/22/22 0913   Resp 20 12/22/22 0913   SpO2 97 % 12/22/22 0913         No case tracking events are documented in the log.      Pain/Bubba Score: No data recorded      
Additional Safety/Bands:

## 2023-01-24 NOTE — ED PROVIDER NOTE - NS ED MD DISPO SPECIAL CONSIDERATION1
Brittany'd refill on [facility] thru 12/23.  Last seen 12/1/7/22.  Next appointment 12/11/23   High Suspicion of COVID-19

## 2023-02-06 NOTE — H&P CARDIOLOGY - BIRTH SEX
Joe Lara  1981   Chief Complaint   Patient presents with    Results     MRI RT knee          HISTORY OF PRESENT ILLNESS  Joe Lara is a 39 y.o. female who presents today for reevaluation of right knee and MRI review. Patient rates pain as 5/10 today. Pain has been present for 1 year. She states that around 1 year ago she \"rolled\" her ankle and knee and has had persistent knee pain since then. Notes a buckling sensation and feels like the knee \"slips\" when pivoting and endorses clicking sensation. She notes previous surgery on the right knee in 1998 performed in [address] She then had a subsequent ACL reconstruction on that knee, with a total of 2 previous surgeries on the right knee. She notes experiencing buckling episodes after seen at last OV. Of note, patient is also s/p left shoulder arthroscopic rotator cuff repair on 3/16/2020, is doing well with this. Patient denies any fever, chills, chest pain, shortness of breath or calf pain. The remainder of the review of systems is negative. There are no new illness or injuries to report since last seen in the office. There are no changes to medications, allergies, family or social history. PHYSICAL EXAM:   Visit Vitals  Temp 97.7 °F (36.5 °C) (Temporal)   Ht 5' 6.5\" (1.689 m)   Wt 170 lb (77.1 kg)   BMI 27.03 kg/m²       The patient is a well-developed, well-nourished female   in no acute distress. The patient is alert and oriented times three. The patient is alert and oriented times three. Mood and affect are normal.  LYMPHATIC: lymph nodes are not enlarged and are within normal limits  SKIN: normal in color and non tender to palpation. There are no bruises or abrasions noted. NEUROLOGICAL: Motor sensory exam is within normal limits. Reflexes are equal bilaterally.  There is normal sensation to pinprick and light touch  MUSCULOSKELETAL:  Examination Right knee   Skin Intact   Range of motion    Effusion +   Medial joint line tenderness +   Lateral joint line tenderness -   Tenderness Pes Bursa -   Tenderness insertion MCL -   Tenderness insertion LCL -   Warners -   Patella crepitus +   Patella grind -   Lachman +   Pivot shift -   Anterior drawer -   Posterior drawer -   Varus stress -   Valgus stress -   Neurovascular Intact   Calf Swelling and Tenderness to Palpation -   Nima's Test -   Hamstring Cord Tightness -       IMAGING: MRI of right knee dated 1/31/2023 was reviewed and read by Dr. Ruth Ahn:   IMPRESSION:  1. ACL graft grossly intact with slightly steep inclination. Suspect anterior  scarring. 2. Subtle multidirectional tearing in the posterior horn medial meniscus with  posterior medial meniscal cyst  3. Grade 2 chondrosis in the anterior lateral femoral condyle. 4. Trace joint effusion and popliteal cyst      XR of the right knee with 2 views obtained in the office dated 12/21/2022 was reviewed and read by Dr. Ruth Ahn: Mild decreased joint space in the medial compartment with metallic implants femoral side and interference screw on the tibial side from previous ACL surgery      IMPRESSION:      ICD-10-CM ICD-9-CM    1. Tear of medial meniscus of right knee, current, unspecified tear type, initial encounter  S83.241A 836.0              PLAN:   1. Pt presents today with right knee pain with hx of ACL reconstruction. MRI reveals ACL graft is intact but she has a MMT. We discussed knee arthroscopy to address MMT and patient would like to proceed. I discussed the risks and benefits and potential adverse outcomes of both operative vs non operative treatment of right knee MMT with the patient and patient wishes to proceed with arthroscopic right knee partial medial meniscectomy.       Risks of operative intervention include but not limited to bleeding, infection, deep vein thrombosis, pulmonary embolism, death, limb length discrepancy, reflexive sympathetic dystrophy, fat embolism syndrome,damage to blood vessels and nerves, malunion, non-union, delayed union, failure of hardware, post traumatic arthritis, stroke, heart attack, and death. Patient understands that infection may arise and may require numerous surgeries. The patient was counseled at length about the risks of lizeth Covid-19 during their perioperative period and any recovery window from their procedure. The patient was made aware that lizeth Covid-19  may worsen their prognosis for recovering from their procedure and lend to a higher morbidity and/or mortality risk. All material risks, benefits, and reasonable alternatives including postponing the procedure were discussed. The patient does  wish to proceed with the procedure at this time. History and physical exam to be preformed at a later date. Risk factors include: n/a  2. No ultrasound exam indicated today  3. No cortisone injection indicated today   4. No Physical/Occupational Therapy indicated today  5. No diagnostic test indicated today  6. No durable medical equipment indicated today  7. No referral indicated today   8. No medications indicated today:   9. No Narcotic indicated today      RTC H&P      Scribed by [facility] Raleigh HospitalMalverntiffany Andrew) as dictated by Yun Cortes MD    I, Dr. Yun Cortes, confirm that all documentation is accurate.     Yun Cortes M.D.   [facility] Male

## 2023-02-16 NOTE — PHYSICAL THERAPY INITIAL EVALUATION ADULT - PHYSICAL ASSIST/NONPHYSICAL ASSIST: SUPINE/SIT, REHAB EVAL
supervision/verbal cues Niacinamide Pregnancy And Lactation Text: These medications are considered safe during pregnancy.

## 2023-03-06 NOTE — DISCHARGE NOTE PROVIDER - NSDCCPGOAL_GEN_ALL_CORE_FT
To get better and follow your care plan as instructed. Clindamycin Counseling: I counseled the patient regarding use of clindamycin as an antibiotic for prophylactic and/or therapeutic purposes. Clindamycin is active against numerous classes of bacteria, including skin bacteria. Side effects may include nausea, diarrhea, gastrointestinal upset, rash, hives, yeast infections, and in rare cases, colitis.

## 2023-04-14 ENCOUNTER — INPATIENT (INPATIENT)
Facility: HOSPITAL | Age: 68
LOS: 9 days | Discharge: HOME | End: 2023-04-24
Attending: PHYSICAL MEDICINE & REHABILITATION | Admitting: PHYSICAL MEDICINE & REHABILITATION
Payer: MEDICAID

## 2023-04-14 VITALS
OXYGEN SATURATION: 100 % | RESPIRATION RATE: 22 BRPM | DIASTOLIC BLOOD PRESSURE: 118 MMHG | HEART RATE: 88 BPM | TEMPERATURE: 98 F | SYSTOLIC BLOOD PRESSURE: 188 MMHG

## 2023-04-14 DIAGNOSIS — Z86.73 PERSONAL HISTORY OF TRANSIENT ISCHEMIC ATTACK (TIA), AND CEREBRAL INFARCTION WITHOUT RESIDUAL DEFICITS: ICD-10-CM

## 2023-04-14 DIAGNOSIS — I77.0 ARTERIOVENOUS FISTULA, ACQUIRED: Chronic | ICD-10-CM

## 2023-04-14 DIAGNOSIS — I16.0 HYPERTENSIVE URGENCY: ICD-10-CM

## 2023-04-14 DIAGNOSIS — Z29.9 ENCOUNTER FOR PROPHYLACTIC MEASURES, UNSPECIFIED: ICD-10-CM

## 2023-04-14 DIAGNOSIS — I20.0 UNSTABLE ANGINA: ICD-10-CM

## 2023-04-14 DIAGNOSIS — D64.9 ANEMIA, UNSPECIFIED: ICD-10-CM

## 2023-04-14 DIAGNOSIS — Z87.19 PERSONAL HISTORY OF OTHER DISEASES OF THE DIGESTIVE SYSTEM: ICD-10-CM

## 2023-04-14 DIAGNOSIS — D33.3 BENIGN NEOPLASM OF CRANIAL NERVES: ICD-10-CM

## 2023-04-14 DIAGNOSIS — I10 ESSENTIAL (PRIMARY) HYPERTENSION: ICD-10-CM

## 2023-04-14 DIAGNOSIS — N18.6 END STAGE RENAL DISEASE: ICD-10-CM

## 2023-04-14 DIAGNOSIS — I48.91 UNSPECIFIED ATRIAL FIBRILLATION: ICD-10-CM

## 2023-04-14 LAB
ALBUMIN SERPL ELPH-MCNC: 4.3 G/DL — SIGNIFICANT CHANGE UP (ref 3.3–5)
ALP SERPL-CCNC: 82 U/L — SIGNIFICANT CHANGE UP (ref 40–120)
ALT FLD-CCNC: 12 U/L — SIGNIFICANT CHANGE UP (ref 4–41)
ANION GAP SERPL CALC-SCNC: 21 MMOL/L — HIGH (ref 7–14)
APTT BLD: 30.6 SEC — SIGNIFICANT CHANGE UP (ref 27–36.3)
AST SERPL-CCNC: 13 U/L — SIGNIFICANT CHANGE UP (ref 4–40)
BASOPHILS # BLD AUTO: 0.04 K/UL — SIGNIFICANT CHANGE UP (ref 0–0.2)
BASOPHILS NFR BLD AUTO: 0.8 % — SIGNIFICANT CHANGE UP (ref 0–2)
BILIRUB SERPL-MCNC: 0.2 MG/DL — SIGNIFICANT CHANGE UP (ref 0.2–1.2)
BUN SERPL-MCNC: 87 MG/DL — HIGH (ref 7–23)
CALCIUM SERPL-MCNC: 6.9 MG/DL — LOW (ref 8.4–10.5)
CHLORIDE SERPL-SCNC: 100 MMOL/L — SIGNIFICANT CHANGE UP (ref 98–107)
CK MB BLD-MCNC: 1.1 % — SIGNIFICANT CHANGE UP (ref 0–2.5)
CK MB CFR SERPL CALC: 3.3 NG/ML — SIGNIFICANT CHANGE UP
CK SERPL-CCNC: 296 U/L — HIGH (ref 30–200)
CO2 SERPL-SCNC: 20 MMOL/L — LOW (ref 22–31)
CREAT SERPL-MCNC: 15.23 MG/DL — HIGH (ref 0.5–1.3)
EGFR: 3 ML/MIN/1.73M2 — LOW
EOSINOPHIL # BLD AUTO: 0.21 K/UL — SIGNIFICANT CHANGE UP (ref 0–0.5)
EOSINOPHIL NFR BLD AUTO: 4 % — SIGNIFICANT CHANGE UP (ref 0–6)
FLUAV AG NPH QL: SIGNIFICANT CHANGE UP
FLUBV AG NPH QL: SIGNIFICANT CHANGE UP
GLUCOSE SERPL-MCNC: 90 MG/DL — SIGNIFICANT CHANGE UP (ref 70–99)
HCT VFR BLD CALC: 26.3 % — LOW (ref 39–50)
HGB BLD-MCNC: 7.7 G/DL — LOW (ref 13–17)
IANC: 3.01 K/UL — SIGNIFICANT CHANGE UP (ref 1.8–7.4)
IMM GRANULOCYTES NFR BLD AUTO: 0.2 % — SIGNIFICANT CHANGE UP (ref 0–0.9)
INR BLD: 1.02 RATIO — SIGNIFICANT CHANGE UP (ref 0.88–1.16)
LYMPHOCYTES # BLD AUTO: 1.45 K/UL — SIGNIFICANT CHANGE UP (ref 1–3.3)
LYMPHOCYTES # BLD AUTO: 27.8 % — SIGNIFICANT CHANGE UP (ref 13–44)
MCHC RBC-ENTMCNC: 27.5 PG — SIGNIFICANT CHANGE UP (ref 27–34)
MCHC RBC-ENTMCNC: 29.3 GM/DL — LOW (ref 32–36)
MCV RBC AUTO: 93.9 FL — SIGNIFICANT CHANGE UP (ref 80–100)
MONOCYTES # BLD AUTO: 0.5 K/UL — SIGNIFICANT CHANGE UP (ref 0–0.9)
MONOCYTES NFR BLD AUTO: 9.6 % — SIGNIFICANT CHANGE UP (ref 2–14)
NEUTROPHILS # BLD AUTO: 3.01 K/UL — SIGNIFICANT CHANGE UP (ref 1.8–7.4)
NEUTROPHILS NFR BLD AUTO: 57.6 % — SIGNIFICANT CHANGE UP (ref 43–77)
NRBC # BLD: 0 /100 WBCS — SIGNIFICANT CHANGE UP (ref 0–0)
NRBC # FLD: 0 K/UL — SIGNIFICANT CHANGE UP (ref 0–0)
NT-PROBNP SERPL-SCNC: HIGH PG/ML
PLATELET # BLD AUTO: 119 K/UL — LOW (ref 150–400)
POTASSIUM SERPL-MCNC: 4.6 MMOL/L — SIGNIFICANT CHANGE UP (ref 3.5–5.3)
POTASSIUM SERPL-SCNC: 4.6 MMOL/L — SIGNIFICANT CHANGE UP (ref 3.5–5.3)
PROT SERPL-MCNC: 7.7 G/DL — SIGNIFICANT CHANGE UP (ref 6–8.3)
PROTHROM AB SERPL-ACNC: 11.8 SEC — SIGNIFICANT CHANGE UP (ref 10.5–13.4)
RBC # BLD: 2.8 M/UL — LOW (ref 4.2–5.8)
RBC # FLD: 14 % — SIGNIFICANT CHANGE UP (ref 10.3–14.5)
RSV RNA NPH QL NAA+NON-PROBE: SIGNIFICANT CHANGE UP
SARS-COV-2 RNA SPEC QL NAA+PROBE: SIGNIFICANT CHANGE UP
SODIUM SERPL-SCNC: 141 MMOL/L — SIGNIFICANT CHANGE UP (ref 135–145)
TROPONIN T, HIGH SENSITIVITY RESULT: 63 NG/L — CRITICAL HIGH
WBC # BLD: 5.22 K/UL — SIGNIFICANT CHANGE UP (ref 3.8–10.5)
WBC # FLD AUTO: 5.22 K/UL — SIGNIFICANT CHANGE UP (ref 3.8–10.5)

## 2023-04-14 PROCEDURE — 71046 X-RAY EXAM CHEST 2 VIEWS: CPT | Mod: 26

## 2023-04-14 PROCEDURE — 99285 EMERGENCY DEPT VISIT HI MDM: CPT

## 2023-04-14 PROCEDURE — 99223 1ST HOSP IP/OBS HIGH 75: CPT

## 2023-04-14 PROCEDURE — 93010 ELECTROCARDIOGRAM REPORT: CPT

## 2023-04-14 RX ORDER — LANOLIN ALCOHOL/MO/W.PET/CERES
3 CREAM (GRAM) TOPICAL AT BEDTIME
Refills: 0 | Status: DISCONTINUED | OUTPATIENT
Start: 2023-04-14 | End: 2023-04-24

## 2023-04-14 RX ORDER — HEPARIN SODIUM 5000 [USP'U]/ML
4400 INJECTION INTRAVENOUS; SUBCUTANEOUS EVERY 6 HOURS
Refills: 0 | Status: DISCONTINUED | OUTPATIENT
Start: 2023-04-14 | End: 2023-04-18

## 2023-04-14 RX ORDER — ACETAMINOPHEN 500 MG
650 TABLET ORAL EVERY 6 HOURS
Refills: 0 | Status: DISCONTINUED | OUTPATIENT
Start: 2023-04-14 | End: 2023-04-24

## 2023-04-14 RX ORDER — METOPROLOL TARTRATE 50 MG
75 TABLET ORAL DAILY
Refills: 0 | Status: DISCONTINUED | OUTPATIENT
Start: 2023-04-15 | End: 2023-04-19

## 2023-04-14 RX ORDER — ASPIRIN/CALCIUM CARB/MAGNESIUM 324 MG
81 TABLET ORAL DAILY
Refills: 0 | Status: DISCONTINUED | OUTPATIENT
Start: 2023-04-15 | End: 2023-04-24

## 2023-04-14 RX ORDER — PANTOPRAZOLE SODIUM 20 MG/1
40 TABLET, DELAYED RELEASE ORAL
Refills: 0 | Status: DISCONTINUED | OUTPATIENT
Start: 2023-04-14 | End: 2023-04-24

## 2023-04-14 RX ORDER — ASPIRIN/CALCIUM CARB/MAGNESIUM 324 MG
162 TABLET ORAL ONCE
Refills: 0 | Status: COMPLETED | OUTPATIENT
Start: 2023-04-14 | End: 2023-04-14

## 2023-04-14 RX ORDER — HEPARIN SODIUM 5000 [USP'U]/ML
INJECTION INTRAVENOUS; SUBCUTANEOUS
Qty: 25000 | Refills: 0 | Status: DISCONTINUED | OUTPATIENT
Start: 2023-04-14 | End: 2023-04-17

## 2023-04-14 RX ORDER — NITROGLYCERIN 6.5 MG
0.4 CAPSULE, EXTENDED RELEASE ORAL
Refills: 0 | Status: DISCONTINUED | OUTPATIENT
Start: 2023-04-14 | End: 2023-04-24

## 2023-04-14 RX ORDER — HEPARIN SODIUM 5000 [USP'U]/ML
4400 INJECTION INTRAVENOUS; SUBCUTANEOUS ONCE
Refills: 0 | Status: COMPLETED | OUTPATIENT
Start: 2023-04-14 | End: 2023-04-14

## 2023-04-14 RX ORDER — ATORVASTATIN CALCIUM 80 MG/1
80 TABLET, FILM COATED ORAL AT BEDTIME
Refills: 0 | Status: DISCONTINUED | OUTPATIENT
Start: 2023-04-14 | End: 2023-04-24

## 2023-04-14 RX ORDER — HYDRALAZINE HCL 50 MG
25 TABLET ORAL EVERY 8 HOURS
Refills: 0 | Status: DISCONTINUED | OUTPATIENT
Start: 2023-04-14 | End: 2023-04-20

## 2023-04-14 RX ORDER — SEVELAMER CARBONATE 2400 MG/1
1600 POWDER, FOR SUSPENSION ORAL
Refills: 0 | Status: DISCONTINUED | OUTPATIENT
Start: 2023-04-14 | End: 2023-04-16

## 2023-04-14 RX ADMIN — HEPARIN SODIUM 4400 UNIT(S): 5000 INJECTION INTRAVENOUS; SUBCUTANEOUS at 17:35

## 2023-04-14 RX ADMIN — Medication 0.4 MILLIGRAM(S): at 17:17

## 2023-04-14 RX ADMIN — Medication 162 MILLIGRAM(S): at 16:52

## 2023-04-14 RX ADMIN — Medication 25 MILLIGRAM(S): at 23:13

## 2023-04-14 RX ADMIN — HEPARIN SODIUM 900 UNIT(S)/HR: 5000 INJECTION INTRAVENOUS; SUBCUTANEOUS at 17:35

## 2023-04-14 RX ADMIN — Medication 0.4 MILLIGRAM(S): at 16:53

## 2023-04-14 NOTE — H&P ADULT - ASSESSMENT
68M with PMHx HTN, ESRD Tu/Th/Sa, CVA (right facial droop), Afib on eliquis, hx GIB?, vestibular schwannoma presenting with chest pain and SOB x2 days. Admitted for cardiac w/u, new EKG changes.

## 2023-04-14 NOTE — ED PROVIDER NOTE - PROGRESS NOTE DETAILS
Kristofer Monk PGY-3 CP improved after nitro po, ekg changes started on heparin ggt  spoke with dr mckeon, will admit on tele  repeat ekg similar to previous

## 2023-04-14 NOTE — H&P ADULT - PROBLEM SELECTOR PLAN 8
Hgb 7.7, lower than baseline 9-10s  -iron studies, active T&S  -PPI  -possible GI input should angiogram happen  -FOBT

## 2023-04-14 NOTE — H&P ADULT - HISTORY OF PRESENT ILLNESS
68M with PMHx HTN, ESRD Tu/Th/Sa, CVA (right facial droop), Afib on eliquis, hx GIB?, vestibular schwannoma presenting with chest pain and SOB x2 days. Patient was last admitted 6/2022 with Left corona radiata infarct. At the time he was not taking eliquis due to hx of GIB but this was resumed on this admission and ASA discontinued. He was seen by neuro and GI and recommendation was for outpatient scope and was not done inpatient. He did not have bleeding on AC and was eventually discharged to rehab. Per patient he was in rehab for 6 months and then went to [place] and he gets HD Tu/Th/Sat. His last HD was Tuesday and then [place] after. He did not have HD on Thursday. Yesterday night around 8PM he began having left sided chest pain and SOB that was intermittent. CP comes and goes and is not radiating. It is stabbing in quality. Pain is now relieved after nitro tabs. He saw Dr. Epperson yesterday day who told him to come to the hospital for possible angiogram. He currently denies CP, SOB, BRBPR, melena. He is compliant with his eliquis but does not know all the names of his medications. Confirms being on metoprolol and pantoprazole as well      In ED, EKG with new TWI in V2 and V3. Started on hep gtt and given ASA 162mg

## 2023-04-14 NOTE — H&P ADULT - NSHPREVIEWOFSYSTEMS_GEN_ALL_CORE
ROS:    Constitutional: [ ] fevers [ ] chills   HEENT: [ ] postnasal drip [ ] nasal congestion  CV: [x ] chest pain [ ] orthopnea [ ] palpitations  Resp: [ ] cough [x ] shortness of breath [ ] dyspnea [ ] wheezing   GI: [ ] nausea [ ] vomiting [ ] diarrhea [ ] constipation [ ] abd pain    : [ ] dysuria  [ ] increased urinary frequency  Musculoskeletal: [ ] back pain [ ] myalgias [ ] arthralgias  Skin: [ ] rash [ ] itch  Neurological: [ ] headache [ ] dizziness [ ] syncope   Endocrine: [ ] diabetes [ ] thyroid problem  Hematologic/Lymphatic: [x ] anemia [ ] bleeding problem  [ x] All other systems negative

## 2023-04-14 NOTE — H&P ADULT - TIME BILLING
I had a face to face encounter with this patient. I spent 80 total minutes on the bedside interview and examination, coordination of care, counseling, chart review, order placement and documentation for this patient.

## 2023-04-14 NOTE — H&P ADULT - NSHPLABSRESULTS_GEN_ALL_CORE
Personally reviewed labs:                        7.7    5.22  )-----------( 119      ( 14 Apr 2023 16:50 )             26.3     04-14-23 @ 16:50    141  |  100  |  87<H>             --------------------------< 90     4.6  |  20<L>  | 15.23<H>    eGFR AA: --  eGFR N-AA: --    Calcium: 6.9<L>  Phosphorus: --  Magnesium: --    AST: 13    ALT: 12  AlkPhos: 82  Protein: 7.7  Albumin: 4.3  TBili: 0.2  D-Bili: --    Troponin 63, CKMB 3.3    Probnp [id number]      RADIOLOGY & ADDITIONAL TESTS:    EKG my independent interpretation: NSR, TWI in V2, V3, QTc 546ms    CXR my independent interpretation: clear lungs

## 2023-04-14 NOTE — H&P ADULT - PROBLEM SELECTOR PLAN 6
Discussed findings of this on MRI last year during stroke admission  -advised patient to f/u with ENT regarding this as outpatient. He understands and is amenable to doing so

## 2023-04-14 NOTE — ED PROVIDER NOTE - OBJECTIVE STATEMENT
pt is a 67 y/o M w/ a PMHx of HTN, ESRD TTS, previous CVA with residual right facial droop who p/w CP started yesterday at 8pm nonradiated. Seen by Dr. Ran Epperson for admission. pt EKG with new TWave inversions in V2-3. Pt endorses SOB but denies diaphoresis new LE edema, fevers, trauma, recent surgery, VTEs in past. Pt past smoking history but non currently. Missed iHD Thursday and generally is just feeling weak.

## 2023-04-14 NOTE — ED ADULT NURSE NOTE - OBJECTIVE STATEMENT
Pt received in 1, ESRD on dialysis T/Th/Sat, missed his dialysis yesterday (thursday) due to having a cardiologist appointment. Pt just came back from [place] (on tuesday), developed chest pain and SOB yesterday. Dialysis fistula to RUE, old fistula to LUE.

## 2023-04-14 NOTE — ED PROVIDER NOTE - NS ED ROS FT
Constitutional: Denies fevers & chills  HEENT: Denies Rhinorrhea & sore throat  Pulmonary: Cough  Abdomen: Denies Abdominal pain, N/V, blood in stools, diarrhea   : Denies dysuria & hematuria.  Skin: Denies Rash or itching  Neuro: Denies new visual changes, confusion, headaches, and one sided paralysis  Psych: Denies SI & HI & Depression

## 2023-04-14 NOTE — H&P ADULT - NSHPPHYSICALEXAM_GEN_ALL_CORE
PHYSICAL EXAM:  Vital Signs Last 24 Hrs  T(C): 36.9 (04-14-23 @ 15:43)  T(F): 98.5 (04-14-23 @ 15:43), Max: 98.5 (04-14-23 @ 15:43)  HR: 78 (04-14-23 @ 17:00) (78 - 88)  BP: 178/104 (04-14-23 @ 17:00)  BP(mean): --  RR: 18 (04-14-23 @ 17:00) (18 - 22)  SpO2: 100% (04-14-23 @ 17:00) (100% - 100%)  Wt(kg): --    Constitutional: NAD, awake and alert, well developed  EYES: EOMI, conjunctiva clear  ENT:  Normal Hearing, no tonsillar exudates   Neck: Soft and supple , no thyromegaly   Respiratory: Breath sounds are clear bilaterally, No wheezing, rales or rhonchi, no tachypnea, no accessory muscle use  Cardiovascular: S1 and S2, regular rate and rhythm, no Murmurs, gallops or rubs, no JVD, no leg edema  Gastrointestinal: Bowel Sounds present, soft, nontender, nondistended, no guarding, no rebound  Extremities: No cyanosis or clubbing; warm to touch  Vascular: 2+ peripheral pulses lower ex  Neurological: No focal deficits, R facial droop otherwise CN intact, strength 5/5 throughout, sensation to light touch intact in all extremities.   Musculoskeletal: 5/5 strength b/l upper and lower extremities; no joint swelling.  Skin: No rashes, no ulcerations  subcutaneous bump c/w likely lipoma (patient endorses hx lipoma)

## 2023-04-14 NOTE — ED PROVIDER NOTE - ATTENDING CONTRIBUTION TO CARE
I personally saw the patient with the Resident, and completed the key components of the history and physical exam. I completed over 90% of the documentation including the HPI, ROS, PE, and MDM and I then discussed the management plan with the Resident.    -JACKSON Watson-MS, MD  Internal/Emergency/Critical Medicine I personally saw the patient with the Resident, and completed the key components of the history and physical exam. I completed over 90% of the documentation including the HPI, ROS, PE, and MDM and I then discussed the management plan with the Resident.  -JACKSON Watson-MS, MD  Internal/Emergency/Critical Medicine

## 2023-04-14 NOTE — ED PROVIDER NOTE - CLINICAL SUMMARY MEDICAL DECISION MAKING FREE TEXT BOX
pt is a 69 y/o M w/ a PMHx of HTN, ESRD TTS, previous CVA with residual right facial droop who p/w CP concerning for NSTEMI vs unstable angina. low suspicion for dissection, PTx or PE (Wells 0). Will get CBC, CMP, trop, Coags, will give ASA, and nitro and repeat EKG. Sent by cardiology and as long as no EKG changes (no STEMI) will admit and start on heparin gtt. Will need admission.

## 2023-04-14 NOTE — ED ADULT TRIAGE NOTE - CHIEF COMPLAINT QUOTE
Pt st" I just got back from [place] on Tuesday....since last night I have chest pain and feel short of breath. I am a diaylsis patient I did have diaylsis on Tuesday in [place]...but missed my treatment on Thursday because I had an apptm with my Cardiologist....he said my arteries are blocked and wants to do and Echo...he told me to go to hospital. " Hx \htn, CKD on diaylsis . Pt on Eloquis.

## 2023-04-14 NOTE — ED PROVIDER NOTE - PHYSICAL EXAMINATION
PHYSICAL EXAM:  GENERAL: in NAD, Sitting comfortable in bed, in no respiratory distress  HEAD: Atraumatic, no friedman's sign, no periorbital ecchymosis   EYES: PERRL, EOMs intact b/l w/out deficits  ENMT: Moist membranes, no anterior/posterior, or supraclavicular LAD  CHEST/LUNG: CTAB no wheezes/rhonchi/rales  HEART: RRR no murmur/gallops/rubs  ABDOMEN: +BS, soft, NT, ND  EXTREMITIES: No LE edema, +2 radial pulses b/l, right AVF with good thrill  NERVOUS SYSTEM:  A&Ox4, Right facial droop otherwise CNs intact and normal, No motor deficits or sensory deficits to b/l UEs  Heme/LYMPH: No ecchymosis or bruising or LAD  SKIN:  No new rashes or DTIs

## 2023-04-14 NOTE — ED ADULT NURSE NOTE - CHIEF COMPLAINT QUOTE
Pt st" I just got back from [place] on Tuesday....since last night I have chest pain and feel short of breath. I am a diaylsis patient I did have diaylsis on Tuesday in [place]...but missed my treatment on Thursday because I had an apptm with my Cardiologist....he said my arteries are blocked and wants to do and Echo...he told me to go to hospital. " Hx \htn, CKD on diaylsis . Pt on Eloquis.

## 2023-04-15 LAB
ALBUMIN SERPL ELPH-MCNC: 4.2 G/DL — SIGNIFICANT CHANGE UP (ref 3.3–5)
ALP SERPL-CCNC: 73 U/L — SIGNIFICANT CHANGE UP (ref 40–120)
ALT FLD-CCNC: 16 U/L — SIGNIFICANT CHANGE UP (ref 4–41)
ANION GAP SERPL CALC-SCNC: 22 MMOL/L — HIGH (ref 7–14)
APTT BLD: 53.5 SEC — HIGH (ref 27–36.3)
APTT BLD: 62.7 SEC — HIGH (ref 27–36.3)
AST SERPL-CCNC: 16 U/L — SIGNIFICANT CHANGE UP (ref 4–40)
BILIRUB SERPL-MCNC: 0.3 MG/DL — SIGNIFICANT CHANGE UP (ref 0.2–1.2)
BLD GP AB SCN SERPL QL: NEGATIVE — SIGNIFICANT CHANGE UP
BUN SERPL-MCNC: 94 MG/DL — HIGH (ref 7–23)
CALCIUM SERPL-MCNC: 6.6 MG/DL — LOW (ref 8.4–10.5)
CHLORIDE SERPL-SCNC: 101 MMOL/L — SIGNIFICANT CHANGE UP (ref 98–107)
CK MB BLD-MCNC: 1.2 % — SIGNIFICANT CHANGE UP (ref 0–2.5)
CK MB BLD-MCNC: 1.2 % — SIGNIFICANT CHANGE UP (ref 0–2.5)
CK MB CFR SERPL CALC: 3 NG/ML — SIGNIFICANT CHANGE UP
CK MB CFR SERPL CALC: 3.1 NG/ML — SIGNIFICANT CHANGE UP
CK SERPL-CCNC: 260 U/L — HIGH (ref 30–200)
CK SERPL-CCNC: 264 U/L — HIGH (ref 30–200)
CO2 SERPL-SCNC: 17 MMOL/L — LOW (ref 22–31)
CREAT SERPL-MCNC: 16.42 MG/DL — HIGH (ref 0.5–1.3)
DIALYSIS INSTRUMENT RESULT - HEPATITIS B SURFACE ANTIGEN: NEGATIVE — SIGNIFICANT CHANGE UP
EGFR: 3 ML/MIN/1.73M2 — LOW
FERRITIN SERPL-MCNC: 889 NG/ML — HIGH (ref 30–400)
GLUCOSE SERPL-MCNC: 90 MG/DL — SIGNIFICANT CHANGE UP (ref 70–99)
HCT VFR BLD CALC: 26.3 % — LOW (ref 39–50)
HCT VFR BLD CALC: 27 % — LOW (ref 39–50)
HGB BLD-MCNC: 8 G/DL — LOW (ref 13–17)
HGB BLD-MCNC: 8.1 G/DL — LOW (ref 13–17)
IRON SATN MFR SERPL: 104 UG/DL — SIGNIFICANT CHANGE UP (ref 45–165)
IRON SATN MFR SERPL: 57 % — HIGH (ref 14–50)
MCHC RBC-ENTMCNC: 28.4 PG — SIGNIFICANT CHANGE UP (ref 27–34)
MCHC RBC-ENTMCNC: 28.5 PG — SIGNIFICANT CHANGE UP (ref 27–34)
MCHC RBC-ENTMCNC: 30 GM/DL — LOW (ref 32–36)
MCHC RBC-ENTMCNC: 30.4 GM/DL — LOW (ref 32–36)
MCV RBC AUTO: 93.6 FL — SIGNIFICANT CHANGE UP (ref 80–100)
MCV RBC AUTO: 94.7 FL — SIGNIFICANT CHANGE UP (ref 80–100)
NRBC # BLD: 0 /100 WBCS — SIGNIFICANT CHANGE UP (ref 0–0)
NRBC # BLD: 0 /100 WBCS — SIGNIFICANT CHANGE UP (ref 0–0)
NRBC # FLD: 0 K/UL — SIGNIFICANT CHANGE UP (ref 0–0)
NRBC # FLD: 0 K/UL — SIGNIFICANT CHANGE UP (ref 0–0)
PLATELET # BLD AUTO: 113 K/UL — LOW (ref 150–400)
PLATELET # BLD AUTO: 114 K/UL — LOW (ref 150–400)
POTASSIUM SERPL-MCNC: 4.6 MMOL/L — SIGNIFICANT CHANGE UP (ref 3.5–5.3)
POTASSIUM SERPL-SCNC: 4.6 MMOL/L — SIGNIFICANT CHANGE UP (ref 3.5–5.3)
PROT SERPL-MCNC: 7.5 G/DL — SIGNIFICANT CHANGE UP (ref 6–8.3)
RBC # BLD: 2.81 M/UL — LOW (ref 4.2–5.8)
RBC # BLD: 2.85 M/UL — LOW (ref 4.2–5.8)
RBC # FLD: 13.9 % — SIGNIFICANT CHANGE UP (ref 10.3–14.5)
RBC # FLD: 14.2 % — SIGNIFICANT CHANGE UP (ref 10.3–14.5)
RH IG SCN BLD-IMP: POSITIVE — SIGNIFICANT CHANGE UP
SODIUM SERPL-SCNC: 140 MMOL/L — SIGNIFICANT CHANGE UP (ref 135–145)
TIBC SERPL-MCNC: 181 UG/DL — LOW (ref 220–430)
TRANSFERRIN SERPL-MCNC: 161 MG/DL — LOW (ref 200–360)
TROPONIN T, HIGH SENSITIVITY RESULT: 61 NG/L — CRITICAL HIGH
TROPONIN T, HIGH SENSITIVITY RESULT: 65 NG/L — CRITICAL HIGH
UIBC SERPL-MCNC: 77 UG/DL — LOW (ref 110–370)
WBC # BLD: 5.58 K/UL — SIGNIFICANT CHANGE UP (ref 3.8–10.5)
WBC # BLD: 5.98 K/UL — SIGNIFICANT CHANGE UP (ref 3.8–10.5)
WBC # FLD AUTO: 5.58 K/UL — SIGNIFICANT CHANGE UP (ref 3.8–10.5)
WBC # FLD AUTO: 5.98 K/UL — SIGNIFICANT CHANGE UP (ref 3.8–10.5)

## 2023-04-15 RX ORDER — ERYTHROPOIETIN 10000 [IU]/ML
16000 INJECTION, SOLUTION INTRAVENOUS; SUBCUTANEOUS
Refills: 0 | Status: DISCONTINUED | OUTPATIENT
Start: 2023-04-15 | End: 2023-04-18

## 2023-04-15 RX ORDER — CHLORHEXIDINE GLUCONATE 213 G/1000ML
1 SOLUTION TOPICAL DAILY
Refills: 0 | Status: DISCONTINUED | OUTPATIENT
Start: 2023-04-15 | End: 2023-04-24

## 2023-04-15 RX ORDER — CALCIUM ACETATE 667 MG
1334 TABLET ORAL
Refills: 0 | Status: DISCONTINUED | OUTPATIENT
Start: 2023-04-15 | End: 2023-04-24

## 2023-04-15 RX ORDER — SODIUM CHLORIDE 9 MG/ML
100 INJECTION INTRAMUSCULAR; INTRAVENOUS; SUBCUTANEOUS
Refills: 0 | Status: DISCONTINUED | OUTPATIENT
Start: 2023-04-15 | End: 2023-04-24

## 2023-04-15 RX ORDER — NIFEDIPINE 30 MG
30 TABLET, EXTENDED RELEASE 24 HR ORAL ONCE
Refills: 0 | Status: COMPLETED | OUTPATIENT
Start: 2023-04-15 | End: 2023-04-15

## 2023-04-15 RX ADMIN — SEVELAMER CARBONATE 1600 MILLIGRAM(S): 2400 POWDER, FOR SUSPENSION ORAL at 09:08

## 2023-04-15 RX ADMIN — Medication 25 MILLIGRAM(S): at 06:29

## 2023-04-15 RX ADMIN — HEPARIN SODIUM 900 UNIT(S)/HR: 5000 INJECTION INTRAVENOUS; SUBCUTANEOUS at 07:41

## 2023-04-15 RX ADMIN — Medication 30 MILLIGRAM(S): at 19:08

## 2023-04-15 RX ADMIN — Medication 81 MILLIGRAM(S): at 14:11

## 2023-04-15 RX ADMIN — SEVELAMER CARBONATE 1600 MILLIGRAM(S): 2400 POWDER, FOR SUSPENSION ORAL at 13:30

## 2023-04-15 RX ADMIN — Medication 75 MILLIGRAM(S): at 06:30

## 2023-04-15 RX ADMIN — HEPARIN SODIUM 900 UNIT(S)/HR: 5000 INJECTION INTRAVENOUS; SUBCUTANEOUS at 08:21

## 2023-04-15 RX ADMIN — ATORVASTATIN CALCIUM 80 MILLIGRAM(S): 80 TABLET, FILM COATED ORAL at 22:09

## 2023-04-15 RX ADMIN — HEPARIN SODIUM 900 UNIT(S)/HR: 5000 INJECTION INTRAVENOUS; SUBCUTANEOUS at 00:59

## 2023-04-15 RX ADMIN — CHLORHEXIDINE GLUCONATE 1 APPLICATION(S): 213 SOLUTION TOPICAL at 22:11

## 2023-04-15 RX ADMIN — PANTOPRAZOLE SODIUM 40 MILLIGRAM(S): 20 TABLET, DELAYED RELEASE ORAL at 06:30

## 2023-04-15 RX ADMIN — Medication 25 MILLIGRAM(S): at 22:08

## 2023-04-15 RX ADMIN — Medication 25 MILLIGRAM(S): at 14:11

## 2023-04-15 NOTE — PATIENT PROFILE ADULT - FALL HARM RISK - HARM RISK INTERVENTIONS

## 2023-04-15 NOTE — CONSULT NOTE ADULT - SUBJECTIVE AND OBJECTIVE BOX
[facility] - S Alicia / Ej S /D Federico/ S Maryellen/ SERA Fasut/ Jovan Garrison / M Danou/ O Gregorio  service -[phone], office [phone]  ---------------------------------------------------------------------------------------------------------------    Patient is a 68y Male whom presented to the hospital with   Denied recent NSAID use/Abx use/iv contrast studies.    Patient seen and examined    PAST MEDICAL & SURGICAL HISTORY:  HTN (Hypertension)      Chronic kidney disease      Kidney stones      Hemodialysis access, AV graft  Left upper extremity      Hyperparathyroidism      Anemia      Thrombocytopenia      Atrial fibrillation  on Eliquis      S/P Nephrectomy  (L) 2007 for kidney stones      Acquired arteriovenous fistula  left wrist  1/2015 - [facility], Left upper arm 4/2015 (approximate date)      AVF (arteriovenous fistula)          Allergies: No Known Allergies    Home Medications Reviewed  Hospital Medications:   MEDICATIONS  (STANDING):  aspirin enteric coated 81 milliGRAM(s) Oral daily  atorvastatin 80 milliGRAM(s) Oral at bedtime  heparin  Infusion.  Unit(s)/Hr (9 mL/Hr) IV Continuous <Continuous>  hydrALAZINE 25 milliGRAM(s) Oral every 8 hours  metoprolol succinate ER 75 milliGRAM(s) Oral daily  pantoprazole    Tablet 40 milliGRAM(s) Oral two times a day  sevelamer carbonate 1600 milliGRAM(s) Oral three times a day with meals    SOCIAL HISTORY:  Denies ETOh,Smoking, illicit drug use  FAMILY HISTORY:  Family history of CVA (Father)        REVIEW OF SYSTEMS:  CONSTITUTIONAL: No weakness, fevers or chills  EYES/ENT: No visual changes;  No vertigo or throat pain   NECK: No pain or stiffness  RESPIRATORY: No cough, wheezing, hemoptysis; No shortness of breath  CARDIOVASCULAR: No chest pain or palpitations.  GASTROINTESTINAL: No abdominal or epigastric pain. No nausea, vomiting, or hematemesis; No diarrhea or constipation. No melena or hematochezia.  GENITOURINARY: No dysuria, frequency, foamy urine, urinary urgency, incontinence or hematuria  NEUROLOGICAL: No numbness or weakness  SKIN: No itching, burning, rashes, or lesions   VASCULAR: No bilateral lower extremity edema.   All other review of systems is negative unless indicated above.    VITALS:  T(F): 98.2 (04-15-23 @ 10:26), Max: 98.5 (04-14-23 @ 15:43)  HR: 72 (04-15-23 @ 10:26)  BP: 171/102 (04-15-23 @ 10:26)  RR: 18 (04-15-23 @ 10:26)  SpO2: 98% (04-15-23 @ 10:26)  Wt(kg): --      Weight (kg): 71 (04-14 @ 17:00)    PHYSICAL EXAM:  Constitutional: NAD  HEENT: anicteric sclera, oropharynx clear, MMM  Neck: No JVD  Respiratory: CTAB, no wheezes, rales or rhonchi  Cardiovascular: S1, S2, RRR  Gastrointestinal: BS+, soft, NT/ND  Extremities: No cyanosis or clubbing. No peripheral edema  Neurological: A/O x 3, no focal deficits  Psychiatric: Normal mood, normal affect  : No CVA tenderness. No siegel.   Skin: No rashes  Vascular Access:    LABS:  04-15    140  |  101  |  94<H>  ----------------------------<  90  4.6   |  17<L>  |  16.42<H>    Ca    6.6<L>      15 Apr 2023 07:00    TPro  7.5  /  Alb  4.2  /  TBili  0.3  /  DBili      /  AST  16  /  ALT  16  /  AlkPhos  73  04-15    Creatinine Trend: 16.42 <--, 15.23 <--                        8.0    5.98  )-----------( 113      ( 15 Apr 2023 07:00 )             26.3     Urine Studies:        RADIOLOGY & ADDITIONAL STUDIES:                 [facility] - S Alicia / Ej S /D Federico/ S Maryellen/ SERA Faust/ Jovan Garrison / GAYATRI Matsonu/ O Gregorio  service -[phone], office [phone]  ---------------------------------------------------------------------------------------------------------------  Patient seen and examined in ER    68M with PMHx HTN, ESRD Tu/Th/Sa, CVA (right facial droop), Afib on eliquis, hx GIB?, vestibular schwannoma presenting with chest pain and SOB x2 days. Patient was last admitted 6/2022 with Left corona radiata infarct. At the time he was not taking eliquis due to hx of GIB but this was resumed on this admission and ASA discontinued. He was seen by neuro and GI and recommendation was for outpatient scope and was not done inpatient. He did not have bleeding on AC and was eventually discharged to rehab. Per patient he was in rehab for 6 months and then went to [place] and he gets HD Tu/Th/Sat. His last HD was Tuesday and then [place] He did not have HD on Thursday. Yesterday night around 8PM he began having left sided chest pain and SOB that was intermittent. CP comes and goes and is not radiating. It is stabbing in quality. Pain is now relieved after nitro tabs. He saw Dr. Epperson yesterday day who told him to come to the hospital for possible angiogram. He currently denies CP, SOB, BRBPR, melena. He is compliant with his eliquis but does not know all the names of his medications. Confirms being on metoprolol and pantoprazole as well    RENAL consulted for ESRD/ HD Mx. pt well known to me, our gp HD pt from [facility] unit> pt was at [facility] w/onsite HD -states was d/c from rehab > pt been out of country, went to [place] for 3 mths, returned 4/11/23 PM  had HD 4/11 prior to leaving [place]. saw Dr. Epperson yesterday who refered pt to ER for c/o chest pain. currently reports sob. cp resolved.  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 denied fevers. reports tenderness at site.    PAST MEDICAL & SURGICAL HISTORY:  HTN (Hypertension)      Chronic kidney disease      Kidney stones      Hemodialysis access, AV graft  Left upper extremity      Hyperparathyroidism      Anemia      Thrombocytopenia      Atrial fibrillation  on Eliquis      S/P Nephrectomy  (L) 2007 for kidney stones      Acquired arteriovenous fistula  left wrist  1/2015 - [facility], Left upper arm 4/2015 (approximate date)    AVF (arteriovenous fistula)      Allergies: No Known Allergies    Home Medications Reviewed  Hospital Medications:   MEDICATIONS  (STANDING):  aspirin enteric coated 81 milliGRAM(s) Oral daily  atorvastatin 80 milliGRAM(s) Oral at bedtime  heparin  Infusion.  Unit(s)/Hr (9 mL/Hr) IV Continuous <Continuous>  hydrALAZINE 25 milliGRAM(s) Oral every 8 hours  metoprolol succinate ER 75 milliGRAM(s) Oral daily  pantoprazole    Tablet 40 milliGRAM(s) Oral two times a day  sevelamer carbonate 1600 milliGRAM(s) Oral three times a day with meals    SOCIAL HISTORY:  Denies ETOh,Smoking, illicit drug use  FAMILY HISTORY:  Family history of CVA (Father)      REVIEW OF SYSTEMS:  CONSTITUTIONAL: No weakness, fevers or chills  EYES/ENT: No visual changes;  No vertigo or throat pain   NECK: No pain or stiffness  RESPIRATORY: No cough, wheezing, hemoptysis; + shortness of breath  CARDIOVASCULAR: No chest pain now, resolved. no palpitations.  GASTROINTESTINAL: No abdominal or epigastric pain. No nausea, vomiting, or hematemesis; No diarrhea or constipation. No melena or hematochezia.  NEUROLOGICAL: No numbness or weakness  SKIN: No itching, burning, rashes, or lesions   VASCULAR: No bilateral lower extremity edema.   All other review of systems is negative unless indicated above.    VITALS:  T(F): 98.2 (04-15-23 @ 10:26), Max: 98.5 (04-14-23 @ 15:43)  HR: 72 (04-15-23 @ 10:26)  BP: 171/102 (04-15-23 @ 10:26)  RR: 18 (04-15-23 @ 10:26)  SpO2: 98% (04-15-23 @ 10:26)  Wt(kg): --      Weight (kg): 71 (04-14 @ 17:00)    PHYSICAL EXAM:  Constitutional: NAD  HEENT: anicteric sclera  Neck: No JVD  Respiratory: CTAB, no wheezes, rales or rhonchi  Cardiovascular: S1, S2, RRR  Gastrointestinal: BS+, soft, NT/ND  Extremities: No peripheral edema b/l  Neurological: A/O x 3, no focal deficits  Psychiatric: Normal mood, normal affect  : No siegel.   Vascular Access: RFA AVF+thrill  TATIANA AVF no thrill, small aneurysm at anastomosis, dry scab    LABS:  04-15    140  |  101  |  94<H>  ----------------------------<  90  4.6   |  17<L>  |  16.42<H>    Ca    6.6<L>      15 Apr 2023 07:00    TPro  7.5  /  Alb  4.2  /  TBili  0.3  /  DBili      /  AST  16  /  ALT  16  /  AlkPhos  73  04-15    Creatinine Trend: 16.42 <--, 15.23 <--                        8.0    5.98  )-----------( 113      ( 15 Apr 2023 07:00 )             26.3     Urine Studies:        RADIOLOGY & ADDITIONAL STUDIES:      < from: Xray Chest 2 Views PA/Lat (04.14.23 @ 17:15) >    Impression:  Clear lungs.    --- End of Report ---    < end of copied text >

## 2023-04-15 NOTE — CONSULT NOTE ADULT - SUBJECTIVE AND OBJECTIVE BOX
Ran Velasquez MD  Interventional Cardiology / Endovascular Specialist  [place] Office : [address] [place] [address] [place] [address]  Tel:   [place] Office : [address] [facility] [place] [address]  Tel: [phone]        HISTORY OF PRESENTING ILLNESS:  68M with PMHx HTN, ESRD Tu/Th/Sa, CVA (right facial droop), Afib on eliquis, hx GIB?, vestibular schwannoma presenting with chest pain and SOB x2 days. Patient was last admitted 6/2022 with Left corona radiata infarct. At the time he was not taking eliquis due to hx of GIB but this was resumed on this admission and ASA discontinued. He was seen by neuro and GI and recommendation was for outpatient scope and was not done inpatient. He did not have bleeding on AC and was eventually discharged to rehab. Per patient he was in rehab for 6 months and then went to [facility] and he gets HD Tu/Th/Sat. His last HD was Tuesday and then he flew back to the  after. He did not have HD on Thursday. Yesterday night around 8PM he began having left sided chest pain and SOB that was intermittent. CP comes and goes and is not radiating. It is stabbing in quality. Pain is now relieved after nitro tabs. He saw Dr. Epperson yesterday day who told him to come to the hospital for possible angiogram. He currently denies CP, SOB, BRBPR, melena. He is compliant with his eliquis but does not know all the names of his medications. Confirms being on metoprolol and pantoprazole as well      In ED, EKG with new TWI in V2 and V3. Started on hep gtt and given ASA 162mg    PAST MEDICAL & SURGICAL HISTORY:  HTN (Hypertension)      Chronic kidney disease      Kidney stones      Hemodialysis access, AV graft  Left upper extremity      Hyperparathyroidism      Anemia      Thrombocytopenia      Atrial fibrillation  on Eliquis      S/P Nephrectomy  (L) 2007 for kidney stones      Acquired arteriovenous fistula  left wrist  1/2015 - [facility], Left upper arm 4/2015 (approximate date)      AVF (arteriovenous fistula)          SOCIAL HISTORY: Substance Use (street drugs): ( x ) never used  (  ) other:    FAMILY HISTORY:  Family history of CVA (Father)        REVIEW OF SYSTEMS:  CONSTITUTIONAL: No fever, weight loss, or fatigue  EYES: No eye pain, visual disturbances, or discharge  ENMT:  No difficulty hearing, tinnitus, vertigo; No sinus or throat pain  BREASTS: No pain, masses, or nipple discharge  GASTROINTESTINAL: No abdominal or epigastric pain. No nausea, vomiting, or hematemesis; No diarrhea or constipation. No melena or hematochezia.  GENITOURINARY: No dysuria, frequency, hematuria, or incontinence  NEUROLOGICAL: No headaches, memory loss, loss of strength, numbness, or tremors  ENDOCRINE: No heat or cold intolerance; No hair loss  MUSCULOSKELETAL: No joint pain or swelling; No muscle, back, or extremity pain  PSYCHIATRIC: No depression, anxiety, mood swings, or difficulty sleeping  HEME/LYMPH: No easy bruising, or bleeding gums  All others negative    MEDICATIONS:  aspirin enteric coated 81 milliGRAM(s) Oral daily  heparin   Injectable 4400 Unit(s) IV Push every 6 hours PRN  heparin  Infusion.  Unit(s)/Hr IV Continuous <Continuous>  hydrALAZINE 25 milliGRAM(s) Oral every 8 hours  metoprolol succinate ER 75 milliGRAM(s) Oral daily  NIFEdipine XL 30 milliGRAM(s) Oral once  nitroglycerin     SubLingual 0.4 milliGRAM(s) SubLingual every 5 minutes PRN  acetaminophen     Tablet .. 650 milliGRAM(s) Oral every 6 hours PRN  melatonin 3 milliGRAM(s) Oral at bedtime PRN  pantoprazole    Tablet 40 milliGRAM(s) Oral two times a day  atorvastatin 80 milliGRAM(s) Oral at bedtime  calcium acetate 1334 milliGRAM(s) Oral three times a day with meals  chlorhexidine 2% Cloths 1 Application(s) Topical daily  epoetin tammi-epbx (RETACRIT) Injectable 82863 Unit(s) IV Push <User Schedule>  sodium chloride 0.9% Bolus. 100 milliLiter(s) IV Bolus every 5 minutes PRN      FAMILY HISTORY:  Family history of CVA (Father)          Allergies    No Known Allergies    Intolerances    	      PHYSICAL EXAM:  T(C): 36.6 (04-15-23 @ 16:15), Max: 36.8 (04-15-23 @ 10:26)  HR: 76 (04-15-23 @ 16:15) (70 - 80)  BP: 198/101 (04-15-23 @ 16:15) (154/79 - 200/113)  RR: 18 (04-15-23 @ 16:15) (17 - 18)  SpO2: 98% (04-15-23 @ 10:26) (95% - 100%)  Wt(kg): --  I&O's Summary      GENERAL: NAD   EYES: conjunctiva and sclera clear  ENMT: No tonsillar erythema, exudates, or enlargement  Cardiovascular: Normal S1 S2, No JVD, No murmurs, No edema  Respiratory: Lungs clear to auscultation	  Gastrointestinal:  Soft, Non-tender, + BS	  Extremities: No edema        LABS:	 	    CARDIAC MARKERS:                                  8.0    5.98  )-----------( 113      ( 15 Apr 2023 07:00 )             26.3     04-15    140  |  101  |  94<H>  ----------------------------<  90  4.6   |  17<L>  |  16.42<H>    Ca    6.6<L>      15 Apr 2023 07:00    TPro  7.5  /  Alb  4.2  /  TBili  0.3  /  DBili  x   /  AST  16  /  ALT  16  /  AlkPhos  73  04-15    proBNP:   Lipid Profile:   HgA1c:   TSH:     Consultant(s) Notes Reviewed:  [x ] YES  [ ] NO    Care Discussed with Consultants/Other Providers [ x] YES  [ ] NO    Imaging Personally Reviewed independently:  [x] YES  [ ] NO    All labs, radiologic studies, vitals, orders and medications list reviewed. Patient is seen and examined at bedside. Case discussed with medical team.    ASSESSMENT/PLAN:

## 2023-04-15 NOTE — PHARMACOTHERAPY INTERVENTION NOTE - COMMENTS
Medication history in progress. Medication list updated in Outpatient Medication Record (OMR) per [facility] and Surescripts. Awaiting return call from emergency contact. Please call spectra [phone] if you have any questions.  Medication history incomplete. Medication list updated in Outpatient Medication Record (OMR) per [facility] and Surescripts. Unable to verify entire medication list with patient. Waiting to speak with Awaiting return call from emergency contact. Please call spectra [phone] if you have any questions.  Medication history incomplete. Medication list updated in Outpatient Medication Record (OMR) per [facility] and Surescripts. Unable to verify entire medication list with patient. Awaiting return call from emergency contact. Please call spectra [phone] if you have any questions.

## 2023-04-16 LAB
A1C WITH ESTIMATED AVERAGE GLUCOSE RESULT: 5.2 % — SIGNIFICANT CHANGE UP (ref 4–5.6)
ALBUMIN SERPL ELPH-MCNC: 3.8 G/DL — SIGNIFICANT CHANGE UP (ref 3.3–5)
ALP SERPL-CCNC: 64 U/L — SIGNIFICANT CHANGE UP (ref 40–120)
ALT FLD-CCNC: 16 U/L — SIGNIFICANT CHANGE UP (ref 4–41)
ANION GAP SERPL CALC-SCNC: 17 MMOL/L — HIGH (ref 7–14)
APTT BLD: 56.3 SEC — HIGH (ref 27–36.3)
AST SERPL-CCNC: 17 U/L — SIGNIFICANT CHANGE UP (ref 4–40)
BASOPHILS # BLD AUTO: 0.03 K/UL — SIGNIFICANT CHANGE UP (ref 0–0.2)
BASOPHILS NFR BLD AUTO: 0.6 % — SIGNIFICANT CHANGE UP (ref 0–2)
BILIRUB SERPL-MCNC: 0.5 MG/DL — SIGNIFICANT CHANGE UP (ref 0.2–1.2)
BUN SERPL-MCNC: 50 MG/DL — HIGH (ref 7–23)
CALCIUM SERPL-MCNC: 7 MG/DL — LOW (ref 8.4–10.5)
CHLORIDE SERPL-SCNC: 97 MMOL/L — LOW (ref 98–107)
CHOLEST SERPL-MCNC: 87 MG/DL — SIGNIFICANT CHANGE UP
CO2 SERPL-SCNC: 25 MMOL/L — SIGNIFICANT CHANGE UP (ref 22–31)
CREAT SERPL-MCNC: 10.02 MG/DL — HIGH (ref 0.5–1.3)
EGFR: 5 ML/MIN/1.73M2 — LOW
EOSINOPHIL # BLD AUTO: 0.14 K/UL — SIGNIFICANT CHANGE UP (ref 0–0.5)
EOSINOPHIL NFR BLD AUTO: 2.7 % — SIGNIFICANT CHANGE UP (ref 0–6)
ESTIMATED AVERAGE GLUCOSE: 103 — SIGNIFICANT CHANGE UP
GLUCOSE SERPL-MCNC: 92 MG/DL — SIGNIFICANT CHANGE UP (ref 70–99)
HBV CORE AB SER-ACNC: SIGNIFICANT CHANGE UP
HBV SURFACE AB SER-ACNC: 587.2 MIU/ML — SIGNIFICANT CHANGE UP
HBV SURFACE AG SER-ACNC: SIGNIFICANT CHANGE UP
HCT VFR BLD CALC: 24.1 % — LOW (ref 39–50)
HCV AB S/CO SERPL IA: 0.53 S/CO — SIGNIFICANT CHANGE UP (ref 0–0.99)
HCV AB SERPL-IMP: SIGNIFICANT CHANGE UP
HDLC SERPL-MCNC: 53 MG/DL — SIGNIFICANT CHANGE UP
HGB BLD-MCNC: 7.7 G/DL — LOW (ref 13–17)
IANC: 3.34 K/UL — SIGNIFICANT CHANGE UP (ref 1.8–7.4)
IMM GRANULOCYTES NFR BLD AUTO: 0.4 % — SIGNIFICANT CHANGE UP (ref 0–0.9)
LIPID PNL WITH DIRECT LDL SERPL: 27 MG/DL — SIGNIFICANT CHANGE UP
LYMPHOCYTES # BLD AUTO: 1.13 K/UL — SIGNIFICANT CHANGE UP (ref 1–3.3)
LYMPHOCYTES # BLD AUTO: 22.2 % — SIGNIFICANT CHANGE UP (ref 13–44)
MCHC RBC-ENTMCNC: 28.8 PG — SIGNIFICANT CHANGE UP (ref 27–34)
MCHC RBC-ENTMCNC: 32 GM/DL — SIGNIFICANT CHANGE UP (ref 32–36)
MCV RBC AUTO: 90.3 FL — SIGNIFICANT CHANGE UP (ref 80–100)
MONOCYTES # BLD AUTO: 0.44 K/UL — SIGNIFICANT CHANGE UP (ref 0–0.9)
MONOCYTES NFR BLD AUTO: 8.6 % — SIGNIFICANT CHANGE UP (ref 2–14)
MRSA PCR RESULT.: DETECTED
NEUTROPHILS # BLD AUTO: 3.34 K/UL — SIGNIFICANT CHANGE UP (ref 1.8–7.4)
NEUTROPHILS NFR BLD AUTO: 65.5 % — SIGNIFICANT CHANGE UP (ref 43–77)
NON HDL CHOLESTEROL: 34 MG/DL — SIGNIFICANT CHANGE UP
NRBC # BLD: 0 /100 WBCS — SIGNIFICANT CHANGE UP (ref 0–0)
NRBC # FLD: 0 K/UL — SIGNIFICANT CHANGE UP (ref 0–0)
PLATELET # BLD AUTO: 110 K/UL — LOW (ref 150–400)
POTASSIUM SERPL-MCNC: 3.6 MMOL/L — SIGNIFICANT CHANGE UP (ref 3.5–5.3)
POTASSIUM SERPL-SCNC: 3.6 MMOL/L — SIGNIFICANT CHANGE UP (ref 3.5–5.3)
PROT SERPL-MCNC: 6.7 G/DL — SIGNIFICANT CHANGE UP (ref 6–8.3)
RBC # BLD: 2.67 M/UL — LOW (ref 4.2–5.8)
RBC # FLD: 14 % — SIGNIFICANT CHANGE UP (ref 10.3–14.5)
S AUREUS DNA NOSE QL NAA+PROBE: DETECTED
SODIUM SERPL-SCNC: 139 MMOL/L — SIGNIFICANT CHANGE UP (ref 135–145)
TRIGL SERPL-MCNC: 34 MG/DL — SIGNIFICANT CHANGE UP
TROPONIN T, HIGH SENSITIVITY RESULT: 67 NG/L — CRITICAL HIGH
TSH SERPL-MCNC: 0.69 UIU/ML — SIGNIFICANT CHANGE UP (ref 0.27–4.2)
WBC # BLD: 5.1 K/UL — SIGNIFICANT CHANGE UP (ref 3.8–10.5)
WBC # FLD AUTO: 5.1 K/UL — SIGNIFICANT CHANGE UP (ref 3.8–10.5)

## 2023-04-16 PROCEDURE — 93306 TTE W/DOPPLER COMPLETE: CPT | Mod: 26

## 2023-04-16 RX ORDER — APIXABAN 2.5 MG/1
1 TABLET, FILM COATED ORAL
Refills: 0 | DISCHARGE

## 2023-04-16 RX ORDER — FERROUS SULFATE 325(65) MG
1 TABLET ORAL
Refills: 0 | DISCHARGE

## 2023-04-16 RX ORDER — PANTOPRAZOLE SODIUM 20 MG/1
1 TABLET, DELAYED RELEASE ORAL
Refills: 0 | DISCHARGE

## 2023-04-16 RX ORDER — CALCIUM ACETATE 667 MG
2 TABLET ORAL
Refills: 0 | DISCHARGE

## 2023-04-16 RX ORDER — ATORVASTATIN CALCIUM 80 MG/1
1 TABLET, FILM COATED ORAL
Refills: 0 | DISCHARGE

## 2023-04-16 RX ADMIN — PANTOPRAZOLE SODIUM 40 MILLIGRAM(S): 20 TABLET, DELAYED RELEASE ORAL at 06:12

## 2023-04-16 RX ADMIN — Medication 1334 MILLIGRAM(S): at 09:14

## 2023-04-16 RX ADMIN — Medication 75 MILLIGRAM(S): at 06:15

## 2023-04-16 RX ADMIN — PANTOPRAZOLE SODIUM 40 MILLIGRAM(S): 20 TABLET, DELAYED RELEASE ORAL at 17:06

## 2023-04-16 RX ADMIN — Medication 81 MILLIGRAM(S): at 13:28

## 2023-04-16 RX ADMIN — Medication 1334 MILLIGRAM(S): at 13:28

## 2023-04-16 RX ADMIN — HEPARIN SODIUM 900 UNIT(S)/HR: 5000 INJECTION INTRAVENOUS; SUBCUTANEOUS at 07:25

## 2023-04-16 RX ADMIN — Medication 25 MILLIGRAM(S): at 13:28

## 2023-04-16 RX ADMIN — SEVELAMER CARBONATE 1600 MILLIGRAM(S): 2400 POWDER, FOR SUSPENSION ORAL at 09:14

## 2023-04-16 RX ADMIN — CHLORHEXIDINE GLUCONATE 1 APPLICATION(S): 213 SOLUTION TOPICAL at 13:33

## 2023-04-16 RX ADMIN — ATORVASTATIN CALCIUM 80 MILLIGRAM(S): 80 TABLET, FILM COATED ORAL at 22:11

## 2023-04-16 RX ADMIN — Medication 25 MILLIGRAM(S): at 22:11

## 2023-04-16 RX ADMIN — SEVELAMER CARBONATE 1600 MILLIGRAM(S): 2400 POWDER, FOR SUSPENSION ORAL at 17:07

## 2023-04-16 RX ADMIN — SEVELAMER CARBONATE 1600 MILLIGRAM(S): 2400 POWDER, FOR SUSPENSION ORAL at 13:28

## 2023-04-16 RX ADMIN — HEPARIN SODIUM 900 UNIT(S)/HR: 5000 INJECTION INTRAVENOUS; SUBCUTANEOUS at 19:25

## 2023-04-16 RX ADMIN — Medication 25 MILLIGRAM(S): at 06:11

## 2023-04-16 RX ADMIN — Medication 1334 MILLIGRAM(S): at 17:07

## 2023-04-16 NOTE — PHARMACOTHERAPY INTERVENTION NOTE - COMMENTS
Sevelamer carbonate discontinued per outpatient medication regimen (therapeutic duplication with calcium acetate).

## 2023-04-16 NOTE — PROGRESS NOTE ADULT - SUBJECTIVE AND OBJECTIVE BOX
Ran Velasquez MD  Interventional Cardiology / Endovascular Specialist  [place] Office : [address] [place] [address] [place] [address]  Tel:   [place] Office : [address] [facility] [place] [address]  Tel: [phone]    Pt lying in bed NAD denies further CP   	  MEDICATIONS:  aspirin enteric coated 81 milliGRAM(s) Oral daily  heparin   Injectable 4400 Unit(s) IV Push every 6 hours PRN  heparin  Infusion.  Unit(s)/Hr IV Continuous <Continuous>  hydrALAZINE 25 milliGRAM(s) Oral every 8 hours  metoprolol succinate ER 75 milliGRAM(s) Oral daily  nitroglycerin     SubLingual 0.4 milliGRAM(s) SubLingual every 5 minutes PRN        acetaminophen     Tablet .. 650 milliGRAM(s) Oral every 6 hours PRN  melatonin 3 milliGRAM(s) Oral at bedtime PRN    pantoprazole    Tablet 40 milliGRAM(s) Oral two times a day    atorvastatin 80 milliGRAM(s) Oral at bedtime    calcium acetate 1334 milliGRAM(s) Oral three times a day with meals  chlorhexidine 2% Cloths 1 Application(s) Topical daily  epoetin atmmi-epbx (RETACRIT) Injectable 66596 Unit(s) IV Push <User Schedule>  sodium chloride 0.9% Bolus. 100 milliLiter(s) IV Bolus every 5 minutes PRN      PAST MEDICAL/SURGICAL HISTORY  PAST MEDICAL & SURGICAL HISTORY:  HTN (Hypertension)      Chronic kidney disease      Kidney stones      Hemodialysis access, AV graft  Left upper extremity      Hyperparathyroidism      Anemia      Thrombocytopenia      Atrial fibrillation  on Eliquis      S/P Nephrectomy  (L) 2007 for kidney stones      Acquired arteriovenous fistula  left wrist  1/2015 - [facility], Left upper arm 4/2015 (approximate date)      AVF (arteriovenous fistula)          SOCIAL HISTORY: Substance Use (street drugs): ( x ) never used  (  ) other:    FAMILY HISTORY:  Family history of CVA (Father)        REVIEW OF SYSTEMS:  CONSTITUTIONAL: No fever, weight loss, or fatigue  EYES: No eye pain, visual disturbances, or discharge  ENMT:  No difficulty hearing, tinnitus, vertigo; No sinus or throat pain  BREASTS: No pain, masses, or nipple discharge  GASTROINTESTINAL: No abdominal or epigastric pain. No nausea, vomiting, or hematemesis; No diarrhea or constipation. No melena or hematochezia.  GENITOURINARY: No dysuria, frequency, hematuria, or incontinence  NEUROLOGICAL: No headaches, memory loss, loss of strength, numbness, or tremors  ENDOCRINE: No heat or cold intolerance; No hair loss  MUSCULOSKELETAL: No joint pain or swelling; No muscle, back, or extremity pain  PSYCHIATRIC: No depression, anxiety, mood swings, or difficulty sleeping  HEME/LYMPH: No easy bruising, or bleeding gums  All others negative    PHYSICAL EXAM:  T(C): 36.7 (04-16-23 @ 13:12), Max: 36.9 (04-15-23 @ 21:15)  HR: 72 (04-16-23 @ 13:12) (63 - 87)  BP: 127/72 (04-16-23 @ 13:12) (127/72 - 201/104)  RR: 17 (04-16-23 @ 13:12) (17 - 18)  SpO2: 100% (04-16-23 @ 13:12) (98% - 100%)  Wt(kg): --  I&O's Summary    15 Apr 2023 07:01  -  16 Apr 2023 07:00  --------------------------------------------------------  IN: 400 mL / OUT: 3400 mL / NET: -3000 mL    16 Apr 2023 07:01  -  16 Apr 2023 15:51  --------------------------------------------------------  IN: 250 mL / OUT: 0 mL / NET: 250 mL      Height (cm): 175.3 (04-16 @ 10:04)    GENERAL: NAD, well-groomed, well-developed  EYES: EOMI, PERRLA, conjunctiva and sclera clear  ENMT: No tonsillar erythema, exudates, or enlargement; Moist mucous membranes, Good dentition, No lesions  Cardiovascular: Normal S1 S2, No JVD, No murmurs, No edema  Respiratory: Lungs clear to auscultation	  Gastrointestinal:  Soft, Non-tender, + BS	  Extremities: Normal range of motion, No clubbing, cyanosis or edema  LYMPH: No lymphadenopathy noted  NERVOUS SYSTEM:  Alert & Oriented X3, Good concentration; Motor Strength 5/5 B/L upper and lower extremities; DTRs 2+ intact and symmetric                                    7.7    5.10  )-----------( 110      ( 16 Apr 2023 05:09 )             24.1     04-16    139  |  97<L>  |  50<H>  ----------------------------<  92  3.6   |  25  |  10.02<H>    Ca    7.0<L>      16 Apr 2023 05:09    TPro  6.7  /  Alb  3.8  /  TBili  0.5  /  DBili  x   /  AST  17  /  ALT  16  /  AlkPhos  64  04-16    proBNP:   Lipid Profile:   HgA1c:   TSH: Thyroid Stimulating Hormone, Serum: 0.69 uIU/mL (04-16 @ 05:09)      Consultant(s) Notes Reviewed:  [x ] YES  [ ] NO    Care Discussed with Consultants/Other Providers [ x] YES  [ ] NO    Imaging Personally Reviewed independently:  [x] YES  [ ] NO    All labs, radiologic studies, vitals, orders and medications list reviewed. Patient is seen and examined at bedside. Case discussed with medical team.

## 2023-04-16 NOTE — PROGRESS NOTE ADULT - SUBJECTIVE AND OBJECTIVE BOX
Name of Patient : CHAVEZ MARQUEZ  MRN: [id number]  Date of visit: 04-16-23       Subjective: Patient seen and examined. No new events except as noted.   Doing okay     REVIEW OF SYSTEMS:    CONSTITUTIONAL: No weakness, fevers or chills  EYES/ENT: No visual changes;  No vertigo or throat pain   NECK: No pain or stiffness  RESPIRATORY: No cough, wheezing, hemoptysis; No shortness of breath  CARDIOVASCULAR: No chest pain or palpitations  GASTROINTESTINAL: No abdominal or epigastric pain. No nausea, vomiting, or hematemesis; No diarrhea or constipation. No melena or hematochezia.  GENITOURINARY: No dysuria, frequency or hematuria  NEUROLOGICAL: No numbness or weakness  SKIN: No itching, burning, rashes, or lesions   All other review of systems is negative unless indicated above.    MEDICATIONS:  MEDICATIONS  (STANDING):  aspirin enteric coated 81 milliGRAM(s) Oral daily  atorvastatin 80 milliGRAM(s) Oral at bedtime  calcium acetate 1334 milliGRAM(s) Oral three times a day with meals  chlorhexidine 2% Cloths 1 Application(s) Topical daily  epoetin tammi-epbx (RETACRIT) Injectable 35653 Unit(s) IV Push <User Schedule>  heparin  Infusion.  Unit(s)/Hr (9 mL/Hr) IV Continuous <Continuous>  hydrALAZINE 25 milliGRAM(s) Oral every 8 hours  metoprolol succinate ER 75 milliGRAM(s) Oral daily  pantoprazole    Tablet 40 milliGRAM(s) Oral two times a day      PHYSICAL EXAM:  T(C): 36.7 (04-16-23 @ 13:12), Max: 36.9 (04-16-23 @ 06:00)  HR: 72 (04-16-23 @ 13:12) (72 - 87)  BP: 127/72 (04-16-23 @ 13:12) (127/72 - 129/81)  RR: 17 (04-16-23 @ 13:12) (17 - 18)  SpO2: 100% (04-16-23 @ 13:12) (98% - 100%)  Wt(kg): --  I&O's Summary    15 Apr 2023 07:01  -  16 Apr 2023 07:00  --------------------------------------------------------  IN: 400 mL / OUT: 3400 mL / NET: -3000 mL    16 Apr 2023 07:01  -  16 Apr 2023 21:29  --------------------------------------------------------  IN: 250 mL / OUT: 0 mL / NET: 250 mL      Height (cm): 175.3 (04-16 @ 10:04)    Appearance: Normal	  HEENT:  PERRLA   Lymphatic: No lymphadenopathy   Cardiovascular: Normal S1 S2, no JVD  Respiratory: normal effort , clear  Gastrointestinal:  Soft, Non-tender  Skin: No rashes,  warm to touch  Psychiatry:  Mood & affect appropriate  Musculuskeletal: No edema    recent labs, Imaging and EKGs personally reviewed     04-15-23 @ 07:01  -  04-16-23 @ 07:00  --------------------------------------------------------  IN: 400 mL / OUT: 3400 mL / NET: -3000 mL    04-16-23 @ 07:01  -  04-16-23 @ 21:29  --------------------------------------------------------  IN: 250 mL / OUT: 0 mL / NET: 250 mL                          7.7    5.10  )-----------( 110      ( 16 Apr 2023 05:09 )             24.1               04-16    139  |  97<L>  |  50<H>  ----------------------------<  92  3.6   |  25  |  10.02<H>    Ca    7.0<L>      16 Apr 2023 05:09    TPro  6.7  /  Alb  3.8  /  TBili  0.5  /  DBili  x   /  AST  17  /  ALT  16  /  AlkPhos  64  04-16    PTT - ( 16 Apr 2023 05:09 )  PTT:56.3 sec       CARDIAC MARKERS ( 15 Apr 2023 07:00 )  x     / x     / 260 U/L / x     / 3.0 ng/mL  CARDIAC MARKERS ( 14 Apr 2023 23:25 )  x     / x     / 264 U/L / x     / 3.1 ng/mL

## 2023-04-17 LAB
ANION GAP SERPL CALC-SCNC: 18 MMOL/L — HIGH (ref 7–14)
APTT BLD: 32.9 SEC — SIGNIFICANT CHANGE UP (ref 27–36.3)
APTT BLD: 41 SEC — HIGH (ref 27–36.3)
APTT BLD: 58.7 SEC — HIGH (ref 27–36.3)
BILIRUB SERPL-MCNC: 0.5 MG/DL — SIGNIFICANT CHANGE UP (ref 0.2–1.2)
BLD GP AB SCN SERPL QL: NEGATIVE — SIGNIFICANT CHANGE UP
BUN SERPL-MCNC: 70 MG/DL — HIGH (ref 7–23)
CALCIUM SERPL-MCNC: 6.9 MG/DL — LOW (ref 8.4–10.5)
CHLORIDE SERPL-SCNC: 94 MMOL/L — LOW (ref 98–107)
CK MB BLD-MCNC: 0.9 % — SIGNIFICANT CHANGE UP (ref 0–2.5)
CK MB CFR SERPL CALC: 1.8 NG/ML — SIGNIFICANT CHANGE UP
CK SERPL-CCNC: 193 U/L — SIGNIFICANT CHANGE UP (ref 30–200)
CO2 SERPL-SCNC: 21 MMOL/L — LOW (ref 22–31)
CREAT SERPL-MCNC: 12 MG/DL — HIGH (ref 0.5–1.3)
EGFR: 4 ML/MIN/1.73M2 — LOW
GLUCOSE SERPL-MCNC: 96 MG/DL — SIGNIFICANT CHANGE UP (ref 70–99)
HCT VFR BLD CALC: 25.3 % — LOW (ref 39–50)
HCT VFR BLD CALC: 26.4 % — LOW (ref 39–50)
HGB BLD-MCNC: 7.8 G/DL — LOW (ref 13–17)
HGB BLD-MCNC: 8.2 G/DL — LOW (ref 13–17)
INR BLD: 1.01 RATIO — SIGNIFICANT CHANGE UP (ref 0.88–1.16)
MAGNESIUM SERPL-MCNC: 2.1 MG/DL — SIGNIFICANT CHANGE UP (ref 1.6–2.6)
MCHC RBC-ENTMCNC: 28.3 PG — SIGNIFICANT CHANGE UP (ref 27–34)
MCHC RBC-ENTMCNC: 28.9 PG — SIGNIFICANT CHANGE UP (ref 27–34)
MCHC RBC-ENTMCNC: 30.8 GM/DL — LOW (ref 32–36)
MCHC RBC-ENTMCNC: 31.1 GM/DL — LOW (ref 32–36)
MCV RBC AUTO: 91.7 FL — SIGNIFICANT CHANGE UP (ref 80–100)
MCV RBC AUTO: 93 FL — SIGNIFICANT CHANGE UP (ref 80–100)
MELD SCORE WITH DIALYSIS: 23 POINTS — SIGNIFICANT CHANGE UP
MELD SCORE WITHOUT DIALYSIS: 23 POINTS — SIGNIFICANT CHANGE UP
NRBC # BLD: 0 /100 WBCS — SIGNIFICANT CHANGE UP (ref 0–0)
NRBC # BLD: 0 /100 WBCS — SIGNIFICANT CHANGE UP (ref 0–0)
NRBC # FLD: 0 K/UL — SIGNIFICANT CHANGE UP (ref 0–0)
NRBC # FLD: 0 K/UL — SIGNIFICANT CHANGE UP (ref 0–0)
PHOSPHATE SERPL-MCNC: 4.1 MG/DL — SIGNIFICANT CHANGE UP (ref 2.5–4.5)
PLATELET # BLD AUTO: 102 K/UL — LOW (ref 150–400)
PLATELET # BLD AUTO: 120 K/UL — LOW (ref 150–400)
POTASSIUM SERPL-MCNC: 5 MMOL/L — SIGNIFICANT CHANGE UP (ref 3.5–5.3)
POTASSIUM SERPL-SCNC: 5 MMOL/L — SIGNIFICANT CHANGE UP (ref 3.5–5.3)
PROTHROM AB SERPL-ACNC: 11.7 SEC — SIGNIFICANT CHANGE UP (ref 10.5–13.4)
RBC # BLD: 2.76 M/UL — LOW (ref 4.2–5.8)
RBC # BLD: 2.84 M/UL — LOW (ref 4.2–5.8)
RBC # FLD: 14.1 % — SIGNIFICANT CHANGE UP (ref 10.3–14.5)
RBC # FLD: 14.1 % — SIGNIFICANT CHANGE UP (ref 10.3–14.5)
RH IG SCN BLD-IMP: POSITIVE — SIGNIFICANT CHANGE UP
SODIUM SERPL-SCNC: 133 MMOL/L — LOW (ref 135–145)
TROPONIN T, HIGH SENSITIVITY RESULT: 63 NG/L — CRITICAL HIGH
WBC # BLD: 5.4 K/UL — SIGNIFICANT CHANGE UP (ref 3.8–10.5)
WBC # BLD: 5.45 K/UL — SIGNIFICANT CHANGE UP (ref 3.8–10.5)
WBC # FLD AUTO: 5.4 K/UL — SIGNIFICANT CHANGE UP (ref 3.8–10.5)
WBC # FLD AUTO: 5.45 K/UL — SIGNIFICANT CHANGE UP (ref 3.8–10.5)

## 2023-04-17 RX ORDER — HEPARIN SODIUM 5000 [USP'U]/ML
INJECTION INTRAVENOUS; SUBCUTANEOUS
Qty: 25000 | Refills: 0 | Status: DISCONTINUED | OUTPATIENT
Start: 2023-04-17 | End: 2023-04-18

## 2023-04-17 RX ORDER — MUPIROCIN 20 MG/G
1 OINTMENT TOPICAL
Refills: 0 | Status: COMPLETED | OUTPATIENT
Start: 2023-04-17 | End: 2023-04-22

## 2023-04-17 RX ADMIN — CHLORHEXIDINE GLUCONATE 1 APPLICATION(S): 213 SOLUTION TOPICAL at 13:42

## 2023-04-17 RX ADMIN — Medication 25 MILLIGRAM(S): at 06:29

## 2023-04-17 RX ADMIN — Medication 1334 MILLIGRAM(S): at 17:32

## 2023-04-17 RX ADMIN — PANTOPRAZOLE SODIUM 40 MILLIGRAM(S): 20 TABLET, DELAYED RELEASE ORAL at 06:30

## 2023-04-17 RX ADMIN — HEPARIN SODIUM 900 UNIT(S)/HR: 5000 INJECTION INTRAVENOUS; SUBCUTANEOUS at 19:19

## 2023-04-17 RX ADMIN — Medication 81 MILLIGRAM(S): at 12:54

## 2023-04-17 RX ADMIN — ATORVASTATIN CALCIUM 80 MILLIGRAM(S): 80 TABLET, FILM COATED ORAL at 22:05

## 2023-04-17 RX ADMIN — Medication 1334 MILLIGRAM(S): at 08:47

## 2023-04-17 RX ADMIN — PANTOPRAZOLE SODIUM 40 MILLIGRAM(S): 20 TABLET, DELAYED RELEASE ORAL at 17:34

## 2023-04-17 RX ADMIN — HEPARIN SODIUM 900 UNIT(S)/HR: 5000 INJECTION INTRAVENOUS; SUBCUTANEOUS at 07:45

## 2023-04-17 RX ADMIN — HEPARIN SODIUM 1050 UNIT(S)/HR: 5000 INJECTION INTRAVENOUS; SUBCUTANEOUS at 20:43

## 2023-04-17 RX ADMIN — Medication 25 MILLIGRAM(S): at 22:04

## 2023-04-17 RX ADMIN — Medication 1334 MILLIGRAM(S): at 12:54

## 2023-04-17 RX ADMIN — HEPARIN SODIUM 900 UNIT(S)/HR: 5000 INJECTION INTRAVENOUS; SUBCUTANEOUS at 14:15

## 2023-04-17 RX ADMIN — Medication 75 MILLIGRAM(S): at 06:29

## 2023-04-17 RX ADMIN — Medication 25 MILLIGRAM(S): at 14:24

## 2023-04-17 RX ADMIN — MUPIROCIN 1 APPLICATION(S): 20 OINTMENT TOPICAL at 17:32

## 2023-04-17 NOTE — PROGRESS NOTE ADULT - SUBJECTIVE AND OBJECTIVE BOX
Name of Patient : CHAVEZ MARQUEZ  MRN: [id number]  Date of visit: 04-17-23       Subjective: Patient seen and examined. No new events except as noted.     REVIEW OF SYSTEMS:    CONSTITUTIONAL: No weakness, fevers or chills  EYES/ENT: No visual changes;  No vertigo or throat pain   NECK: No pain or stiffness  RESPIRATORY: No cough, wheezing, hemoptysis; No shortness of breath  CARDIOVASCULAR: No chest pain or palpitations  GASTROINTESTINAL: No abdominal or epigastric pain. No nausea, vomiting, or hematemesis; No diarrhea or constipation. No melena or hematochezia.  GENITOURINARY: No dysuria, frequency or hematuria  NEUROLOGICAL: No numbness or weakness  SKIN: No itching, burning, rashes, or lesions   All other review of systems is negative unless indicated above.    MEDICATIONS:  MEDICATIONS  (STANDING):  aspirin enteric coated 81 milliGRAM(s) Oral daily  atorvastatin 80 milliGRAM(s) Oral at bedtime  calcium acetate 1334 milliGRAM(s) Oral three times a day with meals  chlorhexidine 2% Cloths 1 Application(s) Topical daily  epoetin tammi-epbx (RETACRIT) Injectable 29644 Unit(s) IV Push <User Schedule>  heparin  Infusion.  Unit(s)/Hr (9 mL/Hr) IV Continuous <Continuous>  hydrALAZINE 25 milliGRAM(s) Oral every 8 hours  metoprolol succinate ER 75 milliGRAM(s) Oral daily  mupirocin 2% Ointment 1 Application(s) Topical two times a day  pantoprazole    Tablet 40 milliGRAM(s) Oral two times a day      PHYSICAL EXAM:  T(C): 36.5 (04-17-23 @ 14:21), Max: 37 (04-17-23 @ 06:15)  HR: 76 (04-17-23 @ 14:21) (69 - 76)  BP: 173/109 (04-17-23 @ 14:21) (141/81 - 173/109)  RR: 17 (04-17-23 @ 14:21) (17 - 18)  SpO2: 100% (04-17-23 @ 14:21) (100% - 100%)  Wt(kg): --  I&O's Summary    16 Apr 2023 07:01  -  17 Apr 2023 07:00  --------------------------------------------------------  IN: 250 mL / OUT: 0 mL / NET: 250 mL          Appearance: Normal	  HEENT:  PERRLA   Lymphatic: No lymphadenopathy   Cardiovascular: Normal S1 S2, no JVD  Respiratory: normal effort , clear  Gastrointestinal:  Soft, Non-tender  Skin: No rashes,  warm to touch  Psychiatry:  Mood & affect appropriate  Musculuskeletal: No edema    recent labs, Imaging and EKGs personally reviewed     04-16-23 @ 07:01  -  04-17-23 @ 07:00  --------------------------------------------------------  IN: 250 mL / OUT: 0 mL / NET: 250 mL                          8.2    5.45  )-----------( 102      ( 17 Apr 2023 13:20 )             26.4               04-17    133<L>  |  94<L>  |  70<H>  ----------------------------<  96  5.0   |  21<L>  |  12.00<H>    Ca    6.9<L>      17 Apr 2023 06:17  Phos  4.1     04-17  Mg     2.10     04-17    TPro  x   /  Alb  x   /  TBili  0.5  /  DBili  x   /  AST  x   /  ALT  x   /  AlkPhos  x   04-17    PT/INR - ( 17 Apr 2023 06:17 )   PT: 11.7 sec;   INR: 1.01 ratio         PTT - ( 17 Apr 2023 20:10 )  PTT:41.0 sec       CARDIAC MARKERS ( 17 Apr 2023 06:17 )  x     / x     / 193 U/L / x     / 1.8 ng/mL

## 2023-04-17 NOTE — PROGRESS NOTE ADULT - SUBJECTIVE AND OBJECTIVE BOX
[facility] - MARK Vargas / MARK Pagan / JASMIN Caballero/ MARK Bojorquez/ MARK Faust/ TELLO Garrison / RODNEY Garcia / HAILEY Perkins  ---------------------------------------------------------------------------------------------------------------  seen and examined today for ESRD  Interval : NAD  VITALS:  T(F): 98.6 (04-17-23 @ 06:15), Max: 98.6 (04-17-23 @ 06:15)  HR: 69 (04-17-23 @ 06:15)  BP: 160/90 (04-17-23 @ 06:15)  RR: 18 (04-17-23 @ 06:15)  SpO2: 100% (04-17-23 @ 06:15)  Wt(kg): --    04-16 @ 07:01  -  04-17 @ 07:00  --------------------------------------------------------  IN: 250 mL / OUT: 0 mL / NET: 250 mL      Physical Exam :-  Constitutional: NAD  Neck: Supple.  Respiratory: Bilateral equal breath sounds,  Cardiovascular: S1, S2 normal,  Gastrointestinal: Bowel Sounds present, soft, non tender.  Extremities: No edema  Neurological: Alert and Oriented x 3, no focal deficits  Psychiatric: Normal mood, normal affect  Data:-  Allergies :   No Known Allergies    Hospital Medications:   MEDICATIONS  (STANDING):  aspirin enteric coated 81 milliGRAM(s) Oral daily  atorvastatin 80 milliGRAM(s) Oral at bedtime  calcium acetate 1334 milliGRAM(s) Oral three times a day with meals  chlorhexidine 2% Cloths 1 Application(s) Topical daily  epoetin tammi-epbx (RETACRIT) Injectable 63199 Unit(s) IV Push <User Schedule>  heparin  Infusion.  Unit(s)/Hr (9 mL/Hr) IV Continuous <Continuous>  hydrALAZINE 25 milliGRAM(s) Oral every 8 hours  metoprolol succinate ER 75 milliGRAM(s) Oral daily  pantoprazole    Tablet 40 milliGRAM(s) Oral two times a day    04-17    133<L>  |  94<L>  |  70<H>  ----------------------------<  96  5.0   |  21<L>  |  12.00<H>    Ca    6.9<L>      17 Apr 2023 06:17  Phos  4.1     04-17  Mg     2.10     04-17    TPro      /  Alb      /  TBili  0.5  /  DBili      /  AST      /  ALT      /  AlkPhos      04-17    Creatinine Trend: 12.00 <--, 10.02 <--, 16.42 <--, 15.23 <--                        7.8    5.40  )-----------( 120      ( 17 Apr 2023 06:17 )             25.3

## 2023-04-17 NOTE — PROGRESS NOTE ADULT - SUBJECTIVE AND OBJECTIVE BOX
Ran Velasquez MD  Interventional Cardiology / Advance Heart Failure and Cardiac Transplant Specialist  [place] Office : [address] [place] [address] [facility] [address]  Tel:   [place] Office : [address] [facility] [place] [address]  Tel: [phone]       Pt is lying in bed comfortable not in distress, no chest pains no SOB no palpitations  	  MEDICATIONS:  aspirin enteric coated 81 milliGRAM(s) Oral daily  heparin   Injectable 4400 Unit(s) IV Push every 6 hours PRN  heparin  Infusion.  Unit(s)/Hr IV Continuous <Continuous>  hydrALAZINE 25 milliGRAM(s) Oral every 8 hours  metoprolol succinate ER 75 milliGRAM(s) Oral daily  nitroglycerin     SubLingual 0.4 milliGRAM(s) SubLingual every 5 minutes PRN        acetaminophen     Tablet .. 650 milliGRAM(s) Oral every 6 hours PRN  melatonin 3 milliGRAM(s) Oral at bedtime PRN    pantoprazole    Tablet 40 milliGRAM(s) Oral two times a day    atorvastatin 80 milliGRAM(s) Oral at bedtime    calcium acetate 1334 milliGRAM(s) Oral three times a day with meals  chlorhexidine 2% Cloths 1 Application(s) Topical daily  epoetin tammi-epbx (RETACRIT) Injectable 82018 Unit(s) IV Push <User Schedule>  mupirocin 2% Ointment 1 Application(s) Topical two times a day  sodium chloride 0.9% Bolus. 100 milliLiter(s) IV Bolus every 5 minutes PRN      PAST MEDICAL/SURGICAL HISTORY  PAST MEDICAL & SURGICAL HISTORY:  HTN (Hypertension)      Chronic kidney disease      Kidney stones      Hemodialysis access, AV graft  Left upper extremity      Hyperparathyroidism      Anemia      Thrombocytopenia      Atrial fibrillation  on Eliquis      S/P Nephrectomy  (L) 2007 for kidney stones      Acquired arteriovenous fistula  left wrist  1/2015 - [facility], Left upper arm 4/2015 (approximate date)      AVF (arteriovenous fistula)          SOCIAL HISTORY: Substance Use (street drugs): ( x ) never used  (  ) other:    FAMILY HISTORY:  Family history of CVA (Father)         PHYSICAL EXAM:  T(C): 36.5 (04-17-23 @ 14:21), Max: 37 (04-17-23 @ 06:15)  HR: 76 (04-17-23 @ 14:21) (69 - 76)  BP: 173/109 (04-17-23 @ 14:21) (141/81 - 173/109)  RR: 17 (04-17-23 @ 14:21) (17 - 18)  SpO2: 100% (04-17-23 @ 14:21) (100% - 100%)  Wt(kg): --  I&O's Summary    16 Apr 2023 07:01  -  17 Apr 2023 07:00  --------------------------------------------------------  IN: 250 mL / OUT: 0 mL / NET: 250 mL          GENERAL: NAD  EYES:   PERRLA   ENMT:   Moist mucous membranes, Good dentition, No lesions  Cardiovascular: Normal S1 S2, No JVD, No murmurs, No edema  Respiratory: Lungs clear to auscultation	  Gastrointestinal:  Soft, Non-tender, + BS	  Extremities: no edema                                    8.2    5.45  )-----------( 102      ( 17 Apr 2023 13:20 )             26.4     04-17    133<L>  |  94<L>  |  70<H>  ----------------------------<  96  5.0   |  21<L>  |  12.00<H>    Ca    6.9<L>      17 Apr 2023 06:17  Phos  4.1     04-17  Mg     2.10     04-17    TPro  x   /  Alb  x   /  TBili  0.5  /  DBili  x   /  AST  x   /  ALT  x   /  AlkPhos  x   04-17    proBNP:   Lipid Profile:   HgA1c:   TSH:     Consultant(s) Notes Reviewed:  [x ] YES  [ ] NO    Care Discussed with Consultants/Other Providers [ x] YES  [ ] NO    Imaging Personally Reviewed independently:  [x] YES  [ ] NO    All labs, radiologic studies, vitals, orders and medications list reviewed. Patient is seen and examined at bedside. Case discussed with medical team.

## 2023-04-18 LAB
ANION GAP SERPL CALC-SCNC: 18 MMOL/L — HIGH (ref 7–14)
APTT BLD: 67.5 SEC — HIGH (ref 27–36.3)
APTT BLD: 69.7 SEC — HIGH (ref 27–36.3)
BUN SERPL-MCNC: 88 MG/DL — HIGH (ref 7–23)
CALCIUM SERPL-MCNC: 6.4 MG/DL — CRITICAL LOW (ref 8.4–10.5)
CHLORIDE SERPL-SCNC: 97 MMOL/L — LOW (ref 98–107)
CO2 SERPL-SCNC: 22 MMOL/L — SIGNIFICANT CHANGE UP (ref 22–31)
CREAT SERPL-MCNC: 13.44 MG/DL — HIGH (ref 0.5–1.3)
EGFR: 4 ML/MIN/1.73M2 — LOW
GLUCOSE SERPL-MCNC: 93 MG/DL — SIGNIFICANT CHANGE UP (ref 70–99)
HCT VFR BLD CALC: 23.3 % — LOW (ref 39–50)
HGB BLD-MCNC: 7.4 G/DL — LOW (ref 13–17)
MAGNESIUM SERPL-MCNC: 2.1 MG/DL — SIGNIFICANT CHANGE UP (ref 1.6–2.6)
MCHC RBC-ENTMCNC: 29 PG — SIGNIFICANT CHANGE UP (ref 27–34)
MCHC RBC-ENTMCNC: 31.8 GM/DL — LOW (ref 32–36)
MCV RBC AUTO: 91.4 FL — SIGNIFICANT CHANGE UP (ref 80–100)
NRBC # BLD: 0 /100 WBCS — SIGNIFICANT CHANGE UP (ref 0–0)
NRBC # FLD: 0 K/UL — SIGNIFICANT CHANGE UP (ref 0–0)
PHOSPHATE SERPL-MCNC: 3.9 MG/DL — SIGNIFICANT CHANGE UP (ref 2.5–4.5)
PLATELET # BLD AUTO: 88 K/UL — LOW (ref 150–400)
POTASSIUM SERPL-MCNC: 4.7 MMOL/L — SIGNIFICANT CHANGE UP (ref 3.5–5.3)
POTASSIUM SERPL-SCNC: 4.7 MMOL/L — SIGNIFICANT CHANGE UP (ref 3.5–5.3)
RBC # BLD: 2.55 M/UL — LOW (ref 4.2–5.8)
RBC # FLD: 14 % — SIGNIFICANT CHANGE UP (ref 10.3–14.5)
SODIUM SERPL-SCNC: 137 MMOL/L — SIGNIFICANT CHANGE UP (ref 135–145)
WBC # BLD: 5.52 K/UL — SIGNIFICANT CHANGE UP (ref 3.8–10.5)
WBC # FLD AUTO: 5.52 K/UL — SIGNIFICANT CHANGE UP (ref 3.8–10.5)

## 2023-04-18 RX ORDER — METOPROLOL TARTRATE 50 MG
2.5 TABLET ORAL ONCE
Refills: 0 | Status: COMPLETED | OUTPATIENT
Start: 2023-04-18 | End: 2023-04-18

## 2023-04-18 RX ORDER — CALCIUM GLUCONATE 100 MG/ML
1 VIAL (ML) INTRAVENOUS ONCE
Refills: 0 | Status: COMPLETED | OUTPATIENT
Start: 2023-04-18 | End: 2023-04-18

## 2023-04-18 RX ORDER — ERYTHROPOIETIN 10000 [IU]/ML
20000 INJECTION, SOLUTION INTRAVENOUS; SUBCUTANEOUS
Refills: 0 | Status: DISCONTINUED | OUTPATIENT
Start: 2023-04-18 | End: 2023-04-24

## 2023-04-18 RX ADMIN — HEPARIN SODIUM 1050 UNIT(S)/HR: 5000 INJECTION INTRAVENOUS; SUBCUTANEOUS at 06:07

## 2023-04-18 RX ADMIN — Medication 25 MILLIGRAM(S): at 21:07

## 2023-04-18 RX ADMIN — HEPARIN SODIUM 1050 UNIT(S)/HR: 5000 INJECTION INTRAVENOUS; SUBCUTANEOUS at 07:05

## 2023-04-18 RX ADMIN — HEPARIN SODIUM 1050 UNIT(S)/HR: 5000 INJECTION INTRAVENOUS; SUBCUTANEOUS at 03:53

## 2023-04-18 RX ADMIN — Medication 81 MILLIGRAM(S): at 12:52

## 2023-04-18 RX ADMIN — CHLORHEXIDINE GLUCONATE 1 APPLICATION(S): 213 SOLUTION TOPICAL at 12:52

## 2023-04-18 RX ADMIN — HEPARIN SODIUM 1050 UNIT(S)/HR: 5000 INJECTION INTRAVENOUS; SUBCUTANEOUS at 12:51

## 2023-04-18 RX ADMIN — Medication 100 GRAM(S): at 05:19

## 2023-04-18 RX ADMIN — Medication 1334 MILLIGRAM(S): at 12:52

## 2023-04-18 RX ADMIN — Medication 2.5 MILLIGRAM(S): at 21:45

## 2023-04-18 RX ADMIN — Medication 2.5 MILLIGRAM(S): at 21:08

## 2023-04-18 RX ADMIN — Medication 1334 MILLIGRAM(S): at 21:07

## 2023-04-18 RX ADMIN — MUPIROCIN 1 APPLICATION(S): 20 OINTMENT TOPICAL at 21:07

## 2023-04-18 RX ADMIN — ATORVASTATIN CALCIUM 80 MILLIGRAM(S): 80 TABLET, FILM COATED ORAL at 21:07

## 2023-04-18 RX ADMIN — MUPIROCIN 1 APPLICATION(S): 20 OINTMENT TOPICAL at 05:23

## 2023-04-18 RX ADMIN — PANTOPRAZOLE SODIUM 40 MILLIGRAM(S): 20 TABLET, DELAYED RELEASE ORAL at 21:06

## 2023-04-18 NOTE — PROGRESS NOTE ADULT - SUBJECTIVE AND OBJECTIVE BOX
[facility] - MARK Vargas / MARK Pagan / JASMIN Caballero/ MARK Bojorquez/ MARK Faust/ TELLO Garrison / RODNEY Garcia / HAILEY Perkins  ---------------------------------------------------------------------------------------------------------------  seen and examined today for ESRD  Interval : NAD  VITALS:  T(F): 97.4 (04-18-23 @ 12:48), Max: 98.1 (04-18-23 @ 05:00)  HR: 73 (04-18-23 @ 12:48)  BP: 162/80 (04-18-23 @ 12:48)  RR: 18 (04-18-23 @ 12:48)  SpO2: 100% (04-18-23 @ 12:48)  Wt(kg): --    04-18 @ 07:01  -  04-18 @ 14:06  --------------------------------------------------------  IN: 240 mL / OUT: 0 mL / NET: 240 mL      Physical Exam :-  Constitutional: NAD  Neck: Supple.  Respiratory: Bilateral equal breath sounds,  Cardiovascular: S1, S2 normal,  Gastrointestinal: Bowel Sounds present, soft, non tender.  Extremities: No edema  Neurological: Alert and Oriented x 3, no focal deficits  Psychiatric: Normal mood, normal affect  Data:-  Allergies :   No Known Allergies    Hospital Medications:   MEDICATIONS  (STANDING):  aspirin enteric coated 81 milliGRAM(s) Oral daily  atorvastatin 80 milliGRAM(s) Oral at bedtime  calcium acetate 1334 milliGRAM(s) Oral three times a day with meals  chlorhexidine 2% Cloths 1 Application(s) Topical daily  epoetin tammi-epbx (RETACRIT) Injectable 34489 Unit(s) IV Push <User Schedule>  heparin  Infusion.  Unit(s)/Hr (9 mL/Hr) IV Continuous <Continuous>  hydrALAZINE 25 milliGRAM(s) Oral every 8 hours  metoprolol succinate ER 75 milliGRAM(s) Oral daily  mupirocin 2% Ointment 1 Application(s) Topical two times a day  pantoprazole    Tablet 40 milliGRAM(s) Oral two times a day    04-18    137  |  97<L>  |  88<H>  ----------------------------<  93  4.7   |  22  |  13.44<H>    Ca    6.4<LL>      18 Apr 2023 02:56  Phos  3.9     04-18  Mg     2.10     04-18    TPro      /  Alb      /  TBili  0.5  /  DBili      /  AST      /  ALT      /  AlkPhos      04-17    Creatinine Trend: 13.44 <--, 12.00 <--, 10.02 <--, 16.42 <--, 15.23 <--                        7.4    5.52  )-----------( 88       ( 18 Apr 2023 02:56 )             23.3

## 2023-04-18 NOTE — PROGRESS NOTE ADULT - SUBJECTIVE AND OBJECTIVE BOX
Ran Velasquez MD  Interventional Cardiology / Advance Heart Failure and Cardiac Transplant Specialist  [place] Office : [address] [place] [address]  Tel:   [place] Office : [address] [facility] [address]  Tel: [phone]      Subjective/Overnight events: Pt is lying in bed comfortable not in distress,  	  MEDICATIONS:  aspirin enteric coated 81 milliGRAM(s) Oral daily  hydrALAZINE 25 milliGRAM(s) Oral every 8 hours  metoprolol succinate ER 75 milliGRAM(s) Oral daily  nitroglycerin     SubLingual 0.4 milliGRAM(s) SubLingual every 5 minutes PRN        acetaminophen     Tablet .. 650 milliGRAM(s) Oral every 6 hours PRN  melatonin 3 milliGRAM(s) Oral at bedtime PRN    pantoprazole    Tablet 40 milliGRAM(s) Oral two times a day    atorvastatin 80 milliGRAM(s) Oral at bedtime    calcium acetate 1334 milliGRAM(s) Oral three times a day with meals  chlorhexidine 2% Cloths 1 Application(s) Topical daily  epoetin tammi-epbx (RETACRIT) Injectable 87037 Unit(s) IV Push <User Schedule>  mupirocin 2% Ointment 1 Application(s) Topical two times a day  sodium chloride 0.9% Bolus. 100 milliLiter(s) IV Bolus every 5 minutes PRN      PAST MEDICAL/SURGICAL HISTORY  PAST MEDICAL & SURGICAL HISTORY:  HTN (Hypertension)      Chronic kidney disease      Kidney stones      Hemodialysis access, AV graft  Left upper extremity      Hyperparathyroidism      Anemia      Thrombocytopenia      Atrial fibrillation  on Eliquis      S/P Nephrectomy  (L) 2007 for kidney stones      Acquired arteriovenous fistula  left wrist  1/2015 - [facility], Left upper arm 4/2015 (approximate date)      AVF (arteriovenous fistula)          SOCIAL HISTORY: Substance Use (street drugs): ( x ) never used  (  ) other:    FAMILY HISTORY:  Family history of CVA (Father)          PHYSICAL EXAM:  T(C): 36.3 (04-18-23 @ 12:48), Max: 36.7 (04-18-23 @ 05:00)  HR: 73 (04-18-23 @ 12:48) (73 - 84)  BP: 162/80 (04-18-23 @ 12:48) (148/77 - 162/80)  RR: 18 (04-18-23 @ 12:48) (18 - 18)  SpO2: 100% (04-18-23 @ 12:48) (98% - 100%)  Wt(kg): --  I&O's Summary    18 Apr 2023 07:01  -  18 Apr 2023 15:46  --------------------------------------------------------  IN: 240 mL / OUT: 0 mL / NET: 240 mL          GENERAL: NAD  EYES: EOMI, PERRLA, conjunctiva and sclera clear  ENMT: No tonsillar erythema, exudates, or enlargement  Cardiovascular: Normal S1 S2, No JVD, No murmurs, No edema  Respiratory: Lungs clear to auscultation	  Gastrointestinal:  Soft, Non-tender, + BS	  Extremities: No edema                                     7.4    5.52  )-----------( 88       ( 18 Apr 2023 02:56 )             23.3     04-18    137  |  97<L>  |  88<H>  ----------------------------<  93  4.7   |  22  |  13.44<H>    Ca    6.4<LL>      18 Apr 2023 02:56  Phos  3.9     04-18  Mg     2.10     04-18    TPro  x   /  Alb  x   /  TBili  0.5  /  DBili  x   /  AST  x   /  ALT  x   /  AlkPhos  x   04-17    proBNP:   Lipid Profile:   HgA1c:   TSH:     Consultant(s) Notes Reviewed:  [x ] YES  [ ] NO    Care Discussed with Consultants/Other Providers [ x] YES  [ ] NO    Imaging Personally Reviewed independently:  [x] YES  [ ] NO    All labs, radiologic studies, vitals, orders and medications list reviewed. Patient is seen and examined at bedside. Case discussed with medical team.

## 2023-04-18 NOTE — PROGRESS NOTE ADULT - SUBJECTIVE AND OBJECTIVE BOX
Name of Patient : CHAVEZ MARQUEZ  MRN: [id number]  Date of visit: 04-18-23       Subjective: Patient seen and examined. No new events except as noted.   doing okay     REVIEW OF SYSTEMS:    CONSTITUTIONAL: No weakness, fevers or chills  EYES/ENT: No visual changes;  No vertigo or throat pain   NECK: No pain or stiffness  RESPIRATORY: No cough, wheezing, hemoptysis; No shortness of breath  CARDIOVASCULAR: No chest pain or palpitations  GASTROINTESTINAL: No abdominal or epigastric pain. No nausea, vomiting, or hematemesis; No diarrhea or constipation. No melena or hematochezia.  GENITOURINARY: No dysuria, frequency or hematuria  NEUROLOGICAL: No numbness or weakness  SKIN: No itching, burning, rashes, or lesions   All other review of systems is negative unless indicated above.    MEDICATIONS:  MEDICATIONS  (STANDING):  aspirin enteric coated 81 milliGRAM(s) Oral daily  atorvastatin 80 milliGRAM(s) Oral at bedtime  calcium acetate 1334 milliGRAM(s) Oral three times a day with meals  chlorhexidine 2% Cloths 1 Application(s) Topical daily  epoetin tammi-epbx (RETACRIT) Injectable 65017 Unit(s) IV Push <User Schedule>  hydrALAZINE 25 milliGRAM(s) Oral every 8 hours  metoprolol succinate ER 75 milliGRAM(s) Oral daily  mupirocin 2% Ointment 1 Application(s) Topical two times a day  pantoprazole    Tablet 40 milliGRAM(s) Oral two times a day      PHYSICAL EXAM:  T(C): 37 (04-18-23 @ 20:35), Max: 37 (04-18-23 @ 20:35)  HR: 92 (04-18-23 @ 20:35) (73 - 92)  BP: 177/100 (04-18-23 @ 20:35) (158/89 - 193/100)  RR: 18 (04-18-23 @ 20:35) (18 - 18)  SpO2: 100% (04-18-23 @ 12:48) (98% - 100%)  Wt(kg): --  I&O's Summary    18 Apr 2023 07:01  -  18 Apr 2023 21:59  --------------------------------------------------------  IN: 440 mL / OUT: 0 mL / NET: 440 mL          Appearance: Normal	  HEENT:  PERRLA   Lymphatic: No lymphadenopathy   Cardiovascular: Normal S1 S2, no JVD  Respiratory: normal effort , clear  Gastrointestinal:  Soft, Non-tender  Skin: No rashes,  warm to touch  Psychiatry:  Mood & affect appropriate  Musculuskeletal: No edema    recent labs, Imaging and EKGs personally reviewed     04-18-23 @ 07:01  -  04-18-23 @ 21:59  --------------------------------------------------------  IN: 440 mL / OUT: 0 mL / NET: 440 mL                          7.4    5.52  )-----------( 88       ( 18 Apr 2023 02:56 )             23.3               04-18    137  |  97<L>  |  88<H>  ----------------------------<  93  4.7   |  22  |  13.44<H>    Ca    6.4<LL>      18 Apr 2023 02:56  Phos  3.9     04-18  Mg     2.10     04-18    TPro  x   /  Alb  x   /  TBili  0.5  /  DBili  x   /  AST  x   /  ALT  x   /  AlkPhos  x   04-17    PT/INR - ( 17 Apr 2023 06:17 )   PT: 11.7 sec;   INR: 1.01 ratio         PTT - ( 18 Apr 2023 10:56 )  PTT:67.5 sec       CARDIAC MARKERS ( 17 Apr 2023 06:17 )  x     / x     / 193 U/L / x     / 1.8 ng/mL

## 2023-04-19 LAB
ANION GAP SERPL CALC-SCNC: 14 MMOL/L — SIGNIFICANT CHANGE UP (ref 7–14)
APTT BLD: 28.4 SEC — SIGNIFICANT CHANGE UP (ref 27–36.3)
BUN SERPL-MCNC: 54 MG/DL — HIGH (ref 7–23)
CALCIUM SERPL-MCNC: 7.7 MG/DL — LOW (ref 8.4–10.5)
CHLORIDE SERPL-SCNC: 100 MMOL/L — SIGNIFICANT CHANGE UP (ref 98–107)
CO2 SERPL-SCNC: 26 MMOL/L — SIGNIFICANT CHANGE UP (ref 22–31)
CREAT SERPL-MCNC: 9.16 MG/DL — HIGH (ref 0.5–1.3)
EGFR: 6 ML/MIN/1.73M2 — LOW
GLUCOSE SERPL-MCNC: 95 MG/DL — SIGNIFICANT CHANGE UP (ref 70–99)
HCT VFR BLD CALC: 26.1 % — LOW (ref 39–50)
HGB BLD-MCNC: 8.5 G/DL — LOW (ref 13–17)
MAGNESIUM SERPL-MCNC: 2 MG/DL — SIGNIFICANT CHANGE UP (ref 1.6–2.6)
MCHC RBC-ENTMCNC: 29.3 PG — SIGNIFICANT CHANGE UP (ref 27–34)
MCHC RBC-ENTMCNC: 32.6 GM/DL — SIGNIFICANT CHANGE UP (ref 32–36)
MCV RBC AUTO: 90 FL — SIGNIFICANT CHANGE UP (ref 80–100)
NRBC # BLD: 0 /100 WBCS — SIGNIFICANT CHANGE UP (ref 0–0)
NRBC # FLD: 0 K/UL — SIGNIFICANT CHANGE UP (ref 0–0)
PHOSPHATE SERPL-MCNC: 4 MG/DL — SIGNIFICANT CHANGE UP (ref 2.5–4.5)
PLATELET # BLD AUTO: 107 K/UL — LOW (ref 150–400)
POTASSIUM SERPL-MCNC: 4.6 MMOL/L — SIGNIFICANT CHANGE UP (ref 3.5–5.3)
POTASSIUM SERPL-SCNC: 4.6 MMOL/L — SIGNIFICANT CHANGE UP (ref 3.5–5.3)
RBC # BLD: 2.9 M/UL — LOW (ref 4.2–5.8)
RBC # FLD: 14 % — SIGNIFICANT CHANGE UP (ref 10.3–14.5)
SODIUM SERPL-SCNC: 140 MMOL/L — SIGNIFICANT CHANGE UP (ref 135–145)
WBC # BLD: 5.52 K/UL — SIGNIFICANT CHANGE UP (ref 3.8–10.5)
WBC # FLD AUTO: 5.52 K/UL — SIGNIFICANT CHANGE UP (ref 3.8–10.5)

## 2023-04-19 PROCEDURE — 93018 CV STRESS TEST I&R ONLY: CPT | Mod: GC

## 2023-04-19 PROCEDURE — 78452 HT MUSCLE IMAGE SPECT MULT: CPT | Mod: 26

## 2023-04-19 PROCEDURE — 93016 CV STRESS TEST SUPVJ ONLY: CPT | Mod: GC

## 2023-04-19 RX ORDER — METOPROLOL TARTRATE 50 MG
100 TABLET ORAL DAILY
Refills: 0 | Status: DISCONTINUED | OUTPATIENT
Start: 2023-04-19 | End: 2023-04-24

## 2023-04-19 RX ORDER — APIXABAN 2.5 MG/1
5 TABLET, FILM COATED ORAL
Refills: 0 | Status: DISCONTINUED | OUTPATIENT
Start: 2023-04-19 | End: 2023-04-24

## 2023-04-19 RX ADMIN — Medication 25 MILLIGRAM(S): at 13:17

## 2023-04-19 RX ADMIN — CHLORHEXIDINE GLUCONATE 1 APPLICATION(S): 213 SOLUTION TOPICAL at 13:18

## 2023-04-19 RX ADMIN — PANTOPRAZOLE SODIUM 40 MILLIGRAM(S): 20 TABLET, DELAYED RELEASE ORAL at 05:39

## 2023-04-19 RX ADMIN — Medication 1334 MILLIGRAM(S): at 09:02

## 2023-04-19 RX ADMIN — ATORVASTATIN CALCIUM 80 MILLIGRAM(S): 80 TABLET, FILM COATED ORAL at 21:04

## 2023-04-19 RX ADMIN — MUPIROCIN 1 APPLICATION(S): 20 OINTMENT TOPICAL at 18:24

## 2023-04-19 RX ADMIN — APIXABAN 5 MILLIGRAM(S): 2.5 TABLET, FILM COATED ORAL at 18:24

## 2023-04-19 RX ADMIN — Medication 81 MILLIGRAM(S): at 13:17

## 2023-04-19 RX ADMIN — Medication 25 MILLIGRAM(S): at 21:04

## 2023-04-19 RX ADMIN — Medication 1 DROP(S): at 18:23

## 2023-04-19 RX ADMIN — PANTOPRAZOLE SODIUM 40 MILLIGRAM(S): 20 TABLET, DELAYED RELEASE ORAL at 18:28

## 2023-04-19 RX ADMIN — Medication 1334 MILLIGRAM(S): at 18:25

## 2023-04-19 RX ADMIN — Medication 25 MILLIGRAM(S): at 05:39

## 2023-04-19 RX ADMIN — Medication 1334 MILLIGRAM(S): at 13:21

## 2023-04-19 RX ADMIN — Medication 100 MILLIGRAM(S): at 18:23

## 2023-04-19 RX ADMIN — MUPIROCIN 1 APPLICATION(S): 20 OINTMENT TOPICAL at 05:38

## 2023-04-19 NOTE — PROGRESS NOTE ADULT - SUBJECTIVE AND OBJECTIVE BOX
[facility] - MARK Vargas / MARK Pagan / JASMIN Caballero/ MARK Bojorquez/ MARK Faust/ TELLO Garrison / RODNEY Garcia / HAILEY Perkins  ---------------------------------------------------------------------------------------------------------------  seen and examined today for ESRD  Interval : NAD  VITALS:  T(F): 98.4 (04-19-23 @ 05:00), Max: 98.6 (04-18-23 @ 20:35)  HR: 82 (04-19-23 @ 05:00)  BP: 150/87 (04-19-23 @ 05:00)  RR: 18 (04-19-23 @ 05:00)  SpO2: 97% (04-19-23 @ 05:00)  Wt(kg): --    04-18 @ 07:01  -  04-19 @ 07:00  --------------------------------------------------------  IN: 1140 mL / OUT: 3700 mL / NET: -2560 mL      Physical Exam :-  Constitutional: NAD  Neck: Supple.  Respiratory: Bilateral equal breath sounds,  Cardiovascular: S1, S2 normal,  Gastrointestinal: Bowel Sounds present, soft, non tender.  Extremities: No edema  Neurological: Alert and Oriented x 3, no focal deficits  Psychiatric: Normal mood, normal affect  Data:-  Allergies :   No Known Allergies    Hospital Medications:   MEDICATIONS  (STANDING):  aspirin enteric coated 81 milliGRAM(s) Oral daily  atorvastatin 80 milliGRAM(s) Oral at bedtime  calcium acetate 1334 milliGRAM(s) Oral three times a day with meals  chlorhexidine 2% Cloths 1 Application(s) Topical daily  epoetin tammi-epbx (RETACRIT) Injectable 56157 Unit(s) IV Push <User Schedule>  hydrALAZINE 25 milliGRAM(s) Oral every 8 hours  metoprolol succinate ER 75 milliGRAM(s) Oral daily  mupirocin 2% Ointment 1 Application(s) Topical two times a day  pantoprazole    Tablet 40 milliGRAM(s) Oral two times a day    04-19    140  |  100  |  54<H>  ----------------------------<  95  4.6   |  26  |  9.16<H>    Ca    7.7<L>      19 Apr 2023 05:28  Phos  4.0     04-19  Mg     2.00     04-19      Creatinine Trend: 9.16 <--, 13.44 <--, 12.00 <--, 10.02 <--, 16.42 <--, 15.23 <--                        8.5    5.52  )-----------( 107      ( 19 Apr 2023 05:28 )             26.1

## 2023-04-19 NOTE — PROGRESS NOTE ADULT - SUBJECTIVE AND OBJECTIVE BOX
Ran Velasquez MD  Interventional Cardiology / Advance Heart Failure and Cardiac Transplant Specialist  [place] Office : [address] [place] [address]  Tel:   [place] Office : [address] [facility] [address]  Tel: [phone]      Subjective/Overnight events: Pt is lying in bed comfortable not in distress, no chest pains no SOB no palpitations  	  MEDICATIONS:  aspirin enteric coated 81 milliGRAM(s) Oral daily  hydrALAZINE 25 milliGRAM(s) Oral every 8 hours  metoprolol succinate  milliGRAM(s) Oral daily  nitroglycerin     SubLingual 0.4 milliGRAM(s) SubLingual every 5 minutes PRN        acetaminophen     Tablet .. 650 milliGRAM(s) Oral every 6 hours PRN  melatonin 3 milliGRAM(s) Oral at bedtime PRN    pantoprazole    Tablet 40 milliGRAM(s) Oral two times a day    atorvastatin 80 milliGRAM(s) Oral at bedtime    calcium acetate 1334 milliGRAM(s) Oral three times a day with meals  chlorhexidine 2% Cloths 1 Application(s) Topical daily  epoetin tammi-epbx (RETACRIT) Injectable 47332 Unit(s) IV Push <User Schedule>  mupirocin 2% Ointment 1 Application(s) Topical two times a day  sodium chloride 0.9% Bolus. 100 milliLiter(s) IV Bolus every 5 minutes PRN      PAST MEDICAL/SURGICAL HISTORY  PAST MEDICAL & SURGICAL HISTORY:  HTN (Hypertension)      Chronic kidney disease      Kidney stones      Hemodialysis access, AV graft  Left upper extremity      Hyperparathyroidism      Anemia      Thrombocytopenia      Atrial fibrillation  on Eliquis      S/P Nephrectomy  (L) 2007 for kidney stones      Acquired arteriovenous fistula  left wrist  1/2015 - [facility], Left upper arm 4/2015 (approximate date)      AVF (arteriovenous fistula)          SOCIAL HISTORY: Substance Use (street drugs): ( x ) never used  (  ) other:    FAMILY HISTORY:  Family history of CVA (Father)            PHYSICAL EXAM:  T(C): 36.7 (04-19-23 @ 11:40), Max: 37 (04-18-23 @ 20:35)  HR: 79 (04-19-23 @ 11:40) (73 - 145)  BP: 145/85 (04-19-23 @ 11:40) (130/82 - 193/100)  RR: 17 (04-19-23 @ 11:40) (17 - 18)  SpO2: 100% (04-19-23 @ 11:40) (95% - 100%)  Wt(kg): --  I&O's Summary    18 Apr 2023 07:01  -  19 Apr 2023 07:00  --------------------------------------------------------  IN: 1140 mL / OUT: 3700 mL / NET: -2560 mL    19 Apr 2023 07:01  -  19 Apr 2023 12:41  --------------------------------------------------------  IN: 240 mL / OUT: 0 mL / NET: 240 mL          GENERAL: NAD  EYES: EOMI, PERRLA, conjunctiva and sclera clear  ENMT: No tonsillar erythema, exudates, or enlargement  Cardiovascular: Normal S1 S2, No JVD, No murmurs, No edema  Respiratory: Lungs clear to auscultation	  Gastrointestinal:  Soft, Non-tender, + BS	  Extremities: No edema                                     8.5    5.52  )-----------( 107      ( 19 Apr 2023 05:28 )             26.1     04-19    140  |  100  |  54<H>  ----------------------------<  95  4.6   |  26  |  9.16<H>    Ca    7.7<L>      19 Apr 2023 05:28  Phos  4.0     04-19  Mg     2.00     04-19      proBNP:   Lipid Profile:   HgA1c:   TSH:     Consultant(s) Notes Reviewed:  [x ] YES  [ ] NO    Care Discussed with Consultants/Other Providers [ x] YES  [ ] NO    Imaging Personally Reviewed independently:  [x] YES  [ ] NO    All labs, radiologic studies, vitals, orders and medications list reviewed. Patient is seen and examined at bedside. Case discussed with medical team.

## 2023-04-19 NOTE — PROGRESS NOTE ADULT - SUBJECTIVE AND OBJECTIVE BOX
Name of Patient : CHAVEZ MARQUEZ  MRN: [id number]  Date of visit: 04-19-23       Subjective: Patient seen and examined. No new events except as noted.   Doing okay     REVIEW OF SYSTEMS:    CONSTITUTIONAL: No weakness, fevers or chills  EYES/ENT: No visual changes;  No vertigo or throat pain   NECK: No pain or stiffness  RESPIRATORY: No cough, wheezing, hemoptysis; No shortness of breath  CARDIOVASCULAR: No chest pain or palpitations  GASTROINTESTINAL: No abdominal or epigastric pain. No nausea, vomiting, or hematemesis; No diarrhea or constipation. No melena or hematochezia.  GENITOURINARY: No dysuria, frequency or hematuria  NEUROLOGICAL: No numbness or weakness  SKIN: No itching, burning, rashes, or lesions   All other review of systems is negative unless indicated above.    MEDICATIONS:  MEDICATIONS  (STANDING):  apixaban 5 milliGRAM(s) Oral two times a day  artificial  tears Solution 1 Drop(s) Both EYES four times a day  aspirin enteric coated 81 milliGRAM(s) Oral daily  atorvastatin 80 milliGRAM(s) Oral at bedtime  calcium acetate 1334 milliGRAM(s) Oral three times a day with meals  chlorhexidine 2% Cloths 1 Application(s) Topical daily  epoetin tammi-epbx (RETACRIT) Injectable 33669 Unit(s) IV Push <User Schedule>  hydrALAZINE 25 milliGRAM(s) Oral every 8 hours  metoprolol succinate  milliGRAM(s) Oral daily  mupirocin 2% Ointment 1 Application(s) Topical two times a day  pantoprazole    Tablet 40 milliGRAM(s) Oral two times a day      PHYSICAL EXAM:  T(C): 36.7 (04-19-23 @ 11:40), Max: 37 (04-18-23 @ 20:35)  HR: 93 (04-19-23 @ 18:20) (79 - 145)  BP: 141/74 (04-19-23 @ 18:20) (130/82 - 177/100)  RR: 17 (04-19-23 @ 11:40) (17 - 18)  SpO2: 100% (04-19-23 @ 11:40) (95% - 100%)  Wt(kg): --  I&O's Summary    18 Apr 2023 07:01 - 19 Apr 2023 07:00  --------------------------------------------------------  IN: 1140 mL / OUT: 3700 mL / NET: -2560 mL    19 Apr 2023 07:01  -  19 Apr 2023 20:34  --------------------------------------------------------  IN: 440 mL / OUT: 0 mL / NET: 440 mL          Appearance: Normal	  HEENT:  PERRLA   Lymphatic: No lymphadenopathy   Cardiovascular: Normal S1 S2, no JVD  Respiratory: normal effort , clear  Gastrointestinal:  Soft, Non-tender  Skin: No rashes,  warm to touch  Psychiatry:  Mood & affect appropriate  Musculuskeletal: No edema    recent labs, Imaging and EKGs personally reviewed     04-18-23 @ 07:01  -  04-19-23 @ 07:00  --------------------------------------------------------  IN: 1140 mL / OUT: 3700 mL / NET: -2560 mL    04-19-23 @ 07:01  -  04-19-23 @ 20:34  --------------------------------------------------------  IN: 440 mL / OUT: 0 mL / NET: 440 mL                          8.5    5.52  )-----------( 107      ( 19 Apr 2023 05:28 )             26.1               04-19    140  |  100  |  54<H>  ----------------------------<  95  4.6   |  26  |  9.16<H>    Ca    7.7<L>      19 Apr 2023 05:28  Phos  4.0     04-19  Mg     2.00     04-19      PTT - ( 19 Apr 2023 05:28 )  PTT:28.4 sec

## 2023-04-20 LAB
ANION GAP SERPL CALC-SCNC: 19 MMOL/L — HIGH (ref 7–14)
BUN SERPL-MCNC: 79 MG/DL — HIGH (ref 7–23)
CALCIUM SERPL-MCNC: 7.7 MG/DL — LOW (ref 8.4–10.5)
CHLORIDE SERPL-SCNC: 97 MMOL/L — LOW (ref 98–107)
CO2 SERPL-SCNC: 21 MMOL/L — LOW (ref 22–31)
CREAT SERPL-MCNC: 11.83 MG/DL — HIGH (ref 0.5–1.3)
EGFR: 4 ML/MIN/1.73M2 — LOW
GLUCOSE SERPL-MCNC: 87 MG/DL — SIGNIFICANT CHANGE UP (ref 70–99)
HCT VFR BLD CALC: 26.9 % — LOW (ref 39–50)
HGB BLD-MCNC: 8.6 G/DL — LOW (ref 13–17)
MAGNESIUM SERPL-MCNC: 2 MG/DL — SIGNIFICANT CHANGE UP (ref 1.6–2.6)
MCHC RBC-ENTMCNC: 29.6 PG — SIGNIFICANT CHANGE UP (ref 27–34)
MCHC RBC-ENTMCNC: 32 GM/DL — SIGNIFICANT CHANGE UP (ref 32–36)
MCV RBC AUTO: 92.4 FL — SIGNIFICANT CHANGE UP (ref 80–100)
NRBC # BLD: 0 /100 WBCS — SIGNIFICANT CHANGE UP (ref 0–0)
NRBC # FLD: 0 K/UL — SIGNIFICANT CHANGE UP (ref 0–0)
PHOSPHATE SERPL-MCNC: 3.7 MG/DL — SIGNIFICANT CHANGE UP (ref 2.5–4.5)
PLATELET # BLD AUTO: 112 K/UL — LOW (ref 150–400)
POTASSIUM SERPL-MCNC: 4.5 MMOL/L — SIGNIFICANT CHANGE UP (ref 3.5–5.3)
POTASSIUM SERPL-SCNC: 4.5 MMOL/L — SIGNIFICANT CHANGE UP (ref 3.5–5.3)
RBC # BLD: 2.91 M/UL — LOW (ref 4.2–5.8)
RBC # FLD: 14.2 % — SIGNIFICANT CHANGE UP (ref 10.3–14.5)
SODIUM SERPL-SCNC: 137 MMOL/L — SIGNIFICANT CHANGE UP (ref 135–145)
WBC # BLD: 7.5 K/UL — SIGNIFICANT CHANGE UP (ref 3.8–10.5)
WBC # FLD AUTO: 7.5 K/UL — SIGNIFICANT CHANGE UP (ref 3.8–10.5)

## 2023-04-20 RX ORDER — HYDRALAZINE HCL 50 MG
25 TABLET ORAL ONCE
Refills: 0 | Status: COMPLETED | OUTPATIENT
Start: 2023-04-20 | End: 2023-04-20

## 2023-04-20 RX ORDER — HYDRALAZINE HCL 50 MG
50 TABLET ORAL THREE TIMES A DAY
Refills: 0 | Status: DISCONTINUED | OUTPATIENT
Start: 2023-04-20 | End: 2023-04-24

## 2023-04-20 RX ADMIN — Medication 1 DROP(S): at 12:51

## 2023-04-20 RX ADMIN — PANTOPRAZOLE SODIUM 40 MILLIGRAM(S): 20 TABLET, DELAYED RELEASE ORAL at 05:37

## 2023-04-20 RX ADMIN — MUPIROCIN 1 APPLICATION(S): 20 OINTMENT TOPICAL at 17:43

## 2023-04-20 RX ADMIN — ERYTHROPOIETIN 20000 UNIT(S): 10000 INJECTION, SOLUTION INTRAVENOUS; SUBCUTANEOUS at 07:49

## 2023-04-20 RX ADMIN — Medication 1334 MILLIGRAM(S): at 12:52

## 2023-04-20 RX ADMIN — Medication 25 MILLIGRAM(S): at 11:08

## 2023-04-20 RX ADMIN — Medication 1334 MILLIGRAM(S): at 17:44

## 2023-04-20 RX ADMIN — Medication 81 MILLIGRAM(S): at 12:52

## 2023-04-20 RX ADMIN — Medication 1 DROP(S): at 00:15

## 2023-04-20 RX ADMIN — Medication 1 DROP(S): at 05:37

## 2023-04-20 RX ADMIN — MUPIROCIN 1 APPLICATION(S): 20 OINTMENT TOPICAL at 05:36

## 2023-04-20 RX ADMIN — Medication 25 MILLIGRAM(S): at 05:36

## 2023-04-20 RX ADMIN — ATORVASTATIN CALCIUM 80 MILLIGRAM(S): 80 TABLET, FILM COATED ORAL at 20:51

## 2023-04-20 RX ADMIN — Medication 50 MILLIGRAM(S): at 20:51

## 2023-04-20 RX ADMIN — CHLORHEXIDINE GLUCONATE 1 APPLICATION(S): 213 SOLUTION TOPICAL at 12:51

## 2023-04-20 RX ADMIN — Medication 100 MILLIGRAM(S): at 05:37

## 2023-04-20 RX ADMIN — APIXABAN 5 MILLIGRAM(S): 2.5 TABLET, FILM COATED ORAL at 17:44

## 2023-04-20 RX ADMIN — APIXABAN 5 MILLIGRAM(S): 2.5 TABLET, FILM COATED ORAL at 05:37

## 2023-04-20 RX ADMIN — Medication 1 DROP(S): at 17:43

## 2023-04-20 NOTE — PROVIDER CONTACT NOTE (OTHER) - BACKGROUND
admitted for unstable angina pectoris; pmhx of afib, htn, and esrd
Pt admitted with unstable angina pectoris
Pt admitted with unstable angina pectoris. PMH CVA, anemia, HTN, ESRD.
admitted with missed dialysis, chest pain, shortness of breath
admitted with missed dialysis, chest pain, shortness of breath
patient admitted for missed dialysis. Patient going to dialysis at 3:30 pm
Pt admitted with unstable angina pectoris. PMH CVA, anemia, HTN, ESRD.
admitted with missed dialysis, chest pain, shortness of breath
pt admitted for angina pectoris. PMH of HTN, Afib on eliquis and CVA w a right sided facial droop.

## 2023-04-20 NOTE — PROGRESS NOTE ADULT - SUBJECTIVE AND OBJECTIVE BOX
[facility] - MARK Vargas / MARK Pagan / JASMIN Caballero/ MARK Bojorquez/ MARK Faust/ TELLO Garrison / RODNEY Garcia / HAILEY Perkins  ---------------------------------------------------------------------------------------------------------------  seen and examined today for ESRD  Interval : NAD  VITALS:  T(F): 97.3 (04-20-23 @ 10:26), Max: 98.3 (04-20-23 @ 06:50)  HR: 65 (04-20-23 @ 10:26)  BP: 166/96 (04-20-23 @ 10:26)  RR: 18 (04-20-23 @ 10:26)  SpO2: 100% (04-20-23 @ 05:00)  Wt(kg): --    04-19 @ 07:01  -  04-20 @ 07:00  --------------------------------------------------------  IN: 440 mL / OUT: 0 mL / NET: 440 mL    04-20 @ 07:01  -  04-20 @ 10:30  --------------------------------------------------------  IN: 400 mL / OUT: 1900 mL / NET: -1500 mL      Physical Exam :-  Constitutional: NAD  Neck: Supple.  Respiratory: Bilateral equal breath sounds,  Cardiovascular: S1, S2 normal,  Gastrointestinal: Bowel Sounds present, soft, non tender.  Extremities: No edema  Neurological: Alert and Oriented x 3, no focal deficits  Psychiatric: Normal mood, normal affect  Data:-  Allergies :   No Known Allergies    Hospital Medications:   MEDICATIONS  (STANDING):  apixaban 5 milliGRAM(s) Oral two times a day  artificial  tears Solution 1 Drop(s) Both EYES four times a day  aspirin enteric coated 81 milliGRAM(s) Oral daily  atorvastatin 80 milliGRAM(s) Oral at bedtime  calcium acetate 1334 milliGRAM(s) Oral three times a day with meals  chlorhexidine 2% Cloths 1 Application(s) Topical daily  epoetin tammi-epbx (RETACRIT) Injectable 13198 Unit(s) IV Push <User Schedule>  hydrALAZINE 25 milliGRAM(s) Oral every 8 hours  metoprolol succinate  milliGRAM(s) Oral daily  mupirocin 2% Ointment 1 Application(s) Topical two times a day  pantoprazole    Tablet 40 milliGRAM(s) Oral two times a day    04-20    137  |  97<L>  |  79<H>  ----------------------------<  87  4.5   |  21<L>  |  11.83<H>    Ca    7.7<L>      20 Apr 2023 07:45  Phos  3.7     04-20  Mg     2.00     04-20      Creatinine Trend: 11.83 <--, 9.16 <--, 13.44 <--, 12.00 <--, 10.02 <--, 16.42 <--, 15.23 <--                        8.6    7.50  )-----------( 112      ( 20 Apr 2023 07:45 )             26.9

## 2023-04-20 NOTE — PROVIDER CONTACT NOTE (OTHER) - REASON
/113
pt hypertensive post dialysis
High /101 post HD
Medication clarification
pt blood pressure exceeding parameter
hypertension
/114
Patient /102
5 beats vtach; L eye swollen/pain

## 2023-04-20 NOTE — PROVIDER CONTACT NOTE (OTHER) - DATE AND TIME:
15-Apr-2023 19:20
15-Apr-2023 10:29
15-Apr-2023 15:19
20-Apr-2023 13:00
20-Apr-2023 06:03
20-Apr-2023 10:58
20-Apr-2023 08:55
15-Apr-2023 14:09
15-Apr-2023 20:00

## 2023-04-20 NOTE — PROVIDER CONTACT NOTE (OTHER) - NAME OF MD/NP/PA/DO NOTIFIED:
ACP, Leah Giuseppe
Baldev Plascencia, ACP
LETICIA Vanegas
LETICIA Vanegas [id number]
LETICIA Velazquez
LETICIA Villeda
LETICIA Villeda
Real Villeda/NP
ACP Marcus [place] [id number]

## 2023-04-20 NOTE — PROGRESS NOTE ADULT - SUBJECTIVE AND OBJECTIVE BOX
Ran Velasquez MD  Interventional Cardiology / Advance Heart Failure and Cardiac Transplant Specialist  [place] Office : [address] [place] [address]  Tel:   [place] Office : [address] [facility] [address]  Tel: [phone]      Subjective/Overnight events: Pt is lying in bed comfortable not in distress, no chest pains no SOB no palpitations  	  MEDICATIONS:  apixaban 5 milliGRAM(s) Oral two times a day  aspirin enteric coated 81 milliGRAM(s) Oral daily  hydrALAZINE 50 milliGRAM(s) Oral three times a day  metoprolol succinate  milliGRAM(s) Oral daily  nitroglycerin     SubLingual 0.4 milliGRAM(s) SubLingual every 5 minutes PRN        acetaminophen     Tablet .. 650 milliGRAM(s) Oral every 6 hours PRN  melatonin 3 milliGRAM(s) Oral at bedtime PRN    pantoprazole    Tablet 40 milliGRAM(s) Oral two times a day    atorvastatin 80 milliGRAM(s) Oral at bedtime    artificial  tears Solution 1 Drop(s) Both EYES four times a day  calcium acetate 1334 milliGRAM(s) Oral three times a day with meals  chlorhexidine 2% Cloths 1 Application(s) Topical daily  epoetin tammi-epbx (RETACRIT) Injectable 80605 Unit(s) IV Push <User Schedule>  mupirocin 2% Ointment 1 Application(s) Topical two times a day  sodium chloride 0.9% Bolus. 100 milliLiter(s) IV Bolus every 5 minutes PRN      PAST MEDICAL/SURGICAL HISTORY  PAST MEDICAL & SURGICAL HISTORY:  HTN (Hypertension)      Chronic kidney disease      Kidney stones      Hemodialysis access, AV graft  Left upper extremity      Hyperparathyroidism      Anemia      Thrombocytopenia      Atrial fibrillation  on Eliquis      S/P Nephrectomy  (L) 2007 for kidney stones      Acquired arteriovenous fistula  left wrist  1/2015 - [facility], Left upper arm 4/2015 (approximate date)      AVF (arteriovenous fistula)          SOCIAL HISTORY: Substance Use (street drugs): ( x ) never used  (  ) other:    FAMILY HISTORY:  Family history of CVA (Father)        PHYSICAL EXAM:  T(C): 36.7 (04-20-23 @ 11:05), Max: 36.8 (04-20-23 @ 06:50)  HR: 76 (04-20-23 @ 11:05) (65 - 93)  BP: 162/70 (04-20-23 @ 11:05) (141/74 - 187/98)  RR: 17 (04-20-23 @ 11:05) (17 - 18)  SpO2: 100% (04-20-23 @ 11:05) (100% - 100%)  Wt(kg): --  I&O's Summary    19 Apr 2023 07:01  -  20 Apr 2023 07:00  --------------------------------------------------------  IN: 440 mL / OUT: 0 mL / NET: 440 mL    20 Apr 2023 07:01  -  20 Apr 2023 12:52  --------------------------------------------------------  IN: 400 mL / OUT: 1900 mL / NET: -1500 mL          GENERAL: NAD  EYES: EOMI, PERRLA, conjunctiva and sclera clear  ENMT: No tonsillar erythema, exudates, or enlargement  Cardiovascular: Normal S1 S2, No JVD, No murmurs, No edema  Respiratory: Lungs clear to auscultation	  Gastrointestinal:  Soft, Non-tender, + BS	  Extremities: No edema                                     8.6    7.50  )-----------( 112      ( 20 Apr 2023 07:45 )             26.9     04-20    137  |  97<L>  |  79<H>  ----------------------------<  87  4.5   |  21<L>  |  11.83<H>    Ca    7.7<L>      20 Apr 2023 07:45  Phos  3.7     04-20  Mg     2.00     04-20      proBNP:   Lipid Profile:   HgA1c:   TSH:     Consultant(s) Notes Reviewed:  [x ] YES  [ ] NO    Care Discussed with Consultants/Other Providers [ x] YES  [ ] NO    Imaging Personally Reviewed independently:  [x] YES  [ ] NO    All labs, radiologic studies, vitals, orders and medications list reviewed. Patient is seen and examined at bedside. Case discussed with medical team.

## 2023-04-20 NOTE — PROVIDER CONTACT NOTE (OTHER) - SITUATION
High /101 post HD
pt vital signs exceeding above parameter. see flowsheet for details. Pt asymptomatic.
/114
Pt with /69. now due for new dose 50mg Hydralazine. Pt received extra 25mg Hydralazine at approx 11
/113
Patient /102
pt hypertensive post dialysis
5 beats vtach
BP:187/98 pre HD, asymptomatic.

## 2023-04-20 NOTE — PROVIDER CONTACT NOTE (OTHER) - ACTION/TREATMENT ORDERED:
continue monitor BP
Provider aware. Ok to come off tele for transport to dialysis.
give hydralazine as per orders even though patient is going to dialysis at 3:30pm
ACP notified, will reassess
Provider aware. Awaiting orders.
continue to monitor patient. recheck VS in one hour and administer night time BP pills.
ACP notified - awaiting further orders - will continue to monitor
Provider aware. Give nifedipine 30mg before transferring off unit. Medication administered as ordered.
As per ACP, hold due dose of Hydralazine. Reschedule evening dose of Hydralazine earlier from 10pm to 8pm

## 2023-04-20 NOTE — PROGRESS NOTE ADULT - SUBJECTIVE AND OBJECTIVE BOX
Name of Patient : CHAVEZ MARQUEZ  MRN: [id number]  Date of visit: 04-20-23       Subjective: Patient seen and examined. No new events except as noted.   Doing okay     REVIEW OF SYSTEMS:    CONSTITUTIONAL: No weakness, fevers or chills  EYES/ENT: No visual changes;  No vertigo or throat pain   NECK: No pain or stiffness  RESPIRATORY: No cough, wheezing, hemoptysis; No shortness of breath  CARDIOVASCULAR: No chest pain or palpitations  GASTROINTESTINAL: No abdominal or epigastric pain. No nausea, vomiting, or hematemesis; No diarrhea or constipation. No melena or hematochezia.  GENITOURINARY: No dysuria, frequency or hematuria  NEUROLOGICAL: No numbness or weakness  SKIN: No itching, burning, rashes, or lesions   All other review of systems is negative unless indicated above.    MEDICATIONS:  MEDICATIONS  (STANDING):  apixaban 5 milliGRAM(s) Oral two times a day  artificial  tears Solution 1 Drop(s) Both EYES four times a day  aspirin enteric coated 81 milliGRAM(s) Oral daily  atorvastatin 80 milliGRAM(s) Oral at bedtime  calcium acetate 1334 milliGRAM(s) Oral three times a day with meals  chlorhexidine 2% Cloths 1 Application(s) Topical daily  epoetin tammi-epbx (RETACRIT) Injectable 67337 Unit(s) IV Push <User Schedule>  hydrALAZINE 50 milliGRAM(s) Oral three times a day  metoprolol succinate  milliGRAM(s) Oral daily  mupirocin 2% Ointment 1 Application(s) Topical two times a day  pantoprazole    Tablet 40 milliGRAM(s) Oral two times a day      PHYSICAL EXAM:  T(C): 36.9 (04-20-23 @ 13:00), Max: 36.9 (04-20-23 @ 13:00)  HR: 74 (04-20-23 @ 13:00) (65 - 88)  BP: 131/69 (04-20-23 @ 13:00) (131/69 - 187/98)  RR: 17 (04-20-23 @ 13:00) (17 - 18)  SpO2: 100% (04-20-23 @ 13:00) (100% - 100%)  Wt(kg): --  I&O's Summary    19 Apr 2023 07:01  -  20 Apr 2023 07:00  --------------------------------------------------------  IN: 440 mL / OUT: 0 mL / NET: 440 mL    20 Apr 2023 07:01  -  20 Apr 2023 20:09  --------------------------------------------------------  IN: 400 mL / OUT: 1900 mL / NET: -1500 mL          Appearance: Normal	  HEENT:  PERRLA   Lymphatic: No lymphadenopathy   Cardiovascular: Normal S1 S2, no JVD  Respiratory: normal effort , clear  Gastrointestinal:  Soft, Non-tender  Skin: No rashes,  warm to touch  Psychiatry:  Mood & affect appropriate  Musculuskeletal: No edema    recent labs, Imaging and EKGs personally reviewed     04-19-23 @ 07:01  -  04-20-23 @ 07:00  --------------------------------------------------------  IN: 440 mL / OUT: 0 mL / NET: 440 mL    04-20-23 @ 07:01  -  04-20-23 @ 20:09  --------------------------------------------------------  IN: 400 mL / OUT: 1900 mL / NET: -1500 mL                          8.6    7.50  )-----------( 112      ( 20 Apr 2023 07:45 )             26.9               04-20    137  |  97<L>  |  79<H>  ----------------------------<  87  4.5   |  21<L>  |  11.83<H>    Ca    7.7<L>      20 Apr 2023 07:45  Phos  3.7     04-20  Mg     2.00     04-20      PTT - ( 19 Apr 2023 05:28 )  PTT:28.4 sec

## 2023-04-20 NOTE — PROVIDER CONTACT NOTE (OTHER) - ASSESSMENT
/114. NSR on tele. No complaints. Going to dialysis now.
Patient /102. Missed dialysis Thursday. Patient endorses mild shortness of breath.
Pt with /69. due for new dose 50mg Hydralazine. Pt received extra 25mg Hydralazine at approx 11. Pt appears comfortable.
pt hypertensive post dialysis. /92, HR 75. pt is asymptomatic denies discomfort
BP:187/98 pre HD, asymptomatic.
patient reports slight headache. Denies chest pain
pt resting in bed; asymptomatic
PT asymptomatic, reports feeling fine.
High /101 post HD- denies any discomfort or symptoms at this time.

## 2023-04-21 ENCOUNTER — TRANSCRIPTION ENCOUNTER (OUTPATIENT)
Age: 68
End: 2023-04-21

## 2023-04-21 LAB
ANION GAP SERPL CALC-SCNC: 20 MMOL/L — HIGH (ref 7–14)
BUN SERPL-MCNC: 58 MG/DL — HIGH (ref 7–23)
CALCIUM SERPL-MCNC: 7.6 MG/DL — LOW (ref 8.4–10.5)
CHLORIDE SERPL-SCNC: 96 MMOL/L — LOW (ref 98–107)
CO2 SERPL-SCNC: 20 MMOL/L — LOW (ref 22–31)
CREAT SERPL-MCNC: 8.48 MG/DL — HIGH (ref 0.5–1.3)
EGFR: 6 ML/MIN/1.73M2 — LOW
GLUCOSE SERPL-MCNC: 101 MG/DL — HIGH (ref 70–99)
HCT VFR BLD CALC: 29.3 % — LOW (ref 39–50)
HGB BLD-MCNC: 9.2 G/DL — LOW (ref 13–17)
MAGNESIUM SERPL-MCNC: 1.8 MG/DL — SIGNIFICANT CHANGE UP (ref 1.6–2.6)
MCHC RBC-ENTMCNC: 28.8 PG — SIGNIFICANT CHANGE UP (ref 27–34)
MCHC RBC-ENTMCNC: 31.4 GM/DL — LOW (ref 32–36)
MCV RBC AUTO: 91.8 FL — SIGNIFICANT CHANGE UP (ref 80–100)
NRBC # BLD: 0 /100 WBCS — SIGNIFICANT CHANGE UP (ref 0–0)
NRBC # FLD: 0 K/UL — SIGNIFICANT CHANGE UP (ref 0–0)
PHOSPHATE SERPL-MCNC: 3.6 MG/DL — SIGNIFICANT CHANGE UP (ref 2.5–4.5)
PLATELET # BLD AUTO: 122 K/UL — LOW (ref 150–400)
POTASSIUM SERPL-MCNC: 4.3 MMOL/L — SIGNIFICANT CHANGE UP (ref 3.5–5.3)
POTASSIUM SERPL-SCNC: 4.3 MMOL/L — SIGNIFICANT CHANGE UP (ref 3.5–5.3)
RBC # BLD: 3.19 M/UL — LOW (ref 4.2–5.8)
RBC # FLD: 14 % — SIGNIFICANT CHANGE UP (ref 10.3–14.5)
SODIUM SERPL-SCNC: 136 MMOL/L — SIGNIFICANT CHANGE UP (ref 135–145)
WBC # BLD: 7.1 K/UL — SIGNIFICANT CHANGE UP (ref 3.8–10.5)
WBC # FLD AUTO: 7.1 K/UL — SIGNIFICANT CHANGE UP (ref 3.8–10.5)

## 2023-04-21 PROCEDURE — 99221 1ST HOSP IP/OBS SF/LOW 40: CPT

## 2023-04-21 RX ADMIN — ATORVASTATIN CALCIUM 80 MILLIGRAM(S): 80 TABLET, FILM COATED ORAL at 21:09

## 2023-04-21 RX ADMIN — Medication 1 DROP(S): at 12:31

## 2023-04-21 RX ADMIN — APIXABAN 5 MILLIGRAM(S): 2.5 TABLET, FILM COATED ORAL at 05:12

## 2023-04-21 RX ADMIN — PANTOPRAZOLE SODIUM 40 MILLIGRAM(S): 20 TABLET, DELAYED RELEASE ORAL at 17:28

## 2023-04-21 RX ADMIN — Medication 1 DROP(S): at 17:27

## 2023-04-21 RX ADMIN — Medication 81 MILLIGRAM(S): at 12:31

## 2023-04-21 RX ADMIN — Medication 100 MILLIGRAM(S): at 05:13

## 2023-04-21 RX ADMIN — Medication 1334 MILLIGRAM(S): at 17:29

## 2023-04-21 RX ADMIN — CHLORHEXIDINE GLUCONATE 1 APPLICATION(S): 213 SOLUTION TOPICAL at 13:40

## 2023-04-21 RX ADMIN — Medication 100 MILLIGRAM(S): at 17:26

## 2023-04-21 RX ADMIN — Medication 50 MILLIGRAM(S): at 05:13

## 2023-04-21 RX ADMIN — Medication 1334 MILLIGRAM(S): at 09:02

## 2023-04-21 RX ADMIN — APIXABAN 5 MILLIGRAM(S): 2.5 TABLET, FILM COATED ORAL at 17:28

## 2023-04-21 RX ADMIN — Medication 50 MILLIGRAM(S): at 21:09

## 2023-04-21 RX ADMIN — Medication 1334 MILLIGRAM(S): at 12:32

## 2023-04-21 RX ADMIN — Medication 1 DROP(S): at 05:11

## 2023-04-21 RX ADMIN — PANTOPRAZOLE SODIUM 40 MILLIGRAM(S): 20 TABLET, DELAYED RELEASE ORAL at 05:13

## 2023-04-21 RX ADMIN — MUPIROCIN 1 APPLICATION(S): 20 OINTMENT TOPICAL at 17:29

## 2023-04-21 RX ADMIN — Medication 50 MILLIGRAM(S): at 15:22

## 2023-04-21 RX ADMIN — Medication 1 DROP(S): at 00:11

## 2023-04-21 RX ADMIN — MUPIROCIN 1 APPLICATION(S): 20 OINTMENT TOPICAL at 05:11

## 2023-04-21 NOTE — DIETITIAN INITIAL EVALUATION ADULT - PERTINENT LABORATORY DATA
04-21    136  |  96<L>  |  58<H>  ----------------------------<  101<H>  4.3   |  20<L>  |  8.48<H>    Ca    7.6<L>      21 Apr 2023 07:10  Phos  3.6     04-21  Mg     1.80     04-21    A1C with Estimated Average Glucose Result: 5.2 % (04-16-23 @ 05:09)  A1C with Estimated Average Glucose Result: 5.2 % (06-19-22 @ 05:35)

## 2023-04-21 NOTE — PHARMACOTHERAPY INTERVENTION NOTE - COMMENTS
Discharge medications reviewed with the patient. Current medication schedule was discussed in detail including: medication name, indication, dose, administration times, treatment duration, side effects, drug interactions, and special instructions. Patient reports adherence to medications. Educated patient on apixaban including the purpose and administration. Discussed adverse effects in detail and when to seek medical attention (blood in stool, vomit, etc.). Patient questions and concerns were answered and addressed. Patient demonstrated understanding.    New medications: hydralazine    Elen Painting, PharmD, BCPS  Clinical Pharmacy Specialist  [id number]

## 2023-04-21 NOTE — DIETITIAN INITIAL EVALUATION ADULT - NS FNS DIET ORDER
Diet, DASH/TLC:   Sodium & Cholesterol Restricted  Caffeine Free (NOCAFF)  For patients receiving Renal Replacement - No Protein Restr, No Conc K, No Conc Phos, Low Sodium (RENAL)  No Caffeine  No Carbonated Beverages  No Chocolate  Supplement Feeding Modality:  Oral  Nepro Cans or Servings Per Day:  1       Frequency:  Daily (04-18-23 @ 16:07)

## 2023-04-21 NOTE — DISCHARGE NOTE PROVIDER - NSFOLLOWUPCLINICS_GEN_ALL_ED_FT
[facility] - ENT  Otolaryngology (ENT)  [address] [place] [address]  Phone: [phone]  Fax:

## 2023-04-21 NOTE — DIETITIAN INITIAL EVALUATION ADULT - PERTINENT MEDS FT
MEDICATIONS  (STANDING):  apixaban 5 milliGRAM(s) Oral two times a day  artificial  tears Solution 1 Drop(s) Both EYES four times a day  aspirin enteric coated 81 milliGRAM(s) Oral daily  atorvastatin 80 milliGRAM(s) Oral at bedtime  calcium acetate 1334 milliGRAM(s) Oral three times a day with meals  chlorhexidine 2% Cloths 1 Application(s) Topical daily  epoetin tammi-epbx (RETACRIT) Injectable 26852 Unit(s) IV Push <User Schedule>  hydrALAZINE 50 milliGRAM(s) Oral three times a day  metoprolol succinate  milliGRAM(s) Oral daily  mupirocin 2% Ointment 1 Application(s) Topical two times a day  pantoprazole    Tablet 40 milliGRAM(s) Oral two times a day    MEDICATIONS  (PRN):  acetaminophen     Tablet .. 650 milliGRAM(s) Oral every 6 hours PRN Mild Pain (1 - 3)  melatonin 3 milliGRAM(s) Oral at bedtime PRN Insomnia  nitroglycerin     SubLingual 0.4 milliGRAM(s) SubLingual every 5 minutes PRN Chest Pain  sodium chloride 0.9% Bolus. 100 milliLiter(s) IV Bolus every 5 minutes PRN SBP LESS THAN or EQUAL to 90 mmHg

## 2023-04-21 NOTE — DISCHARGE NOTE PROVIDER - CARE PROVIDER_API CALL
Ran Velasquez (MD)  Cardiovascular Disease; Internal Medicine  [address] [place] [address]  Phone: [phone]  Fax: [phone]  Follow Up Time:

## 2023-04-21 NOTE — PROGRESS NOTE ADULT - SUBJECTIVE AND OBJECTIVE BOX
Name of Patient : CHAVEZ MARQUEZ  MRN: [id number]  Date of visit: 04-21-23       Subjective: Patient seen and examined. No new events except as noted.   Doing okay     REVIEW OF SYSTEMS:    CONSTITUTIONAL: No weakness, fevers or chills  EYES/ENT: No visual changes;  No vertigo or throat pain   NECK: No pain or stiffness  RESPIRATORY: No cough, wheezing, hemoptysis; No shortness of breath  CARDIOVASCULAR: No chest pain or palpitations  GASTROINTESTINAL: No abdominal or epigastric pain. No nausea, vomiting, or hematemesis; No diarrhea or constipation. No melena or hematochezia.  GENITOURINARY: No dysuria, frequency or hematuria  NEUROLOGICAL: No numbness or weakness  SKIN: No itching, burning, rashes, or lesions   All other review of systems is negative unless indicated above.    MEDICATIONS:  MEDICATIONS  (STANDING):  apixaban 5 milliGRAM(s) Oral two times a day  artificial  tears Solution 1 Drop(s) Both EYES four times a day  aspirin enteric coated 81 milliGRAM(s) Oral daily  atorvastatin 80 milliGRAM(s) Oral at bedtime  calcium acetate 1334 milliGRAM(s) Oral three times a day with meals  chlorhexidine 2% Cloths 1 Application(s) Topical daily  doxycycline monohydrate Capsule 100 milliGRAM(s) Oral every 12 hours  epoetin tammi-epbx (RETACRIT) Injectable 40578 Unit(s) IV Push <User Schedule>  hydrALAZINE 50 milliGRAM(s) Oral three times a day  metoprolol succinate  milliGRAM(s) Oral daily  mupirocin 2% Ointment 1 Application(s) Topical two times a day  pantoprazole    Tablet 40 milliGRAM(s) Oral two times a day      PHYSICAL EXAM:  T(C): 36.7 (04-21-23 @ 21:35), Max: 36.9 (04-21-23 @ 05:00)  HR: 79 (04-21-23 @ 21:35) (78 - 79)  BP: 117/70 (04-21-23 @ 21:35) (117/70 - 143/92)  RR: 18 (04-21-23 @ 21:35) (18 - 18)  SpO2: 98% (04-21-23 @ 21:35) (95% - 100%)  Wt(kg): --  I&O's Summary    20 Apr 2023 07:01  -  21 Apr 2023 07:00  --------------------------------------------------------  IN: 400 mL / OUT: 1900 mL / NET: -1500 mL          Appearance: Normal	  HEENT:  PERRLA   Lymphatic: No lymphadenopathy   Cardiovascular: Normal S1 S2, no JVD  Respiratory: normal effort , clear  Gastrointestinal:  Soft, Non-tender  Skin: No rashes,  warm to touch  Psychiatry:  Mood & affect appropriate  Musculuskeletal: No edema    recent labs, Imaging and EKGs personally reviewed     04-20-23 @ 07:01  -  04-21-23 @ 07:00  --------------------------------------------------------  IN: 400 mL / OUT: 1900 mL / NET: -1500 mL                            9.2    7.10  )-----------( 122      ( 21 Apr 2023 07:10 )             29.3               04-21    136  |  96<L>  |  58<H>  ----------------------------<  101<H>  4.3   |  20<L>  |  8.48<H>    Ca    7.6<L>      21 Apr 2023 07:10  Phos  3.6     04-21  Mg     1.80     04-21

## 2023-04-21 NOTE — DIETITIAN INITIAL EVALUATION ADULT - OTHER INFO
A&Ox4. Pt states his appetite was better at the beginning of admission but has decreased since his stress test 2 days ago. Drinking Nepro 1x daily but would like 2x daily. No reported GI issues (nausea/vomiting/diarrhea/constipation.) Denies any chewing or swallowing difficulties at this time. NKFA. UBW (dry) wt: 150 pounds.     Pt states he has been on HD for 10 years and is well versed on renal diet. Declined further education/reinforcement.

## 2023-04-21 NOTE — CONSULT NOTE ADULT - ATTENDING COMMENTS
67 yo M with left upper canaliculitis. Cultures obtained. F/u results. Agree with plan above. Start Maxitrol ointment and doxycycline PO.

## 2023-04-21 NOTE — CONSULT NOTE ADULT - SUBJECTIVE AND OBJECTIVE BOX
[facility] DEPARTMENT OF OPHTHALMOLOGY - INITIAL ADULT CONSULT  -----------------------------------------------------------------------------------------------------------  Preethi Bocanegra MD PGY-3  -----------------------------------------------------------------------------------------------------------    HPI:  68M with PMHx HTN, ESRD Tu/Th/Sa, CVA (right facial droop), Afib on eliquis, hx GIB?, vestibular schwannoma presenting with chest pain and SOB x2 days. Patient was last admitted 6/2022 with Left corona radiata infarct. At the time he was not taking eliquis due to hx of GIB but this was resumed on this admission and ASA discontinued. He was seen by neuro and GI and recommendation was for outpatient scope and was not done inpatient. He did not have bleeding on AC and was eventually discharged to rehab. Per patient he was in rehab for 6 months and then went to [place] and he gets HD Tu/Th/Sat. His last HD was Tuesday and then [place] the [place] after. He did not have HD on Thursday. Yesterday night around 8PM he began having left sided chest pain and SOB that was intermittent. CP comes and goes and is not radiating. It is stabbing in quality. Pain is now relieved after nitro tabs. He saw Dr. Epperson yesterday day who told him to come to the hospital for possible angiogram. He currently denies CP, SOB, BRBPR, melena. He is compliant with his eliquis but does not know all the names of his medications. Confirms being on metoprolol and pantoprazole as well      In ED, EKG with new TWI in V2 and V3. Started on hep gtt and given ASA 162mg (14 Apr 2023 22:13)    Interval History: Ophthalmology consulted for NOEL swelling. Pt reports symptoms started Wednesday and has been worsening. Associated with pain. No previous episodes.    PAST MEDICAL & SURGICAL HISTORY:  HTN (Hypertension)      Chronic kidney disease      Kidney stones      Hemodialysis access, AV graft  Left upper extremity      Hyperparathyroidism      Anemia      Thrombocytopenia      Atrial fibrillation  on Eliquis      S/P Nephrectomy  (L) 2007 for kidney stones      Acquired arteriovenous fistula  left wrist  1/2015 - [facility], Left upper arm 4/2015 (approximate date)      AVF (arteriovenous fistula)        Past Ocular History: CE/IOL OU  Ophthalmic Medications: none  FAMILY HISTORY:  Family history of CVA (Father)     no glc/amd  Social History: no etoh/tobacco    MEDICATIONS  (STANDING):  apixaban 5 milliGRAM(s) Oral two times a day  artificial  tears Solution 1 Drop(s) Both EYES four times a day  aspirin enteric coated 81 milliGRAM(s) Oral daily  atorvastatin 80 milliGRAM(s) Oral at bedtime  calcium acetate 1334 milliGRAM(s) Oral three times a day with meals  chlorhexidine 2% Cloths 1 Application(s) Topical daily  epoetin tammi-epbx (RETACRIT) Injectable 51727 Unit(s) IV Push <User Schedule>  hydrALAZINE 50 milliGRAM(s) Oral three times a day  metoprolol succinate  milliGRAM(s) Oral daily  mupirocin 2% Ointment 1 Application(s) Topical two times a day  pantoprazole    Tablet 40 milliGRAM(s) Oral two times a day    MEDICATIONS  (PRN):  acetaminophen     Tablet .. 650 milliGRAM(s) Oral every 6 hours PRN Mild Pain (1 - 3)  melatonin 3 milliGRAM(s) Oral at bedtime PRN Insomnia  nitroglycerin     SubLingual 0.4 milliGRAM(s) SubLingual every 5 minutes PRN Chest Pain  sodium chloride 0.9% Bolus. 100 milliLiter(s) IV Bolus every 5 minutes PRN SBP LESS THAN or EQUAL to 90 mmHg    Allergies & Intolerances:   No Known Allergies    Review of Systems:  Constitutional: No fever, chills  Eyes: No blurry vision, flashes, floaters, FBS, erythema, discharge, double vision, OU  Neuro: No tremors  Cardiovascular: No chest pain, palpitations  Respiratory: No SOB, no cough  GI: No nausea, vomiting, abdominal pain  : No dysuria  Skin: no rash  Psych: no depression  Endocrine: no polyuria, polydipsia  Heme/lymph: no swelling    VITALS: T(C): 36.7 (04-21-23 @ 11:50)  T(F): 98 (04-21-23 @ 11:50), Max: 98.6 (04-20-23 @ 21:00)  HR: 78 (04-21-23 @ 11:50) (78 - 80)  BP: 143/92 (04-21-23 @ 11:50) (134/68 - 158/89)  RR:  (18 - 18)  SpO2:  (95% - 100%)  Wt(kg): --  General: AAO x 3, appropriate mood and affect    Ophthalmology Exam:  Visual acuity (sc): 20/30 OD, 20/20 OS  Pupils: PERRL OU, no APD  Ttono: 10, 11  Extraocular movements (EOMs): Full OU, no pain, no diplopia  Confrontational Visual Field (CVF): Full OU  Color Plates: 12/12 OU    Pen Light Exam (PLE)  External: Flat OU  Lids/Lashes/Lacrimal Ducts: Flat OD. NOEL medial 1-2+ erythema and edema - TTP - purulent discharge expresses from RUMA punctum with manipulation.  Sclera/Conjunctiva: W+Q OU  Cornea: Cl OU  Anterior Chamber: D+F OU    Iris: Flat OU  Lens: PCIOL OU    Fundus Exam: dilated with 1% tropicamide and 2.5% phenylephrine  Approval obtained from primary team for dilation  Patient aware that pupils can remained dilated for at least 4-6 hours  Exam performed with 20D lens    Vitreous: wnl OU  Disc, cup/disc: sharp and pink, 0.4 OU  Macula: wnl OU  Vessels: wnl OU  Periphery: wnl OU

## 2023-04-21 NOTE — DIETITIAN INITIAL EVALUATION ADULT - REASON FOR ADMISSION
Unstable angina pectoris  68M with PMHx HTN, ESRD Tu/Th/Sa, CVA (right facial droop), Afib on eliquis, hx GIB?, vestibular schwannoma presenting with chest pain and SOB x2 days. Admitted for cardiac w/u, new EKG changes.

## 2023-04-21 NOTE — DISCHARGE NOTE PROVIDER - NSDCFUADDAPPT_GEN_ALL_CORE_FT
Outpatient follow-up: Please follow-up with his/her ophthalmologist or with [facility] Department of Ophthalmology upon discharge at the address below     [address]  [place], [place] [id number] [phone]

## 2023-04-21 NOTE — DIETITIAN INITIAL EVALUATION ADULT - ADD RECOMMEND
1. Encourage PO intake and honor food preferences as able.   2. Continue to document PO in RN flow sheets and monitor weekly weights.   3. Liberalize DASH restriction.

## 2023-04-21 NOTE — DISCHARGE NOTE PROVIDER - NSDCMRMEDTOKEN_GEN_ALL_CORE_FT
acetaminophen 325 mg oral tablet: 2 tab(s) orally every 6 hours, As needed, Temp greater or equal to 38C (100.4F), Mild Pain (1 - 3)  apixaban 5 mg oral tablet: 1 tab(s) orally 2 times a day ...(patient states this is a current medication; prescription submitted in February 2023, not picked-up)  atorvastatin 80 mg oral tablet: 1 tab(s) orally once a day (at bedtime) ...(patient states this is a current medication; prescription submitted in February 2023, not picked-up)  calcium acetate 667 mg oral tablet: 2 tab(s) orally 3 times a day (with meals) ...(patient states this is a current medication; prescription submitted in December 2022, not picked-up)  ferrous sulfate 325 mg (65 mg elemental iron) oral tablet: 1 tab(s) orally once a day  metoprolol succinate 25 mg oral tablet, extended release: 1 tab(s) orally once a day ...(total daily dose 75mg; patient states this is a current medication; per pharmacy 30-day supply last dispensed January 2023)  metoprolol succinate 50 mg oral tablet, extended release: 1 tab(s) orally once a day ...(total daily dose 75mg; patient states this is a current medication; per pharmacy 30-day supply last dispensed January 2023)  pantoprazole 40 mg oral delayed release tablet: 1 tab(s) orally once a day ...(patient states this is a current medication; 30-day supply last dispensed April 2022)   acetaminophen 325 mg oral tablet: 2 tab(s) orally every 6 hours, As needed, Temp greater or equal to 38C (100.4F), Mild Pain (1 - 3)  apixaban 5 mg oral tablet: 1 tab(s) orally 2 times a day ...(patient states this is a current medication; prescription submitted in February 2023, not picked-up)  aspirin 81 mg oral delayed release tablet: 1 tab(s) orally once a day  atorvastatin 80 mg oral tablet: 1 tab(s) orally once a day (at bedtime) ...(patient states this is a current medication; prescription submitted in February 2023, not picked-up)  calcium acetate 667 mg oral tablet: 2 tab(s) orally 3 times a day (with meals) ...(patient states this is a current medication; prescription submitted in December 2022, not picked-up)  doxycycline monohydrate 50 mg oral capsule: 2 cap(s) orally every 12 hours  ferrous sulfate 325 mg (65 mg elemental iron) oral tablet: 1 tab(s) orally once a day  hydrALAZINE 50 mg oral tablet: 1 tab(s) orally 3 times a day  Maxitrol ophthalmic ointment: 1 application in each affected eye every 6 hours  metoprolol succinate 100 mg oral tablet, extended release: 1 tab(s) orally once a day  pantoprazole 40 mg oral delayed release tablet: 1 tab(s) orally once a day ...(patient states this is a current medication; 30-day supply last dispensed April 2022)   acetaminophen 325 mg oral tablet: 2 tab(s) orally every 6 hours, As needed, Temp greater or equal to 38C (100.4F), Mild Pain (1 - 3)  apixaban 5 mg oral tablet: 1 tab(s) orally 2 times a day ...(patient states this is a current medication; prescription submitted in February 2023, not picked-up)  aspirin 81 mg oral delayed release tablet: 1 tab(s) orally once a day  atorvastatin 80 mg oral tablet: 1 tab(s) orally once a day (at bedtime) ...(patient states this is a current medication; prescription submitted in February 2023, not picked-up)  calcium acetate 667 mg oral tablet: 2 tab(s) orally 3 times a day (with meals) ...(patient states this is a current medication; prescription submitted in December 2022, not picked-up)  doxycycline monohydrate 50 mg oral capsule: 2 cap(s) orally every 12 hours  ferrous sulfate 325 mg (65 mg elemental iron) oral tablet: 1 tab(s) orally once a day  hydrALAZINE 50 mg oral tablet: 1 tab(s) orally 3 times a day  Maxitrol ophthalmic ointment: 1 application in each affected eye every 6 hours To left eye  metoprolol succinate 100 mg oral tablet, extended release: 1 tab(s) orally once a day  pantoprazole 40 mg oral delayed release tablet: 1 tab(s) orally once a day ...(patient states this is a current medication; 30-day supply last dispensed April 2022)   acetaminophen 325 mg oral tablet: 2 tab(s) orally every 6 hours, As needed, Temp greater or equal to 38C (100.4F), Mild Pain (1 - 3)  apixaban 5 mg oral tablet: 1 tab(s) orally 2 times a day ...(patient states this is a current medication; prescription submitted in February 2023, not picked-up)  aspirin 81 mg oral delayed release tablet: 1 tab(s) orally once a day  atorvastatin 80 mg oral tablet: 1 tab(s) orally once a day (at bedtime) ...(patient states this is a current medication; prescription submitted in February 2023, not picked-up)  calcium acetate 667 mg oral tablet: 2 tab(s) orally 3 times a day (with meals) ...(patient states this is a current medication; prescription submitted in December 2022, not picked-up)  doxycycline monohydrate 50 mg oral capsule: 2 cap(s) orally every 12 hours  ferrous sulfate 325 mg (65 mg elemental iron) oral tablet: 1 tab(s) orally once a day  hydrALAZINE 50 mg oral tablet: 1 tab(s) orally 3 times a day  Maxitrol ophthalmic ointment: 1 application in each affected eye 2 times a day To left eye  metoprolol succinate 100 mg oral tablet, extended release: 1 tab(s) orally once a day  pantoprazole 40 mg oral delayed release tablet: 1 tab(s) orally once a day ...(patient states this is a current medication; 30-day supply last dispensed April 2022)

## 2023-04-21 NOTE — PROGRESS NOTE ADULT - SUBJECTIVE AND OBJECTIVE BOX
[facility] : [facility] [phone], Office [phone]  Dr Caballero/Dr Vargas  /Dr Jordan etienne /Dr SERA Bojorquez/Dr Dagoberto Faust/Dr Jovan Garrison /Dr GAYATRI Garcia  _______________________________________________________________________________________________    seen and examined today for End Stage Renal Disease on Dialysis   Interval :  VITALS:  T(F): 98.4 (04-21 @ 05:00), Max: 98.6 (04-20 @ 21:00)  HR: 79 (04-21 @ 05:00)  BP: 134/68 (04-21 @ 05:00)  ABP: --  RR: 18 (04-21 @ 05:00)  SpO2: 95% (04-21 @ 05:00)    04-20 @ 07:01  -  04-21 @ 07:00  --------------------------------------------------------  IN: 400 mL / OUT: 1900 mL / NET: -1500 mL      Physical Exam :-  Constitutional: NAD, left eye stye  Respiratory: Bilateral equal breath sounds, no Crackles present.  Cardiovascular: S1, S2 normal, positive Murmur  Gastrointestinal: Bowel Sounds present, soft  Extremities: no Edema Feet  Neurological: Alert and Oriented x 3  Psychiatric: Normal mood, normal affect    Data:-  Allergies :   No Known Allergies    Hospital Medications:   MEDICATIONS  (STANDING):  apixaban 5 milliGRAM(s) Oral two times a day  artificial  tears Solution 1 Drop(s) Both EYES four times a day  aspirin enteric coated 81 milliGRAM(s) Oral daily  atorvastatin 80 milliGRAM(s) Oral at bedtime  calcium acetate 1334 milliGRAM(s) Oral three times a day with meals  chlorhexidine 2% Cloths 1 Application(s) Topical daily  epoetin tammi-epbx (RETACRIT) Injectable 67580 Unit(s) IV Push <User Schedule>  hydrALAZINE 50 milliGRAM(s) Oral three times a day  metoprolol succinate  milliGRAM(s) Oral daily  mupirocin 2% Ointment 1 Application(s) Topical two times a day  pantoprazole    Tablet 40 milliGRAM(s) Oral two times a day    04-21    136  |  96<L>  |  58<H>  ----------------------------<  101<H>  4.3   |  20<L>  |  8.48<H>    Ca    7.6<L>      21 Apr 2023 07:10  Phos  3.6     04-21  Mg     1.80     04-21      Creatinine Trend: 8.48 <--, 11.83 <--, 9.16 <--, 13.44 <--, 12.00 <--, 10.02 <--  egfr trend : 6 <--, 4 <--, 6 <--, 4 <--, 4 <--, 5 <--, 3 <--                        9.2    7.10  )-----------( 122      ( 21 Apr 2023 07:10 )             29.3

## 2023-04-21 NOTE — DISCHARGE NOTE PROVIDER - HOSPITAL COURSE
68M with PMHx HTN, ESRD Tu/Th/Sa, CVA (right facial droop), Afib on eliquis, hx GIB?, vestibular schwannoma presenting with chest pain and SOB x2 days. Admitted for cardiac w/u, new EKG changes.     Unstable angina.   -Intermittent CP and SOB x2 days. Now resolved after nitroglycerin. EKG with new TWI in V2, V3  [facility] 6/2021 with nonobstructive CAD  -tele  -trop 63, CKMB normal. Likely trop is elevated in setting of ESRD and missed HD as well. Not likely NSTEMI. Possible unstable angina however. Repeat troponin 61, CKMB normal  -echo  -s/p heparin gtt, transitioned to eliquis   -s/p ASA 162mg, resume ASA 81mg qd for now given new EKG changes  -monitor for bleeding while on hep gtt and ASA. May require GI input given hx of GIB if angiogram to be performed  -card follow up appreciated   - PRBC with HD    ESRD needing dialysis.   ·  Plan: -renal eval   -sevelamer 1600mg TID  - HD as per renal.      HTN (hypertension).   - Resume metoprolol 75mg qd  -started hydralazine, titrate as needed     History of CVA (cerebrovascular accident).   -Not reason for admission. Has R facial droop  -resume statin, eliquis.    Atrial fibrillation.   -eliquis   Metoprolol 75mg qd.    Vestibular schwannoma.   ·  Plan: Discussed findings of this on MRI last year during stroke admission  -advised patient to f/u with ENT regarding this as outpatient. He understands and is amenable to doing so      # Left upper canaliculitis  - purulent discharge expressed from left upper punctum with manipulation  - culture obtained - f/u results  - start Maxitrol (neomycin/polymyxin/dexamethasone) ointment BID to left upper lid (ophtho ordered)  - warm compresses TID with massage to area  - start doxycycline 100mg BID PO for 7 day course  - f/u outpatient    History of GI bleed.   - Denies hematochezia, melena or other sources of bleeding  PPI BID  Possible GI c/s if angiogram to happen as he would need to be on DAPT if stent were to be placed.    Anemia.   - Hgb 7.7, lower than baseline 9-10s  -iron studies, active T&S  -PPI  -possible GI input should angiogram happen  -FOBT.     Need for prophylactic measure.   ·  Plan: Hep gtt  Renal DASH/TLC diet   68M with PMHx HTN, ESRD Tu/Th/Sa, CVA (right facial droop), Afib on eliquis, hx GIB?, vestibular schwannoma presenting with chest pain and SOB x2 days. Admitted for cardiac w/u, new EKG changes.     Unstable angina.   -Intermittent CP and SOB x2 days. Now resolved after nitroglycerin. EKG with new TWI in V2, V3  -[facility] 6/2021 with nonobstructive CAD  -tele  -trop 63, CKMB normal. Likely trop is elevated in setting of ESRD and missed HD as well. Not likely NSTEMI. Possible unstable angina however. Repeat troponin 61, CKMB normal  -echo  -s/p heparin gtt, transitioned to Eliquis   -s/p ASA 162mg, resume ASA 81mg qd for now given new EKG changes  -monitor for bleeding while on hep gtt and ASA. May require GI input given hx of GIB if angiogram to be performed, no bleeding noted   -card follow up appreciated   -PRBC with HD    ESRD needing dialysis.   -renal eval   -sevelamer 1600mg TID  -HD as per renal.    HTN (hypertension).   -Resume metoprolol 100mg qd  -started hydralazine, titrate as needed     History of CVA (cerebrovascular accident).   -Not reason for admission. Has R facial droop  -resume statin, eliquis.    Atrial fibrillation.   -eliquis   - Metoprolol 100mg qd.    Vestibular schwannoma.   -Discussed findings of this on MRI last year during stroke admission  -advised patient to f/u with ENT regarding this as outpatient. He understands and is amenable to doing so    # Left upper canaliculitis  - purulent discharge expressed from left upper punctum with manipulation  - culture obtained - f/u results  - start Maxitrol (neomycin/polymyxin/dexamethasone) ointment BID to left upper lid (ophtho ordered)  - warm compresses TID with massage to area  - start doxycycline 100mg BID PO for 7 day course, culture with MRSA continue antibiotics outpatient with eye ointment and fu with Optho   - f/u outpatient    History of GI bleed.   -Denies hematochezia, melena or other sources of bleeding  -PPI BID  -Possible GI c/s if angiogram to happen as he would need to be on DAPT if stent were to be placed.    Anemia.   -Hgb 7.7, lower than baseline 9-10s  -iron studies, active T&S  -PPI  -possible GI input should angiogram happen  -FOBT.     Need for prophylactic measure.   -Hep gtt ---> Eliquis   Renal DASH/TLC diet    Patient seen and evaluated. Reviewed discharge medications with patient and attending. All new medications requiring new prescriptions were sent to the pharmacy of patient's choice. Reviewed need for prescription for previous home medications and new prescriptions sent if requested. Medically cleared/stable for discharge as per Dr. Velasquez on 4/24/23 with appropriate follow up. Patient understands and agrees with plan of care.

## 2023-04-21 NOTE — DIETITIAN INITIAL EVALUATION ADULT - PROBLEM SELECTOR PLAN 7
Denies hematochezia, melena or other sources of bleeding  PPI BID  Possible GI c/s if angiogram to happen as he would need to be on DAPT if stent were to be placed

## 2023-04-21 NOTE — CONSULT NOTE ADULT - ASSESSMENT
Assessment and Recommendations:  68y male w/ pmhx/ochx of HTN, ESRD Tu/Th/Sa, CVA (right facial droop), Afib on eliquis, hx GIB?, vestibular schwannoma presenting with chest pain and SOB. Ophthalmology consulted for left upper lid swelling of 2 days duration.     # Left upper canaliculitis  - purulent discharge expressed from left upper punctum with manipulation  - culture obtained - f/u results  - start Maxitrol (neomycin/polymyxin/dexamethasone) ointment BID to left upper lid (ophtho ordered)  - warm compresses TID with massage to area  - start doxycycline 100mg BID PO for 7 day course  - f/u outpatient    Pt seen and discussed with Dr. Marroquin, attending.     Outpatient follow-up: Patient should follow-up with his/her ophthalmologist or with [facility] Department of Ophthalmology upon discharge at the address below     [address] [place] [address]. [address]  [place] [address]  [phone]
68M with PMHx HTN, ESRD Tu/Th/Sa, CVA (right facial droop), Afib on eliquis, hx GIB?, vestibular schwannoma presenting with chest pain and SOB x2 days. Admitted for cardiac w/u, new EKG changes. RENAL consulted for ESRD/ HD Mx.    End Stage Renal Disease on Dialysis   HD unit - previously at [facility]. [place] [facility] w/onsite HD -states was d/c from rehab but not yet readmitted to [facility] HD unit  last HD 4/11  K, vol ok   bp high, not in chf clinically    Informed consent for HD obtained from pt. consent in chart.   arranged for HD today next shift w/2k bath, uf 2.5kg as tolearted  dose meds for ESRD  renal diet, fluid restriction 1L/day when not NPO    HTN, uncontrolled. BP high likely from missing hd  Resumed metoprolol 75mg qd  added hydral 25mg TID, uptitrate as needed.  consider adding Isosorbide  inc uf w/HD    Unstable angina.   Intermittent CP and SOB x2 days. Now resolved after nitroglycerin. EKG with new TWI in V2, V3  [facility] 6/2021 with nonobstructive CAD  -telemonitoring  - f/u ho  -started on heparin gtt  - c/w ASA, BB, statin  -rest of ischemic eval per primary cardiologist Dr. Epperson     Atrial fibrillation. Holding eliquis as on Hep gtt  Metoprolol 75mg qd.    History of GI bleed. Anemia in CKD  PPI BID  -iron studies, active T&S  -FOBT.  added epo 16k tiw w/hd  minitor H/H qd. prn PRBC    Thanks for consulting. will closely follow up  labs, chart, rad reviewed  poc d/w pt, ER RN, HD RN  For any question, call:  Cell # [phone]  Pager # [phone]  office# [phone]
EKG SR new TWI v2-v3     Echo < from: Transthoracic Echocardiogram (06.18.22 @ 07:56) >  1. Calcified trileaflet aortic valve with normal opening.  Minimal aortic regurgitation.  2. Mildly dilated left atrium.  LA volume index = 39 cc/m2.  3. Normal left ventricular systolic function. No segmental  wall motion abnormalities.  4. Normal right ventricular size and function.  5. Agitated saline injection demonstrates no obvious  evidence of a patent foramen ovale.    < end of copied text >    NSTEMI: C/w asa heparin drip , c/w hydral , add nifedipine , restart home dose metoprolol , plan for ischemic eval once BP stable     ESRD on HD   - f/u renal recs    Afib restart metoprolol , heparin drip     DVT PPX heparin drip

## 2023-04-21 NOTE — PROGRESS NOTE ADULT - SUBJECTIVE AND OBJECTIVE BOX
Ran Velasquez MD  Interventional Cardiology / Advance Heart Failure and Cardiac Transplant Specialist  [place] Office : [phone] [place] [address] [place] [address]  Tel:   [place] Office : [address] [facility] [place] [address]  Tel: [phone]       Pt is lying in bed comfortable not in distress, no chest pains no SOB no palpitations  	  MEDICATIONS:  apixaban 5 milliGRAM(s) Oral two times a day  aspirin enteric coated 81 milliGRAM(s) Oral daily  hydrALAZINE 50 milliGRAM(s) Oral three times a day  metoprolol succinate  milliGRAM(s) Oral daily  nitroglycerin     SubLingual 0.4 milliGRAM(s) SubLingual every 5 minutes PRN    doxycycline monohydrate Capsule 100 milliGRAM(s) Oral every 12 hours      acetaminophen     Tablet .. 650 milliGRAM(s) Oral every 6 hours PRN  melatonin 3 milliGRAM(s) Oral at bedtime PRN    pantoprazole    Tablet 40 milliGRAM(s) Oral two times a day    atorvastatin 80 milliGRAM(s) Oral at bedtime    artificial  tears Solution 1 Drop(s) Both EYES four times a day  calcium acetate 1334 milliGRAM(s) Oral three times a day with meals  chlorhexidine 2% Cloths 1 Application(s) Topical daily  epoetin tammi-epbx (RETACRIT) Injectable 64592 Unit(s) IV Push <User Schedule>  sodium chloride 0.9% Bolus. 100 milliLiter(s) IV Bolus every 5 minutes PRN      PAST MEDICAL/SURGICAL HISTORY  PAST MEDICAL & SURGICAL HISTORY:  HTN (Hypertension)      Chronic kidney disease      Kidney stones      Hemodialysis access, AV graft  Left upper extremity      Hyperparathyroidism      Anemia      Thrombocytopenia      Atrial fibrillation  on Eliquis      S/P Nephrectomy  (L) 2007 for kidney stones      Acquired arteriovenous fistula  left wrist  1/2015 - [facility], Left upper arm 4/2015 (approximate date)      AVF (arteriovenous fistula)          SOCIAL HISTORY: Substance Use (street drugs): ( x ) never used  (  ) other:    FAMILY HISTORY:  Family history of CVA (Father)        REVIEW OF SYSTEMS:  CONSTITUTIONAL: No fever, weight loss, or fatigue  EYES: No eye pain, visual disturbances, or discharge  ENMT:  No difficulty hearing, tinnitus, vertigo; No sinus or throat pain  BREASTS: No pain, masses, or nipple discharge  GASTROINTESTINAL: No abdominal or epigastric pain. No nausea, vomiting, or hematemesis; No diarrhea or constipation. No melena or hematochezia.  GENITOURINARY: No dysuria, frequency, hematuria, or incontinence  NEUROLOGICAL: No headaches, memory loss, loss of strength, numbness, or tremors  ENDOCRINE: No heat or cold intolerance; No hair loss  MUSCULOSKELETAL: No joint pain or swelling; No muscle, back, or extremity pain  PSYCHIATRIC: No depression, anxiety, mood swings, or difficulty sleeping  HEME/LYMPH: No easy bruising, or bleeding gums       PHYSICAL EXAM:  T(C): 36.7 (04-22-23 @ 15:14), Max: 36.7 (04-21-23 @ 21:35)  HR: 75 (04-22-23 @ 15:14) (73 - 79)  BP: 139/88 (04-22-23 @ 15:14) (106/64 - 139/88)  RR: 16 (04-22-23 @ 15:14) (16 - 18)  SpO2: 100% (04-22-23 @ 12:10) (98% - 100%)  Wt(kg): --  I&O's Summary        GENERAL: NAD  EYES:   PERRLA   ENMT:   Moist mucous membranes, Good dentition, No lesions  Cardiovascular: Normal S1 S2, No JVD, No murmurs, No edema  Respiratory: Lungs clear to auscultation	  Gastrointestinal:  Soft, Non-tender, + BS	  Extremities: no edema                                    9.2    7.10  )-----------( 122      ( 21 Apr 2023 07:10 )             29.3     04-21    136  |  96<L>  |  58<H>  ----------------------------<  101<H>  4.3   |  20<L>  |  8.48<H>    Ca    7.6<L>      21 Apr 2023 07:10  Phos  3.6     04-21  Mg     1.80     04-21      proBNP:   Lipid Profile:   HgA1c:   TSH:     Consultant(s) Notes Reviewed:  [x ] YES  [ ] NO    Care Discussed with Consultants/Other Providers [ x] YES  [ ] NO    Imaging Personally Reviewed independently:  [x] YES  [ ] NO    All labs, radiologic studies, vitals, orders and medications list reviewed. Patient is seen and examined at bedside. Case discussed with medical team.

## 2023-04-21 NOTE — DISCHARGE NOTE PROVIDER - NSDCCPCAREPLAN_GEN_ALL_CORE_FT
PRINCIPAL DISCHARGE DIAGNOSIS  Diagnosis: Unstable angina  Assessment and Plan of Treatment:       SECONDARY DISCHARGE DIAGNOSES  Diagnosis: ESRD needing dialysis  Assessment and Plan of Treatment:     Diagnosis: HTN (hypertension)  Assessment and Plan of Treatment:     Diagnosis: Vestibular schwannoma  Assessment and Plan of Treatment:     Diagnosis: Atrial fibrillation  Assessment and Plan of Treatment:      PRINCIPAL DISCHARGE DIAGNOSIS  Diagnosis: Unstable angina  Assessment and Plan of Treatment: You came in with chest pain, you had a catheterization of your heart which was normal, please continue all your home medications and follow up with the cardiologist jan 1-2 weeks      SECONDARY DISCHARGE DIAGNOSES  Diagnosis: ESRD needing dialysis  Assessment and Plan of Treatment: Please continue dialysis with your normal schedule    Diagnosis: HTN (hypertension)  Assessment and Plan of Treatment: Continue blood pressure medication regimen as directed. Monitor for any visual changes, headaches or dizziness.  Monitor blood pressure regularly.  Follow up with your primary care provider for further management for high blood pressure.    Diagnosis: Vestibular schwannoma  Assessment and Plan of Treatment: Please f/u with ENT regarding this as outpatient    Diagnosis: History of CVA (cerebrovascular accident)  Assessment and Plan of Treatment: Please continue home medications and follow up with your PCP    Diagnosis: Atrial fibrillation  Assessment and Plan of Treatment: Please continue home medications and follow up with your PCP    Diagnosis: Acute canaliculitis  Assessment and Plan of Treatment: You have an infection of your eye, this is being treated with an eye ointment and antibitoics, please continue the eye ointment for 14 days and the antibitoics (DOXYCYCLINE) for 4 more days and follow up with the Opthalmologist

## 2023-04-21 NOTE — DIETITIAN INITIAL EVALUATION ADULT - PROBLEM SELECTOR PLAN 1
Intermittent CP and SOB x2 days. Now resolved after nitroglycerin. EKG with new TWI in V2, V3  [facility] 6/2021 with nonobstructive CAD  -tele  -trop 63, CKMB normal. Likely trop is elevated in setting of ESRD and missed HD as well. Not likely NSTEMI. Possible unstable angina however. Repeat troponin 61, CKMB normal  -echo  -started on heparin gtt  -s/p ASA 162mg, resume ASA 81mg qd for now given new EKG changes  -monitor for bleeding while on hep gtt and ASA. May require GI input given hx of GIB if angiogram to be performed  -rest of ischemic eval per primary cardiology team Dr. Epperson (sent patient in) in AM  -wells score 0, not suspicious for PE

## 2023-04-22 LAB
ANION GAP SERPL CALC-SCNC: 17 MMOL/L — HIGH (ref 7–14)
BUN SERPL-MCNC: 87 MG/DL — HIGH (ref 7–23)
CALCIUM SERPL-MCNC: 7.4 MG/DL — LOW (ref 8.4–10.5)
CHLORIDE SERPL-SCNC: 94 MMOL/L — LOW (ref 98–107)
CO2 SERPL-SCNC: 22 MMOL/L — SIGNIFICANT CHANGE UP (ref 22–31)
CREAT SERPL-MCNC: 11.97 MG/DL — HIGH (ref 0.5–1.3)
EGFR: 4 ML/MIN/1.73M2 — LOW
GLUCOSE SERPL-MCNC: 112 MG/DL — HIGH (ref 70–99)
HCT VFR BLD CALC: 26.4 % — LOW (ref 39–50)
HGB BLD-MCNC: 8.4 G/DL — LOW (ref 13–17)
MAGNESIUM SERPL-MCNC: 1.9 MG/DL — SIGNIFICANT CHANGE UP (ref 1.6–2.6)
MCHC RBC-ENTMCNC: 29 PG — SIGNIFICANT CHANGE UP (ref 27–34)
MCHC RBC-ENTMCNC: 31.8 GM/DL — LOW (ref 32–36)
MCV RBC AUTO: 91 FL — SIGNIFICANT CHANGE UP (ref 80–100)
NRBC # BLD: 0 /100 WBCS — SIGNIFICANT CHANGE UP (ref 0–0)
NRBC # FLD: 0.02 K/UL — HIGH (ref 0–0)
PHOSPHATE SERPL-MCNC: 2.7 MG/DL — SIGNIFICANT CHANGE UP (ref 2.5–4.5)
PLATELET # BLD AUTO: 120 K/UL — LOW (ref 150–400)
POTASSIUM SERPL-MCNC: 4 MMOL/L — SIGNIFICANT CHANGE UP (ref 3.5–5.3)
POTASSIUM SERPL-SCNC: 4 MMOL/L — SIGNIFICANT CHANGE UP (ref 3.5–5.3)
RBC # BLD: 2.9 M/UL — LOW (ref 4.2–5.8)
RBC # FLD: 14.1 % — SIGNIFICANT CHANGE UP (ref 10.3–14.5)
SODIUM SERPL-SCNC: 133 MMOL/L — LOW (ref 135–145)
WBC # BLD: 6.64 K/UL — SIGNIFICANT CHANGE UP (ref 3.8–10.5)
WBC # FLD AUTO: 6.64 K/UL — SIGNIFICANT CHANGE UP (ref 3.8–10.5)

## 2023-04-22 RX ADMIN — PANTOPRAZOLE SODIUM 40 MILLIGRAM(S): 20 TABLET, DELAYED RELEASE ORAL at 05:42

## 2023-04-22 RX ADMIN — Medication 1 DROP(S): at 05:43

## 2023-04-22 RX ADMIN — Medication 1 DROP(S): at 00:40

## 2023-04-22 RX ADMIN — Medication 1334 MILLIGRAM(S): at 13:35

## 2023-04-22 RX ADMIN — CHLORHEXIDINE GLUCONATE 1 APPLICATION(S): 213 SOLUTION TOPICAL at 13:37

## 2023-04-22 RX ADMIN — Medication 100 MILLIGRAM(S): at 05:42

## 2023-04-22 RX ADMIN — Medication 50 MILLIGRAM(S): at 13:36

## 2023-04-22 RX ADMIN — ERYTHROPOIETIN 20000 UNIT(S): 10000 INJECTION, SOLUTION INTRAVENOUS; SUBCUTANEOUS at 17:16

## 2023-04-22 RX ADMIN — Medication 50 MILLIGRAM(S): at 21:51

## 2023-04-22 RX ADMIN — Medication 81 MILLIGRAM(S): at 13:36

## 2023-04-22 RX ADMIN — Medication 1 DROP(S): at 23:51

## 2023-04-22 RX ADMIN — Medication 1334 MILLIGRAM(S): at 08:54

## 2023-04-22 RX ADMIN — MUPIROCIN 1 APPLICATION(S): 20 OINTMENT TOPICAL at 05:43

## 2023-04-22 RX ADMIN — Medication 50 MILLIGRAM(S): at 05:42

## 2023-04-22 RX ADMIN — APIXABAN 5 MILLIGRAM(S): 2.5 TABLET, FILM COATED ORAL at 20:23

## 2023-04-22 RX ADMIN — APIXABAN 5 MILLIGRAM(S): 2.5 TABLET, FILM COATED ORAL at 05:42

## 2023-04-22 RX ADMIN — ATORVASTATIN CALCIUM 80 MILLIGRAM(S): 80 TABLET, FILM COATED ORAL at 21:51

## 2023-04-22 RX ADMIN — Medication 100 MILLIGRAM(S): at 20:25

## 2023-04-22 RX ADMIN — Medication 1 DROP(S): at 13:35

## 2023-04-22 NOTE — CHART NOTE - NSCHARTNOTEFT_GEN_A_CORE
Called by HD RN earlier in the shift  for pts HR  gone upto 140s for brief episode post hemodialysis that spontaneously resolved without intervention, pt was asymptomatic. Once patient transferred back to his room, HR increased again upto 130- 150s on telemetry, with elevated BP as well, he remained asymptomatic, had no complaints, when seen at bedside. Lopressor 2.5mg IVP x 2 doses ordered with improvement in Heart rate and BP. RN made aware to hold am PO Metoprolol for poss ST, will c/t monitor.
Medicine Subsequent Hospital Care Note- ACP  Callled by RN to see patient for elevated BP.  ROS:  Denies fever, chills, diaphoresis , malaise, night sweat, generalized weakness, SOB cough, sputum production, wheezing, hemoptysis, CP, CHAMPION, orthopnea, PND, palpitations, diaphoresis, lightheadedness, dizziness, syncope, edema. nausea, vomiting, diarrhea, constipation, abdominal pain, melena, hematochezia, dysphagia, dysuria, frequency, urgency, hematuria, nocturia.    -------------------------------------------------------------------------------------------------------------------------------------------------  Vital Signs:  Vital Signs Last 24 Hrs  T(C): 36.6 (04-15-23 @ 16:15), Max: 36.8 (04-15-23 @ 10:26)  T(F): 97.9 (04-15-23 @ 16:15), Max: 98.2 (04-15-23 @ 10:26)  HR: 76 (04-15-23 @ 16:15) (70 - 80)  BP: 198/101 (04-15-23 @ 16:15) (154/79 - 200/113)  RR: 18 (04-15-23 @ 16:15) (17 - 18)  SpO2: 98% (04-15-23 @ 10:26) (95% - 100%) on (O2)    Telemetry/Alarms:  General: WN/WD NAD  Neurology: Awake, nonfocal, RAMIREZ x 4  Eyes: Scleras clear, PERRLA/ EOMI, Gross vision intact  ENT:Gross hearing intact, grossly patent pharynx, no stridor  Neck: Neck supple, trachea midline, No JVD,   Respiratory: CTA B/L, No wheezing, rales, rhonchi  CV: RRR, S1S2, no murmurs, rubs or gallops  Abdominal: Soft, NT, ND +BS,   Extremities: No edema, + peripheral pulses  Skin: No Rashes, Hematoma, Ecchymosis  Lymphatic: No Neck, axilla, groin LAD  Psych: Oriented x 3, normal affect  Incisions:   Tubes:  Relevant labs, radiology and Medications reviewed                        8.0    5.98  )-----------( 113      ( 15 Apr 2023 07:00 )             26.3     04-15    140  |  101  |  94<H>  ----------------------------<  90  4.6   |  17<L>  |  16.42<H>    Ca    6.6<L>      15 Apr 2023 07:00    TPro  7.5  /  Alb  4.2  /  TBili  0.3  /  DBili  x   /  AST  16  /  ALT  16  /  AlkPhos  73  04-15    PT/INR - ( 14 Apr 2023 16:50 )   PT: 11.8 sec;   INR: 1.02 ratio         PTT - ( 15 Apr 2023 07:00 )  PTT:53.5 sec  MEDICATIONS  (STANDING):  aspirin enteric coated 81 milliGRAM(s) Oral daily  atorvastatin 80 milliGRAM(s) Oral at bedtime  calcium acetate 1334 milliGRAM(s) Oral three times a day with meals  chlorhexidine 2% Cloths 1 Application(s) Topical daily  epoetin tammi-epbx (RETACRIT) Injectable 30132 Unit(s) IV Push <User Schedule>  heparin  Infusion.  Unit(s)/Hr (9 mL/Hr) IV Continuous <Continuous>  hydrALAZINE 25 milliGRAM(s) Oral every 8 hours  metoprolol succinate ER 75 milliGRAM(s) Oral daily  pantoprazole    Tablet 40 milliGRAM(s) Oral two times a day  sevelamer carbonate 1600 milliGRAM(s) Oral three times a day with meals    MEDICATIONS  (PRN):  acetaminophen     Tablet .. 650 milliGRAM(s) Oral every 6 hours PRN Mild Pain (1 - 3)  heparin   Injectable 4400 Unit(s) IV Push every 6 hours PRN For aPTT less than 40  melatonin 3 milliGRAM(s) Oral at bedtime PRN Insomnia  nitroglycerin     SubLingual 0.4 milliGRAM(s) SubLingual every 5 minutes PRN Chest Pain  sodium chloride 0.9% Bolus. 100 milliLiter(s) IV Bolus every 5 minutes PRN SBP LESS THAN or EQUAL to 90 mmHg    I&O's Summary    I reviewed the above lab results, tests, telemetry, and  EKG interpretation. .  Assessment  68y Male  w/ PAST MEDICAL & SURGICAL HISTORY:  HTN (Hypertension)  Chronic kidney disease  Kidney stones  Hemodialysis access, AV graft  Left upper extremity  Hyperparathyroidism  Anemia  Thrombocytopenia  Atrial fibrillation  on Eliquis  S/P Nephrectomy  (L) 2007 for kidney stones      Acquired arteriovenous fistula  left wrist  1/2015 - [facility], Left upper arm 4/2015 (approximate date)      AVF (arteriovenous fistula)      admitted with complaints of Patient is a 68y old  Male who presents with a chief complaint of CP, SOB x2 days (15 Apr 2023 12:13)   with Unstable Angina on Hep gtt  - For possible ischemic eval via angiogram monday -await DR Velasquez input  [ ] TTE ordered  ESRD T/Th/Sat  [ ] Renal called by me: for HD today 4/15 ( Alicia).  Case dw Dr Velasquez will trest Hypertension after HD. Pt given home dose of hydralazine for now
Notified by patient that his daughter Candance is now ok with letting him stay with her after discharge. This was confirmed by ACP via phone call with Candance at [phone]. Discussed new plan with DENIZ Shaffer and she reached out to the patient's HD center to confirm that patient is on the schedule for tuesday HD session. Per DENIZ Shaffer, patient is not on the schedule and the HD center staff informed her that we will have to follow up on Monday to get the patient on schedule. Patient informed and he is agreeable to the new plan, he says he will inform his daughter of the change.     Tierney Urban PA-C  Medicine  pager [phone]
received call from pharmacy - upon review and updating his meds - he has been non compliant with his medications that had been ordered prior to hospital stay
Optho consult called for left eye irritation and swelling.

## 2023-04-22 NOTE — PROGRESS NOTE ADULT - SUBJECTIVE AND OBJECTIVE BOX
Ran Velasquez MD  Interventional Cardiology / Advance Heart Failure and Cardiac Transplant Specialist  [place] Office : [address] [place] [address] [place] [address]  Tel:   [place] Office : [address] [facility] [place] [address]  Tel: [phone]       Pt is lying in bed comfortable not in distress, no chest pains no SOB no palpitations  	  MEDICATIONS:  apixaban 5 milliGRAM(s) Oral two times a day  aspirin enteric coated 81 milliGRAM(s) Oral daily  hydrALAZINE 50 milliGRAM(s) Oral three times a day  metoprolol succinate  milliGRAM(s) Oral daily  nitroglycerin     SubLingual 0.4 milliGRAM(s) SubLingual every 5 minutes PRN    doxycycline monohydrate Capsule 100 milliGRAM(s) Oral every 12 hours      acetaminophen     Tablet .. 650 milliGRAM(s) Oral every 6 hours PRN  melatonin 3 milliGRAM(s) Oral at bedtime PRN    pantoprazole    Tablet 40 milliGRAM(s) Oral two times a day    atorvastatin 80 milliGRAM(s) Oral at bedtime    artificial  tears Solution 1 Drop(s) Both EYES four times a day  calcium acetate 1334 milliGRAM(s) Oral three times a day with meals  chlorhexidine 2% Cloths 1 Application(s) Topical daily  epoetin tammi-epbx (RETACRIT) Injectable 08091 Unit(s) IV Push <User Schedule>  sodium chloride 0.9% Bolus. 100 milliLiter(s) IV Bolus every 5 minutes PRN      PAST MEDICAL/SURGICAL HISTORY  PAST MEDICAL & SURGICAL HISTORY:  HTN (Hypertension)      Chronic kidney disease      Kidney stones      Hemodialysis access, AV graft  Left upper extremity      Hyperparathyroidism      Anemia      Thrombocytopenia      Atrial fibrillation  on Eliquis      S/P Nephrectomy  (L) 2007 for kidney stones      Acquired arteriovenous fistula  left wrist  1/2015 - [facility], Left upper arm 4/2015 (approximate date)      AVF (arteriovenous fistula)          SOCIAL HISTORY: Substance Use (street drugs): ( x ) never used  (  ) other:    FAMILY HISTORY:  Family history of CVA (Father)        REVIEW OF SYSTEMS:  CONSTITUTIONAL: No fever, weight loss, or fatigue  EYES: No eye pain, visual disturbances, or discharge  ENMT:  No difficulty hearing, tinnitus, vertigo; No sinus or throat pain  BREASTS: No pain, masses, or nipple discharge  GASTROINTESTINAL: No abdominal or epigastric pain. No nausea, vomiting, or hematemesis; No diarrhea or constipation. No melena or hematochezia.  GENITOURINARY: No dysuria, frequency, hematuria, or incontinence  NEUROLOGICAL: No headaches, memory loss, loss of strength, numbness, or tremors  ENDOCRINE: No heat or cold intolerance; No hair loss  MUSCULOSKELETAL: No joint pain or swelling; No muscle, back, or extremity pain  PSYCHIATRIC: No depression, anxiety, mood swings, or difficulty sleeping  HEME/LYMPH: No easy bruising, or bleeding gums       PHYSICAL EXAM:  T(C): 36.7 (04-22-23 @ 15:14), Max: 36.7 (04-21-23 @ 21:35)  HR: 75 (04-22-23 @ 15:14) (73 - 79)  BP: 139/88 (04-22-23 @ 15:14) (106/64 - 139/88)  RR: 16 (04-22-23 @ 15:14) (16 - 18)  SpO2: 100% (04-22-23 @ 12:10) (98% - 100%)  Wt(kg): --  I&O's Summary        GENERAL: NAD  EYES:   PERRLA   ENMT:   Moist mucous membranes, Good dentition, No lesions  Cardiovascular: Normal S1 S2, No JVD, No murmurs, No edema  Respiratory: Lungs clear to auscultation	  Gastrointestinal:  Soft, Non-tender, + BS	  Extremities: no edema                                    8.4    6.64  )-----------( 120      ( 22 Apr 2023 15:15 )             26.4     04-22    133<L>  |  94<L>  |  x   ----------------------------<  x   4.0   |  x   |  x     Ca    7.6<L>      21 Apr 2023 07:10  Phos  3.6     04-21  Mg     1.90     04-22      proBNP:   Lipid Profile:   HgA1c:   TSH:     Consultant(s) Notes Reviewed:  [x ] YES  [ ] NO    Care Discussed with Consultants/Other Providers [ x] YES  [ ] NO    Imaging Personally Reviewed independently:  [x] YES  [ ] NO    All labs, radiologic studies, vitals, orders and medications list reviewed. Patient is seen and examined at bedside. Case discussed with medical team.

## 2023-04-22 NOTE — PROGRESS NOTE ADULT - SUBJECTIVE AND OBJECTIVE BOX
Name of Patient : CHAVEZ MARQUEZ  MRN: [id number]  Date of visit: 04-22-23       Subjective: Patient seen and examined. No new events except as noted.   Doing okay       REVIEW OF SYSTEMS:    CONSTITUTIONAL: No weakness, fevers or chills  EYES/ENT: No visual changes;  No vertigo or throat pain   NECK: No pain or stiffness  RESPIRATORY: No cough, wheezing, hemoptysis; No shortness of breath  CARDIOVASCULAR: No chest pain or palpitations  GASTROINTESTINAL: No abdominal or epigastric pain. No nausea, vomiting, or hematemesis; No diarrhea or constipation. No melena or hematochezia.  GENITOURINARY: No dysuria, frequency or hematuria  NEUROLOGICAL: No numbness or weakness  SKIN: No itching, burning, rashes, or lesions   All other review of systems is negative unless indicated above.    MEDICATIONS:  MEDICATIONS  (STANDING):  apixaban 5 milliGRAM(s) Oral two times a day  artificial  tears Solution 1 Drop(s) Both EYES four times a day  aspirin enteric coated 81 milliGRAM(s) Oral daily  atorvastatin 80 milliGRAM(s) Oral at bedtime  calcium acetate 1334 milliGRAM(s) Oral three times a day with meals  chlorhexidine 2% Cloths 1 Application(s) Topical daily  doxycycline monohydrate Capsule 100 milliGRAM(s) Oral every 12 hours  epoetin tammi-epbx (RETACRIT) Injectable 42343 Unit(s) IV Push <User Schedule>  hydrALAZINE 50 milliGRAM(s) Oral three times a day  metoprolol succinate  milliGRAM(s) Oral daily  pantoprazole    Tablet 40 milliGRAM(s) Oral two times a day      PHYSICAL EXAM:  T(C): 36.7 (04-22-23 @ 20:20), Max: 37.1 (04-22-23 @ 18:30)  HR: 78 (04-22-23 @ 20:20) (73 - 78)  BP: 119/63 (04-22-23 @ 20:20) (106/64 - 148/90)  RR: 18 (04-22-23 @ 20:20) (16 - 18)  SpO2: 97% (04-22-23 @ 20:20) (97% - 100%)  Wt(kg): --  I&O's Summary    22 Apr 2023 07:01 - 22 Apr 2023 23:12  --------------------------------------------------------  IN: 400 mL / OUT: 3400 mL / NET: -3000 mL          Appearance: Normal	  HEENT:  PERRLA   Lymphatic: No lymphadenopathy   Cardiovascular: Normal S1 S2, no JVD  Respiratory: normal effort , clear  Gastrointestinal:  Soft, Non-tender  Skin: No rashes,  warm to touch  Psychiatry:  Mood & affect appropriate  Musculuskeletal: No edema    recent labs, Imaging and EKGs personally reviewed     04-22-23 @ 07:01  -  04-22-23 @ 23:12  --------------------------------------------------------  IN: 400 mL / OUT: 3400 mL / NET: -3000 mL                          8.4    6.64  )-----------( 120      ( 22 Apr 2023 15:15 )             26.4               04-22    133<L>  |  94<L>  |  87<H>  ----------------------------<  112<H>  4.0   |  22  |  11.97<H>    Ca    7.4<L>      22 Apr 2023 15:15  Phos  2.7     04-22  Mg     1.90     04-22

## 2023-04-22 NOTE — PROGRESS NOTE ADULT - SUBJECTIVE AND OBJECTIVE BOX
[facility] : Ans Serv [phone], Office [phone]  Dr Caballero/Dr Vargas  /Dr Jordan etienne /Dr SERA Bojorquez/Dr Dagoberto Faust/Dr Jovan Garrison /Dr GAYATRI Garcia  _______________________________________________________________________________________________    seen and examined today for End Stage Renal Disease on Dialysis   Interval : due for hemodialysis today  VITALS:  T(F): 98 (04-22 @ 05:38), Max: 98.6 (04-20 @ 21:00)  HR: 74 (04-22 @ 05:38)  BP: 130/84 (04-22 @ 05:38)  ABP: --  RR: 18 (04-22 @ 05:38)  SpO2: 99% (04-22 @ 05:38)    04-20 @ 07:01  -  04-21 @ 07:00  --------------------------------------------------------  IN: 400 mL / OUT: 1900 mL / NET: -1500 mL      Physical Exam :-  Constitutional: NAD  Respiratory: Bilateral equal breath sounds, few Crackles present.  Cardiovascular: S1, S2 normal, positive Murmur  Gastrointestinal: Bowel Sounds present, soft  Extremities: no Edema Feet  Neurological: Alert and Oriented x 3  Psychiatric: Normal mood, normal affect    Data:-  Allergies :   No Known Allergies    Hospital Medications:   MEDICATIONS  (STANDING):  apixaban 5 milliGRAM(s) Oral two times a day  artificial  tears Solution 1 Drop(s) Both EYES four times a day  aspirin enteric coated 81 milliGRAM(s) Oral daily  atorvastatin 80 milliGRAM(s) Oral at bedtime  calcium acetate 1334 milliGRAM(s) Oral three times a day with meals  chlorhexidine 2% Cloths 1 Application(s) Topical daily  doxycycline monohydrate Capsule 100 milliGRAM(s) Oral every 12 hours  epoetin tammi-epbx (RETACRIT) Injectable 03359 Unit(s) IV Push <User Schedule>  hydrALAZINE 50 milliGRAM(s) Oral three times a day  metoprolol succinate  milliGRAM(s) Oral daily  pantoprazole    Tablet 40 milliGRAM(s) Oral two times a day    04-21    136  |  96<L>  |  58<H>  ----------------------------<  101<H>  4.3   |  20<L>  |  8.48<H>    Ca    7.6<L>      21 Apr 2023 07:10  Phos  3.6     04-21  Mg     1.80     04-21      Creatinine Trend: 8.48 <--, 11.83 <--, 9.16 <--, 13.44 <--, 12.00 <--, 10.02 <--  egfr trend : 6 <--, 4 <--, 6 <--, 4 <--, 4 <--, 5 <--, 3 <--                        9.2    7.10  )-----------( 122      ( 21 Apr 2023 07:10 )             29.3

## 2023-04-23 LAB
-  AMPICILLIN/SULBACTAM: SIGNIFICANT CHANGE UP
-  CEFAZOLIN: SIGNIFICANT CHANGE UP
-  CLINDAMYCIN: SIGNIFICANT CHANGE UP
-  DAPTOMYCIN: SIGNIFICANT CHANGE UP
-  ERYTHROMYCIN: SIGNIFICANT CHANGE UP
-  GENTAMICIN: SIGNIFICANT CHANGE UP
-  LINEZOLID: SIGNIFICANT CHANGE UP
-  OXACILLIN: SIGNIFICANT CHANGE UP
-  PENICILLIN: SIGNIFICANT CHANGE UP
-  RIFAMPIN: SIGNIFICANT CHANGE UP
-  TETRACYCLINE: SIGNIFICANT CHANGE UP
-  TRIMETHOPRIM/SULFAMETHOXAZOLE: SIGNIFICANT CHANGE UP
-  VANCOMYCIN: SIGNIFICANT CHANGE UP
CULTURE RESULTS: SIGNIFICANT CHANGE UP
HCT VFR BLD CALC: 30.2 % — LOW (ref 39–50)
HGB BLD-MCNC: 9.5 G/DL — LOW (ref 13–17)
MCHC RBC-ENTMCNC: 28.7 PG — SIGNIFICANT CHANGE UP (ref 27–34)
MCHC RBC-ENTMCNC: 31.5 GM/DL — LOW (ref 32–36)
MCV RBC AUTO: 91.2 FL — SIGNIFICANT CHANGE UP (ref 80–100)
METHOD TYPE: SIGNIFICANT CHANGE UP
NRBC # BLD: 0 /100 WBCS — SIGNIFICANT CHANGE UP (ref 0–0)
NRBC # FLD: 0.02 K/UL — HIGH (ref 0–0)
ORGANISM # SPEC MICROSCOPIC CNT: SIGNIFICANT CHANGE UP
ORGANISM # SPEC MICROSCOPIC CNT: SIGNIFICANT CHANGE UP
PLATELET # BLD AUTO: 142 K/UL — LOW (ref 150–400)
RBC # BLD: 3.31 M/UL — LOW (ref 4.2–5.8)
RBC # FLD: 14 % — SIGNIFICANT CHANGE UP (ref 10.3–14.5)
SPECIMEN SOURCE: SIGNIFICANT CHANGE UP
WBC # BLD: 5.67 K/UL — SIGNIFICANT CHANGE UP (ref 3.8–10.5)
WBC # FLD AUTO: 5.67 K/UL — SIGNIFICANT CHANGE UP (ref 3.8–10.5)

## 2023-04-23 RX ADMIN — PANTOPRAZOLE SODIUM 40 MILLIGRAM(S): 20 TABLET, DELAYED RELEASE ORAL at 05:36

## 2023-04-23 RX ADMIN — CHLORHEXIDINE GLUCONATE 1 APPLICATION(S): 213 SOLUTION TOPICAL at 12:05

## 2023-04-23 RX ADMIN — Medication 100 MILLIGRAM(S): at 05:36

## 2023-04-23 RX ADMIN — APIXABAN 5 MILLIGRAM(S): 2.5 TABLET, FILM COATED ORAL at 05:36

## 2023-04-23 RX ADMIN — Medication 1334 MILLIGRAM(S): at 17:05

## 2023-04-23 RX ADMIN — Medication 100 MILLIGRAM(S): at 17:05

## 2023-04-23 RX ADMIN — Medication 1334 MILLIGRAM(S): at 12:05

## 2023-04-23 RX ADMIN — Medication 1334 MILLIGRAM(S): at 07:35

## 2023-04-23 RX ADMIN — PANTOPRAZOLE SODIUM 40 MILLIGRAM(S): 20 TABLET, DELAYED RELEASE ORAL at 17:05

## 2023-04-23 RX ADMIN — Medication 50 MILLIGRAM(S): at 05:36

## 2023-04-23 RX ADMIN — APIXABAN 5 MILLIGRAM(S): 2.5 TABLET, FILM COATED ORAL at 17:05

## 2023-04-23 RX ADMIN — Medication 50 MILLIGRAM(S): at 13:35

## 2023-04-23 RX ADMIN — Medication 1 DROP(S): at 12:04

## 2023-04-23 RX ADMIN — Medication 50 MILLIGRAM(S): at 22:57

## 2023-04-23 RX ADMIN — Medication 81 MILLIGRAM(S): at 12:05

## 2023-04-23 RX ADMIN — ATORVASTATIN CALCIUM 80 MILLIGRAM(S): 80 TABLET, FILM COATED ORAL at 22:57

## 2023-04-23 RX ADMIN — Medication 1 DROP(S): at 05:38

## 2023-04-23 RX ADMIN — Medication 1 DROP(S): at 17:04

## 2023-04-23 RX ADMIN — Medication 1 DROP(S): at 23:24

## 2023-04-23 NOTE — PROVIDER CONTACT NOTE (CRITICAL VALUE NOTIFICATION) - SITUATION
Eye culture 4/21 MRSA
calcium 6.4
Pt admitted for chest pain, troponin this morning 67. Pt has a history of CKD currently on HD.

## 2023-04-23 NOTE — PROGRESS NOTE ADULT - SUBJECTIVE AND OBJECTIVE BOX
Ran Velasquez MD  Interventional Cardiology / Advance Heart Failure and Cardiac Transplant Specialist  [place] Office : [address] [place] [address] [place] [address]  Tel:   [place] Office : [address] [facility] [place] [address]  Tel: [phone]       Pt is lying in bed comfortable not in distress, no chest pains no SOB no palpitations  	  MEDICATIONS:  apixaban 5 milliGRAM(s) Oral two times a day  aspirin enteric coated 81 milliGRAM(s) Oral daily  hydrALAZINE 50 milliGRAM(s) Oral three times a day  metoprolol succinate  milliGRAM(s) Oral daily  nitroglycerin     SubLingual 0.4 milliGRAM(s) SubLingual every 5 minutes PRN    doxycycline monohydrate Capsule 100 milliGRAM(s) Oral every 12 hours      acetaminophen     Tablet .. 650 milliGRAM(s) Oral every 6 hours PRN  melatonin 3 milliGRAM(s) Oral at bedtime PRN    pantoprazole    Tablet 40 milliGRAM(s) Oral two times a day    atorvastatin 80 milliGRAM(s) Oral at bedtime    artificial  tears Solution 1 Drop(s) Both EYES four times a day  calcium acetate 1334 milliGRAM(s) Oral three times a day with meals  chlorhexidine 2% Cloths 1 Application(s) Topical daily  epoetin tammi-epbx (RETACRIT) Injectable 36356 Unit(s) IV Push <User Schedule>  sodium chloride 0.9% Bolus. 100 milliLiter(s) IV Bolus every 5 minutes PRN      PAST MEDICAL/SURGICAL HISTORY  PAST MEDICAL & SURGICAL HISTORY:  HTN (Hypertension)      Chronic kidney disease      Kidney stones      Hemodialysis access, AV graft  Left upper extremity      Hyperparathyroidism      Anemia      Thrombocytopenia      Atrial fibrillation  on Eliquis      S/P Nephrectomy  (L) 2007 for kidney stones      Acquired arteriovenous fistula  left wrist  1/2015 - [facility], Left upper arm 4/2015 (approximate date)      AVF (arteriovenous fistula)          SOCIAL HISTORY: Substance Use (street drugs): ( x ) never used  (  ) other:    FAMILY HISTORY:  Family history of CVA (Father)        REVIEW OF SYSTEMS:  CONSTITUTIONAL: No fever, weight loss, or fatigue  EYES: No eye pain, visual disturbances, or discharge  ENMT:  No difficulty hearing, tinnitus, vertigo; No sinus or throat pain  BREASTS: No pain, masses, or nipple discharge  GASTROINTESTINAL: No abdominal or epigastric pain. No nausea, vomiting, or hematemesis; No diarrhea or constipation. No melena or hematochezia.  GENITOURINARY: No dysuria, frequency, hematuria, or incontinence  NEUROLOGICAL: No headaches, memory loss, loss of strength, numbness, or tremors  ENDOCRINE: No heat or cold intolerance; No hair loss  MUSCULOSKELETAL: No joint pain or swelling; No muscle, back, or extremity pain  PSYCHIATRIC: No depression, anxiety, mood swings, or difficulty sleeping  HEME/LYMPH: No easy bruising, or bleeding gums       PHYSICAL EXAM:  T(C): 37 (04-23-23 @ 13:00), Max: 37 (04-23-23 @ 13:00)  HR: 70 (04-23-23 @ 13:00) (70 - 75)  BP: 124/65 (04-23-23 @ 13:00) (124/65 - 128/74)  RR: 16 (04-23-23 @ 13:00) (16 - 18)  SpO2: 99% (04-23-23 @ 13:00) (99% - 100%)  Wt(kg): --  I&O's Summary    22 Apr 2023 07:01  -  23 Apr 2023 07:00  --------------------------------------------------------  IN: 400 mL / OUT: 3400 mL / NET: -3000 mL          GENERAL: NAD  EYES:   PERRLA   ENMT:   Moist mucous membranes, Good dentition, No lesions  Cardiovascular: Normal S1 S2, No JVD, No murmurs, No edema  Respiratory: Lungs clear to auscultation	  Gastrointestinal:  Soft, Non-tender, + BS	  Extremities: no edema                                    9.5    5.67  )-----------( 142      ( 23 Apr 2023 05:17 )             30.2     04-22    133<L>  |  94<L>  |  87<H>  ----------------------------<  112<H>  4.0   |  22  |  11.97<H>    Ca    7.4<L>      22 Apr 2023 15:15  Phos  2.7     04-22  Mg     1.90     04-22      proBNP:   Lipid Profile:   HgA1c:   TSH:     Consultant(s) Notes Reviewed:  [x ] YES  [ ] NO    Care Discussed with Consultants/Other Providers [ x] YES  [ ] NO    Imaging Personally Reviewed independently:  [x] YES  [ ] NO    All labs, radiologic studies, vitals, orders and medications list reviewed. Patient is seen and examined at bedside. Case discussed with medical team.

## 2023-04-23 NOTE — PROGRESS NOTE ADULT - NSPROGADDITIONALINFOA_GEN_ALL_CORE
Dr Caballero  Nephrology Attending  [facility]  office [phone]  ans serv- [phone]  ms Team-[place]
I reviewed the overnight course of events on the unit, re-confirming the patient history. I discussed the care with the patient and their family. The plan of care was discussed with the ACP team and modifications were made to the notation where appropriate. Differential diagnosis and plan of care discussed with patient after the evaluation. Advanced care planning was discussed with patient and family.  Advanced care planning forms were reviewed and discussed.  Risks, benefits and alternatives of cardiac procedures were discussed in detail and all questions were answered. 35 minutes spent on total encounter of which more than fifty percent of the encounter was spent counseling and/or coordinating care by the attending physician.
Dr Caballero  Nephrology Attending  [facility]  office [phone]  ans serv- [phone]  ms Team-[place]
I reviewed the overnight course of events on the unit, re-confirming the patient history. I discussed the care with the patient and their family. The plan of care was discussed with the ACP team and modifications were made to the notation where appropriate. Differential diagnosis and plan of care discussed with patient after the evaluation. Advanced care planning was discussed with patient and family.  Advanced care planning forms were reviewed and discussed.  Risks, benefits and alternatives of cardiac procedures were discussed in detail and all questions were answered. 35 minutes spent on total encounter of which more than fifty percent of the encounter was spent counseling and/or coordinating care by the attending physician.
I reviewed the overnight course of events on the unit, re-confirming the patient history. I discussed the care with the patient and their family. The plan of care was discussed with the ACP team and modifications were made to the notation where appropriate. Differential diagnosis and plan of care discussed with patient after the evaluation. Advanced care planning was discussed with patient and family.  Advanced care planning forms were reviewed and discussed.  Risks, benefits and alternatives of cardiac procedures were discussed in detail and all questions were answered. 35 minutes spent on total encounter of which more than fifty percent of the encounter was spent counseling and/or coordinating care by the attending physician.
Dr Caballero  Nephrology Attending  [facility]  office [phone]  ans serv- [phone]  ms Team-[place]
I reviewed the overnight course of events on the unit, re-confirming the patient history. I discussed the care with the patient and their family. The plan of care was discussed with the ACP team and modifications were made to the notation where appropriate. Differential diagnosis and plan of care discussed with patient after the evaluation. Advanced care planning was discussed with patient and family.  Advanced care planning forms were reviewed and discussed.  Risks, benefits and alternatives of cardiac procedures were discussed in detail and all questions were answered. 35 minutes spent on total encounter of which more than fifty percent of the encounter was spent counseling and/or coordinating care by the attending physician.

## 2023-04-23 NOTE — PROVIDER CONTACT NOTE (CRITICAL VALUE NOTIFICATION) - ASSESSMENT
Pt stable at this time. Heparin gtt infusing as per order.
pt resting in bed; asymptomatic
pt resting in bed; asymptomatic

## 2023-04-23 NOTE — PROVIDER CONTACT NOTE (CRITICAL VALUE NOTIFICATION) - ACTION/TREATMENT ORDERED:
ACP notified - awaiting orders if needed - will continue to monitor
patient placed in isolation
Continue current medical management.

## 2023-04-23 NOTE — PROGRESS NOTE ADULT - SUBJECTIVE AND OBJECTIVE BOX
[facility] : Ans Serv [phone], Office [phone]  Dr Caballero/Dr Vargas  /Dr Jordan etienne /Dr SERA Bojorquez/Dr Dagoberto Faust/Dr Jovan Garrison /Dr GAYATRI Garcia  _______________________________________________________________________________________________    seen and examined today for End Stage Renal Disease on Dialysis   Interval : no new complains   VITALS:  T(F): 97.6 (04-23 @ 05:35), Max: 98.7 (04-22 @ 18:30)  HR: 75 (04-23 @ 05:35)  BP: 128/74 (04-23 @ 05:35)  ABP: --  RR: 18 (04-23 @ 05:35)  SpO2: 100% (04-23 @ 05:35)    04-22 @ 07:01  -  04-23 @ 07:00  --------------------------------------------------------  IN: 400 mL / OUT: 3400 mL / NET: -3000 mL      Physical Exam :-  Constitutional: NAD  Respiratory: Bilateral equal breath sounds, no Crackles present.  Cardiovascular: S1, S2 normal, positive Murmur  Gastrointestinal: Bowel Sounds present, soft  Extremities: no Edema Feet  Neurological: Alert and Oriented x 3  Psychiatric: Normal mood, normal affect    Data:-  Allergies :   No Known Allergies    Hospital Medications:   MEDICATIONS  (STANDING):  apixaban 5 milliGRAM(s) Oral two times a day  artificial  tears Solution 1 Drop(s) Both EYES four times a day  aspirin enteric coated 81 milliGRAM(s) Oral daily  atorvastatin 80 milliGRAM(s) Oral at bedtime  calcium acetate 1334 milliGRAM(s) Oral three times a day with meals  chlorhexidine 2% Cloths 1 Application(s) Topical daily  doxycycline monohydrate Capsule 100 milliGRAM(s) Oral every 12 hours  epoetin tammi-epbx (RETACRIT) Injectable 46955 Unit(s) IV Push <User Schedule>  hydrALAZINE 50 milliGRAM(s) Oral three times a day  metoprolol succinate  milliGRAM(s) Oral daily  pantoprazole    Tablet 40 milliGRAM(s) Oral two times a day    04-22    133<L>  |  94<L>  |  87<H>  ----------------------------<  112<H>  4.0   |  22  |  11.97<H>    Ca    7.4<L>      22 Apr 2023 15:15  Phos  2.7     04-22  Mg     1.90     04-22      Creatinine Trend: 11.97 <--, 8.48 <--, 11.83 <--, 9.16 <--, 13.44 <--, 12.00 <--  egfr trend : 4 <--, 6 <--, 4 <--, 6 <--, 4 <--, 4 <--, 5 <--                        9.5    5.67  )-----------( 142      ( 23 Apr 2023 05:17 )             30.2

## 2023-04-23 NOTE — PROGRESS NOTE ADULT - SUBJECTIVE AND OBJECTIVE BOX
Name of Patient : CHAVEZ MARQUEZ  MRN: [id number]  Date of visit: 04-23-23       Subjective: Patient seen and examined. No new events except as noted.     REVIEW OF SYSTEMS:    CONSTITUTIONAL: No weakness, fevers or chills  EYES/ENT: No visual changes;  No vertigo or throat pain   NECK: No pain or stiffness  RESPIRATORY: No cough, wheezing, hemoptysis; No shortness of breath  CARDIOVASCULAR: No chest pain or palpitations  GASTROINTESTINAL: No abdominal or epigastric pain. No nausea, vomiting, or hematemesis; No diarrhea or constipation. No melena or hematochezia.  GENITOURINARY: No dysuria, frequency or hematuria  NEUROLOGICAL: No numbness or weakness  SKIN: No itching, burning, rashes, or lesions   All other review of systems is negative unless indicated above.    MEDICATIONS:  MEDICATIONS  (STANDING):  apixaban 5 milliGRAM(s) Oral two times a day  artificial  tears Solution 1 Drop(s) Both EYES four times a day  aspirin enteric coated 81 milliGRAM(s) Oral daily  atorvastatin 80 milliGRAM(s) Oral at bedtime  calcium acetate 1334 milliGRAM(s) Oral three times a day with meals  chlorhexidine 2% Cloths 1 Application(s) Topical daily  doxycycline monohydrate Capsule 100 milliGRAM(s) Oral every 12 hours  epoetin tammi-epbx (RETACRIT) Injectable 93776 Unit(s) IV Push <User Schedule>  hydrALAZINE 50 milliGRAM(s) Oral three times a day  metoprolol succinate  milliGRAM(s) Oral daily  pantoprazole    Tablet 40 milliGRAM(s) Oral two times a day      PHYSICAL EXAM:  T(C): 37 (04-23-23 @ 13:00), Max: 37 (04-23-23 @ 13:00)  HR: 70 (04-23-23 @ 13:00) (70 - 75)  BP: 124/65 (04-23-23 @ 13:00) (124/65 - 128/74)  RR: 16 (04-23-23 @ 13:00) (16 - 18)  SpO2: 99% (04-23-23 @ 13:00) (99% - 100%)  Wt(kg): --  I&O's Summary    22 Apr 2023 07:01  -  23 Apr 2023 07:00  --------------------------------------------------------  IN: 400 mL / OUT: 3400 mL / NET: -3000 mL    Appearance: Normal	  HEENT:  PERRLA   Lymphatic: No lymphadenopathy   Cardiovascular: Normal S1 S2, no JVD  Respiratory: normal effort , clear  Gastrointestinal:  Soft, Non-tender  Skin: No rashes,  warm to touch  Psychiatry:  Mood & affect appropriate  Musculuskeletal: No edema    recent labs, Imaging and EKGs personally reviewed     04-22-23 @ 07:01  -  04-23-23 @ 07:00  --------------------------------------------------------  IN: 400 mL / OUT: 3400 mL / NET: -3000 mL                          9.5    5.67  )-----------( 142      ( 23 Apr 2023 05:17 )             30.2               04-22    133<L>  |  94<L>  |  87<H>  ----------------------------<  112<H>  4.0   |  22  |  11.97<H>    Ca    7.4<L>      22 Apr 2023 15:15  Phos  2.7     04-22  Mg     1.90     04-22

## 2023-04-24 ENCOUNTER — TRANSCRIPTION ENCOUNTER (OUTPATIENT)
Age: 68
End: 2023-04-24

## 2023-04-24 VITALS
DIASTOLIC BLOOD PRESSURE: 72 MMHG | HEART RATE: 72 BPM | TEMPERATURE: 97 F | RESPIRATION RATE: 17 BRPM | OXYGEN SATURATION: 100 % | SYSTOLIC BLOOD PRESSURE: 125 MMHG

## 2023-04-24 LAB
HCT VFR BLD CALC: 27.9 % — LOW (ref 39–50)
HGB BLD-MCNC: 8.9 G/DL — LOW (ref 13–17)
MCHC RBC-ENTMCNC: 29 PG — SIGNIFICANT CHANGE UP (ref 27–34)
MCHC RBC-ENTMCNC: 31.9 GM/DL — LOW (ref 32–36)
MCV RBC AUTO: 90.9 FL — SIGNIFICANT CHANGE UP (ref 80–100)
NRBC # BLD: 0 /100 WBCS — SIGNIFICANT CHANGE UP (ref 0–0)
NRBC # FLD: 0 K/UL — SIGNIFICANT CHANGE UP (ref 0–0)
PLATELET # BLD AUTO: 165 K/UL — SIGNIFICANT CHANGE UP (ref 150–400)
RBC # BLD: 3.07 M/UL — LOW (ref 4.2–5.8)
RBC # FLD: 14.1 % — SIGNIFICANT CHANGE UP (ref 10.3–14.5)
WBC # BLD: 6.14 K/UL — SIGNIFICANT CHANGE UP (ref 3.8–10.5)
WBC # FLD AUTO: 6.14 K/UL — SIGNIFICANT CHANGE UP (ref 3.8–10.5)

## 2023-04-24 RX ORDER — METOPROLOL TARTRATE 50 MG
1 TABLET ORAL
Qty: 30 | Refills: 0
Start: 2023-04-24 | End: 2023-05-23

## 2023-04-24 RX ORDER — NEOMYCIN/POLYMYXIN B/DEXAMETHA 0.1 %
1 SUSPENSION, DROPS(FINAL DOSAGE FORM)(ML) OPHTHALMIC (EYE)
Refills: 0 | Status: DISCONTINUED | OUTPATIENT
Start: 2023-04-24 | End: 2023-04-24

## 2023-04-24 RX ORDER — HYDRALAZINE HCL 50 MG
1 TABLET ORAL
Qty: 0 | Refills: 0 | DISCHARGE
Start: 2023-04-24

## 2023-04-24 RX ORDER — NEOMYCIN/POLYMYXIN B/DEXAMETHA 0.1 %
1 SUSPENSION, DROPS(FINAL DOSAGE FORM)(ML) OPHTHALMIC (EYE)
Qty: 1 | Refills: 0
Start: 2023-04-24 | End: 2023-05-07

## 2023-04-24 RX ORDER — ASPIRIN/CALCIUM CARB/MAGNESIUM 324 MG
1 TABLET ORAL
Qty: 0 | Refills: 0 | DISCHARGE
Start: 2023-04-24

## 2023-04-24 RX ORDER — METOPROLOL TARTRATE 50 MG
1 TABLET ORAL
Refills: 0 | DISCHARGE

## 2023-04-24 RX ORDER — HYDRALAZINE HCL 50 MG
1 TABLET ORAL
Qty: 90 | Refills: 0
Start: 2023-04-24 | End: 2023-05-23

## 2023-04-24 RX ORDER — METOPROLOL TARTRATE 50 MG
1 TABLET ORAL
Qty: 0 | Refills: 0 | DISCHARGE
Start: 2023-04-24

## 2023-04-24 RX ADMIN — Medication 1334 MILLIGRAM(S): at 11:43

## 2023-04-24 RX ADMIN — Medication 1 DROP(S): at 17:15

## 2023-04-24 RX ADMIN — Medication 50 MILLIGRAM(S): at 06:34

## 2023-04-24 RX ADMIN — Medication 81 MILLIGRAM(S): at 11:43

## 2023-04-24 RX ADMIN — PANTOPRAZOLE SODIUM 40 MILLIGRAM(S): 20 TABLET, DELAYED RELEASE ORAL at 17:14

## 2023-04-24 RX ADMIN — Medication 100 MILLIGRAM(S): at 06:34

## 2023-04-24 RX ADMIN — CHLORHEXIDINE GLUCONATE 1 APPLICATION(S): 213 SOLUTION TOPICAL at 11:44

## 2023-04-24 RX ADMIN — PANTOPRAZOLE SODIUM 40 MILLIGRAM(S): 20 TABLET, DELAYED RELEASE ORAL at 06:32

## 2023-04-24 RX ADMIN — Medication 1 DROP(S): at 06:32

## 2023-04-24 RX ADMIN — Medication 1334 MILLIGRAM(S): at 07:11

## 2023-04-24 RX ADMIN — APIXABAN 5 MILLIGRAM(S): 2.5 TABLET, FILM COATED ORAL at 06:33

## 2023-04-24 RX ADMIN — Medication 1 DROP(S): at 11:44

## 2023-04-24 RX ADMIN — APIXABAN 5 MILLIGRAM(S): 2.5 TABLET, FILM COATED ORAL at 17:14

## 2023-04-24 RX ADMIN — Medication 1334 MILLIGRAM(S): at 17:14

## 2023-04-24 RX ADMIN — Medication 100 MILLIGRAM(S): at 06:33

## 2023-04-24 RX ADMIN — Medication 100 MILLIGRAM(S): at 17:14

## 2023-04-24 RX ADMIN — Medication 50 MILLIGRAM(S): at 14:20

## 2023-04-24 NOTE — PROGRESS NOTE ADULT - PROBLEM SELECTOR PROBLEM 2
ESRD needing dialysis

## 2023-04-24 NOTE — DISCHARGE NOTE NURSING/CASE MANAGEMENT/SOCIAL WORK - NSDCVIVACCINE_GEN_ALL_CORE_FT
influenza, high-dose, quadrivalent; 01-Nov-2021 07:20; Malka Houser (RN); [employer]; [id number] (Exp. Date: 31-May-2022); IntraMuscular; Deltoid Left.; 0.7 milliLiter(s); VIS (VIS Published: 06-Aug-2021, VIS Presented: 01-Nov-2021);

## 2023-04-24 NOTE — PROGRESS NOTE ADULT - SUBJECTIVE AND OBJECTIVE BOX
[facility] - S Alicia / Ej S /D Federico/ SERA Bojorquez/ SERA Faust/ Jovan Garrison / M Danou/ O Gregorio  service -[phone], office [phone]  ---------------------------------------------------------------------------------------------------------------    Patient seen and examined bedside    Subjective and Objective: No overnight events, sob resolved. No complaints today. feeling better    Allergies: No Known Allergies      Hospital Medications:   MEDICATIONS  (STANDING):  apixaban 5 milliGRAM(s) Oral two times a day  artificial  tears Solution 1 Drop(s) Both EYES four times a day  aspirin enteric coated 81 milliGRAM(s) Oral daily  atorvastatin 80 milliGRAM(s) Oral at bedtime  calcium acetate 1334 milliGRAM(s) Oral three times a day with meals  chlorhexidine 2% Cloths 1 Application(s) Topical daily  dexamethasone/neomycin/polymyxin Ointment 1 Application(s) Left EYE two times a day  doxycycline monohydrate Capsule 100 milliGRAM(s) Oral every 12 hours  epoetin tammi-epbx (RETACRIT) Injectable 02942 Unit(s) IV Push <User Schedule>  hydrALAZINE 50 milliGRAM(s) Oral three times a day  metoprolol succinate  milliGRAM(s) Oral daily  pantoprazole    Tablet 40 milliGRAM(s) Oral two times a day      REVIEW OF SYSTEMS:  CONSTITUTIONAL: No weakness, fevers or chills  EYES/ENT: No visual changes;  No vertigo or throat pain   NECK: No pain or stiffness  RESPIRATORY: No cough, wheezing, hemoptysis; No shortness of breath  CARDIOVASCULAR: No chest pain or palpitations.  GASTROINTESTINAL: No abdominal or epigastric pain. No nausea, vomiting, or hematemesis; No diarrhea or constipation. No melena or hematochezia.  GENITOURINARY: No dysuria, frequency, foamy urine, urinary urgency, incontinence or hematuria  NEUROLOGICAL: No numbness or weakness  SKIN: No itching, burning, rashes, or lesions   VASCULAR: No bilateral lower extremity edema.   All other review of systems is negative unless indicated above.    VITALS:  T(F): 97.3 (04-24-23 @ 11:12), Max: 98.7 (04-23-23 @ 22:30)  HR: 72 (04-24-23 @ 11:12)  BP: 125/72 (04-24-23 @ 11:12)  RR: 17 (04-24-23 @ 11:12)  SpO2: 100% (04-24-23 @ 11:12)  Wt(kg): --        PHYSICAL EXAM:  Constitutional: NAD  HEENT: anicteric sclera, oropharynx clear  Neck: No JVD  Respiratory: CTAB, no wheezes, rales or rhonchi  Cardiovascular: S1, S2, RRR  Gastrointestinal: BS+, soft, NT/ND  Extremities: No cyanosis or clubbing. No peripheral edema  Neurological: A/O x 3, no focal deficits  Psychiatric: Normal mood, normal affect  : No CVA tenderness. No siegel.   Skin: No rashes  Vascular Access:    LABS:        Creatinine Trend: 11.97 <--, 8.48 <--, 11.83 <--, 9.16 <--, 13.44 <--                        8.9    6.14  )-----------( 165      ( 24 Apr 2023 05:28 )             27.9     Urine Studies:        RADIOLOGY & ADDITIONAL STUDIES:   [facility] - S Alicia / Ej S /D Federico/ S Maryellen/ SERA Faust/ Jovan Garrison / M Jose/ O Gregorio  service [phone], office [phone]  ---------------------------------------------------------------------------------------------------------------    Patient seen and examined bedside    Subjective and Objective: No overnight events, denied sob. No complaints today. feeling better    Allergies: No Known Allergies      Hospital Medications:   MEDICATIONS  (STANDING):  apixaban 5 milliGRAM(s) Oral two times a day  artificial  tears Solution 1 Drop(s) Both EYES four times a day  aspirin enteric coated 81 milliGRAM(s) Oral daily  atorvastatin 80 milliGRAM(s) Oral at bedtime  calcium acetate 1334 milliGRAM(s) Oral three times a day with meals  chlorhexidine 2% Cloths 1 Application(s) Topical daily  dexamethasone/neomycin/polymyxin Ointment 1 Application(s) Left EYE two times a day  doxycycline monohydrate Capsule 100 milliGRAM(s) Oral every 12 hours  epoetin tammi-epbx (RETACRIT) Injectable 42366 Unit(s) IV Push <User Schedule>  hydrALAZINE 50 milliGRAM(s) Oral three times a day  metoprolol succinate  milliGRAM(s) Oral daily  pantoprazole    Tablet 40 milliGRAM(s) Oral two times a day    VITALS:  T(F): 97.3 (04-24-23 @ 11:12), Max: 98.7 (04-23-23 @ 22:30)  HR: 72 (04-24-23 @ 11:12)  BP: 125/72 (04-24-23 @ 11:12)  RR: 17 (04-24-23 @ 11:12)  SpO2: 100% (04-24-23 @ 11:12)  Wt(kg): --      PHYSICAL EXAM:  Constitutional: NAD  HEENT: anicteric sclera  Neck: No JVD  Respiratory: CTAB, no wheezes, rales or rhonchi  Cardiovascular: S1, S2, RRR  Gastrointestinal: BS+, soft, NT/ND  Extremities: no pedal edema b/l   Neurological: A/O x 3  Psychiatric: Normal mood, normal affect  : No siegel.   Vascular Access: avf+thrill    LABS:        Creatinine Trend: 11.97 <--, 8.48 <--, 11.83 <--, 9.16 <--, 13.44 <--                        8.9    6.14  )-----------( 165      ( 24 Apr 2023 05:28 )             27.9     Urine Studies:        RADIOLOGY & ADDITIONAL STUDIES:

## 2023-04-24 NOTE — PROGRESS NOTE ADULT - SUBJECTIVE AND OBJECTIVE BOX
[facility] DEPARTMENT OF OPHTHALMOLOGY  ------------------------------------------------------------------------------  Preethi Bocanegra MD PGY-3  ------------------------------------------------------------------------------    Interval History: NOEL swelling improving. Pt without new ocular complaints.     MEDICATIONS  (STANDING):  apixaban 5 milliGRAM(s) Oral two times a day  artificial  tears Solution 1 Drop(s) Both EYES four times a day  aspirin enteric coated 81 milliGRAM(s) Oral daily  atorvastatin 80 milliGRAM(s) Oral at bedtime  calcium acetate 1334 milliGRAM(s) Oral three times a day with meals  chlorhexidine 2% Cloths 1 Application(s) Topical daily  doxycycline monohydrate Capsule 100 milliGRAM(s) Oral every 12 hours  epoetin tammi-epbx (RETACRIT) Injectable 87813 Unit(s) IV Push <User Schedule>  hydrALAZINE 50 milliGRAM(s) Oral three times a day  metoprolol succinate  milliGRAM(s) Oral daily  pantoprazole    Tablet 40 milliGRAM(s) Oral two times a day    MEDICATIONS  (PRN):  acetaminophen     Tablet .. 650 milliGRAM(s) Oral every 6 hours PRN Mild Pain (1 - 3)  melatonin 3 milliGRAM(s) Oral at bedtime PRN Insomnia  nitroglycerin     SubLingual 0.4 milliGRAM(s) SubLingual every 5 minutes PRN Chest Pain  sodium chloride 0.9% Bolus. 100 milliLiter(s) IV Bolus every 5 minutes PRN SBP LESS THAN or EQUAL to 90 mmHg      VITALS: T(C): 36.3 (04-24-23 @ 11:12)  T(F): 97.3 (04-24-23 @ 11:12), Max: 98.7 (04-23-23 @ 22:30)  HR: 72 (04-24-23 @ 11:12) (72 - 75)  BP: 125/72 (04-24-23 @ 11:12) (115/66 - 136/89)  RR:  (17 - 18)  SpO2:  (98% - 100%)  Wt(kg): --  General: AAO x 3, appropriate mood and affect    Ophthalmology Exam:  Visual acuity (sc): 20/20 OD, 20/20 OS  Pupils: PERRL OU, no APD  Ttono: 15, 11  Extraocular movements (EOMs): Full OU, no pain, no diplopia  Confrontational Visual Field (CVF): Full OU  Color Plates: 12/12 OU    Pen Light Exam (PLE)  External: Flat OU  Lids/Lashes/Lacrimal Ducts: Flat OD. NOEL medial 1+ erythema and edema - improved  Sclera/Conjunctiva: W+Q OU  Cornea: Cl OU  Anterior Chamber: D+F OU    Iris: Flat OU  Lens: PCIOL OU

## 2023-04-24 NOTE — DISCHARGE NOTE NURSING/CASE MANAGEMENT/SOCIAL WORK - PATIENT PORTAL LINK FT
You can access the FollowMyHealth Patient Portal offered by [facility] by registering at the following website: [email or web address]. By joining [facility]’s FollowMyHealth portal, you will also be able to view your health information using other applications (apps) compatible with our system.

## 2023-04-24 NOTE — PROGRESS NOTE ADULT - ASSESSMENT
68M with PMHx HTN, ESRD Tu/Th/Sa, CVA (right facial droop), Afib on eliquis, hx GIB?, vestibular schwannoma presenting with chest pain and SOB x2 days. Admitted for cardiac w/u, new EKG changes. 
68M with PMHx HTN, ESRD Tu/Th/Sa, CVA (right facial droop), Afib on eliquis, hx GIB?, vestibular schwannoma presenting with chest pain and SOB x2 days. Admitted for cardiac w/u, new EKG changes. 
68M with PMHx HTN, ESRD Tu/Th/Sa, CVA (right facial droop), Afib on eliquis, hx GIB?, vestibular schwannoma presenting with chest pain and SOB x2 days. Admitted for cardiac w/u, new EKG changes. RENAL consulted for ESRD/ HD Mx.    End Stage Renal Disease on Dialysis   HD unit - previously at [facility]. [place] rehab w/onsite HD -states was d/c from rehab  the patient is OK with following at [facility] vs [facility]  last HD 4/11  K, vol ok   bp high, not in chf clinically  3 calcium bath on HD due to hypocalcemia    Informed consent for HD obtained from pt. and in chart  Due for HD tomorrow  dose meds for ESRD  renal diet, fluid restriction 1L/day when not NPO    HTN, uncontrolled. BP high likely from missing hd  Resumed metoprolol 75mg qd  added hydral 25mg TID, uptitrate as needed.  consider adding Isosorbide  inc uf w/HD    Unstable angina.   Intermittent CP and SOB x2 days. Now resolved after nitroglycerin. EKG with new TWI in V2, V3  C 6/2021 with nonobstructive CAD  -telemonitoring  - f/u ho  -started on heparin gtt  - c/w ASA, BB, statin  -rest of ischemic eval per primary cardiologist Dr. Epperson     Atrial fibrillation. Holding eliquis as on Hep gtt  Metoprolol 75mg qd.    History of GI bleed. Anemia in CKD  PPI BID  -iron studies, active T&S  -FOBT.  added epo 16k tiw -> 20k w/hd  S/P transfusion on 4/18/23  monitor H/H qd. prn PRBC      For any question, call:  Cell # [phone]  Pager # [phone]  Callback # [phone]
68M with PMHx HTN, ESRD Tu/Th/Sa, CVA (right facial droop), Afib on eliquis, hx GIB?, vestibular schwannoma presenting with chest pain and SOB x2 days. Admitted for cardiac w/u, new EKG changes. RENAL consulted for ESRD/ HD Mx.    End Stage Renal Disease on Dialysis   HD unit - previously at [facility]. [place] rehab w/onsite HD -states was d/c from rehab but not yet readmitted to [facility] HD unit  last HD 4/11  K, vol ok   bp high, not in chf clinically  3 calcium bath on HD due to hypocalcemia    Informed consent for HD obtained from pt. and in chart  Due for HD today  dose meds for ESRD  renal diet, fluid restriction 1L/day when not NPO    HTN, uncontrolled. BP high likely from missing hd  Resumed metoprolol 75mg qd  added hydral 25mg TID, uptitrate as needed.  consider adding Isosorbide  inc uf w/HD    Unstable angina.   Intermittent CP and SOB x2 days. Now resolved after nitroglycerin. EKG with new TWI in V2, V3  LHC 6/2021 with nonobstructive CAD  -telemonitoring  - f/u ho  -started on heparin gtt  - c/w ASA, BB, statin  -rest of ischemic eval per primary cardiologist Dr. Epperson     Atrial fibrillation. Holding eliquis as on Hep gtt  Metoprolol 75mg qd.    History of GI bleed. Anemia in CKD  PPI BID  -iron studies, active T&S  -FOBT.  added epo 16k tiw -> 20k w/hd  monitor H/H qd. prn PRBC      For any question, call:  Cell # [phone]  Pager # [phone]  Callback # [phone]
68M with PMHx HTN, ESRD Tu/Th/Sa, CVA (right facial droop), Afib on eliquis, hx GIB?, vestibular schwannoma presenting with chest pain and SOB x2 days. Admitted for cardiac w/u, new EKG changes. RENAL consulted for ESRD/ HD Mx.    End Stage Renal Disease on Dialysis   HD unit - previously at [facility]. [place] [facility] w/onsite HD -states was d/c from rehab  the patient is OK with following at [facility]  last HD 4/11  Volume Status : stable    Plan  3 calcium bath on HD due to hypocalcemia  Ultrafiltration on Dialysis as tolerated with blood pressure   next hemodialysis tuesday   monitor for hypotension on hemodialysis     HTN,  Medications :  hydrALAZINE 50 milliGRAM(s) Oral three times a day  metoprolol succinate  milliGRAM(s) Oral daily    - Continue current medications  - plan to titrate anti hypertensive medication as needed  - low salt diet if not npo suggested    Unstable angina.   Intermittent CP and SOB x2 days. Now resolved after nitroglycerin. EKG with new TWI in V2, V3  C 6/2021 with nonobstructive CAD  -telemonitoring  - f/u ho  -started on heparin gtt  - c/w ASA, BB, statin  -rest of ischemic eval per primary cardiologist Dr. Epperson     Atrial fibrillation. Holding eliquis as on Hep gtt  Metoprolol 75mg qd.    History of GI bleed. Anemia in CKD  Anemia Labs   Hemoglobin : 9.5    Medications :  Recent FRANNY class Medication Administration as per chart:  epoetin tammi-epbx (RETACRIT) Injectable: [id number] Unit(s) IV Push (04-20-23 @ 07:49)    Current orders :  epoetin tammi-epbx (RETACRIT) Injectable [id number] Unit(s) IV Push <User Schedule>    - plan to titrate medication as per responce of hemoglobin  - if Hb less than 7gm/dl consider blood transfusion        
68M with PMHx HTN, ESRD Tu/Th/Sa, CVA (right facial droop), Afib on eliquis, hx GIB?, vestibular schwannoma presenting with chest pain and SOB x2 days. Admitted for cardiac w/u, new EKG changes. RENAL consulted for ESRD/ HD Mx.    End Stage Renal Disease on Dialysis   HD unit - previously at [facility]. [place] [facility] w/onsite HD -states was d/c from rehab  the patient is OK with following at [facility] vs [facility]  last HD 4/11  K, vol ok   bp high, not in chf clinically  3 calcium bath on HD due to hypocalcemia  increasing UF in future HD sessions (high BP)    Informed consent for HD obtained from pt. and in chart  Due for HD sat  dose meds for ESRD  renal diet, fluid restriction 1L/day when not NPO    HTN, uncontrolled. BP high likely from missing hd  Resumed metoprolol 75mg qd  added hydral 25mg TID, uptitrate as needed.  consider adding Isosorbide  inc uf w/HD    Unstable angina.   Intermittent CP and SOB x2 days. Now resolved after nitroglycerin. EKG with new TWI in V2, V3  C 6/2021 with nonobstructive CAD  -telemonitoring  - f/u ho  -started on heparin gtt  - c/w ASA, BB, statin  -rest of ischemic eval per primary cardiologist Dr. Epperson     Atrial fibrillation. Holding eliquis as on Hep gtt  Metoprolol 75mg qd.    History of GI bleed. Anemia in CKD  PPI BID  -iron studies, active T&S  -FOBT.  added epo 16k tiw -> 20k w/hd  S/P transfusion on 4/18/23  monitor H/H qd. prn PRBC      For any question, call:  Cell # [phone]  Pager # [phone]  Callback # [phone]
EKG SR new TWI v2-v3       1. Chest pain  -EKG with new TWI V2-v3  -TTE  normal LV function no WMA  -will get NST  -c/w asa     2. ESRD  - on HD plan for pRBC transfusion today with HD  - f/u renal recs    3. Afib  -will hold eliquis pending stress results   -episode of rapid afib overnight s/p IV lopressor now improved. will increase metoprolol to 100mg  -continue to monitor tele     4. HTN  -BP improving  -c/w metoprolol and hydral  -continue to monitor BP   
EKG SR new TWI v2-v3       1. Chest pain  -EKG with new TWI V2-v3  -s/p hep gtt discontinued  -TTE  normal LV function no WMA  -will get NST  -c/w asa     2. ESRD  - on HD plan for pRBC transfusion today with HD  - f/u renal recs    3. Afib  -will hold eliquis pending stress results   -c/w metoprolol    4. HTN  -BP fluctuating  -c/w metoprolol  -increase hydral if BP remains elevated  -continue to monitor BP  
EKG SR new TWI v2-v3     Echo < from: Transthoracic Echocardiogram (06.18.22 @ 07:56) >  1. Calcified trileaflet aortic valve with normal opening.  Minimal aortic regurgitation.  2. Mildly dilated left atrium.  LA volume index = 39 cc/m2.  3. Normal left ventricular systolic function. No segmental  wall motion abnormalities.  4. Normal right ventricular size and function.  5. Agitated saline injection demonstrates no obvious  evidence of a patent foramen ovale.    < end of copied text >    NSTEMI: C/w asa heparin drip , BP better controlled ,    ESRD on HD   - f/u renal recs recommend transfuse 2 units with next HD as might need angiogram     Afib restart metoprolol , heparin drip     DVT PPX heparin drip
68M with PMHx HTN, ESRD Tu/Th/Sa, CVA (right facial droop), Afib on eliquis, hx GIB?, vestibular schwannoma presenting with chest pain and SOB x2 days. Admitted for cardiac w/u, new EKG changes. 
Assessment and Recommendations:  68y male w/ pmhx/ochx of HTN, ESRD Tu/Th/Sa, CVA (right facial droop), Afib on eliquis, hx GIB?, vestibular schwannoma presenting with chest pain and SOB. Ophthalmology consulted for left upper lid swelling of 2 days duration found to have canaliculitis. Improving.     # Left upper canaliculitis  - purulent discharge expressed from left upper punctum with manipulation  - culture obtained - growing MRSA sensitive to tetracyclines  - start Maxitrol (neomycin/polymyxin/dexamethasone) ointment BID to left upper lid 7-14 day duration (ophtho ordered). IOP wnl.   - warm compresses TID with massage to area  - c/w doxycycline 100mg BID PO for 7 day course  - f/u outpatient. no contraindication to dc.       Outpatient follow-up: Patient should follow-up with his/her ophthalmologist or with [facility] Department of Ophthalmology upon discharge at the address below     [address] [place] [address]. [address]  [place] [address]  [phone]
EKG SR new TWI v2-v3       1. Chest pain  -EKG with new TWI V2-v3  -TTE  normal LV function no WMA  -NST no ischemia     2. ESRD  - on HD   - f/u renal recs    3. Afib  -rate controlled now  -c/w metoprolol and eliquis  -continue to monitor tele     4. HTN  -BP controlled  -hydral 50mg TID  -c/w metoprolol   -continue to monitor BP  
EKG SR new TWI v2-v3       1. Chest pain  -EKG with new TWI V2-v3  -TTE  normal LV function no WMA  -NST no ischemia     2. ESRD  - on HD   - f/u renal recs    3. Afib  -rate controlled now  -c/w metoprolol and eliquis  -continue to monitor tele     4. HTN  -BP elevated  -hydral increased to 50mg TID  -c/w metoprolol   -continue to monitor BP     
EKG SR new TWI v2-v3     Echo < from: Transthoracic Echocardiogram (06.18.22 @ 07:56) >  1. Calcified trileaflet aortic valve with normal opening.  Minimal aortic regurgitation.  2. Mildly dilated left atrium.  LA volume index = 39 cc/m2.  3. Normal left ventricular systolic function. No segmental  wall motion abnormalities.  4. Normal right ventricular size and function.  5. Agitated saline injection demonstrates no obvious  evidence of a patent foramen ovale.    < end of copied text >    NSTEMI: C/w asa heparin drip , BP better controlled ,    ESRD on HD   - f/u renal recs recommend transfuse 2 units with next HD as might need angiogram     Afib restart metoprolol , heparin drip     DVT PPX heparin drip
68M with PMHx HTN, ESRD Tu/Th/Sa, CVA (right facial droop), Afib on eliquis, hx GIB?, vestibular schwannoma presenting with chest pain and SOB x2 days. Admitted for cardiac w/u, new EKG changes. RENAL consulted for ESRD/ HD Mx.    End Stage Renal Disease on Dialysis   HD unit - previously at [facility]. [place] rehab w/onsite HD -states was d/c from rehab but not yet readmitted to [facility] HD unit  last HD 4/11  K, vol ok   bp high, not in chf clinically    Informed consent for HD obtained from pt. consent in chart.   S/P HD on saturday  next HD session on Tuesday  dose meds for ESRD  renal diet, fluid restriction 1L/day when not NPO    HTN, uncontrolled. BP high likely from missing hd  Resumed metoprolol 75mg qd  added hydral 25mg TID, uptitrate as needed.  consider adding Isosorbide  inc uf w/HD    Unstable angina.   Intermittent CP and SOB x2 days. Now resolved after nitroglycerin. EKG with new TWI in V2, V3  C 6/2021 with nonobstructive CAD  -telemonitoring  - f/u ho  -started on heparin gtt  - c/w ASA, BB, statin  -rest of ischemic eval per primary cardiologist Dr. Epperson     Atrial fibrillation. Holding eliquis as on Hep gtt  Metoprolol 75mg qd.    History of GI bleed. Anemia in CKD  PPI BID  -iron studies, active T&S  -FOBT.  added epo 16k tiw w/hd  monitor H/H qd. prn PRBC      For any question, call:  Cell # [phone]  Pager # [phone]  Callback # [phone]
68M with PMHx HTN, ESRD Tu/Th/Sa, CVA (right facial droop), Afib on eliquis, hx GIB?, vestibular schwannoma presenting with chest pain and SOB x2 days. Admitted for cardiac w/u, new EKG changes. RENAL consulted for ESRD/ HD Mx.    End Stage Renal Disease on Dialysis   HD unit - previously at [facility]. [place] rehab w/onsite HD -states was d/c from rehab  the patient is OK with following at [facility] vs [facility]  last HD 4/11  K, vol ok       Plan  3 calcium bath on HD due to hypocalcemia  Ultrafiltration on Dialysis as tolerated with blood pressure   next hemodialysis tomorrow,     HTN,  Blood Pressure- mm of Hg (last 72 hrs)  HR: 79 (65 - 145)  BP: 134/68 (130/82 - 193/100)  ABP: --    Medications :  hydrALAZINE 50 milliGRAM(s) Oral three times a day  metoprolol succinate  milliGRAM(s) Oral daily    - Continue current medications  - plan to titrate anti hypertensive medication as needed  - low salt diet if not npo suggested    Unstable angina.   Intermittent CP and SOB x2 days. Now resolved after nitroglycerin. EKG with new TWI in V2, V3  LHC 6/2021 with nonobstructive CAD  -telemonitoring  - f/u ho  -started on heparin gtt  - c/w ASA, BB, statin  -rest of ischemic eval per primary cardiologist Dr. Epperson     Atrial fibrillation. Holding eliquis as on Hep gtt  Metoprolol 75mg qd.    History of GI bleed. Anemia in CKD  Anemia Labs   Hemoglobin : 9.2 <--, 8.6 <--, 8.5 <--, 7.4 <--, 8.2 <--  Platelets : 122 <--, 112 <--, 107 <--  Ferritin : 889 <--  Iron Saturation % : 57 <--, 26 <--, 25 <--  Folate :   Vitamin b12 :     Medications :  Recent FRANNY class Medication Administration as per chart:  epoetin tammi-epbx (RETACRIT) Injectable: [id number] Unit(s) IV Push (04-20-23 @ 07:49)    Current orders :  epoetin tammi-epbx (RETACRIT) Injectable [id number] Unit(s) IV Push <User Schedule>    - plan to titrate medication as per responce of hemoglobin  - if Hb less than 7gm/dl consider blood transfusion        
68M with PMHx HTN, ESRD Tu/Th/Sa, CVA (right facial droop), Afib on eliquis, hx GIB?, vestibular schwannoma presenting with chest pain and SOB x2 days. Admitted for cardiac w/u, new EKG changes. RENAL consulted for ESRD/ HD Mx.    End Stage Renal Disease on Dialysis   HD unit - previously at [facility]. [place] rehab w/onsite HD -states was d/c from rehab  the patient is OK with following at [facility]  Volume Status : stable    Plan  3 calcium bath on HD due to hypocalcemia  Ultrafiltration on Dialysis as tolerated with blood pressure   next hemodialysis tuesday/tomorrow  monitor for hypotension on hemodialysis     HTN,  Medications :  hydrALAZINE 50 milliGRAM(s) Oral three times a day  metoprolol succinate  milliGRAM(s) Oral daily    - Continue current medications  - plan to titrate anti hypertensive medication as needed  - low salt diet if not npo suggested    Unstable angina.   Intermittent CP and SOB x2 days. Now resolved after nitroglycerin. EKG with new TWI in V2, V3  [facility] 6/2021 with nonobstructive CAD  -telemonitoring  - f/u ho  -started on heparin gtt  - c/w ASA, BB, statin  -rest of ischemic eval per primary cardiologist Dr. Epperson     Atrial fibrillation. Holding eliquis as on Hep gtt  Metoprolol 75mg qd.    History of GI bleed. Anemia in CKD  Anemia Labs   Hemoglobin : 9.5    Medications :  Recent FRANNY class Medication Administration as per chart:  epoetin tammi-epbx (RETACRIT) Injectable: [id number] Unit(s) IV Push (04-20-23 @ 07:49)    Current orders :  epoetin tammi-epbx (RETACRIT) Injectable [id number] Unit(s) IV Push <User Schedule>    - plan to titrate medication as per responce of hemoglobin  - if Hb less than 7gm/dl consider blood transfusion    dc plan per team. stable renal stand point  will closely follow up.   poc d/w pt  labs, chart reviewed  For any question, pl call:  Nephrology  Cell -[phone]  Office [phone]  Ans Serv [phone]    
68M with PMHx HTN, ESRD Tu/Th/Sa, CVA (right facial droop), Afib on eliquis, hx GIB?, vestibular schwannoma presenting with chest pain and SOB x2 days. Admitted for cardiac w/u, new EKG changes. RENAL consulted for ESRD/ HD Mx.    End Stage Renal Disease on Dialysis   HD unit - previously at [facility]. [place] rehab w/onsite HD -states was d/c from rehab  the patient is OK with following at [facility]  last HD 4/11  Volume Status : stable      Plan  3 calcium bath on HD due to hypocalcemia  Ultrafiltration on Dialysis as tolerated with blood pressure   hemodialysis today  monitor for hypotension on hemodialysis     HTN,  Medications :  hydrALAZINE 50 milliGRAM(s) Oral three times a day  metoprolol succinate  milliGRAM(s) Oral daily    - Continue current medications  - plan to titrate anti hypertensive medication as needed  - low salt diet if not npo suggested    Unstable angina.   Intermittent CP and SOB x2 days. Now resolved after nitroglycerin. EKG with new TWI in V2, V3  [facility] 6/2021 with nonobstructive CAD  -telemonitoring  - f/u ho  -started on heparin gtt  - c/w ASA, BB, statin  -rest of ischemic eval per primary cardiologist Dr. Epperson     Atrial fibrillation. Holding eliquis as on Hep gtt  Metoprolol 75mg qd.    History of GI bleed. Anemia in CKD  Anemia Labs   Hemoglobin : 9.2 <--, 8.6 <--, 8.5 <--, 7.4 <--, 8.2 <--  Platelets : 122 <--, 112 <--, 107 <--  Ferritin : 889 <--  Iron Saturation % : 57 <--, 26 <--, 25 <--  Folate :   Vitamin b12 :     Medications :  Recent FRANNY class Medication Administration as per chart:  epoetin tammi-epbx (RETACRIT) Injectable: [id number] Unit(s) IV Push (04-20-23 @ 07:49)    Current orders :  epoetin tammi-epbx (RETACRIT) Injectable [id number] Unit(s) IV Push <User Schedule>    - plan to titrate medication as per responce of hemoglobin  - if Hb less than 7gm/dl consider blood transfusion        
EKG SR new TWI v2-v3       1. Chest pain  -EKG with new TWI V2-v3  -TTE  normal LV function no WMA  -NST no ischemia     2. ESRD  - on HD   - f/u renal recs    3. Afib  -rate controlled now  -c/w metoprolol and eliquis  -continue to monitor tele     4. HTN  -BP elevated  -hydral increased to 50mg TID  -c/w metoprolol   -continue to monitor BP     
68M with PMHx HTN, ESRD Tu/Th/Sa, CVA (right facial droop), Afib on eliquis, hx GIB?, vestibular schwannoma presenting with chest pain and SOB x2 days. Admitted for cardiac w/u, new EKG changes. 

## 2023-04-24 NOTE — DISCHARGE NOTE NURSING/CASE MANAGEMENT/SOCIAL WORK - NSDCCRNAME_GEN_ALL_CORE_FT
Outpatient dialysis center: [facility] [phone]--Tuesday April 25, report by 1:30pm. Thereafter, schedule is tuesday, thursday, saturday at 5:30am. Transportation arranged by the outpatient dialysis center .

## 2023-04-24 NOTE — PROGRESS NOTE ADULT - PROBLEM SELECTOR PROBLEM 6
Vestibular schwannoma

## 2023-04-24 NOTE — PROGRESS NOTE ADULT - SUBJECTIVE AND OBJECTIVE BOX
Ran Velasquez MD  Interventional Cardiology / Advance Heart Failure and Cardiac Transplant Specialist  [place] Office : [address] [place] [id number]  Tel:   [place] Office : [address] [facility] [address]  Tel: [phone]      Subjective/Overnight events: Pt is lying in bed comfortable not in distress, no chest pains no SOB no palpitations  	  MEDICATIONS:  apixaban 5 milliGRAM(s) Oral two times a day  aspirin enteric coated 81 milliGRAM(s) Oral daily  hydrALAZINE 50 milliGRAM(s) Oral three times a day  metoprolol succinate  milliGRAM(s) Oral daily  nitroglycerin     SubLingual 0.4 milliGRAM(s) SubLingual every 5 minutes PRN    doxycycline monohydrate Capsule 100 milliGRAM(s) Oral every 12 hours      acetaminophen     Tablet .. 650 milliGRAM(s) Oral every 6 hours PRN  melatonin 3 milliGRAM(s) Oral at bedtime PRN    pantoprazole    Tablet 40 milliGRAM(s) Oral two times a day    atorvastatin 80 milliGRAM(s) Oral at bedtime    artificial  tears Solution 1 Drop(s) Both EYES four times a day  calcium acetate 1334 milliGRAM(s) Oral three times a day with meals  chlorhexidine 2% Cloths 1 Application(s) Topical daily  dexamethasone/neomycin/polymyxin Ointment 1 Application(s) Left EYE two times a day  epoetin tammi-epbx (RETACRIT) Injectable [id number] Unit(s) IV Push <User Schedule>  sodium chloride 0.9% Bolus. 100 milliLiter(s) IV Bolus every 5 minutes PRN      PAST MEDICAL/SURGICAL HISTORY  PAST MEDICAL & SURGICAL HISTORY:  HTN (Hypertension)      Chronic kidney disease      Kidney stones      Hemodialysis access, AV graft  Left upper extremity      Hyperparathyroidism      Anemia      Thrombocytopenia      Atrial fibrillation  on Eliquis      S/P Nephrectomy  (L) 2007 for kidney stones      Acquired arteriovenous fistula  left wrist  1/2015 - [facility], Left upper arm 4/2015 (approximate date)      AVF (arteriovenous fistula)          SOCIAL HISTORY: Substance Use (street drugs): ( x ) never used  (  ) other:    FAMILY HISTORY:  Family history of CVA (Father)        PHYSICAL EXAM:  T(C): 36.3 (04-24-23 @ 11:12), Max: 37.1 (04-23-23 @ 22:30)  HR: 72 (04-24-23 @ 11:12) (72 - 75)  BP: 125/72 (04-24-23 @ 11:12) (115/66 - 136/89)  RR: 17 (04-24-23 @ 11:12) (17 - 18)  SpO2: 100% (04-24-23 @ 11:12) (98% - 100%)  Wt(kg): --  I&O's Summary        GENERAL: NAD  EYES: EOMI, PERRLA, conjunctiva and sclera clear  ENMT: No tonsillar erythema, exudates, or enlargement  Cardiovascular: Normal S1 S2, No JVD, No murmurs, No edema  Respiratory: Lungs clear to auscultation	  Gastrointestinal:  Soft, Non-tender, + BS	  Extremities: No edema                                     8.9    6.14  )-----------( 165      ( 24 Apr 2023 05:28 )             27.9     04-22    133<L>  |  94<L>  |  87<H>  ----------------------------<  112<H>  4.0   |  22  |  11.97<H>    Ca    7.4<L>      22 Apr 2023 15:15  Phos  2.7     04-22  Mg     1.90     04-22      proBNP:   Lipid Profile:   HgA1c:   TSH:     Consultant(s) Notes Reviewed:  [x ] YES  [ ] NO    Care Discussed with Consultants/Other Providers [ x] YES  [ ] NO    Imaging Personally Reviewed independently:  [x] YES  [ ] NO    All labs, radiologic studies, vitals, orders and medications list reviewed. Patient is seen and examined at bedside. Case discussed with medical team.

## 2023-04-24 NOTE — PROGRESS NOTE ADULT - PROBLEM SELECTOR PLAN 1
Intermittent CP and SOB x2 days. Now resolved after nitroglycerin. EKG with new TWI in V2, V3  [facility] 6/2021 with nonobstructive CAD  -tele  -trop 63, CKMB normal. Likely trop is elevated in setting of ESRD and missed HD as well. Not likely NSTEMI. Possible unstable angina however. Repeat troponin 61, CKMB normal  -echo  -on heparin gtt  -s/p ASA 162mg, resume ASA 81mg qd for now given new EKG changes  -monitor for bleeding while on hep gtt and ASA. May require GI input given hx of GIB if angiogram to be performed  -card follow up apprecoated   -wells score 0, not suspicious for PE
Intermittent CP and SOB x2 days. Now resolved after nitroglycerin. EKG with new TWI in V2, V3  [facility] 6/2021 with nonobstructive CAD  -tele  -trop 63, CKMB normal. Likely trop is elevated in setting of ESRD and missed HD as well. Not likely NSTEMI. Possible unstable angina however. Repeat troponin 61, CKMB normal  -echo  -on heparin gtt  -s/p ASA 162mg, resume ASA 81mg qd for now given new EKG changes  -monitor for bleeding while on hep gtt and ASA.  - PRBC with HD  - Carh per card
Intermittent CP and SOB x2 days. Now resolved after nitroglycerin. EKG with new TWI in V2, V3  [facility] 6/2021 with nonobstructive CAD  -tele  -trop 63, CKMB normal. Likely trop is elevated in setting of ESRD and missed HD as well. Not likely NSTEMI. Possible unstable angina however. Repeat troponin 61, CKMB normal  -echo  -on heparin gtt, switched to eliquis   -s/p ASA 162mg, resume ASA 81mg qd for now given new EKG changes  - PRBC with HD  - Carh per card
Intermittent CP and SOB x2 days. Now resolved after nitroglycerin. EKG with new TWI in V2, V3  [facility] 6/2021 with nonobstructive CAD  -tele  -trop 63, CKMB normal. Likely trop is elevated in setting of ESRD and missed HD as well. Not likely NSTEMI. Possible unstable angina however. Repeat troponin 61, CKMB normal  -echo  -on heparin gtt  -s/p ASA 162mg, resume ASA 81mg qd for now given new EKG changes  -monitor for bleeding while on hep gtt and ASA. May require GI input given hx of GIB if angiogram to be performed  -card follow up appreciated   - PRBC with HD  - Carh per card
Intermittent CP and SOB x2 days. Now resolved after nitroglycerin. EKG with new TWI in V2, V3  [facility] 6/2021 with nonobstructive CAD  -tele  -trop 63, CKMB normal. Likely trop is elevated in setting of ESRD and missed HD as well. Not likely NSTEMI. Possible unstable angina however. Repeat troponin 61, CKMB normal  -echo  -on heparin gtt  -s/p ASA 162mg, resume ASA 81mg qd for now given new EKG changes  -monitor for bleeding while on hep gtt and ASA. May require GI input given hx of GIB if angiogram to be performed  -card follow up appreciated   - PRBC with HD  - Carh per card
Intermittent CP and SOB x2 days. Now resolved after nitroglycerin. EKG with new TWI in V2, V3  [facility] 6/2021 with nonobstructive CAD  -tele  -trop 63, CKMB normal. Likely trop is elevated in setting of ESRD and missed HD as well. Not likely NSTEMI. Possible unstable angina however. Repeat troponin 61, CKMB normal  -echo  -on heparin gtt  -s/p ASA 162mg, resume ASA 81mg qd for now given new EKG changes  -monitor for bleeding while on hep gtt and ASA. May require GI input given hx of GIB if angiogram to be performed  -card follow up appreciated   - PRBC with HD  - Carh per card
Intermittent CP and SOB x2 days. Now resolved after nitroglycerin. EKG with new TWI in V2, V3  [facility] 6/2021 with nonobstructive CAD  -tele  -trop 63, CKMB normal. Likely trop is elevated in setting of ESRD and missed HD as well. Not likely NSTEMI. Possible unstable angina however. Repeat troponin 61, CKMB normal  -echo  -on heparin gtt  -s/p ASA 162mg, resume ASA 81mg qd for now given new EKG changes  -monitor for bleeding while on hep gtt and ASA. May require GI input given hx of GIB if angiogram to be performed  -card follow up appreciated   -wells score 0, not suspicious for PE
Intermittent CP and SOB x2 days. Now resolved after nitroglycerin. EKG with new TWI in V2, V3  [facility] 6/2021 with nonobstructive CAD  -tele  -trop 63, CKMB normal. Likely trop is elevated in setting of ESRD and missed HD as well. Not likely NSTEMI. Possible unstable angina however. Repeat troponin 61, CKMB normal  -echo  -on heparin gtt, switched to eliquis   -s/p ASA 162mg, resume ASA 81mg qd for now given new EKG changes  - PRBC with HD  - Carh per card
Intermittent CP and SOB x2 days. Now resolved after nitroglycerin. EKG with new TWI in V2, V3  [facility] 6/2021 with nonobstructive CAD  -tele  -trop 63, CKMB normal. Likely trop is elevated in setting of ESRD and missed HD as well. Not likely NSTEMI. Possible unstable angina however. Repeat troponin 61, CKMB normal  -echo  -on heparin gtt, switched to eliquis   -s/p ASA 162mg, resume ASA 81mg qd for now given new EKG changes  - PRBC with HD  - Carh per card

## 2023-04-24 NOTE — PROGRESS NOTE ADULT - PROVIDER SPECIALTY LIST ADULT
Cardiology
Internal Medicine
Ophthalmology
Cardiology
Cardiology
Nephrology
Cardiology
Nephrology
Cardiology
Cardiology
Nephrology
Cardiology
Internal Medicine

## 2023-04-24 NOTE — PROGRESS NOTE ADULT - PROBLEM SELECTOR PLAN 8
Hgb 7.7, lower than baseline 9-10s  -iron studies, active T&S  -PPI  -possible GI input should angiogram happen  -FOBT
Hgb 7.7, lower than baseline 9-10s  -iron studies, active T&S  -PPI  -possible GI input should angiogram happen  -FOBT
Hgb 7.7, lower than baseline 9-10s  -iron studies, active T&S  -PPI  - GI eval PRN  -FOBT
Hgb 7.7, lower than baseline 9-10s  -iron studies, active T&S  -PPI  - GI eval PRN  -FOBT
Hgb 7.7, lower than baseline 9-10s  -iron studies, active T&S  -PPI  -possible GI input should angiogram happen  -FOBT
Hgb 7.7, lower than baseline 9-10s  -iron studies, active T&S  -PPI  - GI eval PRN  -FOBT
Hgb 7.7, lower than baseline 9-10s  -iron studies, active T&S  -PPI  - GI eval PRN  -FOBT

## 2023-04-24 NOTE — PROGRESS NOTE ADULT - PROBLEM SELECTOR PROBLEM 1
Unstable angina

## 2023-04-24 NOTE — PROGRESS NOTE ADULT - REASON FOR ADMISSION
CP, SOB x2 days

## 2023-04-24 NOTE — DISCHARGE NOTE NURSING/CASE MANAGEMENT/SOCIAL WORK - NSDCFUADDAPPT_GEN_ALL_CORE_FT
Outpatient follow-up: Please follow-up with his/her ophthalmologist or with [facility] Department of Ophthalmology upon discharge at the address below     [address]  [place], [place] [id number] [phone]

## 2023-04-24 NOTE — PROGRESS NOTE ADULT - PROBLEM SELECTOR PLAN 3
Resume metoprolol 75mg qd  start hydral 25mg TID given -180s, uptitrate as needed

## 2023-04-24 NOTE — PROGRESS NOTE ADULT - PROBLEM SELECTOR PLAN 6
Discussed findings of this on MRI last year during stroke admission  -advised patient to f/u with ENT regarding this as outpatient. He understands and is amenable to doing so
Discussed findings of this on MRI last year during stroke admission  -advised patient to f/u with ENT regarding this as outpatient. He understands and is amenable to doing so      Eye irritation  optho eval
Discussed findings of this on MRI last year during stroke admission  -advised patient to f/u with ENT regarding this as outpatient. He understands and is amenable to doing so      Eye irritation  optho eval
Discussed findings of this on MRI last year during stroke admission  -advised patient to f/u with ENT regarding this as outpatient. He understands and is amenable to doing so
Discussed findings of this on MRI last year during stroke admission  -advised patient to f/u with ENT regarding this as outpatient. He understands and is amenable to doing so      Eye irritation  optho eval
Discussed findings of this on MRI last year during stroke admission  -advised patient to f/u with ENT regarding this as outpatient. He understands and is amenable to doing so      Eye irritation  optho eval
Discussed findings of this on MRI last year during stroke admission  -advised patient to f/u with ENT regarding this as outpatient. He understands and is amenable to doing so
Discussed findings of this on MRI last year during stroke admission  -advised patient to f/u with ENT regarding this as outpatient. He understands and is amenable to doing so
Discussed findings of this on MRI last year during stroke admission  -advised patient to f/u with ENT regarding this as outpatient. He understands and is amenable to doing so      Eye irritation  optho eval

## 2023-04-24 NOTE — PROGRESS NOTE ADULT - PROBLEM SELECTOR PLAN 7
HPI   44-year-old man with a past medical history of hypertension who presents to the emergency department due to diarrhea and abdominal pain. His symptoms initially started on Tuesday. Patient had not had a bowel movement in 2 days, and since that time he has been having multiple bouts of diarrhea daily. He says intermittently he feels like he is going to vomit but has not had any emesis. He denies any blood in his stool. He endorses occasional epigastric pain, but says at other times his pain migrates all over. He says his abdomen feels bloated. He has had a previous appendectomy and hernia repair. He denies fevers or chills. He denies lightheadedness. He denies urinary symptoms. He denies chest pain or shortness of breath.   Past Medical History:   Diagnosis Date    Anemia 04/01/2021    Concussion 06/2015    Generalized arthritis     History of urinary frequency     Every night    Hypertension     Pericarditis, acute 1998    PPD positive 1970    Precancerous skin lesion     Removed from left arm    Prostate CA ([facility] [address] 2019       Past Surgical History:   Procedure Laterality Date    COLONOSCOPY N/A 11/26/2019    COLONOSCOPY performed by Meenu Alvarado MD at [address] [facility] HX APPENDECTOMY  01/012014    HX COLONOSCOPY  9/14/2007    HX HEART CATHETERIZATION  1998    HX HERNIA REPAIR N/A     Umbilicus    HX HIP REPLACEMENT Right 01/09/2019    HX KNEE REPLACEMENT Right 08/2019    HX MENISCECTOMY Left 2007    HX VASECTOMY N/A          Family History:   Problem Relation Age of Onset    Diabetes Mother     Dementia Mother     Neuropathy Mother     No Known Problems Sister     OSTEOARTHRITIS Brother     No Known Problems Brother     No Known Problems Sister     Anesth Problems Neg Hx     Clotting Disorder Neg Hx        Social History     Socioeconomic History    Marital status:      Spouse name: Not on file    Number of children: Not on file    Years of education: Not
on file    Highest education level: Not on file   Occupational History    Not on file   Tobacco Use    Smoking status: Never Smoker    Smokeless tobacco: Never Used   Substance and Sexual Activity    Alcohol use: Yes     Alcohol/week: 3.0 standard drinks     Types: 3 Cans of beer per week    Drug use: No    Sexual activity: Yes     Partners: Female     Birth control/protection: None   Other Topics Concern    Not on file   Social History Narrative    Not on file     Social Determinants of Health     Financial Resource Strain:     Difficulty of Paying Living Expenses: Not on file   Food Insecurity:     Worried About Running Out of Food in the Last Year: Not on file    [place] of Food in the Last Year: Not on file   Transportation Needs:     Lack of Transportation (Medical): Not on file    Lack of Transportation (Non-Medical): Not on file   Physical Activity:     Days of Exercise per Week: Not on file    Minutes of Exercise per Session: Not on file   Stress:     Feeling of Stress : Not on file   Social Connections:     Frequency of Communication with Friends and Family: Not on file    Frequency of Social Gatherings with Friends and Family: Not on file    Attends Gnosticist Services: Not on file    Active Member of [address] [place] [address] or Organizations: Not on file    Attends Club or Organization Meetings: Not on file    Marital Status: Not on file   Intimate Partner Violence:     Fear of Current or Ex-Partner: Not on file    Emotionally Abused: Not on file    Physically Abused: Not on file    Sexually Abused: Not on file   Housing Stability:     Unable to Pay for Housing in the Last Year: Not on file    Number of [place] in the Last Year: Not on file    Unstable Housing in the Last Year: Not on file         ALLERGIES: Patient has no known allergies. Review of Systems   Complete review of systems was performed and all systems reviewed were negative unless documented in the HPI.     Vitals:
12/11/21 1001 12/11/21 1003 12/11/21 1010   BP:  (!) 82/54 (!) 95/48   Pulse: 80     Resp: 16     Temp: 97.6 °F (36.4 °C)     SpO2: 98%  100%   Weight: 120.2 kg (265 lb)     Height: 5' 10\" (1.778 m)              Physical Exam  Constitutional:       Comments: Appears ill. No diaphoresis. HENT:      Mouth/Throat:      Comments: Mucous membranes slightly dry  Eyes:      General: No scleral icterus. Extraocular Movements: Extraocular movements intact. Cardiovascular:      Comments: Regular rate and rhythm. Delayed capillary refill. No murmurs  Pulmonary:      Effort: Pulmonary effort is normal. No respiratory distress. Breath sounds: No wheezing or rales. Abdominal:      Comments: Soft. Mild epigastric tenderness. No rebound tenderness or guarding. Negative Cardenas sign. No tympany. Bowel sounds present   Skin:     General: Skin is warm. Neurological:      Comments: Awake and alert. Speech is normal.  GCS is 15. MDM   80-year-old man who presents with the above chief complaint. Patient hypotensive in triage with a blood pressure in the 30H systolic. This improved to 90s over 40s without intervention. No significant abdominal tenderness appreciated on exam.  No tympany. He has some slightly delayed capillary refill. Given his hypotension, CT of the abdomen and pelvis will be ordered to evaluate for any surgical pathology. He will be given IV fluids. Labs including blood cultures and a lactic acid will be obtained. Going to hold off antibiotics for the time being. Labs show new renal failure with a creatinine of over 2 as well as elevated ALT and AST to 611/815 respectively. Lactic acid was mildly elevated at 2.3 and bilirubin was 2.3 as well. EKG shows normal sinus rhythm with a rate of 74 and QTC of 432 with no concerning ST elevations or depressions. CT scan shows a 5 mm stone in the mid right ureter with no nephrosis. I doubt this is the cause of his symptoms.
Ultrasound showed a distended gallbladder and thickened gallbladder wall containing sludge. Patient CBD was 7 mm. I reexamined him multiple times and he has minimal if any pain in his right upper quadrant. Based on his labs, I am concerned he may actually have choledocholithiasis. Given his hypotension and elevated lactate I did give him a dose of Zosyn to cover for cholangitis, but this seems less likely at this time. Blood pressure improved after 2 L of IV fluids. Gastroenterology was consulted and recommended general surgery consultation for cholecystectomy with consideration for intraoperative cholangiogram.  General surgery presented to the bedside and will be taking the patient to the operating room. Patient stable condition at the time of transport.       Procedures
Denies hematochezia, melena or other sources of bleeding  PPI BID  Possible GI c/s if angiogram to happen as he would need to be on DAPT if stent were to be placed

## 2023-04-24 NOTE — PROGRESS NOTE ADULT - PROBLEM SELECTOR PLAN 4
Not reason for admission. Has R facial droop  -resume statin, eliquis

## 2023-04-24 NOTE — PROGRESS NOTE ADULT - PROBLEM SELECTOR PROBLEM 7
History of GI bleed

## 2023-04-24 NOTE — PROGRESS NOTE ADULT - PROBLEM SELECTOR PLAN 2
Last HD Tuesday in [place]  -renal eval   -sevelamer 1600mg TID
Last HD Tuesday in [place]  -renal eval   -sevelamer 1600mg TID
-renal eval   -sevelamer 1600mg TID  - HD as per renal
Last HD Tuesday in [place]  -renal eval   -sevelamer 1600mg TID
-renal eval   -sevelamer 1600mg TID  - HD as per renal
-renal eval   -sevelamer 1600mg TID  - HD as per renal

## 2023-04-24 NOTE — PROGRESS NOTE ADULT - PROBLEM SELECTOR PLAN 9
Hep gtt  Renal DASH/TLC diet
Hep gtt  Renal DASH/TLC diet    [facility] emailed given patient unsure of all meds
Hep gtt  Renal DASH/TLC diet
Hep gtt  Renal DASH/TLC diet    [facility] emailed given patient unsure of all meds
Hep gtt  Renal DASH/TLC diet
Hep gtt  Renal DASH/TLC diet    [facility] emailed given patient unsure of all meds
Hep gtt  Renal DASH/TLC diet    [facility] emailed given patient unsure of all meds

## 2023-04-24 NOTE — PROGRESS NOTE ADULT - SUBJECTIVE AND OBJECTIVE BOX
Name of Patient : CHAVEZ MARQUEZ  MRN: [id number]  Date of visit: 04-24-23 @ 13:42      Subjective: Patient seen and examined. No new events except as noted.     REVIEW OF SYSTEMS:    CONSTITUTIONAL: No weakness, fevers or chills  EYES/ENT: No visual changes;  No vertigo or throat pain   NECK: No pain or stiffness  RESPIRATORY: No cough, wheezing, hemoptysis; No shortness of breath  CARDIOVASCULAR: No chest pain or palpitations  GASTROINTESTINAL: No abdominal or epigastric pain. No nausea, vomiting, or hematemesis; No diarrhea or constipation. No melena or hematochezia.  GENITOURINARY: No dysuria, frequency or hematuria  NEUROLOGICAL: No numbness or weakness  SKIN: No itching, burning, rashes, or lesions   All other review of systems is negative unless indicated above.    MEDICATIONS:  MEDICATIONS  (STANDING):  apixaban 5 milliGRAM(s) Oral two times a day  artificial  tears Solution 1 Drop(s) Both EYES four times a day  aspirin enteric coated 81 milliGRAM(s) Oral daily  atorvastatin 80 milliGRAM(s) Oral at bedtime  calcium acetate 1334 milliGRAM(s) Oral three times a day with meals  chlorhexidine 2% Cloths 1 Application(s) Topical daily  dexamethasone/neomycin/polymyxin Ointment 1 Application(s) Left EYE two times a day  doxycycline monohydrate Capsule 100 milliGRAM(s) Oral every 12 hours  epoetin tammi-epbx (RETACRIT) Injectable 48194 Unit(s) IV Push <User Schedule>  hydrALAZINE 50 milliGRAM(s) Oral three times a day  metoprolol succinate  milliGRAM(s) Oral daily  pantoprazole    Tablet 40 milliGRAM(s) Oral two times a day      PHYSICAL EXAM:  T(C): 36.3 (04-24-23 @ 11:12), Max: 37.1 (04-23-23 @ 22:30)  HR: 72 (04-24-23 @ 11:12) (72 - 75)  BP: 125/72 (04-24-23 @ 11:12) (115/66 - 136/89)  RR: 17 (04-24-23 @ 11:12) (17 - 18)  SpO2: 100% (04-24-23 @ 11:12) (98% - 100%)  Wt(kg): --  I&O's Summary        Appearance: Normal	  HEENT:  PERRLA   Lymphatic: No lymphadenopathy   Cardiovascular: Normal S1 S2, no JVD  Respiratory: normal effort , clear  Gastrointestinal:  Soft, Non-tender  Skin: No rashes,  warm to touch  Psychiatry:  Mood & affect appropriate  Musculuskeletal: No edema    recent labs, Imaging and EKGs personally reviewed                           8.9    6.14  )-----------( 165      ( 24 Apr 2023 05:28 )             27.9               04-22    133<L>  |  94<L>  |  87<H>  ----------------------------<  112<H>  4.0   |  22  |  11.97<H>    Ca    7.4<L>      22 Apr 2023 15:15  Phos  2.7     04-22  Mg     1.90     04-22

## 2023-04-24 NOTE — PROGRESS NOTE ADULT - PROBLEM SELECTOR PLAN 5
Hold eliquis  Hep gtt  Metoprolol 75mg qd

## 2023-05-01 NOTE — PHYSICAL THERAPY INITIAL EVALUATION ADULT - REHAB POTENTIAL, PT EVAL
Visit Information    Have you changed or started any medications since your last visit including any over-the-counter medicines, vitamins, or herbal medicines? no   Have you stopped taking any of your medications? Is so, why? -  no  Are you having any side effects from any of your medications? - no    Have you seen any other physician or provider since your last visit?  no   Have you had any other diagnostic tests since your last visit?  no   Have you been seen in the emergency room and/or had an admission in a hospital since we last saw you?  no   Have you had your routine dental cleaning in the past 6 months?  no     Do you have an active MyChart account? If no, what is the barrier?   Yes    Patient Care Team:  LJ Oconnor CNP as PCP - General (Nurse Practitioner)  LJ Oconnor CNP as PCP - Empaneled Provider    Medical History Review  Past Medical, Family, and Social History reviewed and  contribute to the patient presenting condition    Health Maintenance   Topic Date Due    Varicella vaccine (1 of 2 - 2-dose childhood series) Never done    DTaP/Tdap/Td vaccine (1 - Tdap) Never done    COVID-19 Vaccine (3 - Booster for Mcbride Peter series) 08/07/2021    Cervical cancer screen  08/21/2021    Flu vaccine (Season Ended) 08/01/2023    Depression Screen  10/20/2023    Lipids  10/19/2027    Hepatitis C screen  Completed    HIV screen  Completed    Hepatitis A vaccine  Aged Out    Hib vaccine  Aged Out    Meningococcal (ACWY) vaccine  Aged Out    Pneumococcal 0-64 years Vaccine  Aged Out educational materials - see patient instructions. Discussed use,benefit, and side effects of prescribed medications. All patient questions answered. Pt voiced understanding. Reviewed health maintenance. Instructed to continue currentmedications, diet and exercise.     Electronically signed by LJ Gutierrez CNP, CNP on 5/1/2023 at 10:17 AM good, to achieve stated therapy goals

## 2023-05-10 NOTE — PROGRESS NOTE ADULT - NS ATTEND AMEND GEN_ALL_CORE FT

## 2023-05-12 ENCOUNTER — NON-APPOINTMENT (OUTPATIENT)
Age: 68
End: 2023-05-12

## 2023-05-18 ENCOUNTER — APPOINTMENT (OUTPATIENT)
Dept: OPHTHALMOLOGY | Facility: CLINIC | Age: 68
End: 2023-05-18
Payer: SELF-PAY

## 2023-05-18 PROCEDURE — 92004 COMPRE OPH EXAM NEW PT 1/>: CPT | Mod: NC

## 2023-08-31 NOTE — DIETITIAN INITIAL EVALUATION ADULT. - PROBLEM SELECTOR PROBLEM 3
AV fistula Simponi Counseling:  I discussed with the patient the risks of golimumab including but not limited to myelosuppression, immunosuppression, autoimmune hepatitis, demyelinating diseases, lymphoma, and serious infections.  The patient understands that monitoring is required including a PPD at baseline and must alert us or the primary physician if symptoms of infection or other concerning signs are noted.

## 2023-09-15 NOTE — CHART NOTE - NSCHARTNOTEFT_GEN_A_CORE
Reviewed labs with medical attending and Renal Attending Dr. Vargas no further interventions at this time.  Pt scheduled for HD .  Serum calcium wnl no need to trend Ionized calcium: no need to replete ionized calcium. Dr. Christian in agreement with plan Detail Level: Detailed Quality 130: Documentation Of Current Medications In The Medical Record: Current Medications Documented Quality 431: Preventive Care And Screening: Unhealthy Alcohol Use - Screening: Patient not identified as an unhealthy alcohol user when screened for unhealthy alcohol use using a systematic screening method Quality 226: Preventive Care And Screening: Tobacco Use: Screening And Cessation Intervention: Patient screened for tobacco use and is an ex/non-smoker
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
## 2024-02-20 NOTE — PATIENT PROFILE ADULT. - PHONE #
Patient called twice while RN was out of the office, left voicemails. RN returned phone call, left voicemail with direct number for return call.   [phone]

## 2024-03-14 NOTE — PROGRESS NOTE ADULT - ASSESSMENT
66M hx of ESRD on HD (M/W/F, previously via LUE AVF, now via chest wall catheter), anemia, hyperparathyroidism, thrombocytopenia, hypothyroidism, HTN who presents with shortness of breath and LE swelling likely due to volume overload from missed HD session, admitted for urgent HD.            N/A

## 2024-06-04 NOTE — PROVIDER CONTACT NOTE (CRITICAL VALUE NOTIFICATION) - SITUATION
Ionized Calcium 0.57
Critical value- total serum calcium 5.4
Calcium 5.8
Pt with calcium of 6.5
None

## 2024-07-22 NOTE — ED ADULT TRIAGE NOTE - PAIN: PRESENCE, MLM
Quality 130: Documentation Of Current Medications In The Medical Record: Current Medications Documented Detail Level: Detailed Quality 431: Preventive Care And Screening: Unhealthy Alcohol Use - Screening: Patient not identified as an unhealthy alcohol user when screened for unhealthy alcohol use using a systematic screening method Quality 226: Preventive Care And Screening: Tobacco Use: Screening And Cessation Intervention: Patient screened for tobacco use and is an ex/non-smoker complains of pain/discomfort

## 2024-09-25 ENCOUNTER — EMERGENCY (EMERGENCY)
Facility: HOSPITAL | Age: 69
LOS: 1 days | Discharge: HOME | End: 2024-09-25
Attending: EMERGENCY MEDICINE | Admitting: EMERGENCY MEDICINE
Payer: MEDICAID

## 2024-09-25 VITALS
TEMPERATURE: 98 F | DIASTOLIC BLOOD PRESSURE: 79 MMHG | WEIGHT: 154.32 LBS | RESPIRATION RATE: 16 BRPM | HEART RATE: 76 BPM | SYSTOLIC BLOOD PRESSURE: 125 MMHG | OXYGEN SATURATION: 97 %

## 2024-09-25 VITALS
RESPIRATION RATE: 18 BRPM | TEMPERATURE: 98 F | DIASTOLIC BLOOD PRESSURE: 58 MMHG | HEART RATE: 63 BPM | OXYGEN SATURATION: 99 % | SYSTOLIC BLOOD PRESSURE: 103 MMHG

## 2024-09-25 DIAGNOSIS — I77.0 ARTERIOVENOUS FISTULA, ACQUIRED: Chronic | ICD-10-CM

## 2024-09-25 PROCEDURE — 99283 EMERGENCY DEPT VISIT LOW MDM: CPT | Mod: 25

## 2024-09-25 PROCEDURE — 10060 I&D ABSCESS SIMPLE/SINGLE: CPT

## 2024-09-25 RX ORDER — CLINDAMYCIN PHOSPHATE 150 MG/ML
300 VIAL (ML) INJECTION ONCE
Refills: 0 | Status: COMPLETED | OUTPATIENT
Start: 2024-09-25 | End: 2024-09-25

## 2024-09-25 RX ORDER — LIDOCAINE HCL/PF 10 MG/ML
20 VIAL (ML) INJECTION ONCE
Refills: 0 | Status: COMPLETED | OUTPATIENT
Start: 2024-09-25 | End: 2024-09-25

## 2024-09-25 RX ORDER — CLINDAMYCIN PHOSPHATE 150 MG/ML
1 VIAL (ML) INJECTION
Qty: 20 | Refills: 0
Start: 2024-09-25 | End: 2024-09-29

## 2024-09-25 RX ADMIN — Medication 300 MILLIGRAM(S): at 16:26

## 2024-09-25 RX ADMIN — Medication 20 MILLILITER(S): at 16:31

## 2024-11-18 NOTE — DISCHARGE NOTE ADULT - PROVIDER RX CONTACT NUMBER
[Depressed Mood] : no depressed mood [Decreased Concentration] : no decrease in concentrating ability [Insomnia] : no insomnia disorder [Highly Irritable] : no high irritability [Dec Need For Sleep] : no decreased need for sleep [Excessive Worry] : no excessive worries [Panic] : no panic disorder [de-identified] : no thought disorder noted [FreeTextEntry1] : Pt. denies any psych hx. or tx. [phone]

## 2024-11-22 NOTE — DISCHARGE NOTE NURSING/CASE MANAGEMENT/SOCIAL WORK - NSDCPEFALRISK_GEN_ALL_CORE
Name: Robert Pate      : 1943      MRN: [id number]  Encounter Provider: Igor Barth MD  Encounter Date: 2024   Encounter department: [facility] UROLOGY [place]  :  Assessment & Plan  BPH with lower urinary tract symptoms without urinary obstruction  AUA symptom score 16 with patient noting that the positive effects from UroLift have waned.  Weakening urinary stream urgency and frequency with nocturia 4-5 times a night noted.  Plan cystoscopy PVR urinalysis and probable TURP       Overactive bladder  As above, anticholinergics did not help       Urinary urgency  As above           History of Present Illness   Robert Pate is a 81 y.o. male who presents 81-year-old gentleman with obstructive voiding symptoms underwent UroLift x 6 number of years ago.  The patient's AUA symptom score came down to 11 however the patient had continued to take tamsulosin.  The patient now presents noting an AUA symptom score of 16 with weakening of the urinary stream significant nocturia frequency and urgency.  He presents for evaluation as to whether further prostate surgery or other interventions would be necessary he denies gross hematuria and incontinence  AUA SYMPTOM SCORE      Flowsheet Row Most Recent Value   AUA SYMPTOM SCORE    How often have you had a sensation of not emptying your bladder completely after you finished urinating? 1 (P)     How often have you had to urinate again less than two hours after you finished urinating? 2 (P)     How often have you found you stopped and started again several times when you urinate? 1 (P)     How often have you found it difficult to postpone urination? 4 (P)     How often have you had a weak urinary stream? 3 (P)     How often have you had to push or strain to begin urination? 0 (P)     How many times did you most typically get up to urinate from the time you went to bed at night until the time you got up in the morning? 5 (P)     Quality of  Life: If you were to spend the rest of your life with your urinary condition just the way it is now, how would you feel about that? 1 (P)     AUA SYMPTOM SCORE 16 (P)            Review of Systems   Genitourinary:  Positive for frequency and urgency.   All other systems reviewed and are negative.    Pertinent Medical History           Medical History Reviewed by provider this encounter:  Tobacco  Allergies  Meds  Problems  Med Hx  Surg Hx  Fam Hx  Soc   Hx    .  Past Medical History   Past Medical History:   Diagnosis Date    Anemia     iron def, long history of anemia no meds    Anxiety     BPH (benign prostatic hyperplasia)     Erectile dysfunction     Hypertension      Past Surgical History:   Procedure Laterality Date    CATARACT EXTRACTION Bilateral     COLONOSCOPY      [place] CYSTO INSERTION TRANSPROSTATIC IMPLANT SINGLE N/A 8/7/2020    Procedure: CYSTOSCOPY WITH INSERTION UROLIFT;  Surgeon: Igor Barth MD;  [facility];  Service: Urology    RECTAL SURGERY       Family History   Problem Relation Age of Onset    No Known Problems Mother     No Known Problems Father       reports that he quit smoking about 32 years ago. He has never used smokeless tobacco. He reports current alcohol use. He reports that he does not use drugs.  Current Outpatient Medications on File Prior to Visit   Medication Sig Dispense Refill    losartan (COZAAR) 100 MG tablet       metoprolol succinate (TOPROL-XL) 50 mg 24 hr tablet       tadalafil (CIALIS) 20 MG tablet Take 1 tablet (20 mg total) by mouth as needed for erectile dysfunction 30 tablet 11    tadalafil (CIALIS) 5 MG tablet Take 1 tablet (5 mg total) by mouth daily as needed for erectile dysfunction 90 tablet 3    tamsulosin (FLOMAX) 0.4 mg Take 1 capsule (0.4 mg total) by mouth daily with dinner 90 capsule 3    venlafaxine (EFFEXOR-XR) 150 mg 24 hr capsule Take 150 mg by mouth daily       metoprolol tartrate (LOPRESSOR) 25 mg tablet       [DISCONTINUED]  "oxybutynin (DITROPAN) 5 mg tablet 1 Tablet q.h.s. for nighttime urination.  If little or no improvement increase to 2 tablets p.o. q.h.s. 180 tablet 1     No current facility-administered medications on file prior to visit.     Allergies   Allergen Reactions    Lisinopril Cough      Current Outpatient Medications on File Prior to Visit   Medication Sig Dispense Refill    losartan (COZAAR) 100 MG tablet       metoprolol succinate (TOPROL-XL) 50 mg 24 hr tablet       tadalafil (CIALIS) 20 MG tablet Take 1 tablet (20 mg total) by mouth as needed for erectile dysfunction 30 tablet 11    tadalafil (CIALIS) 5 MG tablet Take 1 tablet (5 mg total) by mouth daily as needed for erectile dysfunction 90 tablet 3    tamsulosin (FLOMAX) 0.4 mg Take 1 capsule (0.4 mg total) by mouth daily with dinner 90 capsule 3    venlafaxine (EFFEXOR-XR) 150 mg 24 hr capsule Take 150 mg by mouth daily       metoprolol tartrate (LOPRESSOR) 25 mg tablet       [DISCONTINUED] oxybutynin (DITROPAN) 5 mg tablet 1 Tablet q.h.s. for nighttime urination.  If little or no improvement increase to 2 tablets p.o. q.h.s. 180 tablet 1     No current facility-administered medications on file prior to visit.      Social History     Tobacco Use    Smoking status: Former     Current packs/day: 0.00     Types: Cigarettes     Quit date: 8/3/1992     Years since quittin.3    Smokeless tobacco: Never   Vaping Use    Vaping status: Never Used   Substance and Sexual Activity    Alcohol use: Yes     Comment: wine with dinner    Drug use: Never    Sexual activity: Not on file        Objective   /80 (BP Location: Left arm, Patient Position: Sitting, Cuff Size: Standard)   Ht 5' 5\" (1.651 m)   Wt 57.2 kg (126 lb)   SpO2 92%   BMI 20.97 kg/m²     Physical Exam  Vitals reviewed.   Constitutional:       General: He is not in acute distress.     Appearance: Normal appearance. He is normal weight. He is not ill-appearing, toxic-appearing or diaphoretic.   HENT: "      Head: Normocephalic and atraumatic.      Nose: Nose normal.      Mouth/Throat:      Mouth: Mucous membranes are moist.   Eyes:      Extraocular Movements: Extraocular movements intact.   Pulmonary:      Effort: Pulmonary effort is normal. No respiratory distress.   Abdominal:      General: Bowel sounds are normal. There is no distension.      Palpations: Abdomen is soft.      Tenderness: There is no abdominal tenderness. There is no guarding or rebound.   Genitourinary:     Penis: Normal.       Testes: Normal.   Musculoskeletal:      Cervical back: Neck supple.   Skin:     General: Skin is dry.   Neurological:      Mental Status: He is alert and oriented to person, place, and time.   Psychiatric:         Mood and Affect: Mood normal.         Behavior: Behavior normal.         Thought Content: Thought content normal.         Judgment: Judgment normal.           Results  Lab Results   Component Value Date    PSA 1.88 02/28/2020     Lab Results   Component Value Date    CALCIUM 9.3 06/11/2024    K 4.9 06/11/2024    CO2 24 06/11/2024     06/11/2024    BUN 27 06/11/2024    CREATININE 1.02 06/11/2024     Lab Results   Component Value Date    WBC 8.52 10/02/2020    HGB 9.8 (L) 10/02/2020    HCT 30.4 (L) 10/02/2020    MCV 98 10/02/2020     10/02/2020       Office Urine Dip  No results found for this or any previous visit (from the past hour).]       For information on Fall & Injury Prevention, visit: [email or web address] OR  [email or web address] OR  https://www.cdc.gov/steadi/patient.html

## 2025-01-15 ENCOUNTER — NON-APPOINTMENT (OUTPATIENT)
Age: 70
End: 2025-01-15

## 2025-01-15 ENCOUNTER — APPOINTMENT (OUTPATIENT)
Dept: GASTROENTEROLOGY | Facility: CLINIC | Age: 70
End: 2025-01-15
Payer: MEDICAID

## 2025-01-15 VITALS
BODY MASS INDEX: 22.81 KG/M2 | HEIGHT: 69 IN | OXYGEN SATURATION: 97 % | HEART RATE: 63 BPM | WEIGHT: 154 LBS | DIASTOLIC BLOOD PRESSURE: 60 MMHG | RESPIRATION RATE: 12 BRPM | SYSTOLIC BLOOD PRESSURE: 116 MMHG

## 2025-01-15 DIAGNOSIS — D64.9 ANEMIA, UNSPECIFIED: ICD-10-CM

## 2025-01-15 PROCEDURE — 99204 OFFICE O/P NEW MOD 45 MIN: CPT

## 2025-01-15 RX ORDER — AMLODIPINE BESYLATE 10 MG/1
10 TABLET ORAL
Refills: 0 | Status: ACTIVE | COMMUNITY
Start: 2025-01-15

## 2025-01-15 RX ORDER — POLYETHYLENE GLYCOL 3350 AND ELECTROLYTES WITH LEMON FLAVOR 236; 22.74; 6.74; 5.86; 2.97 G/4L; G/4L; G/4L; G/4L; G/4L
236 POWDER, FOR SOLUTION ORAL
Qty: 1 | Refills: 0 | Status: ACTIVE | COMMUNITY
Start: 2025-01-15 | End: 1900-01-01

## 2025-01-15 RX ORDER — CLONIDINE HYDROCHLORIDE 0.3 MG/1
0.3 TABLET ORAL 3 TIMES DAILY
Refills: 0 | Status: ACTIVE | COMMUNITY
Start: 2025-01-15

## 2025-01-18 NOTE — PHARMACOTHERAPY INTERVENTION NOTE - COMMENTS
Medication history is complete. Medication list updated in Outpatient Medication Record (OMR) per patient, [facility] and [facility].   Concern for non-compliance:  - Patient states atorvastatin and apixaban are current medications; per [facility] submitted in February 2023, not picked-up  - Patient states calcium acetate is a current medication; per [facility] prescription submitted in December 2022, not picked-up  - Patient states pantoprazole is a current medication; per [facility] 30-day supply last dispensed April 2022  Please call spectra [phone] if you have any questions.  Medication history is complete. Medication list updated in Outpatient Medication Record (OMR) per patient, [facility] and [facility]. Awaiting return call from emergency contact.   Concern for non-compliance:  - Patient states atorvastatin and apixaban are current medications; per [facility] prescription submitted in February 2023, not picked-up  - Patient states calcium acetate is a current medication; per [facility] prescription submitted in December 2022, not picked-up  - Patient states pantoprazole is a current medication; per [facility] 30-day supply last dispensed April 2022  Please call spectra [phone] if you have any questions.  Medication history is complete. Medication list updated in Outpatient Medication Record (OMR) per patient, [facility] and [facility]. Awaiting return call from emergency contact.   Concern for non-compliance:  - Patient states atorvastatin and apixaban are current medications; per [facility] prescription submitted in February 2023, not picked-up  - Patient states calcium acetate is a current medication; per [facility] prescription submitted in December 2022, not picked-up  - Patient states pantoprazole is a current medication; per [facility] 30-day supply last dispensed April 2022  Please call spectra [phone] if you have any questions.   Spoke with ACP provider to notify OMR updated.  I do not need any legal help

## 2025-02-03 NOTE — PROGRESS NOTE ADULT - ASSESSMENT
Disp Refills Start End    sertraline 50 MG Oral Tab 90 tablet 1 10/29/2024 --    Sig - Route: Take 1 tablet (50 mg total) by mouth daily. - Oral    Sent to pharmacy as: Sertraline HCl 50 MG Oral Tablet (Zoloft)    Notes to Pharmacy: I am aware of the drug interactions and will monitor this.    E-Prescribing Status: Receipt confirmed by pharmacy (10/29/2024  7:49 PM CDT)      Associated Diagnoses    Anxiety        Pharmacy    [id number] [id number] AT [facility] [address], [phone], [phone]      66M hx of ESRD on HD (M/W/F, previously via LUE AVF, now via chest wall catheter), anemia, hyperparathyroidism, thrombocytopenia, hypothyroidism, HTN who presents with shortness of breath and LE swelling likely due to volume overload from missed HD session, admitted for urgent HD.       Considering pt's medical condition and nature of sx, pt is at intermediate risk for the surgery.

## 2025-03-06 NOTE — ED ADULT TRIAGE NOTE - BP NONINVASIVE SYSTOLIC (MM HG)
[facility]ist Internal Medicine   Progress Note              Subjective:  Patient seen and examined by me.  No acute events overnight.  Patient refused stress test yesterday.  Today on exam he is interactive, though does give short answers.  He avoids eye contact initially, but towards end of encounter does pull sheet down from face.  Reports he is always tired.    Reports he has chronic back pain, this is nothing acute.  Agreeable to have nurse walk him and if needed PT and OT will see him    Reports he does have chest pain, especially with exertion.  He is agreeable to do the stress test later today    Patient appears withdrawn, does not have stable housing.  Agreeable to meet with psych    Denies any active drug use, though does say he typically Adderall few days ago.  Says needles in his bag are for his boyfriend who is a diabetic.    Reports he has chronic dysuria, denies sexual activity.     14 point Review of systems is negative except for as noted above.     I/O's    Intake/Output Summary (Last 24 hours) at 3/6/2025 0729  Last data filed at 3/6/2025 0100  Gross per 24 hour   Intake --   Output 2300 ml   Net -2300 ml         ALLERGIES:  Clarithromycin     No data recorded  MAP (mmHg)  Av.8  Min: 81  Max: 88          HOSPITAL MEDS  Current Facility-Administered Medications   Medication Dose Route Frequency Provider Last Rate Last Admin    sodium chloride 0.9 % injection 2 mL  2 mL Intracatheter 2 times per day Isaac Mcnamara MD   2 mL at 25 1452    enoxaparin (LOVENOX) injection 40 mg  40 mg Subcutaneous Daily Isaac Mcnamara MD   40 mg at 25 1251    Potassium Standard Replacement Protocol (Levels 3.5 and lower)   Does not apply See Admin Instructions Isaac Mcnamara MD        Magnesium Standard Replacement Protocol   Does not apply See Admin Instructions Isaac Mcnamara MD        lactulose (CHRONULAC) 10 GM/15ML solution 10 g  10 g Oral TID Isaac Mcnamara MD   10 g at  03/05/25 2123    insulin lispro (ADMELOG,HumaLOG) - Correction Dose   Subcutaneous 4x Daily  Isaac Mcnamara MD           Current Facility-Administered Medications   Medication Dose Route Frequency Provider Last Rate Last Admin       Current Facility-Administered Medications   Medication Dose Route Frequency Provider Last Rate Last Admin    cyclobenzaprine (FLEXERIL) tablet 5 mg  5 mg Oral TID PRN Isaac Mcnamara MD        ketorolac (TORADOL) injection 15 mg  15 mg Intravenous Q6H PRN Isaac Mcnamara MD        lidocaine (LIDOCARE) 4 % patch 1 patch  1 patch Transdermal Daily PRN Isaac Mcnamara MD        sodium chloride 0.9 % injection 10 mL  10 mL Intravenous PRN Isaac Mcnamara MD        sodium chloride 0.9 % injection 10 mL  10 mL Intravenous PRN Isaac Mcnamara MD        ondansetron (ZOFRAN) injection 4 mg  4 mg Intravenous Q6H PRN Isaac Mcnamara MD        acetaminophen (TYLENOL) tablet 650 mg  650 mg Oral Q4H PRN Isaac Mcnamara MD        polyethylene glycol (MIRALAX) packet 17 g  17 g Oral Daily PRN Isaac Mcnamara MD        dextrose 50 % injection 25 g  25 g Intravenous PRN Isaac Mcnamara MD        dextrose 50 % injection 12.5 g  12.5 g Intravenous PRN Isaac Mcnamara MD        glucagon (GLUCAGEN) injection 1 mg  1 mg Intramuscular PRN Isaac Mcnamara MD        dextrose (GLUTOSE) 40 % gel 15 g  15 g Oral PRN Isaac Mcnamara MD        dextrose (GLUTOSE) 40 % gel 30 g  30 g Oral PRN Isaac Mcnamara MD              Last Recorded Vitals      SpO2 Readings from Last 3 Encounters:   03/06/25 100%   04/01/23 100%   02/26/23 99%        VITAL SIGNS:     Vital Last Value 24 Hour Range   Temperature 97.5 °F (36.4 °C) (03/06/25 0357) Temp  Min: 97.5 °F (36.4 °C)  Max: 98 °F (36.7 °C)   Pulse 69 (03/06/25 0357) Pulse  Min: 56  Max: 94   Respiratory 20 (03/06/25 0357) Resp  Min: 16  Max: 20   Non-Invasive  Blood Pressure 108/71 (03/06/25 0357) BP  Min: 107/69   Max: 125/70   Pulse Oximetry 100 % (03/06/25 0357) SpO2  Min: 97 %  Max: 100 %   Arterial   Blood Pressure   No data recorded      Vital Today Admitted   Weight       Height N/A     BMI N/A              Physical Exam:  General: Alert and oriented, no acute distress  Eyes: no scleral icterus, no conjunctival erythema   Cardio: S1, S2, RRR, no murmur, rub, gallop or thrills noted.   Pulm: Lungs clear to auscultation bilaterally, no wheeze or rhonchi noted. No chest wall tenderness  GI: Soft, mildly diffusely tender, nondistended. Normal bowel sounds auscultated   : No suprapubic Tenderness, no CVA tenderness bilaterally  Ext: No upper or lower extremity edema noted. No cords palpated.   Musculoskeletal: Moving all extremities spontaneously and symmetrically. No joint tenderness or erythema.  Skin: No abnormal bruising or discoloration noted. No jaundice.   Psych: Calm and cooperative.  Avoidant of eye contact.  Short answers.  Flat affect.  Fair Insight and Judgment  Neuro: No focal motor or sensory deficits noted. Pt appropriately follows commands.    Labs   Recent Labs     03/04/25 2246 03/06/25 0545   WBC 6.1 4.8   RBC 5.16 4.76   HGB 13.1 12.3*   HCT 42.1 40.1    247   MCV 81.6 84.2   MCH 25.4* 25.8*   MCHC 31.1* 30.7*   NRBCRE 0 0         Recent Labs     03/04/25 2246 03/06/25  0545   SODIUM 139 137   POTASSIUM 4.1 3.9   CO2 23 25   ANIONGAP 11 8   GLUCOSE 92 87   BUN 15 9   CREATININE 0.86 0.79   CALCIUM 8.8 8.3*   BILIRUBIN 0.3  --    AST 21  --    GPT 25  --    ALKPT 76  --    GLOB 3.1  --    AGR 1.2  --    CRP <5.0  --         Recent Labs   Lab 03/06/25 0545 03/04/25 2246   SODIUM 137 139   POTASSIUM 3.9 4.1   CHLORIDE 108 109   CO2 25 23   BUN 9 15   CREATININE 0.79 0.86   GLUCOSE 87 92   ALBUMIN  --  3.6   AST  --  21   BILIRUBIN  --  0.3   TSH  --  2.010       No results for input(s): \"PCT\" in the last 72 hours.     No results found     No results for input(s): \"INR\", \"PT\", \"PTT\" in the last  72 hours.    Recent Labs   Lab 03/05/25  1606 03/05/25  2124 03/06/25  0534   GLUCOSE BEDSIDE 79 97 87       Imaging    CT HEAD WO CONTRAST   Final Result      No acute intracranial abnormality      Electronically Signed by: NADIA UGALDE MD    Signed on: 3/5/2025 6:09 PM    Workstation ID: [id number]      MRI CERVICAL SPINE W WO CONTRAST   Final Result      Motion degraded study.      No evidence of discitis osteomyelitis or epidural abscess in the cervical,   thoracic, or lumbar spine.      Multilevel degenerative changes of the spine. There is mild spinal canal   stenosis at C5-C6. No spinal canal stenosis in the thoracic or lumbar   spine.      There is mild bilateral neural foraminal narrowing at C3-C4 and L4-L5.            Electronically Signed by: TONIE RAO M.D.    Signed on: 3/5/2025 7:38 AM    Workstation ID: [id number]      MRI THORACIC SPINE W WO CONTRAST   Final Result      Motion degraded study.      No evidence of discitis osteomyelitis or epidural abscess in the cervical,   thoracic, or lumbar spine.      Multilevel degenerative changes of the spine. There is mild spinal canal   stenosis at C5-C6. No spinal canal stenosis in the thoracic or lumbar   spine.      There is mild bilateral neural foraminal narrowing at C3-C4 and L4-L5.            Electronically Signed by: TONIE RAO M.D.    Signed on: 3/5/2025 7:38 AM    Workstation ID: [id number]      MRI LUMBAR SPINE W WO CONTRAST   Final Result      Motion degraded study.      No evidence of discitis osteomyelitis or epidural abscess in the cervical,   thoracic, or lumbar spine.      Multilevel degenerative changes of the spine. There is mild spinal canal   stenosis at C5-C6. No spinal canal stenosis in the thoracic or lumbar   spine.      There is mild bilateral neural foraminal narrowing at C3-C4 and L4-L5.            Electronically Signed by: TONIE RAO M.D.    Signed on: 3/5/2025 7:38 AM    Workstation ID: [id number]       CT ABDOMEN PELVIS FOR KIDNEY STONES WO CONTRAST   Final Result     No hydronephrosis or ureteral calculi.            Electronically signed by Pj Ray MD on 03 05 25 at 03:48          Cultures  Microbiology Results       None            All imaging, labs, vitals and related tests have been reviewed and interpreted by me.     Assessment/Plan:    29 year old male w/PMH of HIV, schizoaffective disorder, IV drug use, diagnosed with kidney stone 2 weeks ago, ADHD, ulcerative colitis who presents to [facility] for midline lumbar spine pain and chills.     # Lumbar pain --chronic  # Chills  -ESR and CRP negative  -MRI cervical showed C5-C6 disc protrusion deforming the ventral spinal cord with mild stenosis, no signal abnormality  -MRI thoracic shows mild disc bulges without stenosis  -MRI lumbar shows mild neuroforaminal stenosis bilaterally at L4-L5  -No MRI findings to suggest patient's symptoms  -CT a/p only remarkable for nonobstructing left renal calculus 3 mm  -No concern for discitis or osteomyelitis  -UA without infection  -As needed heat pads, lidocaine patches, Toradol  -PT/OT     # Polysubstance abuse  # hx of IVDU  -Reports being clean for 2 months to the ED  -U tox positive for amphetamine  -Nurse confiscated bottles of aerosolized keyboard , needles, powders with the appearance of amphetamine or heroin  -Monitor for any withdrawal symptoms  -Chemical dependency consult    # Schizoaffective disorder  # ADHD  - Does not appear to be on any meds  - Flat affect, avoidant of eye contact  - Agreeable to psychology consult, appreciate recs     # Chest pain and dyspnea on exertion  -Troponin negative  -No significant EKG changes suggestive of ischemia  -Stress test ordered however when patient was taken down for test, he did not participate. Consider re-attempting in the am  -3/6 reports he is agreeable to stress test today     # ?Acute encephalopathy  -Unclear if real encephalopathy or behavioral due  to lack of participation.    -Ammonia mildly elevated at 51.  Started lactulose  -TSH normal  -U-Tox as above  -ABG unremarkable  -Ammonia elevatd to 51  -CT head was negative  -Continue lactulose, patient has not yet had a bowel movement     # Ulcerative colitis  -Consider further workup if evidence of diarrhea here as he states bloody diarrhea is a daily occurrence     # Dysuria  -UA negative  -Denied sx on exam  -Denies any sexual Activity, concern for STD    # does not appear to have HIV  -tested multiple times and all negative, last test 5/2024     # Rule out DM 2  -Reported history of diabetes when needles were found in his belongings  -Follow-up A1c is 5.1  -LDSS in the meantime    #Homelessness  -Social work consult, appreciate recs    DVT Prophylaxis:   Current Active Medications for DVT Prophylaxis (From admission, onward)           Stop     enoxaparin (LOVENOX) injection 40 mg  40 mg,   Subcutaneous,   DAILY         --                   Diet: Regular; Yes, Medical Nutrition Management by Rd (Registered Dietitian); Stress Diet; Strict No Caffeine, No Decaf, No Chocolate Until After Stress Completed Diet    CODE STATUS:   Code Status: Full Resuscitation    Physician Notification:  Consultants notified of patient via [employer].  Communication: with patient, nurse, psych    I spent 50 MINS ON THIS PATIENTS CARE TODAY. This includes the following: Reviewed all vitals, medications, new orders, I/O, labs, micro, radiology, nurses notes, pertinent consultant notes which are reflected in assessment and plan.This does not include time spent on other items of care such as smoking cessation counseling, prolonged care time, and or advanced care planning if applicable.   (Level 1 PN: 25 min, Level 2 PN: 35 min, Level 3 PN: 50 min)     Primary Care Physician  Pcp, Zayra    Follows at [facility] in [facility]  3/6/2025 7:29 AM           169

## 2025-03-31 NOTE — DIETITIAN INITIAL EVALUATION ADULT. - RD TO REMAIN AVAILABLE
Hospitalist Discharge Summary     Rubén Naik  : 1969  MRN: [id number]    Admit date: 3/27/2025  Discharge date: 3/31/2025    Admitting Physician: Timmy Colorado MD    Discharge Diagnoses:   Patient Active Problem List   Diagnosis    Retropharyngeal cellulitis    Essential hypertension    Hyperlipidemia    Morbid obesity due to excess calories    Acute pharyngitis    Neck pain    Retropharyngeal abscess    Abnormal CT scan, neck    Smoking    Elevated erythrocyte sedimentation rate    CRP elevated       Admission Condition: serious    Discharged Condition: stable    Discharge Exam:  VITALS:  /83   Pulse 64   Temp 97.9 °F (36.6 °C) (Oral)   Resp 18   Ht 1.727 m (5' 8\")   Wt 110.3 kg (243 lb 2.7 oz)   SpO2 97%   BMI 36.97 kg/m²   CONSTITUTIONAL:  awake, alert, cooperative, no apparent distress, and appears stated age  EYES:  Lids and lashes normal, PERRL, EOMI, sclera clear, conjunctiva normal  ENT:  NC/AT, MMM    NECK:  Supple, symmetrical, trachea midline, no adenopathy  HEMATOLOGIC/LYMPHATICS:  no cervical, supraclavicular or axillary lymphadenopathy  LUNGS:  clear to auscultation bilaterally, No increased work of breathing, good air exchange, no crackles or wheezing  CARDIOVASCULAR:  Regular rate and rhythm, normal S1 and S2, no S3 or S4, and no significant murmurs, rubs or gallops noted. Normal apical impulse,   ABDOMEN:  Normal active bowel sounds, soft, non-tender, non-distended, no masses palpated, no organomegally  MUSCULOSKELETAL:  Full range of motion noted.     NEUROLOGIC:  Awake, alert, oriented to name, place and time.  Cranial nerves II-XII are grossly intact.    SKIN:  normal skin color, texture, turgor for age.    Hospital Course:     Chief Complaint on Admission: Pt c/o neck pain, pain with swallowing   Chief diagnosis after evaluation: Retropharyngeal cellulitis      Brief Synopsis: Patient is a 55 y.o. man who has a past medical history of Diabetes (HCC), Essential  Alma Delia Freed RDN [id number]/yes

## 2025-04-12 ENCOUNTER — INPATIENT (INPATIENT)
Facility: HOSPITAL | Age: 70
LOS: 1 days | Discharge: HOME | End: 2025-04-14
Attending: INTERNAL MEDICINE | Admitting: INTERNAL MEDICINE
Payer: MEDICAID

## 2025-04-12 VITALS
RESPIRATION RATE: 17 BRPM | WEIGHT: 154.32 LBS | DIASTOLIC BLOOD PRESSURE: 76 MMHG | SYSTOLIC BLOOD PRESSURE: 137 MMHG | HEART RATE: 79 BPM | OXYGEN SATURATION: 96 % | TEMPERATURE: 98 F

## 2025-04-12 DIAGNOSIS — I77.0 ARTERIOVENOUS FISTULA, ACQUIRED: Chronic | ICD-10-CM

## 2025-04-12 DIAGNOSIS — I48.0 PAROXYSMAL ATRIAL FIBRILLATION: ICD-10-CM

## 2025-04-12 DIAGNOSIS — E83.39 OTHER DISORDERS OF PHOSPHORUS METABOLISM: ICD-10-CM

## 2025-04-12 DIAGNOSIS — N18.6 END STAGE RENAL DISEASE: ICD-10-CM

## 2025-04-12 DIAGNOSIS — I10 ESSENTIAL (PRIMARY) HYPERTENSION: ICD-10-CM

## 2025-04-12 DIAGNOSIS — N18.9 CHRONIC KIDNEY DISEASE, UNSPECIFIED: ICD-10-CM

## 2025-04-12 DIAGNOSIS — E83.51 HYPOCALCEMIA: ICD-10-CM

## 2025-04-12 LAB
ALBUMIN SERPL ELPH-MCNC: 3.6 G/DL — SIGNIFICANT CHANGE UP (ref 3.3–5)
ALP SERPL-CCNC: 74 U/L — SIGNIFICANT CHANGE UP (ref 40–120)
ALT FLD-CCNC: 11 U/L — SIGNIFICANT CHANGE UP (ref 4–41)
ANION GAP SERPL CALC-SCNC: 28 MMOL/L — HIGH (ref 7–14)
AST SERPL-CCNC: 8 U/L — SIGNIFICANT CHANGE UP (ref 4–40)
BILIRUB SERPL-MCNC: 0.3 MG/DL — SIGNIFICANT CHANGE UP (ref 0.2–1.2)
BUN SERPL-MCNC: 112 MG/DL — HIGH (ref 7–23)
CALCIUM SERPL-MCNC: 5.6 MG/DL — CRITICAL LOW (ref 8.4–10.5)
CHLORIDE SERPL-SCNC: 102 MMOL/L — SIGNIFICANT CHANGE UP (ref 98–107)
CO2 SERPL-SCNC: 13 MMOL/L — LOW (ref 22–31)
CREAT SERPL-MCNC: 19.15 MG/DL — HIGH (ref 0.5–1.3)
DIALYSIS INSTRUMENT RESULT - HEPATITIS B SURFACE ANTIGEN: NEGATIVE — SIGNIFICANT CHANGE UP
EGFR: 2 ML/MIN/1.73M2 — LOW
EGFR: 2 ML/MIN/1.73M2 — LOW
GLUCOSE BLDC GLUCOMTR-MCNC: 98 MG/DL — SIGNIFICANT CHANGE UP (ref 70–99)
GLUCOSE SERPL-MCNC: 139 MG/DL — HIGH (ref 70–99)
HCT VFR BLD CALC: 31 % — LOW (ref 39–50)
HGB BLD-MCNC: 10.1 G/DL — LOW (ref 13–17)
MAGNESIUM SERPL-MCNC: 2.3 MG/DL — SIGNIFICANT CHANGE UP (ref 1.6–2.6)
MCHC RBC-ENTMCNC: 29.6 PG — SIGNIFICANT CHANGE UP (ref 27–34)
MCHC RBC-ENTMCNC: 32.6 G/DL — SIGNIFICANT CHANGE UP (ref 32–36)
MCV RBC AUTO: 90.9 FL — SIGNIFICANT CHANGE UP (ref 80–100)
NRBC # BLD AUTO: 0 K/UL — SIGNIFICANT CHANGE UP (ref 0–0)
NRBC # FLD: 0 K/UL — SIGNIFICANT CHANGE UP (ref 0–0)
NRBC BLD AUTO-RTO: 0 /100 WBCS — SIGNIFICANT CHANGE UP (ref 0–0)
PHOSPHATE SERPL-MCNC: 12.5 MG/DL — HIGH (ref 2.5–4.5)
PLATELET # BLD AUTO: 103 K/UL — LOW (ref 150–400)
POTASSIUM SERPL-MCNC: 4.9 MMOL/L — SIGNIFICANT CHANGE UP (ref 3.5–5.3)
POTASSIUM SERPL-SCNC: 4.9 MMOL/L — SIGNIFICANT CHANGE UP (ref 3.5–5.3)
PROT SERPL-MCNC: 6.4 G/DL — SIGNIFICANT CHANGE UP (ref 6–8.3)
RBC # BLD: 3.41 M/UL — LOW (ref 4.2–5.8)
RBC # FLD: 17.5 % — HIGH (ref 10.3–14.5)
SODIUM SERPL-SCNC: 143 MMOL/L — SIGNIFICANT CHANGE UP (ref 135–145)
WBC # BLD: 6.61 K/UL — SIGNIFICANT CHANGE UP (ref 3.8–10.5)
WBC # FLD AUTO: 6.61 K/UL — SIGNIFICANT CHANGE UP (ref 3.8–10.5)

## 2025-04-12 PROCEDURE — 93970 EXTREMITY STUDY: CPT | Mod: 26

## 2025-04-12 PROCEDURE — 71046 X-RAY EXAM CHEST 2 VIEWS: CPT | Mod: 26

## 2025-04-12 PROCEDURE — 99285 EMERGENCY DEPT VISIT HI MDM: CPT

## 2025-04-12 RX ORDER — SEVELAMER HYDROCHLORIDE 800 MG/1
2400 TABLET ORAL
Refills: 0 | Status: DISCONTINUED | OUTPATIENT
Start: 2025-04-12 | End: 2025-04-14

## 2025-04-12 RX ORDER — CARVEDILOL 3.12 MG/1
25 TABLET, FILM COATED ORAL EVERY 12 HOURS
Refills: 0 | Status: DISCONTINUED | OUTPATIENT
Start: 2025-04-12 | End: 2025-04-14

## 2025-04-12 RX ORDER — EPOETIN ALFA 10000 [IU]/ML
10000 SOLUTION INTRAVENOUS; SUBCUTANEOUS
Refills: 0 | Status: DISCONTINUED | OUTPATIENT
Start: 2025-04-12 | End: 2025-04-12

## 2025-04-12 RX ORDER — FOLIC ACID 1 MG/1
1 TABLET ORAL DAILY
Refills: 0 | Status: DISCONTINUED | OUTPATIENT
Start: 2025-04-12 | End: 2025-04-14

## 2025-04-12 RX ORDER — AMLODIPINE BESYLATE 10 MG/1
10 TABLET ORAL DAILY
Refills: 0 | Status: DISCONTINUED | OUTPATIENT
Start: 2025-04-12 | End: 2025-04-14

## 2025-04-12 RX ORDER — CARVEDILOL 3.12 MG/1
1 TABLET, FILM COATED ORAL
Refills: 0 | DISCHARGE

## 2025-04-12 RX ORDER — AMLODIPINE BESYLATE 10 MG/1
1 TABLET ORAL
Refills: 0 | DISCHARGE

## 2025-04-12 RX ORDER — APIXABAN 2.5 MG/1
5 TABLET, FILM COATED ORAL
Refills: 0 | Status: DISCONTINUED | OUTPATIENT
Start: 2025-04-12 | End: 2025-04-14

## 2025-04-12 RX ORDER — FOLIC ACID 1 MG/1
1 TABLET ORAL
Refills: 0 | DISCHARGE

## 2025-04-12 RX ORDER — CALCIUM GLUCONATE 20 MG/ML
1 INJECTION, SOLUTION INTRAVENOUS ONCE
Refills: 0 | Status: DISCONTINUED | OUTPATIENT
Start: 2025-04-12 | End: 2025-04-12

## 2025-04-12 RX ORDER — ACETAMINOPHEN 500 MG/5ML
650 LIQUID (ML) ORAL EVERY 6 HOURS
Refills: 0 | Status: DISCONTINUED | OUTPATIENT
Start: 2025-04-12 | End: 2025-04-14

## 2025-04-12 RX ORDER — EPOETIN ALFA 10000 [IU]/ML
10000 SOLUTION INTRAVENOUS; SUBCUTANEOUS
Refills: 0 | Status: DISCONTINUED | OUTPATIENT
Start: 2025-04-12 | End: 2025-04-14

## 2025-04-12 RX ORDER — ATORVASTATIN CALCIUM 80 MG/1
80 TABLET, FILM COATED ORAL AT BEDTIME
Refills: 0 | Status: DISCONTINUED | OUTPATIENT
Start: 2025-04-12 | End: 2025-04-14

## 2025-04-12 RX ORDER — ACETAMINOPHEN 500 MG/5ML
650 LIQUID (ML) ORAL EVERY 6 HOURS
Refills: 0 | Status: DISCONTINUED | OUTPATIENT
Start: 2025-04-12 | End: 2025-04-13

## 2025-04-12 RX ADMIN — EPOETIN ALFA 10000 UNIT(S): 10000 SOLUTION INTRAVENOUS; SUBCUTANEOUS at 22:02

## 2025-04-12 RX ADMIN — APIXABAN 5 MILLIGRAM(S): 2.5 TABLET, FILM COATED ORAL at 18:15

## 2025-04-12 RX ADMIN — SEVELAMER HYDROCHLORIDE 2400 MILLIGRAM(S): 800 TABLET ORAL at 18:15

## 2025-04-12 RX ADMIN — Medication 0.3 MILLIGRAM(S): at 23:09

## 2025-04-12 RX ADMIN — Medication 50 MILLIGRAM(S): at 23:06

## 2025-04-12 RX ADMIN — ATORVASTATIN CALCIUM 80 MILLIGRAM(S): 80 TABLET, FILM COATED ORAL at 23:06

## 2025-04-12 NOTE — ED ADULT NURSE NOTE - SUICIDE SCREENING QUESTION 1
Patient requests ongoing injections of Depo-Provera      Verified patient's first and last name, and . Patient stated reason for visit today is to receive a Depo Provera injection. Patient denied any concerns with previous injections.       LAST PAP/EXAM:   Lab Results   Component Value Date    PAP NIL 2019     URINE HCG:not indicated    The following medication was given:   MEDICATION: Depo Provera 150mg  ROUTE: IM  SITE: Deltoid - Right    Depo Provera injection prepared and administered IM as ordered. Administration of medication documented in MAR (see MAR for further information regarding dose, lot #, NDC #, expiration date). Patient encouraged to wait in lobby for 15 minutes post-injection and notify staff immediately of any reaction. Next Depo Provera injection in series due NEXT INJECTION DUE: 22 - 22 . Patient provided appointment reminder card.       Kathleen Giles RN ....................  2022   9:01 AM     No

## 2025-04-12 NOTE — ED PROVIDER NOTE - OBJECTIVE STATEMENT
70-year-old male with history of hypertension renal failure on dialysis went away on vacation for a week and did not have dialysis while he was gone missed 2 sessions was due today for the third.  Complaining of some shortness of breath.  Denies chest pain no nausea vomiting no other symptoms.  Feels his weight is up a little bit.  Patient has severe high blood pressure on carvedilol clonidine  hydralazine amlodipine   And is on Eliquis.

## 2025-04-12 NOTE — ED PROVIDER NOTE - CLINICAL SUMMARY MEDICAL DECISION MAKING FREE TEXT BOX
70-year-old male with hypertension complaining of some shortness of breath after missing dialysis for 1 week, missed 2 sessions.  Denies other complaints no chest pain no nausea vomiting.  Will check labs chest x-ray EKG without evidence of hyperkalemia at this time in fact QT is prolonged at 537.  will contact nephrology for resumption of dialysis depending on laboratory and chest x-ray.

## 2025-04-12 NOTE — H&P ADULT - ASSESSMENT
70-year-old male with history of hypertension renal failure on dialysis went away on vacation for a week and did not have dialysis while he was gone missed 2 sessions was due today for the third.  Complaining of some shortness of breath.  Denies chest pain no nausea vomiting no other symptoms.  Feels his weight is up a little bit.

## 2025-04-12 NOTE — H&P ADULT - RESPIRATORY
"ED Provider Note    Scribed for Nemesio Crzu M.D. by Colette Tovar. 9/20/2021, 7:07 PM.    Primary care provider: Zaki Krishna M.D.  Means of arrival: Walk in  History obtained from: Parent  History limited by: None    CHIEF COMPLAINT  Chief Complaint   Patient presents with   • Toe Injury     mother reports blister to right great toe noticed today       LISET Gould is a 11 m.o. male who presents to the Emergency Department for a wound to the right great toe onset last night. The patient's mother says that she does not know if the patient injured his foot. She denies drainage. The patient has no history of medical problems and their vaccinations are up to date.      REVIEW OF SYSTEMS  Review of Systems   Musculoskeletal:        Positive for wound to the right great toe.  Negative for drainage.        PAST MEDICAL HISTORY  The patient has no chronic medical history. Vaccinations are up to date.      SURGICAL HISTORY  patient denies any surgical history    SOCIAL HISTORY  The patient was accompanied to the ED with his mother who he lives with.    FAMILY HISTORY  Family History   Problem Relation Age of Onset   • Cancer Maternal Grandmother         lung cancer (Copied from mother's family history at birth)       CURRENT MEDICATIONS  Home Medications     Reviewed by Kimberly Moise R.N. (Registered Nurse) on 09/20/21 at 1728  Med List Status: Partial   Medication Last Dose Status        Patient Joon Taking any Medications                       ALLERGIES  No Known Allergies    PHYSICAL EXAM  VITAL SIGNS: Pulse 115   Temp 37.2 °C (98.9 °F) (Rectal)   Resp 36   Ht 0.737 m (2' 5\")   Wt 9.3 kg (20 lb 8 oz)   SpO2 100%   BMI 17.14 kg/m²   Vitals reviewed.  Constitutional: Well developed, Well nourished, No acute distress,  HENT: Normocephalic, Atraumatic,  Musculoskeletal: Good range of motion in all major joints. No tenderness to palpation or major deformities noted. Right foot: " Great toe has a blood blister looks against full of pus.  The toe is mildly erythematous because of a cellulitis.  Is well perfused.  Does not appear to be embolic there is no other lesions.  Neurologic: Alert, Moves all extremities.     RADIOLOGY  DX-TOE(S) 2+ RIGHT    (Results Pending)     The radiologist's interpretation of all radiological studies have been reviewed by me.    Incision and Drainage Procedure Note    Indication: Abscess    Procedure: The patient was positioned appropriately and the skin over the incision site was prepped with betadine and draped in a sterile fashion. Local anesthesia was not performed at the patient's request.  An incision was then made over the apex of the lesion and approximately 2 cc of purulent and serosanguineous material was expressed. Loculations were not present. The drainage cavity was then dressed with bacitracin and a sterile dressing. The patient’s tetanus status was up to date and did not require a booster dose.    The patient tolerated the procedure well.    Complications: None    COURSE & MEDICAL DECISION MAKING  Nursing notes, VS, PMSFHx reviewed in chart.      7:07 PM Patient seen and examined at bedside. Ordered for DX-Toes Right to evaluate.     X-ray appears negative to me.  He has what appears to be an abscess at the bottom of his right toe.  He is a blood blister that has pus in it.  The skin is elevated off the subcutaneous tissue so it is sensate.  It is incised and a copious amount of blood and pus was returned.  This is unroofed and a dressing is applied.    I do not believe this to be related to an embolic process.  I believe this to be local.  There is no other skin lesions she is afebrile and nontoxic.      We will start the patient on antibiotics from his first dose here in the ED.  He is prescribed antibiotics for skin and soft tissue infections of mixed purulent and nonpurulent skin infections.    I will instructed to have him return in 48 hours for  wound check.  Sooner for pain redness swelling fever or other concerns.    Questions are answered she is agreeable to plan.    DISPOSITION:  Patient will be discharged home in stable condition.    FOLLOW UP:  Zaki Krishna M.D.  [address]    [place] [id number]  [phone]    Schedule an appointment as soon as possible for a visit in 2 days        OUTPATIENT MEDICATIONS:  New Prescriptions    AMOXICILLIN (AMOXIL) 400 MG/5ML SUSPENSION    Take 1.9 mL by mouth 3 times a day for 7 days.    SULFAMETHOXAZOLE-TRIMETHOPRIM 200-40 MG/5 ML (BACTRIM/SEPTRA) ORAL SUSPENSION    Take 5 mL by mouth 2 times a day for 7 days.       Parent was given return precautions and verbalizes understanding. Parent will return with patient for new or worsening symptoms.     FINAL IMPRESSION  1. Injury of toe on right foot, initial encounter    2. Abscess or cellulitis of toe, right          IColette (Scrluke), am scribing for, and in the presence of, Nemesio Cruz M.D..    Electronically signed by: Colette Tovar (Scribe), 9/20/2021    INemesio M.D. personally performed the services described in this documentation, as scribed by Colette Tovar in my presence, and it is both accurate and complete. E    The note accurately reflects work and decisions made by me.  Nemesio Cruz M.D.  9/20/2021  8:50 PM     Addendum: radiology identified a small foreign body in toe.  I have asked the nurses to call patient back for repeat xray.  Mom was instructed to return in 2 days for recheck.   normal/clear to auscultation bilaterally/no wheezes/no rales/no rhonchi

## 2025-04-12 NOTE — CONSULT NOTE ADULT - SUBJECTIVE AND OBJECTIVE BOX
[facility] - MARK Vargas / MARK Camejo / JASMIN Caballero/ MARK Bojorquez/ MARK Faust/ TELLO Garrison / RODNEY Garcia / HAILEY Perkins / EMILIA Garner  -------------------------------------------------------------------------------------------------------  The patient seen and examined today.  HPI:      PAST MEDICAL & SURGICAL HISTORY:  HTN (Hypertension)      Chronic kidney disease      Kidney stones      Hemodialysis access, AV graft  Left upper extremity      Hyperparathyroidism      Anemia      Thrombocytopenia      Atrial fibrillation  on Eliquis      S/P Nephrectomy  (L) 2007 for kidney stones      Acquired arteriovenous fistula  left wrist  1/2015 - [facility], Left upper arm 4/2015 (approximate date)      AVF (arteriovenous fistula)        Allergies :- No Known Allergies    Home Medications Reviewed  Hospital Medications:   MEDICATIONS  (STANDING):    SOCIAL HISTORY:  Denies ETOh,Smoking,   FAMILY HISTORY:  Family history of CVA (Father)        REVIEW OF SYSTEMS:  CONSTITUTIONAL: No weakness, fevers or chills  EYES/ENT: No visual changes;  No vertigo or throat pain   NECK: No pain or stiffness  RESPIRATORY: No cough, wheezing, hemoptysis; No shortness of breath  CARDIOVASCULAR: No chest pain or palpitations.  GASTROINTESTINAL: No abdominal or epigastric pain. No nausea, vomiting, or hematemesis; No diarrhea or constipation. No melena or hematochezia.  GENITOURINARY: No dysuria, frequency, foamy urine, urinary urgency, incontinence or hematuria  NEUROLOGICAL: No numbness or weakness  SKIN: No itching, burning, rashes, or lesions   VASCULAR: No bilateral lower extremity edema.   All other review of systems is negative unless indicated above.    VITALS:  T(F): 97.8 (04-12-25 @ 10:30), Max: 97.8 (04-12-25 @ 10:30)  HR: 73 (04-12-25 @ 10:30)  BP: 136/88 (04-12-25 @ 10:30)  RR: 18 (04-12-25 @ 10:30)  SpO2: 99% (04-12-25 @ 10:30)  Wt(kg): --      Weight (kg): 70 (04-12 @ 10:02)    PHYSICAL EXAM:  Constitutional: NAD  HEENT: anicteric sclera, oropharynx clear, MMM  Neck: supple.   Respiratory: Bilateral equal breath sounds , no wheezes, no crackles  Cardiovascular: S1, S2, Regular, Murmur present.  Gastrointestinal: Bowel Sound present, soft, NT/ND  Extremities: No cyanosis or clubbing. No peripheral edema  Neurological: Alert and oriented x 3, no focal deficits  Psychiatric: Normal mood, normal affect  : No CVA tenderness. No siegel.   Skin: No rashes    Data:        Creatinine Trend:     Urine Studies:     [facility] - MARK Vargas / MARK Camejo / JASMIN Caballero/ MARK Bojorquez/ MARK Faust/ TELLO Garrison / RODNEY Garcia / HAILEY Perkins / EMILIA Garner  -------------------------------------------------------------------------------------------------------  The patient seen and examined today.  HPI:  70-year-old male with history of hypertension renal failure on dialysis at [facility] went away on vacation for a week and did not have dialysis while he was gone missed 2 sessions was due today for the third.  Complaining of some shortness of breath.  Denies chest pain no nausea vomiting no other symptoms.  Feels his weight is up a little bit.  Patient has severe high blood pressure on carvedilol clonidine  hydralazine amlodipine   And is on Eliquis.    PAST MEDICAL & SURGICAL HISTORY:  HTN (Hypertension)      Chronic kidney disease      Kidney stones      Hemodialysis access, AV graft  Left upper extremity      Hyperparathyroidism      Anemia      Thrombocytopenia      Atrial fibrillation  on Eliquis      S/P Nephrectomy  (L) 2007 for kidney stones      Acquired arteriovenous fistula  left wrist  1/2015 - [facility], Left upper arm 4/2015 (approximate date)      AVF (arteriovenous fistula)        Allergies :- No Known Allergies    Home Medications Reviewed  Hospital Medications:   MEDICATIONS  (STANDING):    SOCIAL HISTORY:  Denies ETOh,Smoking,   FAMILY HISTORY:  Family history of CVA (Father)        REVIEW OF SYSTEMS:  CONSTITUTIONAL: No weakness, fevers or chills  EYES/ENT: No visual changes;  No vertigo or throat pain   NECK: No pain or stiffness  RESPIRATORY: SOB +  CARDIOVASCULAR: CHAMPION +  GASTROINTESTINAL: No abdominal or epigastric pain. No nausea, vomiting, or hematemesis; No diarrhea or constipation. No melena or hematochezia.  GENITOURINARY: No dysuria, frequency, foamy urine, urinary urgency, incontinence or hematuria  NEUROLOGICAL: No numbness or weakness  SKIN: No itching, burning, rashes, or lesions   VASCULAR: No bilateral lower extremity edema.   All other review of systems is negative unless indicated above.    VITALS:  T(F): 97.8 (04-12-25 @ 10:30), Max: 97.8 (04-12-25 @ 10:30)  HR: 73 (04-12-25 @ 10:30)  BP: 136/88 (04-12-25 @ 10:30)  RR: 18 (04-12-25 @ 10:30)  SpO2: 99% (04-12-25 @ 10:30)  Wt(kg): --      Weight (kg): 70 (04-12 @ 10:02)    PHYSICAL EXAM:  Constitutional: NAD  HEENT: anicteric sclera, oropharynx clear, MMM  Neck: supple.   Respiratory: Bilateral equal breath sounds , no wheezes, no crackles  Cardiovascular: S1, S2, Regular, Murmur present.  Gastrointestinal: Bowel Sound present, soft, NT/ND  Extremities: No cyanosis or clubbing. No peripheral edema  Neurological: Alert and oriented x 3, no focal deficits  Psychiatric: Normal mood, normal affect  : No CVA tenderness. No siegel.   Skin: No rashes    Data:        Creatinine Trend:     Urine Studies:

## 2025-04-12 NOTE — CONSULT NOTE ADULT - SUBJECTIVE AND OBJECTIVE BOX
Ran Velasquez MD  Interventional Cardiology / Endovascular Specialist  [place] Office : [address] [place] [address] [place]. [address]  Tel:   [place] Office : [address] [facility] [place] [address]  Tel: [phone]    HPI:  This is a 70y Male with PMHx HTN, ESRD Tu/Th/Sa, CVA (right facial droop), Afib on eliquis, hx GIB?, vestibular schwannoma who presents with ***.  	  MEDICATIONS:                  PAST MEDICAL/SURGICAL HISTORY  PAST MEDICAL & SURGICAL HISTORY:  HTN (Hypertension)      Chronic kidney disease      Kidney stones      Hemodialysis access, AV graft  Left upper extremity      Hyperparathyroidism      Anemia      Thrombocytopenia      Atrial fibrillation  on Eliquis      S/P Nephrectomy  (L) 2007 for kidney stones      Acquired arteriovenous fistula  left wrist  1/2015 - [facility], Left upper arm 4/2015 (approximate date)      AVF (arteriovenous fistula)          SOCIAL HISTORY: Substance Use (street drugs): ( x ) never used  (  ) other:    FAMILY HISTORY:  Family history of CVA (Father)            PHYSICAL EXAM:  T(C): 36.6 (04-12-25 @ 10:30), Max: 36.6 (04-12-25 @ 10:30)  HR: 73 (04-12-25 @ 10:30) (73 - 79)  BP: 136/88 (04-12-25 @ 10:30) (136/88 - 137/76)  RR: 18 (04-12-25 @ 10:30) (17 - 18)  SpO2: 99% (04-12-25 @ 10:30) (96% - 99%)  Wt(kg): --  I&O's Summary      Weight (kg): 70 (04-12 @ 10:02)    GENERAL: NAD, well-groomed, well-developed  EYES: EOMI, PERRLA, conjunctiva and sclera clear  ENMT: No tonsillar erythema, exudates, or enlargement; Moist mucous membranes, Good dentition, No lesions  Cardiovascular: Normal S1 S2, No JVD, No murmurs, No edema  Respiratory: Lungs clear to auscultation	  Gastrointestinal:  Soft, Non-tender, + BS	  Extremities: Normal range of motion, No clubbing, cyanosis or edema  LYMPH: No lymphadenopathy noted  NERVOUS SYSTEM:  Alert & Oriented X3, Good concentration; Motor Strength 5/5 B/L upper and lower extremities; DTRs 2+ intact and symmetric                                    10.1   6.61  )-----------( 103      ( 12 Apr 2025 11:03 )             31.0           proBNP:   Lipid Profile:   HgA1c:   TSH:     Consultant(s) Notes Reviewed:  [x ] YES  [ ] NO    Care Discussed with Consultants/Other Providers [ x] YES  [ ] NO    Imaging Personally Reviewed independently:  [x] YES  [ ] NO    All labs, radiologic studies, vitals, orders and medications list reviewed. Patient is seen and examined at bedside. Case discussed with medical team.                 Ran Velasquez MD  Interventional Cardiology / Endovascular Specialist  [place] Office : [address] [place] [address] [place] [address]  Tel:   [place] Office : [address] [facility] [place] [address]  Tel: [phone]    HPI:  This is a 70y Male with PMHx HTN, ESRD Tu/Th/Sa, CVA (right facial droop), Afib on eliquis, hx GIB?, vestibular schwannoma who presents to ED after missed HD sessions. Pt went away on vacation for a week and did not have dialysis, while he was gone missed 2 sessions was due today for the third.  Complaining of some shortness of breath.  Denies chest pain no nausea vomiting no other symptoms.   	  MEDICATIONS:                  PAST MEDICAL/SURGICAL HISTORY  PAST MEDICAL & SURGICAL HISTORY:  HTN (Hypertension)      Chronic kidney disease      Kidney stones      Hemodialysis access, AV graft  Left upper extremity      Hyperparathyroidism      Anemia      Thrombocytopenia      Atrial fibrillation  on Eliquis      S/P Nephrectomy  (L) 2007 for kidney stones      Acquired arteriovenous fistula  left wrist  1/2015 - [facility], Left upper arm 4/2015 (approximate date)      AVF (arteriovenous fistula)          SOCIAL HISTORY: Substance Use (street drugs): ( x ) never used  (  ) other:    FAMILY HISTORY:  Family history of CVA (Father)            PHYSICAL EXAM:  T(C): 36.6 (04-12-25 @ 10:30), Max: 36.6 (04-12-25 @ 10:30)  HR: 73 (04-12-25 @ 10:30) (73 - 79)  BP: 136/88 (04-12-25 @ 10:30) (136/88 - 137/76)  RR: 18 (04-12-25 @ 10:30) (17 - 18)  SpO2: 99% (04-12-25 @ 10:30) (96% - 99%)  Wt(kg): --  I&O's Summary      Weight (kg): 70 (04-12 @ 10:02)    GENERAL: NAD, well-groomed, well-developed  EYES: PERRLA  ENMT: Moist mucous membranes  Cardiovascular: Normal S1 S2, No JVD, No murmurs, No edema  Respiratory: Lungs clear to auscultation	  Gastrointestinal:  Soft, Non-tender, + BS	  Extremities: Normal range of motion, No clubbing, cyanosis or edema  NERVOUS SYSTEM:  Alert & Oriented X3                                10.1   6.61  )-----------( 103      ( 12 Apr 2025 11:03 )             31.0           proBNP:   Lipid Profile:   HgA1c:   TSH:     Consultant(s) Notes Reviewed:  [x ] YES  [ ] NO    Care Discussed with Consultants/Other Providers [ x] YES  [ ] NO    Imaging Personally Reviewed independently:  [x] YES  [ ] NO    All labs, radiologic studies, vitals, orders and medications list reviewed. Patient is seen and examined at bedside. Case discussed with medical team.                 Ran Velasquez MD  Interventional Cardiology / Endovascular Specialist  [place] Office : [address] [place] [address] [place] [address]  Tel:   [place] Office : [address] [facility] [place] [address]  Tel: [phone]    HPI:  This is a 70y Male with PMHx HTN, ESRD Tu/Th/Sa, CVA (right facial droop), Afib on eliquis, hx GIB?, vestibular schwannoma who presents to ED after missed HD sessions. Pt went away on vacation for a week and did not have dialysis, while he was gone missed 2 sessions was due today for the third.  Complaining of some shortness of breath.  Denies chest pain no nausea vomiting no other symptoms.   	  MEDICATIONS:        PAST MEDICAL/SURGICAL HISTORY  PAST MEDICAL & SURGICAL HISTORY:  HTN (Hypertension)      Chronic kidney disease      Kidney stones      Hemodialysis access, AV graft  Left upper extremity      Hyperparathyroidism      Anemia      Thrombocytopenia      Atrial fibrillation  on Eliquis      S/P Nephrectomy  (L) 2007 for kidney stones      Acquired arteriovenous fistula  left wrist  1/2015 - [facility], Left upper arm 4/2015 (approximate date)      AVF (arteriovenous fistula)          SOCIAL HISTORY: Substance Use (street drugs): ( x ) never used  (  ) other:    FAMILY HISTORY:  Family history of CVA (Father)            PHYSICAL EXAM:  T(C): 36.6 (04-12-25 @ 10:30), Max: 36.6 (04-12-25 @ 10:30)  HR: 73 (04-12-25 @ 10:30) (73 - 79)  BP: 136/88 (04-12-25 @ 10:30) (136/88 - 137/76)  RR: 18 (04-12-25 @ 10:30) (17 - 18)  SpO2: 99% (04-12-25 @ 10:30) (96% - 99%)  Wt(kg): --  I&O's Summary      Weight (kg): 70 (04-12 @ 10:02)    GENERAL: NAD, well-groomed, well-developed  EYES: PERRLA  ENMT: Moist mucous membranes  Cardiovascular: Normal S1 S2, No JVD, No murmurs, No edema  Respiratory: Lungs clear to auscultation	  Gastrointestinal:  Soft, Non-tender, + BS	  Extremities: Normal range of motion, No clubbing, cyanosis or edema  NERVOUS SYSTEM:  Alert & Oriented X3                                10.1   6.61  )-----------( 103      ( 12 Apr 2025 11:03 )             31.0           proBNP:   Lipid Profile:   HgA1c:   TSH:     Consultant(s) Notes Reviewed:  [x ] YES  [ ] NO    Care Discussed with Consultants/Other Providers [ x] YES  [ ] NO    Imaging Personally Reviewed independently:  [x] YES  [ ] NO    All labs, radiologic studies, vitals, orders and medications list reviewed. Patient is seen and examined at bedside. Case discussed with medical team.

## 2025-04-12 NOTE — ED ADULT TRIAGE NOTE - CHIEF COMPLAINT QUOTE
pt returned from vacation, missed 2 dialysis sessions. is due for dialysis today. old fistula noted to L arm. active fistula to R arm. pt SOB in triage.

## 2025-04-12 NOTE — ED ADULT NURSE REASSESSMENT NOTE - NS ED NURSE REASSESS COMMENT FT1
ED focused US guided PIV placement by Registered Nurse.      Indication - poor access.      Findings: compressible left forearm vein.  Using direct US guidance, under clean conditions, a long 20 Ga peripheral catheter introduced into vessel.  US performed after procedure, functional catheter in vein, no complications.     Impression:  successful US guided left forearm PIV placement.

## 2025-04-12 NOTE — H&P ADULT - HISTORY OF PRESENT ILLNESS
70-year-old male with history of hypertension ,hld renal failure on dialysis went away on vacation for a week and did not have dialysis while he was gone missed 2 sessions was due today for the third.  Complaining of some shortness of breath.  Denies chest pain no nausea vomiting no other symptoms.  Feels his weight is up a little bit.

## 2025-04-12 NOTE — CONSULT NOTE ADULT - ASSESSMENT
70-year-old male with history of hypertension renal failure on dialysis at [facility] went away on vacation for a week and did not have dialysis while he was gone missed 2 sessions was due today for the third.     ESRD on HD  consent in chart  Access: ERIC QUIROGA  HD center: [facility]  Schedule: Tuesday, Thursday, and Saturday  Plan:  Renal diet  scheduling for HD today  BMP QD    HTN  resume home regimen      Thank you for allowing me to participate in the care of your patient    For any question, call:  Cell # [phone]  Pager # [phone]  Callback # [phone]

## 2025-04-12 NOTE — CONSULT NOTE ADULT - ASSESSMENT
EKG 4/2023 SR LVH, anterolateral T abn    TTE 4/2023  CONCLUSIONS:  1. Increased relative wall thickness with normal left  ventricular mass index, consistent with concentric left  ventricular remodeling.  2. Normal left ventricular systolic function. No segmental  wall motion abnormalities.  3. Normal right ventricular size and function.    NST 4/2023  NUCLEAR FINDINGS:  The left ventricle was normal in size. Normal myocardial  perfusion scan,with no evidence of infarction or inducible  ischemia.     EKG 4/2023 SR LVH, anterolateral T abn    TTE 4/2023  CONCLUSIONS:  1. Increased relative wall thickness with normal left  ventricular mass index, consistent with concentric left  ventricular remodeling.  2. Normal left ventricular systolic function. No segmental  wall motion abnormalities.  3. Normal right ventricular size and function.    NST 4/2023  NUCLEAR FINDINGS:  The left ventricle was normal in size. Normal myocardial  perfusion scan,with no evidence of infarction or inducible  ischemia.    A/P:  70-year-old male with history of hypertension renal failure on dialysis went away on vacation for a week and did not have dialysis while he was gone missed 2 sessions was due today for the third.  Complaining of some shortness of breath.  Denies chest pain no nausea vomiting no other symptoms.  Feels his weight is up a little bit.    1. ESRD on HD  -  Renal eval     2. HTN (hypertension).   - Resume home antihypertensives clonidine, coreg, hydralazine, norvasc    3. PAF  - denies SOB on exam  - no signs of fluid overload  - SR on tele and EKG  - nl LV on echo 2023, no ischemia on NST 2023  - eliquis for            EKG 4/2023 SR LVH, anterolateral T abn    TTE 4/2023  CONCLUSIONS:  1. Increased relative wall thickness with normal left  ventricular mass index, consistent with concentric left  ventricular remodeling.  2. Normal left ventricular systolic function. No segmental  wall motion abnormalities.  3. Normal right ventricular size and function.    NST 4/2023  NUCLEAR FINDINGS:  The left ventricle was normal in size. Normal myocardial  perfusion scan,with no evidence of infarction or inducible  ischemia.    A/P:  70-year-old male with history of hypertension renal failure on dialysis went away on vacation for a week and did not have dialysis while he was gone missed 2 sessions was due today for the third.  Complaining of some shortness of breath.  Denies chest pain no nausea vomiting no other symptoms.  Feels his weight is up a little bit.    1. ESRD on HD  -  Renal consult    2. HTN (hypertension).   - Resume home antihypertensives clonidine, coreg, hydralazine, norvasc    3. PAF  - denies SOB on exam  - no signs of fluid overload  - SR on tele and EKG  - nl LV on echo 2023, no ischemia on NST 2023  - continue Eliquis 5mg BID for thromboembolic prevention     4. DVT ppx  - on eliquis

## 2025-04-12 NOTE — H&P ADULT - TIME BILLING
Admission H&P including bedside history , examination , reviewing test results and treatment plan. Renal and Cardiology Consult noted  .D/W Patient and ACP.

## 2025-04-12 NOTE — ED ADULT NURSE NOTE - OBJECTIVE STATEMENT
Pt received to room 1: A&Ox4, ambulatory with steady gait, history of HTN, ESRD on dialysis (T/Tr/S), states he is here for clearance to get dialysis. States he went to his dialysis center this morning but was sent to the hospital because he missed 2 sessions. Patient states [place] was [place] and his last session was 04/05/25. Respirations are even and unlabored, sating 99% on room air, NSR on cardiac monitor, VSS, afebrile. R upper arm fistula with bruit auscultated and palpated and an old fistula to L upper arm noted but not in use. Pt received to room 1: A&Ox4, ambulatory with steady gait, history of HTN, ESRD on dialysis (T/Tr/S), states he is here for clearance to get dialysis. States he went to his dialysis center this morning but was sent to the hospital because he missed 2 sessions. Patient states he was out of the country [place] a [place] and his last session was 04/05/25. Respirations are even and unlabored, sating 99% on room air, NSR on cardiac monitor, VSS, afebrile. R upper arm fistula with bruit auscultated and palpated and an old fistula to L upper arm noted but not in use. Patient endorses dyspnea on exertion and lower extremity swelling noted.

## 2025-04-12 NOTE — ED PROVIDER NOTE - PHYSICAL EXAMINATION
Patient well-appearing in no acute distress   HEENT unremarkable conjunctiva pink   neck supple no JVD noted   heart sounds S1-S2 normal rate and rhythm,   lungs clear with occasional wheeze and crackles at the base.    Abdomen soft nontender no hepatosplenomegaly   extremities no edema pulses intact bilaterally.

## 2025-04-12 NOTE — ED ADULT NURSE REASSESSMENT NOTE - NS ED NURSE REASSESS COMMENT FT1
Pt received from primary RN stable. Respirations even and labored on 6L NC. Pt admitted but as per primary RN, Pt to go to dialysis before going to the floor. Report given by primary RN to admitting floor and dialysis. Pt expresses slight discomfort at this time.  Belongings on bed with patient. Safety precautions in place.

## 2025-04-12 NOTE — ED PROVIDER NOTE - ATTENDING CONTRIBUTION TO CARE
I performed the initial face to face bedside interview with this patient regarding history of present illness, review of symptoms and past medical, social and family history.  I completed an independent physical examination.  I was the initial provider who evaluated this patient.  The history, review of symptoms and examination was documented by me.  I have discussed the patient’s plan of care and disposition with the resident

## 2025-04-13 LAB
A1C WITH ESTIMATED AVERAGE GLUCOSE RESULT: 4.9 % — SIGNIFICANT CHANGE UP (ref 4–5.6)
ALBUMIN SERPL ELPH-MCNC: 3.5 G/DL — SIGNIFICANT CHANGE UP (ref 3.3–5)
ALP SERPL-CCNC: 54 U/L — SIGNIFICANT CHANGE UP (ref 40–120)
ALT FLD-CCNC: 8 U/L — SIGNIFICANT CHANGE UP (ref 4–41)
ANION GAP SERPL CALC-SCNC: 19 MMOL/L — HIGH (ref 7–14)
AST SERPL-CCNC: 6 U/L — SIGNIFICANT CHANGE UP (ref 4–40)
BASOPHILS # BLD AUTO: 0.04 K/UL — SIGNIFICANT CHANGE UP (ref 0–0.2)
BASOPHILS NFR BLD AUTO: 0.4 % — SIGNIFICANT CHANGE UP (ref 0–2)
BILIRUB SERPL-MCNC: 0.7 MG/DL — SIGNIFICANT CHANGE UP (ref 0.2–1.2)
BUN SERPL-MCNC: 52 MG/DL — HIGH (ref 7–23)
CALCIUM SERPL-MCNC: 7.5 MG/DL — LOW (ref 8.4–10.5)
CHLORIDE SERPL-SCNC: 95 MMOL/L — LOW (ref 98–107)
CHOLEST SERPL-MCNC: 89 MG/DL — SIGNIFICANT CHANGE UP
CO2 SERPL-SCNC: 24 MMOL/L — SIGNIFICANT CHANGE UP (ref 22–31)
CREAT SERPL-MCNC: 11.76 MG/DL — HIGH (ref 0.5–1.3)
EGFR: 4 ML/MIN/1.73M2 — LOW
EGFR: 4 ML/MIN/1.73M2 — LOW
EOSINOPHIL # BLD AUTO: 0.05 K/UL — SIGNIFICANT CHANGE UP (ref 0–0.5)
EOSINOPHIL NFR BLD AUTO: 0.5 % — SIGNIFICANT CHANGE UP (ref 0–6)
ESTIMATED AVERAGE GLUCOSE: 94 — SIGNIFICANT CHANGE UP
GLUCOSE SERPL-MCNC: 94 MG/DL — SIGNIFICANT CHANGE UP (ref 70–99)
HAV IGM SER-ACNC: SIGNIFICANT CHANGE UP
HBV CORE AB SER-ACNC: SIGNIFICANT CHANGE UP
HBV SURFACE AB SER-ACNC: 418.2 MIU/ML — SIGNIFICANT CHANGE UP
HBV SURFACE AB SER-ACNC: REACTIVE — SIGNIFICANT CHANGE UP
HBV SURFACE AG SER-ACNC: SIGNIFICANT CHANGE UP
HCT VFR BLD CALC: 30.9 % — LOW (ref 39–50)
HCV AB S/CO SERPL IA: 0.76 S/CO — SIGNIFICANT CHANGE UP (ref 0–0.79)
HCV AB SERPL-IMP: SIGNIFICANT CHANGE UP
HDLC SERPL-MCNC: 52 MG/DL — SIGNIFICANT CHANGE UP
HGB BLD-MCNC: 10 G/DL — LOW (ref 13–17)
IANC: 7.15 K/UL — SIGNIFICANT CHANGE UP (ref 1.8–7.4)
IMM GRANULOCYTES NFR BLD AUTO: 0.5 % — SIGNIFICANT CHANGE UP (ref 0–0.9)
LDLC SERPL-MCNC: 25 MG/DL — SIGNIFICANT CHANGE UP
LIPID PNL WITH DIRECT LDL SERPL: 25 MG/DL — SIGNIFICANT CHANGE UP
LYMPHOCYTES # BLD AUTO: 1.18 K/UL — SIGNIFICANT CHANGE UP (ref 1–3.3)
LYMPHOCYTES # BLD AUTO: 12.8 % — LOW (ref 13–44)
MAGNESIUM SERPL-MCNC: 2 MG/DL — SIGNIFICANT CHANGE UP (ref 1.6–2.6)
MCHC RBC-ENTMCNC: 29.2 PG — SIGNIFICANT CHANGE UP (ref 27–34)
MCHC RBC-ENTMCNC: 32.4 G/DL — SIGNIFICANT CHANGE UP (ref 32–36)
MCV RBC AUTO: 90.4 FL — SIGNIFICANT CHANGE UP (ref 80–100)
MONOCYTES # BLD AUTO: 0.72 K/UL — SIGNIFICANT CHANGE UP (ref 0–0.9)
MONOCYTES NFR BLD AUTO: 7.8 % — SIGNIFICANT CHANGE UP (ref 2–14)
MRSA PCR RESULT.: DETECTED
NEUTROPHILS # BLD AUTO: 7.15 K/UL — SIGNIFICANT CHANGE UP (ref 1.8–7.4)
NEUTROPHILS NFR BLD AUTO: 78 % — HIGH (ref 43–77)
NONHDLC SERPL-MCNC: 37 MG/DL — SIGNIFICANT CHANGE UP
NRBC # BLD AUTO: 0 K/UL — SIGNIFICANT CHANGE UP (ref 0–0)
NRBC # FLD: 0 K/UL — SIGNIFICANT CHANGE UP (ref 0–0)
NRBC BLD AUTO-RTO: 0 /100 WBCS — SIGNIFICANT CHANGE UP (ref 0–0)
PHOSPHATE SERPL-MCNC: 7.3 MG/DL — HIGH (ref 2.5–4.5)
PLATELET # BLD AUTO: 83 K/UL — LOW (ref 150–400)
POTASSIUM SERPL-MCNC: 4 MMOL/L — SIGNIFICANT CHANGE UP (ref 3.5–5.3)
POTASSIUM SERPL-SCNC: 4 MMOL/L — SIGNIFICANT CHANGE UP (ref 3.5–5.3)
PROT SERPL-MCNC: 6.3 G/DL — SIGNIFICANT CHANGE UP (ref 6–8.3)
RBC # BLD: 3.42 M/UL — LOW (ref 4.2–5.8)
RBC # FLD: 17.1 % — HIGH (ref 10.3–14.5)
S AUREUS DNA NOSE QL NAA+PROBE: DETECTED
SODIUM SERPL-SCNC: 138 MMOL/L — SIGNIFICANT CHANGE UP (ref 135–145)
TRIGL SERPL-MCNC: 52 MG/DL — SIGNIFICANT CHANGE UP
TSH SERPL-MCNC: 1.28 UIU/ML — SIGNIFICANT CHANGE UP (ref 0.27–4.2)
WBC # BLD: 9.19 K/UL — SIGNIFICANT CHANGE UP (ref 3.8–10.5)
WBC # FLD AUTO: 9.19 K/UL — SIGNIFICANT CHANGE UP (ref 3.8–10.5)

## 2025-04-13 RX ADMIN — Medication 50 MILLIGRAM(S): at 12:21

## 2025-04-13 RX ADMIN — FOLIC ACID 1 MILLIGRAM(S): 1 TABLET ORAL at 12:20

## 2025-04-13 RX ADMIN — Medication 0.3 MILLIGRAM(S): at 15:40

## 2025-04-13 RX ADMIN — CARVEDILOL 25 MILLIGRAM(S): 3.12 TABLET, FILM COATED ORAL at 06:35

## 2025-04-13 RX ADMIN — SEVELAMER HYDROCHLORIDE 2400 MILLIGRAM(S): 800 TABLET ORAL at 12:20

## 2025-04-13 RX ADMIN — CARVEDILOL 25 MILLIGRAM(S): 3.12 TABLET, FILM COATED ORAL at 19:18

## 2025-04-13 RX ADMIN — APIXABAN 5 MILLIGRAM(S): 2.5 TABLET, FILM COATED ORAL at 06:36

## 2025-04-13 RX ADMIN — Medication 50 MILLIGRAM(S): at 19:17

## 2025-04-13 RX ADMIN — AMLODIPINE BESYLATE 10 MILLIGRAM(S): 10 TABLET ORAL at 06:35

## 2025-04-13 RX ADMIN — SEVELAMER HYDROCHLORIDE 2400 MILLIGRAM(S): 800 TABLET ORAL at 19:17

## 2025-04-13 RX ADMIN — APIXABAN 5 MILLIGRAM(S): 2.5 TABLET, FILM COATED ORAL at 19:18

## 2025-04-13 RX ADMIN — Medication 1 APPLICATION(S): at 12:24

## 2025-04-13 RX ADMIN — ATORVASTATIN CALCIUM 80 MILLIGRAM(S): 80 TABLET, FILM COATED ORAL at 21:41

## 2025-04-13 NOTE — CHART NOTE - NSCHARTNOTEFT_GEN_A_CORE
Pt cleared for discharge per Nephrology s/p HD on Saturday Testing 02 pulse ox as pt has been wearing 02 over weekend  and room air pulse ox 88% at rest this morning - improved with sitting up and coughing   Spoke with DENIZ Shaffer Dacomar and discharge can not be set up for today as the Dialysis Center is closed and pt has not attended HD in the past week 2/2 being out of country.  Sw to follow up regarding pt returning to HD center on Monday   Will follow up pulse ox on room air and ambulation Pt cleared for discharge per Nephrology s/p HD on Saturday Testing 02 pulse ox as pt has been wearing 02 over weekend  and room air pulse ox 88% at rest this morning - improved with sitting up and coughing   Spoke with DENIZ Saenz and discharge can be set up for today    Will follow up pulse ox on room air and ambulation Pt cleared for discharge per Nephrology s/p HD on Saturday Testing 02 pulse ox as pt has been wearing 02 over weekend  and room air pulse ox 88% at rest this morning - improved with sitting up and coughing   Spoke with DENIZ Shaffer Dacomar and discharge can be set up for today   However Dr. Vaz would like to keep pt for observation and follow BP    Will follow up pulse ox on room air and ambulation  Eval for dc in am 4/14

## 2025-04-13 NOTE — DISCHARGE NOTE PROVIDER - CARE PROVIDERS DIRECT ADDRESSES
,[email or web address]

## 2025-04-13 NOTE — DISCHARGE NOTE PROVIDER - NSDCMRMEDTOKEN_GEN_ALL_CORE_FT
acetaminophen 325 mg oral tablet: 2 tab(s) orally every 6 hours, As needed, Temp greater or equal to 38C (100.4F), Mild Pain (1 - 3)  apixaban 5 mg oral tablet: 1 tab(s) orally 2 times a day ...(patient states this is a current medication; prescription submitted in February 2023, not picked-up)  aspirin 81 mg oral delayed release tablet: 1 tab(s) orally once a day  atorvastatin 80 mg oral tablet: 1 tab(s) orally once a day (at bedtime) ...(patient states this is a current medication; prescription submitted in February 2023, not picked-up)  calcium acetate 667 mg oral tablet: 2 tab(s) orally 3 times a day (with meals) ...(patient states this is a current medication; prescription submitted in December 2022, not picked-up)  carvedilol 25 mg oral tablet: 1 tab(s) orally 2 times a day  clindamycin 300 mg oral capsule: 1 cap(s) orally 4 times a day  cloNIDine 0.3 mg oral tablet: 1 tab(s) orally 3 times a day  ferrous sulfate 325 mg (65 mg elemental iron) oral tablet: 1 tab(s) orally once a day  folic acid 1 mg oral tablet: 1 tab(s) orally once a day  hydrALAZINE 50 mg oral tablet: 1 tab(s) orally 3 times a day  Maxitrol ophthalmic ointment: 1 application in each affected eye 2 times a day To left eye  Norvasc 10 mg oral tablet: 1 tab(s) orally once a day  pantoprazole 40 mg oral delayed release tablet: 1 tab(s) orally once a day ...(patient states this is a current medication; 30-day supply last dispensed April 2022)   acetaminophen 325 mg oral tablet: 2 tab(s) orally every 6 hours, As needed, Temp greater or equal to 38C (100.4F), Mild Pain (1 - 3)  apixaban 5 mg oral tablet: 1 tab(s) orally 2 times a day ...(patient states this is a current medication; prescription submitted in February 2023, not picked-up)  aspirin 81 mg oral delayed release tablet: 1 tab(s) orally once a day  atorvastatin 80 mg oral tablet: 1 tab(s) orally once a day (at bedtime) ...(patient states this is a current medication; prescription submitted in February 2023, not picked-up)  calcium acetate 667 mg oral tablet: 2 tab(s) orally 3 times a day (with meals) ...(patient states this is a current medication; prescription submitted in December 2022, not picked-up)  carvedilol 25 mg oral tablet: 1 tab(s) orally 2 times a day  ferrous sulfate 325 mg (65 mg elemental iron) oral tablet: 1 tab(s) orally once a day  folic acid 1 mg oral tablet: 1 tab(s) orally once a day  hydrALAZINE 50 mg oral tablet: 1 tab(s) orally 3 times a day  Norvasc 10 mg oral tablet: 1 tab(s) orally once a day  pantoprazole 40 mg oral delayed release tablet: 1 tab(s) orally once a day ...(patient states this is a current medication; 30-day supply last dispensed April 2022)   acetaminophen 325 mg oral tablet: 2 tab(s) orally every 6 hours, As needed, Temp greater or equal to 38C (100.4F), Mild Pain (1 - 3)  apixaban 5 mg oral tablet: 1 tab(s) orally 2 times a day ...(patient states this is a current medication; prescription submitted in February 2023, not picked-up)  aspirin 81 mg oral delayed release tablet: 1 tab(s) orally once a day  atorvastatin 80 mg oral tablet: 1 tab(s) orally once a day (at bedtime) ...(patient states this is a current medication; prescription submitted in February 2023, not picked-up)  calcium acetate 667 mg oral tablet: 2 tab(s) orally 3 times a day (with meals) ...(patient states this is a current medication; prescription submitted in December 2022, not picked-up)  carvedilol 25 mg oral tablet: 1 tab(s) orally 2 times a day  ferrous sulfate 325 mg (65 mg elemental iron) oral tablet: 1 tab(s) orally once a day  folic acid 1 mg oral tablet: 1 tab(s) orally once a day  hydrALAZINE 50 mg oral tablet: 1 tab(s) orally 3 times a day  mupirocin 2% topical ointment: Apply topically to affected area 2 times a day 1 Apply topically to affected area 2 times a day x 5 days MDD: 1  Norvasc 10 mg oral tablet: 1 tab(s) orally once a day  pantoprazole 40 mg oral delayed release tablet: 1 tab(s) orally once a day ...(patient states this is a current medication; 30-day supply last dispensed April 2022)

## 2025-04-13 NOTE — DISCHARGE NOTE PROVIDER - HOSPITAL COURSE
70-year-old male with history of hypertension renal failure on dialysis at [facility] went away on vacation for a week and did not have dialysis while he was gone missed 2 sessions was due today for the third.     ESRD on HD  consent in chart  Access: ERIC QUIROGA  HD center: [facility]  Schedule: Tuesday, Thursday, and Saturday  Plan:  Renal diet  scheduling for HD today  BMP QD    HTN  resume home regimen   70-year-old male with history of hypertension renal failure on dialysis at [facility] went away on vacation for a week and did not have dialysis while he was gone missed 2 sessions was due today for the third.     ESRD on hemodialysis.   - Access: LUE AVF  - HD center: [facility]  - Schedule: Tuesday, Thursday, and Saturday  - renal diet     Acute respiratory failure with hypoxia.   - Oxygen and weaning off as tolerated. Now on Room air.     Hyperphosphatemia.   - On Sevelamer and getting HD.    HTN (hypertension).   - resume home regimen     PAF (paroxysmal atrial fibrillation).   - With H/O CVA on Eliquis . Cardiology following.    Anemia secondary to renal failure.   - Trending CBC.    Hypocalcemia.   - Treating with HD bath.    On 4/14/25, case was discussed with Dr. Vaz, patient is medically cleared and optimized for discharge today. All medications were reviewed with attending, and sent to mutually agreed upon pharmacy.

## 2025-04-13 NOTE — DISCHARGE NOTE PROVIDER - CARE PROVIDER_API CALL
LALIT ENCISO  [address] [place] [address]  Phone: ([phone]  Fax: ()-  Follow Up Time:    LALIT ENCISO  [address] [place] [address]  Phone: ()-  Fax: ()-  Follow Up Time:     Gustavo Perkins  Nephrology  [address] [place] [id number]  Phone: [phone]  Fax: [phone]  Follow Up Time: 1 week

## 2025-04-13 NOTE — PROGRESS NOTE ADULT - ASSESSMENT
70-year-old male with history of hypertension renal failure on dialysis went away on vacation for a week and did not have dialysis while he was gone missed 2 sessions was due today for the third.  Complaining of some shortness of breath.  Denies chest pain no nausea vomiting no other symptoms.  Feels his weight is up a little bit.     Problem/Plan - 1:  ·  Problem: ESRD on hemodialysis.   ·  Plan: Renal help appreciated.   HD per them.     Problem/Plan - 2:  ·  Problem: Hypocalcemia.   ·  Plan: Treating with HD bath.     Problem/Plan - 3:  ·  Problem: Hyperphosphatemia.   ·  Plan: On Sevelamer and getting HD.     Problem/Plan - 4:  ·  Problem: HTN (hypertension).   ·  Plan: On lots of BP medications.   Will give carefully depending on BP.   BP reading Okay.     Problem/Plan - 5:  ·  Problem: PAF (paroxysmal atrial fibrillation).   ·  Plan: With H/O CVA on Eliquis . Cardiology following.     Problem/Plan - 6:  ·  Problem: Anemia secondary to renal failure.   ·  Plan: Trending CBC.    Dispo : DC planning home tomorrow.  70-year-old male with history of hypertension renal failure on dialysis went away on vacation for a week and did not have dialysis while he was gone missed 2 sessions was due today for the third.  Complaining of some shortness of breath.  Denies chest pain no nausea vomiting no other symptoms.  Feels his weight is up a little bit.     Problem/Plan - 1:  ·  Problem: ESRD on hemodialysis.   ·  Plan: Renal help appreciated.   HD per them.     Problem/Plan - 2:  ·  Problem: Acute respiratory failure with hypoxia.   ·  Plan: Oxygen and weaning off as tolerated. .     Problem/Plan - 3:  ·  Problem: Hyperphosphatemia.   ·  Plan: On Sevelamer and getting HD.     Problem/Plan - 4:  ·  Problem: HTN (hypertension).   ·  Plan: On lots of BP medications.   Will give carefully depending on BP.   BP reading Okay.     Problem/Plan - 5:  ·  Problem: PAF (paroxysmal atrial fibrillation).   ·  Plan: With H/O CVA on Eliquis . Cardiology following.     Problem/Plan - 6:  ·  Problem: Anemia secondary to renal failure.   ·  Plan: Trending CBC.     Problem/Plan - 7:  ·  Problem: Hypocalcemia.   ·  Plan: Treating with HD bath.    Dispo : DC planning home tomorrow.

## 2025-04-13 NOTE — DISCHARGE NOTE PROVIDER - PROVIDER TOKENS
PROVIDER:[TOKEN:[[id number]]] PROVIDER:[TOKEN:[[id number]]],PROVIDER:[TOKEN:[[id number]],FOLLOWUP:[1 week]]

## 2025-04-13 NOTE — DISCHARGE NOTE PROVIDER - NSDCCPCAREPLAN_GEN_ALL_CORE_FT
PRINCIPAL DISCHARGE DIAGNOSIS  Diagnosis: ESCRF (end stage chronic renal failure)  Assessment and Plan of Treatment: HD center: [facility]  Schedule: Tuesday, Thursday, and Saturday  Renal diet  Resume Outpatent schedule   Renal diet   Outptient lab monitoring      SECONDARY DISCHARGE DIAGNOSES  Diagnosis: HTN (hypertension)  Assessment and Plan of Treatment: Resume home medications   Continue blood pressure medication regimen as directed. Monitor for any visual changes, headaches or dizziness.  Monitor blood pressure regularly.  Follow up with your PCP for further management for high blood pressure.       PRINCIPAL DISCHARGE DIAGNOSIS  Diagnosis: ESCRF (end stage chronic renal failure)  Assessment and Plan of Treatment: Please continue to follow your dialysis schedule (T/T/Sat) and refer to your primary provider/nephrologist for further care and monitoring of kidney function and electrolytes. Continue a renal restricted diet (Avoiding foods high in potassium and phosphorus), your prescribed medications, and supplementations as directed.      SECONDARY DISCHARGE DIAGNOSES  Diagnosis: HTN (hypertension)  Assessment and Plan of Treatment: Low sodium and fat diet, continue anti-hypertensive medications, and follow up with primary care physician.

## 2025-04-14 ENCOUNTER — TRANSCRIPTION ENCOUNTER (OUTPATIENT)
Age: 70
End: 2025-04-14

## 2025-04-14 VITALS
TEMPERATURE: 98 F | DIASTOLIC BLOOD PRESSURE: 82 MMHG | RESPIRATION RATE: 18 BRPM | HEART RATE: 82 BPM | OXYGEN SATURATION: 100 % | SYSTOLIC BLOOD PRESSURE: 129 MMHG

## 2025-04-14 LAB
ALBUMIN SERPL ELPH-MCNC: 3.5 G/DL — SIGNIFICANT CHANGE UP (ref 3.3–5)
ALP SERPL-CCNC: 59 U/L — SIGNIFICANT CHANGE UP (ref 40–120)
ALT FLD-CCNC: 7 U/L — SIGNIFICANT CHANGE UP (ref 4–41)
ANION GAP SERPL CALC-SCNC: 23 MMOL/L — HIGH (ref 7–14)
AST SERPL-CCNC: <5 U/L — SIGNIFICANT CHANGE UP (ref 4–40)
BASOPHILS # BLD AUTO: 0.04 K/UL — SIGNIFICANT CHANGE UP (ref 0–0.2)
BASOPHILS NFR BLD AUTO: 0.5 % — SIGNIFICANT CHANGE UP (ref 0–2)
BILIRUB SERPL-MCNC: 0.6 MG/DL — SIGNIFICANT CHANGE UP (ref 0.2–1.2)
BUN SERPL-MCNC: 80 MG/DL — HIGH (ref 7–23)
CALCIUM SERPL-MCNC: 7.2 MG/DL — LOW (ref 8.4–10.5)
CHLORIDE SERPL-SCNC: 95 MMOL/L — LOW (ref 98–107)
CO2 SERPL-SCNC: 22 MMOL/L — SIGNIFICANT CHANGE UP (ref 22–31)
CREAT SERPL-MCNC: 13.79 MG/DL — HIGH (ref 0.5–1.3)
EGFR: 3 ML/MIN/1.73M2 — LOW
EGFR: 3 ML/MIN/1.73M2 — LOW
EOSINOPHIL # BLD AUTO: 0.22 K/UL — SIGNIFICANT CHANGE UP (ref 0–0.5)
EOSINOPHIL NFR BLD AUTO: 2.9 % — SIGNIFICANT CHANGE UP (ref 0–6)
GLUCOSE SERPL-MCNC: 105 MG/DL — HIGH (ref 70–99)
HCT VFR BLD CALC: 30.8 % — LOW (ref 39–50)
HGB BLD-MCNC: 10.2 G/DL — LOW (ref 13–17)
IANC: 5.13 K/UL — SIGNIFICANT CHANGE UP (ref 1.8–7.4)
IMM GRANULOCYTES NFR BLD AUTO: 0.7 % — SIGNIFICANT CHANGE UP (ref 0–0.9)
LYMPHOCYTES # BLD AUTO: 1.52 K/UL — SIGNIFICANT CHANGE UP (ref 1–3.3)
LYMPHOCYTES # BLD AUTO: 19.9 % — SIGNIFICANT CHANGE UP (ref 13–44)
MAGNESIUM SERPL-MCNC: 2.2 MG/DL — SIGNIFICANT CHANGE UP (ref 1.6–2.6)
MCHC RBC-ENTMCNC: 29.4 PG — SIGNIFICANT CHANGE UP (ref 27–34)
MCHC RBC-ENTMCNC: 33.1 G/DL — SIGNIFICANT CHANGE UP (ref 32–36)
MCV RBC AUTO: 88.8 FL — SIGNIFICANT CHANGE UP (ref 80–100)
MONOCYTES # BLD AUTO: 0.68 K/UL — SIGNIFICANT CHANGE UP (ref 0–0.9)
MONOCYTES NFR BLD AUTO: 8.9 % — SIGNIFICANT CHANGE UP (ref 2–14)
NEUTROPHILS # BLD AUTO: 5.13 K/UL — SIGNIFICANT CHANGE UP (ref 1.8–7.4)
NEUTROPHILS NFR BLD AUTO: 67.1 % — SIGNIFICANT CHANGE UP (ref 43–77)
NRBC # BLD AUTO: 0 K/UL — SIGNIFICANT CHANGE UP (ref 0–0)
NRBC # FLD: 0 K/UL — SIGNIFICANT CHANGE UP (ref 0–0)
NRBC BLD AUTO-RTO: 0 /100 WBCS — SIGNIFICANT CHANGE UP (ref 0–0)
PHOSPHATE SERPL-MCNC: 6.6 MG/DL — HIGH (ref 2.5–4.5)
PLATELET # BLD AUTO: 99 K/UL — LOW (ref 150–400)
POTASSIUM SERPL-MCNC: 4.4 MMOL/L — SIGNIFICANT CHANGE UP (ref 3.5–5.3)
POTASSIUM SERPL-SCNC: 4.4 MMOL/L — SIGNIFICANT CHANGE UP (ref 3.5–5.3)
PROT SERPL-MCNC: 6.8 G/DL — SIGNIFICANT CHANGE UP (ref 6–8.3)
RBC # BLD: 3.47 M/UL — LOW (ref 4.2–5.8)
RBC # FLD: 16.9 % — HIGH (ref 10.3–14.5)
SODIUM SERPL-SCNC: 140 MMOL/L — SIGNIFICANT CHANGE UP (ref 135–145)
WBC # BLD: 7.64 K/UL — SIGNIFICANT CHANGE UP (ref 3.8–10.5)
WBC # FLD AUTO: 7.64 K/UL — SIGNIFICANT CHANGE UP (ref 3.8–10.5)

## 2025-04-14 RX ORDER — MUPIROCIN CALCIUM 20 MG/G
1 CREAM TOPICAL
Refills: 0 | Status: DISCONTINUED | OUTPATIENT
Start: 2025-04-14 | End: 2025-04-14

## 2025-04-14 RX ORDER — MUPIROCIN CALCIUM 20 MG/G
1 CREAM TOPICAL
Qty: 1 | Refills: 0
Start: 2025-04-14 | End: 2025-04-18

## 2025-04-14 RX ADMIN — FOLIC ACID 1 MILLIGRAM(S): 1 TABLET ORAL at 11:40

## 2025-04-14 RX ADMIN — SEVELAMER HYDROCHLORIDE 2400 MILLIGRAM(S): 800 TABLET ORAL at 11:40

## 2025-04-14 RX ADMIN — APIXABAN 5 MILLIGRAM(S): 2.5 TABLET, FILM COATED ORAL at 05:22

## 2025-04-14 RX ADMIN — SEVELAMER HYDROCHLORIDE 2400 MILLIGRAM(S): 800 TABLET ORAL at 09:12

## 2025-04-14 RX ADMIN — CARVEDILOL 25 MILLIGRAM(S): 3.12 TABLET, FILM COATED ORAL at 05:23

## 2025-04-14 RX ADMIN — Medication 50 MILLIGRAM(S): at 13:20

## 2025-04-14 NOTE — DISCHARGE NOTE NURSING/CASE MANAGEMENT/SOCIAL WORK - NSDCVIVACCINE_GEN_ALL_CORE_FT
influenza, high-dose, quadrivalent; 01-Nov-2021 07:20; Malka Houser (RN); [employer]; [id number] (Exp. Date: 31-May-2022); IntraMuscular; Deltoid Left.; 0.7 milliLiter(s); VIS (VIS Published: 06-Aug-2021, VIS Presented: 01-Nov-2021);

## 2025-04-14 NOTE — DISCHARGE NOTE NURSING/CASE MANAGEMENT/SOCIAL WORK - FINANCIAL ASSISTANCE
[facility] provides services at a reduced cost to those who are determined to be eligible through [facility]’s financial assistance program. Information regarding [facility]’s financial assistance program can be found by going to [email or web address] or by calling [phone].

## 2025-04-14 NOTE — DISCHARGE NOTE NURSING/CASE MANAGEMENT/SOCIAL WORK - PATIENT PORTAL LINK FT
You can access the FollowMyHealth Patient Portal offered by [facility] by registering at the following website: [email or web address]. By joining [employer]’s FollowMyHealth portal, you will also be able to view your health information using other applications (apps) compatible with our system.

## 2025-04-14 NOTE — PROGRESS NOTE ADULT - REASON FOR ADMISSION
Missed HD for 1 week as was [place] of [place]

## 2025-04-14 NOTE — PROGRESS NOTE ADULT - ASSESSMENT
70-year-old male with history of hypertension renal failure on dialysis at [facility] went away on vacation for a week and did not have dialysis while he was gone missed 2 sessions was due today for the third.     ESRD on HD  consent in chart  Access: ERIC QUIROGA  HD center: [facility]  Schedule: Tuesday, Thursday, and Saturday  Plan:  Renal diet  tolerated HD well Saturday  vol status improved  BMP QD    HTN  resume home regimen      stable for DC home from nephrology standpoint    For any question, call:  Cell # [phone]  Pager # [phone]  Callback # [phone]

## 2025-04-14 NOTE — PROGRESS NOTE ADULT - SUBJECTIVE AND OBJECTIVE BOX
[facility] - MARK Vargas / MARK Camejo / JASMIN Caballero/ MARK Bojorquez/ MARK Faust/ TELLO Garrison / RODNEY Garcia / HAILEY Pekrins / EMILIA Garner  ---------------------------------------------------------------------------------------------------------------  seen and examined today for ESRD  Interval : NAD  VITALS:  T(F): 99 (04-13-25 @ 10:04), Max: 99.8 (04-12-25 @ 18:45)  HR: 74 (04-13-25 @ 10:04)  BP: 114/62 (04-13-25 @ 10:04)  RR: 18 (04-13-25 @ 10:04)  SpO2: 94% (04-13-25 @ 10:04)  Wt(kg): --    04-12 @ 07:01  -  04-13 @ 07:00  --------------------------------------------------------  IN: 400 mL / OUT: 3400 mL / NET: -3000 mL      Physical Exam :-  Constitutional: NAD  Neck: Supple.  Respiratory: Bilateral equal breath sounds,  Cardiovascular: S1, S2 normal,  Gastrointestinal: Bowel Sounds present, soft, non tender.  Extremities: No edema  Neurological: Alert and Oriented x 3, no focal deficits  Psychiatric: Normal mood, normal affect  Data:-  Allergies :   No Known Allergies    Hospital Medications:   MEDICATIONS  (STANDING):  amLODIPine   Tablet 10 milliGRAM(s) Oral daily  apixaban 5 milliGRAM(s) Oral two times a day  atorvastatin 80 milliGRAM(s) Oral at bedtime  carvedilol 25 milliGRAM(s) Oral every 12 hours  chlorhexidine 2% Cloths 1 Application(s) Topical daily  cloNIDine 0.3 milliGRAM(s) Oral three times a day  epoetin tammi-epbx (RETACRIT) Injectable 54128 Unit(s) IV Push <User Schedule>  folic acid 1 milliGRAM(s) Oral daily  hydrALAZINE 50 milliGRAM(s) Oral three times a day  sevelamer carbonate 2400 milliGRAM(s) Oral three times a day with meals    04-13    138  |  95[L]  |  52[H]  ----------------------------<  94  4.0   |  24  |  11.76[H]    Ca    7.5[L]      13 Apr 2025 06:00  Phos  7.3     04-13  Mg     2.00     04-13    TPro  6.3  /  Alb  3.5  /  TBili  0.7  /  DBili      /  AST  6   /  ALT  8   /  AlkPhos  54  04-13    Creatinine Trend: 11.76 <--, 19.15 <--                        10.0   9.19  )-----------( 83       ( 13 Apr 2025 06:00 )             30.9   
[facility] - MARK Vargas / MARK Camejo / JASMIN Caballero/ MARK Bojorquez/ MARK Faust/ TELLO Garrison / RODNEY Garcia / HAILEY Perkins / EMILIA Garner  ---------------------------------------------------------------------------------------------------------------  seen and examined today for ESRD  Interval : NAD  VITALS:  T(F): 97.9 (04-14-25 @ 09:13), Max: 99.5 (04-13-25 @ 16:04)  HR: 78 (04-14-25 @ 09:13)  BP: 125/85 (04-14-25 @ 09:13)  RR: 18 (04-14-25 @ 09:13)  SpO2: 100% (04-14-25 @ 09:13)  Wt(kg): --    04-13 @ 07:01  -  04-14 @ 07:00  --------------------------------------------------------  IN: 240 mL / OUT: 0 mL / NET: 240 mL      Physical Exam :-  Constitutional: NAD  Neck: Supple.  Respiratory: Bilateral equal breath sounds,  Cardiovascular: S1, S2 normal,  Gastrointestinal: Bowel Sounds present, soft, non tender.  Extremities: No edema  Neurological: Alert and Oriented x 3, no focal deficits  Psychiatric: Normal mood, normal affect  Data:-  Allergies :   No Known Allergies    Hospital Medications:   MEDICATIONS  (STANDING):  amLODIPine   Tablet 10 milliGRAM(s) Oral daily  apixaban 5 milliGRAM(s) Oral two times a day  atorvastatin 80 milliGRAM(s) Oral at bedtime  carvedilol 25 milliGRAM(s) Oral every 12 hours  chlorhexidine 2% Cloths 1 Application(s) Topical daily  cloNIDine 0.3 milliGRAM(s) Oral three times a day  epoetin tammi-epbx (RETACRIT) Injectable 21357 Unit(s) IV Push <User Schedule>  folic acid 1 milliGRAM(s) Oral daily  hydrALAZINE 50 milliGRAM(s) Oral three times a day  sevelamer carbonate 2400 milliGRAM(s) Oral three times a day with meals    04-14    140  |  95[L]  |  80[H]  ----------------------------<  105[H]  4.4   |  22  |  13.79[H]    Ca    7.2[L]      14 Apr 2025 05:15  Phos  6.6     04-14  Mg     2.20     04-14    TPro  6.8  /  Alb  3.5  /  TBili  0.6  /  DBili      /  AST  <5  /  ALT  7   /  AlkPhos  59  04-14    Creatinine Trend: 13.79 <--, 11.76 <--, 19.15 <--                        10.2   7.64  )-----------( 99       ( 14 Apr 2025 05:15 )             30.8   
Date of Service  : 04-13-25     INTERVAL HPI/OVERNIGHT EVENTS: I feel much better.   Vital Signs Last 24 Hrs  T(C): 37.5 (13 Apr 2025 16:04), Max: 37.6 (12 Apr 2025 22:53)  T(F): 99.5 (13 Apr 2025 16:04), Max: 99.6 (12 Apr 2025 22:53)  HR: 74 (13 Apr 2025 19:14) (65 - 102)  BP: 160/67 (13 Apr 2025 19:14) (114/62 - 179/95)  BP(mean): --  RR: 18 (13 Apr 2025 16:04) (16 - 20)  SpO2: 95% (13 Apr 2025 16:04) (89% - 100%)    Parameters below as of 13 Apr 2025 16:04  Patient On (Oxygen Delivery Method): room air      I&O's Summary    12 Apr 2025 07:01  -  13 Apr 2025 07:00  --------------------------------------------------------  IN: 400 mL / OUT: 3400 mL / NET: -3000 mL      MEDICATIONS  (STANDING):  amLODIPine   Tablet 10 milliGRAM(s) Oral daily  apixaban 5 milliGRAM(s) Oral two times a day  atorvastatin 80 milliGRAM(s) Oral at bedtime  carvedilol 25 milliGRAM(s) Oral every 12 hours  chlorhexidine 2% Cloths 1 Application(s) Topical daily  cloNIDine 0.3 milliGRAM(s) Oral three times a day  epoetin tammi-epbx (RETACRIT) Injectable 83236 Unit(s) IV Push <User Schedule>  folic acid 1 milliGRAM(s) Oral daily  hydrALAZINE 50 milliGRAM(s) Oral three times a day  sevelamer carbonate 2400 milliGRAM(s) Oral three times a day with meals    MEDICATIONS  (PRN):  acetaminophen     Tablet .. 650 milliGRAM(s) Oral every 6 hours PRN Temp greater or equal to 38C (100.4F), Mild Pain (1 - 3), Moderate Pain (4 - 6)  sodium chloride 0.9% Bolus. 100 milliLiter(s) IV Bolus every 5 minutes PRN SBP LESS THAN or EQUAL to 90 mmHg    LABS:                        10.0   9.19  )-----------( 83       ( 13 Apr 2025 06:00 )             30.9     04-13    138  |  95[L]  |  52[H]  ----------------------------<  94  4.0   |  24  |  11.76[H]    Ca    7.5[L]      13 Apr 2025 06:00  Phos  7.3     04-13  Mg     2.00     04-13    TPro  6.3  /  Alb  3.5  /  TBili  0.7  /  DBili  x   /  AST  6   /  ALT  8   /  AlkPhos  54  04-13      Urinalysis Basic - ( 13 Apr 2025 06:00 )    Color: x / Appearance: x / SG: x / pH: x  Gluc: 94 mg/dL / Ketone: x  / Bili: x / Urobili: x   Blood: x / Protein: x / Nitrite: x   Leuk Esterase: x / RBC: x / WBC x   Sq Epi: x / Non Sq Epi: x / Bacteria: x      CAPILLARY BLOOD GLUCOSE      POCT Blood Glucose.: 98 mg/dL (12 Apr 2025 21:43)        Urinalysis Basic - ( 13 Apr 2025 06:00 )    Color: x / Appearance: x / SG: x / pH: x  Gluc: 94 mg/dL / Ketone: x  / Bili: x / Urobili: x   Blood: x / Protein: x / Nitrite: x   Leuk Esterase: x / RBC: x / WBC x   Sq Epi: x / Non Sq Epi: x / Bacteria: x      REVIEW OF SYSTEMS:  CONSTITUTIONAL: No fever, weight loss, or fatigue  EYES: No eye pain, visual disturbances, or discharge  ENMT:  No difficulty hearing, tinnitus, vertigo; No sinus or throat pain  NECK: No pain or stiffness  RESPIRATORY: No cough, wheezing, chills or hemoptysis; No shortness of breath  CARDIOVASCULAR: No chest pain, palpitations, dizziness, or leg swelling  GASTROINTESTINAL: No abdominal or epigastric pain. No nausea, vomiting, or hematemesis; No diarrhea or constipation. No melena or hematochezia.  GENITOURINARY: No dysuria, frequency, hematuria, or incontinence  NEUROLOGICAL: No headaches, memory loss, loss of strength, numbness, or tremors    RADIOLOGY & ADDITIONAL TESTS:    Consultant(s) Notes Reviewed:  [x ] YES  [ ] NO    PHYSICAL EXAM:  GENERAL: NAD, well-groomed, well-developed,not in any distress ,  HEAD:  Atraumatic, Normocephalic  EYES: EOMI, PERRLA, conjunctiva and sclera clear  ENMT: No tonsillar erythema, exudates, or enlargement; Moist mucous membranes, Good dentition, No lesions  NECK: Supple, No JVD, Normal thyroid  NERVOUS SYSTEM:  Alert & Oriented X3, No focal deficit   CHEST/LUNG: Good air entry bilateral with no  rales, rhonchi, wheezing, or rubs  HEART: Regular rate and rhythm; No murmurs, rubs, or gallops  ABDOMEN: Soft, Nontender, Nondistended; Bowel sounds present  EXTREMITIES:  2+ Peripheral Pulses, No clubbing, cyanosis, or edema    Care Discussed with Consultants/Other Providers [ x] YES  [ ] NO

## 2025-05-16 NOTE — ED ADULT TRIAGE NOTE - BANDS:
POST PROCEDURE FOLLOW-UP CALL    Post-Procedure Follow-Up - IR Liver Biopsy    The post-procedure call for Angy has been successfully concluded. The patient has confirmed their understanding of home care instructions and expressed intent to follow up with the ordering Physician for any results or concerns.    Patient verbalized no concerns with recovery.    Currently, no further follow-up from Radiology is deemed necessary. However, should you require additional assistance or have any questions, please feel free to reach out to Radiology/Imaging Nursing Line.    We appreciate your cooperation and understanding. Your health and well-being remain our foremost priorities, and we are here to support you throughout your recovery process.      [facility]   Radiology/Imaging Nurse Line [phone]    Anita Valdivia LPN   IR Nurse Specialty Coordinators    5/16/2025  9:35 AM    
Do Not Use Extremity;
